# Patient Record
Sex: FEMALE | Race: WHITE | NOT HISPANIC OR LATINO | URBAN - METROPOLITAN AREA
[De-identification: names, ages, dates, MRNs, and addresses within clinical notes are randomized per-mention and may not be internally consistent; named-entity substitution may affect disease eponyms.]

---

## 2017-05-04 ENCOUNTER — OUTPATIENT (OUTPATIENT)
Dept: OUTPATIENT SERVICES | Facility: HOSPITAL | Age: 72
LOS: 1 days | Discharge: HOME | End: 2017-05-04

## 2017-06-28 DIAGNOSIS — Z79.899 OTHER LONG TERM (CURRENT) DRUG THERAPY: ICD-10-CM

## 2017-08-03 ENCOUNTER — OUTPATIENT (OUTPATIENT)
Dept: OUTPATIENT SERVICES | Facility: HOSPITAL | Age: 72
LOS: 1 days | Discharge: HOME | End: 2017-08-03

## 2017-08-03 DIAGNOSIS — Z79.899 OTHER LONG TERM (CURRENT) DRUG THERAPY: ICD-10-CM

## 2017-11-02 ENCOUNTER — OUTPATIENT (OUTPATIENT)
Dept: OUTPATIENT SERVICES | Facility: HOSPITAL | Age: 72
LOS: 1 days | Discharge: HOME | End: 2017-11-02

## 2017-11-02 DIAGNOSIS — Z79.899 OTHER LONG TERM (CURRENT) DRUG THERAPY: ICD-10-CM

## 2017-11-07 ENCOUNTER — OUTPATIENT (OUTPATIENT)
Dept: OUTPATIENT SERVICES | Facility: HOSPITAL | Age: 72
LOS: 1 days | Discharge: HOME | End: 2017-11-07

## 2017-11-07 DIAGNOSIS — Z79.899 OTHER LONG TERM (CURRENT) DRUG THERAPY: ICD-10-CM

## 2017-11-16 ENCOUNTER — OUTPATIENT (OUTPATIENT)
Dept: OUTPATIENT SERVICES | Facility: HOSPITAL | Age: 72
LOS: 1 days | Discharge: HOME | End: 2017-11-16

## 2017-11-16 DIAGNOSIS — Z79.899 OTHER LONG TERM (CURRENT) DRUG THERAPY: ICD-10-CM

## 2017-12-07 ENCOUNTER — OUTPATIENT (OUTPATIENT)
Dept: OUTPATIENT SERVICES | Facility: HOSPITAL | Age: 72
LOS: 1 days | Discharge: HOME | End: 2017-12-07

## 2017-12-07 DIAGNOSIS — Z79.899 OTHER LONG TERM (CURRENT) DRUG THERAPY: ICD-10-CM

## 2018-01-16 ENCOUNTER — OUTPATIENT (OUTPATIENT)
Dept: OUTPATIENT SERVICES | Facility: HOSPITAL | Age: 73
LOS: 1 days | Discharge: HOME | End: 2018-01-16

## 2018-01-16 DIAGNOSIS — Z12.31 ENCOUNTER FOR SCREENING MAMMOGRAM FOR MALIGNANT NEOPLASM OF BREAST: ICD-10-CM

## 2018-02-01 ENCOUNTER — OUTPATIENT (OUTPATIENT)
Dept: OUTPATIENT SERVICES | Facility: HOSPITAL | Age: 73
LOS: 1 days | Discharge: HOME | End: 2018-02-01

## 2018-02-01 DIAGNOSIS — Z79.899 OTHER LONG TERM (CURRENT) DRUG THERAPY: ICD-10-CM

## 2018-06-07 ENCOUNTER — OUTPATIENT (OUTPATIENT)
Dept: OUTPATIENT SERVICES | Facility: HOSPITAL | Age: 73
LOS: 1 days | Discharge: HOME | End: 2018-06-07

## 2018-06-07 DIAGNOSIS — Z79.899 OTHER LONG TERM (CURRENT) DRUG THERAPY: ICD-10-CM

## 2018-06-08 ENCOUNTER — OUTPATIENT (OUTPATIENT)
Dept: OUTPATIENT SERVICES | Facility: HOSPITAL | Age: 73
LOS: 1 days | Discharge: HOME | End: 2018-06-08

## 2018-06-08 DIAGNOSIS — Z79.899 OTHER LONG TERM (CURRENT) DRUG THERAPY: ICD-10-CM

## 2018-06-26 ENCOUNTER — OUTPATIENT (OUTPATIENT)
Dept: OUTPATIENT SERVICES | Facility: HOSPITAL | Age: 73
LOS: 1 days | Discharge: HOME | End: 2018-06-26

## 2018-06-26 DIAGNOSIS — Z79.899 OTHER LONG TERM (CURRENT) DRUG THERAPY: ICD-10-CM

## 2018-07-10 ENCOUNTER — OUTPATIENT (OUTPATIENT)
Dept: OUTPATIENT SERVICES | Facility: HOSPITAL | Age: 73
LOS: 1 days | Discharge: HOME | End: 2018-07-10

## 2018-07-10 DIAGNOSIS — Z79.899 OTHER LONG TERM (CURRENT) DRUG THERAPY: ICD-10-CM

## 2018-08-02 ENCOUNTER — OUTPATIENT (OUTPATIENT)
Dept: OUTPATIENT SERVICES | Facility: HOSPITAL | Age: 73
LOS: 1 days | Discharge: HOME | End: 2018-08-02

## 2018-08-02 DIAGNOSIS — Z79.899 OTHER LONG TERM (CURRENT) DRUG THERAPY: ICD-10-CM

## 2018-11-01 ENCOUNTER — OUTPATIENT (OUTPATIENT)
Dept: OUTPATIENT SERVICES | Facility: HOSPITAL | Age: 73
LOS: 1 days | Discharge: HOME | End: 2018-11-01

## 2018-11-01 DIAGNOSIS — Z79.899 OTHER LONG TERM (CURRENT) DRUG THERAPY: ICD-10-CM

## 2019-01-17 ENCOUNTER — OUTPATIENT (OUTPATIENT)
Dept: OUTPATIENT SERVICES | Facility: HOSPITAL | Age: 74
LOS: 1 days | Discharge: HOME | End: 2019-01-17

## 2019-01-17 DIAGNOSIS — Z79.899 OTHER LONG TERM (CURRENT) DRUG THERAPY: ICD-10-CM

## 2019-01-29 ENCOUNTER — OUTPATIENT (OUTPATIENT)
Dept: OUTPATIENT SERVICES | Facility: HOSPITAL | Age: 74
LOS: 1 days | Discharge: HOME | End: 2019-01-29

## 2019-01-29 DIAGNOSIS — R10.2 PELVIC AND PERINEAL PAIN: ICD-10-CM

## 2019-01-29 DIAGNOSIS — R10.9 UNSPECIFIED ABDOMINAL PAIN: ICD-10-CM

## 2019-01-30 ENCOUNTER — OUTPATIENT (OUTPATIENT)
Dept: OUTPATIENT SERVICES | Facility: HOSPITAL | Age: 74
LOS: 1 days | Discharge: HOME | End: 2019-01-30

## 2019-01-31 DIAGNOSIS — Z79.899 OTHER LONG TERM (CURRENT) DRUG THERAPY: ICD-10-CM

## 2019-02-07 ENCOUNTER — OUTPATIENT (OUTPATIENT)
Dept: OUTPATIENT SERVICES | Facility: HOSPITAL | Age: 74
LOS: 1 days | Discharge: HOME | End: 2019-02-07

## 2019-02-07 DIAGNOSIS — Z79.899 OTHER LONG TERM (CURRENT) DRUG THERAPY: ICD-10-CM

## 2019-04-30 ENCOUNTER — OUTPATIENT (OUTPATIENT)
Dept: OUTPATIENT SERVICES | Facility: HOSPITAL | Age: 74
LOS: 1 days | Discharge: HOME | End: 2019-04-30

## 2019-04-30 DIAGNOSIS — Z79.899 OTHER LONG TERM (CURRENT) DRUG THERAPY: ICD-10-CM

## 2019-07-02 ENCOUNTER — OUTPATIENT (OUTPATIENT)
Dept: OUTPATIENT SERVICES | Facility: HOSPITAL | Age: 74
LOS: 1 days | Discharge: HOME | End: 2019-07-02

## 2019-07-02 DIAGNOSIS — Z79.899 OTHER LONG TERM (CURRENT) DRUG THERAPY: ICD-10-CM

## 2019-07-09 ENCOUNTER — OUTPATIENT (OUTPATIENT)
Dept: OUTPATIENT SERVICES | Facility: HOSPITAL | Age: 74
LOS: 1 days | Discharge: HOME | End: 2019-07-09

## 2019-07-09 DIAGNOSIS — Z79.899 OTHER LONG TERM (CURRENT) DRUG THERAPY: ICD-10-CM

## 2019-07-16 ENCOUNTER — OUTPATIENT (OUTPATIENT)
Dept: OUTPATIENT SERVICES | Facility: HOSPITAL | Age: 74
LOS: 1 days | Discharge: HOME | End: 2019-07-16

## 2019-07-16 DIAGNOSIS — Z79.899 OTHER LONG TERM (CURRENT) DRUG THERAPY: ICD-10-CM

## 2019-08-08 ENCOUNTER — OUTPATIENT (OUTPATIENT)
Dept: OUTPATIENT SERVICES | Facility: HOSPITAL | Age: 74
LOS: 1 days | Discharge: HOME | End: 2019-08-08

## 2019-08-08 DIAGNOSIS — Z79.899 OTHER LONG TERM (CURRENT) DRUG THERAPY: ICD-10-CM

## 2019-09-20 ENCOUNTER — OUTPATIENT (OUTPATIENT)
Dept: OUTPATIENT SERVICES | Facility: HOSPITAL | Age: 74
LOS: 1 days | Discharge: HOME | End: 2019-09-20

## 2019-09-20 DIAGNOSIS — Z79.899 OTHER LONG TERM (CURRENT) DRUG THERAPY: ICD-10-CM

## 2019-10-17 ENCOUNTER — OUTPATIENT (OUTPATIENT)
Dept: OUTPATIENT SERVICES | Facility: HOSPITAL | Age: 74
LOS: 1 days | Discharge: HOME | End: 2019-10-17

## 2019-10-17 DIAGNOSIS — Z79.899 OTHER LONG TERM (CURRENT) DRUG THERAPY: ICD-10-CM

## 2019-11-13 ENCOUNTER — APPOINTMENT (OUTPATIENT)
Dept: CARDIOLOGY | Facility: CLINIC | Age: 74
End: 2019-11-13
Payer: MEDICARE

## 2019-11-13 ENCOUNTER — OTHER (OUTPATIENT)
Age: 74
End: 2019-11-13

## 2019-11-13 DIAGNOSIS — Z78.9 OTHER SPECIFIED HEALTH STATUS: ICD-10-CM

## 2019-11-13 DIAGNOSIS — I82.402 ACUTE EMBOLISM AND THROMBOSIS OF UNSPECIFIED DEEP VEINS OF LEFT LOWER EXTREMITY: ICD-10-CM

## 2019-11-13 DIAGNOSIS — Z86.39 PERSONAL HISTORY OF OTHER ENDOCRINE, NUTRITIONAL AND METABOLIC DISEASE: ICD-10-CM

## 2019-11-13 DIAGNOSIS — Z86.79 PERSONAL HISTORY OF OTHER DISEASES OF THE CIRCULATORY SYSTEM: ICD-10-CM

## 2019-11-13 PROBLEM — Z00.00 ENCOUNTER FOR PREVENTIVE HEALTH EXAMINATION: Status: ACTIVE | Noted: 2019-11-13

## 2019-11-13 PROCEDURE — 93000 ELECTROCARDIOGRAM COMPLETE: CPT

## 2019-11-13 PROCEDURE — 99204 OFFICE O/P NEW MOD 45 MIN: CPT

## 2019-11-13 NOTE — ASSESSMENT
[FreeTextEntry1] : DVT - on anticoagulation with Xarelto\par \par DM type 2 on rachel medical therapy\par \par HTN - controlled\par \par ECG - SB at 52. No syncope\par \par Systolic murmur - obtain 2 D echo\par \par If normal, c/w primary prevention, risk factor control.

## 2019-11-13 NOTE — PHYSICAL EXAM
[Normal Appearance] : normal appearance [General Appearance - Well Developed] : well developed [General Appearance - Well Nourished] : well nourished [Well Groomed] : well groomed [No Deformities] : no deformities [General Appearance - In No Acute Distress] : no acute distress [Normal Conjunctiva] : the conjunctiva exhibited no abnormalities [Eyelids - No Xanthelasma] : the eyelids demonstrated no xanthelasmas [Normal Oral Mucosa] : normal oral mucosa [No Oral Pallor] : no oral pallor [Heart Rate And Rhythm] : heart rate and rhythm were normal [Heart Sounds] : normal S1 and S2 [Systolic grade ___/6] : A grade [unfilled]/6 systolic murmur was heard. [] : no respiratory distress [Exaggerated Use Of Accessory Muscles For Inspiration] : no accessory muscle use [Respiration, Rhythm And Depth] : normal respiratory rhythm and effort [Bowel Sounds] : normal bowel sounds [Auscultation Breath Sounds / Voice Sounds] : lungs were clear to auscultation bilaterally [Abdomen Soft] : soft [Abdomen Tenderness] : non-tender [FreeTextEntry1] : using walker [Nail Clubbing] : no clubbing of the fingernails [Cyanosis, Localized] : no localized cyanosis [Skin Color & Pigmentation] : normal skin color and pigmentation [Petechial Hemorrhages (___cm)] : no petechial hemorrhages [No Venous Stasis] : no venous stasis [Oriented To Time, Place, And Person] : oriented to person, place, and time [Affect] : the affect was normal

## 2019-11-13 NOTE — HISTORY OF PRESENT ILLNESS
[FreeTextEntry1] : 73 y/o female with history of HTN, DL, T2DM, DVT on Xarelto, presenting for initial evaluation. ECG with mild bradycardia. No syncope. + edema. + mild dyspnea. No chest pain. No bleeding with Xarelto, but does report increased bruising. Complaint with medical therapy.

## 2019-11-13 NOTE — REVIEW OF SYSTEMS
[Eyeglasses] : currently wearing eyeglasses [Joint Pain] : joint pain [Easy Bruising] : a tendency for easy bruising [see HPI] : see HPI [Easy Bleeding] : no tendency for easy bleeding [Negative] : Endocrine

## 2019-11-14 ENCOUNTER — OUTPATIENT (OUTPATIENT)
Dept: OUTPATIENT SERVICES | Facility: HOSPITAL | Age: 74
LOS: 1 days | Discharge: HOME | End: 2019-11-14

## 2019-11-14 DIAGNOSIS — Z79.899 OTHER LONG TERM (CURRENT) DRUG THERAPY: ICD-10-CM

## 2019-12-12 ENCOUNTER — APPOINTMENT (OUTPATIENT)
Dept: CARDIOLOGY | Facility: CLINIC | Age: 74
End: 2019-12-12
Payer: MEDICARE

## 2019-12-12 PROCEDURE — 93306 TTE W/DOPPLER COMPLETE: CPT

## 2019-12-31 ENCOUNTER — OUTPATIENT (OUTPATIENT)
Dept: OUTPATIENT SERVICES | Facility: HOSPITAL | Age: 74
LOS: 1 days | Discharge: HOME | End: 2019-12-31

## 2019-12-31 DIAGNOSIS — Z79.899 OTHER LONG TERM (CURRENT) DRUG THERAPY: ICD-10-CM

## 2020-06-08 ENCOUNTER — APPOINTMENT (OUTPATIENT)
Dept: CARDIOLOGY | Facility: CLINIC | Age: 75
End: 2020-06-08

## 2020-11-02 ENCOUNTER — APPOINTMENT (OUTPATIENT)
Dept: CARDIOLOGY | Facility: CLINIC | Age: 75
End: 2020-11-02
Payer: MEDICARE

## 2020-11-02 VITALS
HEIGHT: 60 IN | HEART RATE: 63 BPM | BODY MASS INDEX: 26.5 KG/M2 | SYSTOLIC BLOOD PRESSURE: 136 MMHG | DIASTOLIC BLOOD PRESSURE: 84 MMHG | TEMPERATURE: 98.1 F | WEIGHT: 135 LBS

## 2020-11-02 PROCEDURE — 93000 ELECTROCARDIOGRAM COMPLETE: CPT

## 2020-11-02 PROCEDURE — 99213 OFFICE O/P EST LOW 20 MIN: CPT

## 2020-11-02 NOTE — REVIEW OF SYSTEMS
[Eyeglasses] : currently wearing eyeglasses [see HPI] : see HPI [Joint Pain] : joint pain [Easy Bleeding] : no tendency for easy bleeding [Easy Bruising] : a tendency for easy bruising [Negative] : Endocrine

## 2020-11-02 NOTE — HISTORY OF PRESENT ILLNESS
[FreeTextEntry1] : 76 y/o female with history of HTN, DL, T2DM, DVT - completed Xarelto, presenting for initial evaluation. ECG with mild bradycardia. No syncope. + edema - improved. + mild dyspnea. No chest pain.  Complaint with medical therapy.  Mild aortic stenosis.

## 2020-11-02 NOTE — ASSESSMENT
[FreeTextEntry1] : DVT - completed anticoagulation with Xarelto\par \par DM type 2 on oral medical therapy - monitor A1c with the PMD.\par \par HTN - controlled\par \par ECG - SB at 52. No syncope\par \par Systolic murmur, mild AS - obtain 2 D echo in 1 year.\par c/w primary prevention, risk factor control.\par F/u in 1 year.

## 2020-11-02 NOTE — PHYSICAL EXAM
[General Appearance - Well Developed] : well developed [Normal Appearance] : normal appearance [Well Groomed] : well groomed [General Appearance - Well Nourished] : well nourished [No Deformities] : no deformities [General Appearance - In No Acute Distress] : no acute distress [Normal Conjunctiva] : the conjunctiva exhibited no abnormalities [Eyelids - No Xanthelasma] : the eyelids demonstrated no xanthelasmas [] : no respiratory distress [Respiration, Rhythm And Depth] : normal respiratory rhythm and effort [Exaggerated Use Of Accessory Muscles For Inspiration] : no accessory muscle use [Auscultation Breath Sounds / Voice Sounds] : lungs were clear to auscultation bilaterally [Heart Rate And Rhythm] : heart rate and rhythm were normal [Heart Sounds] : normal S1 and S2 [Systolic grade ___/6] : A grade [unfilled]/6 systolic murmur was heard. [Bowel Sounds] : normal bowel sounds [Abdomen Soft] : soft [Abdomen Tenderness] : non-tender [FreeTextEntry1] : using walker [Nail Clubbing] : no clubbing of the fingernails [Cyanosis, Localized] : no localized cyanosis [Petechial Hemorrhages (___cm)] : no petechial hemorrhages [Skin Color & Pigmentation] : normal skin color and pigmentation [No Venous Stasis] : no venous stasis [Oriented To Time, Place, And Person] : oriented to person, place, and time [Affect] : the affect was normal

## 2021-03-08 ENCOUNTER — APPOINTMENT (OUTPATIENT)
Dept: CARDIOLOGY | Facility: CLINIC | Age: 76
End: 2021-03-08
Payer: MEDICARE

## 2021-03-08 VITALS
HEIGHT: 60 IN | HEART RATE: 53 BPM | TEMPERATURE: 97.4 F | WEIGHT: 134 LBS | BODY MASS INDEX: 26.31 KG/M2 | SYSTOLIC BLOOD PRESSURE: 160 MMHG | DIASTOLIC BLOOD PRESSURE: 90 MMHG

## 2021-03-08 DIAGNOSIS — I35.0 NONRHEUMATIC AORTIC (VALVE) STENOSIS: ICD-10-CM

## 2021-03-08 DIAGNOSIS — I50.32 CHRONIC DIASTOLIC (CONGESTIVE) HEART FAILURE: ICD-10-CM

## 2021-03-08 DIAGNOSIS — E78.5 HYPERLIPIDEMIA, UNSPECIFIED: ICD-10-CM

## 2021-03-08 DIAGNOSIS — I10 ESSENTIAL (PRIMARY) HYPERTENSION: ICD-10-CM

## 2021-03-08 DIAGNOSIS — E11.9 TYPE 2 DIABETES MELLITUS W/OUT COMPLICATIONS: ICD-10-CM

## 2021-03-08 PROCEDURE — 93000 ELECTROCARDIOGRAM COMPLETE: CPT

## 2021-03-08 PROCEDURE — 99213 OFFICE O/P EST LOW 20 MIN: CPT

## 2021-03-08 RX ORDER — HYDROCORTISONE 25 MG/G
2.5 CREAM TOPICAL
Qty: 30 | Refills: 0 | Status: DISCONTINUED | COMMUNITY
Start: 2020-07-01 | End: 2021-03-08

## 2021-03-08 NOTE — HISTORY OF PRESENT ILLNESS
[FreeTextEntry1] : 74 y/o female with history of HTN, DL, T2DM, DVT - completed Xarelto, presenting for f/u. ECG with mild bradycardia, blocked APC. No syncope. + edema - improved. + mild dyspnea. No chest pain.  Complaint with medical therapy.  Mild aortic stenosis. BP elevated today - did not take her medications.

## 2021-03-08 NOTE — ASSESSMENT
[FreeTextEntry1] : DVT - completed anticoagulation with Xarelto\par \par DM type 2 on oral medical therapy - monitor A1c with the PMD.\par \par HTN - controlled\par \par ECG - SB at 50's. No syncope\par \par Systolic murmur, mild AS - obtain 2 D echo in 1 year.\par c/w primary prevention, risk factor control.\par F/u in 1 year.

## 2021-07-17 ENCOUNTER — INPATIENT (INPATIENT)
Facility: HOSPITAL | Age: 76
LOS: 8 days | Discharge: NURSING, REHAB OR LONG-TERM CARE | End: 2021-07-26
Attending: INTERNAL MEDICINE | Admitting: INTERNAL MEDICINE
Payer: MEDICARE

## 2021-07-17 VITALS
RESPIRATION RATE: 18 BRPM | SYSTOLIC BLOOD PRESSURE: 187 MMHG | DIASTOLIC BLOOD PRESSURE: 77 MMHG | TEMPERATURE: 98 F | OXYGEN SATURATION: 98 % | HEART RATE: 52 BPM

## 2021-07-17 LAB
ALBUMIN SERPL ELPH-MCNC: 5 G/DL — SIGNIFICANT CHANGE UP (ref 3.5–5.2)
ALP SERPL-CCNC: 145 U/L — HIGH (ref 30–115)
ALT FLD-CCNC: 49 U/L — HIGH (ref 0–41)
ANION GAP SERPL CALC-SCNC: 12 MMOL/L — SIGNIFICANT CHANGE UP (ref 7–14)
APAP SERPL-MCNC: <5 UG/ML — LOW (ref 10–30)
APPEARANCE UR: CLEAR — SIGNIFICANT CHANGE UP
AST SERPL-CCNC: 15 U/L — SIGNIFICANT CHANGE UP (ref 0–41)
BACTERIA # UR AUTO: ABNORMAL
BASOPHILS # BLD AUTO: 0.02 K/UL — SIGNIFICANT CHANGE UP (ref 0–0.2)
BASOPHILS NFR BLD AUTO: 0.2 % — SIGNIFICANT CHANGE UP (ref 0–1)
BILIRUB SERPL-MCNC: 0.3 MG/DL — SIGNIFICANT CHANGE UP (ref 0.2–1.2)
BILIRUB UR-MCNC: NEGATIVE — SIGNIFICANT CHANGE UP
BUN SERPL-MCNC: 49 MG/DL — HIGH (ref 10–20)
CALCIUM SERPL-MCNC: 10.2 MG/DL — HIGH (ref 8.5–10.1)
CHLORIDE SERPL-SCNC: 109 MMOL/L — SIGNIFICANT CHANGE UP (ref 98–110)
CO2 SERPL-SCNC: 19 MMOL/L — SIGNIFICANT CHANGE UP (ref 17–32)
COLOR SPEC: SIGNIFICANT CHANGE UP
CREAT SERPL-MCNC: 2.1 MG/DL — HIGH (ref 0.7–1.5)
DIFF PNL FLD: SIGNIFICANT CHANGE UP
EOSINOPHIL # BLD AUTO: 0.24 K/UL — SIGNIFICANT CHANGE UP (ref 0–0.7)
EOSINOPHIL NFR BLD AUTO: 2.6 % — SIGNIFICANT CHANGE UP (ref 0–8)
EPI CELLS # UR: 1 /HPF — SIGNIFICANT CHANGE UP (ref 0–5)
ETHANOL SERPL-MCNC: <10 MG/DL — SIGNIFICANT CHANGE UP
GLUCOSE SERPL-MCNC: 153 MG/DL — HIGH (ref 70–99)
GLUCOSE UR QL: NEGATIVE — SIGNIFICANT CHANGE UP
HCT VFR BLD CALC: 32.6 % — LOW (ref 37–47)
HGB BLD-MCNC: 9.9 G/DL — LOW (ref 12–16)
HYALINE CASTS # UR AUTO: 2 /LPF — SIGNIFICANT CHANGE UP (ref 0–7)
IMM GRANULOCYTES NFR BLD AUTO: 0.8 % — HIGH (ref 0.1–0.3)
KETONES UR-MCNC: NEGATIVE — SIGNIFICANT CHANGE UP
LEUKOCYTE ESTERASE UR-ACNC: ABNORMAL
LITHIUM SERPL-MCNC: 1.68 MMOL/L — CRITICAL HIGH (ref 0.6–1.2)
LYMPHOCYTES # BLD AUTO: 1.5 K/UL — SIGNIFICANT CHANGE UP (ref 1.2–3.4)
LYMPHOCYTES # BLD AUTO: 16.4 % — LOW (ref 20.5–51.1)
MCHC RBC-ENTMCNC: 26.5 PG — LOW (ref 27–31)
MCHC RBC-ENTMCNC: 30.4 G/DL — LOW (ref 32–37)
MCV RBC AUTO: 87.2 FL — SIGNIFICANT CHANGE UP (ref 81–99)
MONOCYTES # BLD AUTO: 0.37 K/UL — SIGNIFICANT CHANGE UP (ref 0.1–0.6)
MONOCYTES NFR BLD AUTO: 4 % — SIGNIFICANT CHANGE UP (ref 1.7–9.3)
NEUTROPHILS # BLD AUTO: 6.95 K/UL — HIGH (ref 1.4–6.5)
NEUTROPHILS NFR BLD AUTO: 76 % — HIGH (ref 42.2–75.2)
NITRITE UR-MCNC: NEGATIVE — SIGNIFICANT CHANGE UP
NRBC # BLD: 0 /100 WBCS — SIGNIFICANT CHANGE UP (ref 0–0)
PH UR: 6.5 — SIGNIFICANT CHANGE UP (ref 5–8)
PLATELET # BLD AUTO: 205 K/UL — SIGNIFICANT CHANGE UP (ref 130–400)
POTASSIUM SERPL-MCNC: 5.6 MMOL/L — HIGH (ref 3.5–5)
POTASSIUM SERPL-SCNC: 5.6 MMOL/L — HIGH (ref 3.5–5)
PROT SERPL-MCNC: 7.7 G/DL — SIGNIFICANT CHANGE UP (ref 6–8)
PROT UR-MCNC: ABNORMAL
RBC # BLD: 3.74 M/UL — LOW (ref 4.2–5.4)
RBC # FLD: 14.3 % — SIGNIFICANT CHANGE UP (ref 11.5–14.5)
RBC CASTS # UR COMP ASSIST: 1 /HPF — SIGNIFICANT CHANGE UP (ref 0–4)
SALICYLATES SERPL-MCNC: <0.3 MG/DL — LOW (ref 4–30)
SARS-COV-2 RNA SPEC QL NAA+PROBE: DETECTED
SODIUM SERPL-SCNC: 140 MMOL/L — SIGNIFICANT CHANGE UP (ref 135–146)
SP GR SPEC: 1.01 — SIGNIFICANT CHANGE UP (ref 1.01–1.03)
UROBILINOGEN FLD QL: SIGNIFICANT CHANGE UP
WBC # BLD: 9.15 K/UL — SIGNIFICANT CHANGE UP (ref 4.8–10.8)
WBC # FLD AUTO: 9.15 K/UL — SIGNIFICANT CHANGE UP (ref 4.8–10.8)
WBC UR QL: 87 /HPF — HIGH (ref 0–5)

## 2021-07-17 PROCEDURE — 70450 CT HEAD/BRAIN W/O DYE: CPT | Mod: 26,ME

## 2021-07-17 PROCEDURE — G1004: CPT

## 2021-07-17 PROCEDURE — 93010 ELECTROCARDIOGRAM REPORT: CPT

## 2021-07-17 PROCEDURE — 71045 X-RAY EXAM CHEST 1 VIEW: CPT | Mod: 26

## 2021-07-17 PROCEDURE — 99285 EMERGENCY DEPT VISIT HI MDM: CPT | Mod: CS,GC

## 2021-07-17 RX ORDER — INSULIN HUMAN 100 [IU]/ML
10 INJECTION, SOLUTION SUBCUTANEOUS ONCE
Refills: 0 | Status: COMPLETED | OUTPATIENT
Start: 2021-07-17 | End: 2021-07-17

## 2021-07-17 RX ORDER — HALOPERIDOL DECANOATE 100 MG/ML
0.5 INJECTION INTRAMUSCULAR ONCE
Refills: 0 | Status: COMPLETED | OUTPATIENT
Start: 2021-07-17 | End: 2021-07-17

## 2021-07-17 RX ORDER — DEXTROSE 50 % IN WATER 50 %
50 SYRINGE (ML) INTRAVENOUS ONCE
Refills: 0 | Status: COMPLETED | OUTPATIENT
Start: 2021-07-17 | End: 2021-07-17

## 2021-07-17 RX ORDER — SODIUM ZIRCONIUM CYCLOSILICATE 10 G/10G
5 POWDER, FOR SUSPENSION ORAL ONCE
Refills: 0 | Status: COMPLETED | OUTPATIENT
Start: 2021-07-17 | End: 2021-07-18

## 2021-07-17 RX ORDER — SODIUM CHLORIDE 9 MG/ML
1000 INJECTION, SOLUTION INTRAVENOUS ONCE
Refills: 0 | Status: COMPLETED | OUTPATIENT
Start: 2021-07-17 | End: 2021-07-17

## 2021-07-17 RX ORDER — ATROPINE SULFATE 0.1 MG/ML
0.5 SYRINGE (ML) INJECTION ONCE
Refills: 0 | Status: DISCONTINUED | OUTPATIENT
Start: 2021-07-17 | End: 2021-07-26

## 2021-07-17 RX ORDER — CALCIUM GLUCONATE 100 MG/ML
1 VIAL (ML) INTRAVENOUS ONCE
Refills: 0 | Status: DISCONTINUED | OUTPATIENT
Start: 2021-07-17 | End: 2021-07-18

## 2021-07-17 RX ORDER — CEFTRIAXONE 500 MG/1
1000 INJECTION, POWDER, FOR SOLUTION INTRAMUSCULAR; INTRAVENOUS ONCE
Refills: 0 | Status: COMPLETED | OUTPATIENT
Start: 2021-07-17 | End: 2021-07-17

## 2021-07-17 RX ADMIN — CEFTRIAXONE 100 MILLIGRAM(S): 500 INJECTION, POWDER, FOR SOLUTION INTRAMUSCULAR; INTRAVENOUS at 21:37

## 2021-07-17 RX ADMIN — SODIUM CHLORIDE 1000 MILLILITER(S): 9 INJECTION, SOLUTION INTRAVENOUS at 21:36

## 2021-07-17 RX ADMIN — Medication 50 MILLILITER(S): at 23:58

## 2021-07-17 NOTE — ED ADULT NURSE NOTE - NSIMPLEMENTINTERV_GEN_ALL_ED
Implemented All Universal Safety Interventions:  [place] to call system. Call bell, personal items and telephone within reach. Instruct patient to call for assistance. Room bathroom lighting operational. Non-slip footwear when patient is off stretcher. Physically safe environment: no spills, clutter or unnecessary equipment. Stretcher in lowest position, wheels locked, appropriate side rails in place.

## 2021-07-17 NOTE — ED ADULT NURSE NOTE - SUICIDE SCREENING QUESTION 2
Problem: Patient Care Overview (Adult)  Goal: Plan of Care Review  Outcome: Ongoing (interventions implemented as appropriate)   01/16/18 0318   Coping/Psychosocial Response Interventions   Plan Of Care Reviewed With patient   Patient Care Overview   Progress no change   Outcome Evaluation   Outcome Summary/Follow up Plan pain controlled with PO pain meds; ambulates with assist; voiding; recieved 2 units of blood; educated pt about importance of BP monitoring r/t hx of HTN; plan for surgery in afternoon     Goal: Adult Individualization and Mutuality  Outcome: Ongoing (interventions implemented as appropriate)      Problem: Fall Risk (Adult)  Goal: Identify Related Risk Factors and Signs and Symptoms  Outcome: Outcome(s) achieved Date Met: 01/16/18    Goal: Absence of Falls  Outcome: Ongoing (interventions implemented as appropriate)      Problem: Pain, Acute (Adult)  Goal: Identify Related Risk Factors and Signs and Symptoms  Outcome: Outcome(s) achieved Date Met: 01/16/18    Goal: Acceptable Pain Control/Comfort Level  Outcome: Ongoing (interventions implemented as appropriate)      Problem: Orthopaedic Fracture (Adult)  Goal: Signs and Symptoms of Listed Potential Problems Will be Absent or Manageable (Orthopaedic Fracture)  Outcome: Ongoing (interventions implemented as appropriate)      Problem: Self-Care Deficit (Adult,Obstetrics,Pediatric)  Goal: Identify Related Risk Factors and Signs and Symptoms  Outcome: Outcome(s) achieved Date Met: 01/16/18    Goal: Improved Ability to Perform BADL and IADL  Outcome: Ongoing (interventions implemented as appropriate)         No

## 2021-07-17 NOTE — ED PROVIDER NOTE - ATTENDING CONTRIBUTION TO CARE
76 yo F with PMH of CHF, DM, Bipolar presents to ED for confusion. Pt lives at [facility] and called the son saying that patient seemed more confused and was walking around the halls. Son states pt has become more forgetful recently.   pt does not have any concerns- no SOB, CP, fever, abdominal pain, palpitations.   according to the son pt has refused some of her lithium doses for the past 2 months and her levels have been low but the facility now is watching her.     Const: Well nourished, well developed, appears stated age  Eyes: PERRL, no conjunctival injection  HENT:  Neck supple without meningismus   CV: RRR, Warm, well-perfused extremities  RESP: CTA B/L, no tachypnea   GI: soft, non-tender, non-distended  MSK: No gross deformities appreciated  Skin: Warm, dry. No rashes  Neuro: Alert to person. Not time. Pt able to answer what type of building this is when prompted (school vs hospital vs home) CNs II-XII grossly intact. Sensation and motor function of extremities grossly intact.  Psych: Appropriate mood and affect.    CT, labs, UA, CXR

## 2021-07-17 NOTE — ED ADULT NURSE NOTE - OBJECTIVE STATEMENT
Call- [address] shows a gallstone and some fatty liver. Neither need any intervention .  Just be aware of pain below the right rib cage, usually after eating Pt noted  by the staff at [facility] confused Altered mental status from today in AM , increase of forgetfulness ,

## 2021-07-17 NOTE — ED PROVIDER NOTE - PHYSICAL EXAMINATION
Vital Signs: Reviewed  GEN: AOx1, NAD, speaks full sentences, follows commands  HEAD:  normocephalic, atraumatic  EYES:  PERRLA; conjunctivae without injection, drainage or discharge  ENMT:  nasal mucosa moist; mouth moist without ulcerations or lesions; throat moist without erythema, exudate, ulcerations or lesions  NECK:  supple  CARDIAC:  regular rate, normal S1 and S2, no murmurs  RESP:  respiratory rate and effort appear normal for age; lungs are clear to auscultation bilaterally; no rales or wheezes  ABDOMEN:  soft, nontender, nondistended  MUSCULOSKELETAL/NEURO: CNII-XII intact, strength 5/5 b/l UE LE, Rhomberg neg; normal movement, normal tone  SKIN:  normal skin color for age and race, well-perfused; warm and dry

## 2021-07-17 NOTE — H&P ADULT - HISTORY OF PRESENT ILLNESS
Patient is poor historian. history taken from chart.  "75y F pmhx HTN, DM, MDD, bipolar on lithium, anxiety send in by [facility] due to AMS x3-4 days; constant, stable; per NH and son at bedside pt has been more forgetful than usual recently (might have early dementia but sx have never been this severe), with impairment in short-term memory. Pt has no complaints--denies chest pain, fever/chills, SOB, abd pain, n/v/d."    Spoke with Aid at [facility] ([phone]) Ms. Linton, as per aid patient was complaining of diarrhea this morning but she was not letting anyone go to the bathroom to check. Patient had received COVID vaccine (does not remember date). Aid denies any recent cough, fever, sob.    In ED: vitals Temp 98.3; HR 52, /77; RR 18; SaO2 98% on room air. UA suggestive of UTI. Patient received 1L LR bolus + Ceftriaxone 1000 mg x 1.  EKG in ED noted for Sinus bradycardia. Patient is poor historian. history taken from chart.  "75y F pmhx HTN, DM, MDD, bipolar on lithium, anxiety send in by [facility] (assisted living)          due to AMS x3-4 days; constant, stable; per NH and son at bedside pt has been more forgetful than usual recently (might have early dementia but sx have never been this severe), with impairment in short-term memory. Pt has no complaints--denies chest pain, fever/chills, SOB, abd pain, n/v/d."    Spoke with Aid at [facility] ([phone]) Ms. Linton, as per aid patient was complaining of diarrhea this morning but she was not letting anyone go to the bathroom to check. Patient had received COVID vaccine (does not remember date). Aid denies any recent cough, fever, sob.    In ED: vitals Temp 98.3; HR 52, /77; RR 18; SaO2 98% on room air. UA suggestive of UTI. Patient received 1L LR bolus + Ceftriaxone 1000 mg x 1.  EKG in ED noted for Sinus bradycardia.

## 2021-07-17 NOTE — ED PROVIDER NOTE - PMH
Bipolar disorder    DM (diabetes mellitus)    HTN (hypertension)    MDD (major depressive disorder)

## 2021-07-17 NOTE — ED PROVIDER NOTE - CLINICAL SUMMARY MEDICAL DECISION MAKING FREE TEXT BOX
76 yo F presented to ED for AMS. Pt more confused recently. CT head unchanged. Lithium level increased to 1.8. Labs otherwise unremarkable. +UTI. Will admit for treatment.

## 2021-07-17 NOTE — H&P ADULT - NSHPADDITIONALINFOADULT_GEN_ALL_CORE
Patient seen today, H&P read and appreciated. H& P amended, patient is a resident of [facility], assisted living facility.  Will check registry for Covid vaccination status.    Vs noted, BP remains elevated despite dose of IV Hydralazine today. Most of patient's BP medications held    NAD, patient anxious  lungs shallow  heart S1S2  abdomen benign  extremities no edema    COVID-19 PCR: Detected: You can help in the fight against COVID-19. [facility] may contact     Lithium Level, Serum: 1.38 mmol/L (07.18.21 @ 08:15)   Lithium Level, Serum: 1.68: Critical value:called to/read back by dr alford @ 5:50pm 7/17/21. mmol/L     Creatinine, Serum: 1.9 mg/dL (07.18.21 @ 08:15)   Creatinine, Serum: 2.0 mg/dL (07.18.21 @ 00:32)   Creatinine, Serum: 2.1 mg/dL (07.17.21 @ 17:00)     Blood Urea Nitrogen, Serum: 41 mg/dL (07.18.21 @ 08:15)   Blood Urea Nitrogen, Serum: 41 mg/dL (07.18.21 @ 00:32)   Blood Urea Nitrogen, Serum: 49 mg/dL (07.17.21 @ 17:00)

## 2021-07-17 NOTE — ED PROVIDER NOTE - OBJECTIVE STATEMENT
75yF pmhx HTN, DM, MDD, bipolar on lithium, anxiety send in by [facility] due to AMS x3-4 days; constant, stable; per NH and son at bedside pt has been more forgetful than usual recently (might have early dementia but sx have never been this severe), with impairment in short-term memory. Pt has no complaints--denies chest pain, fever/chills, SOB, abd pain, n/v/d.

## 2021-07-17 NOTE — ED PROVIDER NOTE - CARE PLAN
Principal Discharge DX:	Urinary tract infection  Secondary Diagnosis:	Metabolic encephalopathy  Secondary Diagnosis:	Elevated lithium level

## 2021-07-17 NOTE — H&P ADULT - NSHPPHYSICALEXAM_GEN_ALL_CORE
T(F): 98.7 (07-17-21 @ 22:54), Max: 98.7 (07-17-21 @ 22:54)  HR: 45 (07-17-21 @ 22:54) (45 - 54)  BP: 175/73 (07-17-21 @ 22:54) (175/73 - 211/85)  RR: 20 (07-17-21 @ 22:54) (18 - 20)  SpO2: 96% (07-17-21 @ 22:54) (96% - 100%)      PHYSICAL EXAM:  GENERAL: NAD, well-developed  CHEST/LUNG: CTAB; No wheeze  HEART: bradycardia, no murmur  ABDOMEN: Soft, NT/ND; BS present  EXTREMITIES:  No cyanosis, or edema  NEUROLOGY: Patient is awake and alert but not oriented to time, place or person.  SKIN: No rashes or lesions

## 2021-07-17 NOTE — H&P ADULT - NSHPLABSRESULTS_GEN_ALL_CORE
.                          9.9    9.15  )-----------( 205      ( 2021 17:00 )             32.6         140  |  109  |  49<H>  ----------------------------<  153<H>  5.6<H>   |  19  |  2.1<H>    Ca    10.2<H>      2021 17:00    TPro  7.7  /  Alb  5.0  /  TBili  0.3  /  DBili  x   /  AST  15  /  ALT  49<H>  /  AlkPhos  145<H>          LIVER FUNCTIONS - ( 2021 17:00 )  Alb: 5.0 g/dL / Pro: 7.7 g/dL / ALK PHOS: 145 U/L / ALT: 49 U/L / AST: 15 U/L / GGT: x             COVID-19 PCR: Detected (2021 20:30)    EKG: Sinus bradycardia (21)    Urinalysis Basic - ( 2021 19:20 )    Color: Light Yellow / Appearance: Clear / S.014 / pH: x  Gluc: x / Ketone: Negative  / Bili: Negative / Urobili: <2 mg/dL   Blood: x / Protein: 300 mg/dL / Nitrite: Negative   Leuk Esterase: Large / RBC: 1 /HPF / WBC 87 /HPF   Sq Epi: x / Non Sq Epi: 1 /HPF / Bacteria: Many      RADIOLOGY & ADDITIONAL STUDIES:  < from: CT Head No Cont (21 @ 17:12) >    No CT evidence ofacute intracranial pathology.    < end of copied text >

## 2021-07-17 NOTE — ED PROVIDER NOTE - PROGRESS NOTE DETAILS
ER: spoke to the MAR regarding pts repeat ekg. noted to be bradycardic and also concern for peak t waves. MAR aware.

## 2021-07-17 NOTE — H&P ADULT - TIME BILLING
ASHLEY, dehydration secondary to diarrhea, vs Covid 19 infection  - responding to IVF  - continue hydration  - BUN, Creat trending downward  - GFR improving  - continue to hold ACE, Lithium and Metformin    HTN  - on Amlodipine, given home dose this am  - hold ACE, Lasix  - resume Catapres today, give extra dose of 0.1 mg now    Metabolic encephalopathy multi factorial   - Lithium toxicity  - ASHLEY, dehydration  - Covid related?    Covid 19 Infection  - verify immunizations, will check office record and registry  - obtain Covid Antibodies  - obtain, Procalcitonin level, CRP, Ferritin, D-dimer and LDH level  - would obtain ID evaluation  - isolation as per protocol  - notify [facility] of Infection    DM  - hold Metformin  - treat with insulin as needed    Bipolar Disorder  - hold lithium  - would obtain psychiatry evaluation ASHLEY, dehydration secondary to diarrhea, vs Covid 19 infection  - responding to IVF  - continue hydration  - BUN, Creat trending downward  - GFR improving  - continue to hold ACE, Lithium and Metformin    UTI  - on IV Rocephin  - await culture results    HTN  - on Amlodipine, given home dose this am  - hold ACE, Lasix  - resume Catapres today, give extra dose of 0.1 mg now    Metabolic encephalopathy multi factorial   - Lithium toxicity  - ASHLEY, dehydration  - Covid related?    Covid 19 Infection  - verify immunizations, will check office record and registry  - obtain Covid Antibodies  - obtain, Procalcitonin level, CRP, Ferritin, D-dimer and LDH level  - would obtain ID evaluation  - isolation as per protocol  - notify [facility] of Infection    DM  - hold Metformin  - treat with insulin as needed    Bipolar Disorder  - hold lithium  - would obtain psychiatry evaluation

## 2021-07-17 NOTE — H&P ADULT - ASSESSMENT
75y F pmhx HTN, DM, MDD, bipolar on lithium, anxiety send in by [facility] due to AMS x3-4 days    # AMS  - likely metabolic encephalopathy 2/2 UTI vs Li toxicity vs ASHLEY on CKD  - CT head negative  - UA suggestive of UTI  - s/p Rocephin 1 gm x 1. UCx sent  - start Rocephin 1 gm q 24 hr  - follow UCx      # Li toxicity  - stop PO Li for now. single dose of haloperidol 0.5 mg ordered for agitation.  - repeat level  - s/p LR 1L bolus.  - gentle hydration with NS at 50/hr for 10 hours.  - no tremors on exam.    # ASHLEY on CKD3 + hyperkalemia: baseline creatinine ~ 1.4 - 1.7  - On lasix 40 mg at [facility]  - likely prerenal. Patient looks dry on exam  - hold lasix.  - encouage po intake  - gentle hydration with NS at 50 /hr for 10 hours.  - trend BMP  - check urine Na, creatinine, urea  - hyperkalemia can be due ASHLEY + ACEIs + Metformin. hold lisinopril and metformin.  - single dose of lokelma 5 gm ordered.  - check repeat level.      # Bradycardia  - ? due to metoprolol + clonidine vs hyperkalemia  - ekg noted. no significant tall T waves on EKG.  - single dose of insulin + dextrose ordered.  - hold clonidine and metoprolol for now  - trops ordered.  - Patient currently asymptomatic.    # HTN  - elevated BP noted.  - c/w amlodipine.  - monitor BP.    DVT: not indicated for now.  GI: Pantoprazole  Diet: DASH  Activity: Ambulate as tolerated  Dispo: Acute for now 75y F pmhx HTN, DM, MDD, bipolar on lithium, anxiety send in by [facility] due to AMS x3-4 days    # AMS  - likely metabolic encephalopathy 2/2 UTI vs Li toxicity vs ASHLEY on CKD  - CT head negative  - UA suggestive of UTI  - s/p Rocephin 1 gm x 1. UCx sent  - start Rocephin 1 gm q 24 hr  - follow UCx      # Li toxicity  - stop PO Li for now. single dose of haloperidol 0.5 mg ordered for agitation.  - repeat level  - s/p LR 1L bolus.  - gentle hydration with NS at 50/hr for 10 hours.  - no tremors on exam.    # ASHLEY on CKD3 + hyperkalemia: baseline creatinine ~ 1.4 - 1.7  - On lasix 40 mg at [facility]  - likely prerenal. Patient looks dry on exam  - hold lasix.  - encouage po intake  - gentle hydration with NS at 50 /hr for 10 hours.  - trend BMP  - check urine Na, creatinine, urea  - hyperkalemia can be due ASHLEY + ACEIs + Metformin. hold lisinopril and metformin.  - single dose of lokelma 5 gm ordered.  - check repeat level.      # Bradycardia  - ? due to metoprolol + clonidine vs hyperkalemia  - ekg noted. no significant tall T waves on EKG.  - single dose of insulin + dextrose ordered.  - hold clonidine and metoprolol for now  - trops ordered.  - Patient currently asymptomatic.    # HTN  - elevated BP noted.  - c/w amlodipine.  - monitor BP.    # DM  - monitor FS  - start insulin if FS > 180    DVT: not indicated for now.  GI: Pantoprazole  Diet: DASH  Activity: Ambulate as tolerated  Dispo: Acute for now

## 2021-07-18 LAB
ALBUMIN SERPL ELPH-MCNC: 4.5 G/DL — SIGNIFICANT CHANGE UP (ref 3.5–5.2)
ALBUMIN SERPL ELPH-MCNC: 4.7 G/DL — SIGNIFICANT CHANGE UP (ref 3.5–5.2)
ALP SERPL-CCNC: 135 U/L — HIGH (ref 30–115)
ALP SERPL-CCNC: 140 U/L — HIGH (ref 30–115)
ALT FLD-CCNC: 39 U/L — SIGNIFICANT CHANGE UP (ref 0–41)
ALT FLD-CCNC: 42 U/L — HIGH (ref 0–41)
ANION GAP SERPL CALC-SCNC: 11 MMOL/L — SIGNIFICANT CHANGE UP (ref 7–14)
ANION GAP SERPL CALC-SCNC: 12 MMOL/L — SIGNIFICANT CHANGE UP (ref 7–14)
AST SERPL-CCNC: 14 U/L — SIGNIFICANT CHANGE UP (ref 0–41)
AST SERPL-CCNC: 14 U/L — SIGNIFICANT CHANGE UP (ref 0–41)
BASOPHILS # BLD AUTO: 0.03 K/UL — SIGNIFICANT CHANGE UP (ref 0–0.2)
BASOPHILS NFR BLD AUTO: 0.3 % — SIGNIFICANT CHANGE UP (ref 0–1)
BILIRUB SERPL-MCNC: 0.2 MG/DL — SIGNIFICANT CHANGE UP (ref 0.2–1.2)
BILIRUB SERPL-MCNC: 0.3 MG/DL — SIGNIFICANT CHANGE UP (ref 0.2–1.2)
BUN SERPL-MCNC: 41 MG/DL — HIGH (ref 10–20)
BUN SERPL-MCNC: 41 MG/DL — HIGH (ref 10–20)
CALCIUM SERPL-MCNC: 10.1 MG/DL — SIGNIFICANT CHANGE UP (ref 8.5–10.1)
CALCIUM SERPL-MCNC: 10.1 MG/DL — SIGNIFICANT CHANGE UP (ref 8.5–10.1)
CHLORIDE SERPL-SCNC: 107 MMOL/L — SIGNIFICANT CHANGE UP (ref 98–110)
CHLORIDE SERPL-SCNC: 112 MMOL/L — HIGH (ref 98–110)
CO2 SERPL-SCNC: 17 MMOL/L — SIGNIFICANT CHANGE UP (ref 17–32)
CO2 SERPL-SCNC: 19 MMOL/L — SIGNIFICANT CHANGE UP (ref 17–32)
CREAT SERPL-MCNC: 1.9 MG/DL — HIGH (ref 0.7–1.5)
CREAT SERPL-MCNC: 2 MG/DL — HIGH (ref 0.7–1.5)
EOSINOPHIL # BLD AUTO: 0.25 K/UL — SIGNIFICANT CHANGE UP (ref 0–0.7)
EOSINOPHIL NFR BLD AUTO: 2.5 % — SIGNIFICANT CHANGE UP (ref 0–8)
GLUCOSE BLDC GLUCOMTR-MCNC: 151 MG/DL — HIGH (ref 70–99)
GLUCOSE BLDC GLUCOMTR-MCNC: 185 MG/DL — HIGH (ref 70–99)
GLUCOSE BLDC GLUCOMTR-MCNC: 202 MG/DL — HIGH (ref 70–99)
GLUCOSE BLDC GLUCOMTR-MCNC: 214 MG/DL — HIGH (ref 70–99)
GLUCOSE SERPL-MCNC: 162 MG/DL — HIGH (ref 70–99)
GLUCOSE SERPL-MCNC: 197 MG/DL — HIGH (ref 70–99)
HCT VFR BLD CALC: 33.5 % — LOW (ref 37–47)
HGB BLD-MCNC: 10 G/DL — LOW (ref 12–16)
IMM GRANULOCYTES NFR BLD AUTO: 0.5 % — HIGH (ref 0.1–0.3)
LDH SERPL L TO P-CCNC: 160 — SIGNIFICANT CHANGE UP (ref 50–242)
LITHIUM SERPL-MCNC: 1.38 MMOL/L — HIGH (ref 0.6–1.2)
LYMPHOCYTES # BLD AUTO: 1.71 K/UL — SIGNIFICANT CHANGE UP (ref 1.2–3.4)
LYMPHOCYTES # BLD AUTO: 17.2 % — LOW (ref 20.5–51.1)
MCHC RBC-ENTMCNC: 26.2 PG — LOW (ref 27–31)
MCHC RBC-ENTMCNC: 29.9 G/DL — LOW (ref 32–37)
MCV RBC AUTO: 87.9 FL — SIGNIFICANT CHANGE UP (ref 81–99)
MONOCYTES # BLD AUTO: 0.43 K/UL — SIGNIFICANT CHANGE UP (ref 0.1–0.6)
MONOCYTES NFR BLD AUTO: 4.3 % — SIGNIFICANT CHANGE UP (ref 1.7–9.3)
NEUTROPHILS # BLD AUTO: 7.48 K/UL — HIGH (ref 1.4–6.5)
NEUTROPHILS NFR BLD AUTO: 75.2 % — SIGNIFICANT CHANGE UP (ref 42.2–75.2)
NRBC # BLD: 0 /100 WBCS — SIGNIFICANT CHANGE UP (ref 0–0)
PLATELET # BLD AUTO: 212 K/UL — SIGNIFICANT CHANGE UP (ref 130–400)
POTASSIUM SERPL-MCNC: 4.6 MMOL/L — SIGNIFICANT CHANGE UP (ref 3.5–5)
POTASSIUM SERPL-MCNC: 4.7 MMOL/L — SIGNIFICANT CHANGE UP (ref 3.5–5)
POTASSIUM SERPL-SCNC: 4.6 MMOL/L — SIGNIFICANT CHANGE UP (ref 3.5–5)
POTASSIUM SERPL-SCNC: 4.7 MMOL/L — SIGNIFICANT CHANGE UP (ref 3.5–5)
PROT SERPL-MCNC: 7 G/DL — SIGNIFICANT CHANGE UP (ref 6–8)
PROT SERPL-MCNC: 7.2 G/DL — SIGNIFICANT CHANGE UP (ref 6–8)
RBC # BLD: 3.81 M/UL — LOW (ref 4.2–5.4)
RBC # FLD: 14.3 % — SIGNIFICANT CHANGE UP (ref 11.5–14.5)
SODIUM SERPL-SCNC: 138 MMOL/L — SIGNIFICANT CHANGE UP (ref 135–146)
SODIUM SERPL-SCNC: 140 MMOL/L — SIGNIFICANT CHANGE UP (ref 135–146)
TROPONIN T SERPL-MCNC: <0.01 NG/ML — SIGNIFICANT CHANGE UP
WBC # BLD: 9.95 K/UL — SIGNIFICANT CHANGE UP (ref 4.8–10.8)
WBC # FLD AUTO: 9.95 K/UL — SIGNIFICANT CHANGE UP (ref 4.8–10.8)

## 2021-07-18 RX ORDER — DEXTROSE 50 % IN WATER 50 %
15 SYRINGE (ML) INTRAVENOUS ONCE
Refills: 0 | Status: DISCONTINUED | OUTPATIENT
Start: 2021-07-18 | End: 2021-07-26

## 2021-07-18 RX ORDER — HYDRALAZINE HCL 50 MG
10 TABLET ORAL ONCE
Refills: 0 | Status: COMPLETED | OUTPATIENT
Start: 2021-07-18 | End: 2021-07-18

## 2021-07-18 RX ORDER — SODIUM CHLORIDE 9 MG/ML
1000 INJECTION, SOLUTION INTRAVENOUS
Refills: 0 | Status: DISCONTINUED | OUTPATIENT
Start: 2021-07-18 | End: 2021-07-26

## 2021-07-18 RX ORDER — PANTOPRAZOLE SODIUM 20 MG/1
40 TABLET, DELAYED RELEASE ORAL
Refills: 0 | Status: DISCONTINUED | OUTPATIENT
Start: 2021-07-17 | End: 2021-07-26

## 2021-07-18 RX ORDER — AMLODIPINE BESYLATE 2.5 MG/1
5 TABLET ORAL DAILY
Refills: 0 | Status: DISCONTINUED | OUTPATIENT
Start: 2021-07-17 | End: 2021-07-20

## 2021-07-18 RX ORDER — ATORVASTATIN CALCIUM 80 MG/1
40 TABLET, FILM COATED ORAL DAILY
Refills: 0 | Status: DISCONTINUED | OUTPATIENT
Start: 2021-07-17 | End: 2021-07-26

## 2021-07-18 RX ORDER — DEXTROSE 50 % IN WATER 50 %
12.5 SYRINGE (ML) INTRAVENOUS ONCE
Refills: 0 | Status: DISCONTINUED | OUTPATIENT
Start: 2021-07-18 | End: 2021-07-26

## 2021-07-18 RX ORDER — DEXTROSE 50 % IN WATER 50 %
25 SYRINGE (ML) INTRAVENOUS ONCE
Refills: 0 | Status: DISCONTINUED | OUTPATIENT
Start: 2021-07-18 | End: 2021-07-26

## 2021-07-18 RX ORDER — AMLODIPINE BESYLATE 2.5 MG/1
5 TABLET ORAL ONCE
Refills: 0 | Status: COMPLETED | OUTPATIENT
Start: 2021-07-18 | End: 2021-07-18

## 2021-07-18 RX ORDER — GLUCAGON INJECTION, SOLUTION 0.5 MG/.1ML
1 INJECTION, SOLUTION SUBCUTANEOUS ONCE
Refills: 0 | Status: DISCONTINUED | OUTPATIENT
Start: 2021-07-18 | End: 2021-07-26

## 2021-07-18 RX ORDER — SODIUM CHLORIDE 9 MG/ML
500 INJECTION INTRAMUSCULAR; INTRAVENOUS; SUBCUTANEOUS
Refills: 0 | Status: DISCONTINUED | OUTPATIENT
Start: 2021-07-18 | End: 2021-07-20

## 2021-07-18 RX ORDER — HYDRALAZINE HCL 50 MG
25 TABLET ORAL ONCE
Refills: 0 | Status: COMPLETED | OUTPATIENT
Start: 2021-07-18 | End: 2021-07-18

## 2021-07-18 RX ORDER — INSULIN LISPRO 100/ML
VIAL (ML) SUBCUTANEOUS
Refills: 0 | Status: DISCONTINUED | OUTPATIENT
Start: 2021-07-18 | End: 2021-07-26

## 2021-07-18 RX ADMIN — Medication 2: at 16:54

## 2021-07-18 RX ADMIN — AMLODIPINE BESYLATE 5 MILLIGRAM(S): 2.5 TABLET ORAL at 05:26

## 2021-07-18 RX ADMIN — INSULIN HUMAN 10 UNIT(S): 100 INJECTION, SOLUTION SUBCUTANEOUS at 00:04

## 2021-07-18 RX ADMIN — PANTOPRAZOLE SODIUM 40 MILLIGRAM(S): 20 TABLET, DELAYED RELEASE ORAL at 05:25

## 2021-07-18 RX ADMIN — AMLODIPINE BESYLATE 5 MILLIGRAM(S): 2.5 TABLET ORAL at 20:59

## 2021-07-18 RX ADMIN — SODIUM CHLORIDE 50 MILLILITER(S): 9 INJECTION INTRAMUSCULAR; INTRAVENOUS; SUBCUTANEOUS at 03:00

## 2021-07-18 RX ADMIN — Medication 0.1 MILLIGRAM(S): at 14:36

## 2021-07-18 RX ADMIN — Medication 1: at 09:30

## 2021-07-18 RX ADMIN — SODIUM ZIRCONIUM CYCLOSILICATE 5 GRAM(S): 10 POWDER, FOR SUSPENSION ORAL at 00:04

## 2021-07-18 RX ADMIN — HALOPERIDOL DECANOATE 0.5 MILLIGRAM(S): 100 INJECTION INTRAMUSCULAR at 00:05

## 2021-07-18 RX ADMIN — Medication 1: at 12:05

## 2021-07-18 RX ADMIN — Medication 25 MILLIGRAM(S): at 18:13

## 2021-07-18 RX ADMIN — Medication 10 MILLIGRAM(S): at 19:27

## 2021-07-18 RX ADMIN — Medication 10 MILLIGRAM(S): at 11:21

## 2021-07-18 RX ADMIN — ATORVASTATIN CALCIUM 40 MILLIGRAM(S): 80 TABLET, FILM COATED ORAL at 12:05

## 2021-07-18 NOTE — CONSULT NOTE ADULT - SUBJECTIVE AND OBJECTIVE BOX
Nephrology progress note    THIS IS AN INCOMPLETE NOTE . FULL NOTE TO FOLLOW SHORTLY    Patient is seen and examined, events over the last 24 h noted .    Allergies:  amoxicillin (Unknown)  Iodides (Unknown)    Hospital Medications:   MEDICATIONS  (STANDING):  amLODIPine   Tablet 5 milliGRAM(s) Oral daily  atorvastatin Oral Tab/Cap - Peds 40 milliGRAM(s) Oral daily  dextrose 40% Gel 15 Gram(s) Oral once  dextrose 5%. 1000 milliLiter(s) (50 mL/Hr) IV Continuous <Continuous>  dextrose 5%. 1000 milliLiter(s) (100 mL/Hr) IV Continuous <Continuous>  dextrose 50% Injectable 25 Gram(s) IV Push once  dextrose 50% Injectable 12.5 Gram(s) IV Push once  dextrose 50% Injectable 25 Gram(s) IV Push once  glucagon  Injectable 1 milliGRAM(s) IntraMuscular once  insulin lispro (ADMELOG) corrective regimen sliding scale   SubCutaneous three times a day before meals  pantoprazole    Tablet 40 milliGRAM(s) Oral before breakfast  sodium chloride 0.9%. 500 milliLiter(s) (50 mL/Hr) IV Continuous <Continuous>        VITALS:  T(F): 97.6 (21 @ 05:42), Max: 98.7 (21 @ 22:54)  HR: 50 (21 @ 05:42)  BP: 187/77 (21 @ 05:42)  RR: 18 (21 @ 05:42)  SpO2: 96% (21 @ 22:54)  Wt(kg): --     @ 07:01  -   @ 07:00  --------------------------------------------------------  IN: 200 mL / OUT: 0 mL / NET: 200 mL      Height (cm): 157.5 ( 02:42)  Weight (kg): 60.8 ( 02:42)  BMI (kg/m2): 24.5 ( 02:42)  BSA (m2): 1.61 (:42)    PHYSICAL EXAM:  Constitutional: NAD  HEENT: anicteric sclera, oropharynx clear, MMM  Neck: No JVD  Respiratory: CTAB, no wheezes, rales or rhonchi  Cardiovascular: S1, S2, RRR  Gastrointestinal: BS+, soft, NT/ND  Extremities: No cyanosis or clubbing. No peripheral edema  :  No alcala.   Skin: No rashes    LABS:      140  |  112<H>  |  41<H>  ----------------------------<  162<H>  4.6   |  17  |  2.0<H>    Ca    10.1      2021 00:32    TPro  7.2  /  Alb  4.5  /  TBili  0.2  /  DBili      /  AST  14  /  ALT  42<H>  /  AlkPhos  135<H>                            9.9    9.15  )-----------( 205      ( 2021 17:00 )             32.6       Urine Studies:  Urinalysis Basic - ( 2021 19:20 )    Color: Light Yellow / Appearance: Clear / S.014 / pH:   Gluc:  / Ketone: Negative  / Bili: Negative / Urobili: <2 mg/dL   Blood:  / Protein: 300 mg/dL / Nitrite: Negative   Leuk Esterase: Large / RBC: 1 /HPF / WBC 87 /HPF   Sq Epi:  / Non Sq Epi: 1 /HPF / Bacteria: Many        RADIOLOGY & ADDITIONAL STUDIES:   NEPHROLOGY CONSULTATION NOTE    75y F pmhx HTN, DM, MDD, bipolar on lithium, anxiety send in by Doc [facility] due to AMS x3-4 days; Patient was getting forgetful , complained of abdominal pain , had multiple episodes of diarrhea / no dysuria no hematuria no cough no fever no chills no dysuria .  PAtient ruled in for Covid found to have ASHLEY   renal called for ASHLEY on ? CKD   Seen today feels comfortable, denied any complaints/ no rash no NSAID's zone     PAST MEDICAL & SURGICAL HISTORY:  HTN (hypertension)    DM (diabetes mellitus)    MDD (major depressive disorder)    Bipolar disorder      Allergies:  amoxicillin (Unknown)  Iodides (Unknown)    Home Medications Reviewed  Home Medications:  alendronate 70 mg oral tablet: 1 tab(s) orally once a week (2021 23:51)  amLODIPine 5 mg oral tablet: 1 tab(s) orally once a day (2021 23:51)  atorvastatin 40 mg oral tablet: 1 tab(s) orally once a day (2021 23:52)  cloNIDine 0.1 mg oral tablet: 1 tab(s) orally once a day (2021 23:53)  ferrous sulfate 325 mg (65 mg elemental iron) oral tablet: 1 tab(s) orally 3 times a week (2021 23:54)  furosemide 40 mg oral tablet: 1 tab(s) orally once a day (2021 23:54)  glimepiride 4 mg oral tablet: 1 tab(s) orally once a day (2021 23:54)  Januvia 100 mg oral tablet: 1 tab(s) orally once a day (2021 23:55)  lisinopril 40 mg oral tablet: 1 tab(s) orally once a day (2021 23:55)  lithium 150 mg oral capsule: 1 cap(s) orally 2 times a day (2021 23:55)  metFORMIN 500 mg oral tablet: 1 tab(s) orally 2 times a day (2021 23:55)  metoprolol succinate 50 mg oral tablet, extended release: 1 tab(s) orally once a day (2021 23:56)  omeprazole 20 mg oral delayed release capsule: 1 cap(s) orally once a day (2021 23:56)  Vitamin D2 50,000 intl units (1.25 mg) oral capsule: 1 cap(s) orally once a month (2021 23:57)    Hospital Medications:   MEDICATIONS  (STANDING):  amLODIPine   Tablet 5 milliGRAM(s) Oral daily  atorvastatin Oral Tab/Cap - Peds 40 milliGRAM(s) Oral daily  glucagon  Injectable 1 milliGRAM(s) IntraMuscular once  insulin lispro (ADMELOG) corrective regimen sliding scale   SubCutaneous three times a day before meals  pantoprazole    Tablet 40 milliGRAM(s) Oral before breakfast  sodium chloride 0.9%. 500 milliLiter(s) (50 mL/Hr) IV Continuous <Continuous>      SOCIAL HISTORY:  Denies ETOH,Smoking,   FAMILY HISTORY:        REVIEW OF SYSTEMS:    All other review of systems is negative unless indicated above.    VITALS:  T(F): 97.6 (21 @ 05:42), Max: 98.7 (21 @ 22:54)  HR: 50 (21 @ 05:42)  BP: 187/77 (21 @ 05:42)  RR: 18 (21 @ 05:42)  SpO2: 100% (21 @ 08:38)     @ 07:01  -   @ 07:00  --------------------------------------------------------  IN: 200 mL / OUT: 0 mL / NET: 200 mL      Height (cm): 157.5 ( 02:42)  Weight (kg): 60.8 ( 02:42)  BMI (kg/m2): 24.5 ( 02:42)  BSA (m2): 1.61 ( 02:42)      I&O's Detail    2021 07:01  -  2021 07:00  --------------------------------------------------------  IN:    sodium chloride 0.9%: 200 mL  Total IN: 200 mL    OUT:  Total OUT: 0 mL    Total NET: 200 mL            PHYSICAL EXAM:  Constitutional: NAD  Neck: No JVD  Respiratory: CTAB, no wheezes, rales or rhonchi  Cardiovascular: S1, S2, RRR  Gastrointestinal: BS+, soft, NT/ND  Extremities: No cyanosis or clubbing. No peripheral edema  : No CVA tenderness. No alcala.   Skin: No rashes  Vascular Access:    LABS:      138  |  107  |  41<H>  ----------------------------<  197<H>  4.7   |  19  |  1.9<H>    Ca    10.1      2021 08:15    TPro  7.0  /  Alb  4.7  /  TBili  0.3  /  DBili      /  AST  14  /  ALT  39  /  AlkPhos  140<H>      Creatinine Trend: 1.9 <--, 2.0 <--, 2.1 <--    COVID-19 PCR: Detected (2021 20:30)                        10.0   9.95  )-----------( 212      ( 2021 08:15 )             33.5     Urine Studies:  Urinalysis Basic - ( 2021 19:20 )    Color: Light Yellow / Appearance: Clear / S.014 / pH:   Gluc:  / Ketone: Negative  / Bili: Negative / Urobili: <2 mg/dL   Blood:  / Protein: 300 mg/dL / Nitrite: Negative   Leuk Esterase: Large / RBC: 1 /HPF / WBC 87 /HPF   Sq Epi:  / Non Sq Epi: 1 /HPF / Bacteria: Many        Lithium Level, Serum: 1.38 mmol/L (21 @ 08:15)            RADIOLOGY & ADDITIONAL STUDIES:    < from: CT Head No Cont (21 @ 17:12) >    IMPRESSION:    No CT evidence ofacute intracranial pathology.    < end of copied text >

## 2021-07-18 NOTE — CONSULT NOTE ADULT - ASSESSMENT
75y F pmhx HTN, DM, MDD, bipolar on lithium, anxiety send in by Doc [facility] due to AMS  ·	ASHLEY on CKD ? / chronic lithium use/ DM / HTN / covid positive   ·	creat better on IVF continue   ·	keep metformin on hold / keep lisinopril on hold  ·	repeat Li levels / psych eval   ·	check UA / U protein creat ratio  ·	check renal bladder sono  ·	HTN - BP noted resume clonidine . metoprolol keep ACE inh on hold   ·	keep alendronate off   ·	serial BMP   ·	check phosphorus levels    no need for RRT    will follow

## 2021-07-19 LAB
A1C WITH ESTIMATED AVERAGE GLUCOSE RESULT: 8 % — HIGH (ref 4–5.6)
ANION GAP SERPL CALC-SCNC: 13 MMOL/L — SIGNIFICANT CHANGE UP (ref 7–14)
APPEARANCE UR: CLEAR — SIGNIFICANT CHANGE UP
BACTERIA # UR AUTO: NEGATIVE — SIGNIFICANT CHANGE UP
BILIRUB UR-MCNC: NEGATIVE — SIGNIFICANT CHANGE UP
BUN SERPL-MCNC: 29 MG/DL — HIGH (ref 10–20)
CALCIUM SERPL-MCNC: 9.8 MG/DL — SIGNIFICANT CHANGE UP (ref 8.5–10.1)
CHLORIDE SERPL-SCNC: 112 MMOL/L — HIGH (ref 98–110)
CO2 SERPL-SCNC: 15 MMOL/L — LOW (ref 17–32)
COLOR SPEC: SIGNIFICANT CHANGE UP
COMMENT - URINE: SIGNIFICANT CHANGE UP
COMMENT - URINE: SIGNIFICANT CHANGE UP
COVID-19 NUCLEOCAPSID GAM AB INTERP: POSITIVE
COVID-19 NUCLEOCAPSID TOTAL GAM ANTIBODY RESULT: 26.1 INDEX — HIGH
COVID-19 SPIKE DOMAIN AB INTERP: POSITIVE
COVID-19 SPIKE DOMAIN ANTIBODY RESULT: >250 U/ML — HIGH
CREAT ?TM UR-MCNC: 80 MG/DL — SIGNIFICANT CHANGE UP
CREAT SERPL-MCNC: 1.8 MG/DL — HIGH (ref 0.7–1.5)
DIFF PNL FLD: SIGNIFICANT CHANGE UP
EPI CELLS # UR: 1 /HPF — SIGNIFICANT CHANGE UP (ref 0–5)
ESTIMATED AVERAGE GLUCOSE: 183 MG/DL — HIGH (ref 68–114)
FERRITIN SERPL-MCNC: 97 NG/ML — SIGNIFICANT CHANGE UP (ref 15–150)
GLUCOSE BLDC GLUCOMTR-MCNC: 150 MG/DL — HIGH (ref 70–99)
GLUCOSE BLDC GLUCOMTR-MCNC: 173 MG/DL — HIGH (ref 70–99)
GLUCOSE BLDC GLUCOMTR-MCNC: 177 MG/DL — HIGH (ref 70–99)
GLUCOSE BLDC GLUCOMTR-MCNC: 200 MG/DL — HIGH (ref 70–99)
GLUCOSE SERPL-MCNC: 186 MG/DL — HIGH (ref 70–99)
GLUCOSE UR QL: NEGATIVE — SIGNIFICANT CHANGE UP
HCT VFR BLD CALC: 33.4 % — LOW (ref 37–47)
HGB BLD-MCNC: 10 G/DL — LOW (ref 12–16)
HYALINE CASTS # UR AUTO: 1 /LPF — SIGNIFICANT CHANGE UP (ref 0–7)
KETONES UR-MCNC: NEGATIVE — SIGNIFICANT CHANGE UP
LEUKOCYTE ESTERASE UR-ACNC: NEGATIVE — SIGNIFICANT CHANGE UP
LITHIUM SERPL-MCNC: 1.04 MMOL/L — SIGNIFICANT CHANGE UP (ref 0.6–1.2)
MCHC RBC-ENTMCNC: 26.1 PG — LOW (ref 27–31)
MCHC RBC-ENTMCNC: 29.9 G/DL — LOW (ref 32–37)
MCV RBC AUTO: 87.2 FL — SIGNIFICANT CHANGE UP (ref 81–99)
NITRITE UR-MCNC: NEGATIVE — SIGNIFICANT CHANGE UP
NRBC # BLD: 0 /100 WBCS — SIGNIFICANT CHANGE UP (ref 0–0)
PH UR: 6.5 — SIGNIFICANT CHANGE UP (ref 5–8)
PLATELET # BLD AUTO: 215 K/UL — SIGNIFICANT CHANGE UP (ref 130–400)
POTASSIUM SERPL-MCNC: 4.9 MMOL/L — SIGNIFICANT CHANGE UP (ref 3.5–5)
POTASSIUM SERPL-SCNC: 4.9 MMOL/L — SIGNIFICANT CHANGE UP (ref 3.5–5)
PROCALCITONIN SERPL-MCNC: 0.25 NG/ML — HIGH (ref 0.02–0.1)
PROT UR-MCNC: ABNORMAL
RBC # BLD: 3.83 M/UL — LOW (ref 4.2–5.4)
RBC # FLD: 14.6 % — HIGH (ref 11.5–14.5)
RBC CASTS # UR COMP ASSIST: 1 /HPF — SIGNIFICANT CHANGE UP (ref 0–4)
SARS-COV-2 IGG+IGM SERPL QL IA: 26.1 INDEX — HIGH
SARS-COV-2 IGG+IGM SERPL QL IA: >250 U/ML — HIGH
SARS-COV-2 IGG+IGM SERPL QL IA: POSITIVE
SARS-COV-2 IGG+IGM SERPL QL IA: POSITIVE
SARS-COV-2 RNA SPEC QL NAA+PROBE: SIGNIFICANT CHANGE UP
SODIUM SERPL-SCNC: 140 MMOL/L — SIGNIFICANT CHANGE UP (ref 135–146)
SP GR SPEC: 1.01 — SIGNIFICANT CHANGE UP (ref 1.01–1.03)
UROBILINOGEN FLD QL: SIGNIFICANT CHANGE UP
WBC # BLD: 9.63 K/UL — SIGNIFICANT CHANGE UP (ref 4.8–10.8)
WBC # FLD AUTO: 9.63 K/UL — SIGNIFICANT CHANGE UP (ref 4.8–10.8)
WBC UR QL: 7 /HPF — HIGH (ref 0–5)

## 2021-07-19 PROCEDURE — 99221 1ST HOSP IP/OBS SF/LOW 40: CPT | Mod: GC

## 2021-07-19 PROCEDURE — 76770 US EXAM ABDO BACK WALL COMP: CPT | Mod: 26

## 2021-07-19 RX ORDER — SODIUM CHLORIDE 9 MG/ML
1000 INJECTION INTRAMUSCULAR; INTRAVENOUS; SUBCUTANEOUS
Refills: 0 | Status: DISCONTINUED | OUTPATIENT
Start: 2021-07-19 | End: 2021-07-22

## 2021-07-19 RX ORDER — HYDRALAZINE HCL 50 MG
10 TABLET ORAL ONCE
Refills: 0 | Status: COMPLETED | OUTPATIENT
Start: 2021-07-19 | End: 2021-07-19

## 2021-07-19 RX ORDER — NIFEDIPINE 30 MG
30 TABLET, EXTENDED RELEASE 24 HR ORAL ONCE
Refills: 0 | Status: COMPLETED | OUTPATIENT
Start: 2021-07-19 | End: 2021-07-19

## 2021-07-19 RX ADMIN — Medication 1: at 11:40

## 2021-07-19 RX ADMIN — Medication 30 MILLIGRAM(S): at 18:21

## 2021-07-19 RX ADMIN — SODIUM CHLORIDE 50 MILLILITER(S): 9 INJECTION INTRAMUSCULAR; INTRAVENOUS; SUBCUTANEOUS at 14:46

## 2021-07-19 RX ADMIN — Medication 0.1 MILLIGRAM(S): at 05:31

## 2021-07-19 RX ADMIN — ATORVASTATIN CALCIUM 40 MILLIGRAM(S): 80 TABLET, FILM COATED ORAL at 11:41

## 2021-07-19 RX ADMIN — AMLODIPINE BESYLATE 5 MILLIGRAM(S): 2.5 TABLET ORAL at 05:31

## 2021-07-19 RX ADMIN — Medication 0.1 MILLIGRAM(S): at 17:29

## 2021-07-19 RX ADMIN — Medication 10 MILLIGRAM(S): at 15:33

## 2021-07-19 RX ADMIN — PANTOPRAZOLE SODIUM 40 MILLIGRAM(S): 20 TABLET, DELAYED RELEASE ORAL at 05:31

## 2021-07-19 RX ADMIN — Medication 1: at 07:45

## 2021-07-19 NOTE — BEHAVIORAL HEALTH ASSESSMENT NOTE - NSBHCHARTREVIEWLAB_PSY_A_CORE FT
Lithium Level, Serum: 1.38 mmol/L (07.18.21 @ 08:15)     Lithium Level, Serum: 1.68: Critical value:called to/read back by dr alford @ 5:50pm 7/17/21. mmol/L (07.17.21 @ 17:00)     `                      10.0   9.95  )-----------( 212      ( 18 Jul 2021 08:15 )             33.5     07-18    138  |  107  |  41<H>  ----------------------------<  197<H>  4.7   |  19  |  1.9<H>    Ca    10.1      18 Jul 2021 08:15    TPro  7.0  /  Alb  4.7  /  TBili  0.3  /  DBili  x   /  AST  14  /  ALT  39  /  AlkPhos  140<H>  07-18

## 2021-07-19 NOTE — BEHAVIORAL HEALTH ASSESSMENT NOTE - SUMMARY
Ms. 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 Psychiatry was consulted for lithium toxicity and possible alternative medication for management of bipolar disorder. Patient has poor attention and memory and had difficulty participating in the psychiatric interview. Attempted to reach sons Catarino and Javier Rodarte for collateral but was not able to get through.    Differential for current presentation include multifactorial delirium due to lithium toxicity and COVID infection, dementia, and bipolar disorder. Based on assessment, multifactorial delirium on top of a chronic dementia is most likely given rapid deterioration of mental status (several days) and reports of medication non-compliance on a sub-acute scale (months). As per [facility], patient self-administers all her medications and has been taking lithium erratically and potentially mixing with NSAIDs, which along with ASHLEY may have led to high lithium levels. Patient's baseline animated personality and noted propensity for confabulation may have masked underlying dementia for some time. Would also consider bipolar disorder given past history, but patient does not appear acutely depressed or manic.    Patient has chronically elevated risk of suicide given noncompliance with medication, which is mitigated by her living in a supervised setting. Patient is not acutely suicidal, denies psychotic symptoms as well as acute mood symptoms, and is at low risk for violent behavior, therefore patient does not meet criteria for involuntary admission. Will continue to hold lithium and reassess for worsening of bipolar symptoms on 7/21.     Plan:  1. Continue to hold lithium  2. Continue treatment for ASHLEY to address delirium Ms. 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 Psychiatry was consulted for lithium toxicity and possible alternative medication for management of bipolar disorder. Patient has poor attention and memory and had difficulty participating in the psychiatric interview. Attempted to reach sons Catarino and Javier Rodarte for collateral but was not able to get through. Collateral obtained from [facility].    Differential for current presentation include multifactorial delirium due to lithium toxicity and COVID infection, dementia, and bipolar disorder. Based on assessment, multifactorial delirium on top of a chronic dementia is most likely given rapid deterioration of mental status (several days) and reports of medication non-compliance on a sub-acute scale (months). As per [facility], patient self-administers all her medications and has been taking lithium erratically and potentially mixing with NSAIDs, which along with ASHLEY may have led to high lithium levels. Patient's baseline animated personality and noted propensity for confabulation may have masked underlying dementia for some time. Would also consider bipolar disorder given past history, but patient does not appear acutely depressed or manic.    Patient has chronically elevated risk of suicide given noncompliance with medication and active denial of psychiatric illness, which is somewhat mitigated by her living in a supervised setting. Patient is not acutely suicidal, denies psychotic symptoms as well as acute mood symptoms, and is at low risk for violent behavior, therefore patient does not meet criteria for involuntary admission. Will continue to hold lithium and reassess for worsening of bipolar symptoms on 7/21.     Plan:  1. Continue to hold lithium  2. Continue treatment for ASHLEY to address delirium Ms. 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 Psychiatry was consulted for lithium toxicity and possible alternative medication for management of bipolar disorder. Patient has poor attention and memory and had difficulty participating in the psychiatric interview. Attempted to reach sons Catarino and Javier Rodarte for collateral but was not able to get through. Collateral obtained from [facility].    Differential for current presentation include multifactorial delirium due to lithium toxicity and COVID infection, dementia, and bipolar disorder. Based on assessment, multifactorial delirium on top of a chronic dementia is most likely given rapid deterioration of mental status (several days) and reports of medication non-compliance on a sub-acute scale (months). As per [facility], patient has been taking lithium erratically and potentially mixing with NSAIDs, which along with ASHLEY may have led to high lithium levels. Patient's baseline "very animated" personality (potentially hypomania given medication noncompliance over several months) and observed propensity for confabulation may have masked underlying dementia for some time. Would also consider bipolar disorder given past history, but patient does not appear acutely depressed or manic.    Patient has chronically elevated risk of suicide given noncompliance with medication and active denial of psychiatric illness, which is somewhat mitigated by her living in a supervised setting. Patient is not acutely suicidal, denies psychotic symptoms as well as acute mood symptoms, and is at low risk for violent behavior, therefore patient does not meet criteria for involuntary admission. Will continue to hold lithium and reassess for worsening of bipolar symptoms on 7/21.     Plan:  1. Continue to hold lithium  2. Continue treatment for reversible causes of delirium  3. Can consider Seroquel 50 mg PRN for acute agitation. Seroquel can cause paradoxical agitation in patients with dementia and should be used as a last resort. Ms. 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 Psychiatry was consulted for lithium toxicity and possible alternative medication for management of bipolar disorder. Patient has poor attention and memory and had difficulty participating in the psychiatric interview. Attempted to reach sons Catarino and Javier Rodarte for collateral but was not able to get through. Collateral obtained from [facility].    Differential for current presentation include multifactorial delirium due to lithium toxicity and COVID infection, dementia, and bipolar disorder. Based on assessment, multifactorial delirium on top of a chronic dementia is most likely given rapid deterioration of mental status (several days) and reports of medication non-compliance on a sub-acute scale (months). As per jail, patient has been taking lithium erratically and potentially mixing with NSAIDs, which along with ASHLEY may have led to high lithium levels. Patient's baseline "very animated" personality (potentially hypomania given medication noncompliance over several months) and observed propensity for confabulation may have masked underlying dementia for some time. Would also consider bipolar disorder given past history, but patient does not appear acutely depressed or manic.    Patient has chronically elevated risk of suicide given noncompliance with medication and active denial of psychiatric illness, which is somewhat mitigated by her living in a supervised setting. Patient is not acutely suicidal, denies psychotic symptoms as well as acute mood symptoms, and is at low risk for violent behavior, therefore patient does not meet criteria for involuntary admission. Will continue to hold lithium and reassess for worsening of bipolar symptoms on 7/21.     Plan:  -On going delirium work up as per primary team.   1. Continue to hold lithium, -Please consider repeat lithium level on 7/20/21,   2. Continue treatment for reversible causes of delirium  3.  Please start Seroquel 50 mg  po hs standing   4. Consider seroquel 25mg po q8hrs PRN for acute agitation. Seroquel can cause paradoxical agitation in patients with dementia and should be used as a last resort.  -No indication for psychiatric 1:1 at this time.  -There is no plan for inpatient psychiatry hospitalization  -Will follow up on 7/21/21

## 2021-07-19 NOTE — BEHAVIORAL HEALTH ASSESSMENT NOTE - OTHER
ASHLEY Deferred mild Family oriented, has seven grandchildren who motivate her suggestible, confabulatory non-compliance with psychiatric medication [facility]

## 2021-07-19 NOTE — PROGRESS NOTE ADULT - ASSESSMENT
75y F pmhx HTN, DM, MDD, bipolar on lithium, anxiety send in by [facility] due to AMS x3-4 days. Pt was found to have COVID-19 positive on admission.     # COVID-19 positive  - s/p vaccination  2 doses of Pfizer COVID-19 vaccine on January 11, 2021 and February 1, 2021  - currently asymptomatic, on RA  - Xray Chest (07.17.21 @ 17:56): Small left basilar opacity, possibly atelectasis.    # AMS likely metabolic encephalopathy, multi factorial   - could be secondary to UTI vs Li toxicity vs ASHLEY on CKD  - CT head negative    # UTI  - UA positive for UTI  - f/u Ucx  - s/p Rocephin 1 gm x 1 in ED, continue ceftriaxone 1g q24h iv until UCX results x 3 days  as per ID    # Li toxicity  # Hx of bipolar disorder  - holding PO Li for now.   - repeat lithium level   - s/p LR 1L bolus.  - gentle hydration with NS at 50/hr for 10 hours.  - psychiatry evaluation appreciated: consider Seroquel 50 mg PRN for acute agitation. Seroquel can cause paradoxical agitation in patients with dementia and should be used as a last resort    # ASHLEY on CKD3,  likely prerenal  - baseline creatinine ~ 1.4 - 1.7. Cr 1.9 on 7/18  - On lasix 40 mg at [facility]  - continuing to hold lasix, ACE, Lithium and Metformin  - encouage po intake  - c/w hydration with NS at 50 /hr for 12 hours.  - f/u BMP, UA / U protein creat ratio  - f/u check renal bladder sono    # hyperkalemia, resolved  - hyperkalemia can be due ASHLEY + ACEIs + Metformin. hold lisinopril and metformin.  - s/p single dose of lokelma 5 gm     # Bradycardia  - ? due to metoprolol  - ekg noted. no significant tall T waves on EKG.  - single dose of insulin + dextrose ordered.  - hold metoprolol for now  - trops negative x1  - Patient currently asymptomatic.    # HTN  - elevated BP noted.  - c/w amlodipine and clonodine  - monitor BP.  - currently elevated, will order hydralazine 10 mg once    # DM  - monitor FS  - start insulin if FS > 180    DVT: not indicated for now.  GI: Pantoprazole  Diet: DASH  Activity: Ambulate as tolerated  Dispo: Acute for now

## 2021-07-19 NOTE — CONSULT NOTE ADULT - ASSESSMENT
ASSESSMENT  75y F pmhx HTN, DM, MDD, bipolar on lithium, anxiety send in by [facility] (assisted living) due to AMS x3-4 days; constant, stable; per NH and son at bedside pt has been more forgetful than usual recently (might have early dementia but sx have never been this severe), with impairment in short-term memory  ID consulted for COVID19    IMPRESSION  #Asymptomatic COVID19     s/p vaccination  2 doses of Pfizer COVID-19 vaccine on January 11, 2021 and February 1, 2021    CXR Small left basilar opacity, possibly atelectasis.    not requiring supplemental O2   #Bipolar with lithium toxicity  #Possible UTI    UA WBC 87  #Sepsis ruled out on admission   #Abx allergy: amoxicillin (Unknown)  Iodides (Unknown)  #ASHLEY  Creatinine, Serum: 1.9 (07-18-21 @ 08:15)      RECOMMENDATIONS  - Can repeat COVID19 testing if needed negative testing prior to return to facility, either false positive PCR or re-infection. Pt unlikely to develop progressive or severe disease as vaccinated  - No therapeutics  - Unable to determine if urinary symptoms, can continue ceftriaxone 1g q24h iv until UCX results x 3 days should be sufficient    Please recall ID PRN    If any questions, please call or send a message on [employer]  Spectra [phone]

## 2021-07-19 NOTE — PROGRESS NOTE ADULT - SUBJECTIVE AND OBJECTIVE BOX
Nephrology progress note    THIS IS AN INCOMPLETE NOTE . FULL NOTE TO FOLLOW SHORTLY    Patient is seen and examined, events over the last 24 h noted .    Allergies:  amoxicillin (Unknown)  Iodides (Unknown)    Hospital Medications:   MEDICATIONS  (STANDING):  amLODIPine   Tablet 5 milliGRAM(s) Oral daily  atorvastatin Oral Tab/Cap - Peds 40 milliGRAM(s) Oral daily  cloNIDine 0.1 milliGRAM(s) Oral two times a day  dextrose 40% Gel 15 Gram(s) Oral once  dextrose 5%. 1000 milliLiter(s) (50 mL/Hr) IV Continuous <Continuous>  dextrose 5%. 1000 milliLiter(s) (100 mL/Hr) IV Continuous <Continuous>  dextrose 50% Injectable 25 Gram(s) IV Push once  dextrose 50% Injectable 12.5 Gram(s) IV Push once  dextrose 50% Injectable 25 Gram(s) IV Push once  glucagon  Injectable 1 milliGRAM(s) IntraMuscular once  insulin lispro (ADMELOG) corrective regimen sliding scale   SubCutaneous three times a day before meals  pantoprazole    Tablet 40 milliGRAM(s) Oral before breakfast  sodium chloride 0.9%. 500 milliLiter(s) (50 mL/Hr) IV Continuous <Continuous>        VITALS:  T(F): 97.4 (21 @ 05:37), Max: 99.1 (21 @ 12:46)  HR: 57 (21 @ 05:37)  BP: 166/65 (21 @ 05:37)  RR: 18 (21 @ 05:37)  SpO2: 95% (21 @ 20:58)  Wt(kg): --     @ 07:01  -   @ 07:00  --------------------------------------------------------  IN: 200 mL / OUT: 0 mL / NET: 200 mL          PHYSICAL EXAM:  Constitutional: NAD  HEENT: anicteric sclera, oropharynx clear, MMM  Neck: No JVD  Respiratory: CTAB, no wheezes, rales or rhonchi  Cardiovascular: S1, S2, RRR  Gastrointestinal: BS+, soft, NT/ND  Extremities: No cyanosis or clubbing. No peripheral edema  :  No alcala.   Skin: No rashes    LABS:      138  |  107  |  41<H>  ----------------------------<  197<H>  4.7   |  19  |  1.9<H>    Ca    10.1      2021 08:15    TPro  7.0  /  Alb  4.7  /  TBili  0.3  /  DBili      /  AST  14  /  ALT  39  /  AlkPhos  140<H>                            10.0   9.95  )-----------( 212      ( 2021 08:15 )             33.5       Urine Studies:  Urinalysis Basic - ( 2021 19:20 )    Color: Light Yellow / Appearance: Clear / S.014 / pH:   Gluc:  / Ketone: Negative  / Bili: Negative / Urobili: <2 mg/dL   Blood:  / Protein: 300 mg/dL / Nitrite: Negative   Leuk Esterase: Large / RBC: 1 /HPF / WBC 87 /HPF   Sq Epi:  / Non Sq Epi: 1 /HPF / Bacteria: Many        RADIOLOGY & ADDITIONAL STUDIES:   Nephrology progress note  Patient is seen and examined, events over the last 24 h noted .  lying in bed comfortable     Allergies:  amoxicillin (Unknown)  Iodides (Unknown)    Hospital Medications:   MEDICATIONS  (STANDING):  amLODIPine   Tablet 5 milliGRAM(s) Oral daily  atorvastatin Oral Tab/Cap - Peds 40 milliGRAM(s) Oral daily  cloNIDine 0.1 milliGRAM(s) Oral two times a day  dextrose 40% Gel 15 Gram(s) Oral once  glucagon  Injectable 1 milliGRAM(s) IntraMuscular once  insulin lispro (ADMELOG) corrective regimen sliding scale   SubCutaneous three times a day before meals  pantoprazole    Tablet 40 milliGRAM(s) Oral before breakfast  sodium chloride 0.9%. 500 milliLiter(s) (50 mL/Hr) IV Continuous <Continuous>        VITALS:  T(F): 97.4 (21 @ 05:37), Max: 99.1 (21 @ 12:46)  HR: 57 (21 @ 05:37)  BP: 166/65 (21 @ 05:37)  RR: 18 (21 @ 05:37)  SpO2: 95% (21 @ 20:58)     @ 07:01  -   @ 07:00  --------------------------------------------------------  IN: 200 mL / OUT: 0 mL / NET: 200 mL          PHYSICAL EXAM:  Constitutional: NAD  Neck: No JVD  Respiratory: CTAB, no wheezes, rales or rhonchi  Cardiovascular: S1, S2, RRR  Gastrointestinal: BS+, soft, NT/ND  Extremities: No cyanosis or clubbing. No peripheral edema  :  No alcala.   Skin: No rashes    LABS:          138  |  107  |  41<H>  ----------------------------<  197<H>  4.7   |  19  |  1.9<H>  Creatinine Trend: 1.9<--, 2.0<--, 2.1<--  Ca    10.1      2021 08:15    TPro  7.0  /  Alb  4.7  /  TBili  0.3  /  DBili      /  AST  14  /  ALT  39  /  AlkPhos  140<H>  -18                          10.0   9.95  )-----------( 212      ( 2021 08:15 )             33.5     Lithium Level, Serum: 1.38 mmol/L (21 @ 08:15)        Urine Studies:  Urinalysis Basic - ( 2021 19:20 )    Color: Light Yellow / Appearance: Clear / S.014 / pH:   Gluc:  / Ketone: Negative  / Bili: Negative / Urobili: <2 mg/dL   Blood:  / Protein: 300 mg/dL / Nitrite: Negative   Leuk Esterase: Large / RBC: 1 /HPF / WBC 87 /HPF   Sq Epi:  / Non Sq Epi: 1 /HPF / Bacteria: Many        RADIOLOGY & ADDITIONAL STUDIES:

## 2021-07-19 NOTE — BEHAVIORAL HEALTH ASSESSMENT NOTE - NSBHCONSULTMEDSEVERE_PSY_A_CORE FT
Can consider Seroquel 50 mg PRN for severe agitation. Seroquel can cause paradoxical agitation in patients with dementia and should be used as a last resort.

## 2021-07-19 NOTE — BEHAVIORAL HEALTH ASSESSMENT NOTE - SUICIDE PROTECTIVE FACTORS
Responsibility to family and others/Identifies reasons for living/Has future plans/Supportive social network of family or friends/Cultural, spiritual and/or moral attitudes against suicide/Temple beliefs

## 2021-07-19 NOTE — BEHAVIORAL HEALTH ASSESSMENT NOTE - NSBHMEDSOTHERFT_PSY_A_CORE
Self-Admin PRN:    Acetaminophen 325 mg 2 tabs q8h  Famotidine 20 mg 1 tab daily   Ibuprofen 400 mg 1 tab BID  Loperamide 2 mg 1 cap q6h  Meclizine 12.5 mg 1 tab daily    Self admin routine:  Lancet easy touch 1x daily as directed  True Matrix Glucose test strip - test blood sugar 1x daily

## 2021-07-19 NOTE — BEHAVIORAL HEALTH ASSESSMENT NOTE - NSBHCHARTREVIEWINVESTIGATE_PSY_A_CORE FT
< from: 12 Lead ECG (07.17.21 @ 21:58) >      Ventricular Rate 45 BPM    Atrial Rate 45 BPM    P-R Interval 186 ms    QRS Duration 92 ms    Q-T Interval 458 ms    QTC Calculation(Bazett) 396 ms    P Axis 74 degrees    R Axis 60 degrees    T Axis 46 degrees    Diagnosis Line Sinus bradycardia  Otherwise normal ECG    < end of copied text >

## 2021-07-19 NOTE — PROGRESS NOTE ADULT - SUBJECTIVE AND OBJECTIVE BOX
Chart reviewed, patient examined. Pertinent results reviewed.  Case discussed with HO; specialist f/u reviewed  HD#2  KINGSTON PETTY    Allergy: amoxicillin (Unknown)  Iodides (Unknown)      CHIEF COMPLAINT: AMS reported by staff & family       LOS  2d    HPI  HPI:  Patient is poor historian. history taken from chart.  "75y F pmhx HTN, DM, MDD, bipolar on lithium, anxiety send in by [facility] (assisted living)          due to AMS x3-4 days; constant, stable; per NH and son at bedside pt has been more forgetful than usual recently (might have early dementia but sx have never been this severe), with impairment in short-term memory. Pt has no complaints--denies chest pain, fever/chills, SOB, abd pain, n/v/d."    Spoke with Aid at [facility] ([phone]) Ms. Linton, as per aid patient was complaining of diarrhea this morning but she was not letting anyone go to the bathroom to check. Patient had received COVID vaccine (does not remember date). Aid denies any recent cough, fever, sob.    In ED: vitals Temp 98.3; HR 52, /77; RR 18; SaO2 98% on room air. UA suggestive of UTI. Patient received 1L LR bolus + Ceftriaxone 1000 mg x 1.  EKG in ED noted for Sinus bradycardia. (2021 23:42)      INFECTIOUS DISEASE HISTORY:  Satting well on RA  CXR Small left basilar opacity, possibly atelectasis.      PMH  PAST MEDICAL & SURGICAL HISTORY:  HTN (hypertension)    DM (diabetes mellitus)    MDD (major depressive disorder)    Bipolar disorder        FAMILY HISTORY  non-contributory     SOCIAL HISTORY  Social History:  cannot obtain further history from the patient secondary to altered mental status or sedation       ROS  General: Denies rigors, nightsweats  HEENT: Denies headache, rhinorrhea, sore throat, eye pain  CV: Denies CP, palpitations  PULM: Denies wheezing, hemoptysis  GI: Denies hematemesis, hematochezia, melena  : Denies discharge, hematuria  MSK: Denies arthralgias, myalgias  SKIN: Denies rash, lesions  NEURO: Denies paresthesias, weakness  PSYCH: Denies depression, anxiety    VITALS:  Vital Signs Last 24 Hrs  T(C): 36.3 (2021 05:37), Max: 37.3 (2021 12:46)  T(F): 97.4 (2021 05:37), Max: 99.1 (2021 12:46)  HR: 57 (2021 05:37) (57 - 69)  BP: 166/65 (2021 05:37) (130/80 - 199/88)  BP(mean): --  RR: 18 (2021 05:37) (17 - 18)  SpO2: 95% (2021 20:58) (95% - 98%)  Gen: on RA  HEENT: NCAT  Resp: b/l chest expansion  Neuro: nonfocal       TESTS & MEASUREMENTS:                        10.0   9.95  )-----------( 212      ( 2021 08:15 )             33.5         138  |  107  |  41<H>  ----------------------------<  197<H>  4.7   |  19  |  1.9<H>    Ca    10.1      2021 08:15    TPro  7.0  /  Alb  4.7  /  TBili  0.3  /  DBili  x   /  AST  14  /  ALT  39  /  AlkPhos  140<H>      eGFR if Non African American: 25 mL/min/1.73M2 (21 @ 08:15)  eGFR if African American: 29 mL/min/1.73M2 (21 @ 08:15)    LIVER FUNCTIONS - ( 2021 08:15 )  Alb: 4.7 g/dL / Pro: 7.0 g/dL / ALK PHOS: 140 U/L / ALT: 39 U/L / AST: 14 U/L / GGT: x           Urinalysis Basic - ( 2021 19:20 )    Color: Light Yellow / Appearance: Clear / S.014 / pH: x  Gluc: x / Ketone: Negative  / Bili: Negative / Urobili: <2 mg/dL   Blood: x / Protein: 300 mg/dL / Nitrite: Negative   Leuk Esterase: Large / RBC: 1 /HPF / WBC 87 /HPF   Sq Epi: x / Non Sq Epi: 1 /HPF / Bacteria: Many              INFECTIOUS DISEASES TESTING  COVID-19 PCR: Detected (21 @ 20:30)      INFLAMMATORY MARKERS      RADIOLOGY & ADDITIONAL TESTS:  I have personally reviewed the last Chest xray  CXR  Xray Chest 1 View- PORTABLE-Urgent:   EXAM:  XR CHEST PORTABLE URGENT 1V            PROCEDURE DATE:  2021            INTERPRETATION:  Clinical History / Reason for exam: Pneumonia.    Comparison : Chest radiograph dated 2010.    Technique/Positioning: Portable frontal.Rotated.    Findings:    Support devices: None.    Cardiac/mediastinum/hilum: Unremarkable.    Lung parenchyma/Pleura: Small left basilar opacity, possibly atelectasis. No pneumothorax.    Skeleton/soft tissues: No acute osseous abnormality.    Impression:    1. Small left basilar opacity, possibly atelectasis.    --- End of Report ---              NU SPENCER MD; Attending Radiologist  This document has been electronically signed. 2021  9:56AM (21 @ 17:56)      CT      CARDIOLOGY TESTING  12 Lead ECG:   Ventricular Rate 45 BPM    Atrial Rate 45 BPM    P-R Interval 186 ms    QRS Duration 92 ms    Q-T Interval 458 ms    QTC Calculation(Bazett) 396 ms    P Axis 74 degrees    R Axis 60 degrees    T Axis 46 degrees    Diagnosis Line Sinus bradycardia  Otherwise normal ECG    Confirmed by YESSICA GEE MD ([id number]) on 2021 7:07:36 AM (21 @ 21:58)      MEDICATIONS  amLODIPine   Tablet 5 Oral daily  atorvastatin Oral Tab/Cap - Peds 40 Oral daily  cloNIDine 0.1 Oral two times a day  dextrose 40% Gel 15 Oral once  dextrose 5%. 1000 IV Continuous <Continuous>  dextrose 5%. 1000 IV Continuous <Continuous>  dextrose 50% Injectable 25 IV Push once  dextrose 50% Injectable 12.5 IV Push once  dextrose 50% Injectable 25 IV Push once  glucagon  Injectable 1 IntraMuscular once  insulin lispro (ADMELOG) corrective regimen sliding scale  SubCutaneous three times a day before meals  pantoprazole    Tablet 40 Oral before breakfast  sodium chloride 0.9%. 500 IV Continuous <Continuous>      Weight  Weight (kg): 60.8 (21 @ 02:42)    ANTIBIOTICS:      ALLERGIES:  amoxicillin (Unknown)  Iodides (Unknown)           Chart reviewed, patient examined. Pertinent results reviewed.  Case discussed with HO; specialist f/u reviewed  HD#2; Patient is new to me  KINGSTON PETTY    Allergy: amoxicillin (Unknown)  Iodides (Unknown)      CHIEF COMPLAINT: AMS reported by staff & family           HPI  HPI:  Patient is poor historian. history taken from chart.  "75y F pmhx HTN, DM, MDD, bipolar on lithium, anxiety send in by [facility] (assisted living)          due to AMS x3-4 days; constant, stable; per NH and son at bedside pt has been more forgetful than usual recently (might have early dementia but sx have never been this severe), with impairment in short-term memory. Pt has no complaints--denies chest pain, fever/chills, SOB, abd pain, n/v/d."    Spoke with Aid at [facility] ([phone]) Ms. Linton, as per aid patient was complaining of diarrhea this morning but she was not letting anyone go to the bathroom to check. Patient had received COVID vaccine (does not remember date). Aid denies any recent cough, fever, sob.    In ED: vitals Temp 98.3; HR 52, /77; RR 18; SaO2 98% on room air. UA suggestive of UTI. Patient received 1L LR bolus + Ceftriaxone 1000 mg x 1.  EKG in ED noted for Sinus bradycardia. (2021 23:42)      INFECTIOUS DISEASE HISTORY:  Satting well on RA  CXR Small left basilar opacity, possibly atelectasis.      PMH  PAST MEDICAL & SURGICAL HISTORY:  HTN (hypertension)    DM (diabetes mellitus)    MDD (major depressive disorder)    Bipolar disorder        FAMILY HISTORY  non-contributory     SOCIAL HISTORY  Social History:  cannot obtain further history from the patient secondary to altered mental status or sedation       ROS  General: Denies rigors, nightsweats  HEENT: Denies headache, rhinorrhea, sore throat, eye pain  CV: Denies CP, palpitations  PULM: Denies wheezing, hemoptysis  GI: Denies hematemesis, hematochezia, melena  : Denies discharge, hematuria, dysuria or h/o UTIs(in many years); denies sexual activity  MSK: Denies arthralgias, myalgias  SKIN: Denies rash, lesions  NEURO: Denies paresthesias, weakness  PSYCH: Denies depression, anxiety; does see PSY, & take lithium    VITALS:  Vital Signs Last 24 Hrs  T(C): 36.3 (2021 05:37), Max: 37.3 (2021 12:46)  T(F): 97.4 (2021 05:37), Max: 99.1 (2021 12:46)  HR: 57 (2021 05:37) (57 - 69)  BP: 166/65 (2021 05:37) (130/80 - 199/88)  BP(mean): --  RR: 18 (2021 05:37) (17 - 18)  SpO2: 95% (2021 20:58) (95% - 98%)      PHYSICAL EXAM:  GENERAL: NAD, well-developed; Ox2- (name, '[facility]', cannot give year or Pres. where do you live- [facility](incorrect)  CHEST/LUNG: CTAB; No wheeze  HEART: bradycardia, 2/6 CONNER, no R,G  ABDOMEN: Soft, NT/ND; BS present  EXTREMITIES:  No cyanosis, or edema  NEUROLOGY: Patient is awake and alert but not oriented to time; has no insights into her situation; stands with assist-      unsteady; no gross focal deficit  SKIN: No rashes or lesions          TESTS & MEASUREMENTS:                        10.0   9.95  )-----------( 212      ( 2021 08:15 )             33.5         138  |  107  |  41<H>  ----------------------------<  197<H>  4.7   |  19  |  1.9<H>    Ca    10.1      2021 08:15    TPro  7.0  /  Alb  4.7  /  TBili  0.3  /  DBili  x   /  AST  14  /  ALT  39  /  AlkPhos  140<H>      eGFR if Non African American: 25 mL/min/1.73M2 (21 @ 08:15)  eGFR if African American: 29 mL/min/1.73M2 (21 @ 08:15)    LIVER FUNCTIONS - ( 2021 08:15 )  Alb: 4.7 g/dL / Pro: 7.0 g/dL / ALK PHOS: 140 U/L / ALT: 39 U/L / AST: 14 U/L / GGT: x           Urinalysis Basic - ( 2021 19:20 )    Color: Light Yellow / Appearance: Clear / S.014 / pH: x  Gluc: x / Ketone: Negative  / Bili: Negative / Urobili: <2 mg/dL   Blood: x / Protein: 300 mg/dL / Nitrite: Negative   Leuk Esterase: Large / RBC: 1 /HPF / WBC 87 /HPF   Sq Epi: x / Non Sq Epi: 1 /HPF / Bacteria: Many              INFECTIOUS DISEASES TESTING  COVID-19 PCR: Detected (21 @ 20:30)      INFLAMMATORY MARKERS      RADIOLOGY & ADDITIONAL TESTS:  I have personally reviewed the last Chest xray  CXR  Xray Chest 1 View- PORTABLE-Urgent:   EXAM:  XR CHEST PORTABLE URGENT 1V            PROCEDURE DATE:  2021            INTERPRETATION:  Clinical History / Reason for exam: Pneumonia.    Comparison : Chest radiograph dated 2010.    Technique/Positioning: Portable frontal.Rotated.    Findings:    Support devices: None.    Cardiac/mediastinum/hilum: Unremarkable.    Lung parenchyma/Pleura: Small left basilar opacity, possibly atelectasis. No pneumothorax.    Skeleton/soft tissues: No acute osseous abnormality.    Impression:    1. Small left basilar opacity, possibly atelectasis.    --- End of Report ---              NU SPENCER MD; Attending Radiologist  This document has been electronically signed. 2021  9:56AM (21 @ 17:56)      CT      CARDIOLOGY TESTING  12 Lead ECG:   Ventricular Rate 45 BPM    Atrial Rate 45 BPM    P-R Interval 186 ms    QRS Duration 92 ms    Q-T Interval 458 ms    QTC Calculation(Bazett) 396 ms    P Axis 74 degrees    R Axis 60 degrees    T Axis 46 degrees    Diagnosis Line Sinus bradycardia  Otherwise normal ECG    Confirmed by YESSICA GEE MD ([id number]) on 2021 7:07:36 AM (21 @ 21:58)      MEDICATIONS  amLODIPine   Tablet 5 Oral daily  atorvastatin Oral Tab/Cap - Peds 40 Oral daily  cloNIDine 0.1 Oral two times a day  dextrose 40% Gel 15 Oral once  dextrose 5%. 1000 IV Continuous <Continuous>  dextrose 5%. 1000 IV Continuous <Continuous>  dextrose 50% Injectable 25 IV Push once  dextrose 50% Injectable 12.5 IV Push once  dextrose 50% Injectable 25 IV Push once  glucagon  Injectable 1 IntraMuscular once  insulin lispro (ADMELOG) corrective regimen sliding scale  SubCutaneous three times a day before meals  pantoprazole    Tablet 40 Oral before breakfast  sodium chloride 0.9%. 500 IV Continuous <Continuous>      Weight  Weight (kg): 60.8 (21 @ 02:42)    ANTIBIOTICS:      ALLERGIES:  amoxicillin (Unknown)  Iodides (Unknown)

## 2021-07-19 NOTE — BEHAVIORAL HEALTH ASSESSMENT NOTE - HPI (INCLUDE ILLNESS QUALITY, SEVERITY, DURATION, TIMING, CONTEXT, MODIFYING FACTORS, ASSOCIATED SIGNS AND SYMPTOMS)
Ms. 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 Psychiatry was consulted for lithium toxicity and possible alternative medication for management of bipolar disorder. Patient has poor attention and memory and had difficulty participating in the psychiatric interview. Attempted to reach sons Catarino and Javier Rodarte for collateral but was not able to get through.    On approach, Ms. Rodarte is sitting up in her chair, alert and conversant. She reports that she is feeling well, but that she is confused about how she got COVID as she's lived in the same place for the past 7 years. She believes that she takes lithium for "blood pressure". She denies ever having a psychiatric diagnosis or any medical problems. She notes a family history of diabetes in her mother and bipolar disorder in her brother. She denies feeling low or hopeless, or any thoughts of killing herself. She denies seeing or hearing things that others can't. She denies feeling euphoric, agitated, or anxious. She denies drinking alcohol.     Ms. Rodarte is not oriented to time, place, or situation. She has 1/3 on immediate recall and 0/3 on delayed recall. She does not know who the president is, and is markedly confabulatory and suggestible (agrees enthusiastically to most options provided despite being told they are incorrect). Her highest level of educational attainment is high school.    Collateral obtained by medical student from [facility] states that the patient is normally very alert and animated at baseline. On Friday, she appeared groggy and tired, and on Saturday she appeared confused. She manages all of her own medications but has been non-compliant with taking her lithium as prescribed over the past couple of months. She also has ibuprofen at her apartment and it is not clear how frequently she takes it. Ms. 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 Psychiatry was consulted for lithium toxicity and possible alternative medication for management of bipolar disorder. Patient has poor attention and memory and had difficulty participating in the psychiatric interview. Attempted to reach sons Catarino and Javier Rodarte for collateral but was not able to get through.    On approach, Ms. Rodarte is sitting up in her chair, alert and conversant. She reports that she is feeling well, but that she is confused about how she got COVID as she's lived in the same place for the past 7 years. She believes that she takes lithium for "blood pressure". She denies ever having a psychiatric diagnosis or any medical problems. She notes a family history of diabetes in her mother and bipolar disorder in her brother. She denies feeling low or hopeless, or any thoughts of killing herself. She denies seeing or hearing things that others can't. She denies feeling euphoric, agitated, or anxious. She denies drinking alcohol.     Ms. Rodarte is not oriented to time, place, or situation. She has 1/3 on immediate recall and 0/3 on delayed recall. She does not know who the president is, and is markedly confabulatory and suggestible (agrees enthusiastically to most options provided despite being told they are incorrect). Her highest level of educational attainment is high school.    Collateral obtained by medical student from [facility] states that the patient is normally very alert and very animated at baseline. On Friday, she appeared groggy and tired, and on Saturday she appeared confused. She has been non-compliant with taking her lithium as prescribed over the past couple of months, sometimes refusing to take it, sometimes throwing it away in the cup. She also has ibuprofen at her apartment and it is not clear how frequently she takes it.

## 2021-07-19 NOTE — CONSULT NOTE ADULT - SUBJECTIVE AND OBJECTIVE BOX
KINGSTON PETTY  75y, Female  Allergy: amoxicillin (Unknown)  Iodides (Unknown)      CHIEF COMPLAINT:       LOS  2d    HPI  HPI:  Patient is poor historian. history taken from chart.  "75y F pmhx HTN, DM, MDD, bipolar on lithium, anxiety send in by [facility] (assisted living)          due to AMS x3-4 days; constant, stable; per NH and son at bedside pt has been more forgetful than usual recently (might have early dementia but sx have never been this severe), with impairment in short-term memory. Pt has no complaints--denies chest pain, fever/chills, SOB, abd pain, n/v/d."    Spoke with Aid at [facility] ([phone]) Ms. Linton, as per aid patient was complaining of diarrhea this morning but she was not letting anyone go to the bathroom to check. Patient had received COVID vaccine (does not remember date). Aid denies any recent cough, fever, sob.    In ED: vitals Temp 98.3; HR 52, /77; RR 18; SaO2 98% on room air. UA suggestive of UTI. Patient received 1L LR bolus + Ceftriaxone 1000 mg x 1.  EKG in ED noted for Sinus bradycardia. (2021 23:42)      INFECTIOUS DISEASE HISTORY:  Satting well on RA  CXR Small left basilar opacity, possibly atelectasis.      PMH  PAST MEDICAL & SURGICAL HISTORY:  HTN (hypertension)    DM (diabetes mellitus)    MDD (major depressive disorder)    Bipolar disorder        FAMILY HISTORY  non-contributory     SOCIAL HISTORY  Social History:  cannot obtain further history from the patient secondary to altered mental status or sedation       ROS  General: Denies rigors, nightsweats  HEENT: Denies headache, rhinorrhea, sore throat, eye pain  CV: Denies CP, palpitations  PULM: Denies wheezing, hemoptysis  GI: Denies hematemesis, hematochezia, melena  : Denies discharge, hematuria  MSK: Denies arthralgias, myalgias  SKIN: Denies rash, lesions  NEURO: Denies paresthesias, weakness  PSYCH: Denies depression, anxiety    VITALS:  T(F): 97.4, Max: 99.1 (21 @ 12:46)  HR: 57  BP: 166/65  RR: 18Vital Signs Last 24 Hrs  T(C): 36.3 (2021 05:37), Max: 37.3 (2021 12:46)  T(F): 97.4 (2021 05:37), Max: 99.1 (2021 12:46)  HR: 57 (2021 05:37) (57 - 83)  BP: 166/65 (2021 05:37) (130/80 - 199/88)  BP(mean): --  RR: 18 (2021 05:37) (17 - 18)  SpO2: 95% (2021 20:58) (95% - 100%)    Gen: on RA  HEENT: NCAT  Resp: b/l chest expansion  Neuro: nonfocal       TESTS & MEASUREMENTS:                        10.0   9.95  )-----------( 212      ( 2021 08:15 )             33.5         138  |  107  |  41<H>  ----------------------------<  197<H>  4.7   |  19  |  1.9<H>    Ca    10.1      2021 08:15    TPro  7.0  /  Alb  4.7  /  TBili  0.3  /  DBili  x   /  AST  14  /  ALT  39  /  AlkPhos  140<H>      eGFR if Non African American: 25 mL/min/1.73M2 (21 @ 08:15)  eGFR if African American: 29 mL/min/1.73M2 (21 @ 08:15)    LIVER FUNCTIONS - ( 2021 08:15 )  Alb: 4.7 g/dL / Pro: 7.0 g/dL / ALK PHOS: 140 U/L / ALT: 39 U/L / AST: 14 U/L / GGT: x           Urinalysis Basic - ( 2021 19:20 )    Color: Light Yellow / Appearance: Clear / S.014 / pH: x  Gluc: x / Ketone: Negative  / Bili: Negative / Urobili: <2 mg/dL   Blood: x / Protein: 300 mg/dL / Nitrite: Negative   Leuk Esterase: Large / RBC: 1 /HPF / WBC 87 /HPF   Sq Epi: x / Non Sq Epi: 1 /HPF / Bacteria: Many              INFECTIOUS DISEASES TESTING  COVID-19 PCR: Detected (21 @ 20:30)      INFLAMMATORY MARKERS      RADIOLOGY & ADDITIONAL TESTS:  I have personally reviewed the last Chest xray  CXR  Xray Chest 1 View- PORTABLE-Urgent:   EXAM:  XR CHEST PORTABLE URGENT 1V            PROCEDURE DATE:  2021            INTERPRETATION:  Clinical History / Reason for exam: Pneumonia.    Comparison : Chest radiograph dated 2010.    Technique/Positioning: Portable frontal.Rotated.    Findings:    Support devices: None.    Cardiac/mediastinum/hilum: Unremarkable.    Lung parenchyma/Pleura: Small left basilar opacity, possibly atelectasis. No pneumothorax.    Skeleton/soft tissues: No acute osseous abnormality.    Impression:    1. Small left basilar opacity, possibly atelectasis.    --- End of Report ---              NU SPENCER MD; Attending Radiologist  This document has been electronically signed. 2021  9:56AM (21 @ 17:56)      CT      CARDIOLOGY TESTING  12 Lead ECG:   Ventricular Rate 45 BPM    Atrial Rate 45 BPM    P-R Interval 186 ms    QRS Duration 92 ms    Q-T Interval 458 ms    QTC Calculation(Bazett) 396 ms    P Axis 74 degrees    R Axis 60 degrees    T Axis 46 degrees    Diagnosis Line Sinus bradycardia  Otherwise normal ECG    Confirmed by YESSICA GEE MD ([id number]) on 2021 7:07:36 AM (21 @ 21:58)      MEDICATIONS  amLODIPine   Tablet 5 Oral daily  atorvastatin Oral Tab/Cap - Peds 40 Oral daily  cloNIDine 0.1 Oral two times a day  dextrose 40% Gel 15 Oral once  dextrose 5%. 1000 IV Continuous <Continuous>  dextrose 5%. 1000 IV Continuous <Continuous>  dextrose 50% Injectable 25 IV Push once  dextrose 50% Injectable 12.5 IV Push once  dextrose 50% Injectable 25 IV Push once  glucagon  Injectable 1 IntraMuscular once  insulin lispro (ADMELOG) corrective regimen sliding scale  SubCutaneous three times a day before meals  pantoprazole    Tablet 40 Oral before breakfast  sodium chloride 0.9%. 500 IV Continuous <Continuous>      Weight  Weight (kg): 60.8 (21 @ 02:42)    ANTIBIOTICS:      ALLERGIES:  amoxicillin (Unknown)  Iodides (Unknown)

## 2021-07-19 NOTE — PROGRESS NOTE ADULT - ASSESSMENT
ASSESSMENT  75y F pmhx HTN, DM, MDD, bipolar on lithium, anxiety send in by [facility] (assisted living) due to AMS x3-4 days; constant, stable; per NH and son at bedside pt has been more forgetful than usual recently (might have early dementia but sx have never been this severe), with impairment in short-term memory  ID consulted for COVID19    IMPRESSION  #Asymptomatic COVID19     s/p vaccination  2 doses of Pfizer COVID-19 vaccine on January 11, 2021 and February 1, 2021    CXR Small left basilar opacity, possibly atelectasis.    not requiring supplemental O2   #Bipolar with lithium toxicity  #Possible UTI    UA WBC 87  #Sepsis ruled out on admission   #Abx allergy: amoxicillin (Unknown)  Iodides (Unknown)      ASHLEY, dehydration secondary to diarrhea, vs Covid 19 infection  - responding to IVF  - continue hydration  - BUN, Creat trending downward  - GFR improving  - continue to hold ACE, Lithium and Metformin  - Renal consult pending    UTI  - on IV Rocephin  - await culture results    HTN  - on Amlodipine, given home dose this am  - hold ACE, Lasix  - resume Catapres today, give extra dose of 0.1 mg now    Metabolic encephalopathy multi factorial   - Lithium toxicity  - ASHLEY, dehydration  - Covid related?    Covid 19 Infection  - verify immunizations, will check office record and registry  - obtain Covid Antibodies  - obtain, Procalcitonin level, CRP, Ferritin, D-dimer and LDH level  - would obtain ID evaluation  - isolation as per protocol  - notify [facility] of Infection    DM  - hold Metformin  - treat with insulin as needed    Bipolar Disorder  - hold lithium  - would obtain psychiatry evaluation.  #ASHLEY  Creatinine, Serum: 1.9 (07-18-21 @ 08:15)      RECOMMENDATIONS  FROM ID:  - Can repeat COVID19 testing if needed negative testing prior to return to facility, either false positive PCR or re-infection. Pt unlikely to develop progressive or severe disease as vaccinated  - No therapeutics  - Unable to determine if urinary symptoms, can continue ceftriaxone 1g q24h iv until UCX results x 3 days should be sufficient    Please recall ID PRN    If any questions, please call or send a message on Microsoft Teams  Spectra [phone] ASSESSMENT  75y F pmhx HTN, DM, MDD, bipolar on lithium, anxiety send in by [facility] (assisted living) due to AMS x3-4 days; constant, stable; per NH and son at bedside pt has been more forgetful than usual recently (might have early dementia but sx have never been this severe), with impairment in short-term memory  ID consulted for COVID19    IMPRESSION  #Asymptomatic COVID19     s/p vaccination  2 doses of Pfizer COVID-19 vaccine on January 11, 2021 and February 1, 2021    CXR Small left basilar opacity, possibly atelectasis.    not requiring supplemental O2   #Bipolar with lithium toxicity  #Possible UTI    UA WBC 87  #Sepsis ruled out on admission   #Abx allergy: amoxicillin (Unknown)  Iodides (Unknown)      ASHLEY, dehydration secondary to diarrhea, vs Covid 19 infection  - responding to IVF  - continue hydration  - BUN, Creat trending downward  - GFR improving  - continue to hold ACE, Lithium and Metformin  - Renal consult pending;   - labs min improved with 1st F/U    UTI  - on IV Rocephin  - await culture results  - see ID recommendation- 3 days if UC NEG    HTN  - on Amlodipine, given home dose this am  - hold ACE, Lasix  - resume Catapres today,     Metabolic encephalopathy multi factorial   - Lithium toxicity  - ASHLEY, dehydration  - Covid related?  - would ask Neuro to see, may be Dementia with acute worsening due to encephalopathy    Covid 19 Infection  - verify immunizations, will check office record and registry  - obtain Covid Antibodies  - obtain, Procalcitonin level, CRP, Ferritin, D-dimer and LDH level- pending  - follow ID recommendation  - isolation as per protocol  - notify [facility] of Infection  - repeat COVID PCR test    DM  - hold Metformin  - treat with insulin as needed    Bipolar Disorder  - hold lithium  - would obtain psychiatry evaluation.  #ASHLEY  Creatinine, Serum: 1.9 (07-18-21 @ 08:15)   Unsteady Gait  - needs PT evaluation and RX;   - will need short term rehab      RECOMMENDATIONS  FROM ID:  - Can repeat COVID19 testing if needed negative testing prior to return to facility, either false positive PCR or re-infection. Pt unlikely to develop progressive or severe disease as vaccinated  - No therapeutics  - Unable to determine if urinary symptoms, can continue ceftriaxone 1g q24h iv until UCX results x 3 days should be sufficient    Please recall ID PRN    If any questions, please call or send a message on [employer] Teams  Spectra [phone]

## 2021-07-19 NOTE — BEHAVIORAL HEALTH ASSESSMENT NOTE - CASE SUMMARY
75 year old feemale from  [facility] assisted living facility for the past 7 years with a history of DM, MDD, bipolar on lithium, anxiety sent in by [facility] due to altered mental status for the past 3-4 days and was found to be COVID positive and have ASHLEY on admission.   Underlying delirium, which appear to be a component of both lithium toxicity and component of covid with superimposed dementia.  Currently patient appears to be animated, but no overt manic or psychotic symptoms. We agree to hold lithium for now, with the plan to restart it during this admission. For now, consider adding Seroquel 50mg po hs, until further reevaluation. There is no plan to admit this patient to inpatient psychiatry unit.   Will follow up on 7/21/21

## 2021-07-19 NOTE — PROGRESS NOTE ADULT - ASSESSMENT
75y F pmhx HTN, DM, MDD, bipolar on lithium, anxiety send in by [facility] due to AMS  ·	ASHLEY on CKD ? / chronic lithium use/ DM / HTN / covid positive   ·	creat better / need repeat from today   ·	keep metformin on hold / keep lisinopril on hold  ·	repeat Li levels noted better  / psych eval   ·	check UA / U protein creat ratio  ·	check renal bladder sono  ·	HTN - BP noted resumed clonidine . amlodipine keep ACE inh on hold   ·	keep alendronate off   ·	serial BMP   ·	check phosphorus levels    no need for RRT    will follow

## 2021-07-19 NOTE — BEHAVIORAL HEALTH ASSESSMENT NOTE - NSBHCHARTREVIEWVS_PSY_A_CORE FT
ICU Vital Signs Last 24 Hrs  T(C): 36.3 (19 Jul 2021 05:37), Max: 37.3 (18 Jul 2021 12:46)  T(F): 97.4 (19 Jul 2021 05:37), Max: 99.1 (18 Jul 2021 12:46)  HR: 57 (19 Jul 2021 05:37) (57 - 69)  BP: 166/65 (19 Jul 2021 05:37) (130/80 - 199/88)  BP(mean): --  ABP: --  ABP(mean): --  RR: 17 (19 Jul 2021 10:07) (17 - 18)  SpO2: 98% (19 Jul 2021 10:07) (95% - 98%)

## 2021-07-19 NOTE — BEHAVIORAL HEALTH ASSESSMENT NOTE - NSBHCONSULTRECOMMENDOTHER_PSY_A_CORE FT
Delirium precautions include frequent reorientation, maintaining circadian rhythms by encouraging daytime alertness and conversation, ensuring proper sleep at night and minimizing sleep interruptions, ambulating around the unit if mobile

## 2021-07-19 NOTE — BEHAVIORAL HEALTH ASSESSMENT NOTE - OTHER PAST PSYCHIATRIC HISTORY (INCLUDE DETAILS REGARDING ONSET, COURSE OF ILLNESS, INPATIENT/OUTPATIENT TREATMENT)
Has documented diagnoses of MDD, unspecific anxiety, and unspecified bipolar disorder per [facility] chart. Patient denies any past psychiatric history but does note that her brother has bipolar disorder and has been hospitalized due to it.

## 2021-07-19 NOTE — BEHAVIORAL HEALTH ASSESSMENT NOTE - RISK ASSESSMENT
Static risk factors: possible underlying cognitive decline  Protective factors: supervised domicile    Low risk for dangerous behavior Low Acute Suicide Risk Static risk factors: possible underlying cognitive decline, psychiatric history  Modifiable risk factors: denial of psychiatric illness, delirium  Protective factors: supervised domicile    Low risk for dangerous behavior

## 2021-07-19 NOTE — BEHAVIORAL HEALTH ASSESSMENT NOTE - SUICIDE RISK FACTORS
Access to lethal methods (pills, firearm, etc.: Ask specifically about presence or absence of a firearm in the home or ease of accessing/Unable to engage in safety planning

## 2021-07-19 NOTE — PROGRESS NOTE ADULT - SUBJECTIVE AND OBJECTIVE BOX
SUBJECTIVE:    Patient is a 75y old Female who presents with a chief complaint of AMS (2021 09:25)    Currently admitted to medicine with the primary diagnosis of Urinary tract infection.  Today is hospital day 2d. This morning she is resting comfortably in bed and reports no new issues or overnight events.     PAST MEDICAL & SURGICAL HISTORY  HTN (hypertension)    DM (diabetes mellitus)    MDD (major depressive disorder)    Bipolar disorder  ALLERGIES:  amoxicillin (Unknown)  Iodides (Unknown)    MEDICATIONS:  STANDING MEDICATIONS  amLODIPine   Tablet 5 milliGRAM(s) Oral daily  atorvastatin Oral Tab/Cap - Peds 40 milliGRAM(s) Oral daily  cloNIDine 0.1 milliGRAM(s) Oral two times a day  dextrose 40% Gel 15 Gram(s) Oral once  dextrose 5%. 1000 milliLiter(s) IV Continuous <Continuous>  dextrose 5%. 1000 milliLiter(s) IV Continuous <Continuous>  dextrose 50% Injectable 25 Gram(s) IV Push once  dextrose 50% Injectable 12.5 Gram(s) IV Push once  dextrose 50% Injectable 25 Gram(s) IV Push once  glucagon  Injectable 1 milliGRAM(s) IntraMuscular once  insulin lispro (ADMELOG) corrective regimen sliding scale   SubCutaneous three times a day before meals  pantoprazole    Tablet 40 milliGRAM(s) Oral before breakfast  sodium chloride 0.9%. 500 milliLiter(s) IV Continuous <Continuous>    PRN MEDICATIONS  atropine Injectable 0.5 milliGRAM(s) IntraMuscular once PRN    VITALS:   T(F): 98.7  HR: 58  BP: 195/81  RR: 17  SpO2: 98%    LABS:                        10.0   9.95  )-----------( 212      ( 2021 08:15 )             33.5     07-18    138  |  107  |  41<H>  ----------------------------<  197<H>  4.7   |  19  |  1.9<H>    Ca    10.1      2021 08:15    TPro  7.0  /  Alb  4.7  /  TBili  0.3  /  DBili  x   /  AST  14  /  ALT  39  /  AlkPhos  140<H>  07-18      Urinalysis Basic - ( 2021 19:20 )    Color: Light Yellow / Appearance: Clear / S.014 / pH: x  Gluc: x / Ketone: Negative  / Bili: Negative / Urobili: <2 mg/dL   Blood: x / Protein: 300 mg/dL / Nitrite: Negative   Leuk Esterase: Large / RBC: 1 /HPF / WBC 87 /HPF   Sq Epi: x / Non Sq Epi: 1 /HPF / Bacteria: Many      CARDIAC MARKERS ( 2021 00:32 )  x     / <0.01 ng/mL / x     / x     / x          RADIOLOGY:  < from: Xray Chest 1 View- PORTABLE-Urgent (Xray Chest 1 View- PORTABLE-Urgent .) (21 @ 17:56) >    Impression:    1. Small left basilar opacity, possibly atelectasis.      < end of copied text >  < from: CT Head No Cont (21 @ 17:12) >    IMPRESSION:    No CT evidence ofacute intracranial pathology.    < end of copied text >      PHYSICAL EXAM:  GEN: No acute distress  LUNGS: Clear to auscultation bilaterally , no wheezes, rales, rhonci  HEART: Regular rate and rhythm, +s1 s2  ABD: Soft, non-tender, non-distended.  EXT: NC/NC/NE/2+PP/APODACA/Skin Intact.   NEURO: AAOX3, confused

## 2021-07-19 NOTE — BEHAVIORAL HEALTH ASSESSMENT NOTE - DETAILS
DM in mother Bipolar disorder in brother, unverified Patient identifies her children and grandchildren as reasons to live

## 2021-07-19 NOTE — BEHAVIORAL HEALTH ASSESSMENT NOTE - DIFFERENTIAL
cognitive impairment (unspecified)  documented history of unspecific bipolar disorder Delirium  cognitive impairment (unspecified), r/o alzheimer dementia  documented history of unspecific bipolar disorder

## 2021-07-20 LAB
-  AMIKACIN: SIGNIFICANT CHANGE UP
-  AMOXICILLIN/CLAVULANIC ACID: SIGNIFICANT CHANGE UP
-  AMPICILLIN/SULBACTAM: SIGNIFICANT CHANGE UP
-  AMPICILLIN: SIGNIFICANT CHANGE UP
-  AZTREONAM: SIGNIFICANT CHANGE UP
-  CEFAZOLIN: SIGNIFICANT CHANGE UP
-  CEFEPIME: SIGNIFICANT CHANGE UP
-  CEFOXITIN: SIGNIFICANT CHANGE UP
-  CEFTRIAXONE: SIGNIFICANT CHANGE UP
-  CIPROFLOXACIN: SIGNIFICANT CHANGE UP
-  ERTAPENEM: SIGNIFICANT CHANGE UP
-  GENTAMICIN: SIGNIFICANT CHANGE UP
-  IMIPENEM: SIGNIFICANT CHANGE UP
-  LEVOFLOXACIN: SIGNIFICANT CHANGE UP
-  MEROPENEM: SIGNIFICANT CHANGE UP
-  NITROFURANTOIN: SIGNIFICANT CHANGE UP
-  PIPERACILLIN/TAZOBACTAM: SIGNIFICANT CHANGE UP
-  TIGECYCLINE: SIGNIFICANT CHANGE UP
-  TOBRAMYCIN: SIGNIFICANT CHANGE UP
-  TRIMETHOPRIM/SULFAMETHOXAZOLE: SIGNIFICANT CHANGE UP
ALBUMIN SERPL ELPH-MCNC: 4.2 G/DL — SIGNIFICANT CHANGE UP (ref 3.5–5.2)
ALP SERPL-CCNC: 126 U/L — HIGH (ref 30–115)
ALT FLD-CCNC: 22 U/L — SIGNIFICANT CHANGE UP (ref 0–41)
ANION GAP SERPL CALC-SCNC: 9 MMOL/L — SIGNIFICANT CHANGE UP (ref 7–14)
APPEARANCE UR: CLEAR — SIGNIFICANT CHANGE UP
AST SERPL-CCNC: 13 U/L — SIGNIFICANT CHANGE UP (ref 0–41)
BACTERIA # UR AUTO: ABNORMAL
BASOPHILS # BLD AUTO: 0.02 K/UL — SIGNIFICANT CHANGE UP (ref 0–0.2)
BASOPHILS NFR BLD AUTO: 0.2 % — SIGNIFICANT CHANGE UP (ref 0–1)
BILIRUB SERPL-MCNC: 0.3 MG/DL — SIGNIFICANT CHANGE UP (ref 0.2–1.2)
BILIRUB UR-MCNC: NEGATIVE — SIGNIFICANT CHANGE UP
BUN SERPL-MCNC: 25 MG/DL — HIGH (ref 10–20)
CALCIUM SERPL-MCNC: 9.4 MG/DL — SIGNIFICANT CHANGE UP (ref 8.5–10.1)
CHLORIDE SERPL-SCNC: 112 MMOL/L — HIGH (ref 98–110)
CO2 SERPL-SCNC: 16 MMOL/L — LOW (ref 17–32)
COLOR SPEC: YELLOW — SIGNIFICANT CHANGE UP
CREAT ?TM UR-MCNC: 102 MG/DL — SIGNIFICANT CHANGE UP
CREAT ?TM UR-MCNC: 107 MG/DL — SIGNIFICANT CHANGE UP
CREAT SERPL-MCNC: 1.6 MG/DL — HIGH (ref 0.7–1.5)
CULTURE RESULTS: SIGNIFICANT CHANGE UP
D DIMER BLD IA.RAPID-MCNC: 157 NG/ML DDU — SIGNIFICANT CHANGE UP (ref 0–230)
DIFF PNL FLD: NEGATIVE — SIGNIFICANT CHANGE UP
EOSINOPHIL # BLD AUTO: 0.33 K/UL — SIGNIFICANT CHANGE UP (ref 0–0.7)
EOSINOPHIL NFR BLD AUTO: 4.1 % — SIGNIFICANT CHANGE UP (ref 0–8)
EPI CELLS # UR: SIGNIFICANT CHANGE UP
GLUCOSE BLDC GLUCOMTR-MCNC: 158 MG/DL — HIGH (ref 70–99)
GLUCOSE BLDC GLUCOMTR-MCNC: 159 MG/DL — HIGH (ref 70–99)
GLUCOSE BLDC GLUCOMTR-MCNC: 194 MG/DL — HIGH (ref 70–99)
GLUCOSE BLDC GLUCOMTR-MCNC: 243 MG/DL — HIGH (ref 70–99)
GLUCOSE SERPL-MCNC: 223 MG/DL — HIGH (ref 70–99)
GLUCOSE UR QL: NEGATIVE — SIGNIFICANT CHANGE UP
HCT VFR BLD CALC: 29.5 % — LOW (ref 37–47)
HGB BLD-MCNC: 9 G/DL — LOW (ref 12–16)
IMM GRANULOCYTES NFR BLD AUTO: 0.4 % — HIGH (ref 0.1–0.3)
KETONES UR-MCNC: NEGATIVE — SIGNIFICANT CHANGE UP
LDH SERPL L TO P-CCNC: 151 — SIGNIFICANT CHANGE UP (ref 50–242)
LEUKOCYTE ESTERASE UR-ACNC: NEGATIVE — SIGNIFICANT CHANGE UP
LITHIUM SERPL-MCNC: 0.96 MMOL/L — SIGNIFICANT CHANGE UP (ref 0.6–1.2)
LYMPHOCYTES # BLD AUTO: 1.51 K/UL — SIGNIFICANT CHANGE UP (ref 1.2–3.4)
LYMPHOCYTES # BLD AUTO: 18.8 % — LOW (ref 20.5–51.1)
MAGNESIUM SERPL-MCNC: 2 MG/DL — SIGNIFICANT CHANGE UP (ref 1.8–2.4)
MCHC RBC-ENTMCNC: 26.3 PG — LOW (ref 27–31)
MCHC RBC-ENTMCNC: 30.5 G/DL — LOW (ref 32–37)
MCV RBC AUTO: 86.3 FL — SIGNIFICANT CHANGE UP (ref 81–99)
METHOD TYPE: SIGNIFICANT CHANGE UP
MONOCYTES # BLD AUTO: 0.43 K/UL — SIGNIFICANT CHANGE UP (ref 0.1–0.6)
MONOCYTES NFR BLD AUTO: 5.4 % — SIGNIFICANT CHANGE UP (ref 1.7–9.3)
NEUTROPHILS # BLD AUTO: 5.7 K/UL — SIGNIFICANT CHANGE UP (ref 1.4–6.5)
NEUTROPHILS NFR BLD AUTO: 71.1 % — SIGNIFICANT CHANGE UP (ref 42.2–75.2)
NITRITE UR-MCNC: NEGATIVE — SIGNIFICANT CHANGE UP
NRBC # BLD: 0 /100 WBCS — SIGNIFICANT CHANGE UP (ref 0–0)
ORGANISM # SPEC MICROSCOPIC CNT: SIGNIFICANT CHANGE UP
ORGANISM # SPEC MICROSCOPIC CNT: SIGNIFICANT CHANGE UP
PH UR: 6 — SIGNIFICANT CHANGE UP (ref 5–8)
PHOSPHATE SERPL-MCNC: 3.4 MG/DL — SIGNIFICANT CHANGE UP (ref 2.1–4.9)
PLATELET # BLD AUTO: 205 K/UL — SIGNIFICANT CHANGE UP (ref 130–400)
POTASSIUM SERPL-MCNC: 5 MMOL/L — SIGNIFICANT CHANGE UP (ref 3.5–5)
POTASSIUM SERPL-SCNC: 5 MMOL/L — SIGNIFICANT CHANGE UP (ref 3.5–5)
PROT ?TM UR-MCNC: 279 MG/DLG/24H — SIGNIFICANT CHANGE UP
PROT ?TM UR-MCNC: 292 MG/DLG/24H — SIGNIFICANT CHANGE UP
PROT SERPL-MCNC: 6.5 G/DL — SIGNIFICANT CHANGE UP (ref 6–8)
PROT UR-MCNC: ABNORMAL
PROT/CREAT UR-RTO: 2.9 RATIO — HIGH (ref 0–0.2)
RBC # BLD: 3.42 M/UL — LOW (ref 4.2–5.4)
RBC # FLD: 14.7 % — HIGH (ref 11.5–14.5)
SODIUM SERPL-SCNC: 137 MMOL/L — SIGNIFICANT CHANGE UP (ref 135–146)
SODIUM UR-SCNC: 62 MMOL/L — SIGNIFICANT CHANGE UP
SP GR SPEC: 1.02 — SIGNIFICANT CHANGE UP (ref 1.01–1.03)
SPECIMEN SOURCE: SIGNIFICANT CHANGE UP
UROBILINOGEN FLD QL: SIGNIFICANT CHANGE UP
UUN UR-MCNC: 634 MG/DL — SIGNIFICANT CHANGE UP
WBC # BLD: 8.02 K/UL — SIGNIFICANT CHANGE UP (ref 4.8–10.8)
WBC # FLD AUTO: 8.02 K/UL — SIGNIFICANT CHANGE UP (ref 4.8–10.8)
WBC UR QL: SIGNIFICANT CHANGE UP /HPF (ref 0–5)

## 2021-07-20 PROCEDURE — 73562 X-RAY EXAM OF KNEE 3: CPT | Mod: 26,LT

## 2021-07-20 RX ORDER — HALOPERIDOL DECANOATE 100 MG/ML
0.5 INJECTION INTRAMUSCULAR ONCE
Refills: 0 | Status: COMPLETED | OUTPATIENT
Start: 2021-07-20 | End: 2021-07-20

## 2021-07-20 RX ORDER — HYDRALAZINE HCL 50 MG
25 TABLET ORAL
Refills: 0 | Status: DISCONTINUED | OUTPATIENT
Start: 2021-07-20 | End: 2021-07-21

## 2021-07-20 RX ORDER — CHLORHEXIDINE GLUCONATE 213 G/1000ML
1 SOLUTION TOPICAL ONCE
Refills: 0 | Status: COMPLETED | OUTPATIENT
Start: 2021-07-20 | End: 2021-07-20

## 2021-07-20 RX ORDER — CEFTRIAXONE 500 MG/1
1000 INJECTION, POWDER, FOR SOLUTION INTRAMUSCULAR; INTRAVENOUS EVERY 24 HOURS
Refills: 0 | Status: DISCONTINUED | OUTPATIENT
Start: 2021-07-20 | End: 2021-07-21

## 2021-07-20 RX ORDER — HYDRALAZINE HCL 50 MG
25 TABLET ORAL DAILY
Refills: 0 | Status: DISCONTINUED | OUTPATIENT
Start: 2021-07-20 | End: 2021-07-20

## 2021-07-20 RX ORDER — NIFEDIPINE 30 MG
30 TABLET, EXTENDED RELEASE 24 HR ORAL DAILY
Refills: 0 | Status: DISCONTINUED | OUTPATIENT
Start: 2021-07-20 | End: 2021-07-20

## 2021-07-20 RX ORDER — AMLODIPINE BESYLATE 2.5 MG/1
5 TABLET ORAL DAILY
Refills: 0 | Status: DISCONTINUED | OUTPATIENT
Start: 2021-07-20 | End: 2021-07-21

## 2021-07-20 RX ADMIN — AMLODIPINE BESYLATE 5 MILLIGRAM(S): 2.5 TABLET ORAL at 06:15

## 2021-07-20 RX ADMIN — Medication 1: at 17:02

## 2021-07-20 RX ADMIN — Medication 2: at 12:07

## 2021-07-20 RX ADMIN — HALOPERIDOL DECANOATE 0.5 MILLIGRAM(S): 100 INJECTION INTRAMUSCULAR at 03:44

## 2021-07-20 RX ADMIN — CHLORHEXIDINE GLUCONATE 1 APPLICATION(S): 213 SOLUTION TOPICAL at 06:16

## 2021-07-20 RX ADMIN — Medication 0.1 MILLIGRAM(S): at 06:15

## 2021-07-20 RX ADMIN — Medication 25 MILLIGRAM(S): at 12:08

## 2021-07-20 RX ADMIN — CEFTRIAXONE 100 MILLIGRAM(S): 500 INJECTION, POWDER, FOR SOLUTION INTRAMUSCULAR; INTRAVENOUS at 12:08

## 2021-07-20 RX ADMIN — PANTOPRAZOLE SODIUM 40 MILLIGRAM(S): 20 TABLET, DELAYED RELEASE ORAL at 06:15

## 2021-07-20 RX ADMIN — Medication 1: at 07:45

## 2021-07-20 RX ADMIN — ATORVASTATIN CALCIUM 40 MILLIGRAM(S): 80 TABLET, FILM COATED ORAL at 12:08

## 2021-07-20 NOTE — PROGRESS NOTE ADULT - ASSESSMENT
75y F pmhx HTN, DM, MDD, bipolar on lithium, anxiety send in by [facility] due to AMS  ·	ASHLEY on CKD ? / chronic lithium use/ DM / HTN / covid positive   ·	creat better from peak, stable from yesterday  ·	keep metformin on hold / keep lisinopril on hold  ·	repeat Li levels noted better , keep off for now per psych  ·	check UA / U protein creat ratio  ·	check renal bladder sono  ·	HTN - BP noted resumed clonidine . increase amlodipine. keep ACE inh on hold   ·	keep alendronate off   ·	serial BMP   ·	check phosphorus levels    no need for RRT    will follow

## 2021-07-20 NOTE — PROGRESS NOTE ADULT - SUBJECTIVE AND OBJECTIVE BOX
SUBJECTIVE:    Patient is a 75y old Female who presents with a chief complaint of AMS (2021 09:25)    Currently admitted to medicine with the primary diagnosis of Urinary tract infection  Today is hospital day 3d. This morning she is resting comfortably in bed. No overnight events.     PAST MEDICAL & SURGICAL HISTORY  HTN (hypertension)    DM (diabetes mellitus)    MDD (major depressive disorder)    Bipolar disorder    ALLERGIES:  amoxicillin (Unknown)  Iodides (Unknown)    MEDICATIONS:  STANDING MEDICATIONS  amLODIPine   Tablet 5 milliGRAM(s) Oral daily  atorvastatin Oral Tab/Cap - Peds 40 milliGRAM(s) Oral daily  cloNIDine 0.1 milliGRAM(s) Oral two times a day  dextrose 40% Gel 15 Gram(s) Oral once  dextrose 5%. 1000 milliLiter(s) IV Continuous <Continuous>  dextrose 5%. 1000 milliLiter(s) IV Continuous <Continuous>  dextrose 50% Injectable 25 Gram(s) IV Push once  dextrose 50% Injectable 12.5 Gram(s) IV Push once  dextrose 50% Injectable 25 Gram(s) IV Push once  glucagon  Injectable 1 milliGRAM(s) IntraMuscular once  insulin lispro (ADMELOG) corrective regimen sliding scale   SubCutaneous three times a day before meals  pantoprazole    Tablet 40 milliGRAM(s) Oral before breakfast  sodium chloride 0.9%. 1000 milliLiter(s) IV Continuous <Continuous>  sodium chloride 0.9%. 500 milliLiter(s) IV Continuous <Continuous>    PRN MEDICATIONS  atropine Injectable 0.5 milliGRAM(s) IntraMuscular once PRN    VITALS:   T(F): 97  HR: 66  BP: 170/78  RR: 18  SpO2: 95%    LABS:                        9.0    8.02  )-----------( 205      ( 2021 04:30 )             29.5     07-20    137  |  112<H>  |  25<H>  ----------------------------<  223<H>  5.0   |  16<L>  |  1.6<H>    Ca    9.4      2021 04:30  Phos  3.4     07-20  Mg     2.0     07-20    TPro  6.5  /  Alb  4.2  /  TBili  0.3  /  DBili  x   /  AST  13  /  ALT  22  /  AlkPhos  126<H>  07-20      Urinalysis Basic - ( 2021 14:15 )    Color: Light Yellow / Appearance: Clear / S.014 / pH: x  Gluc: x / Ketone: Negative  / Bili: Negative / Urobili: <2 mg/dL   Blood: x / Protein: 300 mg/dL / Nitrite: Negative   Leuk Esterase: Negative / RBC: 1 /HPF / WBC 7 /HPF   Sq Epi: x / Non Sq Epi: 1 /HPF / Bacteria: Negative      Culture - Urine (collected 2021 19:20)  Source: .Urine Clean Catch (Midstream)  Preliminary Report (2021 21:00):    >100,000 CFU/ml Klebsiella pneumoniae  RADIOLOGY:  < from: US Kidney and Bladder (21 @ 18:51) >    IMPRESSION:    Simple cyst within the left kidney. Otherwise unremarkable study.      < end of copied text >    PHYSICAL EXAM:  GEN: No acute distress  LUNGS: Clear to auscultation bilaterally , no wheezes, rales, rhonci  HEART: Regular rate and rhythm, +s1 s2  ABD: Soft, non-tender, non-distended.  EXT: NC/NC/NE/2+PP/APODACA/Skin Intact.   NEURO: AAOX3, confused

## 2021-07-20 NOTE — PROGRESS NOTE ADULT - ASSESSMENT
75y F pmhx HTN, DM, MDD, bipolar on lithium, anxiety send in by [facility] due to AMS x3-4 days. Pt was found to have COVID-19 positive on admission.     # COVID-19 positive  - s/p vaccination  2 doses of Pfizer COVID-19 vaccine on January 11, 2021 and February 1, 2021  - currently asymptomatic, on RA  - Xray Chest (07.17.21 @ 17:56): Small left basilar opacity, possibly atelectasis.    # AMS likely metabolic encephalopathy, multi factorial   - could be secondary to UTI vs Li toxicity vs ASHLEY on CKD  - CT head negative    # UTI  - UA positive for UTI  - Ucx: Klabsiella  - s/p Rocephin 1 gm x 1 in ED,   - continue ceftriaxone 1g q24h iv until UCX results x 3 days  as per ID    # Li toxicity  # Hx of bipolar disorder  - holding PO Li for now.   - lithium level improved( 1.6>>0.96)  - s/p LR 1L bolus.  - gentle hydration with NS at 50/hr for 10 hours.  - psychiatry evaluation appreciated: consider Seroquel 50 mg PRN for acute agitation. Seroquel can cause paradoxical agitation in patients with dementia and should be used as a last resort    # ASHLEY on CKD3,  likely prerenal  - baseline creatinine ~ 1.4 - 1.7.   - Cr 1.9 on 7/18> 1.6 on 7/20  - On lasix 40 mg at [facility]  - continuing to hold lasix, ACE, Lithium and Metformin  - encourage po intake  - c/w hydration with NS at 50 /hr for 12 hours.  - U protein 279, urine creat 80  - renal bladder sono (7/19): Simple cyst within the left kidney    # hyperkalemia, resolved  - hyperkalemia can be due ASHLEY + ACEIs + Metformin. hold lisinopril and metformin.  - s/p single dose of lokelma 5 gm     # Bradycardia  - ? due to metoprolol  - ekg noted. no significant tall T waves on EKG.  - single dose of insulin + dextrose ordered.  - hold metoprolol for now  - trops negative x1  - Patient currently asymptomatic.    # HTN  - c/w amlodipine and clonodine  - s/p  hydralazine 10 mg  and nifedipine XL 30 mg on 7/19 once  - BP is still elevated  - started on hydralazine 25 mg QD    # DM  - monitor FS  - start insulin if FS > 180    DVT: not indicated for now.  GI: Pantoprazole  Diet: DASH  Activity: Ambulate as tolerated  Dispo: Acute for now

## 2021-07-20 NOTE — PROGRESS NOTE ADULT - SUBJECTIVE AND OBJECTIVE BOX
Nephrology progress note    Patient was seen and examined, events over the last 24 h noted .    Allergies:  amoxicillin (Unknown)  Iodides (Unknown)    Hospital Medications:   MEDICATIONS  (STANDING):  amLODIPine   Tablet 5 milliGRAM(s) Oral daily  atorvastatin Oral Tab/Cap - Peds 40 milliGRAM(s) Oral daily  cloNIDine 0.1 milliGRAM(s) Oral two times a day  dextrose 40% Gel 15 Gram(s) Oral once  dextrose 5%. 1000 milliLiter(s) (50 mL/Hr) IV Continuous <Continuous>  dextrose 5%. 1000 milliLiter(s) (100 mL/Hr) IV Continuous <Continuous>  dextrose 50% Injectable 25 Gram(s) IV Push once  dextrose 50% Injectable 12.5 Gram(s) IV Push once  dextrose 50% Injectable 25 Gram(s) IV Push once  glucagon  Injectable 1 milliGRAM(s) IntraMuscular once  insulin lispro (ADMELOG) corrective regimen sliding scale   SubCutaneous three times a day before meals  pantoprazole    Tablet 40 milliGRAM(s) Oral before breakfast  sodium chloride 0.9%. 1000 milliLiter(s) (50 mL/Hr) IV Continuous <Continuous>  sodium chloride 0.9%. 500 milliLiter(s) (50 mL/Hr) IV Continuous <Continuous>        VITALS:  T(F): 97 (21 @ 05:37), Max: 98.7 (21 @ 12:56)  HR: 66 (21 @ 05:37)  BP: 170/78 (21 @ 05:37)  RR: 18 (21 @ 05:37)  SpO2: 95% (21 @ 05:37)  Wt(kg): --     @ 07:01  -   @ 07:00  --------------------------------------------------------  IN: 780 mL / OUT: 200 mL / NET: 580 mL          PHYSICAL EXAM:  Constitutional: NAD  HEENT: anicteric sclera, oropharynx clear, MMM  Neck: No JVD  Respiratory: CTAB, no wheezes, rales or rhonchi  Cardiovascular: S1, S2, RRR  Gastrointestinal: BS+, soft, NT/ND  Extremities: No cyanosis or clubbing. No peripheral edema  :  No alcala.   Skin: No rashes    LABS:      140  |  112<H>  |  29<H>  ----------------------------<  186<H>  4.9   |  15<L>  |  1.8<H>    Ca    9.8      2021 18:28                            10.0   9.63  )-----------( 215      ( 2021 18:28 )             33.4       Urine Studies:  Urinalysis Basic - ( 2021 14:15 )    Color: Light Yellow / Appearance: Clear / S.014 / pH:   Gluc:  / Ketone: Negative  / Bili: Negative / Urobili: <2 mg/dL   Blood:  / Protein: 300 mg/dL / Nitrite: Negative   Leuk Esterase: Negative / RBC: 1 /HPF / WBC 7 /HPF   Sq Epi:  / Non Sq Epi: 1 /HPF / Bacteria: Negative      Creatinine, Random Urine: 80 mg/dL ( @ 13:50)    RADIOLOGY & ADDITIONAL STUDIES:

## 2021-07-20 NOTE — PROGRESS NOTE ADULT - ASSESSMENT
75y F pmhx HTN, DM, MDD, bipolar on lithium, anxiety send in by [facility] due to AMS x3-4 days. Pt was found to have COVID-19 positive on admission.     COVID-19, asymptomatic  - O2 not needed  - s/p vaccination  2 doses of Pfizer COVID-19 vaccine on January 11, 2021 and February 1, 2021  - currently asymptomatic, on RA  - repeat PCR negative  - antibody ++    AMS likely metabolic encephalopathy, multi factorial   - could be secondary to UTI vs Li toxicity vs ASHLEY on CKD  - CT head negative    UTI - Klebsiella  - await sensitivity  - Ceftriaxone 1g q24h     Li toxicity  - in presence of Bipolar disorder  - holding PO Li for now  - lithium level improved  - psychiatry evaluation appreciated: consider Seroquel 50 mg PRN for acute agitation.     ASHLEY on CKD3,  likely prerenal  - baseline creatinine ~ 1.4 - 1.7.   - creat trending downward  - On lasix 40 mg at [facility]  - continue to hold Lasix, ACE, Lithium and Metformin  - encourage po intake  - renal bladder sono (7/19): Simple cyst within the left kidney    Hyperkalemia, resolved  - post single dose of Lokelma 5 gm     Bradycardia  - ? due to Metoprolol  - hold Metoprolol for now, HR in 50's    HTN  - on Amlodipine and Catapres  - holding Lasix and ACE  - increase Hydralazine 25 mg BID to TID id needed    DM  - maintain of Metformin  - monitor FS  - start insulin if FS > 180    Left knee pain  - obtain xray  - rehab evaluation    DVT: not indicated for now.  GI: Pantoprazole  Diet: DASH  Activity: Ambulate as tolerated  Dispo: Acute for now

## 2021-07-20 NOTE — PROGRESS NOTE ADULT - SUBJECTIVE AND OBJECTIVE BOX
JOVANYNKINGSTON  75y  Female  HPI:  Patient is poor historian. history taken from chart.  "75y F pmhx HTN, DM, MDD, bipolar on lithium, anxiety send in by [facility] (assisted living)          due to AMS x3-4 days; constant, stable; per NH and son at bedside pt has been more forgetful than usual recently (might have early dementia but sx have never been this severe), with impairment in short-term memory. Pt has no complaints--denies chest pain, fever/chills, SOB, abd pain, n/v/d."    Spoke with Aid at [facility] ([phone]) Ms. Linton, as per aid patient was complaining of diarrhea this morning but she was not letting anyone go to the bathroom to check. Patient had received COVID vaccine (does not remember date). Aid denies any recent cough, fever, sob.    In ED: vitals Temp 98.3; HR 52, /77; RR 18; SaO2 98% on room air. UA suggestive of UTI. Patient received 1L LR bolus + Ceftriaxone 1000 mg x 1.  EKG in ED noted for Sinus bradycardia. (2021 23:42)    MEDICATIONS  (STANDING):  amLODIPine   Tablet 5 milliGRAM(s) Oral daily  atorvastatin Oral Tab/Cap - Peds 40 milliGRAM(s) Oral daily  cefTRIAXone   IVPB 1000 milliGRAM(s) IV Intermittent every 24 hours  cloNIDine 0.1 milliGRAM(s) Oral two times a day  dextrose 40% Gel 15 Gram(s) Oral once  dextrose 5%. 1000 milliLiter(s) (50 mL/Hr) IV Continuous <Continuous>  dextrose 5%. 1000 milliLiter(s) (100 mL/Hr) IV Continuous <Continuous>  dextrose 50% Injectable 25 Gram(s) IV Push once  dextrose 50% Injectable 12.5 Gram(s) IV Push once  dextrose 50% Injectable 25 Gram(s) IV Push once  glucagon  Injectable 1 milliGRAM(s) IntraMuscular once  hydrALAZINE 25 milliGRAM(s) Oral daily  insulin lispro (ADMELOG) corrective regimen sliding scale   SubCutaneous three times a day before meals  pantoprazole    Tablet 40 milliGRAM(s) Oral before breakfast  sodium chloride 0.9%. 1000 milliLiter(s) (50 mL/Hr) IV Continuous <Continuous>    MEDICATIONS  (PRN):  atropine Injectable 0.5 milliGRAM(s) IntraMuscular once PRN HR less than 30 and /or symptomatic bradycardia    INTERVAL EVENTS: Patient seen today without complaints, staff concerned risk to fall, unsteady due to knee pain? Patient denies fall    T(C): 36.4 (21 @ 13:11), Max: 36.7 (21 @ 20:31)  HR: 52 (21 @ 13:11) (50 - 66)  BP: 159/65 (21 @ 13:11) (152/69 - /)  RR: 18 (21 @ 13:11) (18 - 18)  SpO2: 95% (21 @ 05:37) (95% - 95%)  Wt(kg): --Vital Signs Last 24 Hrs  T(C): 36.4 (2021 13:11), Max: 36.7 (2021 20:31)  T(F): 97.5 (2021 13:11), Max: 98 (2021 20:31)  HR: 52 (2021 13:11) (50 - 66)  BP: 159/65 (2021 13:11) (152/69 - )  BP(mean): --  RR: 18 (2021 13:11) (18 - 18)  SpO2: 95% (2021 05:37) (95% - 95%)    PHYSICAL EXAM:  GENERAL: NAD, Confused  NECK: Supple, No JVD  CHEST/LUNG: Clear; Shallow  HEART: S1, S2  ABDOMEN: Soft, Nontender, Nondistended; Bowel sounds present  EXTREMITIES: No edema, left knee crepitus      LABS:                        9.0    8.02  )-----------(       ( 2021 04:30 )             29.5             07-20    137  |  112<H>  |  25<H>  ----------------------------<  223<H>  5.0   |  16<L>  |  1.6<H>    Ca    9.4      2021 04:30  Phos  3.4     07-20  Mg     2.0     -20    TPro  6.5  /  Alb  4.2  /  TBili  0.3  /  DBili  x   /  AST  13  /  ALT  22  /  AlkPhos  126<H>  -20    LIVER FUNCTIONS - ( 2021 04:30 )  Alb: 4.2 g/dL / Pro: 6.5 g/dL / ALK PHOS: 126 U/L / ALT: 22 U/L / AST: 13 U/L / GGT: x                   coCOVID-19 PCR . (21 @ 17:33)     COVID-19 PCR: NotDetec: Methodology: COVID-19 Nucleocapsid Antibody (21 @ 16:00)   COVID-19 Nucleocapsid Total MELVIN Antibody Result: 26.10: Roche ECLIA Nucleocapsid Total (MELVIN) AB   NOTE: This result index represents a total antibody measurement, which   includes IgG, IgA and IgM.   Measures Nucleocapsid   Negative <= 0.99 Index   Positive >= 1.00 Index Index   COVID-19 Nucleocapsid MELVIN AB Interp: Positive: This test has not been reviewed by the FDA by the standard review   process. It has been authorized for emergency use by the FDA. [employer] has validated this test to be accurate.   Negative results do not rule out SARS-CoV-2 infection, particularly in   those who have been in recent contact with the virus. Follow-up testing   with a molecular diagnostic test should be considered to rule out   infection in these individuals. Results from antibody testing should not   be used as the sole basis to diagnose or exclude SARS-CoV-2 infection, or   to inform infection status.   Positive results may rarely be due to past or present infection with   non-SARS-CoV-2 coronovirus strains, such as coronovirus HKU1, NL63,   OC43, A3 or 229E. [employer] through extensive   validation testing has confirmed that this risk in minimal with this test.     COVID-19 Allen Domain Antibody (21 @ 08:15)   COVID-19 lAlen Domain Antibody Result: >250.00: Roche ECLIA Total AB (MELVIN)       Urinalysis Basic - ( 2021 14:15 )    Color: Light Yellow / Appearance: Clear / S.014 / pH: x  Gluc: x / Ketone: Negative  / Bili: Negative / Urobili: <2 mg/dL   Blood: x / Protein: 300 mg/dL / Nitrite: Negative   Leuk Esterase: Negative / RBC: 1 /HPF / WBC 7 /HPF   Sq Epi: x / Non Sq Epi: 1 /HPF / Bacteria: Negative              Culture - Urine (collected 2021 19:20)  Source: .Urine Clean Catch (Midstream)  Preliminary Report (2021 21:00):    >100,000 CFU/ml Klebsiella pneumoniae      RADIOLOGY & ADDITIONAL TESTS:

## 2021-07-21 LAB
ALBUMIN SERPL ELPH-MCNC: 4.6 G/DL — SIGNIFICANT CHANGE UP (ref 3.5–5.2)
ALP SERPL-CCNC: 141 U/L — HIGH (ref 30–115)
ALT FLD-CCNC: 24 U/L — SIGNIFICANT CHANGE UP (ref 0–41)
ANION GAP SERPL CALC-SCNC: 11 MMOL/L — SIGNIFICANT CHANGE UP (ref 7–14)
AST SERPL-CCNC: 17 U/L — SIGNIFICANT CHANGE UP (ref 0–41)
BASOPHILS # BLD AUTO: 0.05 K/UL — SIGNIFICANT CHANGE UP (ref 0–0.2)
BASOPHILS NFR BLD AUTO: 0.6 % — SIGNIFICANT CHANGE UP (ref 0–1)
BILIRUB SERPL-MCNC: 0.2 MG/DL — SIGNIFICANT CHANGE UP (ref 0.2–1.2)
BUN SERPL-MCNC: 22 MG/DL — HIGH (ref 10–20)
CALCIUM SERPL-MCNC: 10.1 MG/DL — SIGNIFICANT CHANGE UP (ref 8.5–10.1)
CHLORIDE SERPL-SCNC: 108 MMOL/L — SIGNIFICANT CHANGE UP (ref 98–110)
CO2 SERPL-SCNC: 15 MMOL/L — LOW (ref 17–32)
CREAT SERPL-MCNC: 1.6 MG/DL — HIGH (ref 0.7–1.5)
CRP SERPL-MCNC: <3 MG/L — SIGNIFICANT CHANGE UP
EOSINOPHIL # BLD AUTO: 0.43 K/UL — SIGNIFICANT CHANGE UP (ref 0–0.7)
EOSINOPHIL NFR BLD AUTO: 4.8 % — SIGNIFICANT CHANGE UP (ref 0–8)
GLUCOSE BLDC GLUCOMTR-MCNC: 144 MG/DL — HIGH (ref 70–99)
GLUCOSE BLDC GLUCOMTR-MCNC: 168 MG/DL — HIGH (ref 70–99)
GLUCOSE BLDC GLUCOMTR-MCNC: 223 MG/DL — HIGH (ref 70–99)
GLUCOSE BLDC GLUCOMTR-MCNC: 247 MG/DL — HIGH (ref 70–99)
GLUCOSE SERPL-MCNC: 271 MG/DL — HIGH (ref 70–99)
HCT VFR BLD CALC: 33.4 % — LOW (ref 37–47)
HGB BLD-MCNC: 10.4 G/DL — LOW (ref 12–16)
IMM GRANULOCYTES NFR BLD AUTO: 0.4 % — HIGH (ref 0.1–0.3)
LYMPHOCYTES # BLD AUTO: 1.79 K/UL — SIGNIFICANT CHANGE UP (ref 1.2–3.4)
LYMPHOCYTES # BLD AUTO: 19.8 % — LOW (ref 20.5–51.1)
MAGNESIUM SERPL-MCNC: 2 MG/DL — SIGNIFICANT CHANGE UP (ref 1.8–2.4)
MCHC RBC-ENTMCNC: 26.9 PG — LOW (ref 27–31)
MCHC RBC-ENTMCNC: 31.1 G/DL — LOW (ref 32–37)
MCV RBC AUTO: 86.5 FL — SIGNIFICANT CHANGE UP (ref 81–99)
MONOCYTES # BLD AUTO: 0.38 K/UL — SIGNIFICANT CHANGE UP (ref 0.1–0.6)
MONOCYTES NFR BLD AUTO: 4.2 % — SIGNIFICANT CHANGE UP (ref 1.7–9.3)
NEUTROPHILS # BLD AUTO: 6.36 K/UL — SIGNIFICANT CHANGE UP (ref 1.4–6.5)
NEUTROPHILS NFR BLD AUTO: 70.2 % — SIGNIFICANT CHANGE UP (ref 42.2–75.2)
NRBC # BLD: 0 /100 WBCS — SIGNIFICANT CHANGE UP (ref 0–0)
PLATELET # BLD AUTO: 222 K/UL — SIGNIFICANT CHANGE UP (ref 130–400)
POTASSIUM SERPL-MCNC: 4.8 MMOL/L — SIGNIFICANT CHANGE UP (ref 3.5–5)
POTASSIUM SERPL-SCNC: 4.8 MMOL/L — SIGNIFICANT CHANGE UP (ref 3.5–5)
PROT SERPL-MCNC: 7.2 G/DL — SIGNIFICANT CHANGE UP (ref 6–8)
RBC # BLD: 3.86 M/UL — LOW (ref 4.2–5.4)
RBC # FLD: 14.9 % — HIGH (ref 11.5–14.5)
SODIUM SERPL-SCNC: 134 MMOL/L — LOW (ref 135–146)
WBC # BLD: 9.05 K/UL — SIGNIFICANT CHANGE UP (ref 4.8–10.8)
WBC # FLD AUTO: 9.05 K/UL — SIGNIFICANT CHANGE UP (ref 4.8–10.8)

## 2021-07-21 PROCEDURE — 99222 1ST HOSP IP/OBS MODERATE 55: CPT

## 2021-07-21 RX ORDER — HYDRALAZINE HCL 50 MG
25 TABLET ORAL EVERY 8 HOURS
Refills: 0 | Status: DISCONTINUED | OUTPATIENT
Start: 2021-07-21 | End: 2021-07-21

## 2021-07-21 RX ORDER — AMLODIPINE BESYLATE 2.5 MG/1
10 TABLET ORAL DAILY
Refills: 0 | Status: DISCONTINUED | OUTPATIENT
Start: 2021-07-21 | End: 2021-07-26

## 2021-07-21 RX ORDER — CIPROFLOXACIN LACTATE 400MG/40ML
250 VIAL (ML) INTRAVENOUS
Refills: 0 | Status: DISCONTINUED | OUTPATIENT
Start: 2021-07-21 | End: 2021-07-26

## 2021-07-21 RX ORDER — ACETAMINOPHEN 500 MG
650 TABLET ORAL ONCE
Refills: 0 | Status: COMPLETED | OUTPATIENT
Start: 2021-07-21 | End: 2021-07-21

## 2021-07-21 RX ORDER — HYDRALAZINE HCL 50 MG
25 TABLET ORAL
Refills: 0 | Status: DISCONTINUED | OUTPATIENT
Start: 2021-07-21 | End: 2021-07-23

## 2021-07-21 RX ADMIN — CEFTRIAXONE 100 MILLIGRAM(S): 500 INJECTION, POWDER, FOR SOLUTION INTRAMUSCULAR; INTRAVENOUS at 12:12

## 2021-07-21 RX ADMIN — Medication 25 MILLIGRAM(S): at 00:22

## 2021-07-21 RX ADMIN — Medication 0: at 17:12

## 2021-07-21 RX ADMIN — Medication 0.1 MILLIGRAM(S): at 18:02

## 2021-07-21 RX ADMIN — Medication 25 MILLIGRAM(S): at 06:21

## 2021-07-21 RX ADMIN — Medication 25 MILLIGRAM(S): at 18:02

## 2021-07-21 RX ADMIN — Medication 650 MILLIGRAM(S): at 15:30

## 2021-07-21 RX ADMIN — Medication 250 MILLIGRAM(S): at 18:42

## 2021-07-21 RX ADMIN — Medication 25 MILLIGRAM(S): at 13:50

## 2021-07-21 RX ADMIN — Medication 2: at 08:22

## 2021-07-21 RX ADMIN — PANTOPRAZOLE SODIUM 40 MILLIGRAM(S): 20 TABLET, DELAYED RELEASE ORAL at 06:21

## 2021-07-21 RX ADMIN — Medication 2: at 12:49

## 2021-07-21 RX ADMIN — Medication 650 MILLIGRAM(S): at 15:03

## 2021-07-21 RX ADMIN — ATORVASTATIN CALCIUM 40 MILLIGRAM(S): 80 TABLET, FILM COATED ORAL at 13:50

## 2021-07-21 RX ADMIN — AMLODIPINE BESYLATE 5 MILLIGRAM(S): 2.5 TABLET ORAL at 06:21

## 2021-07-21 NOTE — PROGRESS NOTE ADULT - TIME BILLING
Discussed diagnostic testing and treatment plan with primary team.
Discussed diagnostic testing and treatment plan with primary team.

## 2021-07-21 NOTE — PROGRESS NOTE ADULT - SUBJECTIVE AND OBJECTIVE BOX
Nephrology progress note    THIS IS AN INCOMPLETE NOTE . FULL NOTE TO FOLLOW SHORTLY    Patient is seen and examined, events over the last 24 h noted .    Allergies:  amoxicillin (Unknown)  Iodides (Unknown)    Hospital Medications:   MEDICATIONS  (STANDING):  amLODIPine   Tablet 10 milliGRAM(s) Oral daily  atorvastatin Oral Tab/Cap - Peds 40 milliGRAM(s) Oral daily  cefTRIAXone   IVPB 1000 milliGRAM(s) IV Intermittent every 24 hours  cloNIDine 0.1 milliGRAM(s) Oral two times a day  dextrose 40% Gel 15 Gram(s) Oral once  dextrose 5%. 1000 milliLiter(s) (50 mL/Hr) IV Continuous <Continuous>  dextrose 5%. 1000 milliLiter(s) (100 mL/Hr) IV Continuous <Continuous>  dextrose 50% Injectable 25 Gram(s) IV Push once  dextrose 50% Injectable 12.5 Gram(s) IV Push once  dextrose 50% Injectable 25 Gram(s) IV Push once  glucagon  Injectable 1 milliGRAM(s) IntraMuscular once  hydrALAZINE 25 milliGRAM(s) Oral every 8 hours  insulin lispro (ADMELOG) corrective regimen sliding scale   SubCutaneous three times a day before meals  pantoprazole    Tablet 40 milliGRAM(s) Oral before breakfast  sodium chloride 0.9%. 1000 milliLiter(s) (50 mL/Hr) IV Continuous <Continuous>        VITALS:  T(F): 97.5 (21 @ 05:56), Max: 97.5 (21 @ 13:11)  HR: 55 (21 @ 05:56)  BP: 176/77 (21 @ 05:56)  RR: 18 (21 @ 05:56)  SpO2: 100% (21 @ 05:56)  Wt(kg): --     @ 07:  -   @ 07:00  --------------------------------------------------------  IN: 780 mL / OUT: 200 mL / NET: 580 mL     @ 07:01  -   @ 07:00  --------------------------------------------------------  IN: 0 mL / OUT: 600 mL / NET: -600 mL          PHYSICAL EXAM:  Constitutional: NAD  HEENT: anicteric sclera, oropharynx clear, MMM  Neck: No JVD  Respiratory: CTAB, no wheezes, rales or rhonchi  Cardiovascular: S1, S2, RRR  Gastrointestinal: BS+, soft, NT/ND  Extremities: No cyanosis or clubbing. No peripheral edema  :  No alcala.   Skin: No rashes    LABS:      137  |  112<H>  |  25<H>  ----------------------------<  223<H>  5.0   |  16<L>  |  1.6<H>    Ca    9.4      2021 04:30  Phos  3.4       Mg     2.0         TPro  6.5  /  Alb  4.2  /  TBili  0.3  /  DBili      /  AST  13  /  ALT  22  /  AlkPhos  126<H>                            9.0    8.02  )-----------( 205      ( 2021 04:30 )             29.5       Urine Studies:  Urinalysis Basic - ( 2021 16:30 )    Color: Yellow / Appearance: Clear / S.020 / pH:   Gluc:  / Ketone: Negative  / Bili: Negative / Urobili: <2 mg/dL   Blood:  / Protein: 100 mg/dL / Nitrite: Negative   Leuk Esterase: Negative / RBC:  / WBC 3-5 /HPF   Sq Epi:  / Non Sq Epi: Few / Bacteria: Few      Creatinine, Random Urine: 107 mg/dL ( @ 16:31)  Sodium, Random Urine: 62.0 mmoL/L ( @ 16:31)  Creatinine, Random Urine: 102 mg/dL ( @ 16:31)  Protein/Creatinine Ratio Calculation: 2.9 Ratio ( @ 16:31)  Creatinine, Random Urine: 80 mg/dL ( @ 13:50)    RADIOLOGY & ADDITIONAL STUDIES:   Nephrology progress note  Patient is seen and examined, events over the last 24 h noted .  sitting in chair comfortable   Allergies:  amoxicillin (Unknown)  Iodides (Unknown)    Hospital Medications:   MEDICATIONS  (STANDING):  amLODIPine   Tablet 10 milliGRAM(s) Oral daily  atorvastatin Oral Tab/Cap - Peds 40 milliGRAM(s) Oral daily  cefTRIAXone   IVPB 1000 milliGRAM(s) IV Intermittent every 24 hours  cloNIDine 0.1 milliGRAM(s) Oral two times a day  dextrose 40% Gel 15 Gram(s) Oral once  glucagon  Injectable 1 milliGRAM(s) IntraMuscular once  hydrALAZINE 25 milliGRAM(s) Oral every 8 hours  insulin lispro (ADMELOG) corrective regimen sliding scale   SubCutaneous three times a day before meals  pantoprazole    Tablet 40 milliGRAM(s) Oral before breakfast  sodium chloride 0.9%. 1000 milliLiter(s) (50 mL/Hr) IV Continuous <Continuous>        VITALS:  T(F): 97.5 (21 @ 05:56), Max: 97.5 (21 @ 13:11)  HR: 55 (21 @ 05:56)  BP: 176/77 (21 @ 05:56)  RR: 18 (21 @ 05:56)  SpO2: 100% (21 @ 05:56)       @ :  -   @ 07:00  --------------------------------------------------------  IN: 780 mL / OUT: 200 mL / NET: 580 mL     @ 07:  -   @ 07:00  --------------------------------------------------------  IN: 0 mL / OUT: 600 mL / NET: -600 mL          PHYSICAL EXAM:  Constitutional: NAD  Neck: No JVD  Respiratory: CTAB,   Cardiovascular: S1, S2, RRR  Gastrointestinal: BS+, soft, NT/ND  Extremities: No cyanosis or clubbing. No peripheral edema  :  No alcala.   Skin: No rashes    LABS:      134<L>  |  108  |  22<H>  ----------------------------<  271<H>  4.8   |  15<L>  |  1.6<H>    Ca    10.1      2021 09:11  Phos  3.4       Mg     2.0         TPro  7.2  /  Alb  4.6  /  TBili  0.2  /  DBili  x   /  AST  17  /  ALT  24  /  AlkPhos  141<H>      137  |  112<H>  |  25<H>  ----------------------------<  223<H>  5.0   |  16<L>  |  1.6<H>    Ca    9.4      2021 04:30  Phos  3.4       Mg     2.0         TPro  6.5  /  Alb  4.2  /  TBili  0.3  /  DBili      /  AST  13  /  ALT  22  /  AlkPhos  126<H>                            9.0    8.02  )-----------( 205      ( 2021 04:30 )             29.5     Lithium Level, Serum: 0.96 mmol/L (21 @ 04:30)      Urine Studies:  Urinalysis Basic - ( 2021 16:30 )    Color: Yellow / Appearance: Clear / S.020 / pH:   Gluc:  / Ketone: Negative  / Bili: Negative / Urobili: <2 mg/dL   Blood:  / Protein: 100 mg/dL / Nitrite: Negative   Leuk Esterase: Negative / RBC:  / WBC 3-5 /HPF   Sq Epi:  / Non Sq Epi: Few / Bacteria: Few      Creatinine, Random Urine: 107 mg/dL ( @ 16:31)  Sodium, Random Urine: 62.0 mmoL/L ( @ 16:31)  Creatinine, Random Urine: 102 mg/dL ( @ 16:31)  Protein/Creatinine Ratio Calculation: 2.9 Ratio ( @ 16:31)  Creatinine, Random Urine: 80 mg/dL ( @ 13:50)    RADIOLOGY & ADDITIONAL STUDIES:  < from: US Kidney and Bladder (21 @ 18:51) >  IMPRESSION:    Simple cyst within the left kidney. Otherwise unremarkable study.    < end of copied text >

## 2021-07-21 NOTE — PROGRESS NOTE ADULT - ASSESSMENT
ASSESSMENT  75y F pmhx HTN, DM, MDD, bipolar on lithium, anxiety send in by [facility] (assisted living) due to AMS x3-4 days; constant, stable; per NH and son at bedside pt has been more forgetful than usual recently (might have early dementia but sx have never been this severe), with impairment in short-term memory  ID consulted for COVID19    IMPRESSION  #Asymptomatic COVID19 ; PCR +, then NEG- resolving; AB noted also    s/p vaccination  2 doses of Pfizer COVID-19 vaccine on January 11, 2021 and February 1, 2021    CXR Small left basilar opacity, possibly atelectasis.    not requiring supplemental O2   #Bipolar with lithium toxicity;   Behavior evaluation greatly appreciated; ? HX and Rx of underlying MH issues  #Possible UTI--+ UC- pan sensitive- can change to PO Cipro    UA WBC 87  #Sepsis ruled out on admission   #Abx allergy: amoxicillin (Unknown)  Iodides (Unknown)    Delerium on dementia- still waiting call back from son regarding patient's baseline;   ask neuro to see , and NC brain MR, and plan for SNF- if OK w family      ASHLEY, dehydration secondary to diarrhea- which has ceased; possibly 2/2 Covid 19 infection  - responding to IVF  - continue hydration  - BUN, Creat has improved  - GFR improving  - continue to hold ACE, Lithium and Metformin      RX BP as per renal      UTI  - on IV Rocephin  - await culture results  - see ID recommendation- 3 days if UC NEG    HTN  - on Amlodipine, given home dose this am  - hold ACE, Lasix  - resume Catapres today,     Metabolic encephalopathy multi factorial   - Lithium toxicity  - ASHLEY, dehydration  - Covid related?  - would ask Neuro to see, may be Dementia with acute worsening due to encephalopathy    Covid 19 Infection  - verify immunizations, will check office record and registry  - obtain Covid Antibodies  - obtain, Procalcitonin level, CRP, Ferritin, D-dimer and LDH level- pending  - follow ID recommendation  - isolation as per protocol  - notify [facility] of Infection  - repeat COVID PCR test    DM  - hold Metformin  - treat with insulin as needed    Bipolar Disorder  - hold lithium  - would obtain psychiatry evaluation.  #ASHLEY  Creatinine, Serum: 1.9 (07-18-21 @ 08:15)   Unsteady Gait  - needs PT evaluation and RX;   - will need short term rehab- ask CM to evaluate.      RECOMMENDATIONS  FROM ID:  - Can repeat COVID19 testing if needed negative testing prior to return to facility, either false positive PCR or re-infection. see 2nd test NEG; both Covid Ab are POS  - No therapeutics  - Unable to determine if urinary symptoms, can continue ceftriaxone 1g q24h iv ; now to cipro 250 q12h- for total of 10 days

## 2021-07-21 NOTE — CONSULT NOTE ADULT - ASSESSMENT
Assessment :   75y F PMHX HTN, DM, MDD, bipolar on lithium, anxiety send in by [facility] (assisted living) due to AMS x3-4 days; constant, stable; per NH and son at bedside pt has been more forgetful than usual recently (might have early dementia but sx have never been this severe), with impairment in short-term memory. Pt has no complaints--denies chest pain, fever/chills, SOB, abd pain, n/v/d."    Patient admitted for metabolic encephalopathy secondary to lithium toxicity, UTI and COVID infection. Mental status at baseline currently.     Impression  AMS likely because of lithium toxicity, UTI and COVID in the setting of baseline dementia - Resolved    Recommendations:   No further neurological workup needed at this time.   No indication for MRI Brain.  Can follow up outpatient for dementia workup.   Please recall PRN.    75y F PMHX HTN, DM, MDD, bipolar on lithium, anxiety send in by [facility] (assisted living) due to AMS x3-4 days; constant, stable; per NH and son at bedside pt has been more forgetful than usual recently (might have early dementia but sx have never been this severe), with impairment in short-term memory. Pt has no complaints--denies chest pain, fever/chills, SOB, abd pain, n/v/d."      Patient admitted for metabolic encephalopathy secondary to lithium toxicity, UTI and COVID infection. Mental status at baseline currently.     Impression  AMS likely because of lithium toxicity, UTI and COVID in the setting of baseline dementia - Resolved    Recommendations:   No further neurological workup needed at this time.   No indication for MRI Brain at this time  Can follow up outpatient for dementia workup.   Please recall PRN.

## 2021-07-21 NOTE — PROGRESS NOTE ADULT - ASSESSMENT
75y F pmhx HTN, DM, MDD, bipolar on lithium, anxiety send in by [facility] due to AMS  ·	ASHLEY on CKD ? / chronic lithium use/ DM / HTN / covid positive   ·	creat better from peak, stable from yesterday  ·	keep metformin on hold / keep lisinopril on hold  ·	repeat Li levels noted better , keep off for now per psych  ·	proteinuria due to DM   ·	renal bladder sono simple cyst   ·	HTN - BP noted resumed clonidine . increase amlodipine. keep ACE inh on hold   ·	keep alendronate off   ·	serial BMP   ·	check phosphorus levels  ·	bicarb noted dc IVF start sodium bicarb 325 po q8h     no need for RRT    will sign off recall PRN / call using TEAMS or on [phone]

## 2021-07-21 NOTE — CONSULT NOTE ADULT - SUBJECTIVE AND OBJECTIVE BOX
Neurology Consult    Patient is a 75y old  Female who presents with a chief complaint of AMS (2021 10:09)      HPI:    Tried calling the son ([phone]) for further information - no pickup. Patient is poor historian. history taken from chart.   "75y F pmhx HTN, DM, MDD, bipolar on lithium, anxiety send in by [facility] (assisted living)          due to AMS x3-4 days; constant, stable; per NH and son at bedside pt has been more forgetful than usual recently (might have early dementia but sx have never been this severe), with impairment in short-term memory. Pt has no complaints--denies chest pain, fever/chills, SOB, abd pain, n/v/d."    Spoke with Aid at [facility] ([phone]) Ms. Linton, as per aid patient was complaining of diarrhea this morning but she was not letting anyone go to the bathroom to check. Patient had received COVID vaccine (does not remember date). Aid denies any recent cough, fever, sob.    In ED: vitals Temp 98.3; HR 52, /77; RR 18; SaO2 98% on room air. UA suggestive of UTI. Patient received 1L LR bolus + Ceftriaxone 1000 mg x 1.  EKG in ED noted for Sinus bradycardia. (2021 23:42)    Patient was seen at bedside. She is sitting comfortably on a chair. Denies any specific symptoms.     PAST MEDICAL & SURGICAL HISTORY:  HTN (hypertension)    DM (diabetes mellitus)    MDD (major depressive disorder)    Bipolar disorder        FAMILY HISTORY:      Social History: (-) x 3    Allergies    amoxicillin (Unknown)  Iodides (Unknown)    Intolerances        MEDICATIONS  (STANDING):  amLODIPine   Tablet 10 milliGRAM(s) Oral daily  atorvastatin Oral Tab/Cap - Peds 40 milliGRAM(s) Oral daily  ciprofloxacin     Tablet 250 milliGRAM(s) Oral two times a day  cloNIDine 0.1 milliGRAM(s) Oral two times a day  dextrose 40% Gel 15 Gram(s) Oral once  dextrose 5%. 1000 milliLiter(s) (50 mL/Hr) IV Continuous <Continuous>  dextrose 5%. 1000 milliLiter(s) (100 mL/Hr) IV Continuous <Continuous>  dextrose 50% Injectable 25 Gram(s) IV Push once  dextrose 50% Injectable 12.5 Gram(s) IV Push once  dextrose 50% Injectable 25 Gram(s) IV Push once  glucagon  Injectable 1 milliGRAM(s) IntraMuscular once  hydrALAZINE 25 milliGRAM(s) Oral two times a day  insulin lispro (ADMELOG) corrective regimen sliding scale   SubCutaneous three times a day before meals  pantoprazole    Tablet 40 milliGRAM(s) Oral before breakfast  sodium chloride 0.9%. 1000 milliLiter(s) (50 mL/Hr) IV Continuous <Continuous>    MEDICATIONS  (PRN):  atropine Injectable 0.5 milliGRAM(s) IntraMuscular once PRN HR less than 30 and /or symptomatic bradycardia      Review of systems: Limited due to cognitive impairment.   Constitutional: as per HPI  Eyes: No eye pain or discharge  ENMT:  No difficulty hearing; No sinus or throat pain  Neck: No pain or stiffness  Respiratory: No cough, wheezing, chills or hemoptysis  Cardiovascular: No chest pain, palpitations, shortness of breath, dyspnea on exertion  Gastrointestinal: No abdominal pain, nausea, vomiting or hematemesis; No diarrhea or constipation.   Genitourinary: No dysuria, frequency, hematuria or incontinence  Neurological: As per HPI  Skin: No rashes or lesions   Endocrine: No heat or cold intolerance; No hair loss  Musculoskeletal: No joint pain or swelling  Psychiatric: Feels more depressed;   Heme/Lymph: No easy bruising or bleeding gums    Vital Signs Last 24 Hrs  T(C): 36.5 (2021 12:53), Max: 36.5 (2021 12:53)  T(F): 97.7 (2021 12:53), Max: 97.7 (2021 12:53)  HR: 73 (2021 12:53) (55 - 73)  BP: 123/71 (2021 12:53) (123/71 - 176/77)  BP(mean): --  RR: 18 (2021 12:53) (18 - 18)  SpO2: 100% (2021 05:56) (94% - 100%)    Examination:  General:  Appearance is consistent with chronologic age.  No abnormal facies.  Gross skin survey within normal limits.    Cognitive/Language:  The patient is oriented to person, and place but not time.  Recent and remote memory - mild impairment. Fund of knowledge intact. Language with normal repetition, comprehension and naming.  Nondysarthric.    Eyes: intact VA, VFF.  EOMI w/o nystagmus, skew or reported double vision.  PERRL.  No ptosis/weakness of eyelid closure.    Face:  Facial sensation normal V1 - 3, no facial asymmetry.    Ears/Nose/Throat:  Hearing grossly intact b/l.  Palate elevates midline.  Tongue and uvula midline.   Motor examination:   Normal tone, bulk and range of motion.  No tenderness, twitching, tremors or involuntary movements.  Formal Muscle Strength Testing: (MRC grade R/L) 5/5 UE; 5/5 LE.  No observable drift.  Reflexes:   2+ b/l pectoralis, biceps, triceps, brachioradialis, patella and Achilles.  Plantar response downgoing b/l.   Sensory examination:   Intact to light touch, proprioception  in all extremities.  Cerebellum:   No dysmetria or dysdiadokinesia. Romberg test -ve    Respiratory:  no audible wheezing or inspiratory stridor.  no use of accessory muscles.   Cardiac: pulse palpable, no audible bruits  Abdomen: supple, no guarding, no TTP    Labs:   CBC Full  -  ( 2021 09:11 )  WBC Count : 9.05 K/uL  RBC Count : 3.86 M/uL  Hemoglobin : 10.4 g/dL  Hematocrit : 33.4 %  Platelet Count - Automated : 222 K/uL  Mean Cell Volume : 86.5 fL  Mean Cell Hemoglobin : 26.9 pg  Mean Cell Hemoglobin Concentration : 31.1 g/dL  Auto Neutrophil # : 6.36 K/uL  Auto Lymphocyte # : 1.79 K/uL  Auto Monocyte # : 0.38 K/uL  Auto Eosinophil # : 0.43 K/uL  Auto Basophil # : 0.05 K/uL  Auto Neutrophil % : 70.2 %  Auto Lymphocyte % : 19.8 %  Auto Monocyte % : 4.2 %  Auto Eosinophil % : 4.8 %  Auto Basophil % : 0.6 %        134<L>  |  108  |  22<H>  ----------------------------<  271<H>  4.8   |  15<L>  |  1.6<H>    Ca    10.1      2021 09:11  Phos  3.4     07-20  Mg     2.0     -    TPro  7.2  /  Alb  4.6  /  TBili  0.2  /  DBili  x   /  AST  17  /  ALT  24  /  AlkPhos  141<H>      LIVER FUNCTIONS - ( 2021 09:11 )  Alb: 4.6 g/dL / Pro: 7.2 g/dL / ALK PHOS: 141 U/L / ALT: 24 U/L / AST: 17 U/L / GGT: x             Urinalysis Basic - ( 2021 16:30 )    Color: Yellow / Appearance: Clear / S.020 / pH: x  Gluc: x / Ketone: Negative  / Bili: Negative / Urobili: <2 mg/dL   Blood: x / Protein: 100 mg/dL / Nitrite: Negative   Leuk Esterase: Negative / RBC: x / WBC 3-5 /HPF   Sq Epi: x / Non Sq Epi: Few / Bacteria: Few          Neuroimaging:  NCHCT:   < from: CT Head No Cont (21 @ 17:12) >  IMPRESSION:    No CT evidence ofacute intracranial pathology.    < end of copied text >      21 @ 15:44       Neurology Consult    Patient is a 75y old  Female who presents with a chief complaint of AMS (2021 10:09)      HPI:    Tried calling the son ([phone]) for further information - no pickup. Patient is poor historian. history taken from chart.   "75y F pmhx HTN, DM, MDD, bipolar on lithium, anxiety send in by [facility] (assisted living) due to AMS x3-4 days; constant, stable; per NH and son at bedside pt has been more forgetful than usual recently (might have early dementia but sx have never been this severe), with impairment in short-term memory. Pt has no complaints--denies chest pain, fever/chills, SOB, abd pain, n/v/d."    Spoke with Aid at [facility] ([phone]) Ms. Linton, as per aid patient was complaining of diarrhea this morning but she was not letting anyone go to the bathroom to check. Patient had received COVID vaccine (does not remember date). Aid denies any recent cough, fever, sob.    In ED: vitals Temp 98.3; HR 52, /77; RR 18; SaO2 98% on room air. UA suggestive of UTI. Patient received 1L LR bolus + Ceftriaxone 1000 mg x 1.  EKG in ED noted for Sinus bradycardia. (2021 23:42)    Patient was seen at bedside. She is sitting comfortably on a chair. Denies any specific symptoms.     PAST MEDICAL & SURGICAL HISTORY:  HTN (hypertension)    DM (diabetes mellitus)    MDD (major depressive disorder)    Bipolar disorder    FAMILY HISTORY:    Social History: (-) x 3    Allergies    amoxicillin (Unknown)  Iodides (Unknown)    Intolerances    MEDICATIONS  (STANDING):  amLODIPine   Tablet 10 milliGRAM(s) Oral daily  atorvastatin Oral Tab/Cap - Peds 40 milliGRAM(s) Oral daily  ciprofloxacin     Tablet 250 milliGRAM(s) Oral two times a day  cloNIDine 0.1 milliGRAM(s) Oral two times a day  dextrose 40% Gel 15 Gram(s) Oral once  dextrose 5%. 1000 milliLiter(s) (50 mL/Hr) IV Continuous <Continuous>  dextrose 5%. 1000 milliLiter(s) (100 mL/Hr) IV Continuous <Continuous>  dextrose 50% Injectable 25 Gram(s) IV Push once  dextrose 50% Injectable 12.5 Gram(s) IV Push once  dextrose 50% Injectable 25 Gram(s) IV Push once  glucagon  Injectable 1 milliGRAM(s) IntraMuscular once  hydrALAZINE 25 milliGRAM(s) Oral two times a day  insulin lispro (ADMELOG) corrective regimen sliding scale   SubCutaneous three times a day before meals  pantoprazole    Tablet 40 milliGRAM(s) Oral before breakfast  sodium chloride 0.9%. 1000 milliLiter(s) (50 mL/Hr) IV Continuous <Continuous>    MEDICATIONS  (PRN):  atropine Injectable 0.5 milliGRAM(s) IntraMuscular once PRN HR less than 30 and /or symptomatic bradycardia    Review of systems: Limited due to cognitive impairment.   Constitutional: as per HPI  Eyes: No eye pain or discharge  ENMT:  No difficulty hearing; No sinus or throat pain  Neck: No pain or stiffness  Respiratory: No cough, wheezing, chills or hemoptysis  Cardiovascular: No chest pain, palpitations, shortness of breath, dyspnea on exertion  Gastrointestinal: No abdominal pain, nausea, vomiting or hematemesis; No diarrhea or constipation.   Genitourinary: No dysuria, frequency, hematuria or incontinence  Neurological: As per HPI  Skin: No rashes or lesions   Endocrine: No heat or cold intolerance; No hair loss  Musculoskeletal: No joint pain or swelling  Psychiatric: Feels more depressed;   Heme/Lymph: No easy bruising or bleeding gums    Vital Signs Last 24 Hrs  T(C): 36.5 (2021 12:53), Max: 36.5 (2021 12:53)  T(F): 97.7 (2021 12:53), Max: 97.7 (2021 12:53)  HR: 73 (2021 12:53) (55 - 73)  BP: 123/71 (2021 12:53) (123/71 - 176/77)  BP(mean): --  RR: 18 (2021 12:53) (18 - 18)  SpO2: 100% (2021 05:56) (94% - 100%)    Examination:  General:  Appearance is consistent with chronologic age.  No abnormal facies.  Gross skin survey within normal limits.    Cognitive/Language:  The patient is oriented to person, and place "hospital" but not time.  Recent and remote memory - mild impairment. Fund of knowledge intact. Language with normal repetition, comprehension and naming.  Nondysarthric.    Eyes: intact VA, VFF.  EOMI w/o nystagmus, skew or reported double vision.  PERRL.  No ptosis/weakness of eyelid closure.    Face:  Facial sensation normal V1 - 3, no facial asymmetry.    Ears/Nose/Throat:  Hearing grossly intact b/l.  Palate elevates midline.  Tongue and uvula midline.   Motor examination:   Normal tone, bulk and range of motion.  No tenderness, twitching, tremors or involuntary movements.  Formal Muscle Strength Testing: (MRC grade R/L) 5/5 UE; 5/5 LE.  No observable drift.  Reflexes:   2+ b/l pectoralis, biceps, triceps, brachioradialis, patella and Achilles.  Plantar response downgoing b/l.   Sensory examination:   Intact to light touch, proprioception  in all extremities.  Cerebellum:   No dysmetria or dysdiadokinesia. Romberg test -ve    Respiratory:  no audible wheezing or inspiratory stridor.  no use of accessory muscles.   Cardiac: pulse palpable, no audible bruits  Abdomen: supple, no guarding, no TTP    Labs:   CBC Full  -  ( 2021 09:11 )  WBC Count : 9.05 K/uL  RBC Count : 3.86 M/uL  Hemoglobin : 10.4 g/dL  Hematocrit : 33.4 %  Platelet Count - Automated : 222 K/uL  Mean Cell Volume : 86.5 fL  Mean Cell Hemoglobin : 26.9 pg  Mean Cell Hemoglobin Concentration : 31.1 g/dL  Auto Neutrophil # : 6.36 K/uL  Auto Lymphocyte # : 1.79 K/uL  Auto Monocyte # : 0.38 K/uL  Auto Eosinophil # : 0.43 K/uL  Auto Basophil # : 0.05 K/uL  Auto Neutrophil % : 70.2 %  Auto Lymphocyte % : 19.8 %  Auto Monocyte % : 4.2 %  Auto Eosinophil % : 4.8 %  Auto Basophil % : 0.6 %        134<L>  |  108  |  22<H>  ----------------------------<  271<H>  4.8   |  15<L>  |  1.6<H>    Ca    10.1      2021 09:11  Phos  3.4     07-20  Mg     2.0         TPro  7.2  /  Alb  4.6  /  TBili  0.2  /  DBili  x   /  AST  17  /  ALT  24  /  AlkPhos  141<H>      LIVER FUNCTIONS - ( 2021 09:11 )  Alb: 4.6 g/dL / Pro: 7.2 g/dL / ALK PHOS: 141 U/L / ALT: 24 U/L / AST: 17 U/L / GGT: x             Urinalysis Basic - ( 2021 16:30 )    Color: Yellow / Appearance: Clear / S.020 / pH: x  Gluc: x / Ketone: Negative  / Bili: Negative / Urobili: <2 mg/dL   Blood: x / Protein: 100 mg/dL / Nitrite: Negative   Leuk Esterase: Negative / RBC: x / WBC 3-5 /HPF   Sq Epi: x / Non Sq Epi: Few / Bacteria: Few    < from: CT Head No Cont (21 @ 17:12) >  IMPRESSION:    No CT evidence ofacute intracranial pathology.    --- End of Report ---    LUIS ALAS MD; Resident Radiologist  This document has been electronically signed.  PHOENIX ALVARES MD; Attending Radiologist  This document has been electronically signed. 1720  5:38PM    < end of copied text >

## 2021-07-21 NOTE — CONSULT NOTE ADULT - ATTENDING COMMENTS
I have personally seen and examined this patient.  I have fully participated in the care of this patient.  I have reviewed all pertinent clinical information, including history, physical exam, plan and note.   I have reviewed all pertinent clinical information and reviewed all relevant imaging and diagnostic studies personally.  Pt w/ baseline dementia admitted for worsening AMS likely toxic/metabolic encephalopathy currently improved without any signs of delirium at this time.  Recommendations as above.  Agree with above assessment except as noted.

## 2021-07-21 NOTE — PROGRESS NOTE ADULT - ASSESSMENT
75y F pmhx HTN, DM, MDD, bipolar on lithium, anxiety send in by [facility] due to AMS x3-4 days. Pt was found to have COVID-19 positive on admission.     # COVID-19 positive  - s/p vaccination  2 doses of Pfizer COVID-19 vaccine on January 11, 2021 and February 1, 2021  - currently asymptomatic, on RA  - Xray Chest (07.17.21 @ 17:56): Small left basilar opacity, possibly atelectasis.  - repeat COVID PCR negative    # AMS likely metabolic encephalopathy, multi factorial   - could be secondary to UTI vs Li toxicity vs ASHLEY on CKD  - CT head negative  - Neuro consult  - f/u MRI brain with and without contrast    # UTI  - UA positive for UTI  - Ucx: Klabsiella  - s/p Rocephin 1 gm x 1 in ED,   - d/c ceftriaxone 1g q24h iv until UCX results x 3 days  - started  cipro 250 q12h- for total of 10 days      # Li toxicity  # Hx of bipolar disorder  - holding PO Li for now.   - lithium level improved( 1.6>>0.96)  - s/p LR 1L bolus.  - gentle hydration with NS at 50/hr for 10 hours.  - psychiatry evaluation appreciated: consider Seroquel 50 mg PRN for acute agitation. Seroquel can cause paradoxical agitation in patients with dementia and should be used as a last resort    # ASHLEY on CKD3,  likely prerenal  - baseline creatinine ~ 1.4 - 1.7.   - Cr 1.9 on 7/18> 1.6 on 7/20  - On lasix 40 mg at [facility]  - continuing to hold lasix, ACE, Lithium and Metformin  - encourage po intake  - c/w hydration with NS at 50 /hr for 12 hours.  - U protein 279, urine creat 80  - renal bladder sono (7/19): Simple cyst within the left kidney    # hyperkalemia, resolved  - hyperkalemia can be due ASHLEY + ACEIs + Metformin. hold lisinopril and metformin.  - s/p single dose of lokelma 5 gm     # Bradycardia  - ? due to metoprolol  - ekg noted. no significant tall T waves on EKG.  - single dose of insulin + dextrose ordered.  - hold metoprolol for now  - trops negative x1  - Patient currently asymptomatic.    # HTN  - c/w amlodipine and clonodine  - s/p  hydralazine 10 mg  and nifedipine XL 30 mg on 7/19 once  - BP is still elevated  - started on hydralazine 25 mg QD    # DM  - monitor FS  - start insulin if FS > 180    DVT: not indicated for now.  GI: Pantoprazole  Diet: DASH  Activity: Ambulate as tolerated  Dispo: Acute for now

## 2021-07-21 NOTE — PROGRESS NOTE ADULT - SUBJECTIVE AND OBJECTIVE BOX
SUBJECTIVE:    Patient is a 75y old Female who presents with a chief complaint of AMS (2021 10:09)    Currently admitted to medicine with the primary diagnosis of Urinary tract infection       Today is hospital day 4d. This morning she is resting comfortably in bed and reports no new issues or overnight events.     PAST MEDICAL & SURGICAL HISTORY  HTN (hypertension)    DM (diabetes mellitus)    MDD (major depressive disorder)    Bipolar disorder      SOCIAL HISTORY:  Negative for smoking/alcohol/drug use.     ALLERGIES:  amoxicillin (Unknown)  Iodides (Unknown)    MEDICATIONS:  STANDING MEDICATIONS  acetaminophen   Tablet .. 650 milliGRAM(s) Oral once  amLODIPine   Tablet 10 milliGRAM(s) Oral daily  atorvastatin Oral Tab/Cap - Peds 40 milliGRAM(s) Oral daily  cefTRIAXone   IVPB 1000 milliGRAM(s) IV Intermittent every 24 hours  cloNIDine 0.1 milliGRAM(s) Oral two times a day  dextrose 40% Gel 15 Gram(s) Oral once  dextrose 5%. 1000 milliLiter(s) IV Continuous <Continuous>  dextrose 5%. 1000 milliLiter(s) IV Continuous <Continuous>  dextrose 50% Injectable 25 Gram(s) IV Push once  dextrose 50% Injectable 12.5 Gram(s) IV Push once  dextrose 50% Injectable 25 Gram(s) IV Push once  glucagon  Injectable 1 milliGRAM(s) IntraMuscular once  hydrALAZINE 25 milliGRAM(s) Oral two times a day  insulin lispro (ADMELOG) corrective regimen sliding scale   SubCutaneous three times a day before meals  pantoprazole    Tablet 40 milliGRAM(s) Oral before breakfast  sodium chloride 0.9%. 1000 milliLiter(s) IV Continuous <Continuous>    PRN MEDICATIONS  atropine Injectable 0.5 milliGRAM(s) IntraMuscular once PRN    VITALS:   T(F): 97.7  HR: 73  BP: 123/71  RR: 18  SpO2: 100%    LABS:                        10.4   9.05  )-----------( 222      ( 2021 09:11 )             33.4     07-21    134<L>  |  108  |  22<H>  ----------------------------<  271<H>  4.8   |  15<L>  |  1.6<H>    Ca    10.1      2021 09:11  Phos  3.4     07-20  Mg     2.0     07-21    TPro  7.2  /  Alb  4.6  /  TBili  0.2  /  DBili  x   /  AST  17  /  ALT  24  /  AlkPhos  141<H>        Urinalysis Basic - ( 2021 16:30 )    Color: Yellow / Appearance: Clear / S.020 / pH: x  Gluc: x / Ketone: Negative  / Bili: Negative / Urobili: <2 mg/dL   Blood: x / Protein: 100 mg/dL / Nitrite: Negative   Leuk Esterase: Negative / RBC: x / WBC 3-5 /HPF   Sq Epi: x / Non Sq Epi: Few / Bacteria: Few    RADIOLOGY:    PHYSICAL EXAM:  GEN: No acute distress  LUNGS: Clear to auscultation bilaterally , no wheezes, rales, rhonci  HEART: Regular rate and rhythm, +s1 s2  ABD: Soft, non-tender, non-distended.  EXT: NC/NC/NE/2+PP/APODACA/Skin Intact.   NEURO: AAOX3, confused

## 2021-07-21 NOTE — PROGRESS NOTE ADULT - SUBJECTIVE AND OBJECTIVE BOX
Chart reviewed, patient examined. Pertinent results reviewed.  Case discussed with HO; specialist f/u reviewed  HD#4; Patient is new to me  KINGSTON PETTY    Allergy: amoxicillin (Unknown)  Iodides (Unknown)      CHIEF COMPLAINT: AMS reported by staff & family           HPI  HPI:  Patient is poor historian. history taken from chart.  "75y F pmhx HTN, DM, MDD, bipolar on lithium, anxiety send in by [facility] (assisted living)          due to AMS x3-4 days; constant, stable; per NH and son at bedside pt has been more forgetful than usual recently (might have early dementia but sx have never been this severe), with impairment in short-term memory. Pt has no complaints--denies chest pain, fever/chills, SOB, abd pain, n/v/d."    Spoke with Aid at [facility] ([phone]) Ms. Linton, as per aid patient was complaining of diarrhea this morning but she was not letting anyone go to the bathroom to check. Patient had received COVID vaccine (does not remember date). Aid denies any recent cough, fever, sob.    In ED: vitals Temp 98.3; HR 52, /77; RR 18; SaO2 98% on room air. UA suggestive of UTI. Patient received 1L LR bolus + Ceftriaxone 1000 mg x 1.  EKG in ED noted for Sinus bradycardia. (2021 23:42)      INFECTIOUS DISEASE HISTORY:  Satting well on RA  CXR Small left basilar opacity, possibly atelectasis.      PMH  PAST MEDICAL & SURGICAL HISTORY:  HTN (hypertension)    DM (diabetes mellitus)    MDD (major depressive disorder)    Bipolar disorder        FAMILY HISTORY  non-contributory     SOCIAL HISTORY  Social History:  cannot obtain further history from the patient secondary to altered mental status or sedation       ROS  General: Denies rigors, nightsweats  HEENT: Denies headache, rhinorrhea, sore throat, eye pain  CV: Denies CP, palpitations  PULM: Denies wheezing, hemoptysis  GI: Denies hematemesis, hematochezia, melena  : Denies discharge, hematuria, dysuria or h/o UTIs(in many years); denies sexual activity  MSK: Denies arthralgias, myalgias  SKIN: Denies rash, lesions  NEURO: Denies paresthesias, weakness  PSYCH: Denies depression, anxiety; does see PSY, & take lithium    VITALS:  Vital Signs Last 24 Hrs  T(C): 36.4 (2021 05:56), Max: 36.4 (2021 13:11)  T(F): 97.5 (2021 05:56), Max: 97.5 (2021 13:11)  HR: 55 (2021 05:56) (52 - 58)  BP: 176/77 (2021 05:56) (137/60 - 176/77)  BP(mean): --  RR: 18 (2021 05:56) (18 - 18)  SpO2: 100% (2021 05:56) (94% - 100%)  PHYSICAL EXAM:  GENERAL: NAD, well-developed; Ox2- (name, '[facility]', cannot give year or Pres. where do you live- [facility](incorrect); poor recall-immediate, and at 3 minutes  CHEST/LUNG: CTAB; No wheeze  HEART: bradycardia, 2/6 CONNER, no R,G  ABDOMEN: Soft, NT/ND; BS present  EXTREMITIES:  No cyanosis, or edema  NEUROLOGY: Patient is awake and alert but not oriented to time; has no insights into her situation; stands with assist-      unsteady; no gross focal deficit  SKIN: No rashes or lesions          TESTS & MEASUREMENTS:                          10.4   9.05  )-----------( 222      ( 2021 09:11 )             33.4                       10.0   9.95  )-----------( 212      ( 2021 08:15 )             33.5     07-21    134<L>  |  108  |  22<H>  ----------------------------<  271<H>  4.8   |  15<L>  |  1.6<H>    Ca    10.1      2021 09:11  Phos  3.4     07-20  Mg     2.0     07    TPro  7.2  /  Alb  4.6  /  TBili  0.2  /  DBili  x   /  AST  17  /  ALT  24  /  AlkPhos  141<H>  -      07-18    138  |  107  |  41<H>  ----------------------------<  197<H>  4.7   |  19  |  1.9<H>    Ca    10.1      2021 08:15    TPro  7.0  /  Alb  4.7  /  TBili  0.3  /  DBili  x   /  AST  14  /  ALT  39  /  AlkPhos  140<H>      eGFR if Non African American: 25 mL/min/1.73M2 (21 @ 08:15)  eGFR if African American: 29 mL/min/1.73M2 (21 @ 08:15)    LIVER FUNCTIONS - ( 2021 08:15 )  Alb: 4.7 g/dL / Pro: 7.0 g/dL / ALK PHOS: 140 U/L / ALT: 39 U/L / AST: 14 U/L / GGT: x           Urinalysis Basic - ( 2021 19:20 )    Color: Light Yellow / Appearance: Clear / S.014 / pH: x  Gluc: x / Ketone: Negative  / Bili: Negative / Urobili: <2 mg/dL   Blood: x / Protein: 300 mg/dL / Nitrite: Negative   Leuk Esterase: Large / RBC: 1 /HPF / WBC 87 /HPF   Sq Epi: x / Non Sq Epi: 1 /HPF / Bacteria: Many              INFECTIOUS DISEASES TESTING  COVID-19 PCR: Detected (21 @ 20:30)  COVID-19 Allen Domain Antibody Result: >250.00: Roche ECLIA Total AB (MELVIN) COVID-19 Allen Domain AB Interp: Positive: This test has been authorized for emergency use by the FDA.  @ 19:20)   - Amikacin: S <=16   - Amoxicillin/Clavulanic Acid: S <=8/4   - Ampicillin: R >16 These ampicillin results predict results for amoxicillin   - Ampicillin/Sulbactam: S 8/4 Enterobacter, Citrobacter, and Serratia may develop resistance during prolonged therapy (3-4 days)   - Aztreonam: S <=4   - Cefazolin: S <=2 (MIC_CL_COM_ENTERIC_CEFAZU) For uncomplicated UTI with K. pneumoniae, E. coli, or P. mirablis: MANDI <=16 is sensitive and MANDI >=32 is resistant. This also predicts results for oral agents cefaclor, cefdinir, cefpodoxime, cefprozil, cefuroxime axetil, cephalexin and locarbef for uncomplicated UTI. Note that some isolates may be susceptible to these agents while testing resistant to cefazolin.   - Cefepime: S <=2   - Cefoxitin: S <=8   - Ceftriaxone: S <=1 Enterobacter, Citrobacter, and Serratia may develop resistance during prolonged therapy   - Ciprofloxacin: S <=0.25   - Ertapenem: S <=0.5   - Gentamicin: S <=2   - Imipenem: S <=1   - Levofloxacin: S <=0.5   - Meropenem: S <=1   - Nitrofurantoin: S <=32 Should not be used to treat pyelonephritis   - Piperacillin/Tazobactam: S <=8   - Tigecycline: S <=2   - Tobramycin: S <=2   - Trimethoprim/Sulfamethoxazole: S <=0.5/9.5  @ 19:20)         INFLAMMATORY MARKERS      RADIOLOGY & ADDITIONAL TESTS:  I have personally reviewed the last Chest xray  CXR  Xray Chest 1 View- PORTABLE-Urgent:   EXAM:  XR CHEST PORTABLE URGENT 1V            PROCEDURE DATE:  2021            INTERPRETATION:  Clinical History / Reason for exam: Pneumonia.    Comparison : Chest radiograph dated 2010.    Technique/Positioning: Portable frontal.Rotated.    Findings:    Support devices: None.    Cardiac/mediastinum/hilum: Unremarkable.    Lung parenchyma/Pleura: Small left basilar opacity, possibly atelectasis. No pneumothorax.    Skeleton/soft tissues: No acute osseous abnormality.    Impression:    1. Small left basilar opacity, possibly atelectasis.    --- End of Report ---              NU SPENCER MD; Attending Radiologist  This document has been electronically signed. 2021  9:56AM (21 @ 17:56)      CT      CARDIOLOGY TESTING  12 Lead ECG:   Ventricular Rate 45 BPM    Atrial Rate 45 BPM    P-R Interval 186 ms    QRS Duration 92 ms    Q-T Interval 458 ms    QTC Calculation(Bazett) 396 ms    P Axis 74 degrees    R Axis 60 degrees    T Axis 46 degrees    Diagnosis Line Sinus bradycardia  Otherwise normal ECG    Confirmed by YESSICA GEE MD ([id number]) on 2021 7:07:36 AM (21 @ 21:58)      MEDICATIONS  amLODIPine   Tablet 5 Oral daily  atorvastatin Oral Tab/Cap - Peds 40 Oral daily  cloNIDine 0.1 Oral two times a day  dextrose 40% Gel 15 Oral once  dextrose 5%. 1000 IV Continuous <Continuous>  dextrose 5%. 1000 IV Continuous <Continuous>  dextrose 50% Injectable 25 IV Push once  dextrose 50% Injectable 12.5 IV Push once  dextrose 50% Injectable 25 IV Push once  glucagon  Injectable 1 IntraMuscular once  insulin lispro (ADMELOG) corrective regimen sliding scale  SubCutaneous three times a day before meals  pantoprazole    Tablet 40 Oral before breakfast  sodium chloride 0.9%. 500 IV Continuous <Continuous>      Weight  Weight (kg): 60.8 (21 @ 02:42)    ANTIBIOTICS:      ALLERGIES:  amoxicillin (Unknown)  Iodides (Unknown)

## 2021-07-22 LAB
ALBUMIN SERPL ELPH-MCNC: 4.3 G/DL — SIGNIFICANT CHANGE UP (ref 3.5–5.2)
ALP SERPL-CCNC: 127 U/L — HIGH (ref 30–115)
ALT FLD-CCNC: 22 U/L — SIGNIFICANT CHANGE UP (ref 0–41)
ANION GAP SERPL CALC-SCNC: 12 MMOL/L — SIGNIFICANT CHANGE UP (ref 7–14)
AST SERPL-CCNC: 18 U/L — SIGNIFICANT CHANGE UP (ref 0–41)
BASOPHILS # BLD AUTO: 0.03 K/UL — SIGNIFICANT CHANGE UP (ref 0–0.2)
BASOPHILS NFR BLD AUTO: 0.4 % — SIGNIFICANT CHANGE UP (ref 0–1)
BILIRUB SERPL-MCNC: 0.3 MG/DL — SIGNIFICANT CHANGE UP (ref 0.2–1.2)
BUN SERPL-MCNC: 21 MG/DL — HIGH (ref 10–20)
CALCIUM SERPL-MCNC: 9.8 MG/DL — SIGNIFICANT CHANGE UP (ref 8.5–10.1)
CHLORIDE SERPL-SCNC: 109 MMOL/L — SIGNIFICANT CHANGE UP (ref 98–110)
CO2 SERPL-SCNC: 17 MMOL/L — SIGNIFICANT CHANGE UP (ref 17–32)
CREAT SERPL-MCNC: 1.7 MG/DL — HIGH (ref 0.7–1.5)
EOSINOPHIL # BLD AUTO: 0.48 K/UL — SIGNIFICANT CHANGE UP (ref 0–0.7)
EOSINOPHIL NFR BLD AUTO: 6.1 % — SIGNIFICANT CHANGE UP (ref 0–8)
FOLATE SERPL-MCNC: 13.9 NG/ML — SIGNIFICANT CHANGE UP
GLUCOSE BLDC GLUCOMTR-MCNC: 193 MG/DL — HIGH (ref 70–99)
GLUCOSE BLDC GLUCOMTR-MCNC: 206 MG/DL — HIGH (ref 70–99)
GLUCOSE BLDC GLUCOMTR-MCNC: 245 MG/DL — HIGH (ref 70–99)
GLUCOSE BLDC GLUCOMTR-MCNC: 254 MG/DL — HIGH (ref 70–99)
GLUCOSE SERPL-MCNC: 179 MG/DL — HIGH (ref 70–99)
HCT VFR BLD CALC: 32.6 % — LOW (ref 37–47)
HGB BLD-MCNC: 10 G/DL — LOW (ref 12–16)
IMM GRANULOCYTES NFR BLD AUTO: 0.3 % — SIGNIFICANT CHANGE UP (ref 0.1–0.3)
LYMPHOCYTES # BLD AUTO: 1.84 K/UL — SIGNIFICANT CHANGE UP (ref 1.2–3.4)
LYMPHOCYTES # BLD AUTO: 23.5 % — SIGNIFICANT CHANGE UP (ref 20.5–51.1)
MAGNESIUM SERPL-MCNC: 2.1 MG/DL — SIGNIFICANT CHANGE UP (ref 1.8–2.4)
MCHC RBC-ENTMCNC: 26.7 PG — LOW (ref 27–31)
MCHC RBC-ENTMCNC: 30.7 G/DL — LOW (ref 32–37)
MCV RBC AUTO: 86.9 FL — SIGNIFICANT CHANGE UP (ref 81–99)
MONOCYTES # BLD AUTO: 0.46 K/UL — SIGNIFICANT CHANGE UP (ref 0.1–0.6)
MONOCYTES NFR BLD AUTO: 5.9 % — SIGNIFICANT CHANGE UP (ref 1.7–9.3)
NEUTROPHILS # BLD AUTO: 5 K/UL — SIGNIFICANT CHANGE UP (ref 1.4–6.5)
NEUTROPHILS NFR BLD AUTO: 63.8 % — SIGNIFICANT CHANGE UP (ref 42.2–75.2)
NRBC # BLD: 0 /100 WBCS — SIGNIFICANT CHANGE UP (ref 0–0)
PLATELET # BLD AUTO: 209 K/UL — SIGNIFICANT CHANGE UP (ref 130–400)
POTASSIUM SERPL-MCNC: 5.1 MMOL/L — HIGH (ref 3.5–5)
POTASSIUM SERPL-SCNC: 5.1 MMOL/L — HIGH (ref 3.5–5)
PROT SERPL-MCNC: 6.8 G/DL — SIGNIFICANT CHANGE UP (ref 6–8)
RBC # BLD: 3.75 M/UL — LOW (ref 4.2–5.4)
RBC # FLD: 15.1 % — HIGH (ref 11.5–14.5)
SODIUM SERPL-SCNC: 138 MMOL/L — SIGNIFICANT CHANGE UP (ref 135–146)
TSH SERPL-MCNC: 1.79 UIU/ML — SIGNIFICANT CHANGE UP (ref 0.27–4.2)
WBC # BLD: 7.83 K/UL — SIGNIFICANT CHANGE UP (ref 4.8–10.8)
WBC # FLD AUTO: 7.83 K/UL — SIGNIFICANT CHANGE UP (ref 4.8–10.8)

## 2021-07-22 RX ORDER — ACETAMINOPHEN 500 MG
650 TABLET ORAL EVERY 6 HOURS
Refills: 0 | Status: DISCONTINUED | OUTPATIENT
Start: 2021-07-22 | End: 2021-07-26

## 2021-07-22 RX ORDER — HEPARIN SODIUM 5000 [USP'U]/ML
5000 INJECTION INTRAVENOUS; SUBCUTANEOUS EVERY 8 HOURS
Refills: 0 | Status: DISCONTINUED | OUTPATIENT
Start: 2021-07-22 | End: 2021-07-26

## 2021-07-22 RX ORDER — LITHIUM CARBONATE 300 MG/1
150 TABLET, EXTENDED RELEASE ORAL
Refills: 0 | Status: DISCONTINUED | OUTPATIENT
Start: 2021-07-22 | End: 2021-07-26

## 2021-07-22 RX ADMIN — Medication 250 MILLIGRAM(S): at 05:54

## 2021-07-22 RX ADMIN — PANTOPRAZOLE SODIUM 40 MILLIGRAM(S): 20 TABLET, DELAYED RELEASE ORAL at 05:54

## 2021-07-22 RX ADMIN — HEPARIN SODIUM 5000 UNIT(S): 5000 INJECTION INTRAVENOUS; SUBCUTANEOUS at 21:52

## 2021-07-22 RX ADMIN — Medication 25 MILLIGRAM(S): at 05:54

## 2021-07-22 RX ADMIN — LITHIUM CARBONATE 150 MILLIGRAM(S): 300 TABLET, EXTENDED RELEASE ORAL at 17:19

## 2021-07-22 RX ADMIN — Medication 0.1 MILLIGRAM(S): at 17:14

## 2021-07-22 RX ADMIN — Medication 0.1 MILLIGRAM(S): at 05:54

## 2021-07-22 RX ADMIN — AMLODIPINE BESYLATE 10 MILLIGRAM(S): 2.5 TABLET ORAL at 05:54

## 2021-07-22 RX ADMIN — ATORVASTATIN CALCIUM 40 MILLIGRAM(S): 80 TABLET, FILM COATED ORAL at 11:10

## 2021-07-22 RX ADMIN — Medication 250 MILLIGRAM(S): at 17:14

## 2021-07-22 RX ADMIN — Medication 2: at 11:13

## 2021-07-22 RX ADMIN — Medication 3: at 08:01

## 2021-07-22 RX ADMIN — Medication 1: at 17:13

## 2021-07-22 RX ADMIN — Medication 25 MILLIGRAM(S): at 17:14

## 2021-07-22 NOTE — PROGRESS NOTE BEHAVIORAL HEALTH - NSBHCONSULTRECOMMENDOTHER_PSY_A_CORE FT
Recommend letting [facility] know that patient should be monitored for medical noncompliance and that she shouldn't have access to NSAIDs in her apartment, conside tylenol as an alternative for arthritis pain

## 2021-07-22 NOTE — PROGRESS NOTE BEHAVIORAL HEALTH - NSBHFUPINTERVALHXFT_PSY_A_CORE
Ms. Rodarte reports that she is in a great mood today despite not getting much sleep in the hospital. She reports that she is generally in a good mood at the nursing home. She admits to sometimes not taking her prescribed medications but understands that it's important to do so. She also verbalizes understanding that she should avoid ibuprofen as it can interact poorly with her medications. She continues to deny ever having had a psychiatric diagnosis.

## 2021-07-22 NOTE — PROGRESS NOTE BEHAVIORAL HEALTH - NSBHCONSULTSUBST_PSY_A_CORE
Pulmonary Medicine and [address] Kari Benitez MD      Patient - Jayla Malik   MRN -  [id number]   [facility] # - [de-identified]   - 1947      Date of Admission -  3/3/2020  8:24 AM  Date of evaluation -  3/6/2020  Room -    Hospital Day - [address] Larry Amaya MD Primary Care Physician - Eva Santillan MD     Reason for Consult    Acute exacerbation of COPD/influenza A tracheobronchitis    Assessment   · Acute on chronic hypoxic respiratory failure  · Acute exacerbation of COPD  · History of smoking, more than 60-pack-year smoking  · Influenza A tracheo-bronchitis  · DM, psoriasis    Recommendations   · Continue oral Zithromax   · IV solu-medrol 80 mg every 8 hours  · Albuterol and Ipratropium Q 4 hours and prn  · Discontinue Symbicort  · Start budesonide and Brovana via nebulizer every 12 hours  · Singulair  · Tamiflu  · Daliresp  · X-ray chest today  · Labs: CBC and BMP in amBiPAP  · 2 liters/min via nasal cannula  · DVT prophylaxis with low molecular weight heparin  · Will follow with you    HPI     Jayla Malik is 67 y.o.,  male, admitted because of shortness of breath and myalgias. Patient has his symptoms are almost a week or so. He does have productive cough or pale white to yellow sputum. He denies any hemoptysis. He denies any fever or chills. He had body aches. He also had pleuritic type chest pain. History smokes few cigarettes a day, used to smoke 2 to 3 packs/day more than 60-pack-year smoking. He has been on home oxygen at nighttime for last 2 years or so. He follows Dr. Matthew Nixon he never been tested for obstructive sleep apnea.     PMHx   Past Medical History      Diagnosis Date    Asthma 2016    Chronic respiratory failure (HCC)     COPD (chronic obstructive pulmonary disease) ([facility] [address] 2016    Hypertension     Pneumonia     Psoriasis       Past Surgical History        Procedure Laterality Date    TONSILLECTOMY         Meds    Current no

## 2021-07-22 NOTE — PROGRESS NOTE BEHAVIORAL HEALTH - RISK ASSESSMENT
Chief Complaint   Patient presents with    Hypertension    Cholesterol Problem    Thyroid Problem    Labs     Patient in office today for f/u and fasting labs. Pt declined flu shot. Pt states he had to decrease lisinopril to 30mgs,have been on dose for one month. Pt states bp is well controlled on 30mgs. Have no concerns. 1. Have you been to the ER, urgent care clinic since your last visit? Hospitalized since your last visit? No    2. Have you seen or consulted any other health care providers outside of the [address] [place] [address] since your last visit? Include any pap smears or colon screening.  No Patient has chronically elevated risk of suicide given noncompliance with medication and active denial of psychiatric illness, which is somewhat mitigated by her living in a supervised setting. Patient is not acutely suicidal, denies psychotic symptoms, has standing medication order for symptoms of hypomania, and is at low risk for violent behavior, therefore patient does not meet criteria for involuntary admission.

## 2021-07-22 NOTE — PROGRESS NOTE ADULT - SUBJECTIVE AND OBJECTIVE BOX
JOVANNY KINGSTON  75y  Female  HPI:  Patient is poor historian. history taken from chart.  "75y F pmhx HTN, DM, MDD, bipolar on lithium, anxiety send in by [facility] (assisted living)          due to AMS x3-4 days; constant, stable; per NH and son at bedside pt has been more forgetful than usual recently (might have early dementia but sx have never been this severe), with impairment in short-term memory. Pt has no complaints--denies chest pain, fever/chills, SOB, abd pain, n/v/d."    Spoke with Aid at [facility] ([phone]) Ms. Linton, as per aid patient was complaining of diarrhea this morning but she was not letting anyone go to the bathroom to check. Patient had received COVID vaccine (does not remember date). Aid denies any recent cough, fever, sob.    In ED: vitals Temp 98.3; HR 52, /77; RR 18; SaO2 98% on room air. UA suggestive of UTI. Patient received 1L LR bolus + Ceftriaxone 1000 mg x 1.  EKG in ED noted for Sinus bradycardia. (2021 23:42)    MEDICATIONS  (STANDING):  amLODIPine   Tablet 10 milliGRAM(s) Oral daily  atorvastatin Oral Tab/Cap - Peds 40 milliGRAM(s) Oral daily  ciprofloxacin     Tablet 250 milliGRAM(s) Oral two times a day  cloNIDine 0.1 milliGRAM(s) Oral two times a day  dextrose 40% Gel 15 Gram(s) Oral once  dextrose 5%. 1000 milliLiter(s) (50 mL/Hr) IV Continuous <Continuous>  dextrose 5%. 1000 milliLiter(s) (100 mL/Hr) IV Continuous <Continuous>  dextrose 50% Injectable 25 Gram(s) IV Push once  dextrose 50% Injectable 12.5 Gram(s) IV Push once  dextrose 50% Injectable 25 Gram(s) IV Push once  glucagon  Injectable 1 milliGRAM(s) IntraMuscular once  hydrALAZINE 25 milliGRAM(s) Oral two times a day  insulin lispro (ADMELOG) corrective regimen sliding scale   SubCutaneous three times a day before meals  pantoprazole    Tablet 40 milliGRAM(s) Oral before breakfast  sodium chloride 0.9%. 1000 milliLiter(s) (50 mL/Hr) IV Continuous <Continuous>    MEDICATIONS  (PRN):  atropine Injectable 0.5 milliGRAM(s) IntraMuscular once PRN HR less than 30 and /or symptomatic bradycardia    INTERVAL EVENTS: Patient seen today still with knee pain ambulates in room okay    T(C): 36.3 (21 @ 05:27), Max: 36.5 (21 @ 12:53)  HR: 51 (21 @ 05:27) (51 - 73)  BP: 175/59 (21 @ 05:27) (123/71 - 175/59)  RR: 18 (21 @ 05:27) (18 - 18)  SpO2: 100% (21 @ 05:27) (100% - 100%)  Wt(kg): --Vital Signs Last 24 Hrs  T(C): 36.3 (2021 05:27), Max: 36.5 (2021 12:53)  T(F): 97.3 (2021 05:27), Max: 97.7 (2021 12:53)  HR: 51 (2021 05:27) (51 - 73)  BP: 175/59 (2021 05:27) (123/71 - 175/59)  BP(mean): --  RR: 18 (2021 05:27) (18 - 18)  SpO2: 100% (2021 05:27) (100% - 100%)    PHYSICAL EXAM:  GENERAL: NAD  NECK: Supple, No JVD  CHEST/LUNG: Clear  HEART: S1, S2, Regular rate and rhythm  ABDOMEN: Soft, Nontender, Nondistended; Bowel sounds present  EXTREMITIES: No  edema  SKIN: No rashes or lesions    LABS:                          10.0   7.83  )-----------( 209      ( 2021 06:10 )             32.6                 138  |  109  |  21<H>  ----------------------------<  179<H>  5.1<H>   |  17  |  1.7<H>    Ca    9.8      2021 06:10  Mg     2.1         TPro  6.8  /  Alb  4.3  /  TBili  0.3  /  DBili  x   /  AST  18  /  ALT  22  /  AlkPhos  127<H>      LIVER FUNCTIONS - ( 2021 06:10 )  Alb: 4.3 g/dL / Pro: 6.8 g/dL / ALK PHOS: 127 U/L / ALT: 22 U/L / AST: 18 U/L / GGT: x                   Thyroid Stimulating Hormone, Serum: 1.79 uIU/mL (21 @ 16:14)   Folate, Serum: 13.9 ng/mL (21 @ 16:14)   D-Dimer Assay, Quantitative: 157: Manufacturers recommended Cut off for VTE is 230 ng/ml D-DU ng/mL DDU   Lactate Dehydrogenase, Serum: 151 (21 @ 04:30)   C-Reactive Protein, Serum: <3: Test Repeated mg/L (21 @ 04:30)     Lithium Level, Serum: 0.96 mmol/L (21 @ 04:30)   Lithium Level, Serum: 1.04 mmol/L (21 @ 18:28)   Lithium Level, Serum: 1.38 mmol/L (21 @ 08:15)   Lithium Level, Serum: 1.68: Critical value: called to/read back by dr alford @ 5:50pm 21. mmol/L       Urinalysis Basic - ( 2021 16:30 )    Color: Yellow / Appearance: Clear / S.020 / pH: x  Gluc: x / Ketone: Negative  / Bili: Negative / Urobili: <2 mg/dL   Blood: x / Protein: 100 mg/dL / Nitrite: Negative   Leuk Esterase: Negative / RBC: x / WBC 3-5 /HPF   Sq Epi: x / Non Sq Epi: Few / Bacteria: Few              RADIOLOGY & ADDITIONAL TESTS:  < from: Xray Knee 3 Views, Left (21 @ 21:57) >  3 views of the left knee.    COMPARISON: None    Findings/  impression: No acute displaced fracture or dislocation. Severe medial compartment and moderate lateral and patellofemoral compartment osteoarthritis. Small joint effusion. Vascular calcifications.    < end of copied text >

## 2021-07-22 NOTE — PROGRESS NOTE ADULT - ASSESSMENT
75y F pmhx HTN, DM, MDD, bipolar on lithium, anxiety send in by [facility] due to AMS x3-4 days. Pt was found to have COVID-19 positive on admission.     COVID-19, asymptomatic  - O2 not needed  - s/p vaccination  2 doses of Pfizer COVID-19 vaccine on January 11, 2021 and February 1, 2021  - remains asymptomatic, on RA  - repeat PCR negative  - antibody ++    AMS likely metabolic encephalopathy, multi factorial etiology  - secondary to UTI vs Li toxicity vs ASHLEY on CKD vs Covid infection  - CT head negative    UTI - Klebsiella  - initially treated with Ceftriaxone 1g q24h   - transitioned to PO Cipro    Li toxicity  - in presence of Bipolar disorder  - lithium level improved  - psychiatry f/u today would like to resume Lithium, believing Seroquel not enough  - continue to monitor closely  - f/u level    ASHLEY on CKD3,  likely prerenal  - baseline creatinine ~ 1.4 - 1.7.   - creat trending downward  - continue to hold Lasix, ACE, and Metformin  - encourage po intake  - renal bladder sono (7/19): Simple cyst within the left kidney  - renal f/u noted  - continue on PO Bicarb    Hyperkalemia  - post single dose of Lokelma 5 gm     Bradycardia  - ? due to Metoprolol  - hold Metoprolol for now, HR in 50's    HTN  - on Amlodipine and Catapres  - holding Lasix and ACE  - increase Hydralazine 25 mg BID to TID as needed    DM  - maintain of Metformin  - monitor FS  - start insulin if FS > 180    Left knee pain  - xray noted  - rehab evaluation    DVT: not indicated for now.  GI: Pantoprazole  Diet: DASH  Activity: Ambulate as tolerated  Dispo: Acute  monitor with re introduction of [place]. PT to ambulate. Forms to be completed to return to [facility]

## 2021-07-22 NOTE — PROGRESS NOTE BEHAVIORAL HEALTH - SUMMARY
Ms. 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 Psychiatry was consulted for lithium toxicity and possible alternative medication for management of bipolar disorder. Patient was delirious and lithium levels were found to be in toxic range and lithium was held on 7/19.     Today the patient's delirium is improved though she remains disoriented with difficulty concentrating. She has elevated mood with little sleep with mildly pressured speech. Therefore, recommend she be restarted on her home dose of lithium. The patient admits to not taking her medications at [facility] but expresses that she understands the importance of doing so going forward. She also expresses understanding that NSAIDs like ibuprofen will negatively interact with her medication and that she should not take them.    Impression  1. Delirium (improved)  2. Unspecified bipolar disorder Ms. 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 Psychiatry was consulted for lithium toxicity and possible alternative medication for management of bipolar disorder. Patient was delirious and lithium levels were found to be in toxic range and lithium was held on 7/19.     Today the patient's delirium is improved though she remains disoriented with difficulty concentrating. She has elevated mood with little sleep with mildly pressured speech. Therefore, recommend she be restarted on her home dose of lithium. The patient admits to not taking her medications at [facility] but expresses that she understands the importance of doing so going forward. She also expresses understanding that NSAIDs like ibuprofen will negatively interact with her medication and that she should not take them.    Impression  1. Multifactorial delirium (improved)  2. Unspecified bipolar disorder  3. cognitive impairment (unspecified), r/o alzheimer dementia    Plan:  1. Restart home dose of lithium 150 mg oral capsule: 1 cap(s) orally 2 times a day  2. Outpatient followup with psychiatrist for dementia workup  3. Consider seroquel 25mg po q8hrs PRN for acute agitation. Seroquel can cause paradoxical agitation in patients with dementia and should be used as a last resort.

## 2021-07-22 NOTE — PROGRESS NOTE ADULT - SUBJECTIVE AND OBJECTIVE BOX
SUBJECTIVE:    Patient is a 75y old Female who presents with a chief complaint of Altered mental status (2021 15:44)    Currently admitted to medicine with the primary diagnosis of Urinary tract infection.  Today is hospital day 5d. This morning she is resting in bed. No overnight events.     PAST MEDICAL & SURGICAL HISTORY  HTN (hypertension)    DM (diabetes mellitus)    MDD (major depressive disorder)    Bipolar disorder  ALLERGIES:  amoxicillin (Unknown)  Iodides (Unknown)    MEDICATIONS:  STANDING MEDICATIONS  amLODIPine   Tablet 10 milliGRAM(s) Oral daily  atorvastatin Oral Tab/Cap - Peds 40 milliGRAM(s) Oral daily  ciprofloxacin     Tablet 250 milliGRAM(s) Oral two times a day  cloNIDine 0.1 milliGRAM(s) Oral two times a day  dextrose 40% Gel 15 Gram(s) Oral once  dextrose 5%. 1000 milliLiter(s) IV Continuous <Continuous>  dextrose 5%. 1000 milliLiter(s) IV Continuous <Continuous>  dextrose 50% Injectable 25 Gram(s) IV Push once  dextrose 50% Injectable 12.5 Gram(s) IV Push once  dextrose 50% Injectable 25 Gram(s) IV Push once  glucagon  Injectable 1 milliGRAM(s) IntraMuscular once  hydrALAZINE 25 milliGRAM(s) Oral two times a day  insulin lispro (ADMELOG) corrective regimen sliding scale   SubCutaneous three times a day before meals  pantoprazole    Tablet 40 milliGRAM(s) Oral before breakfast  sodium chloride 0.9%. 1000 milliLiter(s) IV Continuous <Continuous>    PRN MEDICATIONS  atropine Injectable 0.5 milliGRAM(s) IntraMuscular once PRN    VITALS:   T(F): 97.6  HR: 52  BP: 132/63  RR: 18  SpO2: 100%    LABS:                        10.0   7.83  )-----------( 209      ( 2021 06:10 )             32.6     -    138  |  109  |  21<H>  ----------------------------<  179<H>  5.1<H>   |  17  |  1.7<H>    Ca    9.8      2021 06:10  Mg     2.1         TPro  6.8  /  Alb  4.3  /  TBili  0.3  /  DBili  x   /  AST  18  /  ALT  22  /  AlkPhos  127<H>        Urinalysis Basic - ( 2021 16:30 )    Color: Yellow / Appearance: Clear / S.020 / pH: x  Gluc: x / Ketone: Negative  / Bili: Negative / Urobili: <2 mg/dL   Blood: x / Protein: 100 mg/dL / Nitrite: Negative   Leuk Esterase: Negative / RBC: x / WBC 3-5 /HPF   Sq Epi: x / Non Sq Epi: Few / Bacteria: Few    RADIOLOGY:    PHYSICAL EXAM:  GEN: No acute distress  LUNGS: Clear to auscultation bilaterally , no wheezes, rales, rhonci  HEART: Regular rate and rhythm, +s1 s2  ABD: Soft, non-tender, non-distended.  EXT: NC/NC/NE/2+PP/APODACA/Skin Intact.   NEURO: Awake and alert with periodic episodes of confusion

## 2021-07-22 NOTE — PROGRESS NOTE ADULT - ASSESSMENT
75y F pmhx HTN, DM, MDD, bipolar on lithium, anxiety send in by [facility] due to AMS x3-4 days. Pt was found to have COVID-19 positive on admission.     # COVID-19 positive on 7/17/21  - s/p vaccination  2 doses of Pfizer COVID-19 vaccine on January 11, 2021 and February 1, 2021  - currently asymptomatic, on RA  - Xray Chest (07.17.21 @ 17:56): Small left basilar opacity, possibly atelectasis.  - repeat COVID PCR 7/19/21 negative    # AMS likely metabolic encephalopathy, multi factorial   - could be secondary to UTI vs Li toxicity vs ASHLEY on CKD  - CT head negative  - Neuro consult appreciated: No further neurological workup recommended, No indication for MRI Brain.OP follow up for dementia workup.     # UTI  - UA positive for UTI  - Ucx: Klabsiella  - s/p Rocephin 1 gm x 1 in ED,   - d/c ceftriaxone 1g q24h iv until UCX results x 3 days  - started cipro 250 PO q12h- for total of 10 days    # Li toxicity  # Hx of bipolar disorder  - lithium level improved(1.6>>0.96)  - psychiatry evaluation appreciated: consider Seroquel 50 mg PRN for acute agitation. Seroquel can cause paradoxical agitation in patients with dementia and should be used as a last resort  - Li PO was on hold. Will restart Li today 7/22/21 as per psych on home dose    # ASHLEY on CKD3,  likely prerenal  - baseline creatinine ~ 1.4 - 1.7.   - Cr 1.9 on 7/18> 1.6 on 7/20>1.7 on 7/22  - On lasix 40 mg at [facility]  - continuing to hold lasix, ACE and Metformin  - encourage po intake  - U protein/ creat 2.9  - renal bladder sono (7/19): Simple cyst within the left kidney    # hyperkalemia  - hyperkalemia can be due ASHLEY + ACEIs + Metformin. hold lisinopril and metformin.  - s/p single dose of lokelma 5 gm     # Bradycardia, asymptomatic  - ? due to metoprolol  - ekg noted. no significant tall T waves on EKG.  - hold metoprolol for now    # HTN  - increased amlodipine 10 mg   - c/w clonodine 0.1mg BID  - c/w hydralazine 25 mg BID    # DM  - monitor FS  - start insulin if FS > 180    DVT: not indicated for now.  GI: Pantoprazole  Diet: DASH  Activity: Ambulate as tolerated, c/w PT  Dispo: [facility] once able to ambulate 150 ft

## 2021-07-22 NOTE — PROGRESS NOTE BEHAVIORAL HEALTH - ADDITIONAL DETAILS / COMMENTS
Patient still not oriented to time or who the president is, unable to spell world backwards or the months of the year backward

## 2021-07-22 NOTE — PHYSICAL THERAPY INITIAL EVALUATION ADULT - LIVES WITH, PROFILE
live in [facility] assisted living as per chart review. Pt is poor historian, + dementia.

## 2021-07-22 NOTE — PROGRESS NOTE BEHAVIORAL HEALTH - NSBHCHARTREVIEWVS_PSY_A_CORE FT
ICU Vital Signs Last 24 Hrs  T(C): 36.4 (22 Jul 2021 13:02), Max: 36.4 (22 Jul 2021 13:02)  T(F): 97.6 (22 Jul 2021 13:02), Max: 97.6 (22 Jul 2021 13:02)  HR: 52 (22 Jul 2021 13:02) (51 - 70)  BP: 132/63 (22 Jul 2021 13:02) (125/80 - 175/59)  BP(mean): --  ABP: --  ABP(mean): --  RR: 18 (22 Jul 2021 05:27) (18 - 18)  SpO2: 100% (22 Jul 2021 05:27) (100% - 100%)

## 2021-07-23 ENCOUNTER — TRANSCRIPTION ENCOUNTER (OUTPATIENT)
Age: 76
End: 2021-07-23

## 2021-07-23 LAB
ALBUMIN SERPL ELPH-MCNC: 4.1 G/DL — SIGNIFICANT CHANGE UP (ref 3.5–5.2)
ALP SERPL-CCNC: 120 U/L — HIGH (ref 30–115)
ALT FLD-CCNC: 22 U/L — SIGNIFICANT CHANGE UP (ref 0–41)
ANION GAP SERPL CALC-SCNC: 12 MMOL/L — SIGNIFICANT CHANGE UP (ref 7–14)
AST SERPL-CCNC: 18 U/L — SIGNIFICANT CHANGE UP (ref 0–41)
BASOPHILS # BLD AUTO: 0.04 K/UL — SIGNIFICANT CHANGE UP (ref 0–0.2)
BASOPHILS NFR BLD AUTO: 0.5 % — SIGNIFICANT CHANGE UP (ref 0–1)
BILIRUB SERPL-MCNC: <0.2 MG/DL — SIGNIFICANT CHANGE UP (ref 0.2–1.2)
BUN SERPL-MCNC: 25 MG/DL — HIGH (ref 10–20)
CALCIUM SERPL-MCNC: 9.4 MG/DL — SIGNIFICANT CHANGE UP (ref 8.5–10.1)
CHLORIDE SERPL-SCNC: 108 MMOL/L — SIGNIFICANT CHANGE UP (ref 98–110)
CO2 SERPL-SCNC: 17 MMOL/L — SIGNIFICANT CHANGE UP (ref 17–32)
CREAT SERPL-MCNC: 1.9 MG/DL — HIGH (ref 0.7–1.5)
EOSINOPHIL # BLD AUTO: 0.41 K/UL — SIGNIFICANT CHANGE UP (ref 0–0.7)
EOSINOPHIL NFR BLD AUTO: 5.2 % — SIGNIFICANT CHANGE UP (ref 0–8)
GLUCOSE BLDC GLUCOMTR-MCNC: 177 MG/DL — HIGH (ref 70–99)
GLUCOSE BLDC GLUCOMTR-MCNC: 195 MG/DL — HIGH (ref 70–99)
GLUCOSE BLDC GLUCOMTR-MCNC: 221 MG/DL — HIGH (ref 70–99)
GLUCOSE SERPL-MCNC: 216 MG/DL — HIGH (ref 70–99)
HCT VFR BLD CALC: 31.8 % — LOW (ref 37–47)
HGB BLD-MCNC: 9.4 G/DL — LOW (ref 12–16)
IMM GRANULOCYTES NFR BLD AUTO: 0.3 % — SIGNIFICANT CHANGE UP (ref 0.1–0.3)
LYMPHOCYTES # BLD AUTO: 1.78 K/UL — SIGNIFICANT CHANGE UP (ref 1.2–3.4)
LYMPHOCYTES # BLD AUTO: 22.8 % — SIGNIFICANT CHANGE UP (ref 20.5–51.1)
MAGNESIUM SERPL-MCNC: 2.1 MG/DL — SIGNIFICANT CHANGE UP (ref 1.8–2.4)
MCHC RBC-ENTMCNC: 26 PG — LOW (ref 27–31)
MCHC RBC-ENTMCNC: 29.6 G/DL — LOW (ref 32–37)
MCV RBC AUTO: 88.1 FL — SIGNIFICANT CHANGE UP (ref 81–99)
MONOCYTES # BLD AUTO: 0.36 K/UL — SIGNIFICANT CHANGE UP (ref 0.1–0.6)
MONOCYTES NFR BLD AUTO: 4.6 % — SIGNIFICANT CHANGE UP (ref 1.7–9.3)
NEUTROPHILS # BLD AUTO: 5.2 K/UL — SIGNIFICANT CHANGE UP (ref 1.4–6.5)
NEUTROPHILS NFR BLD AUTO: 66.6 % — SIGNIFICANT CHANGE UP (ref 42.2–75.2)
NRBC # BLD: 0 /100 WBCS — SIGNIFICANT CHANGE UP (ref 0–0)
PLATELET # BLD AUTO: 207 K/UL — SIGNIFICANT CHANGE UP (ref 130–400)
POTASSIUM SERPL-MCNC: 5.2 MMOL/L — HIGH (ref 3.5–5)
POTASSIUM SERPL-SCNC: 5.2 MMOL/L — HIGH (ref 3.5–5)
PROT SERPL-MCNC: 6.5 G/DL — SIGNIFICANT CHANGE UP (ref 6–8)
RBC # BLD: 3.61 M/UL — LOW (ref 4.2–5.4)
RBC # FLD: 14.9 % — HIGH (ref 11.5–14.5)
SODIUM SERPL-SCNC: 137 MMOL/L — SIGNIFICANT CHANGE UP (ref 135–146)
WBC # BLD: 7.81 K/UL — SIGNIFICANT CHANGE UP (ref 4.8–10.8)
WBC # FLD AUTO: 7.81 K/UL — SIGNIFICANT CHANGE UP (ref 4.8–10.8)

## 2021-07-23 PROCEDURE — 70551 MRI BRAIN STEM W/O DYE: CPT | Mod: 26

## 2021-07-23 RX ORDER — HYDRALAZINE HCL 50 MG
25 TABLET ORAL THREE TIMES A DAY
Refills: 0 | Status: DISCONTINUED | OUTPATIENT
Start: 2021-07-23 | End: 2021-07-24

## 2021-07-23 RX ORDER — SITAGLIPTIN 50 MG/1
1 TABLET, FILM COATED ORAL
Qty: 30 | Refills: 0
Start: 2021-07-23 | End: 2021-08-21

## 2021-07-23 RX ORDER — SODIUM ZIRCONIUM CYCLOSILICATE 10 G/10G
5 POWDER, FOR SUSPENSION ORAL ONCE
Refills: 0 | Status: COMPLETED | OUTPATIENT
Start: 2021-07-23 | End: 2021-07-23

## 2021-07-23 RX ADMIN — LITHIUM CARBONATE 150 MILLIGRAM(S): 300 TABLET, EXTENDED RELEASE ORAL at 17:07

## 2021-07-23 RX ADMIN — HEPARIN SODIUM 5000 UNIT(S): 5000 INJECTION INTRAVENOUS; SUBCUTANEOUS at 06:21

## 2021-07-23 RX ADMIN — Medication 650 MILLIGRAM(S): at 19:30

## 2021-07-23 RX ADMIN — Medication 250 MILLIGRAM(S): at 17:07

## 2021-07-23 RX ADMIN — LITHIUM CARBONATE 150 MILLIGRAM(S): 300 TABLET, EXTENDED RELEASE ORAL at 06:21

## 2021-07-23 RX ADMIN — Medication 1: at 17:07

## 2021-07-23 RX ADMIN — Medication 250 MILLIGRAM(S): at 06:21

## 2021-07-23 RX ADMIN — ATORVASTATIN CALCIUM 40 MILLIGRAM(S): 80 TABLET, FILM COATED ORAL at 11:30

## 2021-07-23 RX ADMIN — SODIUM ZIRCONIUM CYCLOSILICATE 5 GRAM(S): 10 POWDER, FOR SUSPENSION ORAL at 11:48

## 2021-07-23 RX ADMIN — HEPARIN SODIUM 5000 UNIT(S): 5000 INJECTION INTRAVENOUS; SUBCUTANEOUS at 22:10

## 2021-07-23 RX ADMIN — AMLODIPINE BESYLATE 10 MILLIGRAM(S): 2.5 TABLET ORAL at 06:21

## 2021-07-23 RX ADMIN — Medication 0.1 MILLIGRAM(S): at 17:07

## 2021-07-23 RX ADMIN — HEPARIN SODIUM 5000 UNIT(S): 5000 INJECTION INTRAVENOUS; SUBCUTANEOUS at 13:02

## 2021-07-23 RX ADMIN — Medication 25 MILLIGRAM(S): at 22:10

## 2021-07-23 RX ADMIN — Medication 650 MILLIGRAM(S): at 18:56

## 2021-07-23 RX ADMIN — Medication 25 MILLIGRAM(S): at 13:02

## 2021-07-23 RX ADMIN — Medication 2: at 07:56

## 2021-07-23 RX ADMIN — Medication 1: at 11:40

## 2021-07-23 RX ADMIN — Medication 0.1 MILLIGRAM(S): at 06:21

## 2021-07-23 RX ADMIN — Medication 25 MILLIGRAM(S): at 06:21

## 2021-07-23 RX ADMIN — PANTOPRAZOLE SODIUM 40 MILLIGRAM(S): 20 TABLET, DELAYED RELEASE ORAL at 06:21

## 2021-07-23 NOTE — PROGRESS NOTE ADULT - ASSESSMENT
75y F pmhx HTN, DM, MDD, bipolar on lithium, anxiety send in by [facility] due to AMS x3-4 days. Pt was found to have COVID-19 positive on admission.     COVID-19, asymptomatic  - O2 not needed  - s/p vaccination  2 doses of Pfizer COVID-19 vaccine on January 11, 2021 and February 1, 2021  - remains asymptomatic, on RA  - repeat PCR negative, needs new negative PCR to return to assisted living facility  - patient has antibodies    AMS likely metabolic encephalopathy, multi factorial etiology  Delirium resolved  - etiology UTI, Li toxicity, ASHLEY on CKD 3, Covid infection  - CT head negative  - symptoms improved    UTI - Klebsiella  - initially treated with Ceftriaxone 1g q24h   - transitioned to PO Cipro, complete 7 day    Li toxicity  - in presence of Bipolar disorder  - lithium level improved  - psychiatry resumed Lithium, Seroquel not enough and can cause paradoxical agitation  - f/u lithium levels  - see psych recommendations to avoid all NSAID    ASHLEY on CKD3  - baseline creatinine ~ 1.4 - 1.7.   - creat noted  - continue to hold Lasix, ACE, and Metformin, NO NSAIDS  - encourage po intake, fluids  - renal f/u as outpatient  - continue on PO Bicarb    Hyperkalemia  - post single dose of Lokelma 5 gm   - give single dose again today    Bradycardia  - ? due to Metoprolol  - hold Metoprolol for now, HR in 50's    HTN  - on Amlodipine and Catapres  - holding Lasix and ACE  - increase Hydralazine 25 mg BID to TID     DM  - maintain of Metformin  - monitor FS  - has sliding scale insulin  - for return to facility, would start on PO Januvia 50 mg daily    Left knee pain  - xray noted  - rehab evaluation, noted    DVT: not indicated for now.  GI: Pantoprazole  Diet: DASH  Activity: Ambulate as tolerated  Dispo: Return to [facility] when arrangements in place    Case discussed with house staff and .

## 2021-07-23 NOTE — DISCHARGE NOTE PROVIDER - NSDCCPCAREPLAN_GEN_ALL_CORE_FT
PRINCIPAL DISCHARGE DIAGNOSIS  Diagnosis: Altered mental status  Assessment and Plan of Treatment: You were sent to the hospital from your nursing home due to confusion and altered mental status. This was likely caused by a variety of different reasons, all of which have been managed and treated during your hospital stay. This has resolved during your hospital stay. Please follow up with your primary care physician.      SECONDARY DISCHARGE DIAGNOSES  Diagnosis: Metabolic encephalopathy  Assessment and Plan of Treatment:     Diagnosis: Elevated lithium level  Assessment and Plan of Treatment:     Diagnosis: UTI (urinary tract infection)  Assessment and Plan of Treatment:      PRINCIPAL DISCHARGE DIAGNOSIS  Diagnosis: Altered mental status  Assessment and Plan of Treatment: You were sent to the hospital from your nursing home due to confusion and altered mental status. This was likely caused by a variety of different reasons, all of which have been managed and treated during your hospital stay. This has resolved during your hospital stay. Please follow up with your primary care physician, neurologist, and psychiatrist.      SECONDARY DISCHARGE DIAGNOSES  Diagnosis: Elevated lithium level  Assessment and Plan of Treatment: Your lithium level was elevated during your admission. Your lithium was held during your hospital stay and resumed when your lithium level was back to normal. Please follow up with your primary care physician and psychiatrist.    Diagnosis: UTI (urinary tract infection)  Assessment and Plan of Treatment: You were found to have a urinary tract infection during your hospital stay. This could have contributed to your altered mental status. You were started on an antibiotic, ciprofloxacin. Please continue taking ciprofloxacin for a total of 7 days, until July 28.    Diagnosis: Dementia  Assessment and Plan of Treatment: You have a history of dementia. Please follow up with your neurologist for a full work-up of your dementia.    Diagnosis: High creatinine  Assessment and Plan of Treatment: Your creatinine levels were found to be elevated during your hospital stay, which indicates that you may have chronic kidney disease. This may also be due in part to your high lithium levels, which can impact the kidneys. Please follow-up with your nephrologist.

## 2021-07-23 NOTE — PROGRESS NOTE ADULT - SUBJECTIVE AND OBJECTIVE BOX
JOVANNY KINGSTON  75y  Female  HPI:  Patient is poor historian. history taken from chart.  "75y F pmhx HTN, DM, MDD, bipolar on lithium, anxiety send in by [facility] (assisted living)          due to AMS x3-4 days; constant, stable; per NH and son at bedside pt has been more forgetful than usual recently (might have early dementia but sx have never been this severe), with impairment in short-term memory. Pt has no complaints--denies chest pain, fever/chills, SOB, abd pain, n/v/d."    Spoke with Aid at [facility] ([phone]) Ms. Linton, as per aid patient was complaining of diarrhea this morning but she was not letting anyone go to the bathroom to check. Patient had received COVID vaccine (does not remember date). Aid denies any recent cough, fever, sob.    In ED: vitals Temp 98.3; HR 52, /77; RR 18; SaO2 98% on room air. UA suggestive of UTI. Patient received 1L LR bolus + Ceftriaxone 1000 mg x 1.  EKG in ED noted for Sinus bradycardia. (17 Jul 2021 23:42)    MEDICATIONS  (STANDING):  amLODIPine   Tablet 10 milliGRAM(s) Oral daily  atorvastatin Oral Tab/Cap - Peds 40 milliGRAM(s) Oral daily  ciprofloxacin     Tablet 250 milliGRAM(s) Oral two times a day  cloNIDine 0.1 milliGRAM(s) Oral two times a day  dextrose 40% Gel 15 Gram(s) Oral once  dextrose 5%. 1000 milliLiter(s) (50 mL/Hr) IV Continuous <Continuous>  dextrose 5%. 1000 milliLiter(s) (100 mL/Hr) IV Continuous <Continuous>  dextrose 50% Injectable 25 Gram(s) IV Push once  dextrose 50% Injectable 12.5 Gram(s) IV Push once  dextrose 50% Injectable 25 Gram(s) IV Push once  glucagon  Injectable 1 milliGRAM(s) IntraMuscular once  heparin   Injectable 5000 Unit(s) SubCutaneous every 8 hours  hydrALAZINE 25 milliGRAM(s) Oral two times a day  insulin lispro (ADMELOG) corrective regimen sliding scale   SubCutaneous three times a day before meals  lithium 150 milliGRAM(s) Oral two times a day  pantoprazole    Tablet 40 milliGRAM(s) Oral before breakfast    MEDICATIONS  (PRN):  acetaminophen   Tablet .. 650 milliGRAM(s) Oral every 6 hours PRN Moderate Pain (4 - 6)  atropine Injectable 0.5 milliGRAM(s) IntraMuscular once PRN HR less than 30 and /or symptomatic bradycardia    INTERVAL EVENTS: Patient seen today     T(C): 36.1 (07-23-21 @ 06:07), Max: 36.4 (07-22-21 @ 13:02)  HR: 52 (07-23-21 @ 06:07) (47 - 52)  BP: 172/74 (07-23-21 @ 06:07) (132/63 - 172/74)  RR: 18 (07-23-21 @ 06:07) (18 - 18)  SpO2: 100% (07-23-21 @ 06:07) (99% - 100%)  Wt(kg): --Vital Signs Last 24 Hrs  T(C): 36.1 (23 Jul 2021 06:07), Max: 36.4 (22 Jul 2021 13:02)  T(F): 96.9 (23 Jul 2021 06:07), Max: 97.6 (22 Jul 2021 13:02)  HR: 52 (23 Jul 2021 06:07) (47 - 52)  BP: 172/74 (23 Jul 2021 06:07) (132/63 - 172/74)  BP(mean): --  RR: 18 (23 Jul 2021 06:07) (18 - 18)  SpO2: 100% (23 Jul 2021 06:07) (99% - 100%)    PHYSICAL EXAM:  GENERAL:   NECK: Supple, No JVD, Normal thyroid  CHEST/LUNG: Clear; No crackles or wheezing  HEART: S1, S2, Regular rate and rhythm;   ABDOMEN: Soft, Nontender, Nondistended; Bowel sounds present  EXTREMITIES: No clubbing, cyanosis, or edema  SKIN: No rashes or lesions    LABS:                        9.4    7.81  )-----------( 207      ( 23 Jul 2021 08:31 )             31.8             07-23    137  |  108  |  25<H>  ----------------------------<  216<H>  5.2<H>   |  17  |  1.9<H>    Ca    9.4      23 Jul 2021 08:31  Mg     2.1     07-23    TPro  6.5  /  Alb  4.1  /  TBili  <0.2  /  DBili  x   /  AST  18  /  ALT  22  /  AlkPhos  120<H>  07-23    LIVER FUNCTIONS - ( 23 Jul 2021 08:31 )  Alb: 4.1 g/dL / Pro: 6.5 g/dL / ALK PHOS: 120 U/L / ALT: 22 U/L / AST: 18 U/L / GGT: x                    RADIOLOGY & ADDITIONAL TESTS:     JOVANNY KINGSTON  75y  Female  HPI:  Patient is poor historian. history taken from chart.  "75y F pmhx HTN, DM, MDD, bipolar on lithium, anxiety send in by [facility] (assisted living)          due to AMS x3-4 days; constant, stable; per NH and son at bedside pt has been more forgetful than usual recently (might have early dementia but sx have never been this severe), with impairment in short-term memory. Pt has no complaints--denies chest pain, fever/chills, SOB, abd pain, n/v/d."    Spoke with Aid at [facility] ([phone]) Ms. Linton, as per aid patient was complaining of diarrhea this morning but she was not letting anyone go to the bathroom to check. Patient had received COVID vaccine (does not remember date). Aid denies any recent cough, fever, sob.    In ED: vitals Temp 98.3; HR 52, /77; RR 18; SaO2 98% on room air. UA suggestive of UTI. Patient received 1L LR bolus + Ceftriaxone 1000 mg x 1.  EKG in ED noted for Sinus bradycardia. (17 Jul 2021 23:42)    MEDICATIONS  (STANDING):  amLODIPine   Tablet 10 milliGRAM(s) Oral daily  atorvastatin Oral Tab/Cap - Peds 40 milliGRAM(s) Oral daily  ciprofloxacin     Tablet 250 milliGRAM(s) Oral two times a day  cloNIDine 0.1 milliGRAM(s) Oral two times a day  dextrose 40% Gel 15 Gram(s) Oral once  dextrose 5%. 1000 milliLiter(s) (50 mL/Hr) IV Continuous <Continuous>  dextrose 5%. 1000 milliLiter(s) (100 mL/Hr) IV Continuous <Continuous>  dextrose 50% Injectable 25 Gram(s) IV Push once  dextrose 50% Injectable 12.5 Gram(s) IV Push once  dextrose 50% Injectable 25 Gram(s) IV Push once  glucagon  Injectable 1 milliGRAM(s) IntraMuscular once  heparin   Injectable 5000 Unit(s) SubCutaneous every 8 hours  hydrALAZINE 25 milliGRAM(s) Oral two times a day  insulin lispro (ADMELOG) corrective regimen sliding scale   SubCutaneous three times a day before meals  lithium 150 milliGRAM(s) Oral two times a day  pantoprazole    Tablet 40 milliGRAM(s) Oral before breakfast    MEDICATIONS  (PRN):  acetaminophen   Tablet .. 650 milliGRAM(s) Oral every 6 hours PRN Moderate Pain (4 - 6)  atropine Injectable 0.5 milliGRAM(s) IntraMuscular once PRN HR less than 30 and /or symptomatic bradycardia    INTERVAL EVENTS: Patient seen today     T(C): 36.1 (07-23-21 @ 06:07), Max: 36.4 (07-22-21 @ 13:02)  HR: 52 (07-23-21 @ 06:07) (47 - 52)  BP: 172/74 (07-23-21 @ 06:07) (132/63 - 172/74)  RR: 18 (07-23-21 @ 06:07) (18 - 18)  SpO2: 100% (07-23-21 @ 06:07) (99% - 100%)  Wt(kg): --Vital Signs Last 24 Hrs  T(C): 36.1 (23 Jul 2021 06:07), Max: 36.4 (22 Jul 2021 13:02)  T(F): 96.9 (23 Jul 2021 06:07), Max: 97.6 (22 Jul 2021 13:02)  HR: 52 (23 Jul 2021 06:07) (47 - 52)  BP: 172/74 (23 Jul 2021 06:07) (132/63 - 172/74)  BP(mean): --  RR: 18 (23 Jul 2021 06:07) (18 - 18)  SpO2: 100% (23 Jul 2021 06:07) (99% - 100%)    PHYSICAL EXAM:  GENERAL: NAD, pleasant  NECK: Supple, No JVD  CHEST/LUNG: Clear  HEART: S1, S2, Bradycardic  ABDOMEN: Soft, Nontender, Nondistended; Bowel sounds present  EXTREMITIES: No clubbing, cyanosis, or edema  SKIN: No rashes or lesions    LABS:                        9.4    7.81  )-----------( 207      ( 23 Jul 2021 08:31 )             31.8             07-23    137  |  108  |  25<H>  ----------------------------<  216<H>  5.2<H>   |  17  |  1.9<H>    Ca    9.4      23 Jul 2021 08:31  Mg     2.1     07-23    TPro  6.5  /  Alb  4.1  /  TBili  <0.2  /  DBili  x   /  AST  18  /  ALT  22  /  AlkPhos  120<H>  07-23    LIVER FUNCTIONS - ( 23 Jul 2021 08:31 )  Alb: 4.1 g/dL / Pro: 6.5 g/dL / ALK PHOS: 120 U/L / ALT: 22 U/L / AST: 18 U/L / GGT: x                  RADIOLOGY & ADDITIONAL TESTS:

## 2021-07-23 NOTE — DISCHARGE NOTE PROVIDER - CARE PROVIDER_API CALL
Joann Aguilar  [address] [address]  Phone: [phone]  Fax: [phone]  Follow Up Time: 1 week   Joann Aguilar  MEDICINE  [address] [place] [address]  Phone: [phone]  Fax: [phone]  Follow Up Time: 1 week    Wei Callejas)  Neuromuscular Medicine  [address] [place] [address], [address]  [place], [place] [id number]  Phone: [phone]  Fax: [phone]  Follow Up Time: 2 weeks    ROGE VIEYRA  Psychiatry  Phone: ()-  Fax: ()-  Follow Up Time: 1 week    Dandy Whitfield)  Internal Medicine; Nephrology  [address] [place] [address]  Phone: [phone]  Fax: [phone]  Follow Up Time: 1 week

## 2021-07-23 NOTE — DISCHARGE NOTE PROVIDER - PROVIDER TOKENS
PROVIDER:[TOKEN:[[id number]],FOLLOWUP:[1 week]] PROVIDER:[TOKEN:[[id number]],FOLLOWUP:[1 week]],PROVIDER:[TOKEN:[[id number]],FOLLOWUP:[2 weeks]],PROVIDER:[TOKEN:[[id number]],FOLLOWUP:[1 week]],PROVIDER:[TOKEN:[[id number]],FOLLOWUP:[1 week]]

## 2021-07-23 NOTE — DISCHARGE NOTE PROVIDER - CARE PROVIDERS DIRECT ADDRESSES
,[email or web address]

## 2021-07-23 NOTE — PROGRESS NOTE ADULT - SUBJECTIVE AND OBJECTIVE BOX
KINGSTON PETTY 75y Female  MRN#: [id number]   CODE STATUS: Full    Hospital Day: 6d    Pt is currently admitted with the primary diagnosis of UTI.    SUBJECTIVE  Hospital Course    Overnight events: None reported. Patient sitting comfortably. Reports 2 BMs yesterday of regular consistency.    Subjective complaints: Denies chest pain, confusion, SOB.    Present Today:   - Finney:  No [ x ], Yes [   ] : Indication:     - Type of IV Access:       .. CVC/Piccline:  No [ x ], Yes [   ] : Indication:       .. Midline: No [ x ], Yes [   ] : Indication:                                             ----------------------------------------------------------  OBJECTIVE  PAST MEDICAL & SURGICAL HISTORY  HTN (hypertension)    DM (diabetes mellitus)    MDD (major depressive disorder)    Bipolar disorder                                              -----------------------------------------------------------  ALLERGIES:  amoxicillin (Unknown)  Iodides (Unknown)                                            ------------------------------------------------------------    HOME MEDICATIONS  Home Medications:  alendronate 70 mg oral tablet: 1 tab(s) orally once a week (17 Jul 2021 23:51)  amLODIPine 5 mg oral tablet: 1 tab(s) orally once a day (17 Jul 2021 23:51)  atorvastatin 40 mg oral tablet: 1 tab(s) orally once a day (17 Jul 2021 23:52)  cloNIDine 0.1 mg oral tablet: 1 tab(s) orally once a day (17 Jul 2021 23:53)  ferrous sulfate 325 mg (65 mg elemental iron) oral tablet: 1 tab(s) orally 3 times a week (17 Jul 2021 23:54)  furosemide 40 mg oral tablet: 1 tab(s) orally once a day (17 Jul 2021 23:54)  glimepiride 4 mg oral tablet: 1 tab(s) orally once a day (17 Jul 2021 23:54)  Januvia 100 mg oral tablet: 1 tab(s) orally once a day (17 Jul 2021 23:55)  lisinopril 40 mg oral tablet: 1 tab(s) orally once a day (17 Jul 2021 23:55)  lithium 150 mg oral capsule: 1 cap(s) orally 2 times a day (17 Jul 2021 23:55)  metFORMIN 500 mg oral tablet: 1 tab(s) orally 2 times a day (17 Jul 2021 23:55)  metoprolol succinate 50 mg oral tablet, extended release: 1 tab(s) orally once a day (17 Jul 2021 23:56)  omeprazole 20 mg oral delayed release capsule: 1 cap(s) orally once a day (17 Jul 2021 23:56)  Vitamin D2 50,000 intl units (1.25 mg) oral capsule: 1 cap(s) orally once a month (17 Jul 2021 23:57)                           MEDICATIONS:  STANDING MEDICATIONS  amLODIPine   Tablet 10 milliGRAM(s) Oral daily  atorvastatin Oral Tab/Cap - Peds 40 milliGRAM(s) Oral daily  ciprofloxacin     Tablet 250 milliGRAM(s) Oral two times a day  cloNIDine 0.1 milliGRAM(s) Oral two times a day  dextrose 40% Gel 15 Gram(s) Oral once  dextrose 5%. 1000 milliLiter(s) IV Continuous <Continuous>  dextrose 5%. 1000 milliLiter(s) IV Continuous <Continuous>  dextrose 50% Injectable 25 Gram(s) IV Push once  dextrose 50% Injectable 12.5 Gram(s) IV Push once  dextrose 50% Injectable 25 Gram(s) IV Push once  glucagon  Injectable 1 milliGRAM(s) IntraMuscular once  heparin   Injectable 5000 Unit(s) SubCutaneous every 8 hours  hydrALAZINE 25 milliGRAM(s) Oral two times a day  insulin lispro (ADMELOG) corrective regimen sliding scale   SubCutaneous three times a day before meals  lithium 150 milliGRAM(s) Oral two times a day  pantoprazole    Tablet 40 milliGRAM(s) Oral before breakfast    PRN MEDICATIONS  acetaminophen   Tablet .. 650 milliGRAM(s) Oral every 6 hours PRN  atropine Injectable 0.5 milliGRAM(s) IntraMuscular once PRN                                            ------------------------------------------------------------  VITAL SIGNS: Last 24 Hours  T(C): 36.1 (23 Jul 2021 06:07), Max: 36.4 (22 Jul 2021 13:02)  T(F): 96.9 (23 Jul 2021 06:07), Max: 97.6 (22 Jul 2021 13:02)  HR: 52 (23 Jul 2021 06:07) (47 - 52)  BP: 172/74 (23 Jul 2021 06:07) (132/63 - 172/74)  BP(mean): --  RR: 18 (23 Jul 2021 06:07) (18 - 18)  SpO2: 100% (23 Jul 2021 06:07) (99% - 100%)      07-22-21 @ 07:01  -  07-23-21 @ 07:00  --------------------------------------------------------  IN: 0 mL / OUT: 1 mL / NET: -1 mL                                             --------------------------------------------------------------  LABS:                        10.0   7.83  )-----------( 209      ( 22 Jul 2021 06:10 )             32.6     07-22    138  |  109  |  21<H>  ----------------------------<  179<H>  5.1<H>   |  17  |  1.7<H>    Ca    9.8      22 Jul 2021 06:10  Mg     2.1     07-22    TPro  6.8  /  Alb  4.3  /  TBili  0.3  /  DBili  x   /  AST  18  /  ALT  22  /  AlkPhos  127<H>  07-22                                                              -------------------------------------------------------------  RADIOLOGY:                                            --------------------------------------------------------------    PHYSICAL EXAM:  General: alert, awake, NAD  HEENT:  LUNGS:  HEART:  ABDOMEN:  EXT:  NEURO:  SKIN:                                           --------------------------------------------------------------    ASSESSMENT & PLAN    Past medical history and hospital course   75y F pmhx HTN, DM, MDD, bipolar on lithium, anxiety send in by Doc HATCH due to AMS x3-4 days. Pt was found to have COVID-19 positive on admission.     # COVID-19 positive on 7/17/21  - s/p vaccination  2 doses of Pfizer COVID-19 vaccine on January 11, 2021 and February 1, 2021  - currently asymptomatic, on RA  - Xray Chest (07.17.21 @ 17:56): Small left basilar opacity, possibly atelectasis.  - repeat COVID PCR 7/19/21 negative    # AMS likely metabolic encephalopathy, multi factorial   - could be secondary to UTI vs Li toxicity vs ASHLEY on CKD  - CT head negative  - Neuro consult appreciated: No further neurological workup recommended, No indication for MRI Brain.OP follow up for dementia workup.     # UTI  - UA positive for UTI  - Ucx: Klabsiella  - s/p Rocephin 1 gm x 1 in ED,   - d/c ceftriaxone 1g q24h iv until UCX results x 3 days  - started cipro 250 PO q12h- for total of 10 days    # Li toxicity  # Hx of bipolar disorder  - lithium level improved(1.6>>0.96)  - psychiatry evaluation appreciated: consider Seroquel 50 mg PRN for acute agitation. Seroquel can cause paradoxical agitation in patients with dementia and should be used as a last resort  - Li PO was on hold. Will restart Li today 7/22/21 as per psych on home dose    # ASHLEY on CKD3,  likely prerenal  - baseline creatinine ~ 1.4 - 1.7.   - Cr 1.9 on 7/18> 1.6 on 7/20>1.7 on 7/22  - On lasix 40 mg at [facility]  - continuing to hold lasix, ACE and Metformin  - encourage po intake  - U protein/ creat 2.9  - renal bladder sono (7/19): Simple cyst within the left kidney    # hyperkalemia  - hyperkalemia can be due ASHLEY + ACEIs + Metformin. hold lisinopril and metformin.  - s/p single dose of lokelma 5 gm     # Bradycardia, asymptomatic  - ? due to metoprolol  - ekg noted. no significant tall T waves on EKG.  - hold metoprolol for now    # HTN  - increased amlodipine 10 mg   - c/w clonodine 0.1mg BID  - c/w hydralazine 25 mg BID    # DM  - monitor FS  - start insulin if FS > 180    DVT: not indicated for now.  GI: Pantoprazole  Diet: DASH  Activity: Ambulate as tolerated, c/w PT  Dispo: [facility] once able to ambulate 150 ft                                                                                                          ----------------------------------------------------  # DVT prophylaxis     # GI prophylaxis     # Diet     # Activity Score (AM-PAC)    # Code status     # Disposition                                                                              --------------------------------------------------------    # Handoff      KINGSTON PETTY 75y Female  MRN#: [id number]   CODE STATUS: Full    Hospital Day: 6d    Pt is currently admitted with the primary diagnosis of UTI.    SUBJECTIVE  Hospital Course    Overnight events: None reported. Patient sitting comfortably. Reports 2 BMs yesterday of regular consistency.    Subjective complaints: Denies chest pain, confusion, SOB.    Present Today:   - Finney:  No [ x ], Yes [   ] : Indication:     - Type of IV Access:       .. CVC/Piccline:  No [ x ], Yes [   ] : Indication:       .. Midline: No [ x ], Yes [   ] : Indication:                                             ----------------------------------------------------------  OBJECTIVE  PAST MEDICAL & SURGICAL HISTORY  HTN (hypertension)    DM (diabetes mellitus)    MDD (major depressive disorder)    Bipolar disorder                                              -----------------------------------------------------------  ALLERGIES:  amoxicillin (Unknown)  Iodides (Unknown)                                            ------------------------------------------------------------    HOME MEDICATIONS  Home Medications:  alendronate 70 mg oral tablet: 1 tab(s) orally once a week (17 Jul 2021 23:51)  amLODIPine 5 mg oral tablet: 1 tab(s) orally once a day (17 Jul 2021 23:51)  atorvastatin 40 mg oral tablet: 1 tab(s) orally once a day (17 Jul 2021 23:52)  cloNIDine 0.1 mg oral tablet: 1 tab(s) orally once a day (17 Jul 2021 23:53)  ferrous sulfate 325 mg (65 mg elemental iron) oral tablet: 1 tab(s) orally 3 times a week (17 Jul 2021 23:54)  furosemide 40 mg oral tablet: 1 tab(s) orally once a day (17 Jul 2021 23:54)  glimepiride 4 mg oral tablet: 1 tab(s) orally once a day (17 Jul 2021 23:54)  Januvia 100 mg oral tablet: 1 tab(s) orally once a day (17 Jul 2021 23:55)  lisinopril 40 mg oral tablet: 1 tab(s) orally once a day (17 Jul 2021 23:55)  lithium 150 mg oral capsule: 1 cap(s) orally 2 times a day (17 Jul 2021 23:55)  metFORMIN 500 mg oral tablet: 1 tab(s) orally 2 times a day (17 Jul 2021 23:55)  metoprolol succinate 50 mg oral tablet, extended release: 1 tab(s) orally once a day (17 Jul 2021 23:56)  omeprazole 20 mg oral delayed release capsule: 1 cap(s) orally once a day (17 Jul 2021 23:56)  Vitamin D2 50,000 intl units (1.25 mg) oral capsule: 1 cap(s) orally once a month (17 Jul 2021 23:57)                           MEDICATIONS:  STANDING MEDICATIONS  amLODIPine   Tablet 10 milliGRAM(s) Oral daily  atorvastatin Oral Tab/Cap - Peds 40 milliGRAM(s) Oral daily  ciprofloxacin     Tablet 250 milliGRAM(s) Oral two times a day  cloNIDine 0.1 milliGRAM(s) Oral two times a day  dextrose 40% Gel 15 Gram(s) Oral once  dextrose 5%. 1000 milliLiter(s) IV Continuous <Continuous>  dextrose 5%. 1000 milliLiter(s) IV Continuous <Continuous>  dextrose 50% Injectable 25 Gram(s) IV Push once  dextrose 50% Injectable 12.5 Gram(s) IV Push once  dextrose 50% Injectable 25 Gram(s) IV Push once  glucagon  Injectable 1 milliGRAM(s) IntraMuscular once  heparin   Injectable 5000 Unit(s) SubCutaneous every 8 hours  hydrALAZINE 25 milliGRAM(s) Oral two times a day  insulin lispro (ADMELOG) corrective regimen sliding scale   SubCutaneous three times a day before meals  lithium 150 milliGRAM(s) Oral two times a day  pantoprazole    Tablet 40 milliGRAM(s) Oral before breakfast    PRN MEDICATIONS  acetaminophen   Tablet .. 650 milliGRAM(s) Oral every 6 hours PRN  atropine Injectable 0.5 milliGRAM(s) IntraMuscular once PRN                                            ------------------------------------------------------------  VITAL SIGNS: Last 24 Hours  T(C): 36.1 (23 Jul 2021 06:07), Max: 36.4 (22 Jul 2021 13:02)  T(F): 96.9 (23 Jul 2021 06:07), Max: 97.6 (22 Jul 2021 13:02)  HR: 52 (23 Jul 2021 06:07) (47 - 52)  BP: 172/74 (23 Jul 2021 06:07) (132/63 - 172/74)  BP(mean): --  RR: 18 (23 Jul 2021 06:07) (18 - 18)  SpO2: 100% (23 Jul 2021 06:07) (99% - 100%)      07-22-21 @ 07:01  -  07-23-21 @ 07:00  --------------------------------------------------------  IN: 0 mL / OUT: 1 mL / NET: -1 mL                                             --------------------------------------------------------------  LABS:                        10.0   7.83  )-----------( 209      ( 22 Jul 2021 06:10 )             32.6     07-22    138  |  109  |  21<H>  ----------------------------<  179<H>  5.1<H>   |  17  |  1.7<H>    Ca    9.8      22 Jul 2021 06:10  Mg     2.1     07-22    TPro  6.8  /  Alb  4.3  /  TBili  0.3  /  DBili  x   /  AST  18  /  ALT  22  /  AlkPhos  127<H>  07-22                                                              -------------------------------------------------------------  RADIOLOGY:                                            --------------------------------------------------------------    PHYSICAL EXAM:  General: alert, awake, NAD  HEENT: EOMI, atraumatic  LUNGS: clear to auscultation bilaterally, no wheezes noted  HEART: regular rate/rhythm, no murmurs/gallops  ABDOMEN: soft, nontender, normoactive bowel sounds  EXT: 5/5 strength all extremities, 2+ radial pulses  NEURO: aaox3, no focal deficits noted  SKIN: intact, no new lesions noted                                           --------------------------------------------------------------    ASSESSMENT & PLAN    Past medical history and hospital course   75y F PMHx of HTN, DM, MDD, bipolar on lithium, anxiety send in by Doc HATCH due to AMS x3-4 days. Pt was found to have COVID-19 positive on admission.     # COVID-19 positive on 7/17/21  - s/p vaccination 2 doses of Pfizer COVID-19 vaccine on January 11, 2021 and February 1, 2021  - currently asymptomatic, on RA  - Xray Chest (07.17.21 @ 17:56): Small left basilar opacity, possibly atelectasis.  - repeat COVID PCR 7/19/21 negative, reordered PCR today    # AMS likely metabolic encephalopathy, multi factorial   - could be secondary to UTI vs Li toxicity vs ASHLEY on CKD  - CT head negative  - Neuro consult appreciated: No further neurological workup recommended, per Dr. Rasmussen son Catarino would like MRI Brain to be done prior to discharge, spoke with Doc HATCH over home and confirmed no implants or metallic foreign bodies for patient, ordered today    # UTI  - UA+ for UTI  - UCx: Klebsiella  - s/p Rocephin 1 gm x 1 in ED,   - d/c ceftriaxone 1g q24h iv until UCX results x 3 days  - Cipro 250mg PO q12h for total of 7 days per Dr. Aguilar, started 7/21    # Li toxicity  # Hx of bipolar disorder  - patient's lithium level improved (1.6>>0.96)  - psychiatry evaluation appreciated: consider Seroquel 50 mg PRN for acute agitation. Seroquel can cause paradoxical agitation in patients with dementia and should be used as a last resort  - Li PO was on hold. Restarted Lithium 7/22/21 as per psych on home dose    # ASHLEY, possible CKD3 likely prerenal  - baseline creatinine ~ 1.4 - 1.7.   - Cr 1.9 on 7/18> 1.6 on 7/20>1.7 on 7/22  - On Lasix 40 mg at [facility], continuing to hold lasix, ACE, Metformin, NO NSAIDs  - encouraging PO intake  - U protein/Cr 2.9  - renal bladder sono (7/19): Simple cyst within the left kidney  - will have patient f/u with Nephrologist outpatient    # hyperkalemia  - hyperkalemia can be due ASHLEY + ACEIs + Metformin. hold lisinopril and metformin.  - s/p single dose of lokelma 5 gm 7/17, another single dose given 7/23 (K+ 5.2 this morning), will continue to monitor    # Bradycardia, asymptomatic  - ? due to metoprolol  - EKG noted. No significant tall T waves on EKG.  - holding metoprolol for now as HR is in 50s    # HTN  - increased amlodipine to 10 mg on 7/21   - c/w clonidine 0.1mg BID  - per Dr. Aguilar increased hydralazine to 25 mg TID, holding ACE due to kidneys    # DM  - monitor FS  - start insulin if FS > 180  - SSI for now, for discharge will start patient on PO Januvia 50mg daily    DVT: not indicated for now.  GI: Pantoprazole  Diet: DASH  Activity: Ambulate as tolerated, c/w PT  Dispo: Return to [facility] once able, gave report to Nurse and Case management today 7/23, at [phone] KINGSTON PETTY 75y Female  MRN#: [id number]   CODE STATUS: Full    Hospital Day: 6d    Pt is currently admitted with the primary diagnosis of AMS.    SUBJECTIVE  Hospital Course    Overnight events: None reported. Patient sitting comfortably. Reports 2 BMs yesterday of regular consistency.    Subjective complaints: Denies chest pain, confusion, SOB.    Present Today:   - Finney:  No [ x ], Yes [   ] : Indication:     - Type of IV Access:       .. CVC/Piccline:  No [ x ], Yes [   ] : Indication:       .. Midline: No [ x ], Yes [   ] : Indication:                                             ----------------------------------------------------------  OBJECTIVE  PAST MEDICAL & SURGICAL HISTORY  HTN (hypertension)    DM (diabetes mellitus)    MDD (major depressive disorder)    Bipolar disorder                                              -----------------------------------------------------------  ALLERGIES:  amoxicillin (Unknown)  Iodides (Unknown)                                            ------------------------------------------------------------    HOME MEDICATIONS  Home Medications:  alendronate 70 mg oral tablet: 1 tab(s) orally once a week (17 Jul 2021 23:51)  amLODIPine 5 mg oral tablet: 1 tab(s) orally once a day (17 Jul 2021 23:51)  atorvastatin 40 mg oral tablet: 1 tab(s) orally once a day (17 Jul 2021 23:52)  cloNIDine 0.1 mg oral tablet: 1 tab(s) orally once a day (17 Jul 2021 23:53)  ferrous sulfate 325 mg (65 mg elemental iron) oral tablet: 1 tab(s) orally 3 times a week (17 Jul 2021 23:54)  furosemide 40 mg oral tablet: 1 tab(s) orally once a day (17 Jul 2021 23:54)  glimepiride 4 mg oral tablet: 1 tab(s) orally once a day (17 Jul 2021 23:54)  Januvia 100 mg oral tablet: 1 tab(s) orally once a day (17 Jul 2021 23:55)  lisinopril 40 mg oral tablet: 1 tab(s) orally once a day (17 Jul 2021 23:55)  lithium 150 mg oral capsule: 1 cap(s) orally 2 times a day (17 Jul 2021 23:55)  metFORMIN 500 mg oral tablet: 1 tab(s) orally 2 times a day (17 Jul 2021 23:55)  metoprolol succinate 50 mg oral tablet, extended release: 1 tab(s) orally once a day (17 Jul 2021 23:56)  omeprazole 20 mg oral delayed release capsule: 1 cap(s) orally once a day (17 Jul 2021 23:56)  Vitamin D2 50,000 intl units (1.25 mg) oral capsule: 1 cap(s) orally once a month (17 Jul 2021 23:57)                           MEDICATIONS:  STANDING MEDICATIONS  amLODIPine   Tablet 10 milliGRAM(s) Oral daily  atorvastatin Oral Tab/Cap - Peds 40 milliGRAM(s) Oral daily  ciprofloxacin     Tablet 250 milliGRAM(s) Oral two times a day  cloNIDine 0.1 milliGRAM(s) Oral two times a day  dextrose 40% Gel 15 Gram(s) Oral once  dextrose 5%. 1000 milliLiter(s) IV Continuous <Continuous>  dextrose 5%. 1000 milliLiter(s) IV Continuous <Continuous>  dextrose 50% Injectable 25 Gram(s) IV Push once  dextrose 50% Injectable 12.5 Gram(s) IV Push once  dextrose 50% Injectable 25 Gram(s) IV Push once  glucagon  Injectable 1 milliGRAM(s) IntraMuscular once  heparin   Injectable 5000 Unit(s) SubCutaneous every 8 hours  hydrALAZINE 25 milliGRAM(s) Oral two times a day  insulin lispro (ADMELOG) corrective regimen sliding scale   SubCutaneous three times a day before meals  lithium 150 milliGRAM(s) Oral two times a day  pantoprazole    Tablet 40 milliGRAM(s) Oral before breakfast    PRN MEDICATIONS  acetaminophen   Tablet .. 650 milliGRAM(s) Oral every 6 hours PRN  atropine Injectable 0.5 milliGRAM(s) IntraMuscular once PRN                                            ------------------------------------------------------------  VITAL SIGNS: Last 24 Hours  T(C): 36.1 (23 Jul 2021 06:07), Max: 36.4 (22 Jul 2021 13:02)  T(F): 96.9 (23 Jul 2021 06:07), Max: 97.6 (22 Jul 2021 13:02)  HR: 52 (23 Jul 2021 06:07) (47 - 52)  BP: 172/74 (23 Jul 2021 06:07) (132/63 - 172/74)  BP(mean): --  RR: 18 (23 Jul 2021 06:07) (18 - 18)  SpO2: 100% (23 Jul 2021 06:07) (99% - 100%)      07-22-21 @ 07:01  -  07-23-21 @ 07:00  --------------------------------------------------------  IN: 0 mL / OUT: 1 mL / NET: -1 mL                                             --------------------------------------------------------------  LABS:                        10.0   7.83  )-----------( 209      ( 22 Jul 2021 06:10 )             32.6     07-22    138  |  109  |  21<H>  ----------------------------<  179<H>  5.1<H>   |  17  |  1.7<H>    Ca    9.8      22 Jul 2021 06:10  Mg     2.1     07-22    TPro  6.8  /  Alb  4.3  /  TBili  0.3  /  DBili  x   /  AST  18  /  ALT  22  /  AlkPhos  127<H>  07-22                                                              -------------------------------------------------------------  RADIOLOGY:                                            --------------------------------------------------------------    PHYSICAL EXAM:  General: alert, awake, NAD  HEENT: EOMI, atraumatic  LUNGS: clear to auscultation bilaterally, no wheezes noted  HEART: regular rate/rhythm, no murmurs/gallops  ABDOMEN: soft, nontender, normoactive bowel sounds  EXT: 5/5 strength all extremities, 2+ radial pulses  NEURO: aaox3, no focal deficits noted  SKIN: intact, no new lesions noted                                           --------------------------------------------------------------    ASSESSMENT & PLAN    Past medical history and hospital course   75y F PMHx of HTN, DM, MDD, bipolar on lithium, anxiety send in by Doc HATCH due to AMS x3-4 days. Pt was found to have COVID-19 positive on admission.     # COVID-19 positive on 7/17/21  - s/p vaccination 2 doses of Pfizer COVID-19 vaccine on January 11, 2021 and February 1, 2021  - currently asymptomatic, on RA  - Xray Chest (07.17.21 @ 17:56): Small left basilar opacity, possibly atelectasis.  - repeat COVID PCR 7/19/21 negative, reordered PCR today    # AMS likely metabolic encephalopathy, multi factorial   - could be secondary to UTI vs Li toxicity vs ASHLEY on CKD  - CT head negative  - Neuro consult appreciated: No further neurological workup recommended, per Dr. Rasmussen son Catarino would like MRI Brain to be done prior to discharge, spoke with Doc HATCH over home and confirmed no implants or metallic foreign bodies for patient, ordered today    # UTI  - UA+ for UTI  - UCx: Klebsiella  - s/p Rocephin 1 gm x 1 in ED,   - d/c ceftriaxone 1g q24h iv until UCX results x 3 days  - Cipro 250mg PO q12h for total of 7 days per Dr. Aguilar, started 7/21    # Li toxicity  # Hx of bipolar disorder  - patient's lithium level improved (1.6>>0.96)  - psychiatry evaluation appreciated: consider Seroquel 50 mg PRN for acute agitation. Seroquel can cause paradoxical agitation in patients with dementia and should be used as a last resort  - Li PO was on hold. Restarted Lithium 7/22/21 as per psych on home dose    # ASHLEY, possible CKD3 likely prerenal  - baseline creatinine ~ 1.4 - 1.7.   - Cr 1.9 on 7/18> 1.6 on 7/20>1.7 on 7/22  - On Lasix 40 mg at [facility], continuing to hold lasix, ACE, Metformin, NO NSAIDs  - encouraging PO intake  - U protein/Cr 2.9  - renal bladder sono (7/19): Simple cyst within the left kidney  - will have patient f/u with Nephrologist outpatient    # hyperkalemia  - hyperkalemia can be due ASHLEY + ACEIs + Metformin. hold lisinopril and metformin.  - s/p single dose of lokelma 5 gm 7/17, another single dose given 7/23 (K+ 5.2 this morning), will continue to monitor    # Bradycardia, asymptomatic  - ? due to metoprolol  - EKG noted. No significant tall T waves on EKG.  - holding metoprolol for now as HR is in 50s    # HTN  - increased amlodipine to 10 mg on 7/21   - c/w clonidine 0.1mg BID  - per Dr. Aguilar increased hydralazine to 25 mg TID, holding ACE due to kidneys    # DM  - monitor FS  - start insulin if FS > 180  - SSI for now, for discharge will start patient on PO Januvia 50mg daily    DVT: not indicated for now.  GI: Pantoprazole  Diet: DASH  Activity: Ambulate as tolerated, c/w PT  Dispo: Return to [facility] once able, gave report to Nurse and Case management today 7/23, at [phone]

## 2021-07-23 NOTE — DISCHARGE NOTE PROVIDER - NSDCMRMEDTOKEN_GEN_ALL_CORE_FT
alendronate 70 mg oral tablet: 1 tab(s) orally once a week  amLODIPine 5 mg oral tablet: 1 tab(s) orally once a day  atorvastatin 40 mg oral tablet: 1 tab(s) orally once a day  cloNIDine 0.1 mg oral tablet: 1 tab(s) orally once a day  ferrous sulfate 325 mg (65 mg elemental iron) oral tablet: 1 tab(s) orally 3 times a week  furosemide 40 mg oral tablet: 1 tab(s) orally once a day  glimepiride 4 mg oral tablet: 1 tab(s) orally once a day  Januvia 100 mg oral tablet: 1 tab(s) orally once a day  lisinopril 40 mg oral tablet: 1 tab(s) orally once a day  lithium 150 mg oral capsule: 1 cap(s) orally 2 times a day  metFORMIN 500 mg oral tablet: 1 tab(s) orally 2 times a day  metoprolol succinate 50 mg oral tablet, extended release: 1 tab(s) orally once a day  omeprazole 20 mg oral delayed release capsule: 1 cap(s) orally once a day  Vitamin D2 50,000 intl units (1.25 mg) oral capsule: 1 cap(s) orally once a month   amLODIPine 5 mg oral tablet: 1 tab(s) orally once a day  atorvastatin 40 mg oral tablet: 1 tab(s) orally once a day  cloNIDine 0.1 mg oral tablet: 1 tab(s) orally once a day  ferrous sulfate 325 mg (65 mg elemental iron) oral tablet: 1 tab(s) orally 3 times a week  glimepiride 4 mg oral tablet: 1 tab(s) orally once a day  Januvia 100 mg oral tablet: 1 tab(s) orally once a day  lithium 150 mg oral capsule: 1 cap(s) orally once a day  metFORMIN 500 mg oral tablet: 1 tab(s) orally 2 times a day  metoprolol succinate 50 mg oral tablet, extended release: 1 tab(s) orally once a day  omeprazole 20 mg oral delayed release capsule: 1 cap(s) orally once a day  Vitamin D2 50,000 intl units (1.25 mg) oral capsule: 1 cap(s) orally once a month   amLODIPine 10 mg oral tablet: 1 tab(s) orally once a day  atorvastatin 40 mg oral tablet: 1 tab(s) orally once a day  cloNIDine 0.1 mg oral tablet: 1 tab(s) orally once a day  ferrous sulfate 325 mg (65 mg elemental iron) oral tablet: 1 tab(s) orally 3 times a week  glimepiride 4 mg oral tablet: 1 tab(s) orally once a day  metoprolol succinate 50 mg oral tablet, extended release: 1 tab(s) orally once a day  omeprazole 20 mg oral delayed release capsule: 1 cap(s) orally once a day  SITagliptin 50 mg oral tablet: 1 tab(s) orally once a day  Vitamin D2 50,000 intl units (1.25 mg) oral capsule: 1 cap(s) orally once a month   amLODIPine 10 mg oral tablet: 1 tab(s) orally once a day  atorvastatin 40 mg oral tablet: 1 tab(s) orally once a day  cloNIDine 0.1 mg oral tablet: 1 tab(s) orally once a day  ferrous sulfate 325 mg (65 mg elemental iron) oral tablet: 1 tab(s) orally 3 times a week  glimepiride 4 mg oral tablet: 1 tab(s) orally once a day  Januvia 50 mg oral tablet: 1 tab(s) orally once a day   lithium 150 mg oral capsule: 1 cap(s) orally 2 times a day  metoprolol succinate 50 mg oral tablet, extended release: 1 tab(s) orally once a day  omeprazole 20 mg oral delayed release capsule: 1 cap(s) orally once a day  Vitamin D2 50,000 intl units (1.25 mg) oral capsule: 1 cap(s) orally once a month   amLODIPine 10 mg oral tablet: 1 tab(s) orally once a day  atorvastatin 40 mg oral tablet: 1 tab(s) orally once a day  ciprofloxacin 250 mg oral tablet: 1 tab(s) orally 2 times a day  cloNIDine 0.1 mg oral tablet: 1 tab(s) orally once a day  ferrous sulfate 325 mg (65 mg elemental iron) oral tablet: 1 tab(s) orally 3 times a week  glimepiride 4 mg oral tablet: 1 tab(s) orally once a day  Januvia 50 mg oral tablet: 1 tab(s) orally once a day   lithium 150 mg oral capsule: 1 cap(s) orally 2 times a day  metoprolol succinate 50 mg oral tablet, extended release: 1 tab(s) orally once a day  omeprazole 20 mg oral delayed release capsule: 1 cap(s) orally once a day  Vitamin D2 50,000 intl units (1.25 mg) oral capsule: 1 cap(s) orally once a month

## 2021-07-23 NOTE — DISCHARGE NOTE PROVIDER - HOSPITAL COURSE
Patient is a 75 year old female with PMHx of HTN, DM, MDD, bipolar on lithium, anxiety, sent to [facility] by [facility] (assisted living) due to AMS x3-4 days; described as constant and stable; per NH and son at bedside, patient was more forgetful than usual recently (might have early dementia but sx have never been this severe), with impairment in short-term memory. Patient had no complaints, denied chest pain, fever/chills, SOB, abd pain, n/v/d. Spoke with Aid at [facility] ([phone]) Ms. Linton, as per aid patient was complaining of diarrhea that morning but she was not letting anyone go to the bathroom to check. Patient had received COVID vaccine (does not remember date). Aid denies any recent cough, fever, sob. In ED: vitals Temp 98.3; HR 52, /77; RR 18; SaO2 98% on room air. UA suggestive of UTI. Patient received 1L LR bolus + Ceftriaxone 1000 mg x 1. EKG in ED noted for Sinus bradycardia. COVID+ on 7/17. CXR 7/17 showed small left basilar opactiy, possibly atelectasis. Was never symptomatic for COVID, repeat COVID PCR 7/19 negative, repeat 7/23 prior to discharge also negative.     Patient admitted to floor for workup of AMS. CT Head 7/17 negative, neurology consulted, no further neurological workup recommended, no indication for MRI brain, outpatient f/u for dementia workup. UA was positive on admit, 1g Rocephin given in ED, 1g q24h IV Rocephin given for 3 days until UCx result, positive for Klebsiella.    # UTI  - UA positive for UTI  - Ucx: Klabsiella  - s/p Rocephin 1 gm x 1 in ED,   - d/c ceftriaxone 1g q24h iv until UCX results x 3 days  - started cipro 250 PO q12h- for total of 10 days    # Li toxicity  # Hx of bipolar disorder  - lithium level improved(1.6>>0.96)  - psychiatry evaluation appreciated: consider Seroquel 50 mg PRN for acute agitation. Seroquel can cause paradoxical agitation in patients with dementia and should be used as a last resort  - Li PO was on hold. Will restart Li today 7/22/21 as per psych on home dose    # ASHLEY on CKD3,  likely prerenal  - baseline creatinine ~ 1.4 - 1.7.   - Cr 1.9 on 7/18> 1.6 on 7/20>1.7 on 7/22  - On lasix 40 mg at [facility]  - continuing to hold lasix, ACE and Metformin  - encourage po intake  - U protein/ creat 2.9  - renal bladder sono (7/19): Simple cyst within the left kidney    # hyperkalemia  - hyperkalemia can be due ASHLEY + ACEIs + Metformin. hold lisinopril and metformin.  - s/p single dose of lokelma 5 gm     # Bradycardia, asymptomatic  - ? due to metoprolol  - ekg noted. no significant tall T waves on EKG.  - hold metoprolol for now    # HTN  - increased amlodipine 10 mg   - c/w clonodine 0.1mg BID  - c/w hydralazine 25 mg BID    # DM  - monitor FS  - start insulin if FS > 180    DVT: not indicated for now.  GI: Pantoprazole  Diet: DASH  Activity: Ambulate as tolerated, c/w PT  Dispo: [facility] assisted living once able to ambulate 150 ft   Patient is a 75 year old female with PMHx of HTN, DM, MDD, bipolar on lithium, anxiety, sent to [facility] by [facility] (assisted living) due to AMS x3-4 days; described as constant and stable; per NH and son at bedside, patient was more forgetful than usual recently (might have early dementia but sx have never been this severe), with impairment in short-term memory. Patient had no complaints, denied chest pain, fever/chills, SOB, abd pain, n/v/d. Spoke with Aid at [facility] ([phone]) Ms. Linton, as per aid patient was complaining of diarrhea that morning but she was not letting anyone go to the bathroom to check. Patient had received COVID vaccine (does not remember date). Aid denies any recent cough, fever, sob. In ED: vitals Temp 98.3; HR 52, /77; RR 18; SaO2 98% on room air. UA suggestive of UTI. Patient received 1L LR bolus + Ceftriaxone 1000 mg x 1. EKG in ED noted for Sinus bradycardia. COVID+ on 7/17. CXR 7/17 showed small left basilar opactiy, possibly atelectasis. Was never symptomatic for COVID, repeat COVID PCR 7/19 negative, repeat 7/23 prior to discharge also negative.     Patient admitted to floor for workup of AMS. CT Head 7/17 negative, neurology consulted, no further neurological workup recommended, no indication for MRI brain, outpatient f/u for dementia workup. UA was positive on admit, 1g Rocephin given in ED, 1g q24h IV Rocephin given for 3 days until UCx result, positive for Klebsiella. Started on Ciprofloxacin 250mg BID on 7/21, will complete 7d course. Patient has been taking Lithium 150mg PO BID for Bipolar disorder, levels have improved from 1.68 to WNL. Was on hold, restarted 7/22 on home dose. Please monitor Lithium levels outpatient, 2x/week. Patient had ASHLEY during hospital stay, held Lasix, ACE, Metformin, encouraged PO intake, Cr has been around 1.7 to 1.9, continue holding Lasix, ACE, Metformin, NO NSAIDs. Patient mildly hyperkalemic during hospital stay, replenished with Lokelma as needed, CBC/BMP outpatient. Patient bradycardic on admit, Metoprolol held during hospital stay. Patient has been on home meds for HTN, hydralazine increased to TID for better BP management.     # DM  - monitor FS  - start insulin if FS > 180    Dispo: [facility] assisted living once able to ambulate 150 ft   Patient is a 75 year old female with PMHx of HTN, DM, MDD, bipolar on lithium, anxiety, sent to [facility] by [facility] (assisted living) due to AMS x3-4 days; described as constant and stable; per NH and son at bedside, patient was more forgetful than usual recently (might have early dementia but sx have never been this severe), with impairment in short-term memory. Patient had no complaints, denied chest pain, fever/chills, SOB, abd pain, n/v/d. Spoke with Aid at [facility] ([phone]) Ms. Linton, as per aid patient was complaining of diarrhea that morning but she was not letting anyone go to the bathroom to check. Patient had received COVID vaccine (does not remember date). Aid denies any recent cough, fever, sob. In ED: vitals Temp 98.3; HR 52, /77; RR 18; SaO2 98% on room air. UA suggestive of UTI. Patient received 1L LR bolus + Ceftriaxone 1000 mg x 1. EKG in ED noted for Sinus bradycardia. COVID+ on 7/17. CXR 7/17 showed small left basilar opactiy, possibly atelectasis. Was never symptomatic for COVID, repeat COVID PCR 7/19 negative, repeat 7/23 prior to discharge also negative.     Patient admitted to floor for workup of AMS. CT Head 7/17 negative, neurology consulted, no further neurological workup recommended, no indication for MRI brain, outpatient f/u for dementia workup. UA was positive on admit, 1g Rocephin given in ED, 1g q24h IV Rocephin given for 3 days until UCx result, positive for Klebsiella. Started on Ciprofloxacin 250mg BID on 7/21, will complete 7d course. Patient has been taking Lithium 150mg PO BID for Bipolar disorder, levels have improved from 1.68 to WNL. Was on hold, restarted 7/22 on home dose. Please monitor Lithium levels outpatient, 2x/week. Patient had ASHLEY during hospital stay, held Lasix, ACE, Metformin, encouraged PO intake, Cr has been around 1.7 to 1.9, continue holding Lasix, ACE, Metformin, NO NSAIDs. Patient mildly hyperkalemic during hospital stay, replenished with Lokelma as needed, CBC/BMP outpatient. Patient bradycardic on admit, Metoprolol held during hospital stay. Patient has been on home meds for HTN, hydralazine increased to TID for better BP management. Patient will be started on Januvia 50mg PO daily for diabetes management, SSI inpatient, holding Metformin due to elevated Cr. Patient stable for discharge. Patient is a 75 year old female with PMHx of HTN, DM, MDD, bipolar on lithium, anxiety, sent to [facility] by [facility] (assisted living) due to AMS x3-4 days; described as constant and stable; per NH and son at bedside, patient was more forgetful than usual recently (might have early dementia but sx have never been this severe), with impairment in short-term memory. Patient had no complaints, denied chest pain, fever/chills, SOB, abd pain, n/v/d. Spoke with Aid at [facility] ([phone]) Ms. Linton, as per aid patient was complaining of diarrhea that morning but she was not letting anyone go to the bathroom to check. Patient had received COVID vaccine (does not remember date). Aid denies any recent cough, fever, sob. In ED: vitals Temp 98.3; HR 52, /77; RR 18; SaO2 98% on room air. UA suggestive of UTI. Patient received 1L LR bolus + Ceftriaxone 1000 mg x 1. EKG in ED noted for Sinus bradycardia. COVID+ on 7/17. CXR 7/17 showed small left basilar opactiy, possibly atelectasis. Was never symptomatic for COVID, repeat COVID PCR 7/19 negative, repeat 7/23 prior to discharge also negative.     Patient admitted to floor for workup of AMS. CT Head 7/17 negative, neurology consulted, no further neurological workup recommended, son Catarino insisted on MRI brain, ordered and performed, limited study unremarkable. UA was positive on admit, 1g Rocephin given in ED, 1g q24h IV Rocephin given for 3 days until UCx result, positive for Klebsiella. Started on Ciprofloxacin 250mg BID on 7/21, will complete 7d course. Patient has been taking Lithium 150mg PO BID for Bipolar disorder, levels have improved from 1.68 to WNL. Was on hold, restarted 7/22 on home dose. Please monitor Lithium levels outpatient, 2x/week. Patient had ASHLEY during hospital stay, held Lasix, ACE, Metformin, encouraged PO intake, Cr has been around 1.7 to 1.9, continue holding Lasix, ACE, NO NSAIDs, ok to resume Metformin outpatient for now. Patient mildly hyperkalemic during hospital stay, replenished with Lokelma as needed, CBC/BMP outpatient. Patient bradycardic on admit, Metoprolol held during hospital stay. Patient has been on home meds for HTN, hydralazine increased to TID for better BP management, decreased back to BID over weekend. Outpatient to resume metoprolol with clonidine. Patient will be started on Januvia 100mg PO daily for diabetes management, was on SSI inpatient. Patient stable for discharge. Patient is a 75 year old female with PMHx of HTN, DM, MDD, bipolar on lithium, anxiety, sent to [facility] by [facility] (assisted living) due to AMS x3-4 days; described as constant and stable; per NH and son at bedside, patient was more forgetful than usual recently (might have early dementia but sx have never been this severe), with impairment in short-term memory. Patient had no complaints, denied chest pain, fever/chills, SOB, abd pain, n/v/d. Spoke with Aid at [facility] ([phone]) Ms. Linton, as per aid patient was complaining of diarrhea that morning but she was not letting anyone go to the bathroom to check. Patient had received COVID vaccine (does not remember date). Aid denies any recent cough, fever, sob. In ED: vitals Temp 98.3; HR 52, /77; RR 18; SaO2 98% on room air. UA suggestive of UTI. Patient received 1L LR bolus + Ceftriaxone 1000 mg x 1. EKG in ED noted for Sinus bradycardia. COVID+ on 7/17. CXR 7/17 showed small left basilar opactiy, possibly atelectasis. Was never symptomatic for COVID, repeat COVID PCR 7/19 negative, repeat 7/23 prior to discharge also negative.     Patient admitted to floor for workup of AMS. CT Head 7/17 negative, neurology consulted, no further neurological workup recommended, son Catarino insisted on MRI brain, ordered and performed, limited study unremarkable. UA was positive on admit, 1g Rocephin given in ED, 1g q24h IV Rocephin given for 3 days until UCx result, positive for Klebsiella. Started on Ciprofloxacin 250mg BID on 7/21, will complete 7d course. Patient has been taking Lithium 150mg PO BID for Bipolar disorder, levels have improved from 1.68 to WNL. Was on hold, restarted 7/22 on home dose. Please monitor Lithium levels outpatient, 2x/week. Patient had ASHLEY during hospital stay, held Lasix, ACE, Metformin, encouraged PO intake, Cr has been around 1.7 to 1.9, continue holding Lasix, ACE, NO NSAIDs, ok to resume Metformin outpatient for now. Patient mildly hyperkalemic during hospital stay, replenished with Lokelma as needed, CBC/BMP outpatient. Patient bradycardic on admit, Metoprolol held during hospital stay. Patient has been on home meds for HTN, hydralazine increased to TID for better BP management, decreased back to BID over weekend. Outpatient to resume metoprolol with clonidine. Patient will be started on Januvia 100mg PO daily for diabetes management, was on SSI inpatient. Patient stable for discharge, medications reconciled as per Dr. Hooks on 7/24. Patient is a 75 year old female with PMHx of HTN, DM, MDD, bipolar on lithium, anxiety, sent to [facility] by [facility] (assisted living) due to AMS x3-4 days; described as constant and stable; per NH and son at bedside, patient was more forgetful than usual recently (might have early dementia but sx have never been this severe), with impairment in short-term memory. Patient had no complaints, denied chest pain, fever/chills, SOB, abd pain, n/v/d. Spoke with Aid at [facility] ([phone]) Ms. Linton, as per aid patient was complaining of diarrhea that morning but she was not letting anyone go to the bathroom to check. Patient had received COVID vaccine (does not remember date). Aid denies any recent cough, fever, sob. In ED: vitals Temp 98.3; HR 52, /77; RR 18; SaO2 98% on room air. UA suggestive of UTI. Patient received 1L LR bolus + Ceftriaxone 1000 mg x 1. EKG in ED noted for Sinus bradycardia. COVID+ on 7/17. CXR 7/17 showed small left basilar opactiy, possibly atelectasis. Was never symptomatic for COVID, repeat COVID PCR 7/19 negative, repeat 7/23 prior to discharge also negative.     Patient admitted to floor for workup of AMS. CT Head 7/17 negative, neurology consulted, no further neurological workup recommended, son Catarino insisted on MRI brain, ordered and performed, limited study unremarkable. UA was positive on admit, 1g Rocephin given in ED, 1g q24h IV Rocephin given for 3 days until UCx result, positive for Klebsiella. Started on Ciprofloxacin 250mg BID on 7/21, will complete 7d course. Patient has been taking Lithium 150mg PO BID for Bipolar disorder, levels have improved from 1.68 to WNL. Was on hold, restarted 7/22 on home dose. Please monitor Lithium levels outpatient, 2x/week. Patient had ASHLEY during hospital stay, held Lasix, ACE, Metformin, encouraged PO intake, Cr has been around 1.7 to 1.9, continue holding Lasix, ACE, Metformin, NO NSAIDs. Patient mildly hyperkalemic during hospital stay, replenished with Lokelma as needed, CBC/BMP outpatient. Patient bradycardic on admit, Metoprolol held during hospital stay. Patient has been on home meds for HTN, hydralazine increased to TID for better BP management, decreased back to BID over weekend. Outpatient to resume metoprolol with clonidine. Patient will be started on Januvia 50mg PO daily for diabetes management + glimepiride, was on SSI inpatient. Patient stable for discharge, medications reconciled as per Dr. Hooks on 7/25.

## 2021-07-24 LAB
GLUCOSE BLDC GLUCOMTR-MCNC: 218 MG/DL — HIGH (ref 70–99)
GLUCOSE BLDC GLUCOMTR-MCNC: 224 MG/DL — HIGH (ref 70–99)
GLUCOSE BLDC GLUCOMTR-MCNC: 227 MG/DL — HIGH (ref 70–99)
GLUCOSE BLDC GLUCOMTR-MCNC: 239 MG/DL — HIGH (ref 70–99)

## 2021-07-24 RX ORDER — LITHIUM CARBONATE 300 MG/1
1 TABLET, EXTENDED RELEASE ORAL
Qty: 0 | Refills: 0 | DISCHARGE

## 2021-07-24 RX ORDER — ALENDRONATE SODIUM 70 MG/1
1 TABLET ORAL
Qty: 0 | Refills: 0 | DISCHARGE

## 2021-07-24 RX ORDER — LISINOPRIL 2.5 MG/1
1 TABLET ORAL
Qty: 0 | Refills: 0 | DISCHARGE

## 2021-07-24 RX ORDER — HYDRALAZINE HCL 50 MG
25 TABLET ORAL
Refills: 0 | Status: DISCONTINUED | OUTPATIENT
Start: 2021-07-24 | End: 2021-07-25

## 2021-07-24 RX ORDER — FUROSEMIDE 40 MG
1 TABLET ORAL
Qty: 0 | Refills: 0 | DISCHARGE

## 2021-07-24 RX ADMIN — ATORVASTATIN CALCIUM 40 MILLIGRAM(S): 80 TABLET, FILM COATED ORAL at 11:15

## 2021-07-24 RX ADMIN — HEPARIN SODIUM 5000 UNIT(S): 5000 INJECTION INTRAVENOUS; SUBCUTANEOUS at 13:27

## 2021-07-24 RX ADMIN — Medication 650 MILLIGRAM(S): at 21:37

## 2021-07-24 RX ADMIN — Medication 2: at 16:58

## 2021-07-24 RX ADMIN — Medication 2: at 07:51

## 2021-07-24 RX ADMIN — HEPARIN SODIUM 5000 UNIT(S): 5000 INJECTION INTRAVENOUS; SUBCUTANEOUS at 05:13

## 2021-07-24 RX ADMIN — Medication 0.1 MILLIGRAM(S): at 18:04

## 2021-07-24 RX ADMIN — Medication 650 MILLIGRAM(S): at 21:07

## 2021-07-24 RX ADMIN — LITHIUM CARBONATE 150 MILLIGRAM(S): 300 TABLET, EXTENDED RELEASE ORAL at 18:04

## 2021-07-24 RX ADMIN — LITHIUM CARBONATE 150 MILLIGRAM(S): 300 TABLET, EXTENDED RELEASE ORAL at 05:13

## 2021-07-24 RX ADMIN — Medication 250 MILLIGRAM(S): at 05:13

## 2021-07-24 RX ADMIN — Medication 0.1 MILLIGRAM(S): at 05:13

## 2021-07-24 RX ADMIN — Medication 2: at 11:13

## 2021-07-24 RX ADMIN — Medication 25 MILLIGRAM(S): at 18:04

## 2021-07-24 RX ADMIN — Medication 25 MILLIGRAM(S): at 05:13

## 2021-07-24 RX ADMIN — HEPARIN SODIUM 5000 UNIT(S): 5000 INJECTION INTRAVENOUS; SUBCUTANEOUS at 21:09

## 2021-07-24 RX ADMIN — Medication 250 MILLIGRAM(S): at 18:04

## 2021-07-24 RX ADMIN — AMLODIPINE BESYLATE 10 MILLIGRAM(S): 2.5 TABLET ORAL at 05:13

## 2021-07-24 RX ADMIN — PANTOPRAZOLE SODIUM 40 MILLIGRAM(S): 20 TABLET, DELAYED RELEASE ORAL at 05:13

## 2021-07-24 NOTE — PROGRESS NOTE ADULT - SUBJECTIVE AND OBJECTIVE BOX
JOVANNYKINGSTON  75y  Female      Patient is a 75y old  Female who presents with a chief complaint of Altered Mental Status (23 Jul 2021 10:34)        REVIEW OF SYSTEMS:  CONSTITUTIONAL: No fever, weight loss, or fatigue  EYES: No eye pain, visual disturbances, or discharge  ENMT:  No difficulty hearing, tinnitus, vertigo; No sinus or throat pain  NECK: No pain or stiffness  BREASTS: No pain, masses, or nipple discharge  RESPIRATORY: No cough, wheezing, chills or hemoptysis; No shortness of breath  CARDIOVASCULAR: No chest pain, palpitations, dizziness, or leg swelling  GASTROINTESTINAL: No abdominal or epigastric pain. No nausea, vomiting, or hematemesis; No diarrhea or constipation. No melena or hematochezia.  GENITOURINARY: No dysuria, frequency, hematuria, or incontinence  MUSCULOSKELETAL: No joint pain or swelling; No muscle, back, or extremity pain  PSYCHIATRIC: No depression, anxiety, mood swings, or difficulty sleeping  HEME/LYMPH: No easy bruising, or bleeding gums  ALLERY AND IMMUNOLOGIC: No hives or eczema  FAMILY HISTORY:    T(C): 35.7 (07-24-21 @ 04:29), Max: 36.6 (07-23-21 @ 13:48)  HR: 48 (07-24-21 @ 06:10) (48 - 67)  BP: 109/58 (07-24-21 @ 06:10) (109/58 - 169/78)  RR: 18 (07-24-21 @ 06:10) (18 - 18)  SpO2: 96% (07-24-21 @ 06:10) (96% - 100%)  Wt(kg): --Vital Signs Last 24 Hrs  T(C): 35.7 (24 Jul 2021 04:29), Max: 36.6 (23 Jul 2021 13:48)  T(F): 96.2 (24 Jul 2021 04:29), Max: 97.9 (23 Jul 2021 13:48)  HR: 48 (24 Jul 2021 06:10) (48 - 67)  BP: 109/58 (24 Jul 2021 06:10) (109/58 - 169/78)  BP(mean): --  RR: 18 (24 Jul 2021 06:10) (18 - 18)  SpO2: 96% (24 Jul 2021 06:10) (96% - 100%)  amoxicillin (Unknown)  Iodides (Unknown)      PHYSICAL EXAM:  GENERAL: NAD,   HEAD:  Atraumatic, Normocephalic  EYES: EOMI, PERRLA, conjunctiva and sclera clear  ENMT: No tonsillar erythema, exudates, or enlargement;   NECK: Supple, No JVD, Normal thyroid  NERVOUS SYSTEM:  Alert & Oriented   CHEST/LUNG: Clear to percussion bilaterally; No rales, rhonchi, wheezing, or rubs  HEART: Regular rate and rhythm; No murmurs, rubs, or gallops  ABDOMEN: Soft, Nontender, Nondistended; Bowel sounds present  EXTREMITIES:  , No clubbing, cyanosis, or edema  LYMPH: No lymphadenopathy noted  SKIN: No rashes or lesions      LABS:  07-23    137  |  108  |  25<H>  ----------------------------<  216<H>  5.2<H>   |  17  |  1.9<H>    Ca    9.4      23 Jul 2021 08:31  Mg     2.1     07-23    TPro  6.5  /  Alb  4.1  /  TBili  <0.2  /  DBili  x   /  AST  18  /  ALT  22  /  AlkPhos  120<H>  07-23                          9.4    7.81  )-----------( 207      ( 23 Jul 2021 08:31 )             31.8         RADIOLOGY & ADDITIONAL TESTS:    MEDICATION:  acetaminophen   Tablet .. 650 milliGRAM(s) Oral every 6 hours PRN  amLODIPine   Tablet 10 milliGRAM(s) Oral daily  atorvastatin Oral Tab/Cap - Peds 40 milliGRAM(s) Oral daily  atropine Injectable 0.5 milliGRAM(s) IntraMuscular once PRN  ciprofloxacin     Tablet 250 milliGRAM(s) Oral two times a day  cloNIDine 0.1 milliGRAM(s) Oral two times a day  dextrose 40% Gel 15 Gram(s) Oral once  dextrose 5%. 1000 milliLiter(s) IV Continuous <Continuous>  dextrose 5%. 1000 milliLiter(s) IV Continuous <Continuous>  dextrose 50% Injectable 25 Gram(s) IV Push once  dextrose 50% Injectable 12.5 Gram(s) IV Push once  dextrose 50% Injectable 25 Gram(s) IV Push once  glucagon  Injectable 1 milliGRAM(s) IntraMuscular once  heparin   Injectable 5000 Unit(s) SubCutaneous every 8 hours  hydrALAZINE 25 milliGRAM(s) Oral three times a day  insulin lispro (ADMELOG) corrective regimen sliding scale   SubCutaneous three times a day before meals  lithium 150 milliGRAM(s) Oral two times a day  LORazepam   Injectable 1 milliGRAM(s) IV Push once  pantoprazole    Tablet 40 milliGRAM(s) Oral before breakfast      HEALTH ISSUES - PROBLEM Dx:    metabolic encephalopathy due to UTI,s/p lithium toxicity  HTN on amlodipine,hydralazine ,will cut down to bid  UTI on cipro  r/ cva ging for MRI son insists go for that  bipolar disrder on lithium,case d/w son at bedside at length total 20 minutes  s/p hyperkalemia,ckd#3 stable

## 2021-07-25 LAB
ALBUMIN SERPL ELPH-MCNC: 4.3 G/DL — SIGNIFICANT CHANGE UP (ref 3.5–5.2)
ALP SERPL-CCNC: 130 U/L — HIGH (ref 30–115)
ALT FLD-CCNC: 20 U/L — SIGNIFICANT CHANGE UP (ref 0–41)
ANION GAP SERPL CALC-SCNC: 11 MMOL/L — SIGNIFICANT CHANGE UP (ref 7–14)
AST SERPL-CCNC: 15 U/L — SIGNIFICANT CHANGE UP (ref 0–41)
BASOPHILS # BLD AUTO: 0.05 K/UL — SIGNIFICANT CHANGE UP (ref 0–0.2)
BASOPHILS NFR BLD AUTO: 0.6 % — SIGNIFICANT CHANGE UP (ref 0–1)
BILIRUB SERPL-MCNC: <0.2 MG/DL — SIGNIFICANT CHANGE UP (ref 0.2–1.2)
BUN SERPL-MCNC: 22 MG/DL — HIGH (ref 10–20)
CALCIUM SERPL-MCNC: 9.6 MG/DL — SIGNIFICANT CHANGE UP (ref 8.5–10.1)
CHLORIDE SERPL-SCNC: 106 MMOL/L — SIGNIFICANT CHANGE UP (ref 98–110)
CO2 SERPL-SCNC: 19 MMOL/L — SIGNIFICANT CHANGE UP (ref 17–32)
CREAT SERPL-MCNC: 1.8 MG/DL — HIGH (ref 0.7–1.5)
EOSINOPHIL # BLD AUTO: 0.43 K/UL — SIGNIFICANT CHANGE UP (ref 0–0.7)
EOSINOPHIL NFR BLD AUTO: 5.3 % — SIGNIFICANT CHANGE UP (ref 0–8)
GLUCOSE BLDC GLUCOMTR-MCNC: 204 MG/DL — HIGH (ref 70–99)
GLUCOSE BLDC GLUCOMTR-MCNC: 232 MG/DL — HIGH (ref 70–99)
GLUCOSE BLDC GLUCOMTR-MCNC: 240 MG/DL — HIGH (ref 70–99)
GLUCOSE BLDC GLUCOMTR-MCNC: 292 MG/DL — HIGH (ref 70–99)
GLUCOSE SERPL-MCNC: 242 MG/DL — HIGH (ref 70–99)
HCT VFR BLD CALC: 33.2 % — LOW (ref 37–47)
HGB BLD-MCNC: 9.9 G/DL — LOW (ref 12–16)
IMM GRANULOCYTES NFR BLD AUTO: 0.5 % — HIGH (ref 0.1–0.3)
LYMPHOCYTES # BLD AUTO: 1.85 K/UL — SIGNIFICANT CHANGE UP (ref 1.2–3.4)
LYMPHOCYTES # BLD AUTO: 22.8 % — SIGNIFICANT CHANGE UP (ref 20.5–51.1)
MAGNESIUM SERPL-MCNC: 2.2 MG/DL — SIGNIFICANT CHANGE UP (ref 1.8–2.4)
MCHC RBC-ENTMCNC: 26.1 PG — LOW (ref 27–31)
MCHC RBC-ENTMCNC: 29.8 G/DL — LOW (ref 32–37)
MCV RBC AUTO: 87.4 FL — SIGNIFICANT CHANGE UP (ref 81–99)
MONOCYTES # BLD AUTO: 0.47 K/UL — SIGNIFICANT CHANGE UP (ref 0.1–0.6)
MONOCYTES NFR BLD AUTO: 5.8 % — SIGNIFICANT CHANGE UP (ref 1.7–9.3)
NEUTROPHILS # BLD AUTO: 5.27 K/UL — SIGNIFICANT CHANGE UP (ref 1.4–6.5)
NEUTROPHILS NFR BLD AUTO: 65 % — SIGNIFICANT CHANGE UP (ref 42.2–75.2)
NRBC # BLD: 0 /100 WBCS — SIGNIFICANT CHANGE UP (ref 0–0)
PLATELET # BLD AUTO: 206 K/UL — SIGNIFICANT CHANGE UP (ref 130–400)
POTASSIUM SERPL-MCNC: 5 MMOL/L — SIGNIFICANT CHANGE UP (ref 3.5–5)
POTASSIUM SERPL-SCNC: 5 MMOL/L — SIGNIFICANT CHANGE UP (ref 3.5–5)
PROT SERPL-MCNC: 6.9 G/DL — SIGNIFICANT CHANGE UP (ref 6–8)
RBC # BLD: 3.8 M/UL — LOW (ref 4.2–5.4)
RBC # FLD: 15.2 % — HIGH (ref 11.5–14.5)
SARS-COV-2 RNA SPEC QL NAA+PROBE: SIGNIFICANT CHANGE UP
SODIUM SERPL-SCNC: 136 MMOL/L — SIGNIFICANT CHANGE UP (ref 135–146)
WBC # BLD: 8.11 K/UL — SIGNIFICANT CHANGE UP (ref 4.8–10.8)
WBC # FLD AUTO: 8.11 K/UL — SIGNIFICANT CHANGE UP (ref 4.8–10.8)

## 2021-07-25 RX ORDER — SITAGLIPTIN 50 MG/1
1 TABLET, FILM COATED ORAL
Qty: 0 | Refills: 0 | DISCHARGE

## 2021-07-25 RX ORDER — AMLODIPINE BESYLATE 2.5 MG/1
1 TABLET ORAL
Qty: 0 | Refills: 0 | DISCHARGE
Start: 2021-07-25

## 2021-07-25 RX ORDER — AMLODIPINE BESYLATE 2.5 MG/1
1 TABLET ORAL
Qty: 0 | Refills: 0 | DISCHARGE

## 2021-07-25 RX ORDER — METFORMIN HYDROCHLORIDE 850 MG/1
1 TABLET ORAL
Qty: 0 | Refills: 0 | DISCHARGE

## 2021-07-25 RX ORDER — SITAGLIPTIN 50 MG/1
1 TABLET, FILM COATED ORAL
Qty: 0 | Refills: 0 | DISCHARGE
Start: 2021-07-25

## 2021-07-25 RX ORDER — SITAGLIPTIN 50 MG/1
1 TABLET, FILM COATED ORAL
Qty: 30 | Refills: 0
Start: 2021-07-25 | End: 2021-08-23

## 2021-07-25 RX ORDER — LITHIUM CARBONATE 300 MG/1
1 TABLET, EXTENDED RELEASE ORAL
Qty: 0 | Refills: 0 | DISCHARGE

## 2021-07-25 RX ORDER — LITHIUM CARBONATE 300 MG/1
1 TABLET, EXTENDED RELEASE ORAL
Qty: 60 | Refills: 0
Start: 2021-07-25

## 2021-07-25 RX ORDER — CIPROFLOXACIN LACTATE 400MG/40ML
1 VIAL (ML) INTRAVENOUS
Qty: 6 | Refills: 0
Start: 2021-07-25 | End: 2021-07-27

## 2021-07-25 RX ADMIN — Medication 0.1 MILLIGRAM(S): at 18:43

## 2021-07-25 RX ADMIN — Medication 2: at 11:49

## 2021-07-25 RX ADMIN — LITHIUM CARBONATE 150 MILLIGRAM(S): 300 TABLET, EXTENDED RELEASE ORAL at 05:01

## 2021-07-25 RX ADMIN — Medication 3: at 09:15

## 2021-07-25 RX ADMIN — HEPARIN SODIUM 5000 UNIT(S): 5000 INJECTION INTRAVENOUS; SUBCUTANEOUS at 22:01

## 2021-07-25 RX ADMIN — ATORVASTATIN CALCIUM 40 MILLIGRAM(S): 80 TABLET, FILM COATED ORAL at 11:35

## 2021-07-25 RX ADMIN — HEPARIN SODIUM 5000 UNIT(S): 5000 INJECTION INTRAVENOUS; SUBCUTANEOUS at 05:01

## 2021-07-25 RX ADMIN — Medication 250 MILLIGRAM(S): at 18:43

## 2021-07-25 RX ADMIN — Medication 2: at 16:41

## 2021-07-25 RX ADMIN — Medication 25 MILLIGRAM(S): at 05:00

## 2021-07-25 RX ADMIN — LITHIUM CARBONATE 150 MILLIGRAM(S): 300 TABLET, EXTENDED RELEASE ORAL at 18:43

## 2021-07-25 RX ADMIN — AMLODIPINE BESYLATE 10 MILLIGRAM(S): 2.5 TABLET ORAL at 05:00

## 2021-07-25 RX ADMIN — Medication 0.1 MILLIGRAM(S): at 05:00

## 2021-07-25 RX ADMIN — PANTOPRAZOLE SODIUM 40 MILLIGRAM(S): 20 TABLET, DELAYED RELEASE ORAL at 05:00

## 2021-07-25 RX ADMIN — Medication 250 MILLIGRAM(S): at 05:00

## 2021-07-25 RX ADMIN — HEPARIN SODIUM 5000 UNIT(S): 5000 INJECTION INTRAVENOUS; SUBCUTANEOUS at 15:29

## 2021-07-25 NOTE — PROGRESS NOTE ADULT - SUBJECTIVE AND OBJECTIVE BOX
KINGSTON PETTY 75y Female  MRN#: [id number]   CODE STATUS: Full    Hospital Day: 8d    Pt is currently admitted with the primary diagnosis of Altered Mental Status.    SUBJECTIVE  Hospital Course    Overnight events: None reported.    Subjective complaints: Denies chest pain, headache, SOB.    Present Today:   - Finney:  No [  ], Yes [   ] : Indication:     - Type of IV Access:       .. CVC/Piccline:  No [  ], Yes [   ] : Indication:       .. Midline: No [  ], Yes [   ] : Indication:                                             ----------------------------------------------------------  OBJECTIVE  PAST MEDICAL & SURGICAL HISTORY  HTN (hypertension)    DM (diabetes mellitus)    MDD (major depressive disorder)    Bipolar disorder                                              -----------------------------------------------------------  ALLERGIES:  amoxicillin (Unknown)  Iodides (Unknown)                                            ------------------------------------------------------------    HOME MEDICATIONS  Home Medications:  amLODIPine 5 mg oral tablet: 1 tab(s) orally once a day (17 Jul 2021 23:51)  atorvastatin 40 mg oral tablet: 1 tab(s) orally once a day (17 Jul 2021 23:52)  cloNIDine 0.1 mg oral tablet: 1 tab(s) orally once a day (17 Jul 2021 23:53)  ferrous sulfate 325 mg (65 mg elemental iron) oral tablet: 1 tab(s) orally 3 times a week (17 Jul 2021 23:54)  glimepiride 4 mg oral tablet: 1 tab(s) orally once a day (17 Jul 2021 23:54)  Januvia 100 mg oral tablet: 1 tab(s) orally once a day (17 Jul 2021 23:55)  lithium 150 mg oral capsule: 1 cap(s) orally once a day (24 Jul 2021 19:53)  metFORMIN 500 mg oral tablet: 1 tab(s) orally 2 times a day (17 Jul 2021 23:55)  metoprolol succinate 50 mg oral tablet, extended release: 1 tab(s) orally once a day (17 Jul 2021 23:56)  omeprazole 20 mg oral delayed release capsule: 1 cap(s) orally once a day (17 Jul 2021 23:56)  Vitamin D2 50,000 intl units (1.25 mg) oral capsule: 1 cap(s) orally once a month (17 Jul 2021 23:57)                           MEDICATIONS:  STANDING MEDICATIONS  amLODIPine   Tablet 10 milliGRAM(s) Oral daily  atorvastatin Oral Tab/Cap - Peds 40 milliGRAM(s) Oral daily  ciprofloxacin     Tablet 250 milliGRAM(s) Oral two times a day  cloNIDine 0.1 milliGRAM(s) Oral two times a day  dextrose 40% Gel 15 Gram(s) Oral once  dextrose 5%. 1000 milliLiter(s) IV Continuous <Continuous>  dextrose 5%. 1000 milliLiter(s) IV Continuous <Continuous>  dextrose 50% Injectable 25 Gram(s) IV Push once  dextrose 50% Injectable 12.5 Gram(s) IV Push once  dextrose 50% Injectable 25 Gram(s) IV Push once  glucagon  Injectable 1 milliGRAM(s) IntraMuscular once  heparin   Injectable 5000 Unit(s) SubCutaneous every 8 hours  hydrALAZINE 25 milliGRAM(s) Oral two times a day  insulin lispro (ADMELOG) corrective regimen sliding scale   SubCutaneous three times a day before meals  lithium 150 milliGRAM(s) Oral two times a day  LORazepam   Injectable 1 milliGRAM(s) IV Push once  pantoprazole    Tablet 40 milliGRAM(s) Oral before breakfast    PRN MEDICATIONS  acetaminophen   Tablet .. 650 milliGRAM(s) Oral every 6 hours PRN  atropine Injectable 0.5 milliGRAM(s) IntraMuscular once PRN                                            ------------------------------------------------------------  VITAL SIGNS: Last 24 Hours  T(C): 36.7 (24 Jul 2021 21:20), Max: 36.7 (24 Jul 2021 21:20)  T(F): 98 (24 Jul 2021 21:20), Max: 98 (24 Jul 2021 21:20)  HR: 51 (24 Jul 2021 21:20) (48 - 56)  BP: 149/67 (24 Jul 2021 21:20) (109/58 - 169/78)  BP(mean): 96 (24 Jul 2021 21:20) (96 - 96)  RR: 19 (24 Jul 2021 21:20) (18 - 19)  SpO2: 99% (24 Jul 2021 21:20) (96% - 100%)      07-23-21 @ 07:01  -  07-24-21 @ 07:00  --------------------------------------------------------  IN: 400 mL / OUT: 0 mL / NET: 400 mL                                             --------------------------------------------------------------  LABS:                        9.4    7.81  )-----------( 207      ( 23 Jul 2021 08:31 )             31.8     07-23    137  |  108  |  25<H>  ----------------------------<  216<H>  5.2<H>   |  17  |  1.9<H>    Ca    9.4      23 Jul 2021 08:31  Mg     2.1     07-23    TPro  6.5  /  Alb  4.1  /  TBili  <0.2  /  DBili  x   /  AST  18  /  ALT  22  /  AlkPhos  120<H>  07-23                                                              -------------------------------------------------------------  RADIOLOGY:  < from: MR Head No Cont (07.23.21 @ 20:17) >  EXAM:  MR BRAIN            PROCEDURE DATE:  07/23/2021            INTERPRETATION:  Clinical History / Reason for exam: Altered mental status.    MRI of the brain was attempted using axial diffusion-weighted sequence. Patient could not continue withthe rest this study.    Evaluation of the diffusion weighted sequence demonstrates no abnormal areas of restricted diffusion to suggest acute infarct.    Impression:    Limited incomplete MRI of the brain. Complete MRI of the brain is recommended when patient can tolerate it or sedation be given.    --- End of Report ---    < end of copied text >                                            --------------------------------------------------------------    PHYSICAL EXAM:  General: alert, awake, NAD  HEENT: atraumatic  LUNGS: clear to auscultation bilaterally  HEART: regular rate/rhythm, no murmurs/gallops  ABDOMEN: soft, nontender, normoactive bowel sounds  EXT: 2+ radial pulses  NEURO: aaox3, no focal deficits noted  SKIN: intact, no new lesions noted                                           --------------------------------------------------------------    ASSESSMENT & PLAN    Past medical history and hospital course   75y F PMHx of HTN, DM, MDD, bipolar on lithium, anxiety send in by [facility] due to AMS x3-4 days. Pt was found to have COVID-19 positive on admission.     # AMS likely metabolic encephalopathy, multi-factorial   - could be secondary to UTI vs. Li toxicity vs. ASHLEY on possible CKD  - CT head negative  - Neuro consult appreciated: No further neurological workup recommended, per Dr. Rasmussen son Catarino would like MRI Brain to be done prior to discharge, spoke with Doc HATCH over home and confirmed no implants or metallic foreign bodies for patient, ordered 7/23, limited study unremarkable    # UTI  - UA+ for UTI  - UCx: Klebsiella  - s/p Rocephin 1 gm x 1 in ED   - d/c ceftriaxone 1g q24h iv until UCx results x 3 days  - Cipro 250mg PO q12h for total of 7 days per Dr. Aguilar, started 7/21    # COVID-19 positive on 7/17/21  - s/p vaccination 2 doses of Pfizer COVID-19 vaccine on January 11, 2021 and February 1, 2021  - currently asymptomatic, on RA  - Xray Chest (07.17.21 @ 17:56): Small left basilar opacity, possibly atelectasis.  - repeat COVID PCR 7/19/21 negative, reordered PCR    # Lithium toxicity  - PMHx of bipolar disorder, on Lithium  - patient's lithium level has improved (1.6>>0.96)  - psychiatry evaluation appreciated: consider Seroquel 50 mg PRN for acute agitation. Seroquel can cause paradoxical agitation in patients with dementia and should be used as a last resort  - Li PO was on hold. Restarted Lithium 7/22/21 as per psych on home dose    # ASHLEY like prerenal, possible CKD3  - baseline creatinine ~ 1.4 - 1.7.   - Cr 1.9 on 7/18, 1.6 on 7/2, 1.7 on 7/22, 1.9 on 7/23  - On Lasix 40 mg at [facility], continuing to hold lasix, ACE, Metformin, NO NSAIDs  - encouraging PO intake  - U protein/Cr 2.9  - renal bladder sono (7/19): Simple cyst within the left kidney  - will have patient f/u with Nephrologist outpatient    # hyperkalemia  - hyperkalemia can be due ASHLEY + ACEIs + Metformin. hold lisinopril and metformin.  - s/p single dose of lokelma 5 gm 7/17, another single dose given 7/23 (K+ 5.2 this morning), will continue to monitor    # Bradycardia, asymptomatic  - ? due to metoprolol  - EKG noted. No significant tall T waves on EKG.  - holding metoprolol for now as HR is in 50s    # HTN  - increased amlodipine to 10 mg on 7/21   - c/w clonidine 0.1mg BID  - per Dr. Aguilar increased hydralazine to 25 mg TID on 7/23, decreased back to 7/22 per Dr. Hooks, holding ACE due to kidneys    # DM  - monitor FS  - start insulin if FS > 180  - SSI for now, for discharge will start patient on PO Januvia 50mg daily    DVT: not indicated for now.  GI: Pantoprazole  Diet: DASH  Activity: Ambulate as tolerated, c/w PT  Dispo: Possible return to [facility] once able? Gave report to Nurse and Case management 7/23, at [phone]   KINGSTON PETTY 75y Female  MRN#: [id number]   CODE STATUS: Full    Hospital Day: 8d    Pt is currently admitted with the primary diagnosis of Altered Mental Status.    SUBJECTIVE  Hospital Course    Overnight events: None reported.    Subjective complaints: Denies chest pain, headache, SOB.    Present Today:   - Finney:  No [ x ], Yes [   ] : Indication:     - Type of IV Access:       .. CVC/Piccline:  No [ x ], Yes [   ] : Indication:       .. Midline: No [x], Yes [   ] : Indication:                                             ----------------------------------------------------------  OBJECTIVE  PAST MEDICAL & SURGICAL HISTORY  HTN (hypertension)    DM (diabetes mellitus)    MDD (major depressive disorder)    Bipolar disorder                                              -----------------------------------------------------------  ALLERGIES:  amoxicillin (Unknown)  Iodides (Unknown)                                            ------------------------------------------------------------    HOME MEDICATIONS  Home Medications:  amLODIPine 5 mg oral tablet: 1 tab(s) orally once a day (17 Jul 2021 23:51)  atorvastatin 40 mg oral tablet: 1 tab(s) orally once a day (17 Jul 2021 23:52)  cloNIDine 0.1 mg oral tablet: 1 tab(s) orally once a day (17 Jul 2021 23:53)  ferrous sulfate 325 mg (65 mg elemental iron) oral tablet: 1 tab(s) orally 3 times a week (17 Jul 2021 23:54)  glimepiride 4 mg oral tablet: 1 tab(s) orally once a day (17 Jul 2021 23:54)  Januvia 100 mg oral tablet: 1 tab(s) orally once a day (17 Jul 2021 23:55)  lithium 150 mg oral capsule: 1 cap(s) orally once a day (24 Jul 2021 19:53)  metFORMIN 500 mg oral tablet: 1 tab(s) orally 2 times a day (17 Jul 2021 23:55)  metoprolol succinate 50 mg oral tablet, extended release: 1 tab(s) orally once a day (17 Jul 2021 23:56)  omeprazole 20 mg oral delayed release capsule: 1 cap(s) orally once a day (17 Jul 2021 23:56)  Vitamin D2 50,000 intl units (1.25 mg) oral capsule: 1 cap(s) orally once a month (17 Jul 2021 23:57)                           MEDICATIONS:  STANDING MEDICATIONS  amLODIPine   Tablet 10 milliGRAM(s) Oral daily  atorvastatin Oral Tab/Cap - Peds 40 milliGRAM(s) Oral daily  ciprofloxacin     Tablet 250 milliGRAM(s) Oral two times a day  cloNIDine 0.1 milliGRAM(s) Oral two times a day  dextrose 40% Gel 15 Gram(s) Oral once  dextrose 5%. 1000 milliLiter(s) IV Continuous <Continuous>  dextrose 5%. 1000 milliLiter(s) IV Continuous <Continuous>  dextrose 50% Injectable 25 Gram(s) IV Push once  dextrose 50% Injectable 12.5 Gram(s) IV Push once  dextrose 50% Injectable 25 Gram(s) IV Push once  glucagon  Injectable 1 milliGRAM(s) IntraMuscular once  heparin   Injectable 5000 Unit(s) SubCutaneous every 8 hours  hydrALAZINE 25 milliGRAM(s) Oral two times a day  insulin lispro (ADMELOG) corrective regimen sliding scale   SubCutaneous three times a day before meals  lithium 150 milliGRAM(s) Oral two times a day  LORazepam   Injectable 1 milliGRAM(s) IV Push once  pantoprazole    Tablet 40 milliGRAM(s) Oral before breakfast    PRN MEDICATIONS  acetaminophen   Tablet .. 650 milliGRAM(s) Oral every 6 hours PRN  atropine Injectable 0.5 milliGRAM(s) IntraMuscular once PRN                                            ------------------------------------------------------------  VITAL SIGNS: Last 24 Hours  T(C): 36.7 (24 Jul 2021 21:20), Max: 36.7 (24 Jul 2021 21:20)  T(F): 98 (24 Jul 2021 21:20), Max: 98 (24 Jul 2021 21:20)  HR: 51 (24 Jul 2021 21:20) (48 - 56)  BP: 149/67 (24 Jul 2021 21:20) (109/58 - 169/78)  BP(mean): 96 (24 Jul 2021 21:20) (96 - 96)  RR: 19 (24 Jul 2021 21:20) (18 - 19)  SpO2: 99% (24 Jul 2021 21:20) (96% - 100%)      07-23-21 @ 07:01  -  07-24-21 @ 07:00  --------------------------------------------------------  IN: 400 mL / OUT: 0 mL / NET: 400 mL                                             --------------------------------------------------------------  LABS:                        9.4    7.81  )-----------( 207      ( 23 Jul 2021 08:31 )             31.8     07-23    137  |  108  |  25<H>  ----------------------------<  216<H>  5.2<H>   |  17  |  1.9<H>    Ca    9.4      23 Jul 2021 08:31  Mg     2.1     07-23    TPro  6.5  /  Alb  4.1  /  TBili  <0.2  /  DBili  x   /  AST  18  /  ALT  22  /  AlkPhos  120<H>  07-23                                                              -------------------------------------------------------------  RADIOLOGY:  < from: MR Head No Cont (07.23.21 @ 20:17) >  EXAM:  MR BRAIN            PROCEDURE DATE:  07/23/2021            INTERPRETATION:  Clinical History / Reason for exam: Altered mental status.    MRI of the brain was attempted using axial diffusion-weighted sequence. Patient could not continue withthe rest this study.    Evaluation of the diffusion weighted sequence demonstrates no abnormal areas of restricted diffusion to suggest acute infarct.    Impression:    Limited incomplete MRI of the brain. Complete MRI of the brain is recommended when patient can tolerate it or sedation be given.    --- End of Report ---    < end of copied text >                                            --------------------------------------------------------------    PHYSICAL EXAM:  General: alert, awake, NAD  HEENT: atraumatic  LUNGS: clear to auscultation bilaterally  HEART: regular rate/rhythm, no murmurs/gallops  ABDOMEN: soft, nontender, normoactive bowel sounds  EXT: 2+ radial pulses  NEURO: aaox3, no focal deficits noted  SKIN: intact, no new lesions noted                                           --------------------------------------------------------------    ASSESSMENT & PLAN    Past medical history and hospital course   75y F PMHx of HTN, DM, MDD, bipolar on lithium, anxiety send in by Doc HATCH due to AMS x3-4 days. Pt was found to have COVID-19 positive on admission.     # AMS likely metabolic encephalopathy, multi-factorial   - could be secondary to UTI vs. Li toxicity vs. ASHLEY on possible CKD  - CT head negative  - Neuro consult appreciated: No further neurological workup recommended, per Dr. Rasmussen son Catarino would like MRI Brain to be done prior to discharge, spoke with Doc HATCH over home and confirmed no implants or metallic foreign bodies for patient, ordered 7/23, limited study unremarkable    # UTI  - UA+ for UTI  - UCx: Klebsiella  - s/p Rocephin 1 gm x 1 in ED   - d/c ceftriaxone 1g q24h iv until UCx results x 3 days  - Cipro 250mg PO q12h for total of 7 days per Dr. Aguilar, started 7/21    # COVID-19 positive on 7/17/21  - s/p vaccination 2 doses of Pfizer COVID-19 vaccine on January 11, 2021 and February 1, 2021  - currently asymptomatic, on RA  - Xray Chest (07.17.21 @ 17:56): Small left basilar opacity, possibly atelectasis.  - repeat COVID PCR 7/19/21 negative, reordered PCR    # Lithium toxicity  - PMHx of bipolar disorder, on Lithium  - patient's lithium level has improved (1.6>>0.96)  - psychiatry evaluation appreciated: consider Seroquel 50 mg PRN for acute agitation. Seroquel can cause paradoxical agitation in patients with dementia and should be used as a last resort  - Li PO was on hold. Restarted Lithium 7/22/21 as per psych on home dose    # ASHLEY like prerenal, possible CKD3  - baseline creatinine ~ 1.4 - 1.7.   - Cr 1.9 on 7/18, 1.6 on 7/2, 1.7 on 7/22, 1.9 on 7/23  - On Lasix 40 mg at [facility], continuing to hold lasix, ACE, Metformin, NO NSAIDs  - encouraging PO intake  - U protein/Cr 2.9  - renal bladder sono (7/19): Simple cyst within the left kidney  - will have patient f/u with Nephrologist outpatient    # hyperkalemia  - hyperkalemia can be due ASHLEY + ACEIs + Metformin. hold lisinopril and metformin.  - s/p single dose of lokelma 5 gm 7/17, another single dose given 7/23 (K+ 5.2 this morning), will continue to monitor    # Bradycardia, asymptomatic  - ? due to metoprolol  - EKG noted. No significant tall T waves on EKG.  - holding metoprolol for now as HR is in 50s    # HTN  - increased amlodipine to 10 mg on 7/21   - c/w clonidine 0.1mg BID  - per Dr. Aguilar increased hydralazine to 25 mg TID on 7/23, decreased back to 7/22 per Dr. Hooks, holding ACE due to kidneys    # DM  - monitor FS  - start insulin if FS > 180  - SSI for now, for discharge will start patient on PO Januvia 50mg daily    DVT: not indicated for now.  GI: Pantoprazole  Diet: DASH  Activity: Ambulate as tolerated, c/w PT  Dispo: Possible d/c today?

## 2021-07-25 NOTE — PROGRESS NOTE ADULT - SUBJECTIVE AND OBJECTIVE BOX
JOVANNYKINGSTON  75y  Female      Patient is a 75y old  Female who presents with a chief complaint of Altered Mental Status (25 Jul 2021 02:44)        REVIEW OF SYSTEMS:  CONSTITUTIONAL: No fever, weight loss, or fatigue  EYES: No eye pain, visual disturbances, or discharge  ENMT:  No difficulty hearing, tinnitus, vertigo; No sinus or throat pain  NECK: No pain or stiffness  BREASTS: No pain, masses, or nipple discharge  RESPIRATORY: No cough, wheezing, chills or hemoptysis; No shortness of breath  CARDIOVASCULAR: No chest pain, palpitations, dizziness, or leg swelling  GASTROINTESTINAL: No abdominal or epigastric pain. No nausea, vomiting, or hematemesis; No diarrhea or constipation. No melena or hematochezia.  GENITOURINARY: No dysuria, frequency, hematuria, or incontinence SKIN: No itching, burning, rashes, or lesions   LYMPH NODES: No enlarged glands  MUSCULOSKELETAL: No joint pain or swelling; No muscle, back, or extremity pain  PSYCHIATRIC: No depression, anxiety, mood swings, or difficulty sleeping  HEME/LYMPH: No easy bruising, or bleeding gums  ALLERY AND IMMUNOLOGIC: No hives or eczema  FAMILY HISTORY:    T(C): 36.4 (07-25-21 @ 04:54), Max: 36.7 (07-24-21 @ 21:20)  HR: 56 (07-25-21 @ 05:51) (48 - 58)  BP: 111/72 (07-25-21 @ 05:51) (111/72 - 186/83)  RR: 18 (07-25-21 @ 05:51) (18 - 19)  SpO2: 98% (07-25-21 @ 05:51) (97% - 99%)  Wt(kg): --Vital Signs Last 24 Hrs  T(C): 36.4 (25 Jul 2021 04:54), Max: 36.7 (24 Jul 2021 21:20)  T(F): 97.6 (25 Jul 2021 04:54), Max: 98 (24 Jul 2021 21:20)  HR: 56 (25 Jul 2021 05:51) (48 - 58)  BP: 111/72 (25 Jul 2021 05:51) (111/72 - 186/83)  BP(mean): 96 (24 Jul 2021 21:20) (96 - 96)  RR: 18 (25 Jul 2021 05:51) (18 - 19)  SpO2: 98% (25 Jul 2021 05:51) (97% - 99%)  amoxicillin (Unknown)  Iodides (Unknown)      PHYSICAL EXAM:  GENERAL: NAD,   HEAD:  Atraumatic, Normocephalic  EYES: EOMI, PERRLA, conjunctiva and sclera clear  ENMT: No tonsillar erythema, exudates, or enlargement;  NECK: Supple, No JVD, Normal thyroid  NERVOUS SYSTEM:  Alert & Oriented   CHEST/LUNG: Clear to percussion bilaterally; No rales, rhonchi, wheezing, or rubs  HEART: Regular rate and rhythm; No murmurs, rubs, or gallops  ABDOMEN: Soft, Nontender, Nondistended; Bowel sounds present  EXTREMITIES:  , No clubbing, cyanosis, or edema  LYMPH: No lymphadenopathy noted  SKIN: No rashes or lesions      LABS:  07-25    136  |  106  |  22<H>  ----------------------------<  242<H>  5.0   |  19  |  1.8<H>    Ca    9.6      25 Jul 2021 08:03  Mg     2.2     07-25    TPro  6.9  /  Alb  4.3  /  TBili  <0.2  /  DBili  x   /  AST  15  /  ALT  20  /  AlkPhos  130<H>  07-25                          9.9    8.11  )-----------( 206      ( 25 Jul 2021 08:03 )             33.2   < from: MR Head No Cont (07.23.21 @ 20:17) >  Limited incomplete MRI of the brain. Complete MRI of the brain is recommended when patient can tolerate it or sedation be given.    --- End of Report ---      < end of copied text >        RADIOLOGY & ADDITIONAL TESTS:    MEDICATION:  acetaminophen   Tablet .. 650 milliGRAM(s) Oral every 6 hours PRN  amLODIPine   Tablet 10 milliGRAM(s) Oral daily  atorvastatin Oral Tab/Cap - Peds 40 milliGRAM(s) Oral daily  atropine Injectable 0.5 milliGRAM(s) IntraMuscular once PRN  ciprofloxacin     Tablet 250 milliGRAM(s) Oral two times a day  cloNIDine 0.1 milliGRAM(s) Oral two times a day  dextrose 40% Gel 15 Gram(s) Oral once  dextrose 5%. 1000 milliLiter(s) IV Continuous <Continuous>  dextrose 5%. 1000 milliLiter(s) IV Continuous <Continuous>  dextrose 50% Injectable 25 Gram(s) IV Push once  dextrose 50% Injectable 12.5 Gram(s) IV Push once  dextrose 50% Injectable 25 Gram(s) IV Push once  glucagon  Injectable 1 milliGRAM(s) IntraMuscular once  heparin   Injectable 5000 Unit(s) SubCutaneous every 8 hours  hydrALAZINE 25 milliGRAM(s) Oral two times a day  insulin lispro (ADMELOG) corrective regimen sliding scale   SubCutaneous three times a day before meals  lithium 150 milliGRAM(s) Oral two times a day  LORazepam   Injectable 1 milliGRAM(s) IV Push once  pantoprazole    Tablet 40 milliGRAM(s) Oral before breakfast      HEALTH ISSUES - PROBLEM Dx:  metabolic encephalopathy due to lithium toxcicity,ashley,uti  s/p ASHLEY on ckd#3 stable  Diabetes mellitus januvia,glimeperide  uti finish today cipro  HTN stable will d/c hydralazine  bipolar disorder lithium cehck lithium level at [facility],pt is stable to discharge to adult home

## 2021-07-26 ENCOUNTER — TRANSCRIPTION ENCOUNTER (OUTPATIENT)
Age: 76
End: 2021-07-26

## 2021-07-26 VITALS
DIASTOLIC BLOOD PRESSURE: 62 MMHG | TEMPERATURE: 98 F | RESPIRATION RATE: 18 BRPM | HEART RATE: 41 BPM | SYSTOLIC BLOOD PRESSURE: 145 MMHG

## 2021-07-26 LAB
ALBUMIN SERPL ELPH-MCNC: 4.2 G/DL — SIGNIFICANT CHANGE UP (ref 3.5–5.2)
ALP SERPL-CCNC: 128 U/L — HIGH (ref 30–115)
ALT FLD-CCNC: 27 U/L — SIGNIFICANT CHANGE UP (ref 0–41)
ANION GAP SERPL CALC-SCNC: 10 MMOL/L — SIGNIFICANT CHANGE UP (ref 7–14)
AST SERPL-CCNC: 20 U/L — SIGNIFICANT CHANGE UP (ref 0–41)
BASOPHILS # BLD AUTO: 0.04 K/UL — SIGNIFICANT CHANGE UP (ref 0–0.2)
BASOPHILS NFR BLD AUTO: 0.5 % — SIGNIFICANT CHANGE UP (ref 0–1)
BILIRUB SERPL-MCNC: 0.2 MG/DL — SIGNIFICANT CHANGE UP (ref 0.2–1.2)
BUN SERPL-MCNC: 20 MG/DL — SIGNIFICANT CHANGE UP (ref 10–20)
CALCIUM SERPL-MCNC: 9.3 MG/DL — SIGNIFICANT CHANGE UP (ref 8.5–10.1)
CHLORIDE SERPL-SCNC: 107 MMOL/L — SIGNIFICANT CHANGE UP (ref 98–110)
CO2 SERPL-SCNC: 18 MMOL/L — SIGNIFICANT CHANGE UP (ref 17–32)
CREAT SERPL-MCNC: 1.7 MG/DL — HIGH (ref 0.7–1.5)
EOSINOPHIL # BLD AUTO: 0.36 K/UL — SIGNIFICANT CHANGE UP (ref 0–0.7)
EOSINOPHIL NFR BLD AUTO: 4.7 % — SIGNIFICANT CHANGE UP (ref 0–8)
GLUCOSE BLDC GLUCOMTR-MCNC: 187 MG/DL — HIGH (ref 70–99)
GLUCOSE BLDC GLUCOMTR-MCNC: 226 MG/DL — HIGH (ref 70–99)
GLUCOSE BLDC GLUCOMTR-MCNC: 228 MG/DL — HIGH (ref 70–99)
GLUCOSE SERPL-MCNC: 219 MG/DL — HIGH (ref 70–99)
HCT VFR BLD CALC: 31 % — LOW (ref 37–47)
HGB BLD-MCNC: 9.3 G/DL — LOW (ref 12–16)
IMM GRANULOCYTES NFR BLD AUTO: 0.3 % — SIGNIFICANT CHANGE UP (ref 0.1–0.3)
LYMPHOCYTES # BLD AUTO: 1.58 K/UL — SIGNIFICANT CHANGE UP (ref 1.2–3.4)
LYMPHOCYTES # BLD AUTO: 20.7 % — SIGNIFICANT CHANGE UP (ref 20.5–51.1)
MAGNESIUM SERPL-MCNC: 2.1 MG/DL — SIGNIFICANT CHANGE UP (ref 1.8–2.4)
MCHC RBC-ENTMCNC: 26.7 PG — LOW (ref 27–31)
MCHC RBC-ENTMCNC: 30 G/DL — LOW (ref 32–37)
MCV RBC AUTO: 89.1 FL — SIGNIFICANT CHANGE UP (ref 81–99)
MONOCYTES # BLD AUTO: 0.49 K/UL — SIGNIFICANT CHANGE UP (ref 0.1–0.6)
MONOCYTES NFR BLD AUTO: 6.4 % — SIGNIFICANT CHANGE UP (ref 1.7–9.3)
NEUTROPHILS # BLD AUTO: 5.14 K/UL — SIGNIFICANT CHANGE UP (ref 1.4–6.5)
NEUTROPHILS NFR BLD AUTO: 67.4 % — SIGNIFICANT CHANGE UP (ref 42.2–75.2)
NRBC # BLD: 0 /100 WBCS — SIGNIFICANT CHANGE UP (ref 0–0)
PLATELET # BLD AUTO: 188 K/UL — SIGNIFICANT CHANGE UP (ref 130–400)
POTASSIUM SERPL-MCNC: 5.4 MMOL/L — HIGH (ref 3.5–5)
POTASSIUM SERPL-SCNC: 5.4 MMOL/L — HIGH (ref 3.5–5)
PROT SERPL-MCNC: 6.5 G/DL — SIGNIFICANT CHANGE UP (ref 6–8)
RBC # BLD: 3.48 M/UL — LOW (ref 4.2–5.4)
RBC # FLD: 15.4 % — HIGH (ref 11.5–14.5)
SODIUM SERPL-SCNC: 135 MMOL/L — SIGNIFICANT CHANGE UP (ref 135–146)
VIT B2 SERPL-MCNC: 329 UG/L — SIGNIFICANT CHANGE UP (ref 137–370)
WBC # BLD: 7.63 K/UL — SIGNIFICANT CHANGE UP (ref 4.8–10.8)
WBC # FLD AUTO: 7.63 K/UL — SIGNIFICANT CHANGE UP (ref 4.8–10.8)

## 2021-07-26 RX ADMIN — ATORVASTATIN CALCIUM 40 MILLIGRAM(S): 80 TABLET, FILM COATED ORAL at 12:21

## 2021-07-26 RX ADMIN — Medication 0.1 MILLIGRAM(S): at 06:32

## 2021-07-26 RX ADMIN — LITHIUM CARBONATE 150 MILLIGRAM(S): 300 TABLET, EXTENDED RELEASE ORAL at 06:31

## 2021-07-26 RX ADMIN — PANTOPRAZOLE SODIUM 40 MILLIGRAM(S): 20 TABLET, DELAYED RELEASE ORAL at 06:31

## 2021-07-26 RX ADMIN — Medication 250 MILLIGRAM(S): at 06:31

## 2021-07-26 RX ADMIN — HEPARIN SODIUM 5000 UNIT(S): 5000 INJECTION INTRAVENOUS; SUBCUTANEOUS at 13:49

## 2021-07-26 RX ADMIN — AMLODIPINE BESYLATE 10 MILLIGRAM(S): 2.5 TABLET ORAL at 06:30

## 2021-07-26 RX ADMIN — Medication 2: at 07:36

## 2021-07-26 RX ADMIN — Medication 1: at 12:18

## 2021-07-26 RX ADMIN — HEPARIN SODIUM 5000 UNIT(S): 5000 INJECTION INTRAVENOUS; SUBCUTANEOUS at 06:32

## 2021-07-26 NOTE — PHYSICAL THERAPY INITIAL EVALUATION ADULT - RANGE OF MOTION EXAMINATION, REHAB EVAL
B UE's grossly WFL. R LE knee flex 1/2 range , ankle and hip grossly WFL. L LE grossly WFL.
B UE's grossly WFL. R LE knee flex 1/2 range , ankle and hip grossly WFL. L LE grossly WFL.

## 2021-07-26 NOTE — PHYSICAL THERAPY INITIAL EVALUATION ADULT - ADDITIONAL COMMENTS
live in [facility] assisted living as per chart review. Pt is poor historian, + dementia.

## 2021-07-26 NOTE — PROGRESS NOTE ADULT - PROVIDER SPECIALTY LIST ADULT
Internal Medicine
Nephrology
Nephrology
Internal Medicine
Internal Medicine
Nephrology
Internal Medicine

## 2021-07-26 NOTE — PROGRESS NOTE ADULT - NSICDXPILOT_GEN_ALL_CORE
[place]

## 2021-07-26 NOTE — PHYSICAL THERAPY INITIAL EVALUATION ADULT - PLANNED THERAPY INTERVENTIONS, PT EVAL
No PT interventions required at this time. Pt independent with bed mobility, transfers, and ambulation w/ RW at 300 ft. Contact PT if status changes.
balance training/gait training

## 2021-07-26 NOTE — PHYSICAL THERAPY INITIAL EVALUATION ADULT - GENERAL OBSERVATIONS, REHAB EVAL
This is a Re-evaluation as requested by . Pt encountered sitting in chair alert NAD and agreeable to PT.
9:35-9:58. Pt encountered semifowler in bed in NAD,1:1 sit (Zofie PCA ), pt reported discomfort R knee when bending, however agreeable to PT.

## 2021-07-26 NOTE — DISCHARGE NOTE NURSING/CASE MANAGEMENT/SOCIAL WORK - PATIENT PORTAL LINK FT
You can access the FollowMyHealth Patient Portal offered by [facility] by registering at the following website: [email or web address]. By joining [employer]’s FollowMyHealth portal, you will also be able to view your health information using other applications (apps) compatible with our system.

## 2021-07-26 NOTE — PROGRESS NOTE ADULT - SUBJECTIVE AND OBJECTIVE BOX
KINGSTON PETTY 75y Female  MRN#: [id number]   CODE STATUS:________    Hospital Day: 9d    Pt is currently admitted with the primary diagnosis of     SUBJECTIVE  Hospital Course    Overnight events     Subjective complaints     Present Today:   - Reg:  No [  ], Yes [   ] : Indication:     - Type of IV Access:       .. CVC/Piccline:  No [  ], Yes [   ] : Indication:       .. Midline: No [  ], Yes [   ] : Indication:                                             ----------------------------------------------------------  OBJECTIVE  PAST MEDICAL & SURGICAL HISTORY  HTN (hypertension)    DM (diabetes mellitus)    MDD (major depressive disorder)    Bipolar disorder                                              -----------------------------------------------------------  ALLERGIES:  amoxicillin (Unknown)  Iodides (Unknown)                                            ------------------------------------------------------------    HOME MEDICATIONS  Home Medications:  amLODIPine 10 mg oral tablet: 1 tab(s) orally once a day (25 Jul 2021 08:40)  atorvastatin 40 mg oral tablet: 1 tab(s) orally once a day (17 Jul 2021 23:52)  cloNIDine 0.1 mg oral tablet: 1 tab(s) orally once a day (17 Jul 2021 23:53)  ferrous sulfate 325 mg (65 mg elemental iron) oral tablet: 1 tab(s) orally 3 times a week (17 Jul 2021 23:54)  glimepiride 4 mg oral tablet: 1 tab(s) orally once a day (17 Jul 2021 23:54)  metoprolol succinate 50 mg oral tablet, extended release: 1 tab(s) orally once a day (17 Jul 2021 23:56)  omeprazole 20 mg oral delayed release capsule: 1 cap(s) orally once a day (17 Jul 2021 23:56)  Vitamin D2 50,000 intl units (1.25 mg) oral capsule: 1 cap(s) orally once a month (17 Jul 2021 23:57)                           MEDICATIONS:  STANDING MEDICATIONS  amLODIPine   Tablet 10 milliGRAM(s) Oral daily  atorvastatin Oral Tab/Cap - Peds 40 milliGRAM(s) Oral daily  ciprofloxacin     Tablet 250 milliGRAM(s) Oral two times a day  cloNIDine 0.1 milliGRAM(s) Oral two times a day  dextrose 40% Gel 15 Gram(s) Oral once  dextrose 5%. 1000 milliLiter(s) IV Continuous <Continuous>  dextrose 5%. 1000 milliLiter(s) IV Continuous <Continuous>  dextrose 50% Injectable 25 Gram(s) IV Push once  dextrose 50% Injectable 12.5 Gram(s) IV Push once  dextrose 50% Injectable 25 Gram(s) IV Push once  glucagon  Injectable 1 milliGRAM(s) IntraMuscular once  heparin   Injectable 5000 Unit(s) SubCutaneous every 8 hours  insulin lispro (ADMELOG) corrective regimen sliding scale   SubCutaneous three times a day before meals  lithium 150 milliGRAM(s) Oral two times a day  LORazepam   Injectable 1 milliGRAM(s) IV Push once  pantoprazole    Tablet 40 milliGRAM(s) Oral before breakfast    PRN MEDICATIONS  acetaminophen   Tablet .. 650 milliGRAM(s) Oral every 6 hours PRN  atropine Injectable 0.5 milliGRAM(s) IntraMuscular once PRN                                            ------------------------------------------------------------  VITAL SIGNS: Last 24 Hours  T(C): 36.5 (26 Jul 2021 05:12), Max: 36.5 (25 Jul 2021 14:40)  T(F): 97.7 (26 Jul 2021 05:12), Max: 97.7 (25 Jul 2021 14:40)  HR: 45 (26 Jul 2021 05:12) (40 - 58)  BP: 183/79 (26 Jul 2021 05:12) (120/68 - 183/79)  BP(mean): --  RR: 18 (26 Jul 2021 05:12) (18 - 18)  SpO2: 97% (25 Jul 2021 22:30) (97% - 97%)                                             --------------------------------------------------------------  LABS:                        9.3    7.63  )-----------( 188      ( 26 Jul 2021 07:32 )             31.0     07-26    135  |  107  |  20  ----------------------------<  219<H>  5.4<H>   |  18  |  1.7<H>    Ca    9.3      26 Jul 2021 07:32  Mg     2.1     07-26    TPro  6.5  /  Alb  4.2  /  TBili  0.2  /  DBili  x   /  AST  20  /  ALT  27  /  AlkPhos  128<H>  07-26                                                              -------------------------------------------------------------  RADIOLOGY:                                            --------------------------------------------------------------    PHYSICAL EXAM:  General:   HEENT:  LUNGS:  HEART:  ABDOMEN:  EXT:  NEURO:  SKIN:                                           --------------------------------------------------------------    ASSESSMENT & PLAN    Past medical history and hospital course                                                                                                           ----------------------------------------------------  # DVT prophylaxis     # GI prophylaxis     # Diet     # Activity Score (AM-PAC)    # Code status     # Disposition                                                                              --------------------------------------------------------    # Fox      KINGSTON PETTY 75y Female  MRN#: [id number]   CODE STATUS: Full    Hospital Day: 9d    Pt is currently admitted with the primary diagnosis of Altered Mental Status.    SUBJECTIVE  Hospital Course    Overnight events: None reported.    Subjective complaints: Denies headache, chest pain, SOB.    Present Today:   - Finney:  No [ x ], Yes [   ] : Indication:     - Type of IV Access:       .. CVC/Piccline:  No [ x ], Yes [   ] : Indication:       .. Midline: No [ x ], Yes [   ] : Indication:                                             ----------------------------------------------------------  OBJECTIVE  PAST MEDICAL & SURGICAL HISTORY  HTN (hypertension)    DM (diabetes mellitus)    MDD (major depressive disorder)    Bipolar disorder                                              -----------------------------------------------------------  ALLERGIES:  amoxicillin (Unknown)  Iodides (Unknown)                                            ------------------------------------------------------------    HOME MEDICATIONS  Home Medications:  amLODIPine 10 mg oral tablet: 1 tab(s) orally once a day (25 Jul 2021 08:40)  atorvastatin 40 mg oral tablet: 1 tab(s) orally once a day (17 Jul 2021 23:52)  cloNIDine 0.1 mg oral tablet: 1 tab(s) orally once a day (17 Jul 2021 23:53)  ferrous sulfate 325 mg (65 mg elemental iron) oral tablet: 1 tab(s) orally 3 times a week (17 Jul 2021 23:54)  glimepiride 4 mg oral tablet: 1 tab(s) orally once a day (17 Jul 2021 23:54)  metoprolol succinate 50 mg oral tablet, extended release: 1 tab(s) orally once a day (17 Jul 2021 23:56)  omeprazole 20 mg oral delayed release capsule: 1 cap(s) orally once a day (17 Jul 2021 23:56)  Vitamin D2 50,000 intl units (1.25 mg) oral capsule: 1 cap(s) orally once a month (17 Jul 2021 23:57)                           MEDICATIONS:  STANDING MEDICATIONS  amLODIPine   Tablet 10 milliGRAM(s) Oral daily  atorvastatin Oral Tab/Cap - Peds 40 milliGRAM(s) Oral daily  ciprofloxacin     Tablet 250 milliGRAM(s) Oral two times a day  cloNIDine 0.1 milliGRAM(s) Oral two times a day  dextrose 40% Gel 15 Gram(s) Oral once  dextrose 5%. 1000 milliLiter(s) IV Continuous <Continuous>  dextrose 5%. 1000 milliLiter(s) IV Continuous <Continuous>  dextrose 50% Injectable 25 Gram(s) IV Push once  dextrose 50% Injectable 12.5 Gram(s) IV Push once  dextrose 50% Injectable 25 Gram(s) IV Push once  glucagon  Injectable 1 milliGRAM(s) IntraMuscular once  heparin   Injectable 5000 Unit(s) SubCutaneous every 8 hours  insulin lispro (ADMELOG) corrective regimen sliding scale   SubCutaneous three times a day before meals  lithium 150 milliGRAM(s) Oral two times a day  LORazepam   Injectable 1 milliGRAM(s) IV Push once  pantoprazole    Tablet 40 milliGRAM(s) Oral before breakfast    PRN MEDICATIONS  acetaminophen   Tablet .. 650 milliGRAM(s) Oral every 6 hours PRN  atropine Injectable 0.5 milliGRAM(s) IntraMuscular once PRN                                            ------------------------------------------------------------  VITAL SIGNS: Last 24 Hours  T(C): 36.5 (26 Jul 2021 05:12), Max: 36.5 (25 Jul 2021 14:40)  T(F): 97.7 (26 Jul 2021 05:12), Max: 97.7 (25 Jul 2021 14:40)  HR: 45 (26 Jul 2021 05:12) (40 - 58)  BP: 183/79 (26 Jul 2021 05:12) (120/68 - 183/79)  BP(mean): --  RR: 18 (26 Jul 2021 05:12) (18 - 18)  SpO2: 97% (25 Jul 2021 22:30) (97% - 97%)                                             --------------------------------------------------------------  LABS:                        9.3    7.63  )-----------( 188      ( 26 Jul 2021 07:32 )             31.0     07-26    135  |  107  |  20  ----------------------------<  219<H>  5.4<H>   |  18  |  1.7<H>    Ca    9.3      26 Jul 2021 07:32  Mg     2.1     07-26    TPro  6.5  /  Alb  4.2  /  TBili  0.2  /  DBili  x   /  AST  20  /  ALT  27  /  AlkPhos  128<H>  07-26                                                              -------------------------------------------------------------  RADIOLOGY:                                            --------------------------------------------------------------    PHYSICAL EXAM:  General: alert, awake, NAD  HEENT: atraumatic  LUNGS: clear to auscultation bilaterally  HEART: regular rate/rhythm, no murmurs/gallops  ABDOMEN: soft, nontender, normoactive bowel sounds  EXT: 2+ radial pulses  NEURO: aaox3, no focal deficits noted  SKIN: intact, no new lesions noted                                           --------------------------------------------------------------    ASSESSMENT & PLAN  Past medical history and hospital course   75 year old female with PMHx of HTN, DM, MDD, bipolar on lithium, anxiety send in by [facility] due to AMS x3-4 days. Pt was found to have COVID-19 positive on admission.     # AMS likely metabolic encephalopathy, multi-factorial   - could be secondary to UTI vs. Li toxicity vs. ASHLEY on possible CKD  - CT head negative  - Neuro consult appreciated: No further neurological workup recommended, per Dr. Rasmussen son Catarino would like MRI Brain to be done prior to discharge, spoke with [facility] over home and confirmed no implants or metallic foreign bodies for patient, ordered 7/23, limited study unremarkable    # UTI  - UA+ for UTI  - UCx: Klebsiella  - s/p Rocephin 1 gm x 1 in ED   - d/c ceftriaxone 1g q24h iv until UCx results x 3 days  - Cipro 250mg PO q12h for total of 7 days per Dr. Aguilar, started 7/21    # COVID-19 positive on 7/17/21  - s/p vaccination 2 doses of Pfizer COVID-19 vaccine on January 11, 2021 and February 1, 2021  - currently asymptomatic, on RA  - Xray Chest (07.17.21 @ 17:56): Small left basilar opacity, possibly atelectasis.  - repeat COVID PCR 7/19/21 negative, repeat PCR 7/25 negative    # Lithium toxicity  - PMHx of bipolar disorder, on Lithium  - patient's lithium level has improved (1.6>>0.96)  - psychiatry evaluation appreciated: consider Seroquel 50 mg PRN for acute agitation. Seroquel can cause paradoxical agitation in patients with dementia and should be used as a last resort  - Lithium PO was on hold. Restarted Lithium 7/22/21 as per psych on home dose    # ASHLEY like prerenal, possible CKD3  - baseline creatinine ~ 1.4 - 1.7.   - Cr 1.9 on 7/18, 1.6 on 7/2, 1.7 on 7/22, 1.9 on 7/23  - On Lasix 40 mg at [facility], continuing to hold lasix, ACE, Metformin, NO NSAIDs  - encouraging PO intake  - U protein/Cr 2.9  - renal bladder sono (7/19): Simple cyst within the left kidney  - will have patient f/u with Nephrologist outpatient    # hyperkalemia  - hyperkalemia can be due ASHLEY + ACEIs + Metformin. hold lisinopril and metformin.  - s/p single dose of lokelma 5 gm 7/17, another single dose given 7/23 (K+ 5.2 this morning), will continue to monitor    # Bradycardia, asymptomatic  - ? due to metoprolol  - EKG noted. No significant tall T waves on EKG.  - holding metoprolol for now as HR is in 50s    # HTN  - increased amlodipine to 10 mg on 7/21   - c/w clonidine 0.1mg BID  - per Dr. Aguilar increased hydralazine to 25 mg TID on 7/23, decreased back to 7/22 per Dr. Hooks, holding ACE due to kidneys    # DM  - monitor FS  - start insulin if FS > 180  - SSI for now, for discharge will start patient on glimepiride and PO Januvia 50mg daily    DVT: not indicated for now.  GI: Pantoprazole  Diet: DASH  Activity: Ambulate as tolerated  Dispo: d/c today

## 2021-07-26 NOTE — PHYSICAL THERAPY INITIAL EVALUATION ADULT - PERTINENT HX OF CURRENT PROBLEM, REHAB EVAL
75y F pmhx HTN, DM, MDD, bipolar on lithium, anxiety send in by [facility] (assisted living)          due to AMS x3-4 days; constant, stable; per NH and son at bedside pt has been more forgetful than usual recently (might have early dementia but sx have never been this severe), with impairment in short-term memory
75y F pmhx HTN, DM, MDD, bipolar on lithium, anxiety send in by [facility] (assisted living)          due to AMS x3-4 days; constant, stable; per NH and son at bedside pt has been more forgetful than usual recently (might have early dementia but sx have never been this severe), with impairment in short-term memory.

## 2021-08-06 DIAGNOSIS — F31.9 BIPOLAR DISORDER, UNSPECIFIED: ICD-10-CM

## 2021-08-06 DIAGNOSIS — N17.9 ACUTE KIDNEY FAILURE, UNSPECIFIED: ICD-10-CM

## 2021-08-06 DIAGNOSIS — N18.30 CHRONIC KIDNEY DISEASE, STAGE 3 UNSPECIFIED: ICD-10-CM

## 2021-08-06 DIAGNOSIS — G93.41 METABOLIC ENCEPHALOPATHY: ICD-10-CM

## 2021-08-06 DIAGNOSIS — E86.0 DEHYDRATION: ICD-10-CM

## 2021-08-06 DIAGNOSIS — U07.1 COVID-19: ICD-10-CM

## 2021-08-06 DIAGNOSIS — I50.9 HEART FAILURE, UNSPECIFIED: ICD-10-CM
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
## 2021-08-06 DIAGNOSIS — Z79.84 LONG TERM (CURRENT) USE OF ORAL HYPOGLYCEMIC DRUGS: ICD-10-CM

## 2021-08-06 DIAGNOSIS — E87.5 HYPERKALEMIA: ICD-10-CM

## 2021-08-06 DIAGNOSIS — I13.0 HYPERTENSIVE HEART AND CHRONIC KIDNEY DISEASE WITH HEART FAILURE AND STAGE 1 THROUGH STAGE 4 CHRONIC KIDNEY DISEASE, OR UNSPECIFIED CHRONIC KIDNEY DISEASE: ICD-10-CM

## 2021-08-06 DIAGNOSIS — R41.82 ALTERED MENTAL STATUS, UNSPECIFIED: ICD-10-CM

## 2021-08-06 DIAGNOSIS — F03.90 UNSPECIFIED DEMENTIA WITHOUT BEHAVIORAL DISTURBANCE: ICD-10-CM

## 2021-08-06 DIAGNOSIS — E11.22 TYPE 2 DIABETES MELLITUS WITH DIABETIC CHRONIC KIDNEY DISEASE: ICD-10-CM

## 2021-08-06 DIAGNOSIS — B96.1 KLEBSIELLA PNEUMONIAE [K. PNEUMONIAE] AS THE CAUSE OF DISEASES CLASSIFIED ELSEWHERE: ICD-10-CM

## 2021-08-06 DIAGNOSIS — N39.0 URINARY TRACT INFECTION, SITE NOT SPECIFIED: ICD-10-CM

## 2021-08-06 DIAGNOSIS — T43.595A ADVERSE EFFECT OF OTHER ANTIPSYCHOTICS AND NEUROLEPTICS, INITIAL ENCOUNTER: ICD-10-CM

## 2021-08-06 DIAGNOSIS — F32.9 MAJOR DEPRESSIVE DISORDER, SINGLE EPISODE, UNSPECIFIED: ICD-10-CM

## 2021-08-06 DIAGNOSIS — R00.1 BRADYCARDIA, UNSPECIFIED: ICD-10-CM

## 2021-09-17 ENCOUNTER — INPATIENT (INPATIENT)
Facility: HOSPITAL | Age: 76
LOS: 5 days | Discharge: HOME | End: 2021-09-23
Attending: INTERNAL MEDICINE | Admitting: INTERNAL MEDICINE
Payer: MEDICARE

## 2021-09-17 VITALS
HEART RATE: 64 BPM | SYSTOLIC BLOOD PRESSURE: 169 MMHG | HEIGHT: 62 IN | WEIGHT: 130.07 LBS | OXYGEN SATURATION: 98 % | RESPIRATION RATE: 18 BRPM | TEMPERATURE: 98 F | DIASTOLIC BLOOD PRESSURE: 76 MMHG

## 2021-09-17 PROBLEM — E11.9 TYPE 2 DIABETES MELLITUS WITHOUT COMPLICATIONS: Chronic | Status: ACTIVE | Noted: 2021-07-17

## 2021-09-17 PROBLEM — F31.9 BIPOLAR DISORDER, UNSPECIFIED: Chronic | Status: ACTIVE | Noted: 2021-07-17

## 2021-09-17 PROBLEM — F32.9 MAJOR DEPRESSIVE DISORDER, SINGLE EPISODE, UNSPECIFIED: Chronic | Status: ACTIVE | Noted: 2021-07-17

## 2021-09-17 PROBLEM — I10 ESSENTIAL (PRIMARY) HYPERTENSION: Chronic | Status: ACTIVE | Noted: 2021-07-17

## 2021-09-17 LAB
ABO RH CONFIRMATION: SIGNIFICANT CHANGE UP
ALBUMIN SERPL ELPH-MCNC: 4.2 G/DL — SIGNIFICANT CHANGE UP (ref 3.5–5.2)
ALP SERPL-CCNC: 107 U/L — SIGNIFICANT CHANGE UP (ref 30–115)
ALT FLD-CCNC: 20 U/L — SIGNIFICANT CHANGE UP (ref 0–41)
ANION GAP SERPL CALC-SCNC: 10 MMOL/L — SIGNIFICANT CHANGE UP (ref 7–14)
AST SERPL-CCNC: 20 U/L — SIGNIFICANT CHANGE UP (ref 0–41)
BASOPHILS # BLD AUTO: 0.02 K/UL — SIGNIFICANT CHANGE UP (ref 0–0.2)
BASOPHILS NFR BLD AUTO: 0.3 % — SIGNIFICANT CHANGE UP (ref 0–1)
BILIRUB SERPL-MCNC: 0.2 MG/DL — SIGNIFICANT CHANGE UP (ref 0.2–1.2)
BLD GP AB SCN SERPL QL: SIGNIFICANT CHANGE UP
BUN SERPL-MCNC: 22 MG/DL — HIGH (ref 10–20)
CALCIUM SERPL-MCNC: 9.6 MG/DL — SIGNIFICANT CHANGE UP (ref 8.5–10.1)
CHLORIDE SERPL-SCNC: 102 MMOL/L — SIGNIFICANT CHANGE UP (ref 98–110)
CO2 SERPL-SCNC: 27 MMOL/L — SIGNIFICANT CHANGE UP (ref 17–32)
CREAT SERPL-MCNC: 1.4 MG/DL — SIGNIFICANT CHANGE UP (ref 0.7–1.5)
EOSINOPHIL # BLD AUTO: 0.15 K/UL — SIGNIFICANT CHANGE UP (ref 0–0.7)
EOSINOPHIL NFR BLD AUTO: 2.3 % — SIGNIFICANT CHANGE UP (ref 0–8)
FERRITIN SERPL-MCNC: 40 NG/ML — SIGNIFICANT CHANGE UP (ref 15–150)
GLUCOSE SERPL-MCNC: 145 MG/DL — HIGH (ref 70–99)
HCT VFR BLD CALC: 25.1 % — LOW (ref 37–47)
HGB BLD-MCNC: 7.8 G/DL — LOW (ref 12–16)
IMM GRANULOCYTES NFR BLD AUTO: 0.2 % — SIGNIFICANT CHANGE UP (ref 0.1–0.3)
IRON SATN MFR SERPL: 14 % — LOW (ref 15–50)
IRON SATN MFR SERPL: 40 UG/DL — SIGNIFICANT CHANGE UP (ref 35–150)
LYMPHOCYTES # BLD AUTO: 1.64 K/UL — SIGNIFICANT CHANGE UP (ref 1.2–3.4)
LYMPHOCYTES # BLD AUTO: 24.8 % — SIGNIFICANT CHANGE UP (ref 20.5–51.1)
MCHC RBC-ENTMCNC: 26.4 PG — LOW (ref 27–31)
MCHC RBC-ENTMCNC: 31.1 G/DL — LOW (ref 32–37)
MCV RBC AUTO: 84.8 FL — SIGNIFICANT CHANGE UP (ref 81–99)
MONOCYTES # BLD AUTO: 0.4 K/UL — SIGNIFICANT CHANGE UP (ref 0.1–0.6)
MONOCYTES NFR BLD AUTO: 6 % — SIGNIFICANT CHANGE UP (ref 1.7–9.3)
NEUTROPHILS # BLD AUTO: 4.4 K/UL — SIGNIFICANT CHANGE UP (ref 1.4–6.5)
NEUTROPHILS NFR BLD AUTO: 66.4 % — SIGNIFICANT CHANGE UP (ref 42.2–75.2)
NRBC # BLD: 0 /100 WBCS — SIGNIFICANT CHANGE UP (ref 0–0)
PLATELET # BLD AUTO: 222 K/UL — SIGNIFICANT CHANGE UP (ref 130–400)
POTASSIUM SERPL-MCNC: 3.4 MMOL/L — LOW (ref 3.5–5)
POTASSIUM SERPL-SCNC: 3.4 MMOL/L — LOW (ref 3.5–5)
PROT SERPL-MCNC: 6.5 G/DL — SIGNIFICANT CHANGE UP (ref 6–8)
RBC # BLD: 2.96 M/UL — LOW (ref 4.2–5.4)
RBC # FLD: 13.4 % — SIGNIFICANT CHANGE UP (ref 11.5–14.5)
SARS-COV-2 RNA SPEC QL NAA+PROBE: SIGNIFICANT CHANGE UP
SODIUM SERPL-SCNC: 139 MMOL/L — SIGNIFICANT CHANGE UP (ref 135–146)
TIBC SERPL-MCNC: 284 UG/DL — SIGNIFICANT CHANGE UP (ref 220–430)
TROPONIN T SERPL-MCNC: <0.01 NG/ML — SIGNIFICANT CHANGE UP
UIBC SERPL-MCNC: 244 UG/DL — SIGNIFICANT CHANGE UP (ref 110–370)
WBC # BLD: 6.62 K/UL — SIGNIFICANT CHANGE UP (ref 4.8–10.8)
WBC # FLD AUTO: 6.62 K/UL — SIGNIFICANT CHANGE UP (ref 4.8–10.8)

## 2021-09-17 PROCEDURE — 93010 ELECTROCARDIOGRAM REPORT: CPT | Mod: 77

## 2021-09-17 PROCEDURE — 99220: CPT | Mod: CS

## 2021-09-17 PROCEDURE — 71045 X-RAY EXAM CHEST 1 VIEW: CPT | Mod: 26

## 2021-09-17 PROCEDURE — 93010 ELECTROCARDIOGRAM REPORT: CPT | Mod: 76

## 2021-09-17 RX ORDER — ASPIRIN/CALCIUM CARB/MAGNESIUM 324 MG
81 TABLET ORAL DAILY
Refills: 0 | Status: DISCONTINUED | OUTPATIENT
Start: 2021-09-17 | End: 2021-09-23

## 2021-09-17 RX ORDER — PREGABALIN 225 MG/1
1000 CAPSULE ORAL DAILY
Refills: 0 | Status: DISCONTINUED | OUTPATIENT
Start: 2021-09-17 | End: 2021-09-20

## 2021-09-17 RX ORDER — ATORVASTATIN CALCIUM 80 MG/1
40 TABLET, FILM COATED ORAL AT BEDTIME
Refills: 0 | Status: DISCONTINUED | OUTPATIENT
Start: 2021-09-17 | End: 2021-09-23

## 2021-09-17 RX ORDER — DONEPEZIL HYDROCHLORIDE 10 MG/1
5 TABLET, FILM COATED ORAL AT BEDTIME
Refills: 0 | Status: DISCONTINUED | OUTPATIENT
Start: 2021-09-17 | End: 2021-09-21

## 2021-09-17 RX ORDER — LANOLIN ALCOHOL/MO/W.PET/CERES
5 CREAM (GRAM) TOPICAL AT BEDTIME
Refills: 0 | Status: DISCONTINUED | OUTPATIENT
Start: 2021-09-17 | End: 2021-09-23

## 2021-09-17 RX ORDER — NYSTATIN CREAM 100000 [USP'U]/G
1 CREAM TOPICAL
Refills: 0 | Status: DISCONTINUED | OUTPATIENT
Start: 2021-09-17 | End: 2021-09-21

## 2021-09-17 RX ORDER — LURASIDONE HYDROCHLORIDE 40 MG/1
20 TABLET ORAL DAILY
Refills: 0 | Status: DISCONTINUED | OUTPATIENT
Start: 2021-09-17 | End: 2021-09-17

## 2021-09-17 RX ORDER — FERROUS SULFATE 325(65) MG
1 TABLET ORAL
Qty: 0 | Refills: 0 | DISCHARGE

## 2021-09-17 RX ORDER — AMLODIPINE BESYLATE 2.5 MG/1
1 TABLET ORAL
Qty: 0 | Refills: 0 | DISCHARGE

## 2021-09-17 RX ORDER — PANTOPRAZOLE SODIUM 20 MG/1
40 TABLET, DELAYED RELEASE ORAL
Refills: 0 | Status: DISCONTINUED | OUTPATIENT
Start: 2021-09-17 | End: 2021-09-23

## 2021-09-17 RX ORDER — FERROUS SULFATE 325(65) MG
325 TABLET ORAL DAILY
Refills: 0 | Status: DISCONTINUED | OUTPATIENT
Start: 2021-09-17 | End: 2021-09-23

## 2021-09-17 RX ORDER — OMEPRAZOLE 10 MG/1
1 CAPSULE, DELAYED RELEASE ORAL
Qty: 0 | Refills: 0 | DISCHARGE

## 2021-09-17 RX ORDER — LISINOPRIL 2.5 MG/1
40 TABLET ORAL DAILY
Refills: 0 | Status: DISCONTINUED | OUTPATIENT
Start: 2021-09-17 | End: 2021-09-21

## 2021-09-17 RX ORDER — ACETAMINOPHEN 500 MG
650 TABLET ORAL ONCE
Refills: 0 | Status: COMPLETED | OUTPATIENT
Start: 2021-09-17 | End: 2021-09-17

## 2021-09-17 RX ORDER — ERGOCALCIFEROL 1.25 MG/1
1 CAPSULE ORAL
Qty: 0 | Refills: 0 | DISCHARGE

## 2021-09-17 RX ORDER — HYDRALAZINE HCL 50 MG
50 TABLET ORAL EVERY 8 HOURS
Refills: 0 | Status: DISCONTINUED | OUTPATIENT
Start: 2021-09-17 | End: 2021-09-21

## 2021-09-17 RX ORDER — AMLODIPINE BESYLATE 2.5 MG/1
10 TABLET ORAL DAILY
Refills: 0 | Status: DISCONTINUED | OUTPATIENT
Start: 2021-09-17 | End: 2021-09-23

## 2021-09-17 RX ORDER — LITHIUM CARBONATE 300 MG/1
150 TABLET, EXTENDED RELEASE ORAL
Refills: 0 | Status: DISCONTINUED | OUTPATIENT
Start: 2021-09-17 | End: 2021-09-17

## 2021-09-17 RX ADMIN — Medication 0.5 MILLIGRAM(S): at 17:03

## 2021-09-17 RX ADMIN — Medication 5 MILLIGRAM(S): at 22:29

## 2021-09-17 RX ADMIN — DONEPEZIL HYDROCHLORIDE 5 MILLIGRAM(S): 10 TABLET, FILM COATED ORAL at 22:30

## 2021-09-17 RX ADMIN — Medication 50 MILLIGRAM(S): at 22:29

## 2021-09-17 RX ADMIN — ATORVASTATIN CALCIUM 40 MILLIGRAM(S): 80 TABLET, FILM COATED ORAL at 22:29

## 2021-09-17 NOTE — ED PROVIDER NOTE - NS ED ROS FT
Review of Systems:  CONSTITUTIONAL - No fever  SKIN - No rash  HEMATOLOGIC - No abnormal bleeding or bruising  RESPIRATORY - No shortness of breath, No cough  CARDIAC -No palpitations  GI - No abdominal pain, No nausea, No vomiting  - No dysuria, frequency, hematuria  MUSCULOSKELETAL - No joint paint, No swelling, No back pain  NEUROLOGIC - No numbness, No focal weakness, No headache, No dizziness  All other systems negative, unless specified in HPI

## 2021-09-17 NOTE — ED CDU PROVIDER DISPOSITION NOTE - CLINICAL COURSE
77yo woman h/o HTN, DM, bipolar d/o, dementia was placed in CDU for transfusion after she was found to be anemic; she had presented to the ED c/o chest discomfort and tingling in her arms x 1 day. She is a poor historian re timeline of sx, knows she is confused, and having "problems with my memory." She lives at [facility], 2 sons are local. ED workup revealed anemia, but rectal exam with no blood. Anemia labs sent. On my eval, VS as noted, pt alert and cooperative but with mild cognitive disability/dementia. Neck supple, lungs CTA, CVS1S2 RRR abd soft, neuro intact other than confusion, which per sons, is baseline. Initial plan was for transfusion and d/c, however son gives additional hx of multiple visits to the ED (mostly [facility]) over the past few weeks for the same complaint, unsure if truly chest pain or just anxiety; he has noted poor PO intake and weight loss and feels pt's "mind is making her crazy." He reports that pt was on lithium for 3o years successfully, but after a recent admission for ASHLEY, it was discontinued and alternative regimen was prescribed. Given this additional history, admission seems more appropriate. Spoke with Dr Aguilar regarding family concerns; they have power of , but don't want to force their mother to do things she doesn't want to do. Will admit.

## 2021-09-17 NOTE — ED CDU PROVIDER DISPOSITION NOTE - ATTENDING CONTRIBUTION TO CARE
75yo woman h/o HTN, DM, bipolar d/o, dementia was placed in CDU for transfusion after she was found to be anemic; she had presented to the ED c/o chest discomfort and tingling in her arms x 1 day. She is a poor historian re timeline of sx, knows she is confused, and having "problems with my memory." She lives at [facility], 2 sons are local. ED workup revealed anemia, but rectal exam with no blood. Anemia labs sent. On my eval, VS as noted, pt alert and cooperative but with mild cognitive disability/dementia. Neck supple, lungs CTA, CVS1S2 RRR abd soft, neuro intact other than confusion, which per sons, is baseline. Initial plan was for transfusion and d/c, however son gives additional hx of multiple visits to the ED (mostly [facility]) over the past few weeks for the same complaint, unsure if truly chest pain or just anxiety; he has noted poor PO intake and weight loss and feels pt's "mind is making her crazy." He reports that pt was on lithium for 3o years successfully, but after a recent admission for ASHLEY, it was discontinued and alternative regimen was prescribed. Given this additional history, admission seems more appropriate. Spoke with Dr Aguilar regarding family concerns; they have power of , but don't want to force their mother to do things she doesn't want to do. Will admit.

## 2021-09-17 NOTE — ED PROVIDER NOTE - PROGRESS NOTE DETAILS
PP: Consent for blood transfusion left in chart. Patient signed out to KRISTINA Brenner. Rectal and vaginal exam chaperoned by Valerie Hernandez. Rectal no gross blood, no melena. Vaginal vault shows no blood either.

## 2021-09-17 NOTE — ED ADULT NURSE NOTE - NSICDXPASTMEDICALHX_GEN_ALL_CORE_FT
PAST MEDICAL HISTORY:  Bipolar disorder     DM (diabetes mellitus)     HTN (hypertension)     MDD (major depressive disorder)

## 2021-09-17 NOTE — H&P ADULT - NSHPLABSRESULTS_GEN_ALL_CORE
CBC Full  -  ( 17 Sep 2021 02:30 )  WBC Count : 6.62 K/uL  RBC Count : 2.96 M/uL  Hemoglobin : 7.8 g/dL  Hematocrit : 25.1 %  Platelet Count - Automated : 222 K/uL  Mean Cell Volume : 84.8 fL  Mean Cell Hemoglobin : 26.4 pg  Mean Cell Hemoglobin Concentration : 31.1 g/dL  Auto Neutrophil # : 4.40 K/uL  Auto Lymphocyte # : 1.64 K/uL  Auto Monocyte # : 0.40 K/uL  Auto Eosinophil # : 0.15 K/uL  Auto Basophil # : 0.02 K/uL  Auto Neutrophil % : 66.4 %  Auto Lymphocyte % : 24.8 %  Auto Monocyte % : 6.0 %  Auto Eosinophil % : 2.3 %  Auto Basophil % : 0.3 %  09-17    139  |  102  |  22<H>  ----------------------------<  145<H>  3.4<L>   |  27  |  1.4    Ca    9.6      17 Sep 2021 02:30    TPro  6.5  /  Alb  4.2  /  TBili  0.2  /  DBili  x   /  AST  20  /  ALT  20  /  AlkPhos  107  09-17  < from: Xray Chest 1 View AP/PA (09.17.21 @ 05:56) >    Impression:    Left midlung field linear atelectasis.    < end of copied text >

## 2021-09-17 NOTE — ED PROVIDER NOTE - EKG ADDITIONAL QUESTION - PERFORMED INDEPENDENT VISUALIZATION
Controlled Substance Refill Request  Medication Name:   Requested Prescriptions     Pending Prescriptions Disp Refills     omeprazole (PRILOSEC) 40 MG capsule [Pharmacy Med Name: OMEPRAZOLE 40MG CAPSULES] 90 capsule 1     Sig: TAKE 1 CAPSULE(40 MG) BY MOUTH DAILY BEFORE BREAKFAST     acetaminophen-codeine (TYLENOL #3) 300-30 mg per tablet [Pharmacy Med Name: ACETAMINOPHEN/COD #3 (300/30MG) TAB] 120 tablet 0     Sig: TAKE 1 TABLET BY MOUTH EVERY 6 HOURS AS NEEDED FOR PAIN     Date Last Fill: 5/4/21  Requested Pharmacy: [facility]  Submit electronically to pharmacy  Controlled Substance Agreement on file:   Encounter-Level CSA Scan Date - 07/09/2018:    Scan on 7/11/2018 12:40 PM           Encounter-Level CSA Scan Date - 09/08/2016:    Scan on 9/9/2016 11:19 AM        Last office visit:  5/4/21  Marcelina Anthony RN, MA  [facility]    Triage Nurse Advisor       Yes

## 2021-09-17 NOTE — H&P ADULT - HISTORY OF PRESENT ILLNESS
*History is extremely limited as patient cannot recollect the severity of her chest, location or distribution.  Most of information taken from ED Chart as there are no records  from [facility] that contain her HPI or medication list. MEdi    This is a 76 year old F with a PMHx of HTN, HLD, Bipolar Disorder, Anxiety, presents to the ED with a one day history of  *History is extremely limited as patient cannot recollect the severity of her chest, or distribution.  Most of information taken from ED Chart as there are no records  from [facility] that contain her HPI or medication list. MEdi    This is a 76 year old F with a PMHx of HTN, HLD, Bipolar Disorder, Anxiety, presents to the ED with a one day history of history of pain.  Patient endorsed that her chest pain was localized to the midsternum.  When asked about her pain, she was not able to recollect whether the pain radiated, or severe the pain was, whether the pain was occurring frequently prior to admission, whether it was exertional in nature.  But upon further view, she currently denies chest pain, sob, abdominal pain, n/v/d, fever, rigors.      ICU Vital Signs Last 24 Hrs  T(C): 36.3 (17 Sep 2021 05:11), Max: 36.6 (17 Sep 2021 00:35)  T(F): 97.3 (17 Sep 2021 05:11), Max: 97.8 (17 Sep 2021 00:35)  HR: 69 (17 Sep 2021 05:11) (64 - 69)  BP: 145/75 (17 Sep 2021 05:11) (145/75 - 169/76)  BP(mean): --  ABP: --  ABP(mean): --  RR: 18 (17 Sep 2021 05:11) (18 - 18)  SpO2: 99% (17 Sep 2021 05:11) (98% - 99%)      In the ED: CBC demonstrated a Hgb of 7.8 (Baseline 9), MCV 84, CMP demonstrated a Na of 139, K 3.4, BUN 22/SCr 1.4, Trop -ve x2. ECG demonstrated sinus arrythmia without ischemic changes.  Patient to be given 1 UPRC in the ED

## 2021-09-17 NOTE — ED CDU PROVIDER INITIAL DAY NOTE - NS ED ROS FT
Review of Systems    Constitutional: (-) fever, (-) chills  Eyes/ENT: (-) blurry vision, (-) epistaxis, (-) sore throat  Cardiovascular: (+) chest pain, (-) syncope  Respiratory: (-) cough, (-) shortness of breath  Gastrointestinal: (-) pain, (-) nausea, (-) vomiting, (-) diarrhea  Musculoskeletal: (-) neck pain, (-) back pain, (-) body aches  Integumentary: (-) rash, (-) edema  Neurological: (-) headache, (-) altered mental status, (+) tingling to hands  Psychiatric: (-) hallucinations  Allergic/Immunologic: (-) pruritus

## 2021-09-17 NOTE — ED CDU PROVIDER INITIAL DAY NOTE - MEDICAL DECISION MAKING DETAILS
75yo woman h/o HTN, DM, bipolar d/o, dementia was placed in CDU for transfusion after she was found to be anemic; she had presented to the ED c/o chest discomfort and tingling in her arms x 1 day. She is a poor historian re timeline of sx, knows she is confused, and having "problems with my memory." She lives at [facility], 2 sons are local. ED workup revealed anemia, but rectal exam with no blood. Anemia labs sent. On my eval, VS as noted, pt alert and cooperative but with mild cognitive disability/dementia. Neck supple, lungs CTA, CVS1S2 RRR abd soft, neuro intact other than confusion, which per sons, is baseline. Initial plan was for transfusion and d/c, however son gives additional hx of multiple visits to the ED (mostly [facility]) over the past few weeks for the same complaint, unsure if truly chest pain or just anxiety; he has noted poor PO intake and weight loss and feels pt's "mind is making her crazy." He reports that pt was on lithium for 3o years successfully, but after a recent admission for ASHLEY, it was discontinued and alternative regimen was prescribed. Given this additional history, admission seems more appropriate. Spoke with Dr Aguilar regarding family concerns; they have power of , but don't want to force their mother to do things she doesn't want to do. Will admit.

## 2021-09-17 NOTE — CHART NOTE - NSCHARTNOTEFT_GEN_A_CORE
EVENT NOTE:    called by RN --the patient's son is at bedside and says there's an error with home meds    I spoke to the son ,Catarino the [facility] by proxy ,and informed me that his mother has not been on lithium since july ,which was stopped after a hospital admission.  he also says she has not been taking Latuda for the last 3 days ,and says these two drugs where discontinued.  The son spoke with Yamel at [facility]'s [phone] and confirmed with her in front of me .  both medications have been dc.    Tyrone the son and by proxy can be reached at [phone]  brother Javier [phone]      T(F): 98.1 (09-17-21 @ 16:08)  HR: 67 (09-17-21 @ 16:08)  BP: 141/66 (09-17-21 @ 16:08)  BP(mean): --  ABP: --  RR: 18 (09-17-21 @ 16:08)  SpO2: --  CVP(mm Hg): --  CVP(cm H2O): --  PHYSICAL EXAM:   Neurological: AAOx3, CNII-XII intact,  strength 5/5 b/l  Cardiovascular: RRR  Respiratory: CTA  Gastrointestinal: soft, NT, ND, BS+  Extremities: warm, no dependent edema  Vascular: no cyanosis/erythema  LABS:    09-17    139  |  102  |  22<H>  ----------------------------<  145<H>  3.4<L>   |  27  |  1.4    Ca    9.6      17 Sep 2021 02:30    TPro  6.5  /  Alb  4.2  /  TBili  0.2  /  DBili  x   /  AST  20  /  ALT  20  /  AlkPhos  107  09-17  LIVER FUNCTIONS - ( 17 Sep 2021 02:30 )  Alb: 4.2 g/dL / Pro: 6.5 g/dL / ALK PHOS: 107 U/L / ALT: 20 U/L / AST: 20 U/L / GGT: x                               7.8    6.62  )-----------( 222      ( 17 Sep 2021 02:30 )             25.1   CARDIAC MARKERS ( 17 Sep 2021 07:40 )  x     / <0.01 ng/mL / x     / x     / x      CARDIAC MARKERS ( 17 Sep 2021 02:30 )  x     / <0.01 ng/mL / x     / x     / x        CAPILLARY BLOOD GLUCOSE                above event discussed with Attg

## 2021-09-17 NOTE — ED PROVIDER NOTE - ATTENDING CONTRIBUTION TO CARE
I personally evaluated the patient. I reviewed the Resident’s or Physician Assistant’s note (as assigned above), and agree with the findings and plan except as documented in my note.  Pt with hx of HTN, DM, HLD, bipolar do and MDD sent in from [facility] for evaluation of CP. Pt had transient non radiating mid CP. Denies any SOB, fever, coughing, LE pain or swelling. VS reviewed, pt non-toxic appearing, NAD. Head ncat, MMM, neck supple, no JVD, normal s1s2 without any murmurs, Lungs CTAB with normal work of breathing. abd +BS, s/nd/nt, extremities wnl, neuro exam grossly normal. No acute skin rashes. Plan is ekg, labs, monitor, imaging and dispo accordingly.

## 2021-09-17 NOTE — H&P ADULT - ASSESSMENT
76 year old F with a PMHx of HTN, HLD, Bipolar Disorder, Anxiety, presents to the ED with a one day history of history of pain.  Patient endorsed that her chest pain was localized to the midsternum    #Atypical Chest Pain vs. Typical Chest Pain 2/2 to symptomatic Anemia  CBC demonstrated a Hgb of 7.8 (Baseline 9), MCV 84,   CMP demonstrated a Na of 139, K 3.4, BUN 22/SCr 1.4,   Trop -ve x2.   CXR demonstrates Left midlung field linear atelectasis.  ECG demonstrated sinus arrythmia without ischemic changes.    Patient to be given 1 UPRC in the ED   Trop -ve x2; please follow up a Trop for 4pm  2D Echo   Cardio consulted for possible sick sinus syndrome on the ECG    #HTN  c/w norvasc 10mg PO qdaily  c/w lisinopril 40mg PO qdaily   c/w hydralazine 50mg PO TID    #HLD  c/w statin    #Bipolar Disorder  #Anxiety  c/w Latuda 20mg PO qdaily  c/w Lithium 150mg PO BID   c/w ativan .5mg PO qdaily         #DVT ppx: Heparin SubQ  #GI: PPI  #Activity: AAT  #Diet: DASH  Full Code

## 2021-09-17 NOTE — ED PROVIDER NOTE - OBJECTIVE STATEMENT
75 year old female with PMHx of HTN, DM, MDD, bipolar on lithium, anxiety, sent to [facility] by [facility] (assisted living) presents BIBA for chest pain. Patient reports one day of intermittent chest discomfort, non radiating localized, associated with bilateral hand numbness. Denies fevers, SOB, abdominal pain, N/V/D. Patient is AAOx3. Chest pain has subsided according to patient now.

## 2021-09-17 NOTE — ED ADULT TRIAGE NOTE - CHIEF COMPLAINT QUOTE
BIBA with complaints of chest pain, constipated x for more than a week and burning pain on urination. BIBA with complaints of on & off chest pain, constipation and burning pain on urination for more than a week. BIBA with complaints of on & off chest pain, constipation and burning pain on urination for awhile.

## 2021-09-17 NOTE — ED CDU PROVIDER INITIAL DAY NOTE - ATTENDING CONTRIBUTION TO CARE
75yo woman h/o HTN, DM, bipolar d/o, dementia was placed in CDU for transfusion after she was found to be anemic; she had presented to the ED c/o chest discomfort and tingling in her arms x 1 day. She is a poor historian re timeline of sx, knows she is confused, and having "problems with my memory." She lives at [facility], 2 sons are local. ED workup revealed anemia, but rectal exam with no blood. Anemia labs sent. On my eval, VS as noted, pt alert and cooperative but with mild cognitive disability/dementia. Neck supple, lungs CTA, CVS1S2 RRR abd soft, neuro intact other than confusion, which per sons, is baseline. Initial plan was for transfusion and d/c, however son gives additional hx of multiple visits to the ED (mostly [facility]) over the past few weeks for the same complaint, unsure if truly chest pain or just anxiety; he has noted poor PO intake and weight loss and feels pt's "mind is making her crazy." He reports that pt was on lithium for 3o years successfully, but after a recent admission for ASHLEY, it was discontinued and alternative regimen was prescribed. Given this additional history, admission seems more appropriate. Spoke with Dr Aguilar regarding family concerns; they have power of , but don't want to force their mother to do things she doesn't want to do. Will admit.

## 2021-09-17 NOTE — ED CDU PROVIDER INITIAL DAY NOTE - OBJECTIVE STATEMENT
77 yo F hx of DM, HTN, bipolar c/o intermittent chest pains with tingling to arms x 1 day. No SOB, n/v, or abdominal pains. Former smoker. No family hx of CAD.

## 2021-09-18 LAB
ALBUMIN SERPL ELPH-MCNC: 4.1 G/DL — SIGNIFICANT CHANGE UP (ref 3.5–5.2)
ALP SERPL-CCNC: 103 U/L — SIGNIFICANT CHANGE UP (ref 30–115)
ALT FLD-CCNC: 21 U/L — SIGNIFICANT CHANGE UP (ref 0–41)
ANION GAP SERPL CALC-SCNC: 12 MMOL/L — SIGNIFICANT CHANGE UP (ref 7–14)
AST SERPL-CCNC: 19 U/L — SIGNIFICANT CHANGE UP (ref 0–41)
BASOPHILS # BLD AUTO: 0.03 K/UL — SIGNIFICANT CHANGE UP (ref 0–0.2)
BASOPHILS NFR BLD AUTO: 0.4 % — SIGNIFICANT CHANGE UP (ref 0–1)
BILIRUB SERPL-MCNC: <0.2 MG/DL — SIGNIFICANT CHANGE UP (ref 0.2–1.2)
BUN SERPL-MCNC: 23 MG/DL — HIGH (ref 10–20)
CALCIUM SERPL-MCNC: 9.4 MG/DL — SIGNIFICANT CHANGE UP (ref 8.5–10.1)
CHLORIDE SERPL-SCNC: 102 MMOL/L — SIGNIFICANT CHANGE UP (ref 98–110)
CO2 SERPL-SCNC: 25 MMOL/L — SIGNIFICANT CHANGE UP (ref 17–32)
CREAT SERPL-MCNC: 1.4 MG/DL — SIGNIFICANT CHANGE UP (ref 0.7–1.5)
EOSINOPHIL # BLD AUTO: 0.18 K/UL — SIGNIFICANT CHANGE UP (ref 0–0.7)
EOSINOPHIL NFR BLD AUTO: 2.7 % — SIGNIFICANT CHANGE UP (ref 0–8)
GLUCOSE SERPL-MCNC: 157 MG/DL — HIGH (ref 70–99)
HAPTOGLOB SERPL-MCNC: 266 MG/DL — HIGH (ref 34–200)
HCT VFR BLD CALC: 25.8 % — LOW (ref 37–47)
HGB BLD-MCNC: 7.9 G/DL — LOW (ref 12–16)
IMM GRANULOCYTES NFR BLD AUTO: 0.3 % — SIGNIFICANT CHANGE UP (ref 0.1–0.3)
LYMPHOCYTES # BLD AUTO: 1.81 K/UL — SIGNIFICANT CHANGE UP (ref 1.2–3.4)
LYMPHOCYTES # BLD AUTO: 26.8 % — SIGNIFICANT CHANGE UP (ref 20.5–51.1)
MAGNESIUM SERPL-MCNC: 2.3 MG/DL — SIGNIFICANT CHANGE UP (ref 1.8–2.4)
MCHC RBC-ENTMCNC: 25.9 PG — LOW (ref 27–31)
MCHC RBC-ENTMCNC: 30.6 G/DL — LOW (ref 32–37)
MCV RBC AUTO: 84.6 FL — SIGNIFICANT CHANGE UP (ref 81–99)
MONOCYTES # BLD AUTO: 0.42 K/UL — SIGNIFICANT CHANGE UP (ref 0.1–0.6)
MONOCYTES NFR BLD AUTO: 6.2 % — SIGNIFICANT CHANGE UP (ref 1.7–9.3)
NEUTROPHILS # BLD AUTO: 4.29 K/UL — SIGNIFICANT CHANGE UP (ref 1.4–6.5)
NEUTROPHILS NFR BLD AUTO: 63.6 % — SIGNIFICANT CHANGE UP (ref 42.2–75.2)
NRBC # BLD: 0 /100 WBCS — SIGNIFICANT CHANGE UP (ref 0–0)
PHOSPHATE SERPL-MCNC: 3.1 MG/DL — SIGNIFICANT CHANGE UP (ref 2.1–4.9)
PLATELET # BLD AUTO: 236 K/UL — SIGNIFICANT CHANGE UP (ref 130–400)
POTASSIUM SERPL-MCNC: 4.1 MMOL/L — SIGNIFICANT CHANGE UP (ref 3.5–5)
POTASSIUM SERPL-SCNC: 4.1 MMOL/L — SIGNIFICANT CHANGE UP (ref 3.5–5)
PROT SERPL-MCNC: 6.6 G/DL — SIGNIFICANT CHANGE UP (ref 6–8)
RBC # BLD: 3.05 M/UL — LOW (ref 4.2–5.4)
RBC # FLD: 13.6 % — SIGNIFICANT CHANGE UP (ref 11.5–14.5)
SODIUM SERPL-SCNC: 139 MMOL/L — SIGNIFICANT CHANGE UP (ref 135–146)
WBC # BLD: 6.75 K/UL — SIGNIFICANT CHANGE UP (ref 4.8–10.8)
WBC # FLD AUTO: 6.75 K/UL — SIGNIFICANT CHANGE UP (ref 4.8–10.8)

## 2021-09-18 RX ADMIN — NYSTATIN CREAM 1 APPLICATION(S): 100000 CREAM TOPICAL at 17:20

## 2021-09-18 RX ADMIN — Medication 50 MILLIGRAM(S): at 21:46

## 2021-09-18 RX ADMIN — AMLODIPINE BESYLATE 10 MILLIGRAM(S): 2.5 TABLET ORAL at 05:50

## 2021-09-18 RX ADMIN — NYSTATIN CREAM 1 APPLICATION(S): 100000 CREAM TOPICAL at 05:50

## 2021-09-18 RX ADMIN — PREGABALIN 1000 MICROGRAM(S): 225 CAPSULE ORAL at 12:15

## 2021-09-18 RX ADMIN — Medication 1 APPLICATION(S): at 19:07

## 2021-09-18 RX ADMIN — Medication 50 MILLIGRAM(S): at 05:50

## 2021-09-18 RX ADMIN — DONEPEZIL HYDROCHLORIDE 5 MILLIGRAM(S): 10 TABLET, FILM COATED ORAL at 21:46

## 2021-09-18 RX ADMIN — Medication 50 MILLIGRAM(S): at 17:19

## 2021-09-18 RX ADMIN — Medication 81 MILLIGRAM(S): at 12:14

## 2021-09-18 RX ADMIN — Medication 5 MILLIGRAM(S): at 21:46

## 2021-09-18 RX ADMIN — ATORVASTATIN CALCIUM 40 MILLIGRAM(S): 80 TABLET, FILM COATED ORAL at 21:46

## 2021-09-18 RX ADMIN — PANTOPRAZOLE SODIUM 40 MILLIGRAM(S): 20 TABLET, DELAYED RELEASE ORAL at 06:00

## 2021-09-18 RX ADMIN — Medication 1 APPLICATION(S): at 05:50

## 2021-09-18 RX ADMIN — LISINOPRIL 40 MILLIGRAM(S): 2.5 TABLET ORAL at 05:49

## 2021-09-18 RX ADMIN — Medication 325 MILLIGRAM(S): at 12:15

## 2021-09-18 RX ADMIN — Medication 0.5 MILLIGRAM(S): at 12:17

## 2021-09-18 NOTE — PROVIDER CONTACT NOTE (OTHER) - BACKGROUND
Pt is a 76 yr old female, admitted with anemia and chest pain. Pt is A+Ox4, ambulating in the room. Pt has a HX of bipolar disorder, MDD, diabetes and HTN.

## 2021-09-18 NOTE — PROVIDER CONTACT NOTE (OTHER) - SITUATION
Pt has a blood transfusion ordered since 9/17/21 at 6 Am. Pt has been refusing the transfusion on previous shift. Pt H+h 7.8. I spoke to pt and educate pt about blood transfusion.

## 2021-09-19 LAB
COVID-19 SPIKE DOMAIN AB INTERP: POSITIVE
COVID-19 SPIKE DOMAIN ANTIBODY RESULT: >250 U/ML — HIGH
HCT VFR BLD CALC: 24.8 % — LOW (ref 37–47)
HGB BLD-MCNC: 7.6 G/DL — LOW (ref 12–16)
MCHC RBC-ENTMCNC: 26.2 PG — LOW (ref 27–31)
MCHC RBC-ENTMCNC: 30.6 G/DL — LOW (ref 32–37)
MCV RBC AUTO: 85.5 FL — SIGNIFICANT CHANGE UP (ref 81–99)
NRBC # BLD: 0 /100 WBCS — SIGNIFICANT CHANGE UP (ref 0–0)
PLATELET # BLD AUTO: 235 K/UL — SIGNIFICANT CHANGE UP (ref 130–400)
RBC # BLD: 2.9 M/UL — LOW (ref 4.2–5.4)
RBC # FLD: 13.2 % — SIGNIFICANT CHANGE UP (ref 11.5–14.5)
SARS-COV-2 IGG+IGM SERPL QL IA: >250 U/ML — HIGH
SARS-COV-2 IGG+IGM SERPL QL IA: POSITIVE
TROPONIN T SERPL-MCNC: <0.01 NG/ML — SIGNIFICANT CHANGE UP
WBC # BLD: 6.47 K/UL — SIGNIFICANT CHANGE UP (ref 4.8–10.8)
WBC # FLD AUTO: 6.47 K/UL — SIGNIFICANT CHANGE UP (ref 4.8–10.8)

## 2021-09-19 PROCEDURE — 99232 SBSQ HOSP IP/OBS MODERATE 35: CPT

## 2021-09-19 RX ORDER — ALPRAZOLAM 0.25 MG
0.5 TABLET ORAL AT BEDTIME
Refills: 0 | Status: DISCONTINUED | OUTPATIENT
Start: 2021-09-19 | End: 2021-09-21

## 2021-09-19 RX ADMIN — Medication 0.5 MILLIGRAM(S): at 22:06

## 2021-09-19 RX ADMIN — Medication 50 MILLIGRAM(S): at 06:17

## 2021-09-19 RX ADMIN — Medication 325 MILLIGRAM(S): at 11:39

## 2021-09-19 RX ADMIN — AMLODIPINE BESYLATE 10 MILLIGRAM(S): 2.5 TABLET ORAL at 06:17

## 2021-09-19 RX ADMIN — DONEPEZIL HYDROCHLORIDE 5 MILLIGRAM(S): 10 TABLET, FILM COATED ORAL at 22:06

## 2021-09-19 RX ADMIN — Medication 50 MILLIGRAM(S): at 22:06

## 2021-09-19 RX ADMIN — Medication 1 APPLICATION(S): at 05:40

## 2021-09-19 RX ADMIN — PREGABALIN 1000 MICROGRAM(S): 225 CAPSULE ORAL at 11:39

## 2021-09-19 RX ADMIN — Medication 81 MILLIGRAM(S): at 11:39

## 2021-09-19 RX ADMIN — PANTOPRAZOLE SODIUM 40 MILLIGRAM(S): 20 TABLET, DELAYED RELEASE ORAL at 06:17

## 2021-09-19 RX ADMIN — LISINOPRIL 40 MILLIGRAM(S): 2.5 TABLET ORAL at 06:17

## 2021-09-19 RX ADMIN — NYSTATIN CREAM 1 APPLICATION(S): 100000 CREAM TOPICAL at 05:40

## 2021-09-19 RX ADMIN — Medication 50 MILLIGRAM(S): at 15:15

## 2021-09-19 RX ADMIN — Medication 5 MILLIGRAM(S): at 22:06

## 2021-09-19 RX ADMIN — ATORVASTATIN CALCIUM 40 MILLIGRAM(S): 80 TABLET, FILM COATED ORAL at 22:06

## 2021-09-19 NOTE — PROGRESS NOTE ADULT - ASSESSMENT
76 year old F with a PMHx of HTN, HLD, Bipolar Disorder, Anxiety, presents to the ED with a one day history of history of pain.  Patient endorsed that her chest pain was localized to the midsternum    #Atypical Chest Pain   CBC demonstrated a Hgb of 7.8 (Baseline 9), MCV 84,   Trop -ve x2.   ECG demonstrated sinus arrythmia without ischemic changes.    Cardio consulted for possible sick sinus syndrome on the ECG    #Bipolar Disorder  #Suicidal ideations in the past   Appears very anxious,   c/w Latuda 20mg PO q daily and lithium for now.   Latuda can cause anxiety.   Need to verify Ativan with patient's pharmacy.    psychiatry eval.    #HTN  c/w norvasc 10mg PO qdaily  c/w lisinopril 40mg PO qdaily   c/w hydralazine 50mg PO TID    #HLD  c/w statin    #DVT ppx: Heparin SubQ#GI: PPI  #Activity: AAT  #Diet: DASH  Full Code    #Progress Note Handoff  Pending (specify):  Psychiatry   Disposition: Assisted living.

## 2021-09-19 NOTE — CHART NOTE - NSCHARTNOTEFT_GEN_A_CORE
During mornings rounds patient repeatedly described how she "cant do anything" when referring to life and how she "wants things to be over and cant continue living like this"  She had no further description other than these, answering to all questions with "there is no solution"    1:1 observation

## 2021-09-19 NOTE — PROGRESS NOTE ADULT - SUBJECTIVE AND OBJECTIVE BOX
KINGSTON PETTY  76y  Female      Patient is a 76y old  Female who presents with a chief complaint of Chest Pain (19 Sep 2021 10:08)      INTERVAL HPI/OVERNIGHT EVENTS:      ******************************* REVIEW OF SYSTEMS:**********************************************    CONSTITUTIONAL: No fever, weight loss, or fatigue  RESPIRATORY: No cough, wheezing, chills or hemoptysis; No shortness of breath  CARDIOVASCULAR: No chest pain, palpitations, dizziness, or leg swelling  GASTROINTESTINAL: No abdominal or epigastric pain. No nausea, vomiting, or hematemesis; No diarrhea or constipation. No melena or hematochezia.  GENITOURINARY: No dysuria, frequency, hematuria, or incontinence  NEUROLOGICAL: No headaches, memory loss, loss of strength, numbness, or tremors  SKIN: No itching, burning, rashes, or lesions   MUSCULOSKELETAL: No joint pain or swelling; No muscle, back, or extremity pain  PSYCHIATRIC: No depression, anxiety, mood swings, or difficulty sleeping    All other review of systems negative    *********************** VITALS ******************************************    T(F): 96.6 (09-19-21 @ 13:35)  HR: 64 (09-19-21 @ 15:10) (63 - 97)  BP: 145/67 (09-19-21 @ 15:10) (118/62 - 147/67)  RR: 17 (09-19-21 @ 15:10) (17 - 20)  SpO2: --            ******************************** PHYSICAL EXAM:**************************************************  GENERAL: NAD    PSYCH: no agitation, baseline mentation  HEENT:     NERVOUS SYSTEM:  Alert & Oriented X3, MS  5/5 B/L  UE and LE ; Sensory intact    PULMONARY: TICO, CTA    CARDIOVASCULAR: S1S2 RRR    GI: Soft, NT, ND; BS present.    EXTREMITIES:  2+ Peripheral Pulses, No clubbing, cyanosis, or edema    LYMPH: No lymphadenopathy noted    SKIN: No rashes or lesions      **************************** LABS *******************************************************                          7.9    6.75  )-----------( 236      ( 18 Sep 2021 04:30 )             25.8     09-18    139  |  102  |  23<H>  ----------------------------<  157<H>  4.1   |  25  |  1.4    Ca    9.4      18 Sep 2021 04:30  Phos  3.1     09-18  Mg     2.3     09-18    TPro  6.6  /  Alb  4.1  /  TBili  <0.2  /  DBili  x   /  AST  19  /  ALT  21  /  AlkPhos  103  09-18          Lactate Trend    CARDIAC MARKERS ( 17 Sep 2021 22:13 )  x     / <0.01 ng/mL / x     / x     / x      CARDIAC MARKERS ( 17 Sep 2021 17:16 )  x     / <0.01 ng/mL / x     / x     / x          CAPILLARY BLOOD GLUCOSE              **************************Active Medications *******************************************  amoxicillin (Unknown)  Iodides (Unknown)      amLODIPine   Tablet 10 milliGRAM(s) Oral daily  aspirin  chewable 81 milliGRAM(s) Oral daily  atorvastatin 40 milliGRAM(s) Oral at bedtime  clotrimazole 1% Cream 1 Application(s) Topical two times a day  cyanocobalamin 1000 MICROGram(s) Oral daily  donepezil 5 milliGRAM(s) Oral at bedtime  ferrous    sulfate 325 milliGRAM(s) Oral daily  hydrALAZINE 50 milliGRAM(s) Oral every 8 hours  lisinopril 40 milliGRAM(s) Oral daily  melatonin 5 milliGRAM(s) Oral at bedtime  nystatin Cream 1 Application(s) Topical two times a day  pantoprazole    Tablet 40 milliGRAM(s) Oral before breakfast      ***************************************************  RADIOLOGY & ADDITIONAL TESTS:    Imaging Personally Reviewed:  [ ] YES  [ ] NO    HEALTH ISSUES - PROBLEM Dx:

## 2021-09-19 NOTE — PROGRESS NOTE ADULT - SUBJECTIVE AND OBJECTIVE BOX
[facility] day: 2    HPI      76 year old F with a PMHx of HTN, HLD, Bipolar Disorder, Anxiety, presents to the ED with a one day history of history of pain.  Patient endorsed that her chest pain was localized to the midsternum    #Atypical Chest Pain vs. Typical Chest Pain 2/2 to symptomatic Anemia  CBC demonstrated a Hgb of 7.8 (Baseline 9), MCV 84,   CMP demonstrated a Na of 139, K 3.4, BUN 22/SCr 1.4,   Trop -ve x2.   CXR demonstrates Left midlung field linear atelectasis.  ECG demonstrated sinus arrythmia without ischemic changes.    Patient to be given 1 UPRC in the ED   Trop -ve x2; please follow up a Trop for 4pm  2D Echo   Cardio consulted for possible sick sinus syndrome on the ECG    #HTN  c/w norvasc 10mg PO qdaily  c/w lisinopril 40mg PO qdaily   c/w hydralazine 50mg PO TID    #HLD  c/w statin    #Bipolar Disorder  #Anxiety  c/w Latuda 20mg PO qdaily  c/w Lithium 150mg PO BID   c/w ativan .5mg PO qdaily         #DVT ppx: Heparin SubQ  #GI: PPI  #Activity: AAT  #Diet: DASH  Full Code [facility] day: 2    HPI    76 year old F with a PMHx of HTN, HLD, Bipolar Disorder, Anxiety, presents to the ED with a one day history of history of pain.  Patient endorsed that her chest pain was localized to the midsternum    Subjective :   Overnight events: none   Complaints: repeatedly describing how she cant keep living    Objective:  Vital Signs Last 24 Hrs  T(C): 36.7 (19 Sep 2021 04:00), Max: 36.7 (18 Sep 2021 14:26)  T(F): 98 (19 Sep 2021 04:00), Max: 98 (18 Sep 2021 14:26)  HR: 67 (19 Sep 2021 04:00) (64 - 97)  BP: 131/70 (19 Sep 2021 04:00) (118/62 - 147/67)  BP(mean): --  RR: 18 (19 Sep 2021 04:00) (16 - 20)  SpO2: --    MEDICATIONS  (STANDING):  amLODIPine   Tablet 10 milliGRAM(s) Oral daily  aspirin  chewable 81 milliGRAM(s) Oral daily  atorvastatin 40 milliGRAM(s) Oral at bedtime  clotrimazole 1% Cream 1 Application(s) Topical two times a day  cyanocobalamin 1000 MICROGram(s) Oral daily  donepezil 5 milliGRAM(s) Oral at bedtime  ferrous    sulfate 325 milliGRAM(s) Oral daily  hydrALAZINE 50 milliGRAM(s) Oral every 8 hours  lisinopril 40 milliGRAM(s) Oral daily  melatonin 5 milliGRAM(s) Oral at bedtime  nystatin Cream 1 Application(s) Topical two times a day  pantoprazole    Tablet 40 milliGRAM(s) Oral before breakfast    Home Medications:  amLODIPine 10 mg oral tablet: 1 tab(s) orally once a day (25 Jul 2021 08:40)  aspirin 81 mg oral tablet, chewable: 1 tab(s) orally once a day (17 Sep 2021 14:02)  Ativan 0.5 mg oral tablet: 1 tab(s) orally once a day (17 Sep 2021 14:35)  atorvastatin 40 mg oral tablet: 1 tab(s) orally once a day (17 Jul 2021 23:52)  donepezil 5 mg oral tablet: 1 tab(s) orally once a day (at bedtime) (17 Sep 2021 14:03)  ferrous sulfate 324 mg (65 mg elemental iron) oral delayed release tablet: 1 tab(s) orally once a day (17 Sep 2021 14:03)  glimepiride 4 mg oral tablet: 1 tab(s) orally once a day (17 Jul 2021 23:54)  hydrALAZINE 50 mg oral tablet: 1 tab(s) orally 3 times a day (17 Sep 2021 14:02)  Latuda 20 mg oral tablet: 1 tab(s) orally once a day (17 Sep 2021 14:02)  lisinopril 40 mg oral tablet: 1 tab(s) orally once a day (17 Sep 2021 14:02)  metoprolol succinate 50 mg oral tablet, extended release: 1 tab(s) orally once a day (17 Jul 2021 23:56)  omeprazole 20 mg oral delayed release tablet: 1 tab(s) orally once a day (17 Sep 2021 14:04)  sodium bicarbonate 650 mg oral tablet: 2 tab(s) orally 3 times a day (17 Sep 2021 14:03)  torsemide 20 mg oral tablet: 1 tab(s) orally once a day (17 Sep 2021 14:03)  Vitamin B12 1000 mcg oral tablet: 1 tab(s) orally once a day (17 Sep 2021 14:02)    Physical Examination:  GENERAL: NAD, lying in bed comfortably  HEAD:  Atraumatic, Normocephalic  NECK: Supple, No JVD  CHEST/LUNG: Clear to auscultation bilaterally; No rales, rhonchi, wheezing, or rubs. Unlabored respirations  HEART: Regular rate and rhythm; No murmurs, rubs, or gallops  ABDOMEN: Bowel sounds present; Soft, Nontender, Nondistended. No hepatomegally  EXTREMITIES:  2+ Peripheral Pulses, brisk capillary refill. No clubbing, cyanosis, or edema  NERVOUS SYSTEM:  Alert & Oriented X3, speech clear. No deficits   MSK: FROM all 4 extremities, full and equal strength                            7.9    6.75  )-----------( 236      ( 18 Sep 2021 04:30 )             25.8   09-18    139  |  102  |  23<H>  ----------------------------<  157<H>  4.1   |  25  |  1.4    Ca    9.4      18 Sep 2021 04:30  Phos  3.1     09-18  Mg     2.3     09-18    TPro  6.6  /  Alb  4.1  /  TBili  <0.2  /  DBili  x   /  AST  19  /  ALT  21  /  AlkPhos  103  09-18    Troponin T, Serum: <0.01 ng/mL (09.17.21 @ 22:13)      Assessment and plan:  76 year old F with a PMHx of HTN, HLD, Bipolar Disorder, Anxiety, presents to the ED with a one day history of history of pain.  Patient endorsed that her chest pain was localized to the midsternum    #Atypical Chest Pain  symptomatic Anemia  CBC demonstrated a Hgb of 7.8 (Baseline 9), MCV 84,   Trop -ve x2.   ECG demonstrated sinus arrythmia without ischemic changes.    Patient to be given 1 UPRC in the ED   Cardio consulted for possible sick sinus syndrome on the ECG    #Bipolar Disorder  #Suicidal ideations  c/w Latuda 20mg PO qdaily and lithium  1:1 observations   Consult psychiatry     #HTN  c/w norvasc 10mg PO qdaily  c/w lisinopril 40mg PO qdaily   c/w hydralazine 50mg PO TID    #HLD  c/w statin    #DVT ppx: Heparin SubQ  #GI: PPI  #Activity: AAT  #Diet: DASH  Full Code

## 2021-09-19 NOTE — PHYSICAL THERAPY INITIAL EVALUATION ADULT - GENERAL OBSERVATIONS, REHAB EVAL
pt. encountered in bed in NAD. Pt pleasant and cooperative and willing to participate in therapy. [phone]

## 2021-09-20 DIAGNOSIS — F31.9 BIPOLAR DISORDER, UNSPECIFIED: ICD-10-CM

## 2021-09-20 LAB
ALBUMIN SERPL ELPH-MCNC: 3.9 G/DL — SIGNIFICANT CHANGE UP (ref 3.5–5.2)
ALP SERPL-CCNC: 91 U/L — SIGNIFICANT CHANGE UP (ref 30–115)
ALT FLD-CCNC: 16 U/L — SIGNIFICANT CHANGE UP (ref 0–41)
ANION GAP SERPL CALC-SCNC: 10 MMOL/L — SIGNIFICANT CHANGE UP (ref 7–14)
AST SERPL-CCNC: 14 U/L — SIGNIFICANT CHANGE UP (ref 0–41)
BASOPHILS # BLD AUTO: 0.03 K/UL — SIGNIFICANT CHANGE UP (ref 0–0.2)
BASOPHILS NFR BLD AUTO: 0.6 % — SIGNIFICANT CHANGE UP (ref 0–1)
BILIRUB SERPL-MCNC: <0.2 MG/DL — SIGNIFICANT CHANGE UP (ref 0.2–1.2)
BLD GP AB SCN SERPL QL: SIGNIFICANT CHANGE UP
BUN SERPL-MCNC: 27 MG/DL — HIGH (ref 10–20)
CALCIUM SERPL-MCNC: 9.6 MG/DL — SIGNIFICANT CHANGE UP (ref 8.5–10.1)
CHLORIDE SERPL-SCNC: 107 MMOL/L — SIGNIFICANT CHANGE UP (ref 98–110)
CO2 SERPL-SCNC: 24 MMOL/L — SIGNIFICANT CHANGE UP (ref 17–32)
CREAT SERPL-MCNC: 1.6 MG/DL — HIGH (ref 0.7–1.5)
EOSINOPHIL # BLD AUTO: 0.19 K/UL — SIGNIFICANT CHANGE UP (ref 0–0.7)
EOSINOPHIL NFR BLD AUTO: 3.6 % — SIGNIFICANT CHANGE UP (ref 0–8)
GLUCOSE SERPL-MCNC: 153 MG/DL — HIGH (ref 70–99)
HCT VFR BLD CALC: 24.9 % — LOW (ref 37–47)
HGB BLD-MCNC: 7.4 G/DL — LOW (ref 12–16)
IMM GRANULOCYTES NFR BLD AUTO: 0.4 % — HIGH (ref 0.1–0.3)
LYMPHOCYTES # BLD AUTO: 1.83 K/UL — SIGNIFICANT CHANGE UP (ref 1.2–3.4)
LYMPHOCYTES # BLD AUTO: 34.2 % — SIGNIFICANT CHANGE UP (ref 20.5–51.1)
MAGNESIUM SERPL-MCNC: 2.2 MG/DL — SIGNIFICANT CHANGE UP (ref 1.8–2.4)
MCHC RBC-ENTMCNC: 26.1 PG — LOW (ref 27–31)
MCHC RBC-ENTMCNC: 29.7 G/DL — LOW (ref 32–37)
MCV RBC AUTO: 88 FL — SIGNIFICANT CHANGE UP (ref 81–99)
MONOCYTES # BLD AUTO: 0.35 K/UL — SIGNIFICANT CHANGE UP (ref 0.1–0.6)
MONOCYTES NFR BLD AUTO: 6.5 % — SIGNIFICANT CHANGE UP (ref 1.7–9.3)
NEUTROPHILS # BLD AUTO: 2.93 K/UL — SIGNIFICANT CHANGE UP (ref 1.4–6.5)
NEUTROPHILS NFR BLD AUTO: 54.7 % — SIGNIFICANT CHANGE UP (ref 42.2–75.2)
NRBC # BLD: 0 /100 WBCS — SIGNIFICANT CHANGE UP (ref 0–0)
PLATELET # BLD AUTO: 197 K/UL — SIGNIFICANT CHANGE UP (ref 130–400)
POTASSIUM SERPL-MCNC: 4.4 MMOL/L — SIGNIFICANT CHANGE UP (ref 3.5–5)
POTASSIUM SERPL-SCNC: 4.4 MMOL/L — SIGNIFICANT CHANGE UP (ref 3.5–5)
PROT SERPL-MCNC: 6 G/DL — SIGNIFICANT CHANGE UP (ref 6–8)
RBC # BLD: 2.83 M/UL — LOW (ref 4.2–5.4)
RBC # FLD: 13.2 % — SIGNIFICANT CHANGE UP (ref 11.5–14.5)
SODIUM SERPL-SCNC: 141 MMOL/L — SIGNIFICANT CHANGE UP (ref 135–146)
TROPONIN T SERPL-MCNC: <0.01 NG/ML — SIGNIFICANT CHANGE UP
WBC # BLD: 5.35 K/UL — SIGNIFICANT CHANGE UP (ref 4.8–10.8)
WBC # FLD AUTO: 5.35 K/UL — SIGNIFICANT CHANGE UP (ref 4.8–10.8)

## 2021-09-20 PROCEDURE — 99221 1ST HOSP IP/OBS SF/LOW 40: CPT

## 2021-09-20 PROCEDURE — 99233 SBSQ HOSP IP/OBS HIGH 50: CPT

## 2021-09-20 RX ORDER — LITHIUM CARBONATE 300 MG/1
150 TABLET, EXTENDED RELEASE ORAL EVERY 12 HOURS
Refills: 0 | Status: DISCONTINUED | OUTPATIENT
Start: 2021-09-20 | End: 2021-09-21

## 2021-09-20 RX ORDER — IRON SUCROSE 20 MG/ML
200 INJECTION, SOLUTION INTRAVENOUS ONCE
Refills: 0 | Status: COMPLETED | OUTPATIENT
Start: 2021-09-20 | End: 2021-09-20

## 2021-09-20 RX ORDER — IRON SUCROSE 20 MG/ML
200 INJECTION, SOLUTION INTRAVENOUS ONCE
Refills: 0 | Status: DISCONTINUED | OUTPATIENT
Start: 2021-09-20 | End: 2021-09-20

## 2021-09-20 RX ORDER — METOPROLOL TARTRATE 50 MG
25 TABLET ORAL DAILY
Refills: 0 | Status: DISCONTINUED | OUTPATIENT
Start: 2021-09-20 | End: 2021-09-23

## 2021-09-20 RX ADMIN — LISINOPRIL 40 MILLIGRAM(S): 2.5 TABLET ORAL at 05:31

## 2021-09-20 RX ADMIN — IRON SUCROSE 110 MILLIGRAM(S): 20 INJECTION, SOLUTION INTRAVENOUS at 11:20

## 2021-09-20 RX ADMIN — PREGABALIN 1000 MICROGRAM(S): 225 CAPSULE ORAL at 12:36

## 2021-09-20 RX ADMIN — DONEPEZIL HYDROCHLORIDE 5 MILLIGRAM(S): 10 TABLET, FILM COATED ORAL at 21:54

## 2021-09-20 RX ADMIN — Medication 1 APPLICATION(S): at 05:38

## 2021-09-20 RX ADMIN — ATORVASTATIN CALCIUM 40 MILLIGRAM(S): 80 TABLET, FILM COATED ORAL at 21:54

## 2021-09-20 RX ADMIN — AMLODIPINE BESYLATE 10 MILLIGRAM(S): 2.5 TABLET ORAL at 05:31

## 2021-09-20 RX ADMIN — LITHIUM CARBONATE 150 MILLIGRAM(S): 300 TABLET, EXTENDED RELEASE ORAL at 17:31

## 2021-09-20 RX ADMIN — Medication 50 MILLIGRAM(S): at 21:54

## 2021-09-20 RX ADMIN — Medication 0.5 MILLIGRAM(S): at 22:02

## 2021-09-20 RX ADMIN — Medication 81 MILLIGRAM(S): at 12:35

## 2021-09-20 RX ADMIN — Medication 50 MILLIGRAM(S): at 05:31

## 2021-09-20 RX ADMIN — PANTOPRAZOLE SODIUM 40 MILLIGRAM(S): 20 TABLET, DELAYED RELEASE ORAL at 06:02

## 2021-09-20 RX ADMIN — Medication 50 MILLIGRAM(S): at 13:19

## 2021-09-20 RX ADMIN — NYSTATIN CREAM 1 APPLICATION(S): 100000 CREAM TOPICAL at 05:39

## 2021-09-20 RX ADMIN — Medication 0.5 MILLIGRAM(S): at 21:55

## 2021-09-20 NOTE — BEHAVIORAL HEALTH ASSESSMENT NOTE - ACTIVATING EVENTS/STRESSORS
Triggering events leading to humiliation, shame, and/or despair (e.g. Loss of relationship, financial or health status) (real or anticipated)/Change in provider or treatment (i.e., medications, psychotherapy, milieu)/Acute medical problem

## 2021-09-20 NOTE — BEHAVIORAL HEALTH ASSESSMENT NOTE - HPI (INCLUDE ILLNESS QUALITY, SEVERITY, DURATION, TIMING, CONTEXT, MODIFYING FACTORS, ASSOCIATED SIGNS AND SYMPTOMS)
76F, , domiciled at [facility] assisted living facility for the past 7 years with a PMHX of DM, HTN, PPHx of of MDD, bipolar disorder, anxiety BIBEMS to the ED for chest pain.  Was admitted for workup of atypical chest pain.  psych consulted for patient stating that "she cannot continue living."      Upon approach pt is laying in bed, wearing clothes from home, good hygiene and grooming, behavior is somewhat uncooperative, pt is A&Ox4.  Speech is linear however she appears thought blocked vs. patient is uncooperative.      Spoke with son, Catarino Rodarte [phone]  who states that since the Lithium was stopped, the patient has not been doing well.  She's not functioning, can't dress herself or shower, more depressed, has expressed not wanting to live like this anymore, but has never expressed wanting to kill herself, having a plan, or intent.  Has mood swings, shopping and overspending, going out every day, .   She had ECT had 2 separate times in her life, 25 years ago and then and then again 7 years ago.  She had her own apartment for years but then was depressed.  Had ECT while she was living at [facility].  Then went to [facility].  Then 7 years ago moved to [facility].  No hx of SA, .  Last time she was had elevated mood was this summer.  Dementia since 10 years ago and getting.  She had difficulties remembering where she is, who certain family members are.  This tends to fluctuate; ;at times she is more cooperative and her memory seems to be better at those times.  Had rough childhood, grew up in poverty, she had an affair,  and then  kicked her out of the house .   Ex  passed away 3 years ago; she has guilt.  7 Grandchildren.  Sons visit regularly and are supportive.  She doesn't have much a relationship with the .  Very negative thoughts and pessimistic, even when she was on lithium.      [facility] due to altered mental status for the past 3-4 days and was found to be COVID positive and have ASHLEY on admission. Psychiatry was consulted for lithium toxicity and possible alternative medication for management of bipolar disorder. Patient was delirious and lithium levels were found to be in toxic range and lithium was held on 7/19. 76F, , domiciled at [facility] assisted living facility for the past 7 years with a PMHX of DM, HTN, PPHx of of MDD, bipolar disorder, anxiety BIBEMS to the ED for chest pain.  Was admitted for workup of atypical chest pain.  psych consulted for patient stating that "she cannot continue living."      Upon approach pt is laying in bed, wearing clothes from home, good hygiene and grooming, behavior is somewhat uncooperative, pt is A&Ox4.  Speech is linear however she appears thought blocked vs. patient is uncooperative.  she ruminates in feeling that she "can't function" but does not elaborate on this despite asking multiple questions.  patient was asked to participate in tasks of recall, executive function; patient very uncooperative and did not participate.  She was able to repeat 3 objects when the writer prompted her to, however was uncooperative when asked to recall them.  Refused to make a guess at who the POTUS is.      Patient states that she has trouble going ot the bathroom.   She denies constipation, diarrhea, abdominal pain, or other bowel habit issues.  When asked further questions she states that she "cant do anything anymore."  Unclear if patient feels she has physical imitations, pain, changes in independence/function, overall patient is very uncooperative and repeats the same statement over and over.     She endorses feeling hopeless, worthless, depressed, poor appetite, poor focus, poor sleep; endorse anhedonia and not wanting to do anything.  Used to enjoy being social, going shopping, cooking; has no interest in these anymore.  Has no appetite and does not enjoy food.  Admits to feeling like "can't live like this anymore" but denies passive SI, any plan or intent for suicide, past suicide attempt, past self harm.  Endorses feeling overwhelmed but denies constant worrying, feeling on edge, anxiety attacks, muscle tension.  Denies manic symptoms, AVH, paranoia.  Denies past hx of a manic episode.     Denies IPP admissions in the past, rehab visits, hx of substance use.  Denies any alcohol or substance use now.  Denies ECT treatments. Patient states that she was dx with bipolar disorder "a long time ago."  Endorses that she was on lithium for 30 years and it was helpful for her mood.  She states that she was taken off this medication recently and was then started on latuda but the medication was not helpful.  She has been feeling depressed ever since stopping [place].  Patient states she also has been dx with dementia "a while ago" and has had worsening memory problems (unclear timeline) and endorses difficulty remembering faces, names, factual data from the past.      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  Patient likes living at [facility].  Denies issues or recent stressors.  She has 2 sons and 7 grandchildren; endorses good relationship with family and they are supportive.       Spoke with son, Catarino Rodarte [phone]  who states that since the Lithium was stopped, the patient has not been doing well.  She's not functioning, can't dress herself or shower, more depressed, has expressed not wanting to live like this anymore, but has never expressed wanting to kill herself, having a plan, or intent.  Has mood swings, shopping and overspending, going out every day, .   She had ECT had 2 separate times in her life, 25 years ago and then and then again 7 years ago.  She had her own apartment for years but then was depressed.  Had ECT while she was living at [facility].  Then went to [facility].  Then 7 years ago moved to [facility].  No hx of SA, .  Last time she was had elevated mood was this summer.  Dementia since 10 years ago and getting.  She had difficulties remembering where she is, who certain family members are.  This tends to fluctuate; ;at times she is more cooperative and her memory seems to be better at those times.  Had rough childhood, grew up in poverty, she had an affair,  and then  kicked her out of the house .   Ex  passed away 3 years ago; she has guilt.  7 Grandchildren.  Sons visit regularly and are supportive.  She doesn't have much a relationship with the .  Very negative thoughts and pessimistic, even when she was on lithium.      [facility] due to altered mental status for the past 3-4 days and was found to be COVID positive and have ASHLEY on admission. Psychiatry was consulted for lithium toxicity and possible alternative medication for management of bipolar disorder. Patient was delirious and lithium levels were found to be in toxic range and lithium was held on 7/19. 76F, , domiciled at [facility] assisted living facility for the past 7 years with a PMHX of DM, HTN, PPHx of of MDD, bipolar disorder, anxiety BIBEMS to the ED for chest pain.  Was admitted for workup of atypical chest pain.  psych consulted for patient stating that "she cannot continue living."      Upon approach pt is laying in bed, wearing clothes from home, good hygiene and grooming, behavior is somewhat uncooperative, pt is A&Ox4.  Speech is linear however she appears thought blocked vs. patient is uncooperative.  she ruminates in feeling that she "can't function" but does not elaborate on this despite asking multiple questions.  patient was asked to participate in tasks of recall, executive function; patient very uncooperative and did not participate.  She was able to repeat 3 objects when the writer prompted her to, however was uncooperative when asked to recall them.  Refused to make a guess at who the POTUS is.      Patient states that she has trouble going ot the bathroom.   She denies constipation, diarrhea, abdominal pain, or other bowel habit issues.  When asked further questions she states that she "cant do anything anymore."  Unclear if patient feels she has physical imitations, pain, changes in independence/function, overall patient is very uncooperative and repeats the same statement over and over.     She endorses feeling hopeless, worthless, depressed, poor appetite, poor focus, poor sleep; endorse anhedonia and not wanting to do anything.  Used to enjoy being social, going shopping, cooking; has no interest in these anymore.  Has no appetite and does not enjoy food.  Admits to feeling like "can't live like this anymore" but denies passive SI, any plan or intent for suicide, past suicide attempt, past self harm.  Endorses feeling overwhelmed but denies constant worrying, feeling on edge, anxiety attacks, muscle tension.  Denies manic symptoms, AVH, paranoia.  Denies past hx of a manic episode.     Denies IPP admissions in the past, rehab visits, hx of substance use.  Denies any alcohol or substance use now.  Denies ECT treatments. Patient states that she was dx with bipolar disorder "a long time ago."  Endorses that she was on lithium for 30 years and it was helpful for her mood.  She states that she was taken off this medication recently and was then started on latuda but the medication was not helpful.  She has been feeling depressed ever since stopping [place].  Patient states she also has been dx with dementia "a while ago" and has had worsening memory problems (unclear timeline) and endorses difficulty remembering faces, names, factual data from the past.      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, then [facility], and now at [facility].  Patient likes living at [facility].  Denies issues or recent stressors.  She has 2 sons and 7 grandchildren; endorses good relationship with family and they are supportive.   Patient endorses strong will to live and dedication to her family.  Has goals of getting back to a healthier state and enjoying things she used to enjoy, e.g. her social life.      Spoke with son, Catarino Rodarte [phone]  who states that since the Lithium was stopped, the patient has not been doing well.  She's not functioning, can't dress herself or shower, more depressed, has expressed not wanting to live like this anymore, but has never expressed wanting to kill herself, having a plan, or intent.  Has mood swings, shopping and overspending, going out every day, .   She had ECT had 2 separate times in her life, 25 years ago and then and then again 7 years ago.  She had her own apartment for years but then was depressed.  Had ECT while she was living at [facility].  Then went to [facility].  Then 7 years ago moved to [facility].  No hx of SA, .  Last time she was had elevated mood was this summer.  Dementia since 10 years ago and getting.  She had difficulties remembering where she is, who certain family members are.  This tends to fluctuate; at times she is more cooperative and her memory seems to be better at those times.  Had rough childhood, grew up in poverty, she had an affair,  and then  kicked her out of the house .   Ex  passed away 3 years ago; she has guilt.  7 Grandchildren.  Sons visit regularly and are supportive.  She doesn't have much a relationship with the grandchildren, per Catarino.  Very negative thoughts and pessimistic, even when she was on lithium; this appears to be a chronic aspect of her personality.  the Son states that even during her more elevated mood swings when she was very social, spending a lot of money shopping, the patient was still very negative and this is why her relationship with the sons was very strained, in addition to the issue that the sons feel she ruined her marriage with their father.      Spoke with  at [facility].  Patient was sent to the hospital in July 2021 for AMS, found to have UTI and lithium toxicity.  Patient was discharged back to [facility], was restarted on Lithium but then had AMS again and was sent to [facility], found to have Lithium toxicity again in August.  Once she returned to [facility] in late August, patient was started on Latuda by [facility] NP Armaan Morales, however the medication did not help with mood, and patient appeared to be disoriented on the medication.  On Sept 15 the latuda was stopped, Donepezil was stopped due to disorientation.  She was not started on a new mood stabilizer yet because she was waiting to be seen by the NP, however on 9/17 was sent to [facility] ED again, now for chest pain.      Per , patient has been having worse mood swings since 76F, , domiciled at [facility] assisted living facility for the past 7 years with a PMHX of DM, HTN, PPHx of of MDD, bipolar disorder, anxiety, dementia  BIBEMS to the ED for chest pain.  Was admitted for workup of atypical chest pain.  psych consulted for patient stating that "she cannot continue living."  to rule out susicidal ideations.     Upon approach pt is laying in bed, wearing clothes from home, good hygiene and grooming, behavior is somewhat uncooperative, pt is A&Ox4.  Speech is linear however she appears thought blocked vs. patient is uncooperative.  she ruminates in feeling that she "can't function" but does not elaborate on this despite asking multiple questions.  patient was asked to participate in tasks of recall, executive function; patient very uncooperative and did not participate.  She was able to repeat 3 objects when the writer prompted her to, however was uncooperative when asked to recall them.  Refused to make a guess at who the POTUS is.      Patient states that she has trouble going ot the bathroom.   She denies constipation, diarrhea, abdominal pain, or other bowel habit issues.  When asked further questions she states that she "cant do anything anymore."  Unclear if patient feels she has physical imitations, pain, changes in independence/function, overall patient is very uncooperative and repeats the same statement over and over.     She endorses feeling hopeless, worthless, depressed, poor appetite, poor focus, poor sleep; endorse anhedonia and not wanting to do anything.  Used to enjoy being social, going shopping, cooking; has no interest in these anymore.  Has no appetite and does not enjoy food.  Admits to feeling like "can't live like this anymore" but denies passive SI, any plan or intent for suicide, past suicide attempt, past self harm.  Endorses feeling overwhelmed but denies constant worrying, feeling on edge, anxiety attacks, muscle tension.  Denies manic symptoms, AVH, paranoia.  Denies past hx of a manic episode.   patient did not want to participate in the MOC[facility].    Denies IPP admissions in the past, rehab visits, hx of substance use.  Denies any alcohol or substance use now.  Denies ECT treatments. Patient states that she was dx with bipolar disorder "a long time ago."  Endorses that she was on lithium for 30 years and it was helpful for her mood.  She states that she was taken off this medication recently and was then started on latuda but the medication was not helpful.  She has been feeling depressed ever since stopping [place].  Patient states she also has been dx with dementia "a while ago" and has had worsening memory problems (unclear timeline) and endorses difficulty remembering faces, names, factual data from the past.      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  Patient likes living at [facility].  Denies issues or recent stressors.  She has 2 sons and 7 grandchildren; endorses good relationship with family and they are supportive.   Patient endorses strong will to live and dedication to her family.  Has goals of getting back to a healthier state and enjoying things she used to enjoy, e.g. her social life.      Spoke with son, Catarino Rodarte [phone]  who states that since the Lithium was stopped, the patient has not been doing well.  She's not functioning, can't dress herself or shower, more depressed, has expressed not wanting to live like this anymore, but has never expressed wanting to kill herself, having a plan, or intent.  Has mood swings, shopping and overspending, going out every day, .   She had ECT had 2 separate times in her life, 25 years ago and then and then again 7 years ago.  She had her own apartment for years but then was depressed.  Had ECT while she was living at [facility].  Then went to [facility].  Then 7 years ago moved to [facility].  No hx of SA, .  Last time she was had elevated mood was this summer.  Dementia since 10 years ago and getting.  She had difficulties remembering where she is, who certain family members are.  This tends to fluctuate; at times she is more cooperative and her memory seems to be better at those times.  Had rough childhood, grew up in poverty, she had an affair,  and then  kicked her out of the house .   Ex  passed away 3 years ago; she has guilt.  7 Grandchildren.  Sons visit regularly and are supportive.  She doesn't have much a relationship with the grandchildren, per Catarino.  Very negative thoughts and pessimistic, even when she was on lithium; this appears to be a chronic aspect of her personality.  the Son states that even during her more elevated mood swings when she was very social, spending a lot of money shopping, the patient was still very negative and this is why her relationship with the sons was very strained, in addition to the issue that the sons feel she ruined her marriage with their father.      Spoke with  at [facility].  Patient was sent to the hospital in July 2021 for AMS, found to have UTI and lithium toxicity.  Patient was discharged back to [facility], was restarted on Lithium but then had AMS again and was sent to [facility], found to have Lithium toxicity again in August.  Once she returned to [facility] in late August, patient was started on Latuda by Psych[facility] NP Armaan Morales, however the medication did not help with mood, and patient appeared to be disoriented on the medication.  On Sept 15 the latuda was stopped, Donepezil was stopped due to disorientation.  She was not started on a new mood stabilizer yet because she was waiting to be seen by the NP, however on 9/17 was sent to [facility] ED again, now for chest pain.      Per , patient has been having worse mood swings since

## 2021-09-20 NOTE — BEHAVIORAL HEALTH ASSESSMENT NOTE - CASE SUMMARY
Ms Rodarte is a 76 year old woman with a history of Bipolar disorder and Dementia who was admitted to the medical floor for the management of chest pain. psychiatry consult was called for the evalaution of possible suicidal ideations because patient had reported  to the staff on the medical floor that she could no longer continue to live like this.   Patient denies having suicidal thoughts on intent when she mad the statement. She seems to be frustrated and depressed because of her perception that she can not perform preferred tasks and can not think appropriately.  It appears that patient's out patient psychiatrist discontinued her lithium which has worked well for her for many years because of kidney failure. It is also important to consider that patient was diagnosed with dementia and is currently on Aricept. Patient continue to report depressed mood however , it appears that her feeling of dysphoria and her apparent anxiety, seems  to be in the context of her perception of not being able to perform tasks herself and her cognitive impairment.  She however denies having current suicidal ideations, intent or plan. Patient also does not appear acutely psychotic or manic.   At this time, patient is not considered an imminent danger to herself or others and does not need inpatient psychiatric hospitalization. For now patient will benefit from supportive psychotherapy to help her develop better coping skills  and better frustration tolerance to deal with her stressors . We recommend that patient's outpatient psychiatrist should consider re-challenging her on Lithium since it worked well for her in the past however this needs to be done with close coordination of care with her outpatient psychiatrist. In the mean time, we recommend starting Haldol 1mg P.o BID for agitation associated with dementia and some mood stabilization. Second generation antipsychotics were not recommended for no because of the risk of metabolic syndrome and worsening of her current medical problems like Diabetes and hypercholesterolemia. Patient and son  were informed of the black box warning of the risk of death and strokes and they verbalized understand and were agreeable for patient to take the medication. We recommend considering Neurology consult to adjust patient's Aricept and possibly give [facility] , [address] [place] [id number], Phone Number : [phone] for supportive psychotherapy.

## 2021-09-20 NOTE — PROGRESS NOTE ADULT - SUBJECTIVE AND OBJECTIVE BOX
JOVANNY, KINGSTON  76y  Female      Patient is a 76y old  Female who presents with a chief complaint of Chest Pain (19 Sep 2021 16:28)      INTERVAL HPI:  76 year old F with a PMHx of HTN, HLD, Bipolar Disorder, Anxiety, presents to the ED with a one day history of history of pain.  Patient endorsed that her chest pain was localized to the midsternum. Trops were negative, no ST changes on EKG. Hgb was found to be 7.8, baseline 9. Treated for symptomatic anemia and 1U PRBCs given in the ED.     OVERNIGHT EVENTS:  Yesterday patient endorsed suicidal ideations to the team and psych was consulted.   Saw and examined the patient this morning.   Reports decreased appetite and "don't feel like functioning."  Denies suicidal and homicidal ideations today.       Vital Signs Last 24 Hrs  T(C): 36.6 (20 Sep 2021 05:30), Max: 36.6 (20 Sep 2021 05:30)  T(F): 97.8 (20 Sep 2021 05:30), Max: 97.8 (20 Sep 2021 05:30)  HR: 55 (20 Sep 2021 05:30) (55 - 64)  BP: 134/60 (20 Sep 2021 05:30) (105/62 - 145/67)  RR: 18 (20 Sep 2021 05:30) (17 - 18)  SpO2: 98% (19 Sep 2021 20:39) (98% - 98%)    PHYSICAL EXAM:  GENERAL: Flat affect   HEAD:  Atraumatic, Normocephalic  EYES: EOMI, PERRLA  ENMT: Moist mucous membranes  NECK: Supple  NERVOUS SYSTEM:  Alert & Oriented X3  CHEST/LUNG: Clear to auscultation bilaterally  HEART: Regular rate and rhythm  ABDOMEN: Soft, Nontender, Nondistended; Bowel sounds present  EXTREMITIES:  2+ Peripheral Pulses, no edema       Consultant(s) Notes Reviewed:  [x ] YES  [ ] NO  Care Discussed with Consultants/Other Providers [ x] YES  [ ] NO    LABS:                        7.4    5.35  )-----------( 197      ( 20 Sep 2021 04:30 )             24.9     09-20    141  |  107  |  27<H>  ----------------------------<  153<H>  4.4   |  24  |  1.6<H>    Ca    9.6      20 Sep 2021 04:30  Mg     2.2     09-20    TPro  6.0  /  Alb  3.9  /  TBili  <0.2  /  DBili  x   /  AST  14  /  ALT  16  /  AlkPhos  91  09-20          CAPILLARY BLOOD GLUCOSE          RADIOLOGY & ADDITIONAL TESTS:      Imaging Personally Reviewed:  [ ] YES  [ ] NO

## 2021-09-20 NOTE — BEHAVIORAL HEALTH ASSESSMENT NOTE - SUICIDE PROTECTIVE FACTORS
Responsibility to family and others/Identifies reasons for living/Has future plans/Supportive social network of family or friends/Fear of death or the actual act of killing self/Cultural, spiritual and/or moral attitudes against suicide/Positive therapeutic relationships

## 2021-09-20 NOTE — BEHAVIORAL HEALTH ASSESSMENT NOTE - SUMMARY
76F, , domiciled at [facility] assisted living facility for the past 7 years with a PMHX of DM, HTN, PPHx of of MDD, bipolar disorder, anxiety BIBEMS to the ED for chest pain.  Was admitted for workup of atypical chest pain.  psych consulted for patient stating that "she cannot continue living."      Upon evaluation, patient appears to be experiencing worsening depressive symptoms and demoralization in the context of acute medical issues, her long-standing lithium be discontinued, worsening dementia symptoms.  Patient was stable on lithium for many years but due to recent lithium toxicity related to worsening kidney function, has contraindication to this therapy.  She would benefit from medication for mood stabilization that does not exacerbate CKD and DM and would also be safe for her age.  Though the patient does endorse some feelings that she "cant function" and "can't live like this", these statements appear to be out of frustration and demoralization due to her worsening dementia, and the patient is not suicidal, has no plan or intent to kill herself.  she is not an acute danger to self or others, does not require IPP admission.      Recommendations:  -  Start haldol 0.5mg BID for mood stabilization.    Patient agreeable to starting haldol.  The writer educated the patient on the Indication for treatment, risk, benefits, and side effects of medication including increased risk of stroke and death in the eldelry (black box warning); the patient expressed informed consent and understanding.  Family was informed and expressed understanding.  The writer answered all questions and concerns.   -  Patient does not require IPP admission  - Recommend neuropsych referral/neurology workup for dementia if not done previously   - Patient can contact [facility] upon discharge to set up supportive therapy to start once she returns to the nursing home. 76F, , domiciled at [facility] assisted living facility for the past 7 years with a PMHX of DM, HTN, PPHx of of MDD, bipolar disorder, anxiety BIBEMS to the ED for chest pain.  Was admitted for workup of atypical chest pain.  psych consulted for patient stating that "she cannot continue living."  and to rule out suicidal ideations.     Upon evaluation, patient appears to be experiencing worsening depressive symptoms and demoralization in the context of acute medical issues, her long-standing lithium be discontinued, worsening dementia symptoms.  Patient was stable on lithium for many years but due to recent lithium toxicity related to worsening kidney function, has contraindication to this therapy.  She would benefit from medication for mood stabilization that does not exacerbate CKD and DM and would also be safe for her age.  Though the patient does endorse some feelings that she "cant function" and "can't live like this", these statements appear to be out of frustration and demoralization due to her worsening dementia, and the patient is not suicidal, has no plan or intent to kill herself.  she is not an acute danger to self or others, does not require IPP admission.      Recommendations:  -  Start haldol 0.5mg BID for mood stabilization.    Patient agreeable to starting haldol.  The writer educated the patient on the Indication for treatment, risk, benefits, and side effects of medication including increased risk of stroke and death in the eldelry (black box warning); the patient expressed informed consent and understanding.  Family was informed and expressed understanding.  The writer answered all questions and concerns.   -  Patient does not require IPP admission  - Recommend neuropsych referral/neurology workup for dementia if not done previously   - Patient can contact [facility] upon discharge to set up supportive therapy to start once she returns to the nursing home.

## 2021-09-20 NOTE — BEHAVIORAL HEALTH ASSESSMENT NOTE - NSBHCHARTREVIEWVS_PSY_A_CORE FT
ICU Vital Signs Last 24 Hrs  T(C): 36.2 (20 Sep 2021 11:30), Max: 36.6 (20 Sep 2021 05:30)  T(F): 97.2 (20 Sep 2021 11:30), Max: 97.8 (20 Sep 2021 05:30)  HR: 59 (20 Sep 2021 11:30) (55 - 64)  BP: 129/72 (20 Sep 2021 11:30) (105/62 - 145/67)  BP(mean): --  ABP: --  ABP(mean): --  RR: 18 (20 Sep 2021 11:30) (17 - 18)  SpO2: 98% (19 Sep 2021 20:39) (98% - 98%)

## 2021-09-20 NOTE — BEHAVIORAL HEALTH ASSESSMENT NOTE - NSBHREFERDETAILS_PSY_A_CORE_FT
Hx of Bipolar on lithium   looks depressed and describing "cant continue living" repeatedly and how she is unable to do anything Hx of Bipolar on lithium looks depressed and describing "cant continue living" repeatedly and how she is unable to do anything

## 2021-09-20 NOTE — PROGRESS NOTE ADULT - SUBJECTIVE AND OBJECTIVE BOX
KINGSTON PETTY  76y  Female  ***My note supersedes ALL resident notes that I sign.  My corrections for their notes are in my note.***    I can be reached directly on [id number]. My office number is [phone]. My personal cell number is [phone].    INTERVAL EVENTS: Here for f/u of anemia. Pt seems a bit overwhelmed by everything. She seems like she has a mild degree of cognitive impairment (early dementia) and has trouble grasping medical issues. She is a bit fixated on thinking that she is "not going to make it." She has NO desire whatsoever to kill or harm herself, in fact, she very much wants to live, she just thinks that she is not going to survive (and she does not know why). She is not sure she can go back to [facility]. She seems lost and does not know what to do. She agrees that she is depressed and is willing to talk w/ psych.  She can eat and drink. She is able to walk.    T(F): 97 (09-20-21 @ 13:48), Max: 97.8 (09-20-21 @ 05:30)  HR: 51 (09-20-21 @ 13:48) (51 - 64)  BP: 133/71 (09-20-21 @ 13:48) (105/62 - 145/67)  RR: 18 (09-20-21 @ 13:48) (17 - 18)  SpO2: 98% (09-19-21 @ 20:39) (98% - 98%)    Gen: NAD  HEENT: PERRL, EOMI, mouth clr, nose clr  Neck: no nodes, no JVD, thyroid nl  lungs: clr  hrt: s1 s2 rrr no murmur  abd: soft, NT/ND, no HS megaly  ext: no edema, no c/c  neuro: aa ox3, cn intact, can move all 4 ext    LABS:                      7.4     (    88.0   5.35  )-----------( ---------      197      ( 20 Sep 2021 04:30 )             24.9    (    13.2     Hemoglobin: 7.4 g/dL (09-20 @ 04:30)  Hemoglobin: 7.6 g/dL (09-19 @ 17:20)  Hemoglobin: 7.9 g/dL (09-18 @ 04:30)  Hemoglobin: 7.8 g/dL (09-17 @ 02:30)    141   (   107   (   153      09-20-21 @ 04:30  ----------------------               4.4   (   24   (   27                             -----                        1.6  Ca  9.6   Mg  2.2    P   --     LFT  6.0  (  <0.2  (  14       09-20-21 @ 04:30  -------------------------  3.9  (  91  (  16    CARDIAC MARKERS ( 20 Sep 2021 04:30 )  x     / <0.01 ng/mL / x     / x     / x      CARDIAC MARKERS ( 19 Sep 2021 17:20 )  x     / <0.01 ng/mL / x     / x     / x        RADIOLOGY & ADDITIONAL TESTS:  < from: Xray Chest 1 View AP/PA (09.17.21 @ 05:56) >  Impression:    Left midlung field linear atelectasis.    < end of copied text >    < from: MR Head No Cont (07.23.21 @ 20:17) >  MRI of the brain was attempted using axial diffusion-weighted sequence. Patient could not continue withthe rest this study.    Evaluation of the diffusion weighted sequence demonstrates no abnormal areas of restricted diffusion to suggest acute infarct.    Impression:    Limited incomplete MRI of the brain. Complete MRI of the brain is recommended when patient can tolerate it or sedation be given.    < end of copied text >    < from: CT Head No Cont (07.17.21 @ 17:12) >  FINDINGS:    The ventricular, basal cisternal and sulcal patterns are appropriate for patient's stated age.    No evidence of acute intracranial hemorrhage, loss of gray-white matter differentiation, or significant space-occupying lesion. No extra-axial collections. The craniocervical junction is unremarkable.    Confluent hypoattenuation in the cerebral hemispheric white matter without mass effect is consistent with chronic microvascular ischemic changes.    No depressed calvarial fracture. Imaged portions of the paranasal sinuses are clear.    IMPRESSION:    No CT evidence of acute intracranial pathology.    < end of copied text >    MEDICATIONS:    ALPRAZolam 0.5 milliGRAM(s) Oral at bedtime  amLODIPine   Tablet 10 milliGRAM(s) Oral daily  aspirin  chewable 81 milliGRAM(s) Oral daily  atorvastatin 40 milliGRAM(s) Oral at bedtime  clotrimazole 1% Cream 1 Application(s) Topical two times a day  cyanocobalamin 1000 MICROGram(s) Oral daily  donepezil 5 milliGRAM(s) Oral at bedtime  ferrous    sulfate 325 milliGRAM(s) Oral daily  hydrALAZINE 50 milliGRAM(s) Oral every 8 hours  lisinopril 40 milliGRAM(s) Oral daily  melatonin 5 milliGRAM(s) Oral at bedtime  nystatin Cream 1 Application(s) Topical two times a day  pantoprazole    Tablet 40 milliGRAM(s) Oral before breakfast

## 2021-09-20 NOTE — BEHAVIORAL HEALTH ASSESSMENT NOTE - OTHER
denies any passive or active suicide wish chest pain, dementia.  recent change from lithium to latuda, not responsive to latuda bipolar disorder, hx of ECT

## 2021-09-20 NOTE — BEHAVIORAL HEALTH ASSESSMENT NOTE - NSBHCHARTREVIEWLAB_PSY_A_CORE FT
7.4    5.35  )-----------( 197      ( 20 Sep 2021 04:30 )             24.9   09-20    141  |  107  |  27<H>  ----------------------------<  153<H>  4.4   |  24  |  1.6<H>    Ca    9.6      20 Sep 2021 04:30  Mg     2.2     09-20    TPro  6.0  /  Alb  3.9  /  TBili  <0.2  /  DBili  x   /  AST  14  /  ALT  16  /  AlkPhos  91  09-20

## 2021-09-20 NOTE — BEHAVIORAL HEALTH ASSESSMENT NOTE - RISK ASSESSMENT
risk factors: depressive episode, acute medical problem, elderly  female liviing in a nursing home, poor response to medications recently    protective: not suicidal, no acute safety concnerns Low Acute Suicide Risk

## 2021-09-20 NOTE — PROGRESS NOTE ADULT - ASSESSMENT
76 year old F with a PMHx of HTN, HLD, Bipolar Disorder, Anxiety, presents to the ED with a one day history of history of pain.  Patient endorsed that her chest pain was localized to the midsternum.    # Atypical Chest Pain prob musculoskeletal in nature  ACS RULED OUT  ECG demonstrated sinus arrythmia without ischemic changes.    trop neg  does NOT need to see cardio here - can f/u as outpt  can do echo as OUTPT    # normo anemia of chr dz 2/2 CKD 3 and poss MONA  ferr 40; iron sat 14%  venofer 200mg iv x1  FeSO4 325mg po q24  cannot start ALISSA until iron more replete  OUTPT Gi eval for EGD/C-scope    # mild cognitive impairment  not sure if pt has actual dementia - too early to tell  CTH 7/2021 = chr microvasc changes  MRI 7/2021 = pt did not lillian well; but no acute CVA on limited exam  OK to cont Aricept  not sure if pt has "pseudodementia" from depression - f/u psych  TSH, Fol, B12 are nl from 7/2021 - can d/c B12 oral  Li lvl 0.96 in 7/2021    # Bipolar Disorder  there are ZERO suicidal ideations now - 1:1 sit not needed; IPP not needed  Appears overwhelmed  c/w Latuda 20mg PO q daily - may need NF request  can resume lithium 150mg po q12  resume ativan 0.5mg po q12 prn anxiety  f/u  psychiatry eval - in progress  can see neuro as OUTPT    # HTN  c/w norvasc 10mg PO qdaily  c/w lisinopril 40mg PO qdaily   c/w hydralazine 50mg PO TID  resume and decr Toprol XL 25mg po q24 - hold for HR < 55  holding torsemide    # HLD  c/w Lip 40mg QHS    # T2 DM  on amaryl and januvia at home - can resume on d/c  diabetic/DASH diet  FS 2x/day  will tx if FS > 180 consistently    # DVT ppx: Heparin SubQ    # GI ppx: PPI po q24    # Activity: PT eval to see if pt needs STR at SNF or can go back to [facility]    # Full Code    Dispo: d/c Po B12; f/u psych; FS 2x/day; decr BB; Fe oral  will eventually d/c back to Assisted living ([facility]) vs SNF for STR  outpt f/u Neuro, Psych, Renal, IM, Cardio,

## 2021-09-20 NOTE — PROGRESS NOTE ADULT - ASSESSMENT
76 year old F with a PMHx of HTN, HLD, Bipolar Disorder, Anxiety, presents to the ED with a one day history of history of pain.  Patient endorsed that her chest pain was localized to the midsternum    #Symptomatic Anemia causing Atypical chest pain   #Normocytic Anemia   -CBC demonstrated a Hgb of 7.8 (Baseline 9), MCV 84. S/p 1U PRBCs in ED   -Trop -ve x2. No ST changes on ECG     -Iron % Sat low- Will give Venofer 200 x 1 today   -Hgb 7.4 today, patient can benefit from 1 U PRBCs  -FOBT now - with outpatient f/o with GI for possible  endoscopy   -Keep active type and screen      #Bipolar Disorder  #Suicidal ideations  c/w Latuda 20mg PO qdaily and lithium  1:1 observations   Consult psychiatry     #HTN  c/w norvasc 10mg PO qdaily  c/w lisinopril 40mg PO qdaily   c/w hydralazine 50mg PO TID    #HLD  c/w statin    Chronic Kidney Disease III  -Normocytic Anemia   -f/o outpatient with nephro  -can benefit from procrit injection       #DVT ppx: Heparin SubQ  #GI: PPI  #Activity: AAT  #Diet: DASH  Full Code

## 2021-09-20 NOTE — BEHAVIORAL HEALTH ASSESSMENT NOTE - NSBHCHARTREVIEWINVESTIGATE_PSY_A_CORE FT
`< from: 12 Lead ECG (09.17.21 @ 08:02) >      Ventricular Rate 62 BPM    Atrial Rate 62 BPM    P-R Interval 182 ms    QRS Duration 90 ms    Q-T Interval 452 ms    QTC Calculation(Bazett) 458 ms    P Axis 76 degrees    R Axis 74 degrees    T Axis 93 degrees    Diagnosis Line Sinus rhythm with marked sinus arrhythmia  ST & T wave abnormality, consider lateral ischemia  Abnormal ECG    Confirmed by LISA QUINONES MD ([id number]) on 9/17/2021 11:00:06 AM    < end of copied text >

## 2021-09-21 DIAGNOSIS — F03.90 UNSPECIFIED DEMENTIA WITHOUT BEHAVIORAL DISTURBANCE: ICD-10-CM

## 2021-09-21 LAB
ALBUMIN SERPL ELPH-MCNC: 4.4 G/DL — SIGNIFICANT CHANGE UP (ref 3.5–5.2)
ALP SERPL-CCNC: 107 U/L — SIGNIFICANT CHANGE UP (ref 30–115)
ALT FLD-CCNC: 21 U/L — SIGNIFICANT CHANGE UP (ref 0–41)
ANION GAP SERPL CALC-SCNC: 11 MMOL/L — SIGNIFICANT CHANGE UP (ref 7–14)
APPEARANCE UR: CLEAR — SIGNIFICANT CHANGE UP
AST SERPL-CCNC: 20 U/L — SIGNIFICANT CHANGE UP (ref 0–41)
BACTERIA # UR AUTO: NEGATIVE — SIGNIFICANT CHANGE UP
BASOPHILS # BLD AUTO: 0.03 K/UL — SIGNIFICANT CHANGE UP (ref 0–0.2)
BASOPHILS NFR BLD AUTO: 0.5 % — SIGNIFICANT CHANGE UP (ref 0–1)
BILIRUB SERPL-MCNC: <0.2 MG/DL — SIGNIFICANT CHANGE UP (ref 0.2–1.2)
BILIRUB UR-MCNC: NEGATIVE — SIGNIFICANT CHANGE UP
BUN SERPL-MCNC: 20 MG/DL — SIGNIFICANT CHANGE UP (ref 10–20)
CALCIUM SERPL-MCNC: 10.3 MG/DL — HIGH (ref 8.5–10.1)
CHLORIDE SERPL-SCNC: 106 MMOL/L — SIGNIFICANT CHANGE UP (ref 98–110)
CO2 SERPL-SCNC: 23 MMOL/L — SIGNIFICANT CHANGE UP (ref 17–32)
COLOR SPEC: SIGNIFICANT CHANGE UP
CREAT SERPL-MCNC: 1.3 MG/DL — SIGNIFICANT CHANGE UP (ref 0.7–1.5)
DIFF PNL FLD: NEGATIVE — SIGNIFICANT CHANGE UP
EOSINOPHIL # BLD AUTO: 0.17 K/UL — SIGNIFICANT CHANGE UP (ref 0–0.7)
EOSINOPHIL NFR BLD AUTO: 2.7 % — SIGNIFICANT CHANGE UP (ref 0–8)
EPI CELLS # UR: 6 /HPF — HIGH (ref 0–5)
GLUCOSE BLDC GLUCOMTR-MCNC: 117 MG/DL — HIGH (ref 70–99)
GLUCOSE BLDC GLUCOMTR-MCNC: 188 MG/DL — HIGH (ref 70–99)
GLUCOSE SERPL-MCNC: 115 MG/DL — HIGH (ref 70–99)
GLUCOSE UR QL: NEGATIVE — SIGNIFICANT CHANGE UP
HCT VFR BLD CALC: 31.7 % — LOW (ref 37–47)
HGB BLD-MCNC: 9.9 G/DL — LOW (ref 12–16)
HYALINE CASTS # UR AUTO: 3 /LPF — SIGNIFICANT CHANGE UP (ref 0–7)
IMM GRANULOCYTES NFR BLD AUTO: 0.5 % — HIGH (ref 0.1–0.3)
KETONES UR-MCNC: NEGATIVE — SIGNIFICANT CHANGE UP
LEUKOCYTE ESTERASE UR-ACNC: ABNORMAL
LYMPHOCYTES # BLD AUTO: 1.16 K/UL — LOW (ref 1.2–3.4)
LYMPHOCYTES # BLD AUTO: 18.3 % — LOW (ref 20.5–51.1)
MCHC RBC-ENTMCNC: 26.5 PG — LOW (ref 27–31)
MCHC RBC-ENTMCNC: 31.2 G/DL — LOW (ref 32–37)
MCV RBC AUTO: 85 FL — SIGNIFICANT CHANGE UP (ref 81–99)
MONOCYTES # BLD AUTO: 0.31 K/UL — SIGNIFICANT CHANGE UP (ref 0.1–0.6)
MONOCYTES NFR BLD AUTO: 4.9 % — SIGNIFICANT CHANGE UP (ref 1.7–9.3)
NEUTROPHILS # BLD AUTO: 4.64 K/UL — SIGNIFICANT CHANGE UP (ref 1.4–6.5)
NEUTROPHILS NFR BLD AUTO: 73.1 % — SIGNIFICANT CHANGE UP (ref 42.2–75.2)
NITRITE UR-MCNC: NEGATIVE — SIGNIFICANT CHANGE UP
NRBC # BLD: 0 /100 WBCS — SIGNIFICANT CHANGE UP (ref 0–0)
PH UR: 7 — SIGNIFICANT CHANGE UP (ref 5–8)
PLATELET # BLD AUTO: 249 K/UL — SIGNIFICANT CHANGE UP (ref 130–400)
POTASSIUM SERPL-MCNC: 4 MMOL/L — SIGNIFICANT CHANGE UP (ref 3.5–5)
POTASSIUM SERPL-SCNC: 4 MMOL/L — SIGNIFICANT CHANGE UP (ref 3.5–5)
PROT SERPL-MCNC: 6.9 G/DL — SIGNIFICANT CHANGE UP (ref 6–8)
PROT UR-MCNC: ABNORMAL
RBC # BLD: 3.73 M/UL — LOW (ref 4.2–5.4)
RBC # FLD: 14.1 % — SIGNIFICANT CHANGE UP (ref 11.5–14.5)
RBC CASTS # UR COMP ASSIST: 2 /HPF — SIGNIFICANT CHANGE UP (ref 0–4)
SARS-COV-2 RNA SPEC QL NAA+PROBE: SIGNIFICANT CHANGE UP
SODIUM SERPL-SCNC: 140 MMOL/L — SIGNIFICANT CHANGE UP (ref 135–146)
SP GR SPEC: 1.01 — SIGNIFICANT CHANGE UP (ref 1.01–1.03)
UROBILINOGEN FLD QL: SIGNIFICANT CHANGE UP
WBC # BLD: 6.34 K/UL — SIGNIFICANT CHANGE UP (ref 4.8–10.8)
WBC # FLD AUTO: 6.34 K/UL — SIGNIFICANT CHANGE UP (ref 4.8–10.8)
WBC UR QL: 10 /HPF — HIGH (ref 0–5)

## 2021-09-21 PROCEDURE — 99233 SBSQ HOSP IP/OBS HIGH 50: CPT

## 2021-09-21 RX ORDER — DONEPEZIL HYDROCHLORIDE 10 MG/1
10 TABLET, FILM COATED ORAL AT BEDTIME
Refills: 0 | Status: DISCONTINUED | OUTPATIENT
Start: 2021-09-21 | End: 2021-09-23

## 2021-09-21 RX ORDER — HYDRALAZINE HCL 50 MG
75 TABLET ORAL EVERY 8 HOURS
Refills: 0 | Status: DISCONTINUED | OUTPATIENT
Start: 2021-09-21 | End: 2021-09-23

## 2021-09-21 RX ORDER — HALOPERIDOL DECANOATE 100 MG/ML
0.5 INJECTION INTRAMUSCULAR EVERY 12 HOURS
Refills: 0 | Status: DISCONTINUED | OUTPATIENT
Start: 2021-09-21 | End: 2021-09-23

## 2021-09-21 RX ORDER — SALICYLIC ACID 0.5 %
1 CLEANSER (GRAM) TOPICAL
Refills: 0 | Status: DISCONTINUED | OUTPATIENT
Start: 2021-09-21 | End: 2021-09-23

## 2021-09-21 RX ORDER — LISINOPRIL 2.5 MG/1
40 TABLET ORAL AT BEDTIME
Refills: 0 | Status: DISCONTINUED | OUTPATIENT
Start: 2021-09-22 | End: 2021-09-23

## 2021-09-21 RX ADMIN — Medication 5 MILLIGRAM(S): at 21:45

## 2021-09-21 RX ADMIN — Medication 1 APPLICATION(S): at 17:01

## 2021-09-21 RX ADMIN — Medication 75 MILLIGRAM(S): at 13:32

## 2021-09-21 RX ADMIN — Medication 25 MILLIGRAM(S): at 05:05

## 2021-09-21 RX ADMIN — ATORVASTATIN CALCIUM 40 MILLIGRAM(S): 80 TABLET, FILM COATED ORAL at 21:44

## 2021-09-21 RX ADMIN — PANTOPRAZOLE SODIUM 40 MILLIGRAM(S): 20 TABLET, DELAYED RELEASE ORAL at 05:05

## 2021-09-21 RX ADMIN — AMLODIPINE BESYLATE 10 MILLIGRAM(S): 2.5 TABLET ORAL at 05:05

## 2021-09-21 RX ADMIN — Medication 325 MILLIGRAM(S): at 11:02

## 2021-09-21 RX ADMIN — LISINOPRIL 40 MILLIGRAM(S): 2.5 TABLET ORAL at 05:05

## 2021-09-21 RX ADMIN — Medication 81 MILLIGRAM(S): at 11:02

## 2021-09-21 RX ADMIN — Medication 0.5 MILLIGRAM(S): at 16:05

## 2021-09-21 RX ADMIN — HALOPERIDOL DECANOATE 0.5 MILLIGRAM(S): 100 INJECTION INTRAMUSCULAR at 16:31

## 2021-09-21 RX ADMIN — DONEPEZIL HYDROCHLORIDE 10 MILLIGRAM(S): 10 TABLET, FILM COATED ORAL at 21:47

## 2021-09-21 RX ADMIN — LITHIUM CARBONATE 150 MILLIGRAM(S): 300 TABLET, EXTENDED RELEASE ORAL at 05:04

## 2021-09-21 RX ADMIN — Medication 50 MILLIGRAM(S): at 05:05

## 2021-09-21 RX ADMIN — Medication 75 MILLIGRAM(S): at 21:47

## 2021-09-21 NOTE — PROGRESS NOTE ADULT - ASSESSMENT
76 year old F with a PMHx of HTN, HLD, Bipolar Disorder, Anxiety, presents to the ED with a one day history of history of pain.  Patient endorsed that her chest pain was localized to the midsternum    #Symptomatic Anemia causing Atypical chest pain   #Normocytic Anemia   -CBC demonstrated a Hgb of 7.8 (Baseline 9), MCV 84. S/p 2U PRBCs since admission   -Trop -ve x2. No ST changes on ECG     -Iron % Sat low- s/p Venofer 200 x 1  -FOBT now - with outpatient f/o with GI for possible  endoscopy   -Keep active type and screen      #Bipolar Disorder  #Anxiety   -No lithium per Son's wishes due to effect on Kidneys   -D/c Latuda- not much improvement in pt's mood since started   - psych recommending haldol 0.5 mg BID   - D/c xanax   -C/w Ativan 0.5 mg daily PRN       #HTN  c/w norvasc 10mg PO qdaily  c/w lisinopril 40mg PO at bedtime   increase  [id number] mg PO TID  resume toprol at lower dose of 25 mg, hold for HR <55  continue to hold torsemide     #HLD  c/w Lipitor 40     #Chronic Kidney Disease III  -Normocytic Anemia   -f/o outpatient with nephro  -can benefit from procrit injection       #DVT ppx: Heparin SubQ  #GI: PPI  #Activity: AAT  #Diet: DASH  Full Code

## 2021-09-21 NOTE — PROGRESS NOTE BEHAVIORAL HEALTH - SUMMARY
Ms Rodarte is a 76 year old woman with a history of Bipolar disorder and Dementia who was admitted to the medical floor for the management of chest pain. psychiatry consult was called for the evalaution of possible suicidal ideations because patient had reported  to the staff on the medical floor that she could no longer continue to live like this.   	Patient denies having suicidal thoughts on intent when she mad the statement. She seems to be frustrated and depressed because of her perception that she can not perform preferred tasks and can not think appropriately.  It appears that patient's out patient psychiatrist discontinued her lithium which has worked well for her for many years because of kidney failure. It is also important to consider that patient was diagnosed with dementia and is currently on Aricept. Patient continue to report depressed mood however , it appears that her feeling of dysphoria and her apparent anxiety, seems  to be in the context of her perception of not being able to perform tasks herself and her cognitive impairment.  She however denies having current suicidal ideations, intent or plan. Patient also does not appear acutely psychotic or manic.   At this time, patient is not considered an imminent danger to herself or others and does not need inpatient psychiatric hospitalization. For now patient will benefit from supportive psychotherapy to help her develop better coping skills  and better frustration tolerance to deal with her stressors . We recommend that patient's outpatient psychiatrist should consider re-challenging her on Lithium since it worked well for her in the past however this needs to be done with close coordination of care with her outpatient psychiatrist. In the mean time, we recommend starting Haldol 1mg P.o BID for agitation associated with dementia and some mood stabilization. Second generation antipsychotics were not recommended for no because of the risk of metabolic syndrome and worsening of her current medical problems like Diabetes and hypercholesterolemia. Patient and son  were informed of the black box warning of the risk of death and strokes and they verbalized understand and were agreeable for patient to take the medication. We recommend considering Neurology consult to adjust patient's Aricept and possibly give [facility] , [address] [place] [id number], Phone Number : [phone] for supportive psychotherapy. Ms Rodarte is a 76 year old woman with a history of Bipolar disorder and Dementia who was admitted to the medical floor for the management of chest pain. psychiatry consult was called for the evaluation of possible suicidal ideations because patient had reported  to the staff on the medical floor that she could no longer continue to live like this.   Patient continues to appear anxious and depressed in the context of her perception that she can not perform tasks and can not think. Patient appeared to have difficulty participating in the MOCA dementia screening and eventually refused to complete the screen.   At this time, patient is not considered an imminent danger to herself or others and does not need inpatient psychiatric hospitalization. We continue to recommend that patient's outpatient psychiatrist should consider re-challenging her on Lithium since it worked well for her in the past however this needs to be done with close coordination of care with her outpatient nephrologist. The  psychiatry team will discuss this recommendation with patient's son and her out patient psychiatrist.  In the mean time, we recommend starting Haldol 1mg P.o BID for agitation associated with dementia and some mood stabilization. We also recommend titrating Aricept for the better management of dementia in this patient.

## 2021-09-21 NOTE — PROGRESS NOTE ADULT - SUBJECTIVE AND OBJECTIVE BOX
KINGSTON PETTY  76y  Female      Patient is a 76y old  Female who presents with a chief complaint of Chest Pain (21 Sep 2021 13:29)      INTERVAL HPI:  76 year old F with a PMHx of HTN, HLD, Bipolar Disorder, Anxiety, presents to the ED with a one day history of history of pain.  Patient endorsed that her chest pain was localized to the midsternum. Trops were negative, no ST changes on EKG. Hgb was found to be 7.8, baseline 9. Treated for symptomatic anemia and 1U PRBCs given in the ED.     OVERNIGHT EVENTS:  Reports dysuria this am.  Also endorses hopelessness.  No suicidal ideations         Vital Signs Last 24 Hrs  T(C): 36.1 (21 Sep 2021 13:27), Max: 36.4 (20 Sep 2021 21:18)  T(F): 97 (21 Sep 2021 13:27), Max: 97.5 (20 Sep 2021 21:18)  HR: 71 (21 Sep 2021 13:27) (50 - 71)  BP: 121/83 (21 Sep 2021 13:27) (121/68 - 187/74)  RR: 20 (21 Sep 2021 13:27) (18 - 20)      PHYSICAL EXAM:  GENERAL: Flat affect   HEAD:  Atraumatic, Normocephalic  EYES: EOMI, PERRLA  ENMT: Moist mucous membranes  NECK: Supple  NERVOUS SYSTEM:  Alert & Oriented X3  CHEST/LUNG: Clear to auscultation bilaterally  HEART: Regular rate and rhythm  ABDOMEN: Soft, Nontender, Nondistended; Bowel sounds present  EXTREMITIES:  2+ Peripheral Pulses, no edema     Consultant(s) Notes Reviewed:  [x ] YES  [ ] NO  Care Discussed with Consultants/Other Providers [ x] YES  [ ] NO    LABS:                        9.9    6.34  )-----------( 249      ( 21 Sep 2021 11:00 )             31.7     09-    140  |  106  |  20  ----------------------------<  115<H>  4.0   |  23  |  1.3    Ca    10.3<H>      21 Sep 2021 11:00  Mg     2.2     -    TPro  6.9  /  Alb  4.4  /  TBili  <0.2  /  DBili  x   /  AST  20  /  ALT  21  /  AlkPhos  107  09-21      Urinalysis Basic - ( 21 Sep 2021 11:00 )    Color: Light Yellow / Appearance: Clear / S.013 / pH: x  Gluc: x / Ketone: Negative  / Bili: Negative / Urobili: <2 mg/dL   Blood: x / Protein: 100 mg/dL / Nitrite: Negative   Leuk Esterase: Moderate / RBC: 2 /HPF / WBC 10 /HPF   Sq Epi: x / Non Sq Epi: 6 /HPF / Bacteria: Negative        CAPILLARY BLOOD GLUCOSE      POCT Blood Glucose.: 188 mg/dL (21 Sep 2021 11:08)      RADIOLOGY & ADDITIONAL TESTS:    Imaging Personally Reviewed:  [ ] YES  [ ] NO

## 2021-09-21 NOTE — PROGRESS NOTE ADULT - ASSESSMENT
76 year old F with a PMHx of HTN, HLD, Bipolar Disorder, Anxiety, presents to the ED with a one day history of history of pain.  Patient endorsed that her chest pain was localized to the midsternum.    # Atypical Chest Pain prob musculoskeletal in nature  ACS RULED OUT  ECG demonstrated sinus arrythmia without ischemic changes.    trop neg  does NOT need to see cardio here - can f/u as outpt  can do echo as OUTPT    # normo anemia of chr dz 2/2 CKD 3 and poss MONA  ferr 40; iron sat 14%  venofer 200mg iv x1  FeSO4 325mg po q24  cannot start ALISSA until iron more replete - needs OUTPT f/u w/ RENAL  OUTPT GI eval for EGD/C-scope    # mild cognitive impairment; son reports hx of dementia  doubt pt has progression of her dementia accounting for decomp since July, thus raising Aricept unlikely to help  CTH 7/2021 = chr microvasc changes  MRI 7/2021 = pt did not lillian well; but no acute CVA on limited exam  OK to cont Aricept at 5mg po q24  not sure if pt has "pseudodementia" from depression - f/u psych  TSH, Fol, B12 are nl from 7/2021 - can d/c B12 oral  Li lvl 0.96 in 7/2021    # Bipolar Disorder  there are ZERO suicidal ideations now - 1:1 sit not needed; IPP not needed  pt appears overwhelmed  I spoke w/ Dr Mac Berumen 20mg PO q daily - does not seem to be helping: per psych, HOLD  DO NOT RESUME LITHIUM per son (psych prefers to restart Li, but son totally against it. Dr Watts will speak w/ outpt NP from psych and then speak w/ son)  for now, start haldol 0.5mg po q12 (NOT PRN)  d/c xanax  decr ativan 0.5mg po q24 prn anxiety - DO NOT RAISE  f/u  psychiatry   should see neuro and psych as OUTPT    # HTN  c/w norvasc 10mg PO qdaily  make lisinopril 40mg PO qHS  incr hydralazine 75mg PO TID  resume and decr Toprol XL 25mg po q24 - hold for HR < 55  holding torsemide    # HLD  c/w Lip 40mg QHS    # T2 DM  on amaryl and januvia at home - can resume upon d/c  diabetic/DASH diet  FS 2x/day  will tx if FS > 180 consistently    # DVT ppx: Heparin SubQ    # GI ppx: PPI po q24    # Activity: PT eval (walked 150') son STR not needed; can go back to [facility]    # Full Code    Dispo: f/u psych; FS 2x/day; tx HTN; Fe oral  need to f/u CM and son about going back to [facility] tomorrow  outpt f/u Neuro, Psych, Renal, IM, Cardio,    76 year old F with a PMHx of HTN, HLD, Bipolar Disorder, Anxiety, presents to the ED with a one day history of history of pain.  Patient endorsed that her chest pain was localized to the midsternum.    # Atypical Chest Pain prob musculoskeletal in nature  ACS RULED OUT  ECG demonstrated sinus arrythmia without ischemic changes.    trop neg  does NOT need to see cardio here - can f/u as outpt  can do echo as OUTPT    # normo anemia of chr dz 2/2 CKD 3 and poss MONA  ferr 40; iron sat 14%  venofer 200mg iv x1  FeSO4 325mg po q24  cannot start ALISSA until iron more replete - needs OUTPT f/u w/ RENAL  needs OUTPT GI eval for EGD/C-scope    # son reports hx of chronic dementia (could be Alzheimer's and/or vascular type)  pt prob has at least some progression of her dementia (as she did not do well when completing the MOCA ([place] [employer] Assess) form)  some of the dysfxn in filling out the MOCA was related to anxiety, but there is also element of dementia  pt MIGHT also be losing ability to cope emotionally feeling (but unable to express into thoughts/words) she is becoming demented (i.e. "losing her mind")  the lack of Li could also be unstabilizing her BPD  in all, the combination of BPD flare and worse dementia is leading to her unrelenting decompensation  CTH 7/2021 = chr microvasc changes  MRI 7/2021 = pt did not lillian well; but no acute CVA on limited exam  incr Aricept to 10mg po q24 - watch for cholinergic side effects  not sure if pt has "pseudodementia" from depression - f/u psych  TSH, Fol, B12 are nl from 7/2021 - can d/c B12 oral  Li lvl 0.96 in 7/2021 - psych wants to talk w/ son about restarting Li at lower dose - f/u psych  needs outpt NEURO f/u    # Bipolar Disorder  there are ZERO suicidal ideations now - 1:1 sit not needed; IPP not needed  pt appears overwhelmed  I spoke w/ Dr Mac Berumen 20mg PO q daily - does not seem to be helping: per psych, HOLD  HOLD Li til psych and son talk  for now, start haldol 0.5mg po q12 (NOT PRN)  d/c xanax  decr ativan 0.5mg po q24 prn anxiety - DO NOT RAISE  f/u  psychiatry   needs OUTPT psych f/u     # HTN  c/w norvasc 10mg PO qdaily  make lisinopril 40mg PO qHS  incr hydralazine 75mg PO TID  resume and decr Toprol XL 25mg po q24 - hold for HR < 55  holding torsemide    # HLD  c/w Lip 40mg QHS    # T2 DM  on amaryl and januvia at home - can resume upon d/c  diabetic/DASH diet  FS 2x/day  will tx if FS > 180 consistently    # DVT ppx: Heparin SubQ    # GI ppx: PPI po q24    # Activity: PT eval (walked 150') son STR not needed; can go back to [facility]    # Full Code    Dispo: f/u psych; FS 2x/day; tx HTN; Fe oral; incr aricept  f/u CM - anticipate d/c back to [facility] tomorrow  outpt f/u Neuro, Psych, Renal, IM, Cardio,

## 2021-09-21 NOTE — PROGRESS NOTE ADULT - SUBJECTIVE AND OBJECTIVE BOX
KINGSTON PETTY  76y  Female  ***My note supersedes ALL resident notes that I sign.  My corrections for their notes are in my note.***    I can be reached directly on [id number]. My office number is [phone]. My personal cell number is [phone].    INTERVAL EVENTS: Here for f/u of anemia. Pt still saying she has trouble grasping what is going on, though I have explained it to her slowly many times. I think there is an inability to comprehend and understand, which can be related to a developing dementia. Today, she c/o dysuria and burning in the vulvar area. She also noted spotting from her vagina while wiping. RN never saw this and it seems like it stopped.    I spoke w/ son. Pt has had long psych hx. She has been in IPP in past and has had ECT as well. The Li was ruining her kidney fxn and this is NOT to be used ever again. Since 2021, when the pt was found to be talking to herself, she has been back and forth to [facility] and [facility]. No medical issue (except mild dementia, CKD and anemia) has been found. She seems more in a depression now rather than a progression of her dementia. The son (Catarino) has power of . Catarino (son) lives in  and Javier (son) lives in .     T(F): 97 (21 @ 13:27), Max: 97.5 (21 @ 21:18)  HR: 71 (21 @ 13:27) (50 - 71)  BP: 121/83 (21 @ 13:27) (121/68 - 187/74)  RR: 20 (21 @ 13:27) (18 - 20)  SpO2: --    I did exam w/ RN chaperone (Ching).    Gen: no resp distress; pt is bothered by her inability to comprehend what is going on  HEENT: PERRL, EOMI, mouth clr, nose clr  Neck: no nodes, no JVD, thyroid nl  lungs: clr  hrt: s1 s2 rrr no murmur  abd: soft, NT/ND, no HS megaly  ext: no edema, no c/c  : ext exam: no rash seen; no infection; no erythema; no blood in vag introitus; no perianal fissure; no anal bleeding; ext hemorrhoid noted  neuro: aa ox3, cn intact, can move all 4 ext    LABS:                      9.9     (    85.0   6.34  )-----------( ---------      249      ( 21 Sep 2021 11:00 )             31.7    (    14.1     Hemoglobin: 9.9 g/dL ( @ 11:00)  Hemoglobin: 7.4 g/dL ( @ 04:30) - gave 1u PRBC  Hemoglobin: 7.6 g/dL ( @ 17:20)  Hemoglobin: 7.9 g/dL ( @ 04:30)  Hemoglobin: 7.8 g/dL ( @ 02:30)    CARDIAC MARKERS ( 20 Sep 2021 04:30 )  x     / <0.01 ng/mL / x     / x     / x      CARDIAC MARKERS ( 19 Sep 2021 17:20 )  x     / <0.01 ng/mL / x     / x     / x        Urinalysis Basic - ( 21 Sep 2021 11:00 )    Color: Light Yellow / Appearance: Clear / S.013 / pH: x  Gluc: x / Ketone: Negative  / Bili: Negative / Urobili: <2 mg/dL   Blood: x / Protein: 100 mg/dL / Nitrite: Negative   Leuk Esterase: Moderate / RBC: 2 /HPF / WBC 10 /HPF   Sq Epi: x / Non Sq Epi: 6 /HPF / Bacteria: Negative    CAPILLARY BLOOD GLUCOSE  POCT Blood Glucose.: 188 (21 @ 11:08)    RADIOLOGY & ADDITIONAL TESTS:  < from: Xray Chest 1 View AP/PA (21 @ 05:56) >  Impression:    Left midlung field linear atelectasis.    < end of copied text >    MEDICATIONS:    ALPRAZolam 0.5 milliGRAM(s) Oral at bedtime  amLODIPine   Tablet 10 milliGRAM(s) Oral daily  aspirin  chewable 81 milliGRAM(s) Oral daily  atorvastatin 40 milliGRAM(s) Oral at bedtime  clotrimazole 1% Cream 1 Application(s) Topical two times a day  donepezil 5 milliGRAM(s) Oral at bedtime  ferrous    sulfate 325 milliGRAM(s) Oral daily  hydrALAZINE 75 milliGRAM(s) Oral every 8 hours  lithium 150 milliGRAM(s) Oral every 12 hours  LORazepam     Tablet 0.5 milliGRAM(s) Oral two times a day PRN  melatonin 5 milliGRAM(s) Oral at bedtime  metoprolol succinate ER 25 milliGRAM(s) Oral daily  nystatin Cream 1 Application(s) Topical two times a day  pantoprazole    Tablet 40 milliGRAM(s) Oral before breakfast  vitamin A &amp; D Ointment 1 Application(s) Topical two times a day

## 2021-09-21 NOTE — PROGRESS NOTE BEHAVIORAL HEALTH - NSBHCHARTREVIEWLAB_PSY_A_CORE FT
9.9    6.34  )-----------( 249      ( 21 Sep 2021 11:00 )             31.7       09-21    140  |  106  |  20  ----------------------------<  115<H>  4.0   |  23  |  1.3    Ca    10.3<H>      21 Sep 2021 11:00  Mg     2.2     09-20    TPro  6.9  /  Alb  4.4  /  TBili  <0.2  /  DBili  x   /  AST  20  /  ALT  21  /  AlkPhos  107  09-21

## 2021-09-21 NOTE — PROGRESS NOTE BEHAVIORAL HEALTH - NSBHFUPINTERVALHXFT_PSY_A_CORE
Patient seen and interviewd Patient seen and interviewed.  She initial refused to participate in the interview but later agreed. She continued to report that there is something wrong with her brain and she can not do anything , cannot take care of herself and is worried that she would not have any where to go.   patient continued to appear anxious despite all efforts by writer and medical students that she has somewhere to go. patient then asked the CL team to leave saying she no longer wanted to continue to interview.   A MOCA was administered and patient was unable to tack the numbers and letters and although she was able to draw the [place] of the clock , and assign the number accordingly, she was unable to draw the hands of the clock to show ten past 11 , patient was unable to identify the rhinoceros  and was unable to recall list of 5 words read to her after 5 minutes. patient reused to continue the screen. Patient seen and interviewed.  She initial refused to participate in the interview but later agreed. She continued to report that there is something wrong with her brain and she can not do anything , cannot take care of herself and is worried that she would not have any where to go.   patient continued to appear anxious despite all efforts by writer and medical students that she has somewhere to go. patient then asked the CL team to leave saying she no longer wanted to continue to interview.   A MOCA was administered and patient was unable to tack the numbers and letters and although she was able to draw the [place] of the clock , and assign the number accordingly, she was unable to draw the hands of the clock to show ten past 11 , patient was unable to identify the rhinoceros  and was unable to recall list of 5 words read to her after 5 minutes. patient reused to continue the screen.  Attempted to call patient's son Catarino Rodarte but did not get through.

## 2021-09-22 ENCOUNTER — TRANSCRIPTION ENCOUNTER (OUTPATIENT)
Age: 76
End: 2021-09-22

## 2021-09-22 VITALS
TEMPERATURE: 97 F | RESPIRATION RATE: 20 BRPM | SYSTOLIC BLOOD PRESSURE: 153 MMHG | DIASTOLIC BLOOD PRESSURE: 67 MMHG | HEART RATE: 51 BPM

## 2021-09-22 LAB
ALBUMIN SERPL ELPH-MCNC: 4 G/DL — SIGNIFICANT CHANGE UP (ref 3.5–5.2)
ALP SERPL-CCNC: 95 U/L — SIGNIFICANT CHANGE UP (ref 30–115)
ALT FLD-CCNC: 19 U/L — SIGNIFICANT CHANGE UP (ref 0–41)
ANION GAP SERPL CALC-SCNC: 10 MMOL/L — SIGNIFICANT CHANGE UP (ref 7–14)
AST SERPL-CCNC: 17 U/L — SIGNIFICANT CHANGE UP (ref 0–41)
BASOPHILS # BLD AUTO: 0.03 K/UL — SIGNIFICANT CHANGE UP (ref 0–0.2)
BASOPHILS NFR BLD AUTO: 0.6 % — SIGNIFICANT CHANGE UP (ref 0–1)
BILIRUB SERPL-MCNC: <0.2 MG/DL — SIGNIFICANT CHANGE UP (ref 0.2–1.2)
BUN SERPL-MCNC: 20 MG/DL — SIGNIFICANT CHANGE UP (ref 10–20)
CALCIUM SERPL-MCNC: 10.2 MG/DL — HIGH (ref 8.5–10.1)
CHLORIDE SERPL-SCNC: 107 MMOL/L — SIGNIFICANT CHANGE UP (ref 98–110)
CO2 SERPL-SCNC: 22 MMOL/L — SIGNIFICANT CHANGE UP (ref 17–32)
CREAT SERPL-MCNC: 1.4 MG/DL — SIGNIFICANT CHANGE UP (ref 0.7–1.5)
EOSINOPHIL # BLD AUTO: 0.17 K/UL — SIGNIFICANT CHANGE UP (ref 0–0.7)
EOSINOPHIL NFR BLD AUTO: 3.2 % — SIGNIFICANT CHANGE UP (ref 0–8)
GLUCOSE BLDC GLUCOMTR-MCNC: 137 MG/DL — HIGH (ref 70–99)
GLUCOSE BLDC GLUCOMTR-MCNC: 156 MG/DL — HIGH (ref 70–99)
GLUCOSE SERPL-MCNC: 137 MG/DL — HIGH (ref 70–99)
HCT VFR BLD CALC: 30.3 % — LOW (ref 37–47)
HGB BLD-MCNC: 9.2 G/DL — LOW (ref 12–16)
IMM GRANULOCYTES NFR BLD AUTO: 0.4 % — HIGH (ref 0.1–0.3)
LYMPHOCYTES # BLD AUTO: 1.43 K/UL — SIGNIFICANT CHANGE UP (ref 1.2–3.4)
LYMPHOCYTES # BLD AUTO: 27.2 % — SIGNIFICANT CHANGE UP (ref 20.5–51.1)
MAGNESIUM SERPL-MCNC: 2.1 MG/DL — SIGNIFICANT CHANGE UP (ref 1.8–2.4)
MCHC RBC-ENTMCNC: 26.1 PG — LOW (ref 27–31)
MCHC RBC-ENTMCNC: 30.4 G/DL — LOW (ref 32–37)
MCV RBC AUTO: 85.8 FL — SIGNIFICANT CHANGE UP (ref 81–99)
MONOCYTES # BLD AUTO: 0.36 K/UL — SIGNIFICANT CHANGE UP (ref 0.1–0.6)
MONOCYTES NFR BLD AUTO: 6.8 % — SIGNIFICANT CHANGE UP (ref 1.7–9.3)
NEUTROPHILS # BLD AUTO: 3.25 K/UL — SIGNIFICANT CHANGE UP (ref 1.4–6.5)
NEUTROPHILS NFR BLD AUTO: 61.8 % — SIGNIFICANT CHANGE UP (ref 42.2–75.2)
NRBC # BLD: 0 /100 WBCS — SIGNIFICANT CHANGE UP (ref 0–0)
PLATELET # BLD AUTO: 211 K/UL — SIGNIFICANT CHANGE UP (ref 130–400)
POTASSIUM SERPL-MCNC: 4.2 MMOL/L — SIGNIFICANT CHANGE UP (ref 3.5–5)
POTASSIUM SERPL-SCNC: 4.2 MMOL/L — SIGNIFICANT CHANGE UP (ref 3.5–5)
PROT SERPL-MCNC: 6.4 G/DL — SIGNIFICANT CHANGE UP (ref 6–8)
RBC # BLD: 3.53 M/UL — LOW (ref 4.2–5.4)
RBC # FLD: 14 % — SIGNIFICANT CHANGE UP (ref 11.5–14.5)
SODIUM SERPL-SCNC: 139 MMOL/L — SIGNIFICANT CHANGE UP (ref 135–146)
WBC # BLD: 5.26 K/UL — SIGNIFICANT CHANGE UP (ref 4.8–10.8)
WBC # FLD AUTO: 5.26 K/UL — SIGNIFICANT CHANGE UP (ref 4.8–10.8)

## 2021-09-22 PROCEDURE — 99239 HOSP IP/OBS DSCHRG MGMT >30: CPT

## 2021-09-22 PROCEDURE — 99232 SBSQ HOSP IP/OBS MODERATE 35: CPT

## 2021-09-22 RX ORDER — FERROUS SULFATE 325(65) MG
1 TABLET ORAL
Qty: 0 | Refills: 0 | DISCHARGE

## 2021-09-22 RX ORDER — LURASIDONE HYDROCHLORIDE 40 MG/1
1 TABLET ORAL
Qty: 0 | Refills: 0 | DISCHARGE

## 2021-09-22 RX ORDER — HALOPERIDOL DECANOATE 100 MG/ML
1 INJECTION INTRAMUSCULAR
Qty: 60 | Refills: 0
Start: 2021-09-22 | End: 2021-10-21

## 2021-09-22 RX ORDER — FERROUS SULFATE 325(65) MG
1 TABLET ORAL
Qty: 0 | Refills: 0 | DISCHARGE
Start: 2021-09-22

## 2021-09-22 RX ORDER — LITHIUM CARBONATE 300 MG/1
1 TABLET, EXTENDED RELEASE ORAL
Qty: 30 | Refills: 0
Start: 2021-09-22 | End: 2021-10-21

## 2021-09-22 RX ORDER — DONEPEZIL HYDROCHLORIDE 10 MG/1
1 TABLET, FILM COATED ORAL
Qty: 0 | Refills: 0 | DISCHARGE
Start: 2021-09-22

## 2021-09-22 RX ORDER — DONEPEZIL HYDROCHLORIDE 10 MG/1
1 TABLET, FILM COATED ORAL
Qty: 0 | Refills: 0 | DISCHARGE

## 2021-09-22 RX ORDER — PREGABALIN 225 MG/1
1 CAPSULE ORAL
Qty: 0 | Refills: 0 | DISCHARGE

## 2021-09-22 RX ORDER — LITHIUM CARBONATE 300 MG/1
1 TABLET, EXTENDED RELEASE ORAL
Qty: 60 | Refills: 0
Start: 2021-09-22 | End: 2021-10-21

## 2021-09-22 RX ORDER — METOPROLOL TARTRATE 50 MG
1 TABLET ORAL
Qty: 0 | Refills: 0 | DISCHARGE

## 2021-09-22 RX ORDER — METOPROLOL TARTRATE 50 MG
1 TABLET ORAL
Qty: 0 | Refills: 0 | DISCHARGE
Start: 2021-09-22

## 2021-09-22 RX ADMIN — Medication 325 MILLIGRAM(S): at 11:13

## 2021-09-22 RX ADMIN — Medication 25 MILLIGRAM(S): at 05:37

## 2021-09-22 RX ADMIN — AMLODIPINE BESYLATE 10 MILLIGRAM(S): 2.5 TABLET ORAL at 05:37

## 2021-09-22 RX ADMIN — Medication 75 MILLIGRAM(S): at 15:00

## 2021-09-22 RX ADMIN — Medication 81 MILLIGRAM(S): at 11:12

## 2021-09-22 RX ADMIN — PANTOPRAZOLE SODIUM 40 MILLIGRAM(S): 20 TABLET, DELAYED RELEASE ORAL at 05:38

## 2021-09-22 RX ADMIN — Medication 1 APPLICATION(S): at 05:37

## 2021-09-22 RX ADMIN — Medication 75 MILLIGRAM(S): at 05:37

## 2021-09-22 RX ADMIN — HALOPERIDOL DECANOATE 0.5 MILLIGRAM(S): 100 INJECTION INTRAMUSCULAR at 05:37

## 2021-09-22 NOTE — DIETITIAN INITIAL EVALUATION ADULT. - OTHER CALCULATIONS
Weight used: 59 kg Energy needs: 9962-7405 kcal/day (MSJ 1.2-1.3 AF) Protein needs: 59-70 g/day (1.0-1.2 g/kg of dosing weight) Fluid needs: per LIP

## 2021-09-22 NOTE — DIETITIAN INITIAL EVALUATION ADULT. - NAME AND PHONE
RD to monitor: diet order, body composition, energy intake, nutrition focused physical finding: glucose profile. at risk will f/u in 4 days

## 2021-09-22 NOTE — PROGRESS NOTE BEHAVIORAL HEALTH - NSBHFUPINTERVALHXFT_PSY_A_CORE
Patient seen and interviewed.    Writer spoke to patient's son Catarino Rodarte about patient's medication. he was informed that given the fact that patient has had severe psychiatric decompensations in the past , requiring ECT and has since  has done very well on lithium for a , the recommendation is to continue lithium at a lower dose in combination with low dose haldol  and for her outpatient to work closely with her outpatient nephrologist to monitor her kidney function closely. patient's son was informed that patient's dementia appears to be getting worse and will not do well if admitted to the inpatient psychiatric floor for medication management since the inpatient psychiatric floor does not provide the services that will be of benefit to the patient. Patient's son verbalized understanding and was agreeable that lithium can be restarted on a Lower dose in combination with the low dose of haldol. he expressed his desire that patient should see a therapist weekly on outpatient basis upon discharge from the hospital.   Writer spoke to patient's outpatient psychiatrist NP Armaan Morales [phone]) and was informed of the plan as discussed with patient's son Catarino Rodarte. Mr Morales verbalized understanding and was agreeable.   Writer spoke to Sandra, staff at [facility] who reports that the last dose of Lithium that patient was taking prior to the onset of the ASHLEY in July , 2021  was 150mg P.O BID. She also reports that patient was started on Xanax 0.5mg on September 10th , 2021  and subsequently increased to Xanax 0.5mg BID for worsening anxiety. Patient seen and interviewed.  She reports that she does not remember writer and is worried about feeling out of it. patient also reports that she was told that she would be going back home today but knows that she cant go back home,. When asked why she thinks that she can not go back to her residence, patient states " I don't know, I can not function, I feel out of it'. Patient continues to report having depressed mood and anxiety but reports good sleep  and appetite and denies having suicidal ideations , intent or plan. She also denies acute symptoms of psychosis and samra.   Writer spoke to patient's son Catarino Rodarte about patient's medication. he was informed that given the fact that patient has had severe psychiatric decompensations in the past , requiring ECT and has since  has done very well on lithium for a , the recommendation is to continue lithium at a lower dose in combination with low dose haldol  and for her outpatient to work closely with her outpatient nephrologist to monitor her kidney function closely. patient's son was informed that patient's dementia appears to be getting worse and will not do well if admitted to the inpatient psychiatric floor for medication management since the inpatient psychiatric floor does not provide the services that will be of benefit to the patient. Patient's son verbalized understanding and was agreeable that lithium can be restarted on a Lower dose in combination with the low dose of haldol. he expressed his desire that patient should see a therapist weekly on outpatient basis upon discharge from the hospital.   Writer spoke to patient's outpatient psychiatrist NP Armaan Morales 9 [phone]) and was informed of the plan as discussed with patient's son Catarino Rodarte. Mr Morales verbalized understanding and was agreeable.   Writer spoke to Sandra, staff at [facility] who reports that the last dose of Lithium that patient was taking prior to the onset of the ASHLEY in July , 2021  was 150mg P.O BID. She also reports that patient was started on Xanax 0.5mg on September 10th , 2021  and subsequently increased to Xanax 0.5mg BID for worsening anxiety.

## 2021-09-22 NOTE — DISCHARGE NOTE PROVIDER - NSDCCPCAREPLAN_GEN_ALL_CORE_FT
PRINCIPAL DISCHARGE DIAGNOSIS  Diagnosis: Chest pain  Assessment and Plan of Treatment: You presented with chest pain. EKG and enzymes were negative meaning you did not have an acute myocardial infarction. You should follow up outpatient with a cardiologist.      SECONDARY DISCHARGE DIAGNOSES  Diagnosis: Anemia  Assessment and Plan of Treatment: You were found to be anemic at presentation. You received 2 units of packed red blood cells during your admission. You were also given Venofor Infusion  x 1 dose to improve you iron levels. You will continue to take iron supplements at discharge. You will need to follow up with your primary care provider and nephrology to further evaluate your anemia and maintain proper hemoglobin levels.     PRINCIPAL DISCHARGE DIAGNOSIS  Diagnosis: Chest pain  Assessment and Plan of Treatment: You presented with chest pain. EKG and enzymes were negative meaning you did not have an acute myocardial infarction. Your chest pain was probably more musculoskeletal in nature. You should follow up outpatient with a cardiologist.      SECONDARY DISCHARGE DIAGNOSES  Diagnosis: Anemia  Assessment and Plan of Treatment: You were found to be anemic at presentation. You received 2 units of packed red blood cells during your admission. You were also given Venofor Infusion  x 1 dose to improve you iron levels. You will continue to take iron supplements at discharge. You will need to follow up with your primary care provider and nephrology to further evaluate your anemia and maintain proper hemoglobin levels.

## 2021-09-22 NOTE — DISCHARGE NOTE PROVIDER - NSDCMRMEDTOKEN_GEN_ALL_CORE_FT
amLODIPine 10 mg oral tablet: 1 tab(s) orally once a day  aspirin 81 mg oral tablet, chewable: 1 tab(s) orally once a day  Ativan 0.5 mg oral tablet: 1 tab(s) orally once a day  atorvastatin 40 mg oral tablet: 1 tab(s) orally once a day  donepezil 5 mg oral tablet: 1 tab(s) orally once a day (at bedtime)  ferrous sulfate 324 mg (65 mg elemental iron) oral delayed release tablet: 1 tab(s) orally once a day  glimepiride 4 mg oral tablet: 1 tab(s) orally once a day  hydrALAZINE 50 mg oral tablet: 1 tab(s) orally 3 times a day  Januvia 50 mg oral tablet: 1 tab(s) orally once a day   Latuda 20 mg oral tablet: 1 tab(s) orally once a day  lisinopril 40 mg oral tablet: 1 tab(s) orally once a day  lithium 150 mg oral capsule: 1 cap(s) orally 2 times a day  metoprolol succinate 50 mg oral tablet, extended release: 1 tab(s) orally once a day  omeprazole 20 mg oral delayed release tablet: 1 tab(s) orally once a day  sodium bicarbonate 650 mg oral tablet: 2 tab(s) orally 3 times a day  torsemide 20 mg oral tablet: 1 tab(s) orally once a day  Vitamin B12 1000 mcg oral tablet: 1 tab(s) orally once a day   amLODIPine 10 mg oral tablet: 1 tab(s) orally once a day  aspirin 81 mg oral tablet, chewable: 1 tab(s) orally once a day  Ativan 0.5 mg oral tablet: 1 tab(s) orally once a day  atorvastatin 40 mg oral tablet: 1 tab(s) orally once a day  donepezil 10 mg oral tablet: 1 tab(s) orally once a day (at bedtime)  ferrous sulfate 325 mg (65 mg elemental iron) oral tablet: 1 tab(s) orally once a day  glimepiride 4 mg oral tablet: 1 tab(s) orally once a day  haloperidol 0.5 mg oral tablet: 1 tab(s) orally every 12 hours  hydrALAZINE 50 mg oral tablet: 1 tab(s) orally 3 times a day  Januvia 50 mg oral tablet: 1 tab(s) orally once a day   lisinopril 40 mg oral tablet: 1 tab(s) orally once a day  lithium 150 mg oral capsule: 1 cap(s) orally 2 times a day  metoprolol succinate 25 mg oral tablet, extended release: 1 tab(s) orally once a day  omeprazole 20 mg oral delayed release tablet: 1 tab(s) orally once a day  sodium bicarbonate 650 mg oral tablet: 2 tab(s) orally 3 times a day  torsemide 20 mg oral tablet: 1 tab(s) orally once a day

## 2021-09-22 NOTE — DISCHARGE NOTE PROVIDER - CARE PROVIDERS DIRECT ADDRESSES
,[email or web address]

## 2021-09-22 NOTE — DISCHARGE NOTE PROVIDER - HOSPITAL COURSE
v v76 year old F with a PMHx of HTN, HLD, Bipolar Disorder, Anxiety, presents to the ED with a one day history of history of pain.  Patient endorsed that her chest pain was localized to the midsternum.    # Atypical Chest Pain prob musculoskeletal in nature  ACS RULED OUT  ECG demonstrated sinus arrythmia without ischemic changes.    trop neg  does NOT need to see cardio here - can f/u as outpt  can do echo as OUTPT    # normo anemia of chr dz 2/2 CKD 3 and poss MOAN  ferr 40; iron sat 14%  s/p venofer 200mg iv x1  FeSO4 325mg po q24  cannot start ALISSA until iron more replete - needs OUTPT f/u w/ RENAL  needs OUTPT GI eval for EGD/C-scope    # son reports hx of chronic dementia (could be Alzheimer's and/or vascular type)  pt prob has at least some progression of her dementia (as she did not do well when completing the MOCA ([place] [employer]og Assess) form)  some of the dysfxn in filling out the MOCA was related to anxiety, but there is also element of dementia  pt MIGHT also be losing ability to cope emotionally feeling (but unable to express into thoughts/words) she is becoming demented (i.e. "losing her mind")  the lack of Li could also be unstabilizing her BPD  in all, the combination of BPD flare and worse dementia is leading to her unrelenting decompensation  CTH 7/2021 = chr microvasc changes  MRI 7/2021 = pt did not lillian well; but no acute CVA on limited exam  incr Aricept to 10mg po q24 - watch for cholinergic side effects  not sure if pt has "pseudodementia" from depression - f/u psych  TSH, Fol, B12 are nl from 7/2021 - can d/c B12 oral  Li lvl 0.96 in 7/2021 - psych wants to talk w/ son about restarting Li at lower dose - f/u psych  needs outpt NEURO f/u    # Bipolar Disorder  there are ZERO suicidal ideations now - 1:1 sit not needed; IPP not needed  pt appears overwhelmed  I spoke w/ Dr Mac Berumen 20mg PO q daily - does not seem to be helping: per psych, HOLD  Lithium 150 mg   for now, start haldol 0.5mg po q12   d/c xanax  decr ativan 0.5mg po q24 prn anxiety - DO NOT RAISE  f/u  psychiatry   needs OUTPT psych f/u     # HTN  c/w norvasc 10mg PO qdaily  make lisinopril 40mg PO qHS  incr hydralazine 75mg PO TID  resume and decr Toprol XL 25mg po q24 - hold for HR < 55      # HLD  c/w Lip 40mg QHS    # T2 DM  on amaryl and januvia at home - can resume upon d/c  diabetic/DASH diet  FS 2x/day  will tx if FS > 180 consistently      Dispo: f/u psych; FS 2x/day; tx HTN; Fe oral; incr aricept  f/u CM - [facility]  outpt f/u Neuro, Psych, Renal, IM, Cardio

## 2021-09-22 NOTE — PROGRESS NOTE BEHAVIORAL HEALTH - SUMMARY
Ms Rodarte is a 76 year old woman with a history of Bipolar disorder and Dementia who was admitted to the medical floor for the management of chest pain. psychiatry consult was called for the evaluation of possible suicidal ideations because patient had reported  to the staff on the medical floor that she could no longer continue to live like this.     At this time, patient is not considered an imminant danger to herself or others and does not need inpatient psychiatric hospitalization. Patient can be discharged back to her residence with the plan to follow up with her outpatient psychiatrist NP Armaan Morales on [place] 150mg P.O Daily and haldol 0.5mg P.O BID. We do no recommend the use of benzodiazepines in elderly patient's with dementia because of the risk of falls, worsening delirium and paradoxical agitation. If it is necessary for this medication to be used , it should be used very judiciously. We recommend that patient's outpatient psychiatrist should work in close concert with patient's nephrologist to monitor her kidney function given her recent ASHLEY and the fact that Lithium can cause worsening renal impairment. We are also agreeable with the increase in the dose of Aricept for now. Patient will benefit from outpatient supportive psychotherapy to help her develop better coping skills and better frustration tolerance. Patient's son and psychiatrist were informed of this pan and they were both agreeable to it. Ms Rodarte is a 76 year old woman with a history of Bipolar disorder and Dementia who was admitted to the medical floor for the management of chest pain. psychiatry consult was called for the evaluation of possible suicidal ideations because patient had reported  to the staff on the medical floor that she could no longer continue to live like this.   Patient continues to appear anxious and depressed in the context of her perception that she can not perform tasks and can not think appropriately. her behavior seems to be in keeping with worsening of her dementia. It is unclear if the chronic kidney disease was caused by lithium however Lithium can worsen kidney function. However since patient is high risk for acute decompensation of bipolar depression and has required ECT in the past and has done well on Lithium , the benefit of continuing this medication with the collaboration of her outpatient psychiatrist and nephrologist must be weighted with the risk of worsening kidney function.   At this time, patient is not considered an imminent danger to herself or others and does not need inpatient psychiatric hospitalization. Patient can be discharged back to her residence with the plan to follow up with her outpatient psychiatrist NP Armaan Morales on [place] 150mg P.O Daily and haldol 0.5mg P.O BID. We do no recommend the use of benzodiazepines in elderly patient's with dementia because of the risk of falls, worsening delirium and paradoxical agitation. If it is necessary for this medication to be used , it should be used very judiciously. We recommend that patient's outpatient psychiatrist should work in close concert with patient's nephrologist to monitor her kidney function given her chronic kidney disease and the fact that Lithium can cause worsening renal impairment. We are also agreeable with the increase in the dose of Aricept for now. Patient will benefit from outpatient supportive psychotherapy to help her develop better coping skills and better frustration tolerance. Patient's son and psychiatrist were informed of this pan and they were both agreeable to it.

## 2021-09-22 NOTE — PROGRESS NOTE ADULT - ASSESSMENT
76 year old F with a PMHx of HTN, HLD, Bipolar Disorder, Anxiety, presents to the ED with a one day history of history of pain.  Patient endorsed that her chest pain was localized to the midsternum.    # Atypical Chest Pain prob musculoskeletal in nature  ACS RULED OUT  ECG demonstrated sinus arrythmia without ischemic changes  trop neg  does NOT need to see cardio here - can f/u as outpt  can do echo as OUTPT    # normo anemia of chr dz 2/2 CKD 3 and poss MONA  ferr 40; iron sat 14%  venofer 200mg iv x1 done  FeSO4 325mg po q24  cannot start ALISSA until iron more replete - needs OUTPT f/u w/ RENAL  needs OUTPT GI eval for EGD/C-scope    # son reports hx of chronic dementia (could be Alzheimer's and/or vascular type)  pt prob has at least some progression of her dementia (as she did not do well when completing the MOCA (Zander Cog Assess) form w/ psych)  some of the dysfxn in filling out the MOCA was related to anxiety, but there is also element of dementia  pt MIGHT also be losing ability to cope emotionally (but unable to express into thoughts/words) that she is becoming demented (i.e. "losing her mind")  the lack of Li could also be unstabilizing her BPD  in all, the combination of BPD flare and worse dementia is leading to her unrelenting decompensation  CTH 7/2021 = chr microvasc changes  MRI 7/2021 = pt did not lillian well; but no acute CVA on limited exam  incr Aricept to 10mg po q24 - watch for cholinergic side effects  not sure if pt has "pseudodementia" from depression - f/u psych  TSH, Fol, B12 are nl from 7/2021 - can d/c B12 oral  needs outpt NEURO f/u    # Bipolar Disorder  there are ZERO suicidal ideations now - 1:1 sit not needed; IPP not needed  pt appears overwhelmed  psych spoke w/ outpt psych and son  I spoke w/ Dr Watts  d/c Latuda   resume Lithium 150mg po q24  c/w haldol 0.5mg po q12 (NOT PRN) - and can incr as outpt  d/c xanax  decr ativan 0.5mg po q24 prn anxiety - DO NOT RAISE  f/u  psychiatry as OUTPT      # HTN  c/w norvasc 10mg PO qdaily  c/w lisinopril 40mg PO qHS  c/w hydralazine 75mg PO TID  c/w Toprol XL 25mg po q24 - hold for HR < 55  holding torsemide - do not restart    # HLD  c/w Lip 40mg QHS    # T2 DM  on amaryl and januvia at home - can resume upon d/c  diabetic/DASH diet  FS 2x/day  will tx if FS > 180 consistently    # DVT ppx: Heparin SubQ    # GI ppx: PPI po q24    # Activity: PT eval (walked 150') so STR not needed; can go back to [facility]    # Full Code    Dispo: f/u psych; FS 2x/day; tx HTN; Fe oral; incr aricept; restart Li  f/u CM - d/c back to [facility] today  outpt f/u Neuro, Psych, Renal, IM, Cardio,

## 2021-09-22 NOTE — DISCHARGE NOTE PROVIDER - NSDCFUADDAPPT_GEN_ALL_CORE_FT
Please be sure to follow up with Gastroenterology to further evaluate your anemia, with nephrology for your kidneys, psychiatry, and neurology.

## 2021-09-22 NOTE — PROGRESS NOTE BEHAVIORAL HEALTH - NSBHCHARTREVIEWVS_PSY_A_CORE FT
Vital Signs Last 24 Hrs  T(C): 35.7 (22 Sep 2021 05:00), Max: 36.3 (21 Sep 2021 22:02)  T(F): 96.3 (22 Sep 2021 05:00), Max: 97.4 (21 Sep 2021 22:02)  HR: 58 (22 Sep 2021 06:27) (53 - 71)  BP: 177/76 (22 Sep 2021 06:27) (121/83 - 188/84)  BP(mean): --  RR: 20 (22 Sep 2021 05:00) (20 - 20)  SpO2: --
Vital Signs Last 24 Hrs  T(C): 36.3 (21 Sep 2021 22:02), Max: 36.3 (21 Sep 2021 22:02)  T(F): 97.4 (21 Sep 2021 22:02), Max: 97.4 (21 Sep 2021 22:02)  HR: 57 (21 Sep 2021 22:02) (57 - 71)  BP: 176/72 (21 Sep 2021 22:02) (121/83 - 187/74)  BP(mean): --  RR: 20 (21 Sep 2021 22:02) (20 - 20)  SpO2: --

## 2021-09-22 NOTE — DIETITIAN INITIAL EVALUATION ADULT. - OTHER INFO
Pertinent medical information: 76 year old F with a PMHx of HTN, HLD, Bipolar Disorder, Anxiety, presents to the ED with a one day history of history of pain.  Patient endorsed that her chest pain was localized to the midsternum. Per internal medicine note, Atypical Chest Pain prob musculoskeletal in nature,  normo anemia of chr dz 2/2 CKD 3 and poss MONA. Patient seems overwhelmed noted.    Pertinent nutrition information: Patient noted with poor PO intake. Per RN patients PO intake ranges from <50 % of meals. Changing habits and appetite common with progression of dementia.

## 2021-09-22 NOTE — PROGRESS NOTE BEHAVIORAL HEALTH - NSBHCHARTREVIEWLAB_PSY_A_CORE FT
9.2    5.26  )-----------( 211      ( 22 Sep 2021 05:48 )             30.3       09-22    139  |  107  |  20  ----------------------------<  137<H>  4.2   |  22  |  1.4    Ca    10.2<H>      22 Sep 2021 05:48  Mg     2.1     09-22    TPro  6.4  /  Alb  4.0  /  TBili  <0.2  /  DBili  x   /  AST  17  /  ALT  19  /  AlkPhos  95  09-22

## 2021-09-22 NOTE — PROGRESS NOTE ADULT - SUBJECTIVE AND OBJECTIVE BOX
KINGSTON PETTY  76y  Female  ***My note supersedes ALL resident notes that I sign.  My corrections for their notes are in my note.***    I can be reached directly on [address]. My office number is [phone]. My personal cell number is [phone].    INTERVAL EVENTS: Here for f/u of dementia. Pt calm today. She is aware that she has dementia and her brain is failing her. She knows that this is making her upset. She is concerned about how she will do at [facility], even knowing that the staff there do help her and her son is very active in her care. Pt has no dysuria. Pt has no more vag bleeding.    T(F): 96.6 (21 @ 12:56), Max: 97.4 (21 @ 22:02)  HR: 51 (21 @ 12:56) (51 - 58)  BP: 149/65 (21 @ 12:56) (149/65 - 188/84)  RR: 20 (21 @ 12:56) (20 - 20)  SpO2: --    Gen: no resp distress; pt is bothered by her inability to comprehend what is going on  HEENT: PERRL, EOMI, mouth clr, nose clr  Neck: no nodes, no JVD, thyroid nl  lungs: clr  hrt: s1 s2 rrr no murmur  abd: soft, NT/ND, no HS megaly  ext: no edema, no c/c  neuro: aa ox2-3, cn intact, can move all 4 ext    LABS:                      9.2     (    85.8   5.26  )-----------( ---------      211      ( 22 Sep 2021 05:48 )             30.3    (    14.0     139   (   107   (   137      21 @ 05:48  ----------------------               4.2   (   22   (   20                             -----                        1.4  Ca  10.2   Mg  2.1    P   --     LFT  6.4  (  <0.2  (  17       21 @ 05:48  -------------------------  4.0  (  95  (  19    Urinalysis Basic - ( 21 Sep 2021 11:00 )    Color: Light Yellow / Appearance: Clear / S.013 / pH: x  Gluc: x / Ketone: Negative  / Bili: Negative / Urobili: <2 mg/dL   Blood: x / Protein: 100 mg/dL / Nitrite: Negative   Leuk Esterase: Moderate / RBC: 2 /HPF / WBC 10 /HPF   Sq Epi: x / Non Sq Epi: 6 /HPF / Bacteria: Negative    CAPILLARY BLOOD GLUCOSE  POCT Blood Glucose.: 137 (21 @ 12:12)  POCT Blood Glucose.: 156 (21 @ 08:16)  POCT Blood Glucose.: 117 (21 @ 16:42)  POCT Blood Glucose.: 188 (21 @ 11:08)    RADIOLOGY & ADDITIONAL TESTS:    MEDICATIONS:    amLODIPine   Tablet 10 milliGRAM(s) Oral daily  aspirin  chewable 81 milliGRAM(s) Oral daily  atorvastatin 40 milliGRAM(s) Oral at bedtime  donepezil 10 milliGRAM(s) Oral at bedtime  ferrous    sulfate 325 milliGRAM(s) Oral daily  haloperidol     Tablet 0.5 milliGRAM(s) Oral every 12 hours  hydrALAZINE 75 milliGRAM(s) Oral every 8 hours  lisinopril 40 milliGRAM(s) Oral at bedtime  LORazepam     Tablet 0.5 milliGRAM(s) Oral daily PRN  melatonin 5 milliGRAM(s) Oral at bedtime  metoprolol succinate ER 25 milliGRAM(s) Oral daily  pantoprazole    Tablet 40 milliGRAM(s) Oral before breakfast  vitamin A &amp; D Ointment 1 Application(s) Topical two times a day

## 2021-09-22 NOTE — PROGRESS NOTE BEHAVIORAL HEALTH - NSBHCONSULTFOLLOWAFTERCARE_PSY_A_CORE FT
Please refer patient back to her outpatient psychiatrist LUIS Morales at [facility] , [phone]  Patient can also be referred to [facility] , [address] [place] [address], Phone number: [phone] for supportive psychotherapy at her residence

## 2021-09-22 NOTE — DISCHARGE NOTE NURSING/CASE MANAGEMENT/SOCIAL WORK - PATIENT PORTAL LINK FT
You can access the FollowMyHealth Patient Portal offered by [facility] by registering at the following website: [email or web address]. By joining [employer]’s FollowMyHealth portal, you will also be able to view your health information using other applications (apps) compatible with our system.

## 2021-09-22 NOTE — PROGRESS NOTE BEHAVIORAL HEALTH - NSBHCONSULTMEDS_PSY_A_CORE FT
- Recommend starting Lithium 150mg p.o daily  - Recommend continuing haldol 0.5mg P.o BID   - There is no indication for Latuda for now   - Recommend continuation of Aricept 10mg P.O daily.   - If benzodiazepines are to be used for anxiety, we recommend the use of low dose ativan on an as needed basis - Recommend starting Lithium 150mg p.o daily  - Recommend continuing haldol 0.5mg P.o BID   - There is no indication for Latuda for now   - Recommend continuation of Aricept 10mg P.O daily.   - We do not recommend the use on benzodiazepines in this patient however in the event that there are used,  low dose ativan on an as needed basis is preferable

## 2021-09-22 NOTE — DIETITIAN INITIAL EVALUATION ADULT. - PHYSCIAL ASSESSMENT
skin intact  no edema noted  no gastrointestinal signs/symptoms, LBM: 9/ 21 per RN flow sheets   No chewing/swallowing difficulty at this time well nourished

## 2021-09-22 NOTE — PROGRESS NOTE ADULT - PROVIDER SPECIALTY LIST ADULT
Internal Medicine
Hospitalist
Internal Medicine

## 2021-09-22 NOTE — DISCHARGE NOTE PROVIDER - PROVIDER TOKENS
PROVIDER:[TOKEN:[[id number]]],PROVIDER:[TOKEN:[[id number]],ESTABLISHEDPATIENT:[T]],PROVIDER:[TOKEN:[[id number]]],PROVIDER:[TOKEN:[[id number]]],PROVIDER:[TOKEN:[[id number]]]

## 2021-09-22 NOTE — DIETITIAN INITIAL EVALUATION ADULT. - ORAL INTAKE PTA/DIET HISTORY
Attempted to ask patient nutrition assessment questions- was unable to provide pertinent nutrition hx. No answer from emergency contact number, called all number listed in EMR.

## 2021-09-22 NOTE — DIETITIAN INITIAL EVALUATION ADULT. - ADD RECOMMEND
1) Add carbohydrate consistent diet modifier to diet 2) Add Glucerna BID to diet order to optimize energy and protein intake 3) Provide food preferences and encourage PO intake

## 2021-09-22 NOTE — DIETITIAN INITIAL EVALUATION ADULT. - PATIENT PROFILE REVIEWED
Outpatient Occupational Therapy Peds Treatment Note BEULAH Jacobo     Patient Name: Chu Nugent  : 2014  MRN: [id number]  Today's Date: 2018       Visit Date: 2018  There is no problem list on file for this patient.    Past Medical History:   Diagnosis Date   • Constipation    • Dysuria    • Geographic tongue birth     Past Surgical History:   Procedure Laterality Date   • TONSILLECTOMY Bilateral 2017    and Adenoidectomy       Visit Dx:    ICD-10-CM ICD-9-CM   1. Lack of coordination R27.9 781.3              OT Pediatric Evaluation     Row Name 18 1312             Subjective Comments    Subjective Comments grandparents brought pt to therapy session, states doing well in   -JJ         General Observations/Behavior    General Observations/Behavior Followed verbal directions well  -JJ      Communication Impaired oral expression  -JJ         Subjective Pain    Able to rate subjective pain? no  -JJ        User Key  (r) = Recorded By, (t) = Taken By, (c) = Cosigned By    Initials Name Provider Type    Angelica Henry, OTR Occupational Therapist                        OT Assessment/Plan     Row Name 18 8976          OT Assessment    Assessment Comments pt improving cutting skills and inhand manipulation of paper and scissors. pt still having difficulty with fine moter coordination/precison with all handwriting/ letter tracing. pt is improving participation with therapist and follwoing directions  throughout treatment session   -JJ        OT Plan    OT Plan Comments cont poc  -JJ       User Key  (r) = Recorded By, (t) = Taken By, (c) = Cosigned By    Initials Name Provider Type    Angelica Henry, OTR Occupational Therapist           Therapy Education  Given: HEP  Program: Reinforced  How Provided: Verbal, Demonstration  Provided to: Patient, Caregiver  Level of Understanding: Verbalized        OT Exercises     Row Name 18 6306             Exercise  1    Exercise Name 1 pt completed theraputty activity with min cues to stay on task  -JJ         Exercise 2    Exercise Name 2 pt completed handwriting/ letter tracing activity. pt required [place] intermittently for cuing and to increase accuracy. pt requried cues to use L hand as assist to stabilize paper. pt required consistent cues and encouragement to trace letters, slow pace and increase accuracy. pt kelly 50% accuracy with letter tracing  -J         Exercise 3    Exercise Name 3 pt completed cutting activity with cutting 3 1/2 inch thick diaganol lines. pt required demonstration of instrucitons and required min assist to stabilize paper and min assist to advance scissors intermittently on first line. able to progress to holding papaer himself and advancing scissors up line with max cues and kelly 75% accuracy on line  -J         Exercise 4    Exercise Name 4 pt completed turn taking game with following one step directions, manipulating colors. pt able to follow one step directions kelly 75% of game, given extended time. improved with practice  -        User Key  (r) = Recorded By, (t) = Taken By, (c) = Cosigned By    Initials Name Provider Type    Angelica Henry OTR Occupational Therapist                   Time Calculation:   OT Start Time: 1315  OT Stop Time: 1400  OT Time Calculation (min): 45 min   Therapy Suggested Charges     Code   Minutes Charges    None           Therapy Charges for Today     Code Description Service Date Service Provider Modifiers Qty    [id number]  OT THERAPEUTIC ACT EA 15 MIN 8/14/2018 Angelica Anton OTR GO 3              DANILO Guillen  8/14/2018   yes

## 2021-09-22 NOTE — DIETITIAN INITIAL EVALUATION ADULT. - PERTINENT MEDS FT
MEDICATIONS  (STANDING):  amLODIPine   Tablet 10 milliGRAM(s) Oral daily  atorvastatin 40 milliGRAM(s) Oral at bedtime  donepezil 10 milliGRAM(s) Oral at bedtime  ferrous    sulfate 325 milliGRAM(s) Oral daily  hydrALAZINE 75 milliGRAM(s) Oral every 8 hours  lisinopril 40 milliGRAM(s) Oral at bedtime  pantoprazole    Tablet 40 milliGRAM(s) Oral before breakfast

## 2021-09-22 NOTE — DISCHARGE NOTE PROVIDER - CARE PROVIDER_API CALL
Brad Watts)  Psychiatry  [address] [place] [address]  Phone: [phone]  Fax: [phone]  Follow Up Time:     Joann Aguilar  MEDICINE  [address] [place] [address]  Phone: [phone]  Fax: [phone]  Established Patient  Follow Up Time:     Alma Moran)  Internal Medicine; Nephrology  [address] [place] [address]  Phone: [phone]  Fax: [phone]  Follow Up Time:     Malika Lowe)  Gastroenterology  [address] [place] [address]  Phone: [phone]  Fax: [phone]  Follow Up Time:     Kal Keane)  Neurology  [address] [place] [address], [address]  [place] [address]  Phone: [phone]  Fax: [phone]  Follow Up Time:

## 2021-09-22 NOTE — DIETITIAN INITIAL EVALUATION ADULT. - PERTINENT LABORATORY DATA
9/22: RBC: 3.53, H/H: 9.2/30.3    7/18:  HbA1c: 8.0%    CAPILLARY BLOOD GLUCOSE  POCT Blood Glucose.: 137 mg/dL (22 Sep 2021 12:12)  POCT Blood Glucose.: 156 mg/dL (22 Sep 2021 08:16)

## 2021-09-27 DIAGNOSIS — I12.9 HYPERTENSIVE CHRONIC KIDNEY DISEASE WITH STAGE 1 THROUGH STAGE 4 CHRONIC KIDNEY DISEASE, OR UNSPECIFIED CHRONIC KIDNEY DISEASE: ICD-10-CM

## 2021-09-27 DIAGNOSIS — Z79.82 LONG TERM (CURRENT) USE OF ASPIRIN: ICD-10-CM

## 2021-09-27 DIAGNOSIS — Z79.84 LONG TERM (CURRENT) USE OF ORAL HYPOGLYCEMIC DRUGS: ICD-10-CM

## 2021-09-27 DIAGNOSIS — F31.9 BIPOLAR DISORDER, UNSPECIFIED: ICD-10-CM

## 2021-09-27 DIAGNOSIS — F41.9 ANXIETY DISORDER, UNSPECIFIED: ICD-10-CM

## 2021-09-27 DIAGNOSIS — D63.1 ANEMIA IN CHRONIC KIDNEY DISEASE: ICD-10-CM

## 2021-09-27 DIAGNOSIS — Z88.8 ALLERGY STATUS TO OTHER DRUGS, MEDICAMENTS AND BIOLOGICAL SUBSTANCES: ICD-10-CM

## 2021-09-27 DIAGNOSIS — R07.89 OTHER CHEST PAIN: ICD-10-CM

## 2021-09-27 DIAGNOSIS — Z88.1 ALLERGY STATUS TO OTHER ANTIBIOTIC AGENTS STATUS: ICD-10-CM

## 2021-09-27 DIAGNOSIS — F03.90 UNSPECIFIED DEMENTIA, UNSPECIFIED SEVERITY, WITHOUT BEHAVIORAL DISTURBANCE, PSYCHOTIC DISTURBANCE, MOOD DISTURBANCE, AND ANXIETY: ICD-10-CM

## 2021-09-27 DIAGNOSIS — E11.22 TYPE 2 DIABETES MELLITUS WITH DIABETIC CHRONIC KIDNEY DISEASE: ICD-10-CM

## 2021-09-27 DIAGNOSIS — N18.30 CHRONIC KIDNEY DISEASE, STAGE 3 UNSPECIFIED: ICD-10-CM

## 2021-09-27 DIAGNOSIS — F32.9 MAJOR DEPRESSIVE DISORDER, SINGLE EPISODE, UNSPECIFIED: ICD-10-CM

## 2021-09-27 DIAGNOSIS — Z87.891 PERSONAL HISTORY OF NICOTINE DEPENDENCE: ICD-10-CM

## 2021-09-29 ENCOUNTER — EMERGENCY (EMERGENCY)
Facility: HOSPITAL | Age: 76
LOS: 1 days | Discharge: HOME | End: 2021-09-29
Attending: EMERGENCY MEDICINE | Admitting: EMERGENCY MEDICINE
Payer: MEDICARE

## 2021-09-29 VITALS
RESPIRATION RATE: 18 BRPM | SYSTOLIC BLOOD PRESSURE: 156 MMHG | TEMPERATURE: 97 F | HEART RATE: 61 BPM | DIASTOLIC BLOOD PRESSURE: 72 MMHG | OXYGEN SATURATION: 98 %

## 2021-09-29 VITALS
DIASTOLIC BLOOD PRESSURE: 74 MMHG | HEIGHT: 62 IN | HEART RATE: 60 BPM | RESPIRATION RATE: 17 BRPM | TEMPERATURE: 97 F | SYSTOLIC BLOOD PRESSURE: 182 MMHG | WEIGHT: 130.07 LBS | OXYGEN SATURATION: 97 %

## 2021-09-29 DIAGNOSIS — I10 ESSENTIAL (PRIMARY) HYPERTENSION: ICD-10-CM

## 2021-09-29 DIAGNOSIS — E11.9 TYPE 2 DIABETES MELLITUS WITHOUT COMPLICATIONS: ICD-10-CM

## 2021-09-29 DIAGNOSIS — F03.90 UNSPECIFIED DEMENTIA WITHOUT BEHAVIORAL DISTURBANCE: ICD-10-CM

## 2021-09-29 DIAGNOSIS — Z79.84 LONG TERM (CURRENT) USE OF ORAL HYPOGLYCEMIC DRUGS: ICD-10-CM

## 2021-09-29 DIAGNOSIS — F31.9 BIPOLAR DISORDER, UNSPECIFIED: ICD-10-CM

## 2021-09-29 DIAGNOSIS — K57.92 DIVERTICULITIS OF INTESTINE, PART UNSPECIFIED, WITHOUT PERFORATION OR ABSCESS WITHOUT BLEEDING: ICD-10-CM

## 2021-09-29 DIAGNOSIS — R10.2 PELVIC AND PERINEAL PAIN: ICD-10-CM

## 2021-09-29 DIAGNOSIS — Z88.0 ALLERGY STATUS TO PENICILLIN: ICD-10-CM

## 2021-09-29 DIAGNOSIS — K59.00 CONSTIPATION, UNSPECIFIED: ICD-10-CM

## 2021-09-29 DIAGNOSIS — Z79.82 LONG TERM (CURRENT) USE OF ASPIRIN: ICD-10-CM

## 2021-09-29 DIAGNOSIS — Z87.19 PERSONAL HISTORY OF OTHER DISEASES OF THE DIGESTIVE SYSTEM: ICD-10-CM

## 2021-09-29 DIAGNOSIS — Z91.041 RADIOGRAPHIC DYE ALLERGY STATUS: ICD-10-CM

## 2021-09-29 DIAGNOSIS — R10.30 LOWER ABDOMINAL PAIN, UNSPECIFIED: ICD-10-CM

## 2021-09-29 DIAGNOSIS — K64.9 UNSPECIFIED HEMORRHOIDS: ICD-10-CM

## 2021-09-29 LAB
ALBUMIN SERPL ELPH-MCNC: 4.3 G/DL — SIGNIFICANT CHANGE UP (ref 3.5–5.2)
ALP SERPL-CCNC: 120 U/L — HIGH (ref 30–115)
ALT FLD-CCNC: 18 U/L — SIGNIFICANT CHANGE UP (ref 0–41)
ANION GAP SERPL CALC-SCNC: 11 MMOL/L — SIGNIFICANT CHANGE UP (ref 7–14)
APPEARANCE UR: CLEAR — SIGNIFICANT CHANGE UP
AST SERPL-CCNC: 18 U/L — SIGNIFICANT CHANGE UP (ref 0–41)
BACTERIA # UR AUTO: NEGATIVE — SIGNIFICANT CHANGE UP
BASOPHILS # BLD AUTO: 0.03 K/UL — SIGNIFICANT CHANGE UP (ref 0–0.2)
BASOPHILS NFR BLD AUTO: 0.4 % — SIGNIFICANT CHANGE UP (ref 0–1)
BILIRUB SERPL-MCNC: <0.2 MG/DL — SIGNIFICANT CHANGE UP (ref 0.2–1.2)
BILIRUB UR-MCNC: NEGATIVE — SIGNIFICANT CHANGE UP
BUN SERPL-MCNC: 13 MG/DL — SIGNIFICANT CHANGE UP (ref 10–20)
CALCIUM SERPL-MCNC: 10.3 MG/DL — HIGH (ref 8.5–10.1)
CHLORIDE SERPL-SCNC: 106 MMOL/L — SIGNIFICANT CHANGE UP (ref 98–110)
CO2 SERPL-SCNC: 19 MMOL/L — SIGNIFICANT CHANGE UP (ref 17–32)
COLOR SPEC: COLORLESS — SIGNIFICANT CHANGE UP
CREAT SERPL-MCNC: 1.3 MG/DL — SIGNIFICANT CHANGE UP (ref 0.7–1.5)
DIFF PNL FLD: NEGATIVE — SIGNIFICANT CHANGE UP
EOSINOPHIL # BLD AUTO: 0.19 K/UL — SIGNIFICANT CHANGE UP (ref 0–0.7)
EOSINOPHIL NFR BLD AUTO: 2.6 % — SIGNIFICANT CHANGE UP (ref 0–8)
EPI CELLS # UR: 1 /HPF — SIGNIFICANT CHANGE UP (ref 0–5)
GLUCOSE SERPL-MCNC: 151 MG/DL — HIGH (ref 70–99)
GLUCOSE UR QL: NEGATIVE — SIGNIFICANT CHANGE UP
HCT VFR BLD CALC: 32.6 % — LOW (ref 37–47)
HGB BLD-MCNC: 10 G/DL — LOW (ref 12–16)
HYALINE CASTS # UR AUTO: 3 /LPF — SIGNIFICANT CHANGE UP (ref 0–7)
IMM GRANULOCYTES NFR BLD AUTO: 0.4 % — HIGH (ref 0.1–0.3)
KETONES UR-MCNC: NEGATIVE — SIGNIFICANT CHANGE UP
LACTATE SERPL-SCNC: 1.4 MMOL/L — SIGNIFICANT CHANGE UP (ref 0.7–2)
LEUKOCYTE ESTERASE UR-ACNC: NEGATIVE — SIGNIFICANT CHANGE UP
LIDOCAIN IGE QN: 42 U/L — SIGNIFICANT CHANGE UP (ref 7–60)
LYMPHOCYTES # BLD AUTO: 1.29 K/UL — SIGNIFICANT CHANGE UP (ref 1.2–3.4)
LYMPHOCYTES # BLD AUTO: 17.9 % — LOW (ref 20.5–51.1)
MCHC RBC-ENTMCNC: 26.4 PG — LOW (ref 27–31)
MCHC RBC-ENTMCNC: 30.7 G/DL — LOW (ref 32–37)
MCV RBC AUTO: 86 FL — SIGNIFICANT CHANGE UP (ref 81–99)
MONOCYTES # BLD AUTO: 0.44 K/UL — SIGNIFICANT CHANGE UP (ref 0.1–0.6)
MONOCYTES NFR BLD AUTO: 6.1 % — SIGNIFICANT CHANGE UP (ref 1.7–9.3)
NEUTROPHILS # BLD AUTO: 5.22 K/UL — SIGNIFICANT CHANGE UP (ref 1.4–6.5)
NEUTROPHILS NFR BLD AUTO: 72.6 % — SIGNIFICANT CHANGE UP (ref 42.2–75.2)
NITRITE UR-MCNC: NEGATIVE — SIGNIFICANT CHANGE UP
NRBC # BLD: 0 /100 WBCS — SIGNIFICANT CHANGE UP (ref 0–0)
PH UR: 6.5 — SIGNIFICANT CHANGE UP (ref 5–8)
PLATELET # BLD AUTO: 207 K/UL — SIGNIFICANT CHANGE UP (ref 130–400)
POTASSIUM SERPL-MCNC: 4.7 MMOL/L — SIGNIFICANT CHANGE UP (ref 3.5–5)
POTASSIUM SERPL-SCNC: 4.7 MMOL/L — SIGNIFICANT CHANGE UP (ref 3.5–5)
PROT SERPL-MCNC: 7 G/DL — SIGNIFICANT CHANGE UP (ref 6–8)
PROT UR-MCNC: ABNORMAL
RBC # BLD: 3.79 M/UL — LOW (ref 4.2–5.4)
RBC # FLD: 14.1 % — SIGNIFICANT CHANGE UP (ref 11.5–14.5)
RBC CASTS # UR COMP ASSIST: 0 /HPF — SIGNIFICANT CHANGE UP (ref 0–4)
SODIUM SERPL-SCNC: 136 MMOL/L — SIGNIFICANT CHANGE UP (ref 135–146)
SP GR SPEC: 1.01 — SIGNIFICANT CHANGE UP (ref 1.01–1.03)
UROBILINOGEN FLD QL: SIGNIFICANT CHANGE UP
WBC # BLD: 7.2 K/UL — SIGNIFICANT CHANGE UP (ref 4.8–10.8)
WBC # FLD AUTO: 7.2 K/UL — SIGNIFICANT CHANGE UP (ref 4.8–10.8)
WBC UR QL: 1 /HPF — SIGNIFICANT CHANGE UP (ref 0–5)

## 2021-09-29 PROCEDURE — 74177 CT ABD & PELVIS W/CONTRAST: CPT | Mod: 26,MA

## 2021-09-29 PROCEDURE — 99284 EMERGENCY DEPT VISIT MOD MDM: CPT

## 2021-09-29 RX ORDER — CIPROFLOXACIN LACTATE 400MG/40ML
400 VIAL (ML) INTRAVENOUS ONCE
Refills: 0 | Status: COMPLETED | OUTPATIENT
Start: 2021-09-29 | End: 2021-09-29

## 2021-09-29 RX ORDER — METRONIDAZOLE 500 MG
500 TABLET ORAL ONCE
Refills: 0 | Status: COMPLETED | OUTPATIENT
Start: 2021-09-29 | End: 2021-09-29

## 2021-09-29 RX ORDER — MOXIFLOXACIN HYDROCHLORIDE TABLETS, 400 MG 400 MG/1
1 TABLET, FILM COATED ORAL
Qty: 20 | Refills: 0
Start: 2021-09-29 | End: 2021-10-08

## 2021-09-29 RX ORDER — METRONIDAZOLE 500 MG
1 TABLET ORAL
Qty: 30 | Refills: 0
Start: 2021-09-29 | End: 2021-10-08

## 2021-09-29 RX ADMIN — Medication 100 MILLIGRAM(S): at 05:06

## 2021-09-29 RX ADMIN — Medication 200 MILLIGRAM(S): at 05:40

## 2021-09-29 NOTE — ED PROVIDER NOTE - NS ED ROS FT
CONST: No fever, chills or bodyaches  EYES: No pain, redness, drainage or visual changes.  ENT: No ear pain or discharge, nasal discharge or congestion. No sore throat  CARD: No chest pain, palpitations  RESP: No SOB, cough, hemoptysis. No hx of asthma or COPD  GI: (+) lower abdominal pain, No N/V/D  : No urinary symptoms. (+) vaginal pain.   MS: No joint pain, back pain or extremity pain/injury  SKIN: No rashes  NEURO: No headache, dizziness, paresthesias or LOC

## 2021-09-29 NOTE — ED PROVIDER NOTE - PATIENT PORTAL LINK FT
You can access the FollowMyHealth Patient Portal offered by [facility] by registering at the following website: [email or web address]. By joining [employer]’s FollowMyHealth portal, you will also be able to view your health information using other applications (apps) compatible with our system.

## 2021-09-29 NOTE — ED PROVIDER NOTE - NSFOLLOWUPINSTRUCTIONS_ED_ALL_ED_FT
Diverticulitis  Diverticulitis is infection or inflammation of small pouches (diverticula) in the colon that form due to a condition called diverticulosis. Diverticula can trap stool (feces) and bacteria, causing infection and inflammation.    Diverticulitis may cause severe stomach pain and diarrhea. It may lead to tissue damage in the colon that causes bleeding. The diverticula may also burst (rupture) and cause infected stool to enter other areas of the abdomen.    Complications of diverticulitis can include:  Bleeding.  Severe infection.  Severe pain.  Rupture (perforation) of the colon.  Blockage (obstruction) of the colon.  What are the causes?  This condition is caused by stool becoming trapped in the diverticula, which allows bacteria to grow in the diverticula. This leads to inflammation and infection.    What increases the risk?  You are more likely to develop this condition if:  You have diverticulosis. The risk for diverticulosis increases if:  You are overweight or obese.  You use tobacco products.  You do not get enough exercise.  You eat a diet that does not include enough fiber. High-fiber foods include fruits, vegetables, beans, nuts, and whole grains.  What are the signs or symptoms?  Symptoms of this condition may include:  Pain and tenderness in the abdomen. The pain is normally located on the left side of the abdomen, but it may occur in other areas.  Fever and chills.  Bloating.  Cramping.  Nausea.  Vomiting.  Changes in bowel routines.  Blood in your stool.  How is this diagnosed?  This condition is diagnosed based on:  Your medical history.  A physical exam.  Tests to make sure there is nothing else causing your condition. These tests may include:  Blood tests.  Urine tests.  Imaging tests of the abdomen, including X-rays, ultrasounds, MRIs, or CT scans.  How is this treated?  Most cases of this condition are mild and can be treated at home. Treatment may include:  Taking over-the-counter pain medicines.  Following a clear liquid diet.  Taking antibiotic medicines by mouth.  Rest.  More severe cases may need to be treated at a hospital. Treatment may include:  Not eating or drinking.  Taking prescription pain medicine.  Receiving antibiotic medicines through an IV tube.  Receiving fluids and nutrition through an IV tube.  Surgery.  When your condition is under control, your health care provider may recommend that you have a colonoscopy. This is an exam to look at the entire large intestine. During the exam, a lubricated, bendable tube is inserted into the anus and then passed into the rectum, colon, and other parts of the large intestine. A colonoscopy can show how severe your diverticula are and whether something else may be causing your symptoms.    Follow these instructions at home:  Medicines     Take over-the-counter and prescription medicines only as told by your health care provider. These include fiber supplements, probiotics, and stool softeners.  If you were prescribed an antibiotic medicine, take it as told by your health care provider. Do not stop taking the antibiotic even if you start to feel better.  Do not drive or use heavy machinery while taking prescription pain medicine.  General instructions     Follow a full liquid diet or another diet as directed by your health care provider. After your symptoms improve, your health care provider may tell you to change your diet. He or she may recommend that you eat a diet that contains at least 25 g (25 grams) of fiber daily. Fiber makes it easier to pass stool. Healthy sources of fiber include:  Berries. One cup contains 4–8 grams of fiber.  Beans or lentils. One half cup contains 5–8 grams of fiber.  Green vegetables. One cup contains 4 grams of fiber.  Exercise for at least 30 minutes, 3 times each week. You should exercise hard enough to raise your heart rate and break a sweat.  Keep all follow-up visits as told by your health care provider. This is important. You may need a colonoscopy.  Contact a health care provider if:  Your pain does not improve.  You have a hard time drinking or eating food.  Your bowel movements do not return to normal.  Get help right away if:  Your pain gets worse.  Your symptoms do not get better with treatment.  Your symptoms suddenly get worse.  You have a fever.  You vomit more than one time.  You have stools that are bloody, black, or tarry.  Summary  Diverticulitis is infection or inflammation of small pouches (diverticula) in the colon that form due to a condition called diverticulosis. Diverticula can trap stool (feces) and bacteria, causing infection and inflammation.  You are at higher risk for this condition if you have diverticulosis and you eat a diet that does not include enough fiber.  Most cases of this condition are mild and can be treated at home. More severe cases may need to be treated at a hospital.  When your condition is under control, your health care provider may recommend that you have an exam called a colonoscopy. This exam can show how severe your diverticula are and whether something else may be causing your symptoms.  This information is not intended to replace advice given to you by your health care provider. Make sure you discuss any questions you have with your health care provider.

## 2021-09-29 NOTE — ED PROVIDER NOTE - PROGRESS NOTE DETAILS
FF: discussed lab and radiology results. discussed adnexal cyst with pt. advised of return precautions discussed at bedside. advised to f/u with gyn, and gi. agreeable to dc.

## 2021-09-29 NOTE — ED PROVIDER NOTE - CLINICAL SUMMARY MEDICAL DECISION MAKING FREE TEXT BOX
Diverticulitis. Pt is well appearing. IV abx given. Will send oral abx to the pharmacy. Repeat abd exam- soft slight ttp in LLQ. Full DC instructions discussed and patient knows when to seek immediate medical attention. Patient has proper follow-up. All results discussed with the patient they may require further work-up. Limitations of ED work-up discussed. All  questions and concerns from patient or family addressed. Understanding of insturctions verbalized

## 2021-09-29 NOTE — ED ADULT NURSE REASSESSMENT NOTE - NS ED NURSE REASSESS COMMENT FT1
patient made for discharge by provider.  Report given to receiving RN Abdifatah at [facility].  Patient sent by ambulate and discharge papers.  Patient in stable condition and nad.  Patient ambulates with steady gait.

## 2021-09-29 NOTE — ED PROVIDER NOTE - OBJECTIVE STATEMENT
77 y/o female with a PMH of bipolar, MDD, DM, HTN, chronic pancreatitis, and dementia presents to the ED for evaluation of [place] for lower abdominal pain, vaginal pain and difficulty passing stool x 1 week. pt reports she had a bowel movement this morning. pt reports she noticed a spec of blood on her depends. pt reports she is using flagyl gel vaginally that was prescribed by her doctor for lower abdominal pain. pt denies fever, chills, back pain, chest pain, sob, weakness, numbness, tingling, dizziness, n/v/d/c, burning or pain with urination, urinary frequency, blood in the urine or stools, or hx of abdominal surgeries.

## 2021-09-29 NOTE — ED PROVIDER NOTE - CARE PROVIDER_API CALL
Malika Lowe (MD)  Gastroenterology  [address] [place] [address]  Phone: [phone]  Fax: [phone]  Follow Up Time: 1-3 Days

## 2021-09-29 NOTE — ED PROVIDER NOTE - ATTENDING CONTRIBUTION TO CARE
I personally evaluated the patient. I reviewed the Resident’s or Physician Assistant’s note (as assigned above), and agree with the findings and plan except as documented in my note.    75 y/o female with a PMH of bipolar, MDD, DM, HTN, chronic pancreatitis, and dementia presents to the ED for evaluation of [place] for lower abdominal pain for few days. No n/v. Last BM this morning. No back pain. No fevers, chills. No vomiting.     Exam- Well appearing patient in NAD. Abd: ttp in LLQ. Soft. No rebound. No guarding    Plan- labs, CT, reassess

## 2021-09-30 LAB
CULTURE RESULTS: SIGNIFICANT CHANGE UP
SPECIMEN SOURCE: SIGNIFICANT CHANGE UP

## 2021-10-05 ENCOUNTER — EMERGENCY (EMERGENCY)
Facility: HOSPITAL | Age: 76
LOS: 0 days | Discharge: HOME | End: 2021-10-05
Attending: EMERGENCY MEDICINE | Admitting: EMERGENCY MEDICINE
Payer: MEDICARE

## 2021-10-05 VITALS
OXYGEN SATURATION: 97 % | SYSTOLIC BLOOD PRESSURE: 161 MMHG | TEMPERATURE: 98 F | HEART RATE: 65 BPM | RESPIRATION RATE: 18 BRPM | DIASTOLIC BLOOD PRESSURE: 77 MMHG

## 2021-10-05 VITALS
OXYGEN SATURATION: 99 % | HEIGHT: 62 IN | DIASTOLIC BLOOD PRESSURE: 77 MMHG | SYSTOLIC BLOOD PRESSURE: 149 MMHG | TEMPERATURE: 98 F | HEART RATE: 88 BPM | RESPIRATION RATE: 20 BRPM

## 2021-10-05 DIAGNOSIS — M25.561 PAIN IN RIGHT KNEE: ICD-10-CM

## 2021-10-05 DIAGNOSIS — E11.9 TYPE 2 DIABETES MELLITUS WITHOUT COMPLICATIONS: ICD-10-CM

## 2021-10-05 DIAGNOSIS — F32.9 MAJOR DEPRESSIVE DISORDER, SINGLE EPISODE, UNSPECIFIED: ICD-10-CM

## 2021-10-05 DIAGNOSIS — F03.90 UNSPECIFIED DEMENTIA WITHOUT BEHAVIORAL DISTURBANCE: ICD-10-CM
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
## 2021-10-05 DIAGNOSIS — Z79.82 LONG TERM (CURRENT) USE OF ASPIRIN: ICD-10-CM

## 2021-10-05 DIAGNOSIS — I10 ESSENTIAL (PRIMARY) HYPERTENSION: ICD-10-CM

## 2021-10-05 DIAGNOSIS — Z91.041 RADIOGRAPHIC DYE ALLERGY STATUS: ICD-10-CM

## 2021-10-05 DIAGNOSIS — Z88.1 ALLERGY STATUS TO OTHER ANTIBIOTIC AGENTS STATUS: ICD-10-CM

## 2021-10-05 DIAGNOSIS — E78.5 HYPERLIPIDEMIA, UNSPECIFIED: ICD-10-CM

## 2021-10-05 DIAGNOSIS — Z79.84 LONG TERM (CURRENT) USE OF ORAL HYPOGLYCEMIC DRUGS: ICD-10-CM

## 2021-10-05 DIAGNOSIS — F41.9 ANXIETY DISORDER, UNSPECIFIED: ICD-10-CM

## 2021-10-05 LAB
ALBUMIN SERPL ELPH-MCNC: 4 G/DL — SIGNIFICANT CHANGE UP (ref 3.5–5.2)
ALP SERPL-CCNC: 112 U/L — SIGNIFICANT CHANGE UP (ref 30–115)
ALT FLD-CCNC: 17 U/L — SIGNIFICANT CHANGE UP (ref 0–41)
ANION GAP SERPL CALC-SCNC: 14 MMOL/L — SIGNIFICANT CHANGE UP (ref 7–14)
AST SERPL-CCNC: 29 U/L — SIGNIFICANT CHANGE UP (ref 0–41)
BASOPHILS # BLD AUTO: 0.04 K/UL — SIGNIFICANT CHANGE UP (ref 0–0.2)
BASOPHILS NFR BLD AUTO: 0.6 % — SIGNIFICANT CHANGE UP (ref 0–1)
BILIRUB SERPL-MCNC: <0.2 MG/DL — SIGNIFICANT CHANGE UP (ref 0.2–1.2)
BUN SERPL-MCNC: 19 MG/DL — SIGNIFICANT CHANGE UP (ref 10–20)
CALCIUM SERPL-MCNC: 9.4 MG/DL — SIGNIFICANT CHANGE UP (ref 8.5–10.1)
CHLORIDE SERPL-SCNC: 107 MMOL/L — SIGNIFICANT CHANGE UP (ref 98–110)
CO2 SERPL-SCNC: 18 MMOL/L — SIGNIFICANT CHANGE UP (ref 17–32)
CREAT SERPL-MCNC: 1.6 MG/DL — HIGH (ref 0.7–1.5)
EOSINOPHIL # BLD AUTO: 0.19 K/UL — SIGNIFICANT CHANGE UP (ref 0–0.7)
EOSINOPHIL NFR BLD AUTO: 2.7 % — SIGNIFICANT CHANGE UP (ref 0–8)
GLUCOSE SERPL-MCNC: 77 MG/DL — SIGNIFICANT CHANGE UP (ref 70–99)
HCT VFR BLD CALC: 31.8 % — LOW (ref 37–47)
HGB BLD-MCNC: 9.9 G/DL — LOW (ref 12–16)
IMM GRANULOCYTES NFR BLD AUTO: 0.3 % — SIGNIFICANT CHANGE UP (ref 0.1–0.3)
LYMPHOCYTES # BLD AUTO: 1.62 K/UL — SIGNIFICANT CHANGE UP (ref 1.2–3.4)
LYMPHOCYTES # BLD AUTO: 22.7 % — SIGNIFICANT CHANGE UP (ref 20.5–51.1)
MCHC RBC-ENTMCNC: 26.4 PG — LOW (ref 27–31)
MCHC RBC-ENTMCNC: 31.1 G/DL — LOW (ref 32–37)
MCV RBC AUTO: 84.8 FL — SIGNIFICANT CHANGE UP (ref 81–99)
MONOCYTES # BLD AUTO: 0.48 K/UL — SIGNIFICANT CHANGE UP (ref 0.1–0.6)
MONOCYTES NFR BLD AUTO: 6.7 % — SIGNIFICANT CHANGE UP (ref 1.7–9.3)
NEUTROPHILS # BLD AUTO: 4.79 K/UL — SIGNIFICANT CHANGE UP (ref 1.4–6.5)
NEUTROPHILS NFR BLD AUTO: 67 % — SIGNIFICANT CHANGE UP (ref 42.2–75.2)
NRBC # BLD: 0 /100 WBCS — SIGNIFICANT CHANGE UP (ref 0–0)
PLATELET # BLD AUTO: 245 K/UL — SIGNIFICANT CHANGE UP (ref 130–400)
POTASSIUM SERPL-MCNC: 4.6 MMOL/L — SIGNIFICANT CHANGE UP (ref 3.5–5)
POTASSIUM SERPL-SCNC: 4.6 MMOL/L — SIGNIFICANT CHANGE UP (ref 3.5–5)
PROT SERPL-MCNC: 6.6 G/DL — SIGNIFICANT CHANGE UP (ref 6–8)
RBC # BLD: 3.75 M/UL — LOW (ref 4.2–5.4)
RBC # FLD: 13.6 % — SIGNIFICANT CHANGE UP (ref 11.5–14.5)
SODIUM SERPL-SCNC: 139 MMOL/L — SIGNIFICANT CHANGE UP (ref 135–146)
WBC # BLD: 7.14 K/UL — SIGNIFICANT CHANGE UP (ref 4.8–10.8)
WBC # FLD AUTO: 7.14 K/UL — SIGNIFICANT CHANGE UP (ref 4.8–10.8)

## 2021-10-05 PROCEDURE — 73502 X-RAY EXAM HIP UNI 2-3 VIEWS: CPT | Mod: 26,RT

## 2021-10-05 PROCEDURE — 73562 X-RAY EXAM OF KNEE 3: CPT | Mod: 26,RT

## 2021-10-05 PROCEDURE — 71045 X-RAY EXAM CHEST 1 VIEW: CPT | Mod: 26

## 2021-10-05 PROCEDURE — 99284 EMERGENCY DEPT VISIT MOD MDM: CPT | Mod: GC

## 2021-10-05 NOTE — ED PROVIDER NOTE - PHYSICAL EXAMINATION
CONSTITUTIONAL: Well-developed; well-nourished. Thin, old female, laying on the bed.  SKIN: warm, dry  HEAD: Normocephalic; atraumatic.  EYES: no conjunctival injection. PERRL.   ENT: No nasal discharge; airway clear.  NECK: Supple; non tender.  CARD: S1, S2 normal; no murmurs, gallops, or rubs. Regular rate and rhythm.   RESP: No wheezes, rales or rhonchi.  ABD: soft ntnd  EXT: Normal ROM.  No clubbing, cyanosis or edema.   LYMPH: No acute cervical adenopathy.  NEURO: Alert, oriented x 1. Can answer questions and follow commands. Can move all extremities. Can ambulate.

## 2021-10-05 NOTE — ED ADULT NURSE NOTE - CHIEF COMPLAINT QUOTE
BIBA from [facility] pt. requesting to go to hospital for anxiety. pt. on ativan PRN in [place]. pt. states she fell this morning tripped and fall on a shot landed on right knee. pt. evaluated by MD in [place] was cleared and ambulatory. pt. denies any SI/HI

## 2021-10-05 NOTE — ED ADULT NURSE NOTE - NSIMPLEMENTINTERV_GEN_ALL_ED
Implemented All Fall with Harm Risk Interventions:  [place] to call system. Call bell, personal items and telephone within reach. Instruct patient to call for assistance. Room bathroom lighting operational. Non-slip footwear when patient is off stretcher. Physically safe environment: no spills, clutter or unnecessary equipment. Stretcher in lowest position, wheels locked, appropriate side rails in place. Provide visual cue, wrist band, yellow gown, etc. Monitor gait and stability. Monitor for mental status changes and reorient to person, place, and time. Review medications for side effects contributing to fall risk. Reinforce activity limits and safety measures with patient and family. Provide visual clues: red socks.

## 2021-10-05 NOTE — ED PROVIDER NOTE - OBJECTIVE STATEMENT
Patient is a 77 yo female with PMHx bipolar d/o, MDD, DM, HTN, dementia BIBEMS from [facility] for fall and medical evaluation. Per her , patient had fall this morning at 8:30 AM, from standing, slipped, and fell on right knee, was evaluated by her doctor and was medically cleared, patient ambulating well. However, patient told  that she wanted to go to the hospital because she "does not feel well", without complaining about any specific symptoms. Patient recently diagnosed with dementia, alert and oriented x 1, and had been hospitalized recently for lithium toxicity.

## 2021-10-05 NOTE — ED ADULT NURSE NOTE - NS_BH TRG QUESTION6_ED_ALL_ED
Monoclonal antibodies would help him get better quicker.  They should be given within 10 days of testing positive.  If he is getting better, he probably does not need them.  Especially if his symptoms are mild.  We could order them for him, we would have to check availability.   No

## 2021-10-05 NOTE — ED PROVIDER NOTE - CLINICAL SUMMARY MEDICAL DECISION MAKING FREE TEXT BOX
75 Y/O F PMHX AS DOCUMENTED S/P FALL AT SNF THIS AM. PT C/O R KNEE PAIN AND "NOT FEELING WELL." XRAYS WITH NO FX. PT AMBULATING WELL IN THE ED. ALL DIAGNOSTIC TESTING REVIEWED. PT DISCHARGED TO SNF AND TO FOLLOW UP WITH PMD.

## 2021-10-05 NOTE — ED PROVIDER NOTE - PROGRESS NOTE DETAILS
MQ: Patient does not remember why she was sent to ED, denies SI/HI, auditory/visual hallucinations, alert oriented x 1, called [facility], left message, awaiting call back MQ: Talked to  for patient, Ms. Driscoll ([phone]), knows patient well, patient was alert and oriented x 3 in the summer, but was hospitalized for lithium toxicity in August and ever since then has progressively become more confused, alert and oriented x 1 now, diagnosed with dementia, recently seen in ED for diverticulitis and treated, now c/o of fecal incontinence after treatment, however, no other complaints, had fall today on knee, but seen by doctor in [facility] and medically cleared, can ambulate normally, patient insisted this morning to go to the hospital because she "does not feel well", did not note any symptoms, such as fever, chills, chest pain, SOB, cough, abdominal pain, n/v. Told  we will obtain labs and medically clear patient, will also obtain urine. MQ: Talked to  for patient, Ms. Driscoll ([phone]), knows patient well, patient was alert and oriented x 3 in the summer, but was hospitalized for lithium toxicity in August and ever since then has progressively become more confused, alert and oriented x 1 now, diagnosed with dementia, recently seen in ED for diverticulitis and treated, now c/o of fecal incontinence after treatment, however, no other complaints, had fall today on knee, but seen by doctor in [place] and medically cleared, can ambulate normally, patient insisted this morning to go to the hospital because she "does not feel well", did not note any symptoms, such as fever, chills, chest pain, SOB, cough, abdominal pain, n/v. Told  we will obtain labs and medically clear patient. MQ: Labs unremarkable, xrays neg, will d/c with PMD f/u

## 2021-10-05 NOTE — ED ADULT TRIAGE NOTE - CHIEF COMPLAINT QUOTE
BIBA from [facility] pt. requesting to go to hospital for anxiety. pt. on ativan PRN in [place]. pt. states she fell this morning tripped and fall on a shot landed on right knee. pt. evaluated by MD in [place] was cleared and ambulatory. pt. denies any SI/HI

## 2021-10-05 NOTE — ED ADULT NURSE NOTE - OBJECTIVE STATEMENT
Pt bibems c/o fecal incontinence with dementia also with fall earlier at [facility], medically cleared by NH. Also c/o anxiety patient states "I don't feel good." Fall precautions implemented with bed alarm on and audible as patient is alert and oriented x 1.

## 2021-10-05 NOTE — ED PROVIDER NOTE - CARE PROVIDER_API CALL
Joann Aguilar  [address] [address]  Phone: [phone]  Fax: [phone]  Follow Up Time: 4-6 Days

## 2021-10-05 NOTE — ED PROVIDER NOTE - PATIENT PORTAL LINK FT
You can access the FollowMyHealth Patient Portal offered by [facility] by registering at the following website: [email or web address]. By joining [employer]’s FollowMyHealth portal, you will also be able to view your health information using other applications (apps) compatible with our system.

## 2021-10-05 NOTE — ED PROVIDER NOTE - ATTENDING CONTRIBUTION TO CARE
I personally evaluated the patient. I reviewed the Resident’s or Physician Assistant’s note (as assigned above), and agree with the findings and plan except as documented in my note.   77 Y/O F HTN, BIPOLAR D/O, DEPRESSION/ANXIETY, DLD, DM, DEMENTIASENT TO ED FROM SNF S/P FALL. AS PER SNF STAFF, PT FELL AT 8:30AM TODAY. PT C/O R KNEE PAIN. NO HIP PAIN. NO HEAD TRAUMA OR LOC. PT GIVEN [facility] PRIOR TO TRANSPORT FOR SEVERE ANXIETY. PT LAST ADMITTED 8/2021 FOR LITHIUM TOXICITY. PT DENIES ANY OTHER INJURIES. AS PER STAFF PT REPORTS "NOT FEELING WELL" THIS AM AND WAS REFERRED TO THE ED. AS PER STAFF, NO RECENT FEVER, COUGH. NO V/D. VITALS NOTED. ALERT ORIENTED TO PERSON ONLY. NCAT. PERRL, EOMI. NO MIDLINE C SPINE TENDERNESS. LUNGS CLEAR B/L. CHEST NONTENDER, NO CREPITUS. RRR. ABD- SOFT NONTENDER. PELVIS STABLE NONTENDER. BACK NONTENDER. NO SPINE TENDERNESS. RECTAL WITH NO GROSS BLOOD. NEURO EXAM NONFOCAL. PT AMBULATING WITH LIMPING GAIT. R KNEE NONTENDER, FROM. R HIP AND ANKLE NONTENDER, FROM.

## 2021-10-05 NOTE — ED PROVIDER NOTE - NSFOLLOWUPINSTRUCTIONS_ED_ALL_ED_FT
Fall Prevention    WHAT YOU NEED TO KNOW:    Fall prevention includes ways to make your home and other areas safer. It also includes ways you can move more carefully to prevent a fall. Health conditions that cause changes in your blood pressure, vision, or muscle strength and coordination may increase your risk for falls. Medicines may also increase your risk for falls if they make you dizzy, weak, or sleepy.    DISCHARGE INSTRUCTIONS:    Call 911 or have someone else call if:   •You have fallen and are unconscious.      •You have fallen and cannot move part of your body.      Contact your healthcare provider if:   •You have fallen and have pain or a headache.      •You have questions or concerns about your condition or care.      Fall prevention tips:   •Stand or sit up slowly. This may help you keep your balance and prevent falls.      •Use assistive devices as directed. Your healthcare provider may suggest that you use a cane or walker to help you keep your balance. You may need to have grab bars put in your bathroom near the toilet or in the shower.      •Wear shoes that fit well and have soles that . Wear shoes both inside and outside. Use slippers with good . Do not wear shoes with high heels.      •Wear a personal alarm. This is a device that allows you to call 911 if you fall and need help. Ask your healthcare provider for more information.      •Stay active. Exercise can help strengthen your muscles and improve your balance. Your healthcare provider may recommend water aerobics or walking. He or she may also recommend physical therapy to improve your coordination. Never start an exercise program without talking to your healthcare provider first.  Walking for Exercise           •Manage your medical conditions.  Keep all appointments with your healthcare providers. Visit your eye doctor as directed.      Home safety tips:     Fall Prevention for Adults     •Add items to prevent falls in the bathroom. Put nonslip strips on your bath or shower floor to prevent you from slipping. Use a bath mat if you do not have carpet in the bathroom. This will prevent you from falling when you step out of the bath or shower. Use a shower seat so you do not need to stand while you shower. Sit on the toilet or a chair in your bathroom to dry yourself and put on clothing. This will prevent you from losing your balance from drying or dressing yourself while you are standing.      •Keep paths clear. Remove books, shoes, and other objects from walkways and stairs. Place cords for telephones and lamps out of the way so that you do not need to walk over them. Tape them down if you cannot move them. Remove small rugs. If you cannot remove a rug, secure it with double-sided tape. This will prevent you from tripping.      •Install bright lights in your home. Use night lights to help light paths to the bathroom or kitchen. Always turn on the light before you start walking.      •Keep items you use often on shelves within reach. Do not use a step stool to help you reach an item.      •Paint or place reflective tape on the edges of your stairs. This will help you see the stairs better.      Follow up with your doctor as directed: Write down your questions so you remember to ask them during your visits.     Please follow up with your primary care doctor in 1 week.

## 2021-10-14 ENCOUNTER — EMERGENCY (EMERGENCY)
Facility: HOSPITAL | Age: 76
LOS: 0 days | Discharge: HOME | End: 2021-10-15
Attending: EMERGENCY MEDICINE | Admitting: EMERGENCY MEDICINE
Payer: MEDICARE

## 2021-10-14 VITALS
HEART RATE: 57 BPM | WEIGHT: 145.06 LBS | OXYGEN SATURATION: 98 % | SYSTOLIC BLOOD PRESSURE: 135 MMHG | TEMPERATURE: 98 F | RESPIRATION RATE: 17 BRPM | HEIGHT: 62 IN | DIASTOLIC BLOOD PRESSURE: 62 MMHG

## 2021-10-14 DIAGNOSIS — Z79.82 LONG TERM (CURRENT) USE OF ASPIRIN: ICD-10-CM
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
## 2021-10-14 DIAGNOSIS — Z79.84 LONG TERM (CURRENT) USE OF ORAL HYPOGLYCEMIC DRUGS: ICD-10-CM

## 2021-10-14 DIAGNOSIS — F31.9 BIPOLAR DISORDER, UNSPECIFIED: ICD-10-CM

## 2021-10-14 DIAGNOSIS — I10 ESSENTIAL (PRIMARY) HYPERTENSION: ICD-10-CM

## 2021-10-14 DIAGNOSIS — M25.561 PAIN IN RIGHT KNEE: ICD-10-CM

## 2021-10-14 DIAGNOSIS — Z91.041 RADIOGRAPHIC DYE ALLERGY STATUS: ICD-10-CM
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
## 2021-10-14 DIAGNOSIS — E11.9 TYPE 2 DIABETES MELLITUS WITHOUT COMPLICATIONS: ICD-10-CM

## 2021-10-14 DIAGNOSIS — Z88.1 ALLERGY STATUS TO OTHER ANTIBIOTIC AGENTS STATUS: ICD-10-CM

## 2021-10-14 PROCEDURE — 70450 CT HEAD/BRAIN W/O DYE: CPT | Mod: 26,MA

## 2021-10-14 PROCEDURE — 71045 X-RAY EXAM CHEST 1 VIEW: CPT | Mod: 26

## 2021-10-14 PROCEDURE — 73552 X-RAY EXAM OF FEMUR 2/>: CPT | Mod: 26,RT

## 2021-10-14 PROCEDURE — 72170 X-RAY EXAM OF PELVIS: CPT | Mod: 26

## 2021-10-14 PROCEDURE — 99284 EMERGENCY DEPT VISIT MOD MDM: CPT

## 2021-10-14 PROCEDURE — 73564 X-RAY EXAM KNEE 4 OR MORE: CPT | Mod: 26,RT

## 2021-10-14 PROCEDURE — 72125 CT NECK SPINE W/O DYE: CPT | Mod: 26,MA

## 2021-10-14 PROCEDURE — 73030 X-RAY EXAM OF SHOULDER: CPT | Mod: 26,LT

## 2021-10-14 NOTE — ED PROVIDER NOTE - NSFOLLOWUPINSTRUCTIONS_ED_ALL_ED_FT
Follow up with PMD in 1-2 days.    Fall Prevention in the Home    Falls can cause injuries. They can happen to people of all ages. There are many things you can do to make your home safe and to help prevent falls.     WHAT CAN I DO ON THE OUTSIDE OF MY HOME?  Regularly fix the edges of walkways and driveways and fix any cracks.  Remove anything that might make you trip as you walk through a door, such as a raised step or threshold.  Trim any bushes or trees on the path to your home.  Use bright outdoor lighting.  Clear any walking paths of anything that might make someone trip, such as rocks or tools.  Regularly check to see if handrails are loose or broken. Make sure that both sides of any steps have handrails.  Any raised decks and porches should have guardrails on the edges.  Have any leaves, snow, or ice cleared regularly.  Use sand or salt on walking paths during winter.  Clean up any spills in your garage right away. This includes oil or grease spills.    WHAT CAN I DO IN THE BATHROOM?  Use night lights.  Install grab bars by the toilet and in the tub and shower. Do not use towel bars as grab bars.  Use non-skid mats or decals in the tub or shower.  If you need to sit down in the shower, use a plastic, non-slip stool.  Keep the floor dry. Clean up any water that spills on the floor as soon as it happens.  Remove soap buildup in the tub or shower regularly.  Attach bath mats securely with double-sided non-slip rug tape.  Do not have throw rugs and other things on the floor that can make you trip.    WHAT CAN I DO IN THE BEDROOM?  Use night lights.  Make sure that you have a light by your bed that is easy to reach.  Do not use any sheets or blankets that are too big for your bed. They should not hang down onto the floor.  Have a firm chair that has side arms. You can use this for support while you get dressed.  Do not have throw rugs and other things on the floor that can make you trip.    WHAT CAN I DO IN THE KITCHEN?  Clean up any spills right away.  Avoid walking on wet floors.  Keep items that you use a lot in easy-to-reach places.  If you need to reach something above you, use a strong step stool that has a grab bar.  Keep electrical cords out of the way.  Do not use floor polish or wax that makes floors slippery. If you must use wax, use non-skid floor wax.  Do not have throw rugs and other things on the floor that can make you trip.    WHAT CAN I DO WITH MY STAIRS?  Do not leave any items on the stairs.  Make sure that there are handrails on both sides of the stairs and use them. Fix handrails that are broken or loose. Make sure that handrails are as long as the stairways.  Check any carpeting to make sure that it is firmly attached to the stairs. Fix any carpet that is loose or worn.  Avoid having throw rugs at the top or bottom of the stairs. If you do have throw rugs, attach them to the floor with carpet tape.  Make sure that you have a light switch at the top of the stairs and the bottom of the stairs. If you do not have them, ask someone to add them for you.    WHAT ELSE CAN I DO TO HELP PREVENT FALLS?  Wear shoes that:  Do not have high heels.  Have rubber bottoms.  Are comfortable and fit you well.  Are closed at the toe. Do not wear sandals.  If you use a stepladder:  Make sure that it is fully opened. Do not climb a closed stepladder.  Make sure that both sides of the stepladder are locked into place.  Ask someone to hold it for you, if possible.  Clearly kassandra and make sure that you can see:  Any grab bars or handrails.  First and last steps.  Where the edge of each step is.  Use tools that help you move around (mobility aids) if they are needed. These include:  Canes.  Walkers.  Scooters.  Crutches.  Turn on the lights when you go into a dark area. Replace any light bulbs as soon as they burn out.  Set up your furniture so you have a clear path. Avoid moving your furniture around.  If any of your floors are uneven, fix them.  If there are any pets around you, be aware of where they are.  Review your medicines with your doctor. Some medicines can make you feel dizzy. This can increase your chance of falling.    Ask your doctor what other things that you can do to help prevent falls.    ADDITIONAL NOTES AND INSTRUCTIONS    Please follow up with your Primary MD in 24-48 hr.  Seek immediate medical care for any new/worsening signs or symptoms.

## 2021-10-14 NOTE — ED PROVIDER NOTE - PHYSICAL EXAMINATION
CONST: NAD  EYES: Sclera and conjunctiva clear.   ENT: No nasal discharge. Oropharynx normal appearing, no erythema or exudates. No abscess or swelling. Uvula midline.   NECK: Non-tender, no meningeal signs. normal ROM. supple   CARD: S1 S2; No jvd  RESP: Equal BS B/L, No wheezes, rhonchi or rales. No distress  GI: Soft, non-tender, non-distended. no cva tenderness. normal BS  MS: Normal ROM in all extremities. pulses 2 +. no calf tenderness or swelling  SKIN: Warm, dry, no acute rashes. Good turgor  NEURO: A&Ox2, No focal deficits. Strength 5/5 with no sensory deficits. Steady gait.

## 2021-10-14 NOTE — ED ADULT TRIAGE NOTE - RESPIRATORY RATE (BREATHS/MIN)
Ongoing SW/CM Assessment/Plan of Care Note     See SW/CM flowsheets for goals and other objective data.    Patient/Family discharge goal (s):  Goal #1: Communication facilitated  Goal #2: Psychosocial needs assessed       PT Recommendation:  Recommendation for Discharge: PT WI: Intensive therapy program    OT Recommendation:  Recommendations for Discharge: OT WI: 24 Hour assist, Home, Home therapy    SLP Recommendation:       Disposition:  Planned Discharge Destination: Home/apartment with Spouse/SO    Progress note:   SW attended patient's weekly team meeting today. Patient now ambulating 25 feet with a walker and with wheelchair follow. Therapy anticipating that patient's primary source of ambulation will be a wheelchair. Currently patient able to self propel 80 feet with supervision. Patient continues to need partial moderate to dependent assistance with his ADLs. Therapy team recommending that patient's wife, Sarah come to hospital for family training and to determine if Pat will be able to safely care for patient at home. Patient's anticipated discharge is Friday, 6/12. Re-conference will be held on Tuesday 6/9.     SW met with patient and provided update from today's team meeting. Patient is in agreement and is looking forward to seeing his wife during family teaching. Patient shared with patient the long journey he has had with his brain mass and need for chemotherapy. Patient anxious to get home and start following up with doctor's appointments as he needs dental work due to chemo causing his crowns to fall out.  SW listened and provided support/empathy. Patient agreeable for SW to call patient's wife, Sarah for an update and to discuss discharge planning.     SW called patient's wife, Sarah and provided update from today's team meeting. Pat indicated that she spoke with PT and Pat is scheduled to come in on Monday for family teaching. Pat had no other SW concerns/needs at this time.     Sw to continue to follow  and be available for any discharge planning needs.          17

## 2021-10-14 NOTE — ED PROVIDER NOTE - PATIENT PORTAL LINK FT
You can access the FollowMyHealth Patient Portal offered by [facility] by registering at the following website: [email or web address]. By joining [employer]’s FollowMyHealth portal, you will also be able to view your health information using other applications (apps) compatible with our system.

## 2021-10-14 NOTE — ED PROVIDER NOTE - ATTENDING CONTRIBUTION TO CARE
75 y/o female with hx of Bipolar, DM, HTN presents to ED for evaluation s/p mechanical fall, tripped over a slipper.  No prodrome.  No CP/SOB.  No numbness tingling or weakness.  C/O right knee pain.  Is ambulatory in ED.  PE:  agree with above.  A/P:  Mechanical Fall, Knee Pain.  Imaging and Reassess.

## 2021-10-14 NOTE — ED PROVIDER NOTE - OBJECTIVE STATEMENT
76y F pmh Bipolar, MDD, DM, HTN presents for eval of fall. Pt tripped on her slipper today falling onto her R knee, since has mild aching pain localized to R knee, aggravated with flexion, relieved at rest. Pt has had multiple falls in past 2wk.

## 2021-10-14 NOTE — ED PROVIDER NOTE - NSFOLLOWUPCLINICS_GEN_ALL_ED_FT
[facility] ([facility])  Rehabilitation  [facility] [address], [address] [place] [address]  Phone: [phone]  Fax:

## 2021-10-14 NOTE — ED PROVIDER NOTE - NS ED ROS FT
Constitutional: (-) fever  Eyes/ENT: (-) blurry vision, (-) epistaxis  Cardiovascular: (-) chest pain, (-) syncope  Respiratory: (-) cough, (-) shortness of breath  Gastrointestinal: (-) vomiting, (-) diarrhea  Musculoskeletal: (+) R knee pain, (-) neck pain, (-) back pain  Integumentary: (-) rash, (-) edema  Neurological: (-) headache, (-) altered mental status  Psychiatric: (-) hallucinations  Allergic/Immunologic: (-) pruritus

## 2021-10-14 NOTE — ED ADULT TRIAGE NOTE - CHIEF COMPLAINT QUOTE
Pt alert here from [facility] with multiple falls over past 2 weeks. unwitnessed. c/o R knee pain, left shoulder pain and R knee pain. As per ems pt with baseline confusion. ambulates with rollator. No obvious injury or deformity.

## 2021-10-14 NOTE — ED ADULT NURSE NOTE - CHIEF COMPLAINT QUOTE
Pt alert here from [facility] with multiple falls over past 2 weeks. unwitnessed. c/o R knee pain, left shoulder pain and R knee pain. As per ems pt with baseline confusion. ambulates with rollator. No obvious injury or deformity.

## 2021-10-22 ENCOUNTER — OUTPATIENT (OUTPATIENT)
Dept: OUTPATIENT SERVICES | Facility: HOSPITAL | Age: 76
LOS: 1 days | Discharge: HOME | End: 2021-10-22

## 2021-10-22 ENCOUNTER — APPOINTMENT (OUTPATIENT)
Dept: PSYCHIATRY | Facility: CLINIC | Age: 76
End: 2021-10-22
Payer: MEDICARE

## 2021-10-22 DIAGNOSIS — F31.9 BIPOLAR DISORDER, UNSPECIFIED: ICD-10-CM

## 2021-10-22 PROCEDURE — 90792 PSYCH DIAG EVAL W/MED SRVCS: CPT | Mod: GC

## 2021-10-24 ENCOUNTER — EMERGENCY (EMERGENCY)
Facility: HOSPITAL | Age: 76
LOS: 0 days | Discharge: HOME | End: 2021-10-24
Attending: EMERGENCY MEDICINE | Admitting: EMERGENCY MEDICINE
Payer: MEDICARE

## 2021-10-24 VITALS
RESPIRATION RATE: 18 BRPM | DIASTOLIC BLOOD PRESSURE: 65 MMHG | HEART RATE: 56 BPM | OXYGEN SATURATION: 98 % | SYSTOLIC BLOOD PRESSURE: 147 MMHG | TEMPERATURE: 99 F | WEIGHT: 125 LBS | HEIGHT: 60 IN

## 2021-10-24 VITALS
TEMPERATURE: 97 F | RESPIRATION RATE: 18 BRPM | OXYGEN SATURATION: 99 % | HEART RATE: 57 BPM | DIASTOLIC BLOOD PRESSURE: 83 MMHG | SYSTOLIC BLOOD PRESSURE: 155 MMHG

## 2021-10-24 DIAGNOSIS — R19.7 DIARRHEA, UNSPECIFIED: ICD-10-CM

## 2021-10-24 DIAGNOSIS — K57.32 DIVERTICULITIS OF LARGE INTESTINE WITHOUT PERFORATION OR ABSCESS WITHOUT BLEEDING: ICD-10-CM

## 2021-10-24 DIAGNOSIS — N39.0 URINARY TRACT INFECTION, SITE NOT SPECIFIED: ICD-10-CM

## 2021-10-24 DIAGNOSIS — I10 ESSENTIAL (PRIMARY) HYPERTENSION: ICD-10-CM

## 2021-10-24 DIAGNOSIS — Z79.82 LONG TERM (CURRENT) USE OF ASPIRIN: ICD-10-CM

## 2021-10-24 DIAGNOSIS — E11.9 TYPE 2 DIABETES MELLITUS WITHOUT COMPLICATIONS: ICD-10-CM

## 2021-10-24 DIAGNOSIS — Z79.84 LONG TERM (CURRENT) USE OF ORAL HYPOGLYCEMIC DRUGS: ICD-10-CM

## 2021-10-24 DIAGNOSIS — Z88.8 ALLERGY STATUS TO OTHER DRUGS, MEDICAMENTS AND BIOLOGICAL SUBSTANCES: ICD-10-CM

## 2021-10-24 DIAGNOSIS — Z88.0 ALLERGY STATUS TO PENICILLIN: ICD-10-CM

## 2021-10-24 DIAGNOSIS — R10.30 LOWER ABDOMINAL PAIN, UNSPECIFIED: ICD-10-CM

## 2021-10-24 LAB
ALBUMIN SERPL ELPH-MCNC: 4.3 G/DL — SIGNIFICANT CHANGE UP (ref 3.5–5.2)
ALP SERPL-CCNC: 112 U/L — SIGNIFICANT CHANGE UP (ref 30–115)
ALT FLD-CCNC: 12 U/L — SIGNIFICANT CHANGE UP (ref 0–41)
ANION GAP SERPL CALC-SCNC: 13 MMOL/L — SIGNIFICANT CHANGE UP (ref 7–14)
APPEARANCE UR: ABNORMAL
AST SERPL-CCNC: 13 U/L — SIGNIFICANT CHANGE UP (ref 0–41)
BACTERIA # UR AUTO: ABNORMAL
BASOPHILS # BLD AUTO: 0.04 K/UL — SIGNIFICANT CHANGE UP (ref 0–0.2)
BASOPHILS NFR BLD AUTO: 0.4 % — SIGNIFICANT CHANGE UP (ref 0–1)
BILIRUB DIRECT SERPL-MCNC: <0.2 MG/DL — SIGNIFICANT CHANGE UP (ref 0–0.2)
BILIRUB INDIRECT FLD-MCNC: SIGNIFICANT CHANGE UP MG/DL (ref 0.2–1.2)
BILIRUB SERPL-MCNC: <0.2 MG/DL — SIGNIFICANT CHANGE UP (ref 0.2–1.2)
BILIRUB UR-MCNC: NEGATIVE — SIGNIFICANT CHANGE UP
BLD GP AB SCN SERPL QL: SIGNIFICANT CHANGE UP
BUN SERPL-MCNC: 19 MG/DL — SIGNIFICANT CHANGE UP (ref 10–20)
CALCIUM SERPL-MCNC: 9.5 MG/DL — SIGNIFICANT CHANGE UP (ref 8.5–10.1)
CHLORIDE SERPL-SCNC: 105 MMOL/L — SIGNIFICANT CHANGE UP (ref 98–110)
CO2 SERPL-SCNC: 22 MMOL/L — SIGNIFICANT CHANGE UP (ref 17–32)
COLOR SPEC: SIGNIFICANT CHANGE UP
CREAT SERPL-MCNC: 1.4 MG/DL — SIGNIFICANT CHANGE UP (ref 0.7–1.5)
DIFF PNL FLD: NEGATIVE — SIGNIFICANT CHANGE UP
EOSINOPHIL # BLD AUTO: 0.17 K/UL — SIGNIFICANT CHANGE UP (ref 0–0.7)
EOSINOPHIL NFR BLD AUTO: 1.6 % — SIGNIFICANT CHANGE UP (ref 0–8)
EPI CELLS # UR: 0 /HPF — SIGNIFICANT CHANGE UP (ref 0–5)
GLUCOSE SERPL-MCNC: 123 MG/DL — HIGH (ref 70–99)
GLUCOSE UR QL: NEGATIVE — SIGNIFICANT CHANGE UP
HCT VFR BLD CALC: 30.6 % — LOW (ref 37–47)
HGB BLD-MCNC: 9.5 G/DL — LOW (ref 12–16)
HYALINE CASTS # UR AUTO: 0 /LPF — SIGNIFICANT CHANGE UP (ref 0–7)
IMM GRANULOCYTES NFR BLD AUTO: 0.4 % — HIGH (ref 0.1–0.3)
KETONES UR-MCNC: NEGATIVE — SIGNIFICANT CHANGE UP
LACTATE SERPL-SCNC: 1 MMOL/L — SIGNIFICANT CHANGE UP (ref 0.7–2)
LEUKOCYTE ESTERASE UR-ACNC: ABNORMAL
LIDOCAIN IGE QN: 55 U/L — SIGNIFICANT CHANGE UP (ref 7–60)
LYMPHOCYTES # BLD AUTO: 1.1 K/UL — LOW (ref 1.2–3.4)
LYMPHOCYTES # BLD AUTO: 10.6 % — LOW (ref 20.5–51.1)
MCHC RBC-ENTMCNC: 26.6 PG — LOW (ref 27–31)
MCHC RBC-ENTMCNC: 31 G/DL — LOW (ref 32–37)
MCV RBC AUTO: 85.7 FL — SIGNIFICANT CHANGE UP (ref 81–99)
MONOCYTES # BLD AUTO: 0.61 K/UL — HIGH (ref 0.1–0.6)
MONOCYTES NFR BLD AUTO: 5.9 % — SIGNIFICANT CHANGE UP (ref 1.7–9.3)
NEUTROPHILS # BLD AUTO: 8.38 K/UL — HIGH (ref 1.4–6.5)
NEUTROPHILS NFR BLD AUTO: 81.1 % — HIGH (ref 42.2–75.2)
NITRITE UR-MCNC: NEGATIVE — SIGNIFICANT CHANGE UP
NRBC # BLD: 0 /100 WBCS — SIGNIFICANT CHANGE UP (ref 0–0)
PH UR: 6.5 — SIGNIFICANT CHANGE UP (ref 5–8)
PLATELET # BLD AUTO: 195 K/UL — SIGNIFICANT CHANGE UP (ref 130–400)
POTASSIUM SERPL-MCNC: 4.3 MMOL/L — SIGNIFICANT CHANGE UP (ref 3.5–5)
POTASSIUM SERPL-SCNC: 4.3 MMOL/L — SIGNIFICANT CHANGE UP (ref 3.5–5)
PROT SERPL-MCNC: 6.9 G/DL — SIGNIFICANT CHANGE UP (ref 6–8)
PROT UR-MCNC: ABNORMAL
RBC # BLD: 3.57 M/UL — LOW (ref 4.2–5.4)
RBC # FLD: 13.5 % — SIGNIFICANT CHANGE UP (ref 11.5–14.5)
RBC CASTS # UR COMP ASSIST: 1 /HPF — SIGNIFICANT CHANGE UP (ref 0–4)
SODIUM SERPL-SCNC: 140 MMOL/L — SIGNIFICANT CHANGE UP (ref 135–146)
SP GR SPEC: 1.01 — SIGNIFICANT CHANGE UP (ref 1.01–1.03)
UROBILINOGEN FLD QL: SIGNIFICANT CHANGE UP
WBC # BLD: 10.34 K/UL — SIGNIFICANT CHANGE UP (ref 4.8–10.8)
WBC # FLD AUTO: 10.34 K/UL — SIGNIFICANT CHANGE UP (ref 4.8–10.8)
WBC UR QL: 36 /HPF — HIGH (ref 0–5)

## 2021-10-24 PROCEDURE — 71045 X-RAY EXAM CHEST 1 VIEW: CPT | Mod: 26

## 2021-10-24 PROCEDURE — 99284 EMERGENCY DEPT VISIT MOD MDM: CPT

## 2021-10-24 PROCEDURE — 74177 CT ABD & PELVIS W/CONTRAST: CPT | Mod: 26,MA

## 2021-10-24 RX ORDER — SODIUM CHLORIDE 9 MG/ML
1000 INJECTION INTRAMUSCULAR; INTRAVENOUS; SUBCUTANEOUS ONCE
Refills: 0 | Status: COMPLETED | OUTPATIENT
Start: 2021-10-24 | End: 2021-10-24

## 2021-10-24 RX ORDER — METRONIDAZOLE 500 MG
500 TABLET ORAL ONCE
Refills: 0 | Status: DISCONTINUED | OUTPATIENT
Start: 2021-10-24 | End: 2021-10-24

## 2021-10-24 RX ORDER — MOXIFLOXACIN HYDROCHLORIDE TABLETS, 400 MG 400 MG/1
1 TABLET, FILM COATED ORAL
Qty: 20 | Refills: 0
Start: 2021-10-24 | End: 2021-11-02

## 2021-10-24 RX ORDER — ONDANSETRON 8 MG/1
4 TABLET, FILM COATED ORAL ONCE
Refills: 0 | Status: COMPLETED | OUTPATIENT
Start: 2021-10-24 | End: 2021-10-24

## 2021-10-24 RX ORDER — METRONIDAZOLE 500 MG
1 TABLET ORAL
Qty: 30 | Refills: 0
Start: 2021-10-24 | End: 2021-11-02

## 2021-10-24 RX ORDER — CIPROFLOXACIN LACTATE 400MG/40ML
400 VIAL (ML) INTRAVENOUS ONCE
Refills: 0 | Status: DISCONTINUED | OUTPATIENT
Start: 2021-10-24 | End: 2021-10-24

## 2021-10-24 RX ORDER — FAMOTIDINE 10 MG/ML
20 INJECTION INTRAVENOUS ONCE
Refills: 0 | Status: COMPLETED | OUTPATIENT
Start: 2021-10-24 | End: 2021-10-24

## 2021-10-24 RX ADMIN — ONDANSETRON 4 MILLIGRAM(S): 8 TABLET, FILM COATED ORAL at 07:42

## 2021-10-24 RX ADMIN — FAMOTIDINE 20 MILLIGRAM(S): 10 INJECTION INTRAVENOUS at 07:42

## 2021-10-24 RX ADMIN — SODIUM CHLORIDE 1000 MILLILITER(S): 9 INJECTION INTRAMUSCULAR; INTRAVENOUS; SUBCUTANEOUS at 07:42

## 2021-10-24 RX ADMIN — SODIUM CHLORIDE 1000 MILLILITER(S): 9 INJECTION INTRAMUSCULAR; INTRAVENOUS; SUBCUTANEOUS at 08:39

## 2021-10-24 NOTE — ED ADULT NURSE NOTE - NSIMPLEMENTINTERV_GEN_ALL_ED
Implemented All Fall Risk Interventions:  [place] to call system. Call bell, personal items and telephone within reach. Instruct patient to call for assistance. Room bathroom lighting operational. Non-slip footwear when patient is off stretcher. Physically safe environment: no spills, clutter or unnecessary equipment. Stretcher in lowest position, wheels locked, appropriate side rails in place. Provide visual cue, wrist band, yellow gown, etc. Monitor gait and stability. Monitor for mental status changes and reorient to person, place, and time. Review medications for side effects contributing to fall risk. Reinforce activity limits and safety measures with patient and family.

## 2021-10-24 NOTE — ED PROVIDER NOTE - NEUROLOGICAL, MLM
04/11/20  1707   04/11/20  1209   04/11/20  0903   04/11/20  0818   04/11/Mono #     1.0         Mono%     04/11/20  0006   04/10/Alert and oriented, no focal deficits, no motor or sensory deficits.

## 2021-10-24 NOTE — ED PROVIDER NOTE - PATIENT PORTAL LINK FT
You can access the FollowMyHealth Patient Portal offered by [facility] by registering at the following website: [email or web address]. By joining [employer]’s FollowMyHealth portal, you will also be able to view your health information using other applications (apps) compatible with our system.

## 2021-10-24 NOTE — ED PROVIDER NOTE - CARE PROVIDER_API CALL
Malika Lowe (MD)  Gastroenterology  [address] [place] [address]  Phone: [phone]  Fax: [phone]  Follow Up Time:

## 2021-10-24 NOTE — ED ADULT NURSE NOTE - OBJECTIVE STATEMENT
Pt with complaint of pelvic pain/dysuria x1week along with bleeding. pt reports wearing incontinence pad and endorses blood near rectal area. PA at bedside for rectal exam - external hemorrhoid present.

## 2021-10-24 NOTE — ED PROVIDER NOTE - OBJECTIVE STATEMENT
75 y/o female with hx Bipolar, MDD, DM, HTN BIBA from assisted living c/o "I have lower abdominal pain with frequent, burning urination for a couple of days. I also noticed some blood on my pad. I had some diarrhea last week. " no n/v/fever/ chills/ weakness

## 2021-10-24 NOTE — ED PROVIDER NOTE - PROGRESS NOTE DETAILS
pelvic exam with no blood by KRISTINA Burk. rectal exam negative as well spoke to son about ct finding knows about lesions to adnexa, kidney. pt has GI f/u on Nov 1st. will dc

## 2021-10-24 NOTE — ED ADULT TRIAGE NOTE - CHIEF COMPLAINT QUOTE
I have pain in my lower abdomen and when I went to the bathroom there  was red blood on the pad - patient   Patient unsure if blood from rectum or urine, c/o dysuria and pelvic pain

## 2021-10-24 NOTE — ED PROVIDER NOTE - CARE PLAN
1 Principal Discharge DX:	Acute diverticulitis   Principal Discharge DX:	Acute diverticulitis  Secondary Diagnosis:	Urinary tract infection

## 2021-10-24 NOTE — ED PROVIDER NOTE - ATTENDING CONTRIBUTION TO CARE
76yr old male hx of dm, mdd, bp, here for lower abdominal/pelvic pain, dysuria. assocaited with blood on the pad. no fever, chills, back pain. on exam pt no disrtress, awake, alert, s1s2 ctab soft minimal suprapubic ttp w/o r/g.

## 2021-10-24 NOTE — ED ADULT NURSE NOTE - CHPI ED NUR SEVERITY2
Left facial swelling x 2-3 days; h/o chemo in 2019 for anal cancer  Tranferred in for ophtho eval  Acyclovir 10mg/kg started at OSH  Pt also in CARLOS and likely CHF; current CrCl 51  PLAN  - Acyclovir 10mg/kg IV Q12h (dose reduced for CrCl <50); may increase to standard Q8h dosing but given CARLOS and CHF, optimal IV hydration may be challenging  - Ophtho eval in the AM (ordered)     PAIN SCALE 4 OF 10.

## 2021-10-24 NOTE — ED PROVIDER NOTE - CLINICAL SUMMARY MEDICAL DECISION MAKING FREE TEXT BOX
pt presented with lower abd pain, dysuria, and blood noticed on pad.  here pt with UTI, rectal and pelvic exam wnl. ct showing Since September 29, 2021, increased moderate mural thickening of a short segment of sigmoid colon, with increased pericolonic inflammation, compatible with acute sigmoid diverticulitis; no focal drainable fluid collection or free intraperitoneal air. plan to treat on cipro/flagyl. dc with abx, outtp f/u. spoke with son.

## 2021-10-24 NOTE — ED PROVIDER NOTE - NSFOLLOWUPINSTRUCTIONS_ED_ALL_ED_FT
Diverticulitis    Diverticulitis is inflammation or infection of small pouches in your colon that form when you have a condition called diverticulosis. This condition can range from mild to severe potentially leading to perforation or obstructions of your colon. Symptoms include abdominal pain, fever/chills, nausea, vomiting, diarrhea, constipation, or blood in your stool. If you were prescribed an antibiotic medicine, take it as told by your health care provider. Do not stop taking the antibiotic even if you start to feel better.    SEEK IMMEDIATE MEDICAL CARE IF YOU HAVE THE FOLLOWING SYMPTOMS: worsening abdominal pain, high fever, inability to hold down liquids or medication, black or bloody stools, inability to pass gas, lightheadedness/dizziness, or a change in mental status.    GI follow-up needed.

## 2021-10-29 ENCOUNTER — OUTPATIENT (OUTPATIENT)
Dept: OUTPATIENT SERVICES | Facility: HOSPITAL | Age: 76
LOS: 1 days | Discharge: HOME | End: 2021-10-29

## 2021-10-29 ENCOUNTER — APPOINTMENT (OUTPATIENT)
Dept: PSYCHIATRY | Facility: CLINIC | Age: 76
End: 2021-10-29
Payer: MEDICARE

## 2021-10-29 DIAGNOSIS — F31.9 BIPOLAR DISORDER, UNSPECIFIED: ICD-10-CM

## 2021-10-29 PROCEDURE — 99213 OFFICE O/P EST LOW 20 MIN: CPT | Mod: GC

## 2021-10-29 RX ORDER — QUETIAPINE FUMARATE 25 MG/1
25 TABLET ORAL
Qty: 30 | Refills: 0 | Status: ACTIVE | COMMUNITY
Start: 2021-10-22 | End: 1900-01-01

## 2021-10-29 RX ORDER — QUETIAPINE FUMARATE 50 MG/1
50 TABLET ORAL
Qty: 30 | Refills: 0 | Status: ACTIVE | COMMUNITY
Start: 2021-10-29 | End: 1900-01-01

## 2021-11-05 ENCOUNTER — INPATIENT (INPATIENT)
Facility: HOSPITAL | Age: 76
LOS: 82 days | Discharge: NURSING, REHAB OR LONG-TERM CARE | End: 2022-01-27
Attending: PSYCHIATRY & NEUROLOGY | Admitting: PSYCHIATRY & NEUROLOGY
Payer: MEDICARE

## 2021-11-05 VITALS
HEART RATE: 104 BPM | HEIGHT: 60 IN | TEMPERATURE: 98 F | SYSTOLIC BLOOD PRESSURE: 135 MMHG | OXYGEN SATURATION: 99 % | RESPIRATION RATE: 18 BRPM | DIASTOLIC BLOOD PRESSURE: 70 MMHG

## 2021-11-05 DIAGNOSIS — F31.9 BIPOLAR DISORDER, UNSPECIFIED: ICD-10-CM

## 2021-11-05 LAB
ALBUMIN SERPL ELPH-MCNC: 4 G/DL — SIGNIFICANT CHANGE UP (ref 3.5–5.2)
ALBUMIN SERPL ELPH-MCNC: 4.2 G/DL — SIGNIFICANT CHANGE UP (ref 3.5–5.2)
ALP SERPL-CCNC: 74 U/L — SIGNIFICANT CHANGE UP (ref 30–115)
ALP SERPL-CCNC: 86 U/L — SIGNIFICANT CHANGE UP (ref 30–115)
ALT FLD-CCNC: 13 U/L — SIGNIFICANT CHANGE UP (ref 0–41)
ALT FLD-CCNC: 14 U/L — SIGNIFICANT CHANGE UP (ref 0–41)
ANION GAP SERPL CALC-SCNC: 11 MMOL/L — SIGNIFICANT CHANGE UP (ref 7–14)
ANION GAP SERPL CALC-SCNC: 15 MMOL/L — HIGH (ref 7–14)
APPEARANCE UR: CLEAR — SIGNIFICANT CHANGE UP
AST SERPL-CCNC: 12 U/L — SIGNIFICANT CHANGE UP (ref 0–41)
AST SERPL-CCNC: 9 U/L — SIGNIFICANT CHANGE UP (ref 0–41)
BACTERIA # UR AUTO: ABNORMAL
BASOPHILS # BLD AUTO: 0.01 K/UL — SIGNIFICANT CHANGE UP (ref 0–0.2)
BASOPHILS NFR BLD AUTO: 0.1 % — SIGNIFICANT CHANGE UP (ref 0–1)
BILIRUB SERPL-MCNC: <0.2 MG/DL — SIGNIFICANT CHANGE UP (ref 0.2–1.2)
BILIRUB SERPL-MCNC: <0.2 MG/DL — SIGNIFICANT CHANGE UP (ref 0.2–1.2)
BILIRUB UR-MCNC: NEGATIVE — SIGNIFICANT CHANGE UP
BUN SERPL-MCNC: 33 MG/DL — HIGH (ref 10–20)
BUN SERPL-MCNC: 36 MG/DL — HIGH (ref 10–20)
CALCIUM SERPL-MCNC: 9.3 MG/DL — SIGNIFICANT CHANGE UP (ref 8.5–10.1)
CALCIUM SERPL-MCNC: 9.7 MG/DL — SIGNIFICANT CHANGE UP (ref 8.5–10.1)
CHLORIDE SERPL-SCNC: 102 MMOL/L — SIGNIFICANT CHANGE UP (ref 98–110)
CHLORIDE SERPL-SCNC: 107 MMOL/L — SIGNIFICANT CHANGE UP (ref 98–110)
CO2 SERPL-SCNC: 16 MMOL/L — LOW (ref 17–32)
CO2 SERPL-SCNC: 18 MMOL/L — SIGNIFICANT CHANGE UP (ref 17–32)
COLOR SPEC: YELLOW — SIGNIFICANT CHANGE UP
CREAT SERPL-MCNC: 1.7 MG/DL — HIGH (ref 0.7–1.5)
CREAT SERPL-MCNC: 1.8 MG/DL — HIGH (ref 0.7–1.5)
DIFF PNL FLD: NEGATIVE — SIGNIFICANT CHANGE UP
EOSINOPHIL # BLD AUTO: 0 K/UL — SIGNIFICANT CHANGE UP (ref 0–0.7)
EOSINOPHIL NFR BLD AUTO: 0 % — SIGNIFICANT CHANGE UP (ref 0–8)
EPI CELLS # UR: ABNORMAL /HPF
ETHANOL SERPL-MCNC: <10 MG/DL — SIGNIFICANT CHANGE UP
GLUCOSE SERPL-MCNC: 165 MG/DL — HIGH (ref 70–99)
GLUCOSE SERPL-MCNC: 241 MG/DL — HIGH (ref 70–99)
GLUCOSE UR QL: NEGATIVE MG/DL — SIGNIFICANT CHANGE UP
HCT VFR BLD CALC: 31.7 % — LOW (ref 37–47)
HGB BLD-MCNC: 9.9 G/DL — LOW (ref 12–16)
IMM GRANULOCYTES NFR BLD AUTO: 0.6 % — HIGH (ref 0.1–0.3)
KETONES UR-MCNC: NEGATIVE — SIGNIFICANT CHANGE UP
LEUKOCYTE ESTERASE UR-ACNC: NEGATIVE — SIGNIFICANT CHANGE UP
LYMPHOCYTES # BLD AUTO: 0.5 K/UL — LOW (ref 1.2–3.4)
LYMPHOCYTES # BLD AUTO: 7.1 % — LOW (ref 20.5–51.1)
MAGNESIUM SERPL-MCNC: 2 MG/DL — SIGNIFICANT CHANGE UP (ref 1.8–2.4)
MCHC RBC-ENTMCNC: 26.5 PG — LOW (ref 27–31)
MCHC RBC-ENTMCNC: 31.2 G/DL — LOW (ref 32–37)
MCV RBC AUTO: 85 FL — SIGNIFICANT CHANGE UP (ref 81–99)
MONOCYTES # BLD AUTO: 0.03 K/UL — LOW (ref 0.1–0.6)
MONOCYTES NFR BLD AUTO: 0.4 % — LOW (ref 1.7–9.3)
NEUTROPHILS # BLD AUTO: 6.46 K/UL — SIGNIFICANT CHANGE UP (ref 1.4–6.5)
NEUTROPHILS NFR BLD AUTO: 91.8 % — HIGH (ref 42.2–75.2)
NITRITE UR-MCNC: NEGATIVE — SIGNIFICANT CHANGE UP
NRBC # BLD: 0 /100 WBCS — SIGNIFICANT CHANGE UP (ref 0–0)
PH UR: 5.5 — SIGNIFICANT CHANGE UP (ref 5–8)
PLATELET # BLD AUTO: 246 K/UL — SIGNIFICANT CHANGE UP (ref 130–400)
POTASSIUM SERPL-MCNC: 4.6 MMOL/L — SIGNIFICANT CHANGE UP (ref 3.5–5)
POTASSIUM SERPL-MCNC: 4.9 MMOL/L — SIGNIFICANT CHANGE UP (ref 3.5–5)
POTASSIUM SERPL-SCNC: 4.6 MMOL/L — SIGNIFICANT CHANGE UP (ref 3.5–5)
POTASSIUM SERPL-SCNC: 4.9 MMOL/L — SIGNIFICANT CHANGE UP (ref 3.5–5)
PROT SERPL-MCNC: 6.2 G/DL — SIGNIFICANT CHANGE UP (ref 6–8)
PROT SERPL-MCNC: 6.5 G/DL — SIGNIFICANT CHANGE UP (ref 6–8)
PROT UR-MCNC: 100 MG/DL
RBC # BLD: 3.73 M/UL — LOW (ref 4.2–5.4)
RBC # FLD: 13.4 % — SIGNIFICANT CHANGE UP (ref 11.5–14.5)
RBC CASTS # UR COMP ASSIST: SIGNIFICANT CHANGE UP /HPF
SARS-COV-2 RNA SPEC QL NAA+PROBE: SIGNIFICANT CHANGE UP
SODIUM SERPL-SCNC: 133 MMOL/L — LOW (ref 135–146)
SODIUM SERPL-SCNC: 136 MMOL/L — SIGNIFICANT CHANGE UP (ref 135–146)
SP GR SPEC: 1.02 — SIGNIFICANT CHANGE UP (ref 1.01–1.03)
UROBILINOGEN FLD QL: 0.2 MG/DL — SIGNIFICANT CHANGE UP
WBC # BLD: 7.04 K/UL — SIGNIFICANT CHANGE UP (ref 4.8–10.8)
WBC # FLD AUTO: 7.04 K/UL — SIGNIFICANT CHANGE UP (ref 4.8–10.8)
WBC UR QL: SIGNIFICANT CHANGE UP /HPF

## 2021-11-05 PROCEDURE — 93010 ELECTROCARDIOGRAM REPORT: CPT

## 2021-11-05 PROCEDURE — 99285 EMERGENCY DEPT VISIT HI MDM: CPT

## 2021-11-05 PROCEDURE — 90792 PSYCH DIAG EVAL W/MED SRVCS: CPT | Mod: 95

## 2021-11-05 RX ORDER — DONEPEZIL HYDROCHLORIDE 10 MG/1
10 TABLET, FILM COATED ORAL AT BEDTIME
Refills: 0 | Status: DISCONTINUED | OUTPATIENT
Start: 2021-11-05 | End: 2021-11-30

## 2021-11-05 RX ORDER — ACETAMINOPHEN 500 MG
650 TABLET ORAL ONCE
Refills: 0 | Status: COMPLETED | OUTPATIENT
Start: 2021-11-05 | End: 2021-11-05

## 2021-11-05 RX ORDER — QUETIAPINE FUMARATE 200 MG/1
25 TABLET, FILM COATED ORAL DAILY
Refills: 0 | Status: DISCONTINUED | OUTPATIENT
Start: 2021-11-05 | End: 2021-11-08

## 2021-11-05 RX ORDER — QUETIAPINE FUMARATE 200 MG/1
50 TABLET, FILM COATED ORAL AT BEDTIME
Refills: 0 | Status: DISCONTINUED | OUTPATIENT
Start: 2021-11-05 | End: 2021-11-08

## 2021-11-05 RX ORDER — SODIUM CHLORIDE 9 MG/ML
1000 INJECTION INTRAMUSCULAR; INTRAVENOUS; SUBCUTANEOUS ONCE
Refills: 0 | Status: COMPLETED | OUTPATIENT
Start: 2021-11-05 | End: 2021-11-05

## 2021-11-05 RX ADMIN — Medication 650 MILLIGRAM(S): at 20:55

## 2021-11-05 RX ADMIN — SODIUM CHLORIDE 1000 MILLILITER(S): 9 INJECTION INTRAMUSCULAR; INTRAVENOUS; SUBCUTANEOUS at 17:02
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
## 2021-11-05 NOTE — ED PROVIDER NOTE - EYES NEGATIVE STATEMENT, MLM
[Takes medication as prescribed] : takes [None] : Patient does not have any barriers to medication adherence no discharge, no irritation, no pain, no redness, and no visual changes.

## 2021-11-05 NOTE — ED BEHAVIORAL HEALTH NOTE - BEHAVIORAL HEALTH NOTE
===================  PRE-HOSPITAL COURSE  ===================    SOURCE: ANTOINETTE Toribio.     [facility] SI.     ============  ED COURSE  ============    SOURCE: ANTOINETTE Toribio.     ARRIVAL: [facility] activated [facility].     BELONGINGS: Pt's belongings were collected and cleared.     BEHAVIOR: Patient has been cooperative but appears extremely anxious. Mood congruent to affect. Speech rate and volume off normal. Pt's behavior has been in control and no further details were elaborated. Pt continues to perseverate on stating" Something is wrong with me." Allowed for bloodwork, EKG and covid swab to get done. No psychotic sx visible. No elicited delusions. Somewhat good insight and judgement.     TREATMENT: Pt did not require any medication or security intervention.     VISITORS: No visitors     ------------------------------------------------  COVID Exposure Screen- collateral (i.e. third-party, chart review, belongings, etc; include EMS and ED staff)  ---------------------------------------------------    1.    Has the patient had a COVID-19 test in the last 90 days? Unknown.    2. Has the patient tested positive for COVID-19 antibodies? Unknown.    3.Has the patient received 2 doses of the COVID-19 vaccine?  Unknown.    4. In the past 10 days, has the patient been around anyone with a positive COVID-19 test?* Unknown.    5.Has the patient been out of [place] within the past 10 days? Unknown.    ========================  FOR EACH COLLATERAL  ========================    Patient gives permission to obtain collateral from _____:    (  ) Yes    (X  )  No    Rationale for overriding objection              ( X ) Lack of capacity. Details: ________              (  ) Assessing risk of danger to self/others. Details: ________    Rationale for selecting specific collateral source              (  ) Potential to impact risk of danger to self/others and no alternative equivalent. Details: _____    NAME: Sandra.     NUMBER: [phone]    RELATIONSHIP: .     RELIABILITY: Reliable.     COMMENTS: Patient has extensive hx of Bipolar d/o, was previously on Lithium for a long time until this past August 2021 after pt was hospitalized for Lithium Toxicity. Pt's kidney function began to decline. Therefore, pt was discontinued from Lithium and a different psych med regimen were started and discontinued several times. Trial meds include Haldol, Lamotrigine and Seroquel. Pt's current med regimen include Seroquel.     Pt was seeing an NP from the facility. However, pt's PCP wanted a second option. Therefore, pt started seeing Dr. Gonzalez ([phone]) and pt has only met with this psychiatrist twice. During the first visit pt was started on Seroquel and during the second visit pt's Seroquel was increased.     Ever since the Lithium toxicity, pt's cognitive function has declined, pt has not been alert or oriented and pt used to take pride in her appearance but pt no longer spends time thinking about what to wear or in general about her appearance.     Today, pt she made a passive suicidal comment in the context of "I don't think I can take this anymore. I hate to think about what could happen to me." Pt then mentioned that she might need a psych admission. Staff became concerned due to these comments. Therefore, they activated 911.     Per collateral pt has hx of ECT treatment approx 10-15 years ago but no hx of SI.SIB.SA.  Pt has diverticulitis and reported abdominal pain recently. Pt was given prednisone (3 doses) before an appointment to prevent a "reaction."    ------------------------------------------------  COVID Exposure Screen- collateral (i.e. third-party, chart review, belongings, etc; include EMS and ED staff)  ---------------------------------------------------    1.    Has the patient had a COVID-19 test in the last 90 days? No.     2. Has the patient tested positive for COVID-19 antibodies? Unknown.    3.Has the patient received 2 doses of the COVID-19 vaccine?  Unknown.    4. In the past 10 days, has the patient been around anyone with a positive COVID-19 test?* Yes.     5.Has the patient been out of [place] within the past 10 days?  No.

## 2021-11-05 NOTE — ED BEHAVIORAL HEALTH ASSESSMENT NOTE - DETAILS
discussed reason for admission passive suicidal statement in circumstance of frustration with how poorly she is feeling does not change disposition brother has bipolar disorder and has been hospitalized due to it. Lithium Toxicity

## 2021-11-05 NOTE — ED BEHAVIORAL HEALTH ASSESSMENT NOTE - NS ED BHA PLAN INPATIENT UNIT SELECTION YN
I personally performed the service described in the documentation recorded by the scribe in my presence, and it accurately and completely records my words and actions. No

## 2021-11-05 NOTE — ED PROVIDER NOTE - PROGRESS NOTE DETAILS
labs with signs of dehydration, increased bun/cr from prior. glucose 241 and ag 15 but doubt dka and suspect dehydration. will give IVFs and reassess. SAE: signed out to Dr Lopez pending psych rec and rpt bmp post IVFs.

## 2021-11-05 NOTE — ED BEHAVIORAL HEALTH ASSESSMENT NOTE - NSPRESENTSXS_PSY_ALL_CORE
Depressed mood/Anhedonia/Hopelessness or despair/Global insomnia/Severe anxiety, agitation or panic/Refusal or inability to complete safety plan

## 2021-11-05 NOTE — ED BEHAVIORAL HEALTH ASSESSMENT NOTE - DESCRIPTION
currently domiciled at assisted living facility. Has two adult sons, 7 grand children,  ED Triage  ED Assessment  Placed on 1:1  Given IV fluids after initial medical/psych clearance labs.  PA to re-check BMP  Psych consult placed HTN, T2DM, h/o diverticulitis, arthritis

## 2021-11-05 NOTE — ED BEHAVIORAL HEALTH ASSESSMENT NOTE - SUMMARY
77 y/o female with what appears to be (confirmed by  at [facility] of 7 years and biological son) of bipolar depression, with good response to lithium for 20 to 30 years, with no previous IP hospitalizations, now presenting to the ED multiple times in the last few months for various medical complaints since being admitted in July/August of 2021 for lithium toxicity and metabolic encephalopathy.    Upon discharge and discontinuation of lithium, patient has suffered significant deterioration in her affective and mood stability, and ability to function in the community and care for herself, when previously though she required some assistance, was independent in her activities of daily living, with euthymic and reactive affect. Multiple other medications has been trial-ed including multiple 2nd generation antipsychotics such as abilify,  quetiapine, lurasidone, meds like haldol, ativan, prozac, yet patient has not improved and is now reporting passive suicidal statements or wish to die given her hopelessness.    In addition, patient does have mild cognitive impairment, though is alert and oriented to herself, time/date, situation when doing well, and recently placed on prednisone 50mg (for 3 days?), for diverticulitis and inflammation, both factors which can be included in differential and association with recent decompensation, though regardless patient is unable to care for herself in the community, and requires inpatient level of care at this time.    Patient may be admitted to IP psychiatry only after medical clearance and new BMP after correction with IV fluids, and deemed medically appropriate to receive psychiatric treatment in hospital without acute medical concerns.

## 2021-11-05 NOTE — ED ADULT NURSE REASSESSMENT NOTE - NS ED NURSE REASSESS COMMENT FT1
received at 11pm  - a&o x3, lsc, abd soft, nt/nd, no iv, awaits bed assignment to psych  - voluntary admit - dniees SI/HI-  will continue to monitor

## 2021-11-05 NOTE — ED BEHAVIORAL HEALTH ASSESSMENT NOTE - DIFFERENTIAL
Bipolar II Disorder (Bipolar depression), current episode mixed  Mild Cognitive Impairment, without behavioral disturbance  r/o steroid induced samra??

## 2021-11-05 NOTE — ED PROVIDER NOTE - OBJECTIVE STATEMENT
Patient sent for eval from [facility],   patient depressed, worried she might want to hurt herself, Patient denies suicidal thoughts, States she has poor appetite, unable to sleep, States she is depressed due to dementia, and unable to remember anything

## 2021-11-05 NOTE — ED PROVIDER NOTE - ATTENDING CONTRIBUTION TO CARE
77yo F with hx of BPD, MDD, HTN, DM here with slowly progressive depression, anhedonia and forgetfullness that is frustrating her for the past few weeks. today was sent for passive SI and feeling more anxious. denies SI to me adamantly. no HI or AVH. no cp or sob or abd pain. no HA or any other systemic complaints. on exam, nontoxic, mild tachycardia but midly anxious appearing and vss other wise.   Gen: Alert, NAD  Skin: Warm, dry, intact  Head: NCAT, PERRL  ENT: Mucous membranes moist  Neck: Supple  CV: RRR, normal S1, S2, blowing systolic murmur  Resp: Non labored respirations, Lungs CTA b/l, no wheezes, rales, rhonchi  Abdomen: Soft, nondistended, nontender  Extremities: Moving all extremities, no edema  Neuro: No focal neuro deficits, AAOx3, CNs II-XII intact, strength 5/5 in b/l UE and LE, normal gait, no ataxia, no drift  Psych: Cooperative, depressed, linear, not suicidal    here for worsening depression but not actively suicidal but with pph and ?passive SI on paperwork, will c/s psych and get medical clearance labs. CTH from 10/14 wnl so with forgetfullness do not feel needs CTH today. no deficits on exam. disposition pending w/up and, psych recs and reassessment. 75yo F with hx of BPD, MDD, HTN, DM here with slowly progressive depression, anhedonia and forgetfullness that is frustrating her for the past few weeks. today was sent for passive SI and feeling more anxious. denies SI to me adamantly. no HI or AVH. no cp or sob or abd pain. no HA or any other systemic complaints. on exam, nontoxic, mild tachycardia but midly anxious appearing and vss other wise.   Gen: Alert, NAD  Skin: Warm, dry, intact  Head: NCAT, PERRL  ENT: Mucous membranes moist  Neck: Supple  CV: RRR, normal S1, S2, blowing systolic murmur  Resp: Non labored respirations, Lungs CTA b/l, no wheezes, rales, rhonchi  Abdomen: Soft, nondistended, nontender  Extremities: Moving all extremities, no edema  Neuro: No focal neuro deficits, AAOx3, CNs II-XII intact, strength 5/5 in b/l UE and LE, normal gait, no ataxia, no drift  Psych: Cooperative, depressed, linear, not suicidal    here for worsening depression but not actively suicidal but with pph and ?passive SI on paperwork, will c/s psych and get medical clearance labs. clincially do not feel needs 1:1. CTH from 10/14 wnl so with forgetfullness do not feel needs CTH today. no deficits on exam. disposition pending w/up and, psych recs and reassessment.

## 2021-11-05 NOTE — ED PROVIDER NOTE - CLINICAL SUMMARY MEDICAL DECISION MAKING FREE TEXT BOX
Patient seen and evaluated by me.  Patient on a 1:1 and psychiatry consulted.  Appropriate clearance labs sent, EKG.  ED work up reviewed and psychiatry evaluated patient.  Initial Creatinine slightly higher than baseline at 1.8. Patient had mildly elevated anion gap. Patient given IV fluids. Metabolic panel repeated and Creatinine slightly improved and gap is closed. Will admit to psychiatry for further work up and treatment..

## 2021-11-06 LAB
CULTURE RESULTS: SIGNIFICANT CHANGE UP
GLUCOSE BLDC GLUCOMTR-MCNC: 126 MG/DL — HIGH (ref 70–99)
GLUCOSE BLDC GLUCOMTR-MCNC: 174 MG/DL — HIGH (ref 70–99)
GLUCOSE BLDC GLUCOMTR-MCNC: 96 MG/DL — SIGNIFICANT CHANGE UP (ref 70–99)
SPECIMEN SOURCE: SIGNIFICANT CHANGE UP

## 2021-11-06 PROCEDURE — 99232 SBSQ HOSP IP/OBS MODERATE 35: CPT

## 2021-11-06 RX ORDER — DEXTROSE 50 % IN WATER 50 %
25 SYRINGE (ML) INTRAVENOUS ONCE
Refills: 0 | Status: DISCONTINUED | OUTPATIENT
Start: 2021-11-06 | End: 2022-01-27

## 2021-11-06 RX ORDER — SODIUM CHLORIDE 9 MG/ML
1000 INJECTION, SOLUTION INTRAVENOUS
Refills: 0 | Status: DISCONTINUED | OUTPATIENT
Start: 2021-11-06 | End: 2022-01-27

## 2021-11-06 RX ORDER — INSULIN GLARGINE 100 [IU]/ML
27 INJECTION, SOLUTION SUBCUTANEOUS EVERY MORNING
Refills: 0 | Status: DISCONTINUED | OUTPATIENT
Start: 2021-11-06 | End: 2021-11-06

## 2021-11-06 RX ORDER — INSULIN LISPRO 100/ML
VIAL (ML) SUBCUTANEOUS
Refills: 0 | Status: DISCONTINUED | OUTPATIENT
Start: 2021-11-06 | End: 2021-11-09

## 2021-11-06 RX ORDER — INSULIN LISPRO 100/ML
9 VIAL (ML) SUBCUTANEOUS
Refills: 0 | Status: DISCONTINUED | OUTPATIENT
Start: 2021-11-06 | End: 2021-11-06

## 2021-11-06 RX ORDER — GLUCAGON INJECTION, SOLUTION 0.5 MG/.1ML
1 INJECTION, SOLUTION SUBCUTANEOUS ONCE
Refills: 0 | Status: DISCONTINUED | OUTPATIENT
Start: 2021-11-06 | End: 2022-01-27

## 2021-11-06 RX ORDER — DEXTROSE 50 % IN WATER 50 %
15 SYRINGE (ML) INTRAVENOUS ONCE
Refills: 0 | Status: DISCONTINUED | OUTPATIENT
Start: 2021-11-06 | End: 2022-01-27

## 2021-11-06 RX ORDER — DEXTROSE 50 % IN WATER 50 %
12.5 SYRINGE (ML) INTRAVENOUS ONCE
Refills: 0 | Status: DISCONTINUED | OUTPATIENT
Start: 2021-11-06 | End: 2022-01-27

## 2021-11-06 RX ADMIN — Medication 0.5 MILLIGRAM(S): at 20:12

## 2021-11-06 RX ADMIN — Medication 0.5 MILLIGRAM(S): at 00:56

## 2021-11-06 RX ADMIN — DONEPEZIL HYDROCHLORIDE 10 MILLIGRAM(S): 10 TABLET, FILM COATED ORAL at 00:53

## 2021-11-06 RX ADMIN — QUETIAPINE FUMARATE 50 MILLIGRAM(S): 200 TABLET, FILM COATED ORAL at 20:12

## 2021-11-06 RX ADMIN — Medication 0.5 MILLIGRAM(S): at 08:10

## 2021-11-06 RX ADMIN — QUETIAPINE FUMARATE 25 MILLIGRAM(S): 200 TABLET, FILM COATED ORAL at 08:10

## 2021-11-06 RX ADMIN — SODIUM CHLORIDE 1000 MILLILITER(S): 9 INJECTION INTRAMUSCULAR; INTRAVENOUS; SUBCUTANEOUS at 00:01

## 2021-11-06 RX ADMIN — Medication 650 MILLIGRAM(S): at 00:52

## 2021-11-06 RX ADMIN — QUETIAPINE FUMARATE 50 MILLIGRAM(S): 200 TABLET, FILM COATED ORAL at 00:54

## 2021-11-06 RX ADMIN — QUETIAPINE FUMARATE 25 MILLIGRAM(S): 200 TABLET, FILM COATED ORAL at 01:47

## 2021-11-06 RX ADMIN — DONEPEZIL HYDROCHLORIDE 10 MILLIGRAM(S): 10 TABLET, FILM COATED ORAL at 20:12

## 2021-11-06 NOTE — H&P ADULT - NSHPLABSRESULTS_GEN_ALL_CORE
LABS:  cret                        9.9    7.04  )-----------( 246      ( 05 Nov 2021 14:44 )             31.7     11-05    136  |  107  |  33<H>  ----------------------------<  165<H>  4.9   |  18  |  1.7<H>    Ca    9.3      05 Nov 2021 20:48  Mg     2.0     11-05    TPro  6.2  /  Alb  4.0  /  TBili  <0.2  /  DBili  x   /  AST  9   /  ALT  13  /  AlkPhos  74  11-05

## 2021-11-06 NOTE — H&P ADULT - ASSESSMENT
77yo F with hx of BPD, MDD, HTN, DM here with slowly progressive depression, anhedonia and forgetfulness that is frustrating her for the past few weeks. Pt was sent for passive SI and feeling more anxious.    # Suicidal ideations  # anxiety  # Bipolar d/o  # MDD  - Will follow recommendations as per psychiatrist    # HTN  - c/w    # HLD  - c/w atorvastatin    # Low carb DASH diet   75yo F with hx of BPD, MDD, HTN, DM here with slowly progressive depression, anhedonia and forgetfulness that is frustrating her for the past few weeks. Pt was sent for passive SI and feeling more anxious.    # Suicidal ideations  # anxiety  # Bipolar d/o  # MDD  - Will follow recommendations as per psychiatrist    # HTN  - c/w.......................    # HLD  - c/w atorvastatin 40mg QD    # GERD  - c/w pantoprazole 40mg QD    # DM  - FS SS as per orders     # Low carb DASH diet   75yo F with hx of BPD, MDD, HTN, DM here with slowly progressive depression, anhedonia and forgetfulness that is frustrating her for the past few weeks. Pt was sent for passive SI and feeling more anxious.    # Suicidal ideations  # anxiety  # Bipolar d/o  # MDD  - Will follow recommendations as per psychiatrist    # HTN    # HLD  - c/w atorvastatin 40mg QD    # GERD  - c/w pantoprazole 40mg QD    # DM  - FS SS as per orders     # Low carb DASH diet

## 2021-11-06 NOTE — PROGRESS NOTE BEHAVIORAL HEALTH - NSBHFUPINTERVALHXFT_PSY_A_CORE
As per nurses report, no acute events overnight. However, pt presents depressed, with decreased appetite. Pt was seen in her room, presents pleasant, reports feeling "not so great" and is mostly concerned about her forgetfulness. She is A,Ox3 approximately (does not know the hospital name and reports current date as 11/16/2021). She reports depressed mood, low appetite, denies SI/HI, and denies AH.

## 2021-11-06 NOTE — H&P ADULT - NSICDXFAMILYHX_GEN_ALL_CORE_FT
FAMILY HISTORY:  Father  Still living? No  FH: myocardial infarction, Age at diagnosis: Age Unknown    Mother  Still living? No  Family history of diabetes mellitus (DM), Age at diagnosis: Age Unknown

## 2021-11-06 NOTE — H&P ADULT - HISTORY OF PRESENT ILLNESS
75yo F with hx of BPD, MDD, HTN, DM here with slowly progressive depression, anhedonia and forgetfulness that is frustrating her for the past few weeks. Pt was sent for passive SI and feeling more anxious. denies SI upon interview no HI or AVH. Denies CP, SOB, abd pain, HA, fever, chills or any other systemic complaints.

## 2021-11-06 NOTE — H&P ADULT - NSHPPHYSICALEXAM_GEN_ALL_CORE
Vital Signs Last 24 Hrs  T(C): 35.7 (06 Nov 2021 01:27), Max: 36.9 (05 Nov 2021 13:48)  T(F): 96.2 (06 Nov 2021 01:27), Max: 98.5 (05 Nov 2021 13:48)  HR: 72 (06 Nov 2021 01:27) (72 - 104)  BP: 169/76 (06 Nov 2021 01:27) (117/62 - 169/76)  BP(mean): --  RR: 20 (06 Nov 2021 01:27) (18 - 20)  SpO2: 98% (05 Nov 2021 22:34) (98% - 99%)    VITALS:   T(C): 35.7 (11-06-21 @ 01:27), Max: 36.9 (11-05-21 @ 13:48)  HR: 72 (11-06-21 @ 01:27) (72 - 104)  BP: 169/76 (11-06-21 @ 01:27) (117/62 - 169/76)  RR: 20 (11-06-21 @ 01:27) (18 - 20)  SpO2: 98% (11-05-21 @ 22:34) (98% - 99%)    GENERAL: NAD, lying in bed comfortably and pleasant  HEAD:  Atraumatic, Normocephalic  EYES: EOMI, conjunctiva and sclera clear  ENT: Moist mucous membranes  NECK: Supple, No JVD  CHEST/LUNG: Clear to auscultation bilaterally; Unlabored respirations  HEART: Regular rate and rhythm; No murmurs, rubs, or gallops  ABDOMEN: Bowel sounds present; Soft, Nontender, Nondistended.   EXTREMITIES:  No clubbing, cyanosis, or edema  NERVOUS SYSTEM:  Alert & Oriented X3, speech clear. No deficits   MSK: FROM all 4 extremities, full and equal strength  SKIN: No rashes or lesions

## 2021-11-06 NOTE — PROGRESS NOTE BEHAVIORAL HEALTH - NSBHCHARTREVIEWLAB_PSY_A_CORE FT
9.9    7.04  )-----------( 246      ( 05 Nov 2021 14:44 )             31.7   11-05    136  |  107  |  33<H>  ----------------------------<  165<H>  4.9   |  18  |  1.7<H>    Ca    9.3      05 Nov 2021 20:48  Mg     2.0     11-05    TPro  6.2  /  Alb  4.0  /  TBili  <0.2  /  DBili  x   /  AST  9   /  ALT  13  /  AlkPhos  74  11-05

## 2021-11-06 NOTE — PROGRESS NOTE BEHAVIORAL HEALTH - SUMMARY
77 y/o female with what appears to be (confirmed by  at [facility] of 7 years and biological son) of bipolar depression, with good response to lithium for 20 to 30 years, with no previous IP hospitalizations, now presenting to the ED multiple times in the last few months for various medical complaints since being admitted in July/August of 2021 for lithium toxicity and metabolic encephalopathy.    Upon discharge and discontinuation of lithium, patient has suffered significant deterioration in her affective and mood stability, and ability to function in the community and care for herself.    - Bipolar depression:  Quetiapine 25mg AM, 50mg QHS  Lorazepam 0.5mg PO q12h for anxiety    - Memory impairment  Donepezil 10mg daily.

## 2021-11-07 LAB
COVID-19 NUCLEOCAPSID GAM AB INTERP: POSITIVE
COVID-19 NUCLEOCAPSID TOTAL GAM ANTIBODY RESULT: 10.3 INDEX — HIGH
GLUCOSE BLDC GLUCOMTR-MCNC: 102 MG/DL — HIGH (ref 70–99)
GLUCOSE BLDC GLUCOMTR-MCNC: 110 MG/DL — HIGH (ref 70–99)
GLUCOSE BLDC GLUCOMTR-MCNC: 116 MG/DL — HIGH (ref 70–99)
SARS-COV-2 IGG+IGM SERPL QL IA: 10.3 INDEX — HIGH
SARS-COV-2 IGG+IGM SERPL QL IA: POSITIVE

## 2021-11-07 RX ORDER — PANTOPRAZOLE SODIUM 20 MG/1
40 TABLET, DELAYED RELEASE ORAL
Refills: 0 | Status: DISCONTINUED | OUTPATIENT
Start: 2021-11-07 | End: 2022-01-27

## 2021-11-07 RX ORDER — ATORVASTATIN CALCIUM 80 MG/1
40 TABLET, FILM COATED ORAL AT BEDTIME
Refills: 0 | Status: DISCONTINUED | OUTPATIENT
Start: 2021-11-07 | End: 2022-01-27

## 2021-11-07 RX ORDER — LISINOPRIL 2.5 MG/1
40 TABLET ORAL DAILY
Refills: 0 | Status: DISCONTINUED | OUTPATIENT
Start: 2021-11-07 | End: 2021-11-24

## 2021-11-07 RX ORDER — AMLODIPINE BESYLATE 2.5 MG/1
10 TABLET ORAL DAILY
Refills: 0 | Status: DISCONTINUED | OUTPATIENT
Start: 2021-11-07 | End: 2021-11-24

## 2021-11-07 RX ORDER — MIRTAZAPINE 45 MG/1
7.5 TABLET, ORALLY DISINTEGRATING ORAL AT BEDTIME
Refills: 0 | Status: DISCONTINUED | OUTPATIENT
Start: 2021-11-07 | End: 2021-11-12

## 2021-11-07 RX ORDER — HYDROXYZINE HCL 10 MG
25 TABLET ORAL ONCE
Refills: 0 | Status: COMPLETED | OUTPATIENT
Start: 2021-11-07 | End: 2021-11-07

## 2021-11-07 RX ORDER — FERROUS SULFATE 325(65) MG
325 TABLET ORAL DAILY
Refills: 0 | Status: DISCONTINUED | OUTPATIENT
Start: 2021-11-07 | End: 2022-01-27

## 2021-11-07 RX ORDER — ASPIRIN/CALCIUM CARB/MAGNESIUM 324 MG
81 TABLET ORAL DAILY
Refills: 0 | Status: DISCONTINUED | OUTPATIENT
Start: 2021-11-07 | End: 2022-01-27

## 2021-11-07 RX ORDER — HYDRALAZINE HCL 50 MG
50 TABLET ORAL THREE TIMES A DAY
Refills: 0 | Status: DISCONTINUED | OUTPATIENT
Start: 2021-11-07 | End: 2021-11-24

## 2021-11-07 RX ORDER — PANTOPRAZOLE SODIUM 20 MG/1
40 TABLET, DELAYED RELEASE ORAL DAILY
Refills: 0 | Status: DISCONTINUED | OUTPATIENT
Start: 2021-11-07 | End: 2021-11-07

## 2021-11-07 RX ORDER — LANOLIN ALCOHOL/MO/W.PET/CERES
5 CREAM (GRAM) TOPICAL AT BEDTIME
Refills: 0 | Status: DISCONTINUED | OUTPATIENT
Start: 2021-11-07 | End: 2021-11-12

## 2021-11-07 RX ADMIN — Medication 0.5 MILLIGRAM(S): at 20:13

## 2021-11-07 RX ADMIN — AMLODIPINE BESYLATE 10 MILLIGRAM(S): 2.5 TABLET ORAL at 15:27

## 2021-11-07 RX ADMIN — MIRTAZAPINE 7.5 MILLIGRAM(S): 45 TABLET, ORALLY DISINTEGRATING ORAL at 20:15

## 2021-11-07 RX ADMIN — Medication 25 MILLIGRAM(S): at 15:27

## 2021-11-07 RX ADMIN — Medication 50 MILLIGRAM(S): at 20:14

## 2021-11-07 RX ADMIN — Medication 5 MILLIGRAM(S): at 20:16

## 2021-11-07 RX ADMIN — Medication 81 MILLIGRAM(S): at 15:27

## 2021-11-07 RX ADMIN — Medication 25 MILLIGRAM(S): at 15:25

## 2021-11-07 RX ADMIN — ATORVASTATIN CALCIUM 40 MILLIGRAM(S): 80 TABLET, FILM COATED ORAL at 20:14

## 2021-11-07 RX ADMIN — Medication 0.5 MILLIGRAM(S): at 08:06

## 2021-11-07 RX ADMIN — DONEPEZIL HYDROCHLORIDE 10 MILLIGRAM(S): 10 TABLET, FILM COATED ORAL at 20:14

## 2021-11-07 RX ADMIN — QUETIAPINE FUMARATE 50 MILLIGRAM(S): 200 TABLET, FILM COATED ORAL at 20:16

## 2021-11-07 RX ADMIN — QUETIAPINE FUMARATE 25 MILLIGRAM(S): 200 TABLET, FILM COATED ORAL at 08:06

## 2021-11-07 NOTE — PROGRESS NOTE BEHAVIORAL HEALTH - SUMMARY
75 y/o female with what appears to be (confirmed by  at [facility] of 7 years and biological son) of bipolar depression, with good response to lithium for 20 to 30 years, with no previous IP hospitalizations, now presenting to the ED multiple times in the last few months for various medical complaints since being admitted in July/August of 2021 for lithium toxicity and metabolic encephalopathy.    Upon discharge and discontinuation of lithium, patient has suffered significant deterioration in her affective and mood stability, and ability to function in the community and care for herself.    - Bipolar depression:  Quetiapine 25mg AM, 50mg QHS  Lorazepam 0.5mg PO q12h for anxiety    - Memory impairment  Donepezil 10mg daily.

## 2021-11-07 NOTE — PROGRESS NOTE BEHAVIORAL HEALTH - NSBHFUPINTERVALHXFT_PSY_A_CORE
As per nurses report, no acute events overnight. However, pt presents depressed, with decreased appetite.  feeling very anxious ,   not sleeping . She reports depressed mood, low appetite, denies SI/HI, and denies AH.

## 2021-11-08 DIAGNOSIS — G31.84 MILD COGNITIVE IMPAIRMENT OF UNCERTAIN OR UNKNOWN ETIOLOGY: ICD-10-CM

## 2021-11-08 DIAGNOSIS — E11.9 TYPE 2 DIABETES MELLITUS WITHOUT COMPLICATIONS: ICD-10-CM

## 2021-11-08 DIAGNOSIS — I10 ESSENTIAL (PRIMARY) HYPERTENSION: ICD-10-CM

## 2021-11-08 LAB
A1C WITH ESTIMATED AVERAGE GLUCOSE RESULT: 6 % — HIGH (ref 4–5.6)
ESTIMATED AVERAGE GLUCOSE: 126 MG/DL — HIGH (ref 68–114)
GLUCOSE BLDC GLUCOMTR-MCNC: 109 MG/DL — HIGH (ref 70–99)
GLUCOSE BLDC GLUCOMTR-MCNC: 125 MG/DL — HIGH (ref 70–99)
GLUCOSE BLDC GLUCOMTR-MCNC: 150 MG/DL — HIGH (ref 70–99)
GLUCOSE BLDC GLUCOMTR-MCNC: 206 MG/DL — HIGH (ref 70–99)
GLUCOSE BLDC GLUCOMTR-MCNC: 86 MG/DL — SIGNIFICANT CHANGE UP (ref 70–99)
SARS-COV-2 RNA SPEC QL NAA+PROBE: SIGNIFICANT CHANGE UP

## 2021-11-08 PROCEDURE — 99233 SBSQ HOSP IP/OBS HIGH 50: CPT

## 2021-11-08 RX ORDER — FLUOXETINE HCL 10 MG
20 CAPSULE ORAL DAILY
Refills: 0 | Status: DISCONTINUED | OUTPATIENT
Start: 2021-11-08 | End: 2021-11-15

## 2021-11-08 RX ORDER — HEPARIN SODIUM 5000 [USP'U]/ML
5000 INJECTION INTRAVENOUS; SUBCUTANEOUS EVERY 12 HOURS
Refills: 0 | Status: DISCONTINUED | OUTPATIENT
Start: 2021-11-08 | End: 2022-01-27

## 2021-11-08 RX ORDER — OLANZAPINE 15 MG/1
2.5 TABLET, FILM COATED ORAL AT BEDTIME
Refills: 0 | Status: DISCONTINUED | OUTPATIENT
Start: 2021-11-08 | End: 2021-11-10

## 2021-11-08 RX ORDER — ENOXAPARIN SODIUM 100 MG/ML
30 INJECTION SUBCUTANEOUS DAILY
Refills: 0 | Status: DISCONTINUED | OUTPATIENT
Start: 2021-11-08 | End: 2021-11-08

## 2021-11-08 RX ADMIN — LISINOPRIL 40 MILLIGRAM(S): 2.5 TABLET ORAL at 08:22

## 2021-11-08 RX ADMIN — MIRTAZAPINE 7.5 MILLIGRAM(S): 45 TABLET, ORALLY DISINTEGRATING ORAL at 21:00

## 2021-11-08 RX ADMIN — Medication 0.5 MILLIGRAM(S): at 08:22

## 2021-11-08 RX ADMIN — ATORVASTATIN CALCIUM 40 MILLIGRAM(S): 80 TABLET, FILM COATED ORAL at 20:59

## 2021-11-08 RX ADMIN — Medication 5 MILLIGRAM(S): at 21:00

## 2021-11-08 RX ADMIN — DONEPEZIL HYDROCHLORIDE 10 MILLIGRAM(S): 10 TABLET, FILM COATED ORAL at 20:59

## 2021-11-08 RX ADMIN — Medication 50 MILLIGRAM(S): at 20:59

## 2021-11-08 RX ADMIN — Medication 20 MILLIGRAM(S): at 21:01

## 2021-11-08 RX ADMIN — Medication 0.5 MILLIGRAM(S): at 21:03

## 2021-11-08 RX ADMIN — Medication 2: at 11:25

## 2021-11-08 RX ADMIN — AMLODIPINE BESYLATE 10 MILLIGRAM(S): 2.5 TABLET ORAL at 08:22

## 2021-11-08 RX ADMIN — Medication 50 MILLIGRAM(S): at 08:22

## 2021-11-08 RX ADMIN — Medication 81 MILLIGRAM(S): at 08:22

## 2021-11-08 RX ADMIN — Medication 50 MILLIGRAM(S): at 13:55

## 2021-11-08 RX ADMIN — Medication 325 MILLIGRAM(S): at 08:22

## 2021-11-08 RX ADMIN — PANTOPRAZOLE SODIUM 40 MILLIGRAM(S): 20 TABLET, DELAYED RELEASE ORAL at 08:22

## 2021-11-08 RX ADMIN — OLANZAPINE 2.5 MILLIGRAM(S): 15 TABLET, FILM COATED ORAL at 21:00

## 2021-11-08 RX ADMIN — QUETIAPINE FUMARATE 25 MILLIGRAM(S): 200 TABLET, FILM COATED ORAL at 08:22

## 2021-11-08 NOTE — PROGRESS NOTE BEHAVIORAL HEALTH - NSBHFUPINTERVALHXFT_PSY_A_CORE
Per patient's son Catarino (contacted with consent [phone]), she has been deteriorating since June/July. The only thing that has worked to keep her stable for decades was lithium, however Catarino recalls that her lithium was stopped due to concerns about renal toxicity despite nephrologists having told them that her kidney function looks okay for now. She has had dementia for over 10 years. She received ECT at [facility] 7-8 years ago for bipolar depression and had a good response. He plans to visit her on Thursday and would like patient to call him.    Collateral per patient's  Sandra Driscoll at [facility], patient has been on numerous medication trials of very brief duration since July and has had many hospitalizations and changes in providers leading to erratic prescribing.   She was tried on 2 mg and then 5 mg of Abilify in October, unclear if helped or why stopped.  She was tried on Prozac from Oct. 18-22, unclear if helped or why stopped.  She has had trials of Prozac, Abilify, Latuda, Buspar, Haldol, Seroquel, Lamotrigine (couple of days). She has not tried Zyprexa or Wellbutrin.  Patient's nephrologist is Ella Owens  Patient's prior PCP was Jay Jay June, now is Dr. Melo.  She was admitted to the hospital for chest pain on Sept 6th and again on Sept 11th and 16th, was found to have Vit B12 deficiency and anemia. Admitted Sept 29th for abdominal pain, found to have diverticulitis. She has only had one hospitalization in which she was found to have lithium toxicity (in July). Amber reports that she is having trouble with daily tasks of life and feels like something is wrong. She endorses feeling very sad, lacking in motivation, and like she doesn't want to move. She used to go out with friends but no longer does. She is sleeping on and off and is eating   "a little bit". She denies thoughts of wanting to harm or kill herself or others. She denies hearing or seeing things others can't. She is oriented to year, month, and city ([place]). She reports that she's been tried on many medications recently that she doesn't remember the names of, but that the only medication she remembers working for her is lithium and she doesn't know why she was taken off of it. She reports being able to ambulate to bathroom on her own without walker. She denies using alcohol or other drugs.    Per patient's son Catarino (contacted with consent [phone]), she has been deteriorating since June/July. The only thing that has worked to keep her stable for decades was lithium, however Catarino recalls that her lithium was stopped due to concerns about renal toxicity despite nephrologists having told them that her kidney function looks okay for now. She has had dementia for over 10 years. She received ECT at [facility] 7-8 years ago for bipolar depression and had a good response. He plans to visit her on Thursday and would like patient to call him.    Collateral per patient's  Sandra Driscoll at [facility], patient has been on numerous medication trials of very brief duration since July and has had many hospitalizations and changes in providers leading to erratic prescribing.   She was tried on 2 mg and then 5 mg of Abilify in October, unclear if helped or why stopped.  She was tried on Prozac from Oct. 18-22, unclear if helped or why stopped.  She has had trials of Prozac, Abilify, Latuda, Buspar, Haldol, Seroquel, Lamotrigine (couple of days). She has not tried Zyprexa or Wellbutrin.  Patient's nephrologist is Ella Owens  Patient's prior PCP was Jay Jay June, now is Dr. Melo.  She was admitted to the hospital for chest pain on Sept 6th and again on Sept 11th and 16th, was found to have Vit B12 deficiency and anemia. Admitted Sept 29th for abdominal pain, found to have diverticulitis. She has only had one hospitalization in which she was found to have lithium toxicity (in July).
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
## 2021-11-08 NOTE — PHYSICAL THERAPY INITIAL EVALUATION ADULT - PLANNED THERAPY INTERVENTIONS, PT EVAL
Pt able to transfer and ambulate with RW and supervision, without LOB, on unit. D/C pt from PT - no skilled need for PT in acute care setting. Discussed leaving RW on unit for pt to use (pt already using one) - ANTOINETTE Yanes aware and agreeable. Pt able to transfer and ambulate with RW and supervision, without LOB, on unit. D/C pt from PT - no skilled need for PT in acute care setting. Discussed leaving RW on unit for pt to use (pt already using one) - ANTOINETTE Yanes aware and agreeable. please recall if pt status changes.

## 2021-11-08 NOTE — PROGRESS NOTE BEHAVIORAL HEALTH - NSBHFUPIPCHARTREVFT_PSY_A_CORE
77 y/o female with history of bipolar depression (Bipolar II disorder), hypertension, osteoarthritis, T2DM, mild cognitive impairment without behavioral disturbance, brought to the ED at the behest of the [facility] she has been residing for the last 7 years for gradual deterioration in patient's ability to care for herself, in the context of recent psychotropic medication changes due to an episode of lithium toxicity in August of 2021.
75 y/o female with what appears to be (confirmed by  at [facility] of 7 years and biological son) of bipolar depression, with good response to lithium for 20 to 30 years, with no previous IP hospitalizations, now presenting to the ED multiple times in the last few months for various medical complaints since being admitted in July/August of 2021 for lithium toxicity and metabolic encephalopathy.    Upon discharge and discontinuation of lithium, patient has suffered significant deterioration in her affective and mood stability, and ability to function in the community and care for herself.

## 2021-11-08 NOTE — PROGRESS NOTE BEHAVIORAL HEALTH - PROBLEM SELECTOR PLAN 1
- d/c Seroquel d/t lack of response  - start Zyprexa 2.5 mg tonight (11/8)  - start Prozac 20 mg tomorrow (11/9)  - c/w Lorazepam 0.5 mg BID for anxiety

## 2021-11-08 NOTE — PROGRESS NOTE BEHAVIORAL HEALTH - SUMMARY
Ms. Rodarte is a 77 y/o female with history of bipolar depression (Bipolar II disorder), hypertension, osteoarthritis, T2DM, mild cognitive impairment without behavioral disturbance, brought to the ED at the behest of the [facility] she has been residing for the last 7 years for gradual deterioration in patient's ability to care for herself, in the context of recent psychotropic medication changes due to an episode of lithium toxicity in August of 2021.    Patient's current presentation is consistent with bipolar depression with underlying dementia without behavioral disturbance. Per collateral from son, patient has been well managed on lithium for decades in terms of bipolar samra, was recently discontinued on it due to concern for lithum toxicity and renal function. Creatinine was 1.7 on 11/5. In the past, she has responded well to ECT for bipolar depression. Per  at [facility], patient has been on numerous medication trials of very brief duration since July and has had many hospitalizations and changes in providers leading to erratic prescribing. She has not been tried on Zyprexa and was only on Prozac for 4 days (stopped for unknown reasons, no side effects mentioned). Will plan on doing trial of Zyprexa and Prozac for bipolar depression, can also try Wellbutrin if no response, or transfer for ECT if medication trials not fruitful.

## 2021-11-08 NOTE — PROGRESS NOTE BEHAVIORAL HEALTH - NSBHADMITIPBHPROVFT_PSY_A_CORE
Called psych NP Armaan Morales ([phone]). He was away from his desk, said would call back once he had access to pt's file. Called psych NP Armaan Morales ([phone]). He was away from his desk, said would call back once he had access to pt's file.
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
## 2021-11-08 NOTE — PROGRESS NOTE BEHAVIORAL HEALTH - NSBHADDHXPSYCHFT_PSY_A_CORE
"As per group home  and family collateral from previous assessments, patient has long standing history of bipolar depression, and had been stable with respect to acute episodes of depression/samra on lithium for apprx. 20 to 30 years, though earlier this summer medication had to be discontinued given patient came in with acute lithium toxicity and required medical admission for encephalopathy.     Since discontinuation of lithium in August, patient has trials multiple anti-psychotic medication including lurasidone, haloperidol, quetiapine, fluoxetine, but patient continued to deteriorate, and in present, is unable to care for herself, does not keep up with her hygiene, does not bathe, reports feeling confused, withdrawing, restless with insomnia though she feels tired and wants to sleep, hopeless, and with irritability and frustration leading to passive suicidal statements without plan or intent.     Patient has also previously had ECT for stabilization of her bipolar depression  Patient has been in hospital now   11/5  10/24  10/14  10/5  9/29  Just in ED, and earlier in September had medical admission."

## 2021-11-08 NOTE — CONSULT NOTE ADULT - ASSESSMENT
IMPRESSION: Rehab of 75 y/o  f  rehab  for  debility      PRECAUTIONS: [  ] Cardiac  [  ] Respiratory  [  ] Seizures [  ] Contact Isolation  [  ] Droplet Isolation  [ FALL ] Other    Weight Bearing Status:     RECOMMENDATION:    Out of Bed to Chair     DVT/Decubiti Prophylaxis    REHAB PLAN:     [  xx ] Bedside P/T 3-5 times a week   [   ]   Bedside O/T  2-3 times a week             [   ] No Rehab Therapy Indicated                   [   ]  Speech Therapy   Conditioning/ROM                                    ADL  Bed Mobility                                               Conditioning/ROM  Transfers                                                     Bed Mobility  Sitting /Standing Balance                         Transfers                                        Gait Training                                               Sitting/Standing Balance  Stair Training [   ]Applicable                    Home equipment Eval                                                                        Splinting  [   ] Only      GOALS:   ADL   [x  ]   Independent                    Transfers  [ x  ] Independent                          Ambulation  [x   ] Independent     [x   ] With device                            [   ]  CG                                                         [   ]  CG                                                                  [   ] CG                            [    ] Min A                                                   [   ] Min A                                                              [   ] Min  A          DISCHARGE PLAN:   [   ]  Good candidate for Intensive Rehabilitation/Hospital based-[facility]                                             Will tolerate 3hrs Intensive Rehab Daily                                       [    ]  Short Term Rehab in Skilled Nursing Facility                                       [  xx  ]  Home with Outpatient or  services  need  rw  for  ambulation  for  safety  and  support                                         [    ]  Possible Candidate for Intensive Hospital based Rehab

## 2021-11-08 NOTE — CHART NOTE - NSCHARTNOTEFT_GEN_A_CORE
Social Work Admit Note:    Patient is 76 years of age female who was admitted for evaluation for “possible suicidal statement.”  At time of assessment in the emergency department patient endorsed “I can’t take care of myself.”  Prior to her admission patient informed the staff of her residence that she was going to have a breakdown.  She presented as anxious and fearful.  Since August lithium was discontinued and there were other medication trials.      In the community patient resides at [facility].  She has been at this facility for seven years.  Patient has history of multiple prior inpatient psychiatric admissions.  She has history of ECT treatment.  Patient has two sons and seven grandchildren.  Marital status is .     Sexual History – patient identifies as heterosexual. 	    Family relationships and history – Patient has two adult sons who are her primary social supports.     Leisure Activity Assessment – not disclosed          Community Supports – No known attendance in any self- help groups or other organizations.     Employment – patient is not employed     Substance Use Assessment – use of alcohol or other substances denied.       History of suicidality or self- injurious behaviors – no known history         Significant Loses – None identified.      Life Goals – not disclosed.        will continue to meet with patient 1:1 and with treatment team daily.  Discharge plan is for continued mental health treatment in outpatient setting.      Please refer to Social Work Psychosocial for additional information.

## 2021-11-08 NOTE — PROGRESS NOTE BEHAVIORAL HEALTH - NSBHFUPADDHPIFT_PSY_A_CORE
Today she reported to the staff that she feels like she is going to have a nervous breakdown, and during assessment, patient reported feeling fearful and scared, and did not feel safe returning to the facility before getting "better" for fear she may hurt herself.    In her usual state of health, patient is social, well kempt, able to complete her ADL's, and is not confused.    Patient denies previous history of psychosis, AVH, paranoid ideation. Does not appear to have thought disorder, though is quite anxious and has disorganization in her thought process.    From previous notes and collateral: on 9/20/21    Spoke with son, Catarino Rodarte [phone]  who states that since the Lithium was stopped, the patient has not been doing well.  She's not functioning, can't dress herself or shower, more depressed, has expressed not wanting to live like this anymore, but has never expressed wanting to kill herself, having a plan, or intent.  Has mood swings, shopping and overspending, going out every day, .   She had ECT had 2 separate times in her life, 25 years ago and then and then again 7 years ago.  She had her own apartment for years but then was depressed.  Had ECT while she was living at [facility].  Then went to [facility].  Then 7 years ago moved to [facility].  No hx of SA, .  Last time she was had elevated mood was this summer.  Dementia since 10 years ago and getting.  She had difficulties remembering where she is, who certain family members are.  This tends to fluctuate; at times she is more cooperative and her memory seems to be better at those times.      Spoke with  at [facility].  Patient was sent to the hospital in July 2021 for AMS, found to have UTI and lithium toxicity.  Patient was discharged back to [facility], was restarted on Lithium but then had AMS again and was sent to [facility], found to have Lithium toxicity again in August.  Once she returned to [facility] in late August, patient was started on Latuda by Psychairic NP Armaan Morales, however the medication did not help with mood, and patient appeared to be disoriented on the medication.  On Sept 15 the latuda was stopped, Donepezil was stopped due to disorientation.  She was not started on a new mood stabilizer yet because she was waiting to be seen by the NP, however on 9/17 was sent to [facility] ED again, now for chest pain.

## 2021-11-09 DIAGNOSIS — F31.4 BIPOLAR DISORDER, CURRENT EPISODE DEPRESSED, SEVERE, WITHOUT PSYCHOTIC FEATURES: ICD-10-CM

## 2021-11-09 LAB
ALBUMIN SERPL ELPH-MCNC: 4.7 G/DL — SIGNIFICANT CHANGE UP (ref 3.5–5.2)
ALP SERPL-CCNC: 93 U/L — SIGNIFICANT CHANGE UP (ref 30–115)
ALT FLD-CCNC: 13 U/L — SIGNIFICANT CHANGE UP (ref 0–41)
ANION GAP SERPL CALC-SCNC: 11 MMOL/L — SIGNIFICANT CHANGE UP (ref 7–14)
AST SERPL-CCNC: 11 U/L — SIGNIFICANT CHANGE UP (ref 0–41)
BILIRUB SERPL-MCNC: <0.2 MG/DL — SIGNIFICANT CHANGE UP (ref 0.2–1.2)
BUN SERPL-MCNC: 34 MG/DL — HIGH (ref 10–20)
CALCIUM SERPL-MCNC: 10.1 MG/DL — SIGNIFICANT CHANGE UP (ref 8.5–10.1)
CHLORIDE SERPL-SCNC: 106 MMOL/L — SIGNIFICANT CHANGE UP (ref 98–110)
CO2 SERPL-SCNC: 21 MMOL/L — SIGNIFICANT CHANGE UP (ref 17–32)
CREAT SERPL-MCNC: 1.5 MG/DL — SIGNIFICANT CHANGE UP (ref 0.7–1.5)
GLUCOSE BLDC GLUCOMTR-MCNC: 128 MG/DL — HIGH (ref 70–99)
GLUCOSE SERPL-MCNC: 175 MG/DL — HIGH (ref 70–99)
POTASSIUM SERPL-MCNC: 5.1 MMOL/L — HIGH (ref 3.5–5)
POTASSIUM SERPL-SCNC: 5.1 MMOL/L — HIGH (ref 3.5–5)
PROT SERPL-MCNC: 7 G/DL — SIGNIFICANT CHANGE UP (ref 6–8)
SODIUM SERPL-SCNC: 138 MMOL/L — SIGNIFICANT CHANGE UP (ref 135–146)

## 2021-11-09 PROCEDURE — 99232 SBSQ HOSP IP/OBS MODERATE 35: CPT

## 2021-11-09 RX ORDER — HYDROXYZINE HCL 10 MG
25 TABLET ORAL EVERY 6 HOURS
Refills: 0 | Status: DISCONTINUED | OUTPATIENT
Start: 2021-11-09 | End: 2022-01-27

## 2021-11-09 RX ADMIN — Medication 0.5 MILLIGRAM(S): at 08:02

## 2021-11-09 RX ADMIN — Medication 0.5 MILLIGRAM(S): at 20:23

## 2021-11-09 RX ADMIN — OLANZAPINE 2.5 MILLIGRAM(S): 15 TABLET, FILM COATED ORAL at 20:20

## 2021-11-09 RX ADMIN — MIRTAZAPINE 7.5 MILLIGRAM(S): 45 TABLET, ORALLY DISINTEGRATING ORAL at 20:20

## 2021-11-09 RX ADMIN — Medication 20 MILLIGRAM(S): at 08:03

## 2021-11-09 RX ADMIN — Medication 50 MILLIGRAM(S): at 08:02

## 2021-11-09 RX ADMIN — Medication 50 MILLIGRAM(S): at 20:21

## 2021-11-09 RX ADMIN — Medication 325 MILLIGRAM(S): at 08:02

## 2021-11-09 RX ADMIN — DONEPEZIL HYDROCHLORIDE 10 MILLIGRAM(S): 10 TABLET, FILM COATED ORAL at 20:20

## 2021-11-09 RX ADMIN — PANTOPRAZOLE SODIUM 40 MILLIGRAM(S): 20 TABLET, DELAYED RELEASE ORAL at 08:02

## 2021-11-09 RX ADMIN — HEPARIN SODIUM 5000 UNIT(S): 5000 INJECTION INTRAVENOUS; SUBCUTANEOUS at 20:21

## 2021-11-09 RX ADMIN — HEPARIN SODIUM 5000 UNIT(S): 5000 INJECTION INTRAVENOUS; SUBCUTANEOUS at 08:01

## 2021-11-09 RX ADMIN — Medication 81 MILLIGRAM(S): at 08:02

## 2021-11-09 RX ADMIN — Medication 50 MILLIGRAM(S): at 15:09

## 2021-11-09 RX ADMIN — ATORVASTATIN CALCIUM 40 MILLIGRAM(S): 80 TABLET, FILM COATED ORAL at 20:20

## 2021-11-09 RX ADMIN — LISINOPRIL 40 MILLIGRAM(S): 2.5 TABLET ORAL at 08:02

## 2021-11-09 RX ADMIN — AMLODIPINE BESYLATE 10 MILLIGRAM(S): 2.5 TABLET ORAL at 08:02

## 2021-11-09 RX ADMIN — Medication 5 MILLIGRAM(S): at 20:20

## 2021-11-09 NOTE — PROGRESS NOTE BEHAVIORAL HEALTH - NSBHFUPINTERVALHXFT_PSY_A_CORE
Per nursing, Amber has been more ambulatory around the unit.    Amber reports that she feels a little bit better this morning but still not 100% back to herself. She has been eating and sleeping adequately. She reports that "I'm trying to get myself back to normal," and that she plans on taking a shower today. She has been going to groups. She denies hearing or seeing things that aren't there, or a desire to harm herself or others. She is amenable to asking her son to bring her own clothes when he visits tomorrow. No side effects reported.

## 2021-11-09 NOTE — PROGRESS NOTE BEHAVIORAL HEALTH - NSBHCHARTREVIEWLAB_PSY_A_CORE FT
11-09    138  |  106  |  34<H>  ----------------------------<  175<H>  5.1<H>   |  21  |  1.5    Ca    10.1      09 Nov 2021 08:19    TPro  7.0  /  Alb  4.7  /  TBili  <0.2  /  DBili  x   /  AST  11  /  ALT  13  /  AlkPhos  93  11-09

## 2021-11-09 NOTE — PROGRESS NOTE BEHAVIORAL HEALTH - SUMMARY
Ms. Rodarte is a 77 y/o female with history of bipolar depression (Bipolar II disorder), hypertension, osteoarthritis, T2DM, mild cognitive impairment without behavioral disturbance, brought to the ED at the behest of the long term she has been residing for the last 7 years for gradual deterioration in patient's ability to care for herself, in the context of recent psychotropic medication changes due to an episode of lithium toxicity in August of 2021.    Patient's current presentation is consistent with bipolar depression with underlying dementia without behavioral disturbance. Per collateral from son, patient has been well managed on lithium for decades in terms of bipolar samra, was recently discontinued on it due to concern for lithum toxicity and renal function. In the past, she has responded well to ECT for bipolar depression. Per  at [facility], patient has been on numerous medication trials of very brief duration since July and has had many hospitalizations and changes in providers leading to erratic prescribing. She has not been tried on Zyprexa and was only on Prozac for 4 days (stopped for unknown reasons, no side effects mentioned). Patient tolerating trial of Zyprexa and Prozac well, no complaints of side effects with slightly increased mood and activity.    Creatinine is within normal limits today 11/9 (1.5).

## 2021-11-09 NOTE — PROGRESS NOTE BEHAVIORAL HEALTH - PROBLEM SELECTOR PLAN 1
- c/w Zyprexa 2.5 mg (started 11/8)  - c/w Prozac 20 mg (started 11/9)  - c/w home dose of Lorazepam 0.5 mg BID

## 2021-11-09 NOTE — PROGRESS NOTE BEHAVIORAL HEALTH - NSBHADMITIPBHPROVFT_PSY_A_CORE
Called psych NP Armaan Morales ([phone]). He was away from his desk, said would call back once he had access to pt's file.

## 2021-11-10 LAB
CHOLEST SERPL-MCNC: 186 MG/DL — SIGNIFICANT CHANGE UP
GLUCOSE BLDC GLUCOMTR-MCNC: 131 MG/DL — HIGH (ref 70–99)
HDLC SERPL-MCNC: 33 MG/DL — LOW
LIPID PNL WITH DIRECT LDL SERPL: 100 MG/DL — HIGH
NON HDL CHOLESTEROL: 153 MG/DL — HIGH
TRIGL SERPL-MCNC: 360 MG/DL — HIGH

## 2021-11-10 PROCEDURE — 99232 SBSQ HOSP IP/OBS MODERATE 35: CPT

## 2021-11-10 RX ORDER — OLANZAPINE 15 MG/1
5 TABLET, FILM COATED ORAL AT BEDTIME
Refills: 0 | Status: DISCONTINUED | OUTPATIENT
Start: 2021-11-10 | End: 2021-11-15

## 2021-11-10 RX ADMIN — Medication 81 MILLIGRAM(S): at 08:06

## 2021-11-10 RX ADMIN — Medication 50 MILLIGRAM(S): at 08:06

## 2021-11-10 RX ADMIN — DONEPEZIL HYDROCHLORIDE 10 MILLIGRAM(S): 10 TABLET, FILM COATED ORAL at 20:17

## 2021-11-10 RX ADMIN — Medication 0.5 MILLIGRAM(S): at 20:18

## 2021-11-10 RX ADMIN — Medication 50 MILLIGRAM(S): at 13:29

## 2021-11-10 RX ADMIN — LISINOPRIL 40 MILLIGRAM(S): 2.5 TABLET ORAL at 08:06

## 2021-11-10 RX ADMIN — HEPARIN SODIUM 5000 UNIT(S): 5000 INJECTION INTRAVENOUS; SUBCUTANEOUS at 20:17

## 2021-11-10 RX ADMIN — Medication 0.5 MILLIGRAM(S): at 08:06

## 2021-11-10 RX ADMIN — OLANZAPINE 5 MILLIGRAM(S): 15 TABLET, FILM COATED ORAL at 20:16

## 2021-11-10 RX ADMIN — PANTOPRAZOLE SODIUM 40 MILLIGRAM(S): 20 TABLET, DELAYED RELEASE ORAL at 08:06

## 2021-11-10 RX ADMIN — Medication 5 MILLIGRAM(S): at 20:17

## 2021-11-10 RX ADMIN — Medication 20 MILLIGRAM(S): at 08:06

## 2021-11-10 RX ADMIN — AMLODIPINE BESYLATE 10 MILLIGRAM(S): 2.5 TABLET ORAL at 08:06

## 2021-11-10 RX ADMIN — MIRTAZAPINE 7.5 MILLIGRAM(S): 45 TABLET, ORALLY DISINTEGRATING ORAL at 20:17

## 2021-11-10 RX ADMIN — ATORVASTATIN CALCIUM 40 MILLIGRAM(S): 80 TABLET, FILM COATED ORAL at 20:16

## 2021-11-10 RX ADMIN — Medication 50 MILLIGRAM(S): at 20:17

## 2021-11-10 RX ADMIN — Medication 325 MILLIGRAM(S): at 08:06

## 2021-11-10 NOTE — PROGRESS NOTE BEHAVIORAL HEALTH - PROBLEM SELECTOR PLAN 1
- increase Zyprexa to 5 mg qhs (started 11/8)  - c/w Prozac 20 mg qdaily (started 11/9)  - c/w home dose of Lorazepam 0.5 mg BID

## 2021-11-10 NOTE — PROGRESS NOTE BEHAVIORAL HEALTH - NSBHADMITIPBHPROVFT_PSY_A_CORE
Called psych NP Armaan Morales ([phone]). He was away from his desk, said would call back once he had access to pt's file.

## 2021-11-10 NOTE — PROGRESS NOTE BEHAVIORAL HEALTH - NSBHFUPINTERVALHXFT_PSY_A_CORE
Pt was seen, evaluated, chart reviewed.  As per nursing staff, no events overnight. On evaluation, pt reports she "doesn't feel good today." States she feels that she has no energy and states "I can't do anything." Pt reports having depressed mood today. Reports going to 1 group yesterday. Pt appears more depressed today than yesterday. s compliant with medication, denies negative side effects. Endorsed fair appetite. States she did not sleep very well last night. Denied any dizziness, headaches or lightheadedness. Denied auditory/visual hallucinations. Denied suicidal/homicidal ideation, intent or plan.

## 2021-11-10 NOTE — CHART NOTE - NSCHARTNOTEFT_GEN_A_CORE
Social Work Note:    Treatment team met with patient earlier today on unit rounds.  Patient informed treatment team that she was not feeling physically well.  She described feeling “sad.”  Sleep and appetite endorsed as poor.  Treatment team encouraged patient to ask for assistance as needed.  During the day patient has been isolative to her room.  She has been adherent to prescribed medications.  Suicidal / homicidal ideation denied.     Prior to admission patient was a resident at [facility].  Plan is for patient to return there when stable for discharge.  Patient has supportive family.     Mental Status Exam:    Mood – Depressed   Sleep – Fair   Appetite – Poor   ADLs – Fair   Thought Process – Perseverative     Observation – [id number]    No barriers to discharge identified at this time.     At this time patient is not psychiatrically stable for discharge.

## 2021-11-10 NOTE — CHART NOTE - NSCHARTNOTEFT_GEN_A_CORE
Spoke to Amber about her life before depression and goals of care. She stated her depression began this year (2021), could not identify a specific month but said it has gotten progressively worse. She was also diagnosed with dementia within the past year and stated her inability to care for herself is contributing to her depression. Stating further she doesn't know where her mind went. Before depression she endorses the ability to take care of herself, go shopping, and that she would look forward to going out with girlfriends for lunch/coffee on weekends.    For goals of care, pt states she wants more energy and to be able to want to participate in activities. Stating that she does not feel like doing anything anymore. She endorses looking forward to seeing her son, looks forward to seeing family for holiday season, and looks forward to feeling like herself. She agreed to try things like taking walks around the unit, and doing word searches.

## 2021-11-10 NOTE — PROGRESS NOTE BEHAVIORAL HEALTH - SUMMARY
Ms. Rodarte is a 77 y/o female with history of bipolar depression (Bipolar II disorder), hypertension, osteoarthritis, T2DM, mild cognitive impairment without behavioral disturbance, brought to the ED at the behest of the intermediate she has been residing for the last 7 years for gradual deterioration in patient's ability to care for herself, in the context of recent psychotropic medication changes due to an episode of lithium toxicity in August of 2021.    Patient's current presentation is consistent with bipolar depression with underlying dementia without behavioral disturbance. Per collateral from son, patient has been well managed on lithium for decades in terms of bipolar samra, was recently discontinued on it due to concern for lithum toxicity and renal function. In the past, she has responded well to ECT for bipolar depression. Per  at [facility], patient has been on numerous medication trials of very brief duration since July and has had many hospitalizations and changes in providers leading to erratic prescribing. She has not been tried on Zyprexa and was only on Prozac for 4 days (stopped for unknown reasons, no side effects mentioned). Patient tolerating trial of Zyprexa and Prozac well, no complaints of side effects with slightly increased mood and activity.    Creatinine is within normal limits today 11/9 (1.5).

## 2021-11-10 NOTE — PROGRESS NOTE BEHAVIORAL HEALTH - THOUGHT CONTENT
----- Message from Do Jarquin sent at 9/29/2017 10:22 AM CDT -----  Regarding: [facility]  Pt has an appt in October, would like orders placed for pt to come in before appt. Thank you   Preoccupations

## 2021-11-11 DIAGNOSIS — E78.5 HYPERLIPIDEMIA, UNSPECIFIED: ICD-10-CM

## 2021-11-11 LAB
GLUCOSE BLDC GLUCOMTR-MCNC: 134 MG/DL — HIGH (ref 70–99)
SARS-COV-2 RNA SPEC QL NAA+PROBE: SIGNIFICANT CHANGE UP

## 2021-11-11 PROCEDURE — 99232 SBSQ HOSP IP/OBS MODERATE 35: CPT

## 2021-11-11 RX ADMIN — LISINOPRIL 40 MILLIGRAM(S): 2.5 TABLET ORAL at 08:07

## 2021-11-11 RX ADMIN — Medication 50 MILLIGRAM(S): at 20:11

## 2021-11-11 RX ADMIN — ATORVASTATIN CALCIUM 40 MILLIGRAM(S): 80 TABLET, FILM COATED ORAL at 20:11

## 2021-11-11 RX ADMIN — Medication 50 MILLIGRAM(S): at 12:12

## 2021-11-11 RX ADMIN — HEPARIN SODIUM 5000 UNIT(S): 5000 INJECTION INTRAVENOUS; SUBCUTANEOUS at 08:06

## 2021-11-11 RX ADMIN — OLANZAPINE 5 MILLIGRAM(S): 15 TABLET, FILM COATED ORAL at 20:11

## 2021-11-11 RX ADMIN — Medication 5 MILLIGRAM(S): at 20:11

## 2021-11-11 RX ADMIN — Medication 325 MILLIGRAM(S): at 08:07

## 2021-11-11 RX ADMIN — Medication 50 MILLIGRAM(S): at 08:07

## 2021-11-11 RX ADMIN — AMLODIPINE BESYLATE 10 MILLIGRAM(S): 2.5 TABLET ORAL at 08:08

## 2021-11-11 RX ADMIN — Medication 20 MILLIGRAM(S): at 08:07

## 2021-11-11 RX ADMIN — DONEPEZIL HYDROCHLORIDE 10 MILLIGRAM(S): 10 TABLET, FILM COATED ORAL at 20:11

## 2021-11-11 RX ADMIN — Medication 0.5 MILLIGRAM(S): at 08:07

## 2021-11-11 RX ADMIN — Medication 25 MILLIGRAM(S): at 12:17

## 2021-11-11 RX ADMIN — Medication 81 MILLIGRAM(S): at 08:07

## 2021-11-11 RX ADMIN — MIRTAZAPINE 7.5 MILLIGRAM(S): 45 TABLET, ORALLY DISINTEGRATING ORAL at 20:11

## 2021-11-11 RX ADMIN — Medication 0.5 MILLIGRAM(S): at 20:13

## 2021-11-11 RX ADMIN — PANTOPRAZOLE SODIUM 40 MILLIGRAM(S): 20 TABLET, DELAYED RELEASE ORAL at 08:06

## 2021-11-11 NOTE — PROGRESS NOTE BEHAVIORAL HEALTH - NSBHFUPINTERVALHXFT_PSY_A_CORE
Per nursing, Amber has been doing better and is more visible on the unit.    Amber is able to ambulate from her room to conference room with walker despite believing she can't. She reports that she tosses and turns at night and that she has no appetite. Amber denies that she has been anywhere else but her bed yesterday and asserts that she is unable to do anything. "I'm not ok" "I'm angry at myself" "I feel stupid sitting here" "My thoughts are foggy" "I have no thoughts" "I'm worried about having Alzheimers" are some of her concerns. Minutes later she says that she is feeling "a little bit better". She is agreeable to her goals today being to take a shower, have a visitor (son), and go to a group.     She is compliant with medications, denied side effects. Denied auditory/visual hallucinations. Denied paranoia. Denied suicidal/homicidal ideation, intent or plan.     On afternoon reassessment, she endorses having taken a shower but that she does not want to go to a group. Her son visited and would like an update tomorrow after 3 PM.    Contacted nephrologist Dr. Ella Owens [phone] about potentially restarting lithium, left a message with .

## 2021-11-11 NOTE — PROGRESS NOTE BEHAVIORAL HEALTH - PROBLEM SELECTOR PLAN 1
- c/w Zyprexa 5 mg qhs   - c/w Prozac 20 mg qdaily (started 11/9)  - c/w home dose of Lorazepam 0.5 mg BID - c/w Zyprexa 5 mg qhs   - c/w Prozac 20 mg qdaily (started 11/9)  - c/w home dose of Lorazepam 0.5 mg BID  - c/w DVT ppx dose of heparin as per PT recommendation, patient is minimally ambulating

## 2021-11-11 NOTE — PROGRESS NOTE BEHAVIORAL HEALTH - NSBHCHARTREVIEWLAB_PSY_A_CORE FT
Lipid Profile in AM (11.10.21 @ 07:30)   Cholesterol, Serum: 186 mg/dL   Triglycerides, Serum: 360 mg/dL   HDL Cholesterol, Serum: 33 mg/dL   Non HDL Cholesterol: 153  LDL Cholesterol Calculated: 100 mg/dL

## 2021-11-11 NOTE — PROGRESS NOTE BEHAVIORAL HEALTH - SUMMARY
Ms. Rodarte is a 77 y/o female with history of bipolar depression (Bipolar II disorder), hypertension, osteoarthritis, T2DM, mild cognitive impairment without behavioral disturbance, brought to the ED at the behest of the correction she has been residing for the last 7 years for gradual deterioration in patient's ability to care for herself, in the context of recent psychotropic medication changes due to an episode of lithium toxicity in August of 2021.    Patient's current presentation is consistent with bipolar depression with underlying dementia without behavioral disturbance. Per collateral from son, patient has been well managed on lithium for decades in terms of bipolar samra, was recently discontinued on it due to concern for lithum toxicity and renal function. In the past, she has responded well to ECT for bipolar depression. Per  at [facility], patient has been on numerous medication trials of very brief duration since July and has had many hospitalizations and changes in providers leading to erratic prescribing. She has not been tried on Zyprexa and was only on Prozac for 4 days (stopped for unknown reasons, no side effects mentioned). Patient tolerating trial of Zyprexa and Prozac well, no complaints of side effects with slightly increased mood and activity.    Attempted to contact outpatient nephrologist Dr. Ella Owens, left a message. Would prefer to restart patient on lithium with close monitoring with nephrology as she has been stable on it for decades. Etiology or chronicity of patient's kidney disease is unknown.

## 2021-11-12 ENCOUNTER — APPOINTMENT (OUTPATIENT)
Dept: PSYCHIATRY | Facility: CLINIC | Age: 76
End: 2021-11-12

## 2021-11-12 LAB — GLUCOSE BLDC GLUCOMTR-MCNC: 136 MG/DL — HIGH (ref 70–99)

## 2021-11-12 PROCEDURE — 99232 SBSQ HOSP IP/OBS MODERATE 35: CPT

## 2021-11-12 RX ORDER — LANOLIN ALCOHOL/MO/W.PET/CERES
5 CREAM (GRAM) TOPICAL
Refills: 0 | Status: DISCONTINUED | OUTPATIENT
Start: 2021-11-12 | End: 2021-12-06

## 2021-11-12 RX ADMIN — Medication 81 MILLIGRAM(S): at 08:26

## 2021-11-12 RX ADMIN — Medication 50 MILLIGRAM(S): at 14:21

## 2021-11-12 RX ADMIN — PANTOPRAZOLE SODIUM 40 MILLIGRAM(S): 20 TABLET, DELAYED RELEASE ORAL at 08:26

## 2021-11-12 RX ADMIN — AMLODIPINE BESYLATE 10 MILLIGRAM(S): 2.5 TABLET ORAL at 08:26

## 2021-11-12 RX ADMIN — Medication 50 MILLIGRAM(S): at 08:26

## 2021-11-12 RX ADMIN — ATORVASTATIN CALCIUM 40 MILLIGRAM(S): 80 TABLET, FILM COATED ORAL at 20:14

## 2021-11-12 RX ADMIN — LISINOPRIL 40 MILLIGRAM(S): 2.5 TABLET ORAL at 08:26

## 2021-11-12 RX ADMIN — HEPARIN SODIUM 5000 UNIT(S): 5000 INJECTION INTRAVENOUS; SUBCUTANEOUS at 08:26

## 2021-11-12 RX ADMIN — HEPARIN SODIUM 5000 UNIT(S): 5000 INJECTION INTRAVENOUS; SUBCUTANEOUS at 20:14

## 2021-11-12 RX ADMIN — Medication 50 MILLIGRAM(S): at 20:13

## 2021-11-12 RX ADMIN — Medication 325 MILLIGRAM(S): at 08:26

## 2021-11-12 RX ADMIN — OLANZAPINE 5 MILLIGRAM(S): 15 TABLET, FILM COATED ORAL at 20:13

## 2021-11-12 RX ADMIN — DONEPEZIL HYDROCHLORIDE 10 MILLIGRAM(S): 10 TABLET, FILM COATED ORAL at 20:13

## 2021-11-12 RX ADMIN — Medication 20 MILLIGRAM(S): at 08:26

## 2021-11-12 RX ADMIN — Medication 0.5 MILLIGRAM(S): at 08:27

## 2021-11-12 RX ADMIN — Medication 5 MILLIGRAM(S): at 20:13

## 2021-11-12 NOTE — PROGRESS NOTE BEHAVIORAL HEALTH - PROBLEM SELECTOR PLAN 1
- c/w Zyprexa 5 mg qhs   - c/w Prozac 20 mg qdaily (started 11/9)  - will d/c Lorazepam 0.5 mg BID  - c/w DVT ppx dose of heparin as per PT recommendation, patient is minimally ambulating

## 2021-11-12 NOTE — PROGRESS NOTE BEHAVIORAL HEALTH - NSBHFUPINTERVALHXFT_PSY_A_CORE
Pt was seen, evaluated, chart reviewed. As per nursing staff, no events overnight. On evaluation, pt reports she is "still not myself." Continues to endorse depressed mood, although was seen dressed nicely, sitting in her bed. She was agreeable to do some word search puzzles. Continues to require encouragement to do things but is able to do them once she starts. States her son visited her yesterday. Is compliant with medication, denies negative side effects. Endorsed poor sleep last night, states she was able to get comfortable. Denied auditory/visual hallucinations. Denied suicidal/homicidal ideation, intent or plan.

## 2021-11-12 NOTE — PROGRESS NOTE BEHAVIORAL HEALTH - SUMMARY
Ms. Rodarte is a 77 y/o female with history of bipolar depression (Bipolar II disorder), hypertension, osteoarthritis, T2DM, mild cognitive impairment without behavioral disturbance, brought to the ED at the behest of the skilled nursing she has been residing for the last 7 years for gradual deterioration in patient's ability to care for herself, in the context of recent psychotropic medication changes due to an episode of lithium toxicity in August of 2021.    Patient's current presentation is consistent with bipolar depression with underlying dementia without behavioral disturbance. Per collateral from son, patient has been well managed on lithium for decades in terms of bipolar samra, was recently discontinued on it due to concern for lithum toxicity and renal function. In the past, she has responded well to ECT for bipolar depression. Per  at [facility] [place], patient has been on numerous medication trials of very brief duration since July and has had many hospitalizations and changes in providers leading to erratic prescribing. She has not been tried on Zyprexa and was only on Prozac for 4 days (stopped for unknown reasons, no side effects mentioned). Patient tolerating trial of Zyprexa and Prozac well, no complaints of side effects with slightly increased mood and activity.    Attempted to contact outpatient nephrologist Dr. Ella Owens, left a message. Would prefer to restart patient on lithium with close monitoring with nephrology as she has been stable on it for decades. Etiology or chronicity of patient's kidney disease is unknown.

## 2021-11-13 LAB — GLUCOSE BLDC GLUCOMTR-MCNC: 146 MG/DL — HIGH (ref 70–99)

## 2021-11-13 RX ADMIN — OLANZAPINE 5 MILLIGRAM(S): 15 TABLET, FILM COATED ORAL at 20:16

## 2021-11-13 RX ADMIN — Medication 20 MILLIGRAM(S): at 08:24

## 2021-11-13 RX ADMIN — HEPARIN SODIUM 5000 UNIT(S): 5000 INJECTION INTRAVENOUS; SUBCUTANEOUS at 20:16

## 2021-11-13 RX ADMIN — ATORVASTATIN CALCIUM 40 MILLIGRAM(S): 80 TABLET, FILM COATED ORAL at 20:16

## 2021-11-13 RX ADMIN — LISINOPRIL 40 MILLIGRAM(S): 2.5 TABLET ORAL at 08:24

## 2021-11-13 RX ADMIN — PANTOPRAZOLE SODIUM 40 MILLIGRAM(S): 20 TABLET, DELAYED RELEASE ORAL at 08:24

## 2021-11-13 RX ADMIN — DONEPEZIL HYDROCHLORIDE 10 MILLIGRAM(S): 10 TABLET, FILM COATED ORAL at 20:16

## 2021-11-13 RX ADMIN — Medication 30 MILLILITER(S): at 10:13

## 2021-11-13 RX ADMIN — Medication 325 MILLIGRAM(S): at 08:25

## 2021-11-13 RX ADMIN — Medication 5 MILLIGRAM(S): at 20:16

## 2021-11-13 RX ADMIN — Medication 50 MILLIGRAM(S): at 08:24

## 2021-11-13 RX ADMIN — HEPARIN SODIUM 5000 UNIT(S): 5000 INJECTION INTRAVENOUS; SUBCUTANEOUS at 10:51

## 2021-11-13 RX ADMIN — AMLODIPINE BESYLATE 10 MILLIGRAM(S): 2.5 TABLET ORAL at 08:25

## 2021-11-13 RX ADMIN — Medication 50 MILLIGRAM(S): at 12:35

## 2021-11-13 RX ADMIN — Medication 50 MILLIGRAM(S): at 20:16

## 2021-11-13 RX ADMIN — Medication 81 MILLIGRAM(S): at 08:25

## 2021-11-14 LAB
ALBUMIN SERPL ELPH-MCNC: 4.7 G/DL — SIGNIFICANT CHANGE UP (ref 3.5–5.2)
ALP SERPL-CCNC: 92 U/L — SIGNIFICANT CHANGE UP (ref 30–115)
ALT FLD-CCNC: 15 U/L — SIGNIFICANT CHANGE UP (ref 0–41)
ANION GAP SERPL CALC-SCNC: 13 MMOL/L — SIGNIFICANT CHANGE UP (ref 7–14)
AST SERPL-CCNC: 14 U/L — SIGNIFICANT CHANGE UP (ref 0–41)
BILIRUB SERPL-MCNC: 0.3 MG/DL — SIGNIFICANT CHANGE UP (ref 0.2–1.2)
BUN SERPL-MCNC: 41 MG/DL — HIGH (ref 10–20)
CALCIUM SERPL-MCNC: 10.1 MG/DL — SIGNIFICANT CHANGE UP (ref 8.5–10.1)
CHLORIDE SERPL-SCNC: 107 MMOL/L — SIGNIFICANT CHANGE UP (ref 98–110)
CO2 SERPL-SCNC: 19 MMOL/L — SIGNIFICANT CHANGE UP (ref 17–32)
CREAT SERPL-MCNC: 2 MG/DL — HIGH (ref 0.7–1.5)
GLUCOSE BLDC GLUCOMTR-MCNC: 140 MG/DL — HIGH (ref 70–99)
GLUCOSE SERPL-MCNC: 199 MG/DL — HIGH (ref 70–99)
POTASSIUM SERPL-MCNC: 5.2 MMOL/L — HIGH (ref 3.5–5)
POTASSIUM SERPL-SCNC: 5.2 MMOL/L — HIGH (ref 3.5–5)
PROT SERPL-MCNC: 7.1 G/DL — SIGNIFICANT CHANGE UP (ref 6–8)
SODIUM SERPL-SCNC: 139 MMOL/L — SIGNIFICANT CHANGE UP (ref 135–146)

## 2021-11-14 RX ADMIN — Medication 20 MILLIGRAM(S): at 08:35

## 2021-11-14 RX ADMIN — Medication 5 MILLIGRAM(S): at 20:11

## 2021-11-14 RX ADMIN — ATORVASTATIN CALCIUM 40 MILLIGRAM(S): 80 TABLET, FILM COATED ORAL at 20:11

## 2021-11-14 RX ADMIN — HEPARIN SODIUM 5000 UNIT(S): 5000 INJECTION INTRAVENOUS; SUBCUTANEOUS at 08:38

## 2021-11-14 RX ADMIN — Medication 50 MILLIGRAM(S): at 08:35

## 2021-11-14 RX ADMIN — OLANZAPINE 5 MILLIGRAM(S): 15 TABLET, FILM COATED ORAL at 20:11

## 2021-11-14 RX ADMIN — Medication 81 MILLIGRAM(S): at 08:37

## 2021-11-14 RX ADMIN — Medication 50 MILLIGRAM(S): at 20:11

## 2021-11-14 RX ADMIN — DONEPEZIL HYDROCHLORIDE 10 MILLIGRAM(S): 10 TABLET, FILM COATED ORAL at 20:11

## 2021-11-14 RX ADMIN — PANTOPRAZOLE SODIUM 40 MILLIGRAM(S): 20 TABLET, DELAYED RELEASE ORAL at 08:35

## 2021-11-14 RX ADMIN — Medication 325 MILLIGRAM(S): at 08:35

## 2021-11-14 RX ADMIN — AMLODIPINE BESYLATE 10 MILLIGRAM(S): 2.5 TABLET ORAL at 08:35

## 2021-11-14 RX ADMIN — LISINOPRIL 40 MILLIGRAM(S): 2.5 TABLET ORAL at 08:35

## 2021-11-14 RX ADMIN — Medication 50 MILLIGRAM(S): at 12:28

## 2021-11-15 DIAGNOSIS — N18.30 CHRONIC KIDNEY DISEASE, STAGE 3 UNSPECIFIED: ICD-10-CM

## 2021-11-15 LAB
GLUCOSE BLDC GLUCOMTR-MCNC: 144 MG/DL — HIGH (ref 70–99)
SARS-COV-2 RNA SPEC QL NAA+PROBE: SIGNIFICANT CHANGE UP

## 2021-11-15 PROCEDURE — 99232 SBSQ HOSP IP/OBS MODERATE 35: CPT

## 2021-11-15 RX ORDER — SODIUM BICARBONATE 1 MEQ/ML
1300 SYRINGE (ML) INTRAVENOUS
Refills: 0 | Status: DISCONTINUED | OUTPATIENT
Start: 2021-11-15 | End: 2022-01-27

## 2021-11-15 RX ORDER — FLUOXETINE HCL 10 MG
20 CAPSULE ORAL DAILY
Refills: 0 | Status: DISCONTINUED | OUTPATIENT
Start: 2021-11-16 | End: 2021-11-16

## 2021-11-15 RX ORDER — LITHIUM CARBONATE 300 MG/1
150 TABLET, EXTENDED RELEASE ORAL DAILY
Refills: 0 | Status: DISCONTINUED | OUTPATIENT
Start: 2021-11-15 | End: 2021-11-15

## 2021-11-15 RX ORDER — LITHIUM CARBONATE 300 MG/1
150 TABLET, EXTENDED RELEASE ORAL DAILY
Refills: 0 | Status: DISCONTINUED | OUTPATIENT
Start: 2021-11-15 | End: 2021-11-19

## 2021-11-15 RX ADMIN — LITHIUM CARBONATE 150 MILLIGRAM(S): 300 TABLET, EXTENDED RELEASE ORAL at 13:54

## 2021-11-15 RX ADMIN — Medication 81 MILLIGRAM(S): at 08:05

## 2021-11-15 RX ADMIN — Medication 50 MILLIGRAM(S): at 13:01

## 2021-11-15 RX ADMIN — LISINOPRIL 40 MILLIGRAM(S): 2.5 TABLET ORAL at 08:06

## 2021-11-15 RX ADMIN — AMLODIPINE BESYLATE 10 MILLIGRAM(S): 2.5 TABLET ORAL at 08:05

## 2021-11-15 RX ADMIN — Medication 20 MILLIGRAM(S): at 08:06

## 2021-11-15 RX ADMIN — Medication 325 MILLIGRAM(S): at 08:06

## 2021-11-15 RX ADMIN — PANTOPRAZOLE SODIUM 40 MILLIGRAM(S): 20 TABLET, DELAYED RELEASE ORAL at 07:38

## 2021-11-15 RX ADMIN — Medication 50 MILLIGRAM(S): at 08:06

## 2021-11-15 NOTE — PROGRESS NOTE BEHAVIORAL HEALTH - SUMMARY
Ms. Rodarte is a 77 y/o female with history of bipolar depression (Bipolar II disorder), hypertension, osteoarthritis, T2DM, mild cognitive impairment without behavioral disturbance, brought to the ED at the behest of the long term she has been residing for the last 7 years for gradual deterioration in patient's ability to care for herself, in the context of recent psychotropic medication changes due to an episode of lithium toxicity in August of 2021.    Patient's current presentation is consistent with bipolar depression with underlying dementia without behavioral disturbance. Per collateral from son, patient has been well managed on lithium for decades in terms of bipolar samra, was recently discontinued on it due to concern for lithum toxicity and renal function. In the past, she has responded well to ECT for bipolar depression. Per outpatient psychiatrist Dr. Melo, patient was being slowly titrated up on Seroquel prior to admission.    Amber is more active on the unit today and is no longer perseverating on her inability to function. She is now perseverative about her appearance, which per Dr. Melo is her baseline.     Per  note from 9/20/21:  "Very negative thoughts and pessimistic, even when she was on lithium; this appears to be a chronic aspect of her personality.  the Son states that even during her more elevated mood swings when she was very social, spending a lot of money shopping, the patient was still very negative and this is why her relationship with the sons was very strained."    Patient continues to endorse poor sleep and appetite. No SI/HI/hallucinations/samra noted.    Attempted to contact outpatient nephrologist Dr. Ella Owens, left a second message. Would prefer to restart patient on low dose lithium with close monitoring with nephrology as she has been stable on it for decades. Ms. Rodarte is a 75 y/o female with history of bipolar depression (Bipolar II disorder), hypertension, osteoarthritis, T2DM, mild cognitive impairment without behavioral disturbance, brought to the ED at the behest of the [facility] she has been residing for the last 7 years for gradual deterioration in patient's ability to care for herself, in the context of recent psychotropic medication changes due to an episode of lithium toxicity in August of 2021.    Patient's current presentation is consistent with bipolar depression with underlying dementia without behavioral disturbance. Per collateral from son, patient has been well managed on lithium for decades in terms of bipolar samra, was recently discontinued on it due to concern for lithum toxicity and renal function. In the past, she has responded well to ECT for bipolar depression. Per outpatient psychiatrist Dr. Melo, patient was being slowly titrated up on Seroquel prior to admission.    Amber is more active on the unit today and is no longer perseverating on her inability to function. She is now perseverative about her appearance, which per Dr. Melo is her baseline.     Per  note from 9/20/21:  "Very negative thoughts and pessimistic, even when she was on lithium; this appears to be a chronic aspect of her personality.  the Son states that even during her more elevated mood swings when she was very social, spending a lot of money shopping, the patient was still very negative and this is why her relationship with the sons was very strained."    Patient continues to endorse poor sleep and appetite. No SI/HI/hallucinations/samra noted.    Spoke with patient's nephrologist Dr. Owens who endorses that she only very recently has known patient, but that she can be restarted on lithium with close renal monitoring if she's not responding well to other medications. Patient started on sodium bicarb 1300 BID and low K+ diet as per Dr. Owens's recommendations.

## 2021-11-15 NOTE — PROGRESS NOTE BEHAVIORAL HEALTH - PROBLEM SELECTOR PLAN 1
- c/w Zyprexa 5 mg qhs   - c/w Prozac 20 mg qdaily (started 11/9)  - c/w DVT ppx dose of heparin as per PT recommendation, patient is minimally ambulating - d/c Zyprexa 5 mg qhs   - d/c Prozac 20 mg qdaily   - start lithium 150 mg qday with close monitoring of renal function - d/c Zyprexa 5 mg qhs   - continue with Prozac 20 mg qdaily   - start lithium 150 mg qday with close monitoring of renal function

## 2021-11-15 NOTE — PROGRESS NOTE BEHAVIORAL HEALTH - NSBHFUPINTERVALHXFT_PSY_A_CORE
Per nursing, Amber has been doing well over the weekend.    This morning, Amber is not in her room. After some searching, she emerges from a bathroom on the unit and is interviewed in her room. She endorses that this is the first time she has looked in the mirror since admission and that she looks "horrible" and that her "whole face is different". She is unable to describe in what way her face is different, but that she doesn't recognize it. She reports that the weekend was just okay, and that it was good to see her son. She is unwilling to put on the clothes her son brought her because she doesn't believe it will help anything. She reports that she feels worse than when she came in, has no more energy, is not sleeping at all, and is eating only a little bit. She is not hopeful that her medications will ever help her. She is amenable to attempting to take a shower today. No SI/HI/hallucinations reported.    Outpatient psychiatrist Dr. Melo contacted over the weekend--she reports that patient has CKD stage 3 which is why they were trying to move away from lithium, but is okay with restarting lithium at very low dose if pt's nephrologist is okay with it. If nonresponsive to medication ECT would also be okay but this needs to be considered in context of pt's existing neurocognitive impairment.     Called pt's nephrologist Dr. Owens at [phone], left a second message with .    Patient's son Catarino visited over the weekend, did not observe much change in his mother. He would like her back on lithium. Per nursing, Amber has been doing well over the weekend.    This morning, Amber is not in her room. After some searching, she emerges from a bathroom on the unit and is interviewed in her room. She endorses that this is the first time she has looked in the mirror since admission and that she looks "horrible" and that her "whole face is different". She is unable to describe in what way her face is different, but that she doesn't recognize it. She reports that the weekend was just okay, and that it was good to see her son. She is unwilling to put on the clothes her son brought her because she doesn't believe it will help anything. She reports that she feels worse than when she came in, has no more energy, is not sleeping at all, and is eating only a little bit. She is not hopeful that her medications will ever help her. She is amenable to attempting to take a shower today. No SI/HI/hallucinations reported.    Outpatient psychiatrist Dr. Melo contacted over the weekend--she reports that patient has CKD stage 3 which is why they were trying to move away from lithium, but is okay with restarting lithium at very low dose if pt's nephrologist is okay with it. If nonresponsive to medication ECT would also be okay but this needs to be considered in context of pt's existing neurocognitive impairment.     Patient's son Catarino visited over the weekend, did not observe much change in his mother. He would like her back on lithium.    Patient's nephrologist Dr. Owens endorses that she only very recently has known patient, but that she can be restarted on lithium with close renal monitoring if she's not responding well to other medications. Patient should be on sodium bicarb 1300 BID and low K+ diet as well. The underlying source of patient's kidney disease is unknown--fluctuating creatinine may be due to variable PO hydration.

## 2021-11-15 NOTE — CHART NOTE - NSCHARTNOTEFT_GEN_A_CORE
Social Work Note:    Treatment team met with patient on unit rounds.  At time of assessment patient referred to herself and asked treatment team “don’t look at me, I look horrible, this isn’t me.”  Patient was unable to express what was different specifically but referred to self as looking badly.  Sleep endorsed as poor.  Patient was not able to identify when she last showered.  She was encouraged to have better ADLs, shower today and change out of her hospital gown and into clothing her son brought to her over the weekend when he visited.      Patient has been observed to be more visible on the unit during the day.  She informed treatment team that her visit with her son over the weekend went well.  When patient’s hospital stay was discussed with her, patient expressed not hopeful that she would feel better.      Mental Status Exam:    Mood – Neutral   Sleep – Poor   Appetite – Poor   ADLs – Poor   Thought Process – Illogical     Observation – [id number]    No barriers to discharge identified at this time.     At this time patient is not psychiatrically stable for discharge.

## 2021-11-15 NOTE — PROGRESS NOTE BEHAVIORAL HEALTH - NSBHCHARTREVIEWLAB_PSY_A_CORE FT
Comprehensive Metabolic Panel in AM (11.14.21 @ 08:35)   Sodium, Serum: 139 mmol/L   Potassium, Serum: 5.2 mmol/L   Chloride, Serum: 107 mmol/L   Carbon Dioxide, Serum: 19 mmol/L   Anion Gap, Serum: 13 mmol/L   Blood Urea Nitrogen, Serum: 41 mg/dL   Creatinine, Serum: 2.0 mg/dL   Glucose, Serum: 199 mg/dL   Calcium, Total Serum: 10.1 mg/dL   Protein Total, Serum: 7.1 g/dL   Albumin, Serum: 4.7 g/dL   Bilirubin Total, Serum: 0.3 mg/dL   Alkaline Phosphatase, Serum: 92 U/L   Aspartate Aminotransferase (AST/SGOT): 14 U/L   Alanine Aminotransferase (ALT/SGPT): 15 U/L   eGFR if Non : 23

## 2021-11-15 NOTE — PROGRESS NOTE BEHAVIORAL HEALTH - OTHER
not assessed content of patient's preoccupations have shifted from her perceived lack of functionality to her appearance

## 2021-11-16 LAB — GLUCOSE BLDC GLUCOMTR-MCNC: 138 MG/DL — HIGH (ref 70–99)

## 2021-11-16 PROCEDURE — 99232 SBSQ HOSP IP/OBS MODERATE 35: CPT

## 2021-11-16 RX ORDER — FLUOXETINE HCL 10 MG
30 CAPSULE ORAL DAILY
Refills: 0 | Status: DISCONTINUED | OUTPATIENT
Start: 2021-11-16 | End: 2021-11-19

## 2021-11-16 RX ADMIN — LISINOPRIL 40 MILLIGRAM(S): 2.5 TABLET ORAL at 08:32

## 2021-11-16 RX ADMIN — DONEPEZIL HYDROCHLORIDE 10 MILLIGRAM(S): 10 TABLET, FILM COATED ORAL at 20:24

## 2021-11-16 RX ADMIN — Medication 1300 MILLIGRAM(S): at 20:24

## 2021-11-16 RX ADMIN — LITHIUM CARBONATE 150 MILLIGRAM(S): 300 TABLET, EXTENDED RELEASE ORAL at 08:32

## 2021-11-16 RX ADMIN — PANTOPRAZOLE SODIUM 40 MILLIGRAM(S): 20 TABLET, DELAYED RELEASE ORAL at 08:02

## 2021-11-16 RX ADMIN — Medication 5 MILLIGRAM(S): at 20:24

## 2021-11-16 RX ADMIN — AMLODIPINE BESYLATE 10 MILLIGRAM(S): 2.5 TABLET ORAL at 08:32

## 2021-11-16 RX ADMIN — Medication 20 MILLIGRAM(S): at 08:32

## 2021-11-16 RX ADMIN — Medication 325 MILLIGRAM(S): at 08:32

## 2021-11-16 RX ADMIN — Medication 50 MILLIGRAM(S): at 20:24

## 2021-11-16 RX ADMIN — Medication 50 MILLIGRAM(S): at 13:41

## 2021-11-16 RX ADMIN — Medication 1300 MILLIGRAM(S): at 08:32

## 2021-11-16 RX ADMIN — ATORVASTATIN CALCIUM 40 MILLIGRAM(S): 80 TABLET, FILM COATED ORAL at 20:24

## 2021-11-16 RX ADMIN — Medication 50 MILLIGRAM(S): at 08:32

## 2021-11-16 RX ADMIN — Medication 81 MILLIGRAM(S): at 08:32

## 2021-11-16 NOTE — PROGRESS NOTE BEHAVIORAL HEALTH - SUMMARY
Ms. Rodarte is a 75 y/o female with history of bipolar depression (Bipolar II disorder), hypertension, osteoarthritis, T2DM, mild cognitive impairment without behavioral disturbance, brought to the ED at the behest of the halfway she has been residing for the last 7 years for gradual deterioration in patient's ability to care for herself, in the context of recent psychotropic medication changes due to an episode of lithium toxicity in August of 2021.    Patient's current presentation is consistent with bipolar depression with underlying dementia without behavioral disturbance. Patient was stable on lithium for decades (though per son, she was never "normal" and always fluctuated between highs and lows)--was restarted on low dose lithium 150 mg qday after discussion with outpatient nephrologist Dr. Owens.    Amber is more active on the unit today and is wearing her home clothes, which she refused to do yesterday. She continues to be perseverative about dissatisfaction with her appearance, which per her son is normal at her baseline. Her MOCA score is 12/30 which likely reflects a progression of her underlying neurocognitive disorder with a primary mood disorder superimposition. Patient continues to endorse poor sleep and appetite. No SI/HI/hallucinations/samra noted. Patient counseled about importance of remaining hydrated for kidney function.    Amber's lack of confidence and prominent sense of loss may reflect an appropriate emotional reaction to progression of her neurocognitive disorder. This may be her new baseline as it is a common feature in early dementia and does not necessarily reflect a depressive episode or suicidal ideation. Will continue low dose lithium which has coincided with some improvement in executive function and perseveration. Will increase Prozac to 30 mg and assess for improvement in dysphoria.

## 2021-11-16 NOTE — PROGRESS NOTE BEHAVIORAL HEALTH - NSBHFUPINTERVALHXFT_PSY_A_CORE
Per nursing, patient has been anxious, worrying, and refusing her nighttime medications, however taking all of her psych medications.    On approach, Amber is in the tv room watching tv wearing her own clothing that her son brought her. Interviewed in room. She reports that she's been trying her best to be functional but knows she doesn't look right. "I feel like I can't cope with everything", but is unable to elaborate on what she means. She reports that her sleep and appetite continue to be poor. She reports that she has friends at her residence and that if she was feeling better emotionally she'd be looking forward to spending time with them but that she's feeling like "a lot is going through my head".     No SI/HI/hallucinations noted.    Per son Catarino, patient doesn't really have a baseline, rather vacillates between highs and lows. When she is more high, she can "become nasty, goes out, and spends money". When she's on a low, she is fixated on her appearance, is overly critical, focuses on room not being clean enough. Son updated on current treatment plan.

## 2021-11-16 NOTE — PROGRESS NOTE BEHAVIORAL HEALTH - PROBLEM SELECTOR PLAN 1
- increase Prozac to 30 mg qdaily (increased on 11/16)  - c/w lithium 150 mg qday with close monitoring of renal function

## 2021-11-17 LAB — GLUCOSE BLDC GLUCOMTR-MCNC: 130 MG/DL — HIGH (ref 70–99)

## 2021-11-17 PROCEDURE — 93010 ELECTROCARDIOGRAM REPORT: CPT

## 2021-11-17 PROCEDURE — 99231 SBSQ HOSP IP/OBS SF/LOW 25: CPT

## 2021-11-17 RX ORDER — MIRTAZAPINE 45 MG/1
15 TABLET, ORALLY DISINTEGRATING ORAL AT BEDTIME
Refills: 0 | Status: DISCONTINUED | OUTPATIENT
Start: 2021-11-17 | End: 2021-11-18

## 2021-11-17 RX ORDER — ACETAMINOPHEN 500 MG
650 TABLET ORAL EVERY 6 HOURS
Refills: 0 | Status: DISCONTINUED | OUTPATIENT
Start: 2021-11-17 | End: 2022-01-27

## 2021-11-17 RX ADMIN — LISINOPRIL 40 MILLIGRAM(S): 2.5 TABLET ORAL at 08:31

## 2021-11-17 RX ADMIN — Medication 1300 MILLIGRAM(S): at 08:31

## 2021-11-17 RX ADMIN — Medication 30 MILLILITER(S): at 02:17

## 2021-11-17 RX ADMIN — AMLODIPINE BESYLATE 10 MILLIGRAM(S): 2.5 TABLET ORAL at 08:30

## 2021-11-17 RX ADMIN — Medication 25 MILLIGRAM(S): at 02:17

## 2021-11-17 RX ADMIN — LITHIUM CARBONATE 150 MILLIGRAM(S): 300 TABLET, EXTENDED RELEASE ORAL at 08:28

## 2021-11-17 RX ADMIN — Medication 30 MILLIGRAM(S): at 08:28

## 2021-11-17 RX ADMIN — Medication 50 MILLIGRAM(S): at 12:23

## 2021-11-17 RX ADMIN — PANTOPRAZOLE SODIUM 40 MILLIGRAM(S): 20 TABLET, DELAYED RELEASE ORAL at 08:31

## 2021-11-17 RX ADMIN — Medication 50 MILLIGRAM(S): at 08:31

## 2021-11-17 RX ADMIN — HEPARIN SODIUM 5000 UNIT(S): 5000 INJECTION INTRAVENOUS; SUBCUTANEOUS at 08:35

## 2021-11-17 RX ADMIN — Medication 81 MILLIGRAM(S): at 08:28

## 2021-11-17 RX ADMIN — Medication 650 MILLIGRAM(S): at 02:20

## 2021-11-17 RX ADMIN — Medication 325 MILLIGRAM(S): at 08:30

## 2021-11-17 NOTE — CHART NOTE - NSCHARTNOTEFT_GEN_A_CORE
Social Work Note:    Treatment team met with patient on unit rounds.  At time of assessment patient endorsed “I’m in bad shape” and “can’t get myself together.”  On interview patient was pleasant.  She requires encouragement to perform ADLs, shower and leave her room.  This is a change from yesterday when patient was observed to be showered, dressed in regular clothing and sitting in the day room.  Treatment team encouraged patient to be visible on the unit.  She was offered assistance and encouragement.     Plan is for patient to return to [facility] when stable for discharge.  She is agreeable to same.      Mental Status Exam:    Mood – Neutral   Sleep – Poor   Appetite – Poor   ADLs – Poor   Thought Process – Illogical     Observation – [id number]    No barriers to discharge identified at this time.     At this time patient is not psychiatrically stable for discharge.

## 2021-11-17 NOTE — PROGRESS NOTE BEHAVIORAL HEALTH - PROBLEM SELECTOR PLAN 1
- increase Prozac to 30 mg qdaily (increased on 11/17)  - c/w lithium 150 mg qday with close monitoring of renal function  - augment with remeron 15 mg for insomnia

## 2021-11-17 NOTE — PROGRESS NOTE BEHAVIORAL HEALTH - NSBHFUPINTERVALHXFT_PSY_A_CORE
Pt was seen, evaluated, chart reviewed. As per nursing staff, received Vistaril 25 mg at 2:00AM. On evaluation, pt reports she "can't get it together." Continues to report that she "doesn't feel right." Requires a lot of encouragement to take a shower and get out of bed today, although when prompted is able to do those things. Continues to have a feeling of impending doom. Is compliant with medication, denies negative side effects. Endorsed difficulty sleeping. Denied auditory/visual hallucinations. Denied paranoia. Denied suicidal/homicidal ideation, intent or plan. Continues to feel that her mind is "blank."

## 2021-11-17 NOTE — CHART NOTE - NSCHARTNOTEFT_GEN_A_CORE
Vital Signs Last 24 Hrs  T(C): 36.5 (16 Nov 2021 16:03), Max: 36.5 (16 Nov 2021 08:06)  T(F): 97.7 (16 Nov 2021 16:03), Max: 97.7 (16 Nov 2021 08:06)  HR: 67 (16 Nov 2021 16:03) (55 - 67)  BP: 124/62 (16 Nov 2021 16:03) (124/62 - 164/110)  BP(mean): --  RR: 18 (16 Nov 2021 16:03) (18 - 20)  SpO2: --  Pt c/o chest pain. (pt points to epigastric and lower sternum.), and bilateral hand "tingling".   Pt in NAD, no SOB, no dizziness.  EKG done, NO acute changes.  Will offer pt vistaril and maalox.

## 2021-11-18 LAB
ALBUMIN SERPL ELPH-MCNC: 5 G/DL — SIGNIFICANT CHANGE UP (ref 3.5–5.2)
ALP SERPL-CCNC: 93 U/L — SIGNIFICANT CHANGE UP (ref 30–115)
ALT FLD-CCNC: 14 U/L — SIGNIFICANT CHANGE UP (ref 0–41)
ANION GAP SERPL CALC-SCNC: 13 MMOL/L — SIGNIFICANT CHANGE UP (ref 7–14)
AST SERPL-CCNC: 16 U/L — SIGNIFICANT CHANGE UP (ref 0–41)
BILIRUB SERPL-MCNC: <0.2 MG/DL — SIGNIFICANT CHANGE UP (ref 0.2–1.2)
BUN SERPL-MCNC: 52 MG/DL — HIGH (ref 10–20)
CALCIUM SERPL-MCNC: 10.5 MG/DL — HIGH (ref 8.5–10.1)
CHLORIDE SERPL-SCNC: 103 MMOL/L — SIGNIFICANT CHANGE UP (ref 98–110)
CO2 SERPL-SCNC: 20 MMOL/L — SIGNIFICANT CHANGE UP (ref 17–32)
CREAT SERPL-MCNC: 2.6 MG/DL — HIGH (ref 0.7–1.5)
GLUCOSE BLDC GLUCOMTR-MCNC: 120 MG/DL — HIGH (ref 70–99)
GLUCOSE SERPL-MCNC: 188 MG/DL — HIGH (ref 70–99)
HCT VFR BLD CALC: 37.6 % — SIGNIFICANT CHANGE UP (ref 37–47)
HGB BLD-MCNC: 11.9 G/DL — LOW (ref 12–16)
MCHC RBC-ENTMCNC: 26.6 PG — LOW (ref 27–31)
MCHC RBC-ENTMCNC: 31.6 G/DL — LOW (ref 32–37)
MCV RBC AUTO: 84.1 FL — SIGNIFICANT CHANGE UP (ref 81–99)
NRBC # BLD: 0 /100 WBCS — SIGNIFICANT CHANGE UP (ref 0–0)
PLATELET # BLD AUTO: 263 K/UL — SIGNIFICANT CHANGE UP (ref 130–400)
POTASSIUM SERPL-MCNC: 5.4 MMOL/L — HIGH (ref 3.5–5)
POTASSIUM SERPL-SCNC: 5.4 MMOL/L — HIGH (ref 3.5–5)
PROT SERPL-MCNC: 7.7 G/DL — SIGNIFICANT CHANGE UP (ref 6–8)
RBC # BLD: 4.47 M/UL — SIGNIFICANT CHANGE UP (ref 4.2–5.4)
RBC # FLD: 13.3 % — SIGNIFICANT CHANGE UP (ref 11.5–14.5)
SARS-COV-2 RNA SPEC QL NAA+PROBE: SIGNIFICANT CHANGE UP
SODIUM SERPL-SCNC: 136 MMOL/L — SIGNIFICANT CHANGE UP (ref 135–146)
WBC # BLD: 7.74 K/UL — SIGNIFICANT CHANGE UP (ref 4.8–10.8)
WBC # FLD AUTO: 7.74 K/UL — SIGNIFICANT CHANGE UP (ref 4.8–10.8)

## 2021-11-18 PROCEDURE — 99231 SBSQ HOSP IP/OBS SF/LOW 25: CPT

## 2021-11-18 RX ORDER — QUETIAPINE FUMARATE 200 MG/1
50 TABLET, FILM COATED ORAL AT BEDTIME
Refills: 0 | Status: DISCONTINUED | OUTPATIENT
Start: 2021-11-18 | End: 2021-11-19

## 2021-11-18 RX ADMIN — Medication 81 MILLIGRAM(S): at 10:32

## 2021-11-18 RX ADMIN — Medication 5 MILLIGRAM(S): at 20:06

## 2021-11-18 RX ADMIN — Medication 1300 MILLIGRAM(S): at 10:32

## 2021-11-18 RX ADMIN — Medication 325 MILLIGRAM(S): at 10:32

## 2021-11-18 RX ADMIN — QUETIAPINE FUMARATE 50 MILLIGRAM(S): 200 TABLET, FILM COATED ORAL at 20:06

## 2021-11-18 RX ADMIN — PANTOPRAZOLE SODIUM 40 MILLIGRAM(S): 20 TABLET, DELAYED RELEASE ORAL at 10:34

## 2021-11-18 RX ADMIN — Medication 1300 MILLIGRAM(S): at 20:06

## 2021-11-18 RX ADMIN — AMLODIPINE BESYLATE 10 MILLIGRAM(S): 2.5 TABLET ORAL at 10:32

## 2021-11-18 RX ADMIN — Medication 30 MILLIGRAM(S): at 10:33

## 2021-11-18 RX ADMIN — DONEPEZIL HYDROCHLORIDE 10 MILLIGRAM(S): 10 TABLET, FILM COATED ORAL at 20:05

## 2021-11-18 RX ADMIN — ATORVASTATIN CALCIUM 40 MILLIGRAM(S): 80 TABLET, FILM COATED ORAL at 20:05

## 2021-11-18 RX ADMIN — LISINOPRIL 40 MILLIGRAM(S): 2.5 TABLET ORAL at 10:32

## 2021-11-18 RX ADMIN — Medication 50 MILLIGRAM(S): at 14:40

## 2021-11-18 RX ADMIN — LITHIUM CARBONATE 150 MILLIGRAM(S): 300 TABLET, EXTENDED RELEASE ORAL at 10:35

## 2021-11-18 RX ADMIN — Medication 50 MILLIGRAM(S): at 10:32

## 2021-11-18 RX ADMIN — Medication 50 MILLIGRAM(S): at 20:06

## 2021-11-18 NOTE — PROGRESS NOTE BEHAVIORAL HEALTH - NSBHFUPINTERVALHXFT_PSY_A_CORE
Per nursing, Amber has been depressed, anxious, and refusing to take her medications at night.    On approach, Amber is dressed in her home clothing. She complains of a piece of lint on her sweatshirt before putting it on and gestures to her unmade bed as evidence of things being "wrong". She agrees that throughout her life she was a perfectionist and liked having everything neat and orderly, but denies that it ever caused issues in her relationships. She endorses feeling "worse" than when she came in but when asked in what way, she says "that's a good reason to ask me that question" and is unable to explain further. She is oriented to year and month but is unable to provide a time period where she can remember feeling well. Amber reports that when she sits in the TV room to watch TV she is unable to focus on the programming. She reports that she feels better when her son visits, and that she has been trying to stay hydrated.

## 2021-11-18 NOTE — PROGRESS NOTE BEHAVIORAL HEALTH - SUMMARY
Ms. Rodarte is a 75 y/o female with history of bipolar depression (Bipolar II disorder), hypertension, osteoarthritis, T2DM, mild cognitive impairment without behavioral disturbance, brought to the ED at the behest of the nursing home she has been residing for the last 7 years for gradual deterioration in patient's ability to care for herself, in the context of recent psychotropic medication changes due to an episode of lithium toxicity in August of 2021.    Patient's current presentation is consistent with bipolar depression with underlying dementia without behavioral disturbance. Patient was stable on lithium for decades (though per son, she was never "normal" and always fluctuated between highs and lows)--was restarted on low dose lithium 150 mg qday after discussion with outpatient nephrologist Dr. Owens.    Amber has made progress in terms of her functionality and sociability, however she still remains markedly dysphoric and perseverative. Her MOCA score is 12/30 which likely reflects a progression of her underlying neurocognitive disorder with a primary mood disorder superimposition. She continues to endorse poor sleep and appetite. No SI/HI/hallucinations/samra noted. Patient counseled about importance of remaining hydrated for kidney function and provided multiple glasses of water.     Amber's dysphoria and prominent sense of loss may reflect an appropriate emotional reaction to progression of her neurocognitive disorder, especially in context of her personality of high criticality and perfectionism. This may be her new baseline as it is a common feature in early dementia and does not necessarily reflect a depressive episode or suicidal ideation. Will continue low dose lithium which has coincided with some improvement in executive function. Prozac increased to 30 mg.

## 2021-11-19 DIAGNOSIS — R41.9 UNSPECIFIED SYMPTOMS AND SIGNS INVOLVING COGNITIVE FUNCTIONS AND AWARENESS: ICD-10-CM

## 2021-11-19 LAB — GLUCOSE BLDC GLUCOMTR-MCNC: 127 MG/DL — HIGH (ref 70–99)

## 2021-11-19 PROCEDURE — 99231 SBSQ HOSP IP/OBS SF/LOW 25: CPT

## 2021-11-19 RX ORDER — QUETIAPINE FUMARATE 200 MG/1
100 TABLET, FILM COATED ORAL AT BEDTIME
Refills: 0 | Status: DISCONTINUED | OUTPATIENT
Start: 2021-11-19 | End: 2021-11-22

## 2021-11-19 RX ADMIN — LITHIUM CARBONATE 150 MILLIGRAM(S): 300 TABLET, EXTENDED RELEASE ORAL at 08:39

## 2021-11-19 RX ADMIN — Medication 30 MILLIGRAM(S): at 08:40

## 2021-11-19 RX ADMIN — LISINOPRIL 40 MILLIGRAM(S): 2.5 TABLET ORAL at 08:39

## 2021-11-19 RX ADMIN — Medication 5 MILLIGRAM(S): at 20:16

## 2021-11-19 RX ADMIN — Medication 1300 MILLIGRAM(S): at 08:40

## 2021-11-19 RX ADMIN — Medication 325 MILLIGRAM(S): at 08:39

## 2021-11-19 RX ADMIN — QUETIAPINE FUMARATE 100 MILLIGRAM(S): 200 TABLET, FILM COATED ORAL at 20:25

## 2021-11-19 RX ADMIN — Medication 50 MILLIGRAM(S): at 08:39

## 2021-11-19 RX ADMIN — AMLODIPINE BESYLATE 10 MILLIGRAM(S): 2.5 TABLET ORAL at 08:39

## 2021-11-19 RX ADMIN — Medication 81 MILLIGRAM(S): at 08:39

## 2021-11-19 RX ADMIN — ATORVASTATIN CALCIUM 40 MILLIGRAM(S): 80 TABLET, FILM COATED ORAL at 20:16

## 2021-11-19 RX ADMIN — DONEPEZIL HYDROCHLORIDE 10 MILLIGRAM(S): 10 TABLET, FILM COATED ORAL at 20:17

## 2021-11-19 RX ADMIN — PANTOPRAZOLE SODIUM 40 MILLIGRAM(S): 20 TABLET, DELAYED RELEASE ORAL at 08:39

## 2021-11-19 RX ADMIN — Medication 50 MILLIGRAM(S): at 20:17

## 2021-11-19 RX ADMIN — Medication 50 MILLIGRAM(S): at 13:19

## 2021-11-19 RX ADMIN — Medication 1300 MILLIGRAM(S): at 20:17

## 2021-11-19 NOTE — PROGRESS NOTE BEHAVIORAL HEALTH - PROBLEM SELECTOR PLAN 4
- c/w lisinopril 40 mg qday  - c/w hydralazine 75 mg TID  - monitor BP - c/w lisinopril 40 mg qday  - c/w hydralazine 75 mg TID  - c/w amlodipine 10 mg qday  - monitor BP

## 2021-11-19 NOTE — PROGRESS NOTE BEHAVIORAL HEALTH - NSBHFUPINTERVALHXFT_PSY_A_CORE
Per nursing, Amber has been taking her medications, needs lots of encouragement but otherwise functional, and her son is planning to visit her over the weekend.    On approach, Amber is dressed, sitting at the edge of her bed and eating breakfast. She reports that she was able to sleep a bit and is eating because she doesn't want a tube in here, and points to her abdomen. She reports that she talked to her son yesterday and that she is looking forward to seeing him over the weekend. She remains perseverative about inability to do things and endorses worry about her appearance.    Son Catarino contacted to discuss what is likely Amber's new baseline--left a message with callback number. Per nursing, Amber has been taking her medications, needs lots of encouragement but otherwise functional, and her son is planning to visit her over the weekend.    On approach, Amber is dressed, sitting at the edge of her bed and eating breakfast. She reports that she was able to sleep a bit and is eating more. She reports that she talked to her son yesterday and that she is looking forward to seeing him over the weekend. She remains perseverative about inability to do things and endorses worry about her appearance. Requires encouragement about daily events however is able to do everything. Pt is compliant with medications, denied side effects. Requires encouragement to drink more fluids. Denied auditory/visual hallucinations. Denied paranoia. Denied suicidal/homicidal ideation, intent or plan.    Son Catarino contacted to discuss what is likely Amber's new baseline--left a message with callback number.

## 2021-11-19 NOTE — PROGRESS NOTE BEHAVIORAL HEALTH - SUMMARY
Ms. Rodarte is a 77 y/o female with history of bipolar depression (Bipolar II disorder), hypertension, osteoarthritis, T2DM, mild cognitive impairment without behavioral disturbance, brought to the ED at the behest of the [facility] she has been residing for the last 7 years for gradual deterioration in patient's ability to care for herself, in the context of recent psychotropic medication changes due to an episode of lithium toxicity in August of 2021.    Patient's current presentation is consistent with bipolar depression with underlying dementia without behavioral disturbance. Patient was stable on lithium for decades (though per son, she was never "normal" and always fluctuated between highs and lows)--was restarted on low dose lithium 150 mg qday after discussion with outpatient nephrologist Dr. Owens. However, even on a low dose patient's creatinine increased to 2.6 (baseline fluctuates around 2, BUN:Cr 20) and patient is hyperkalemic and hypercalcemic. As patient's mood has not been significantly improved on the lithium and she cannot be relied on to keep herself consistently hydrated, it has been discontinued (on 11/19).    Amber has made progress in terms of her functionality and sociability, however she still remains markedly dysphoric and perseverative. Her MOCA score is 12/30 which likely reflects a progression of her underlying neurocognitive disorder with a primary mood disorder superimposition. Amber's dysphoria and prominent sense of loss may reflect an appropriate emotional reaction to progression of her neurocognitive disorder, especially in context of her personality of high criticality and perfectionism. This may be her new baseline as it is a common feature in early dementia and does not necessarily reflect a depressive episode or suicidal ideation. She continues to endorse poor sleep and appetite, though both are improved from admission on observation. No SI/HI/hallucinations/samra noted. Prozac was increased to 30 mg, will increase Seroquel to 100 mg tonight.

## 2021-11-19 NOTE — PROGRESS NOTE BEHAVIORAL HEALTH - PROBLEM SELECTOR PLAN 1
- c/w Prozac 30 mg qday (increased on 11/17)  - d/c lithium   - increase Seroquel to 100 mg qhs - D/c Prozac 30 mg qday  - d/c lithium   - increase Seroquel to 100 mg qhs for bipolar depression

## 2021-11-19 NOTE — PROGRESS NOTE BEHAVIORAL HEALTH - NSBHCHARTREVIEWLAB_PSY_A_CORE FT
11.9   7.74  )-----------( 263      ( 18 Nov 2021 11:14 )             37.6   11-18    136  |  103  |  52<H>  ----------------------------<  188<H>  5.4<H>   |  20  |  2.6<H>    Ca    10.5<H>      18 Nov 2021 11:14    TPro  7.7  /  Alb  5.0  /  TBili  <0.2  /  DBili  x   /  AST  16  /  ALT  14  /  AlkPhos  93  11-18

## 2021-11-19 NOTE — CHART NOTE - NSCHARTNOTEFT_GEN_A_CORE
Social Work Note:    Treatment team met with patient on unit rounds.  Patient was in her room at time of assessment.  She was dressed in regular clothing and eating her breakfast.  Patient was praised for not being in a hospital gown and having something to eat and drink.  She responded with “I’m trying.”      Treatment team encouraged patient to be visible on the unit.  She was offered assistance if needed.  At times patient requires encouragement to perform ADLs and other tasks.      Plan is for patient to return to [facility] when stable for discharge.  She is agreeable to same.  This worker has been communicating with ANTOINETTE Montesinos at [facility] [phone].      Mental Status Exam:    Mood – Neutral   Sleep – Fair    Appetite – Fair    ADLs – Good   Thought Process – Linear      Observation – [id number]    No barriers to discharge identified at this time.     At this time patient is not psychiatrically stable for discharge.

## 2021-11-20 LAB — GLUCOSE BLDC GLUCOMTR-MCNC: 164 MG/DL — HIGH (ref 70–99)

## 2021-11-20 RX ADMIN — ATORVASTATIN CALCIUM 40 MILLIGRAM(S): 80 TABLET, FILM COATED ORAL at 20:01

## 2021-11-20 RX ADMIN — Medication 1300 MILLIGRAM(S): at 20:01

## 2021-11-20 RX ADMIN — Medication 50 MILLIGRAM(S): at 20:01

## 2021-11-20 RX ADMIN — QUETIAPINE FUMARATE 100 MILLIGRAM(S): 200 TABLET, FILM COATED ORAL at 20:01

## 2021-11-20 RX ADMIN — AMLODIPINE BESYLATE 10 MILLIGRAM(S): 2.5 TABLET ORAL at 06:31

## 2021-11-20 RX ADMIN — Medication 1300 MILLIGRAM(S): at 09:04

## 2021-11-20 RX ADMIN — Medication 50 MILLIGRAM(S): at 09:04

## 2021-11-20 RX ADMIN — Medication 325 MILLIGRAM(S): at 09:04

## 2021-11-20 RX ADMIN — LISINOPRIL 40 MILLIGRAM(S): 2.5 TABLET ORAL at 09:04

## 2021-11-20 RX ADMIN — Medication 5 MILLIGRAM(S): at 20:01

## 2021-11-20 RX ADMIN — Medication 81 MILLIGRAM(S): at 09:03

## 2021-11-20 RX ADMIN — HEPARIN SODIUM 5000 UNIT(S): 5000 INJECTION INTRAVENOUS; SUBCUTANEOUS at 20:02

## 2021-11-20 RX ADMIN — HEPARIN SODIUM 5000 UNIT(S): 5000 INJECTION INTRAVENOUS; SUBCUTANEOUS at 09:04

## 2021-11-20 RX ADMIN — DONEPEZIL HYDROCHLORIDE 10 MILLIGRAM(S): 10 TABLET, FILM COATED ORAL at 20:01

## 2021-11-20 RX ADMIN — Medication 50 MILLIGRAM(S): at 14:54

## 2021-11-21 LAB
GLUCOSE BLDC GLUCOMTR-MCNC: 110 MG/DL — HIGH (ref 70–99)
GLUCOSE BLDC GLUCOMTR-MCNC: 117 MG/DL — HIGH (ref 70–99)

## 2021-11-21 RX ADMIN — HEPARIN SODIUM 5000 UNIT(S): 5000 INJECTION INTRAVENOUS; SUBCUTANEOUS at 21:31

## 2021-11-21 RX ADMIN — PANTOPRAZOLE SODIUM 40 MILLIGRAM(S): 20 TABLET, DELAYED RELEASE ORAL at 08:43

## 2021-11-21 RX ADMIN — Medication 1300 MILLIGRAM(S): at 08:43

## 2021-11-21 RX ADMIN — HEPARIN SODIUM 5000 UNIT(S): 5000 INJECTION INTRAVENOUS; SUBCUTANEOUS at 08:43

## 2021-11-21 RX ADMIN — Medication 50 MILLIGRAM(S): at 08:43

## 2021-11-21 RX ADMIN — LISINOPRIL 40 MILLIGRAM(S): 2.5 TABLET ORAL at 08:43

## 2021-11-21 RX ADMIN — Medication 325 MILLIGRAM(S): at 08:43

## 2021-11-21 RX ADMIN — Medication 81 MILLIGRAM(S): at 08:43

## 2021-11-21 RX ADMIN — AMLODIPINE BESYLATE 10 MILLIGRAM(S): 2.5 TABLET ORAL at 08:43

## 2021-11-22 LAB
ALBUMIN SERPL ELPH-MCNC: 5 G/DL — SIGNIFICANT CHANGE UP (ref 3.5–5.2)
ALP SERPL-CCNC: 93 U/L — SIGNIFICANT CHANGE UP (ref 30–115)
ALT FLD-CCNC: 11 U/L — SIGNIFICANT CHANGE UP (ref 0–41)
ANION GAP SERPL CALC-SCNC: 14 MMOL/L — SIGNIFICANT CHANGE UP (ref 7–14)
AST SERPL-CCNC: 14 U/L — SIGNIFICANT CHANGE UP (ref 0–41)
BILIRUB SERPL-MCNC: 0.3 MG/DL — SIGNIFICANT CHANGE UP (ref 0.2–1.2)
BUN SERPL-MCNC: 46 MG/DL — HIGH (ref 10–20)
CALCIUM SERPL-MCNC: 10.6 MG/DL — HIGH (ref 8.5–10.1)
CHLORIDE SERPL-SCNC: 106 MMOL/L — SIGNIFICANT CHANGE UP (ref 98–110)
CO2 SERPL-SCNC: 20 MMOL/L — SIGNIFICANT CHANGE UP (ref 17–32)
CREAT SERPL-MCNC: 1.8 MG/DL — HIGH (ref 0.7–1.5)
GLUCOSE BLDC GLUCOMTR-MCNC: 129 MG/DL — HIGH (ref 70–99)
GLUCOSE SERPL-MCNC: 148 MG/DL — HIGH (ref 70–99)
HCT VFR BLD CALC: 36.4 % — LOW (ref 37–47)
HGB BLD-MCNC: 11.6 G/DL — LOW (ref 12–16)
MAGNESIUM SERPL-MCNC: 2.4 MG/DL — SIGNIFICANT CHANGE UP (ref 1.8–2.4)
MCHC RBC-ENTMCNC: 26.7 PG — LOW (ref 27–31)
MCHC RBC-ENTMCNC: 31.9 G/DL — LOW (ref 32–37)
MCV RBC AUTO: 83.9 FL — SIGNIFICANT CHANGE UP (ref 81–99)
NRBC # BLD: 0 /100 WBCS — SIGNIFICANT CHANGE UP (ref 0–0)
PHOSPHATE SERPL-MCNC: 4.1 MG/DL — SIGNIFICANT CHANGE UP (ref 2.1–4.9)
PLATELET # BLD AUTO: 206 K/UL — SIGNIFICANT CHANGE UP (ref 130–400)
POTASSIUM SERPL-MCNC: 4.9 MMOL/L — SIGNIFICANT CHANGE UP (ref 3.5–5)
POTASSIUM SERPL-SCNC: 4.9 MMOL/L — SIGNIFICANT CHANGE UP (ref 3.5–5)
PROT SERPL-MCNC: 7.3 G/DL — SIGNIFICANT CHANGE UP (ref 6–8)
RBC # BLD: 4.34 M/UL — SIGNIFICANT CHANGE UP (ref 4.2–5.4)
RBC # FLD: 13.6 % — SIGNIFICANT CHANGE UP (ref 11.5–14.5)
SODIUM SERPL-SCNC: 140 MMOL/L — SIGNIFICANT CHANGE UP (ref 135–146)
WBC # BLD: 5.12 K/UL — SIGNIFICANT CHANGE UP (ref 4.8–10.8)
WBC # FLD AUTO: 5.12 K/UL — SIGNIFICANT CHANGE UP (ref 4.8–10.8)

## 2021-11-22 PROCEDURE — 99232 SBSQ HOSP IP/OBS MODERATE 35: CPT

## 2021-11-22 RX ORDER — QUETIAPINE FUMARATE 200 MG/1
150 TABLET, FILM COATED ORAL AT BEDTIME
Refills: 0 | Status: DISCONTINUED | OUTPATIENT
Start: 2021-11-22 | End: 2021-11-23

## 2021-11-22 RX ORDER — FLUOXETINE HCL 10 MG
30 CAPSULE ORAL DAILY
Refills: 0 | Status: DISCONTINUED | OUTPATIENT
Start: 2021-11-22 | End: 2021-11-26

## 2021-11-22 RX ADMIN — ATORVASTATIN CALCIUM 40 MILLIGRAM(S): 80 TABLET, FILM COATED ORAL at 20:24

## 2021-11-22 RX ADMIN — Medication 50 MILLIGRAM(S): at 12:52

## 2021-11-22 RX ADMIN — DONEPEZIL HYDROCHLORIDE 10 MILLIGRAM(S): 10 TABLET, FILM COATED ORAL at 20:22

## 2021-11-22 RX ADMIN — Medication 325 MILLIGRAM(S): at 08:50

## 2021-11-22 RX ADMIN — Medication 81 MILLIGRAM(S): at 08:50

## 2021-11-22 RX ADMIN — QUETIAPINE FUMARATE 150 MILLIGRAM(S): 200 TABLET, FILM COATED ORAL at 20:23

## 2021-11-22 RX ADMIN — PANTOPRAZOLE SODIUM 40 MILLIGRAM(S): 20 TABLET, DELAYED RELEASE ORAL at 08:50

## 2021-11-22 RX ADMIN — Medication 50 MILLIGRAM(S): at 20:23

## 2021-11-22 RX ADMIN — HEPARIN SODIUM 5000 UNIT(S): 5000 INJECTION INTRAVENOUS; SUBCUTANEOUS at 08:51

## 2021-11-22 RX ADMIN — Medication 30 MILLIGRAM(S): at 12:42

## 2021-11-22 RX ADMIN — Medication 1300 MILLIGRAM(S): at 20:23

## 2021-11-22 RX ADMIN — AMLODIPINE BESYLATE 10 MILLIGRAM(S): 2.5 TABLET ORAL at 08:50

## 2021-11-22 RX ADMIN — Medication 5 MILLIGRAM(S): at 20:23

## 2021-11-22 RX ADMIN — Medication 1300 MILLIGRAM(S): at 08:49

## 2021-11-22 RX ADMIN — HEPARIN SODIUM 5000 UNIT(S): 5000 INJECTION INTRAVENOUS; SUBCUTANEOUS at 20:23

## 2021-11-22 RX ADMIN — LISINOPRIL 40 MILLIGRAM(S): 2.5 TABLET ORAL at 08:50

## 2021-11-22 NOTE — CHART NOTE - NSCHARTNOTEFT_GEN_A_CORE
Social Work Note:    Treatment team met with patient on unit rounds. At time of assessment patient was laying in her bed and lacked any motivation to sit up, take her medications or engage with treatment team.  On interview patient said multiple times “you are going to put me out on the street.”  Patient was assisted to sit up for the remainder of the interview.  Her primary nurse was present and offered her medications which she refused as “they are not going to help me.”  This presentation is different from Friday 11/19 when patient had some motivation and informed “I’ll try” when she was asked to be visible on the unit, sit in the day room and attend groups.     Plan is for patient to return to [facility] when stable for discharge.  She is agreeable to same.  This worker has been communicating with ANTOINETTE Montesinos at [facility] [phone].      Mental Status Exam:    Mood – Depressed   Sleep – Fair    Appetite – Fair    ADLs – Fair    Thought Process – Illogical       Observation – [id number]    No barriers to discharge identified at this time.     At this time patient is not psychiatrically stable for discharge.

## 2021-11-22 NOTE — PROGRESS NOTE BEHAVIORAL HEALTH - SUMMARY
Ms. Rodarte is a 77 y/o female with history of bipolar depression (Bipolar II disorder), hypertension, osteoarthritis, T2DM, mild cognitive impairment without behavioral disturbance, brought to the ED at the behest of the senior care she has been residing for the last 7 years for gradual deterioration in patient's ability to care for herself, in the context of recent psychotropic medication changes due to an episode of lithium toxicity in August of 2021.    Patient's current presentation is consistent with bipolar depression with underlying dementia without behavioral disturbance. Patient was stable on lithium for decades (though per son, she was never "normal" and always fluctuated between highs and lows)--was restarted on low dose lithium 150 mg qday after discussion with outpatient nephrologist Dr. Owens. However, even on a low dose patient's creatinine increased to 2.6 (baseline fluctuates around 2, BUN:Cr 20) and patient is hyperkalemic and hypercalcemic. As patient's mood has not been significantly improved on the lithium and she cannot be relied on to keep herself consistently hydrated, it has been discontinued (on 11/19).    Amber has made progress in terms of her functionality and sociability, however she still remains markedly dysphoric and perseverative. Her MOCA score is 12/30 which likely reflects a progression of her underlying neurocognitive disorder with a primary mood disorder superimposition. Amber's dysphoria and prominent sense of loss may reflect an appropriate emotional reaction to progression of her neurocognitive disorder, especially in context of her personality of high criticality and perfectionism. This may be her new baseline as it is a common feature in early dementia and does not necessarily reflect a depressive episode or suicidal ideation. She continues to endorse poor sleep and appetite, though both are improved from admission on observation. No SI/HI/hallucinations/samra noted. Prozac was increased to 30 mg, will increase Seroquel to 100 mg tonight.

## 2021-11-22 NOTE — PROGRESS NOTE BEHAVIORAL HEALTH - NSBHFUPINTERVALHXFT_PSY_A_CORE
Pt was seen, evaluated, chart reviewed. As per nursing staff, pt started to refuse medications starting Saturday. On evaluation, pt reports she is "not good" and remains preoccupied with "being thrown out on the street" Pt was reassured that she would not be discharged from the hospital and that she has a home to return to however continues to state "don't contradict me, just throw me out, naked." Pt is illogical and depressed today. Refusing medications despite much encouragement. Has poor appetite. Reported poor sleep. Denied auditory/visual hallucinations.

## 2021-11-23 LAB
GLUCOSE BLDC GLUCOMTR-MCNC: 141 MG/DL — HIGH (ref 70–99)
SARS-COV-2 RNA SPEC QL NAA+PROBE: SIGNIFICANT CHANGE UP

## 2021-11-23 PROCEDURE — 99232 SBSQ HOSP IP/OBS MODERATE 35: CPT

## 2021-11-23 RX ORDER — CLONAZEPAM 1 MG
0.5 TABLET ORAL
Refills: 0 | Status: DISCONTINUED | OUTPATIENT
Start: 2021-11-23 | End: 2021-11-24

## 2021-11-23 RX ORDER — QUETIAPINE FUMARATE 200 MG/1
200 TABLET, FILM COATED ORAL AT BEDTIME
Refills: 0 | Status: DISCONTINUED | OUTPATIENT
Start: 2021-11-23 | End: 2021-11-24

## 2021-11-23 RX ADMIN — ATORVASTATIN CALCIUM 40 MILLIGRAM(S): 80 TABLET, FILM COATED ORAL at 20:14

## 2021-11-23 RX ADMIN — Medication 5 MILLIGRAM(S): at 20:14

## 2021-11-23 RX ADMIN — PANTOPRAZOLE SODIUM 40 MILLIGRAM(S): 20 TABLET, DELAYED RELEASE ORAL at 08:36

## 2021-11-23 RX ADMIN — Medication 0.5 MILLIGRAM(S): at 20:16

## 2021-11-23 RX ADMIN — Medication 1300 MILLIGRAM(S): at 08:37

## 2021-11-23 RX ADMIN — Medication 50 MILLIGRAM(S): at 20:14

## 2021-11-23 RX ADMIN — Medication 1300 MILLIGRAM(S): at 20:14

## 2021-11-23 RX ADMIN — HEPARIN SODIUM 5000 UNIT(S): 5000 INJECTION INTRAVENOUS; SUBCUTANEOUS at 08:56

## 2021-11-23 RX ADMIN — DONEPEZIL HYDROCHLORIDE 10 MILLIGRAM(S): 10 TABLET, FILM COATED ORAL at 20:14

## 2021-11-23 RX ADMIN — Medication 325 MILLIGRAM(S): at 08:36

## 2021-11-23 RX ADMIN — AMLODIPINE BESYLATE 10 MILLIGRAM(S): 2.5 TABLET ORAL at 08:37

## 2021-11-23 RX ADMIN — LISINOPRIL 40 MILLIGRAM(S): 2.5 TABLET ORAL at 08:36

## 2021-11-23 RX ADMIN — Medication 50 MILLIGRAM(S): at 14:28

## 2021-11-23 RX ADMIN — Medication 0.5 MILLIGRAM(S): at 11:43

## 2021-11-23 RX ADMIN — QUETIAPINE FUMARATE 200 MILLIGRAM(S): 200 TABLET, FILM COATED ORAL at 20:14

## 2021-11-23 RX ADMIN — Medication 50 MILLIGRAM(S): at 08:53

## 2021-11-23 RX ADMIN — Medication 81 MILLIGRAM(S): at 08:37

## 2021-11-23 RX ADMIN — HEPARIN SODIUM 5000 UNIT(S): 5000 INJECTION INTRAVENOUS; SUBCUTANEOUS at 20:17

## 2021-11-23 RX ADMIN — Medication 30 MILLIGRAM(S): at 08:54

## 2021-11-23 NOTE — PROGRESS NOTE BEHAVIORAL HEALTH - NSBHCHARTREVIEWLAB_PSY_A_CORE FT
Phosphorus Level, Serum: 4.1 mg/dL (11.22.21 @ 08:00)   Magnesium, Serum: 2.4 mg/dL (11.22.21 @ 08:00) Comprehensive Metabolic Panel (11.22.21 @ 08:00)     Sodium, Serum: 140 mmol/L   Potassium, Serum: 4.9 mmol/L   Chloride, Serum: 106 mmol/L   Carbon Dioxide, Serum: 20 mmol/L   Anion Gap, Serum: 14 mmol/L   Blood Urea Nitrogen, Serum: 46 mg/dL   Creatinine, Serum: 1.8 mg/dL   Glucose, Serum: 148 mg/dL   Calcium, Total Serum: 10.6 mg/dL   Protein Total, Serum: 7.3 g/dL   Albumin, Serum: 5.0 g/dL   Bilirubin Total, Serum: 0.3 mg/dL   Alkaline Phosphatase, Serum: 93 U/L   Aspartate Aminotransferase (AST/SGOT): 14 U/L   Alanine Aminotransferase (ALT/SGPT): 11 U/L   eGFR if Non : 27    Complete Blood Count (11.22.21 @ 08:00)   Nucleated RBC: 0 /100 WBCs   WBC Count: 5.12 K/uL   RBC Count: 4.34 M/uL   Hemoglobin: 11.6 g/dL   Hematocrit: 36.4 %   Mean Cell Volume: 83.9 fL   Mean Cell Hemoglobin: 26.7 pg   Mean Cell Hemoglobin Conc: 31.9 g/dL   Red Cell Distrib Width: 13.6 %   Platelet Count - Automated: 206 K/uL

## 2021-11-23 NOTE — PROGRESS NOTE BEHAVIORAL HEALTH - NSBHFUPINTERVALHXFT_PSY_A_CORE
Per nursing, Amber has been feeling better and is eating by herself.    On assessment, Amber is sitting up in chair, showered, and dressed in home clothing. She is somewhat tearful during interview, endorsing worry about being put out on the street. She endorses eating very little (per RN, ate half her breakfast). Amber does not remember her prior ECT treatments and does not want to try it, however it is unclear whether she understands the question as she has been receptive to the idea in the past. With encouragement she is able to walk to the TV room with her walker with relative physical ease, however endorses anxiety that she is being thrown out "God help me". Reassured multiple times that her fears are unfounded.    Per son Catarino who saw patient over the weekend, her condition has not changed since admission. He believes that her current presentation is "99% bipolar depression" and that ECT is the only thing at this point that will help her. He denies any known history of heart disease/CAD. He is willing to talk to patient about ECT and will encourage her to continue her medications. Per nursing, Amber has been feeling better and is eating by herself.    On assessment, Amber is sitting up in chair, showered, and dressed in home clothing. She is somewhat tearful during interview, endorsing worry about being put out on the street. She endorses eating very little (per RN, ate half her breakfast). Amber does not remember her prior ECT treatments and does not want to try it, however it is unclear whether she understands the question as she has been receptive to the idea in the past. With encouragement she is able to walk to the TV room with her walker with relative physical ease, however endorses anxiety that she is being thrown out "God help me". Reassured multiple times that her fears are unfounded. Pt was able to eat some of her breakfast, had minimal lunch. Denied any side effects from medications.     Per son Catarino who saw patient over the weekend, her condition has not changed since admission. He believes that her current presentation is "99% bipolar depression" and that ECT is the only thing at this point that will help her. He denies any known history of heart disease/CAD. He is willing to talk to patient about ECT and will encourage her to continue her medications.

## 2021-11-23 NOTE — PROGRESS NOTE BEHAVIORAL HEALTH - SUMMARY
Ms. Rodarte is a 75 y/o female with history of bipolar depression (Bipolar II disorder), hypertension, osteoarthritis, T2DM, mild cognitive impairment without behavioral disturbance, brought [facility] the ED [facility] the [facility] been [facility] for the last 7 years for gradual deterioration in patient's ability to care for herself, in the context of recent psychotropic medication changes due to an episode of lithium toxicity in August of 2021.    Patient's current presentation is consistent with bipolar depression with underlying dementia without behavioral disturbance. Patient was stable on lithium for decades (though per son, she was never "normal" and always fluctuated between highs and lows)--was restarted on low dose lithium 150 mg qday after discussion with outpatient nephrologist Dr. Owens. However, even on a low dose patient's creatinine increased to 2.6 (baseline fluctuates around 2, BUN:Cr 20) and patient is hyperkalemic and hypercalcemic. As patient's mood has not been significantly improved on the lithium and she cannot be relied on to keep herself consistently hydrated, it was discontinued on 11/19. On 11/22 pt's renal function has improved (Cr 1.8, BUN/Cr 25) and is normokalemic, however still mildly hypercalcemic, will order PTH.     Amber's response to medication treatment has been poor; she remains markedly dysphoric and perseverative despite adequate functioning. She continues to endorse poor sleep and appetite, though both are improved from admission on observation. She has developed a fixed belief that she will be thrown out into the street naked--no other signs of psychosis noted, however anxiety is more prominent this week. No SI/HI/hallucinations/samra noted. Will start Klonopin 0.5 mg BID for anxiety and increase Seroquel to 200 mg qhs.    Son Catarino believes ECT will be more helpful than medication as this has worked well in the past--will need medical clearance prior to placement.

## 2021-11-23 NOTE — PROGRESS NOTE BEHAVIORAL HEALTH - PROBLEM SELECTOR PLAN 1
- c/w Prozac 30 mg qday  - increase Seroquel to 200 mg qhs for bipolar depression  - start Klonopin 0.5 mg BID for anxiety

## 2021-11-24 DIAGNOSIS — F31.5 BIPOLAR DISORDER, CURRENT EPISODE DEPRESSED, SEVERE, WITH PSYCHOTIC FEATURES: ICD-10-CM

## 2021-11-24 LAB
CALCIUM SERPL-MCNC: 10.9 MG/DL — HIGH (ref 8.4–10.5)
GLUCOSE BLDC GLUCOMTR-MCNC: 128 MG/DL — HIGH (ref 70–99)
PTH-INTACT FLD-MCNC: 63 PG/ML — SIGNIFICANT CHANGE UP (ref 15–65)

## 2021-11-24 PROCEDURE — 99231 SBSQ HOSP IP/OBS SF/LOW 25: CPT

## 2021-11-24 RX ORDER — LISINOPRIL 2.5 MG/1
10 TABLET ORAL DAILY
Refills: 0 | Status: DISCONTINUED | OUTPATIENT
Start: 2021-11-25 | End: 2022-01-27

## 2021-11-24 RX ORDER — CLONAZEPAM 1 MG
0.5 TABLET ORAL AT BEDTIME
Refills: 0 | Status: DISCONTINUED | OUTPATIENT
Start: 2021-11-24 | End: 2021-12-01

## 2021-11-24 RX ORDER — AMLODIPINE BESYLATE 2.5 MG/1
10 TABLET ORAL DAILY
Refills: 0 | Status: DISCONTINUED | OUTPATIENT
Start: 2021-11-25 | End: 2022-01-27

## 2021-11-24 RX ORDER — QUETIAPINE FUMARATE 200 MG/1
250 TABLET, FILM COATED ORAL AT BEDTIME
Refills: 0 | Status: DISCONTINUED | OUTPATIENT
Start: 2021-11-24 | End: 2021-11-26

## 2021-11-24 RX ADMIN — Medication 30 MILLIGRAM(S): at 08:19

## 2021-11-24 RX ADMIN — Medication 325 MILLIGRAM(S): at 08:19

## 2021-11-24 RX ADMIN — LISINOPRIL 40 MILLIGRAM(S): 2.5 TABLET ORAL at 08:19

## 2021-11-24 RX ADMIN — Medication 0.5 MILLIGRAM(S): at 08:19

## 2021-11-24 RX ADMIN — Medication 1300 MILLIGRAM(S): at 08:20

## 2021-11-24 RX ADMIN — Medication 81 MILLIGRAM(S): at 08:19

## 2021-11-24 RX ADMIN — PANTOPRAZOLE SODIUM 40 MILLIGRAM(S): 20 TABLET, DELAYED RELEASE ORAL at 08:20

## 2021-11-24 RX ADMIN — AMLODIPINE BESYLATE 10 MILLIGRAM(S): 2.5 TABLET ORAL at 08:20

## 2021-11-24 RX ADMIN — Medication 50 MILLIGRAM(S): at 13:04

## 2021-11-24 RX ADMIN — Medication 50 MILLIGRAM(S): at 08:21

## 2021-11-24 NOTE — PROGRESS NOTE BEHAVIORAL HEALTH - THOUGHT PROCESS
Perseverative/Illogical/Impaired reasoning Tangential/Perseverative/Illogical/Impaired reasoning/Disorganized

## 2021-11-24 NOTE — PROGRESS NOTE BEHAVIORAL HEALTH - PROBLEM SELECTOR PLAN 1
- c/w Prozac 30 mg qday  - increase Seroquel to 250 mg qhs for bipolar depression  - decrease Klonopin to 0.5 mg only at bedtime for anxiety

## 2021-11-24 NOTE — PROGRESS NOTE BEHAVIORAL HEALTH - PROBLEM SELECTOR PLAN 4
- currently hypotensive, f/u PA recommendations  - c/w lisinopril 40 mg qday  - c/w hydralazine 75 mg TID  - c/w amlodipine 10 mg qday  - monitor BP

## 2021-11-24 NOTE — PROGRESS NOTE BEHAVIORAL HEALTH - THOUGHT CONTENT
Delusions/Preoccupations/Ruminations/Hopelessness Delusions/Preoccupations/Ruminations/Hopelessness/Guilt/Other

## 2021-11-24 NOTE — CHART NOTE - NSCHARTNOTEFT_GEN_A_CORE
Social Work Note:    Treatment team met with patient on unit rounds. At time of assessment patient informed she had a “stomach ache.” She has been more visible on the unit prior to today.  Patient requires encouragement to perform ADLs and to participate in groups or sit in the day room.     Plan is for patient to return to [facility] when stable for discharge.  She is agreeable to same.  This worker has been communicating with ANTOINETTE Montesinos at [facility] [phone].      Mental Status Exam:    Mood – Depressed   Sleep – Fair    Appetite – Fair    ADLs – Fair    Thought Process – Illogical       Observation – [id number]    No barriers to discharge identified at this time.     At this time patient is not psychiatrically stable for discharge.

## 2021-11-24 NOTE — CHART NOTE - NSCHARTNOTEFT_GEN_A_CORE
Pt hypotensive today but asymptomatic no dizziness.  Will dc hydralazine  reordered lisinopril and amlodipine with parameter to hold if sbp<100  d/w DR Crawford

## 2021-11-24 NOTE — PROGRESS NOTE BEHAVIORAL HEALTH - SUMMARY
Ms. Rodarte is a 77 y/o female with history of bipolar depression (Bipolar II disorder), hypertension, osteoarthritis, T2DM, mild cognitive impairment without behavioral disturbance, brought to the ED at the behest of the senior living she has been residing for the last 7 years for gradual deterioration in patient's ability to care for herself, in the context of recent psychotropic medication changes due to an episode of lithium toxicity in August of 2021.    Patient's current presentation is consistent with severe bipolar depression with psychotic features underlying dementia without behavioral disturbance. Patient was stable on lithium for decades (though per son, she was never "normal" and always fluctuated between highs and lows)--was restarted on low dose lithium 150 mg qday after discussion with outpatient nephrologist Dr. Owens. However, even on a low dose patient's creatinine increased to 2.6 (baseline fluctuates around 2, BUN:Cr 20) and patient is hyperkalemic and hypercalcemic. As patient's mood has not been significantly improved on the lithium and she cannot be relied on to keep herself consistently hydrated, it was discontinued on 11/19. On 11/22 pt's renal function has improved (Cr 1.8, BUN/Cr 25) and is normokalemic, however still mildly hypercalcemic, will order PTH.     Amber's response to medication treatment has been poor; she remains markedly dysphoric and perseverative despite adequate functioning. She continues to endorse poor sleep and appetite, though both are improved from admission on observation. She has developed a fixed belief that she will be thrown out into the street naked, now endorses delusions about offensive body odor and appearance, No SI/HI/hallucinations/samra noted.  Klonopin 0.5 mg BID was started for anxiety, will discontinue morning dose due to potential delirium (patient doesn't remember conversation with son yesterday) and increase Seroquel to 250 mg qhs. Hypotension noted, PA contacted to reassess need for BP medications.    Son Catarino believes ECT will be more helpful than medication as this has worked well in the past--will need medical clearance prior to placement. Patient currently delirious and unable to give consent.

## 2021-11-24 NOTE — PROGRESS NOTE BEHAVIORAL HEALTH - NSBHFUPINTERVALHXFT_PSY_A_CORE
Per nursing, Amber has been ambulating, showered yesterday, has been more visible on the unit.    Per son Catarino, patient hallucinated insects and spiders crawling in her room once during a manic episode.    This morning Amber was in bed with the covers pulled up to her chin. "Not good" she says without being asked a question. She complains of a stomachache but denies any discomfort going to the bathroom. On afternoon reassessment, she is still in bed and continues to endorse feeling "not good". She endorses fear of being thrown out into the street and that she can no longer clean herself. She denies stomachache or fatigue. She does not remember talking to her son Catarino yesterday. When asked about her thoughts on ECT she says "I don't know". Per nursing, Amber has been ambulating, showered yesterday, has been more visible on the unit.    Per son Catarino, patient hallucinated insects and spiders crawling in her room once during a manic episode.    This morning Amber was in bed with the covers pulled up to her chin. "Not good" she says without being asked a question. She complains of a stomachache but denies any discomfort going to the bathroom. On afternoon reassessment, she is still in bed and continues to endorse feeling "not good". She endorses fear of being thrown out into the street and that she can no longer clean herself. She denies stomachache or fatigue. She does not remember talking to her son Catarino yesterday. When asked about her thoughts on ECT she says "I don't know". When PA student stepped away and turned her head to cough, patient endorses worry that she did so to escape patient's offensive body odor, is not reassurable. Per nursing, Amber has been ambulating, showered yesterday, has been more visible on the unit.    This morning Amber was in bed with the covers pulled up to her chin. "Not good" she says without being asked a question. She complains of a stomachache but denies any discomfort going to the bathroom. On afternoon reassessment, she is still in bed and continues to endorse feeling "not good". She endorses fear of being thrown out into the street and that she can no longer clean herself. She denies stomachache or fatigue. She does not remember talking to her son Catarino yesterday. When asked about her thoughts on ECT she says "I don't know". When PA student stepped away and turned her head to cough, patient endorses worry that she did so to escape patient's offensive body odor, is not reassurable.    Per son Catarino, patient hallucinated insects and spiders crawling in her room once during a manic episode in the past.

## 2021-11-25 LAB
GLUCOSE BLDC GLUCOMTR-MCNC: 113 MG/DL — HIGH (ref 70–99)
SARS-COV-2 RNA SPEC QL NAA+PROBE: SIGNIFICANT CHANGE UP

## 2021-11-25 RX ADMIN — Medication 1300 MILLIGRAM(S): at 08:15

## 2021-11-25 RX ADMIN — QUETIAPINE FUMARATE 250 MILLIGRAM(S): 200 TABLET, FILM COATED ORAL at 20:02

## 2021-11-25 RX ADMIN — Medication 0.5 MILLIGRAM(S): at 20:01

## 2021-11-25 RX ADMIN — Medication 30 MILLIGRAM(S): at 08:14

## 2021-11-25 RX ADMIN — Medication 81 MILLIGRAM(S): at 08:14

## 2021-11-25 RX ADMIN — Medication 1300 MILLIGRAM(S): at 20:01

## 2021-11-25 RX ADMIN — Medication 325 MILLIGRAM(S): at 08:14

## 2021-11-25 RX ADMIN — LISINOPRIL 10 MILLIGRAM(S): 2.5 TABLET ORAL at 08:16

## 2021-11-25 RX ADMIN — PANTOPRAZOLE SODIUM 40 MILLIGRAM(S): 20 TABLET, DELAYED RELEASE ORAL at 08:15

## 2021-11-25 RX ADMIN — AMLODIPINE BESYLATE 10 MILLIGRAM(S): 2.5 TABLET ORAL at 08:14

## 2021-11-25 RX ADMIN — DONEPEZIL HYDROCHLORIDE 10 MILLIGRAM(S): 10 TABLET, FILM COATED ORAL at 20:02

## 2021-11-25 RX ADMIN — Medication 5 MILLIGRAM(S): at 20:02

## 2021-11-25 RX ADMIN — ATORVASTATIN CALCIUM 40 MILLIGRAM(S): 80 TABLET, FILM COATED ORAL at 20:01

## 2021-11-26 LAB — GLUCOSE BLDC GLUCOMTR-MCNC: 112 MG/DL — HIGH (ref 70–99)

## 2021-11-26 PROCEDURE — 99231 SBSQ HOSP IP/OBS SF/LOW 25: CPT

## 2021-11-26 RX ORDER — OLANZAPINE 15 MG/1
5 TABLET, FILM COATED ORAL AT BEDTIME
Refills: 0 | Status: DISCONTINUED | OUTPATIENT
Start: 2021-11-26 | End: 2021-11-29

## 2021-11-26 RX ORDER — FLUOXETINE HCL 10 MG
40 CAPSULE ORAL DAILY
Refills: 0 | Status: DISCONTINUED | OUTPATIENT
Start: 2021-11-26 | End: 2021-12-07

## 2021-11-26 RX ADMIN — ATORVASTATIN CALCIUM 40 MILLIGRAM(S): 80 TABLET, FILM COATED ORAL at 20:46

## 2021-11-26 RX ADMIN — Medication 1300 MILLIGRAM(S): at 20:45

## 2021-11-26 RX ADMIN — DONEPEZIL HYDROCHLORIDE 10 MILLIGRAM(S): 10 TABLET, FILM COATED ORAL at 20:46

## 2021-11-26 RX ADMIN — Medication 5 MILLIGRAM(S): at 20:46

## 2021-11-26 RX ADMIN — Medication 0.5 MILLIGRAM(S): at 20:47

## 2021-11-26 RX ADMIN — OLANZAPINE 5 MILLIGRAM(S): 15 TABLET, FILM COATED ORAL at 20:51

## 2021-11-26 NOTE — PROGRESS NOTE BEHAVIORAL HEALTH - PROBLEM SELECTOR PLAN 4
- currently normotensive after hydralazine was d/c-ed  - c/w lisinopril 40 mg qday  - c/w amlodipine 10 mg qday  - monitor BP

## 2021-11-26 NOTE — PROGRESS NOTE BEHAVIORAL HEALTH - OTHER
not assessed delusions of being thrown out into the street, of offensive body odor requires significant encouragement

## 2021-11-26 NOTE — PROGRESS NOTE BEHAVIORAL HEALTH - SUMMARY
Ms. Rodarte is a 77 y/o female with history of bipolar depression (Bipolar II disorder), hypertension, osteoarthritis, T2DM, mild cognitive impairment without behavioral disturbance, brought to the ED at the behest of the [facility] she has been residing for the last 7 years for gradual deterioration in patient's ability to care for herself, in the context of recent psychotropic medication changes due to an episode of lithium toxicity in August of 2021.    Patient's current presentation is consistent with severe bipolar depression with psychotic features underlying dementia without behavioral disturbance. Patient was stable on lithium for decades (though per son, she was never "normal" and always fluctuated between highs and lows)--was restarted on low dose lithium 150 mg qday after discussion with outpatient nephrologist Dr. Owens. However, even on a low dose patient's creatinine increased to 2.6 (baseline fluctuates around 2, BUN:Cr 20) and patient is hyperkalemic and hypercalcemic. As patient's mood has not been significantly improved on the lithium and she cannot be relied on to keep herself consistently hydrated, it was discontinued on 11/19. On 11/22 pt's renal function has improved (Cr 1.8, BUN/Cr 25) and is normokalemic. Calcium levels have been rising (10.9), PTH in high range of normal, likely secondary to renal disease in setting of poor oral hydration.    Amber's response to medication treatment has been poor; she remains markedly dysphoric and perseverative despite adequate functioning. She continues to endorse poor sleep and appetite, though both are improved from admission on observation. She has developed a fixed belief that she will be thrown out into the street naked, now endorses delusions about offensive body odor and appearance, No SI/HI/hallucinations/samra noted. Her condition has worsened on Seroquel, will d/c and restart Zyprexa which she tolerated well.    Son Catarino believes ECT will be more helpful than medication as this has worked well in the past--will need medical clearance prior to placement. Patient currently unable to give consent.

## 2021-11-26 NOTE — PROGRESS NOTE BEHAVIORAL HEALTH - NSBHFUPINTERVALHXFT_PSY_A_CORE
Per nursing, Amber has been compliant with her medications.    On assessment, Amber is in bed turned away from interviewers. She is dysphoric and perseverative, saying "please don't throw me out". With encouragement she is able to sit up and drink some water. She denies abdominal pain. Interview limited by perseveration. Per nursing, Amber has been compliant with her medications.    On assessment, Amber is in bed turned away from interviewers. She is dysphoric and perseverative, saying "please don't throw me out". With encouragement she is able to sit up and drink some water. She denies abdominal pain. Interview limited by perseveration. Pt requires a lot of encouragement to get out of bed. Remains somewhat paranoid of being "thrown out of the hospital." Endorsed fair sleep. Requires encouragement to eat. Has been compliant with medications. Denied auditory/visual hallucinations

## 2021-11-26 NOTE — PROGRESS NOTE BEHAVIORAL HEALTH - PROBLEM SELECTOR PLAN 1
- increase Prozac to 40 mg qday  - d/c Seroquel  - restart Zyprexa 5 mg at bedtime  - c/w Klonopin to 0.5 mg only at bedtime for anxiety

## 2021-11-26 NOTE — PROGRESS NOTE BEHAVIORAL HEALTH - NSBHCHARTREVIEWLAB_PSY_A_CORE FT
Parathyroid Hormone Intact, Serum (11.24.21 @ 07:43)   Calcium.: 10.9: Test Repeated mg/dL   Intact PTH: 63

## 2021-11-27 LAB — GLUCOSE BLDC GLUCOMTR-MCNC: 125 MG/DL — HIGH (ref 70–99)

## 2021-11-27 RX ADMIN — Medication 650 MILLIGRAM(S): at 18:35

## 2021-11-27 RX ADMIN — Medication 1300 MILLIGRAM(S): at 20:24

## 2021-11-27 RX ADMIN — AMLODIPINE BESYLATE 10 MILLIGRAM(S): 2.5 TABLET ORAL at 08:14

## 2021-11-27 RX ADMIN — ATORVASTATIN CALCIUM 40 MILLIGRAM(S): 80 TABLET, FILM COATED ORAL at 20:25

## 2021-11-27 RX ADMIN — Medication 325 MILLIGRAM(S): at 08:15

## 2021-11-27 RX ADMIN — DONEPEZIL HYDROCHLORIDE 10 MILLIGRAM(S): 10 TABLET, FILM COATED ORAL at 20:25

## 2021-11-27 RX ADMIN — Medication 81 MILLIGRAM(S): at 08:14

## 2021-11-27 RX ADMIN — Medication 650 MILLIGRAM(S): at 20:00

## 2021-11-27 RX ADMIN — Medication 0.5 MILLIGRAM(S): at 20:58

## 2021-11-27 RX ADMIN — Medication 40 MILLIGRAM(S): at 08:15

## 2021-11-27 RX ADMIN — Medication 5 MILLIGRAM(S): at 20:24

## 2021-11-27 RX ADMIN — LISINOPRIL 10 MILLIGRAM(S): 2.5 TABLET ORAL at 08:15

## 2021-11-27 RX ADMIN — Medication 1300 MILLIGRAM(S): at 08:14

## 2021-11-27 RX ADMIN — OLANZAPINE 5 MILLIGRAM(S): 15 TABLET, FILM COATED ORAL at 20:25

## 2021-11-28 LAB
ALBUMIN SERPL ELPH-MCNC: 4.2 G/DL — SIGNIFICANT CHANGE UP (ref 3.5–5.2)
ALP SERPL-CCNC: 99 U/L — SIGNIFICANT CHANGE UP (ref 30–115)
ALT FLD-CCNC: 14 U/L — SIGNIFICANT CHANGE UP (ref 0–41)
ANION GAP SERPL CALC-SCNC: 12 MMOL/L — SIGNIFICANT CHANGE UP (ref 7–14)
AST SERPL-CCNC: 14 U/L — SIGNIFICANT CHANGE UP (ref 0–41)
BILIRUB SERPL-MCNC: <0.2 MG/DL — SIGNIFICANT CHANGE UP (ref 0.2–1.2)
BUN SERPL-MCNC: 36 MG/DL — HIGH (ref 10–20)
CALCIUM SERPL-MCNC: 10.1 MG/DL — SIGNIFICANT CHANGE UP (ref 8.5–10.1)
CHLORIDE SERPL-SCNC: 101 MMOL/L — SIGNIFICANT CHANGE UP (ref 98–110)
CO2 SERPL-SCNC: 21 MMOL/L — SIGNIFICANT CHANGE UP (ref 17–32)
CREAT SERPL-MCNC: 1.8 MG/DL — HIGH (ref 0.7–1.5)
GLUCOSE BLDC GLUCOMTR-MCNC: 147 MG/DL — HIGH (ref 70–99)
GLUCOSE SERPL-MCNC: 201 MG/DL — HIGH (ref 70–99)
HCT VFR BLD CALC: 34.9 % — LOW (ref 37–47)
HGB BLD-MCNC: 11.2 G/DL — LOW (ref 12–16)
MAGNESIUM SERPL-MCNC: 1.8 MG/DL — SIGNIFICANT CHANGE UP (ref 1.8–2.4)
MCHC RBC-ENTMCNC: 27 PG — SIGNIFICANT CHANGE UP (ref 27–31)
MCHC RBC-ENTMCNC: 32.1 G/DL — SIGNIFICANT CHANGE UP (ref 32–37)
MCV RBC AUTO: 84.1 FL — SIGNIFICANT CHANGE UP (ref 81–99)
NRBC # BLD: 0 /100 WBCS — SIGNIFICANT CHANGE UP (ref 0–0)
PHOSPHATE SERPL-MCNC: 3.6 MG/DL — SIGNIFICANT CHANGE UP (ref 2.1–4.9)
PLATELET # BLD AUTO: 168 K/UL — SIGNIFICANT CHANGE UP (ref 130–400)
POTASSIUM SERPL-MCNC: 4.6 MMOL/L — SIGNIFICANT CHANGE UP (ref 3.5–5)
POTASSIUM SERPL-SCNC: 4.6 MMOL/L — SIGNIFICANT CHANGE UP (ref 3.5–5)
PROT SERPL-MCNC: 6.8 G/DL — SIGNIFICANT CHANGE UP (ref 6–8)
RBC # BLD: 4.15 M/UL — LOW (ref 4.2–5.4)
RBC # FLD: 13.5 % — SIGNIFICANT CHANGE UP (ref 11.5–14.5)
SODIUM SERPL-SCNC: 134 MMOL/L — LOW (ref 135–146)
WBC # BLD: 9.48 K/UL — SIGNIFICANT CHANGE UP (ref 4.8–10.8)
WBC # FLD AUTO: 9.48 K/UL — SIGNIFICANT CHANGE UP (ref 4.8–10.8)

## 2021-11-28 RX ADMIN — ATORVASTATIN CALCIUM 40 MILLIGRAM(S): 80 TABLET, FILM COATED ORAL at 20:24

## 2021-11-28 RX ADMIN — Medication 1300 MILLIGRAM(S): at 20:25

## 2021-11-28 RX ADMIN — Medication 0.5 MILLIGRAM(S): at 20:26

## 2021-11-28 RX ADMIN — OLANZAPINE 5 MILLIGRAM(S): 15 TABLET, FILM COATED ORAL at 20:27

## 2021-11-28 RX ADMIN — DONEPEZIL HYDROCHLORIDE 10 MILLIGRAM(S): 10 TABLET, FILM COATED ORAL at 20:24

## 2021-11-28 RX ADMIN — Medication 5 MILLIGRAM(S): at 20:24

## 2021-11-29 LAB
GLUCOSE BLDC GLUCOMTR-MCNC: 137 MG/DL — HIGH (ref 70–99)
SARS-COV-2 RNA SPEC QL NAA+PROBE: SIGNIFICANT CHANGE UP
VIT D25+D1,25 OH+D1,25 PNL SERPL-MCNC: 16.5 PG/ML — LOW (ref 19.9–79.3)

## 2021-11-29 PROCEDURE — 99231 SBSQ HOSP IP/OBS SF/LOW 25: CPT

## 2021-11-29 RX ORDER — OLANZAPINE 15 MG/1
7.5 TABLET, FILM COATED ORAL AT BEDTIME
Refills: 0 | Status: DISCONTINUED | OUTPATIENT
Start: 2021-11-29 | End: 2021-12-06

## 2021-11-29 RX ADMIN — Medication 40 MILLIGRAM(S): at 08:02

## 2021-11-29 RX ADMIN — Medication 325 MILLIGRAM(S): at 08:02

## 2021-11-29 RX ADMIN — PANTOPRAZOLE SODIUM 40 MILLIGRAM(S): 20 TABLET, DELAYED RELEASE ORAL at 08:02

## 2021-11-29 RX ADMIN — Medication 81 MILLIGRAM(S): at 08:02

## 2021-11-29 RX ADMIN — Medication 1300 MILLIGRAM(S): at 08:02

## 2021-11-29 NOTE — PROGRESS NOTE BEHAVIORAL HEALTH - SUMMARY
Ms. Rodarte is a 75 y/o female with history of bipolar depression (Bipolar II disorder), hypertension, osteoarthritis, T2DM, mild cognitive impairment without behavioral disturbance, brought to the ED at the behest of the longterm she has been residing for the last 7 years for gradual deterioration in patient's ability to care for herself, in the context of recent psychotropic medication changes due to an episode of lithium toxicity in August of 2021.    Patient's current presentation is consistent with severe bipolar depression with psychotic features underlying dementia without behavioral disturbance. Patient was stable on lithium for decades (though per son, she was never "normal" and always fluctuated between highs and lows)--was restarted on low dose lithium 150 mg qday after discussion with outpatient nephrologist Dr. Owens. However, even on a low dose patient's creatinine increased to 2.6 (baseline fluctuates around 2) and it was discontinued on 11/19. Patient's renal function is now stable at 1.8 and hypercalcemia has resolved.    Amber's response to medication treatment has been poor; she remains markedly dysphoric and perseverative despite adequate functioning. She continues to endorse poor sleep and appetite, though both are improved from admission on observation. She has developed a fixed belief that she will be thrown out into the street naked. No SI/HI/hallucinations/samra noted. Her condition worsened on Seroquel but has improved somewhat on Zyprexa 5 mg. Will increase to 7.5 mg tonight. Amber has been compliant with psych meds but noncompliant with other medications. Currently normotensive.    Son Catarino believes ECT will be more helpful than medication as this has worked well in the past--will need medical clearance prior to placement. Patient currently unable to give consent, may need to get outpatient.

## 2021-11-29 NOTE — CHART NOTE - NSCHARTNOTEFT_GEN_A_CORE
Social Work Note:    Treatment team met with patient on unit rounds. Patient minimally engaged but was pleasant on interview.  She was encouraged to sit in the day room and attend groups offered today.  She responded with “I’ll try” but she requires encouragement to be visible on the unit.   Patient was also encouraged to maintain communication with her son.      Plan is for patient to return to [facility] when stable for discharge.  She is agreeable to same.  This worker has been communicating with ANTOINETTE Montesinos at [facility] [phone].      Mental Status Exam:    Mood – Depressed   Sleep – Fair    Appetite – Fair    ADLs – Fair    Thought Process – Perseverative   Observation – [id number]    No barriers to discharge identified at this time.     At this time patient is not psychiatrically stable for discharge.

## 2021-11-29 NOTE — PROGRESS NOTE BEHAVIORAL HEALTH - NSBHCHARTREVIEWLAB_PSY_A_CORE FT
Vitamin D, 1, 25-Dihydroxy (11.28.21 @ 04:30)   Vitamin D, 1, 25-Dihydroxy: 16.5 pg/mL     Phosphorus Level, Serum: 3.6 mg/dL (11.28.21 @ 04:30)     Magnesium, Serum: 1.8 mg/dL (11.28.21 @ 04:30)     Comprehensive Metabolic Panel (11.28.21 @ 04:30)   Sodium, Serum: 134 mmol/L   Potassium, Serum: 4.6 mmol/L   Chloride, Serum: 101 mmol/L   Carbon Dioxide, Serum: 21 mmol/L   Anion Gap, Serum: 12 mmol/L   Blood Urea Nitrogen, Serum: 36 mg/dL   Creatinine, Serum: 1.8 mg/dL   Glucose, Serum: 201 mg/dL   Calcium, Total Serum: 10.1 mg/dL   Protein Total, Serum: 6.8 g/dL   Albumin, Serum: 4.2 g/dL   Bilirubin Total, Serum: <0.2 mg/dL   Alkaline Phosphatase, Serum: 99 U/L   Aspartate Aminotransferase (AST/SGOT): 14 U/L   Alanine Aminotransferase (ALT/SGPT): 14 U/L   eGFR if Non : 27    Complete Blood Count (11.28.21 @ 04:30)   Nucleated RBC: 0 /100 WBCs   WBC Count: 9.48 K/uL   RBC Count: 4.15 M/uL   Hemoglobin: 11.2 g/dL   Hematocrit: 34.9 %   Mean Cell Volume: 84.1 fL   Mean Cell Hemoglobin: 27.0 pg   Mean Cell Hemoglobin Conc: 32.1 g/dL   Red Cell Distrib Width: 13.5 %   Platelet Count - Automated: 168 K/uL

## 2021-11-29 NOTE — PROGRESS NOTE BEHAVIORAL HEALTH - NSBHFUPINTERVALHXFT_PSY_A_CORE
Per nursing, Amber has been feeling better, spoke with her son over the weekend, has been out more.    Per RN note from 11/28: had a conversation with patient and her son kamila on phone in her room so that he could reassure her that she was not being discharged to street. Despite him providing emotional support and assuring her that she was not being thrown out to street she continues to believe she is. He also encouraged her to take medication which she continues to refuse .     This morning Amber is in bed and does not want to get up. She is perseverative about feeling bad, and is somewhat short with interviewers, which is new for her. On reassessment in the afternoon, she is able to get up with encouragement though still anxious and dysphoric about being put out onto the street. She is less tearful today. Per RN, ambulated to TV room with lots of encouragement. Had a bowel movement this morning.    Updated son Catarino about response and current plan.

## 2021-11-29 NOTE — PROGRESS NOTE BEHAVIORAL HEALTH - PROBLEM SELECTOR PLAN 1
- c/w Prozac 40 mg qday  - increase Zyprexa to 7.5 mg at bedtime (increased on 11/29)  - c/w Klonopin to 0.5 mg only at bedtime for anxiety

## 2021-11-30 LAB — GLUCOSE BLDC GLUCOMTR-MCNC: 129 MG/DL — HIGH (ref 70–99)

## 2021-11-30 PROCEDURE — 99231 SBSQ HOSP IP/OBS SF/LOW 25: CPT

## 2021-11-30 RX ADMIN — OLANZAPINE 7.5 MILLIGRAM(S): 15 TABLET, FILM COATED ORAL at 16:03

## 2021-11-30 NOTE — PROGRESS NOTE BEHAVIORAL HEALTH - OTHER
not assessed delusions of being thrown out into the street, of offensive body odor, not redirectable

## 2021-11-30 NOTE — PROGRESS NOTE BEHAVIORAL HEALTH - NSBHFUPINTERVALHXFT_PSY_A_CORE
Per nursing, Amber has been refusing all her medications (including psych meds) and is very negative.    On assessment this morning, she continues to be perseverative about being thrown out on the street and about smelling bad to others, believes that is why people are wearing masks in the room (knows about COVID, says "but there's no COVID here"). Endorses olfactory hallucination that she can smell herself and it smells "not good". She reports poor sleep and appetite. No SI/HI noted. Despite treatment team encouraging her to do so for 10 minutes, and RN encouraging for some time after, patient refuses to take medications. She is later found rummaging through her neighbor's belongings and has been subsequently moved to a new room.     On reassessment in the afternoon, Amber is able to take her dose of Zyprexa 7.5 when it is the only thing offered to her, and with the encouragement of son Catarino on the phone. He plans to visit her on Saturday. She is observed wandering the unit vocalizing concern that she will be out on the street by then.     Son Catarino is adamant that Amber receive ECT inpatient as he knows she will not be compliant once discharged to [facility]. Advised that this is a long process due to long lines for inpatient ECT, lack of consent, and that we will need to optimize her medical treatment while she is here either way, he verbalizes understanding. Per nursing, Amber has been refusing all her medications (including psych meds) and is very negative.    On assessment this morning, she continues to be perseverative about being thrown out on the street and about smelling bad to others, believes that is why people are wearing masks in the room (knows about COVID, says "but there's no COVID here"). Apologizes profusely despite reassurance, is not redirectable. Endorses olfactory hallucination that she can smell herself and it smells "not good". She reports poor sleep and appetite. No SI/HI noted. Despite treatment team encouraging her to do so for 10 minutes, and RN encouraging for some time after, patient refuses to take medications. She is later found rummaging through her neighbor's belongings and has been subsequently moved to a new room.     On reassessment in the afternoon, Amber is able to take her dose of Zyprexa 7.5 when it is the only thing offered to her, and with the encouragement of son Catarino on the phone. He plans to visit her on Saturday. She is observed wandering the unit vocalizing concern that she will be out on the street by then.     Son Catarino is adamant that Amber receive ECT inpatient as he knows she will not be compliant once discharged to [facility]. Advised that this is a long process due to long lines for inpatient ECT, lack of consent, and that we will need to optimize her medical treatment while she is here either way, he verbalizes understanding.

## 2021-11-30 NOTE — PROGRESS NOTE BEHAVIORAL HEALTH - SUMMARY
Ms. Rodarte is a 77 y/o female with history of bipolar depression (Bipolar II disorder), hypertension, osteoarthritis, T2DM, mild cognitive impairment without behavioral disturbance, brought to the ED at the behest of the senior living she has been residing for the last 7 years for gradual deterioration in patient's ability to care for herself, in the context of recent psychotropic medication changes due to an episode of lithium toxicity in August of 2021.    Patient's current presentation is consistent with severe bipolar depression with psychotic features underlying dementia with emerging behavioral disturbance. Patient was stable on lithium for decades (though per son, she was never "normal" and always fluctuated between highs and lows)--was restarted on low dose lithium 150 mg qday after discussion with outpatient nephrologist Dr. Owens. However, even on a low dose patient's creatinine increased to 2.6 (baseline fluctuates around 2) and it was discontinued on 11/19. Patient's renal function is now stable at 1.8 and hypercalcemia has resolved.    Amber's response to medication treatment has been poor; she remains markedly dysphoric, perseverative, and delusional. She continues to endorse poor sleep and appetite. May be a component of olfactory hallucination present, but also may be a component of her persecutory delusion instead of a true hallucination. No SI/HI/samra noted. Her condition worsened on Seroquel but has improved somewhat on Zyprexa--however, patient has been refusing all medications except the Zyprexa 7.5 mg which she was able to take when it was the only one offered. May need to stagger medications throughout the day to reduce the number of pills offered at once.    Patient would be helped by ECT, however has not given consent, and would need to be transferred to a different institution that offers it after granted court ordered treatment--in the meantime will require better behavioral control. If patient continues to be treatment noncompliant, will consider TOO for IM medications.

## 2021-11-30 NOTE — PROGRESS NOTE BEHAVIORAL HEALTH - PROBLEM SELECTOR PLAN 1
- c/w Prozac 40 mg qday  - c/w Zyprexa 7.5 mg at bedtime (increased on 11/29, has only received one dose d/t noncompliance)  - c/w Klonopin to 0.5 mg only at bedtime for anxiety

## 2021-12-01 LAB — GLUCOSE BLDC GLUCOMTR-MCNC: 142 MG/DL — HIGH (ref 70–99)

## 2021-12-01 PROCEDURE — 99231 SBSQ HOSP IP/OBS SF/LOW 25: CPT

## 2021-12-01 NOTE — PROGRESS NOTE BEHAVIORAL HEALTH - NSBHFUPINTERVALHXFT_PSY_A_CORE
Pt was seen, evaluated, chart reviewed. As per nursing staff, no events overnight. On evaluation, pt reports she is "not good" however was out of bed, sitting in chair. Pt was initially agreeable to take medications to help her feel better. In 5 minutes when the nurse came with the medications, pt refused medications, stating "please, you're just going to kick me out on the street naked." Pt is delusional and paranoid. Refusing medications. Has poor appetite. Reports poor sleep. Denied auditory/visual hallucinations. Denied suicidal/homicidal ideation, intent or plan

## 2021-12-01 NOTE — PROGRESS NOTE BEHAVIORAL HEALTH - OTHER
delusions of being thrown out into the street, of offensive body odor, not redirectable not assessed

## 2021-12-01 NOTE — CHART NOTE - NSCHARTNOTEFT_GEN_A_CORE
Social Work Note:    Treatment team met with patient on unit rounds earlier this morning.  At time of assessment patient was sitting in a chair in her room.  She made several statements about her not looking well, and was concerned that she “would be thrown out on the street.”  Patient also has continued to not be agreeable to medications prescribed.  “What’s the point?” she asked.     After some conversation with patient she was dismissive of treatment team.  She stated that she would be agreeable to taking medications “later.”  When medications are offered after some time she was not agreeable.     Prior to admission patient was living at [facility].  Communication ongoing with facility representative ANTOINETTE Montesinos at [facility] [phone] with last contact on Monday November 29.      Mental Status Exam:    Mood – Depressed   Sleep – Fair    Appetite – Fair    ADLs – Fair    Thought Process – Perseverative   Observation – [id number]    No barriers to discharge identified at this time.     At this time patient is not psychiatrically stable for discharge.

## 2021-12-01 NOTE — PROGRESS NOTE BEHAVIORAL HEALTH - SUMMARY
Ms. Rodarte is a 77 y/o female with history of bipolar depression (Bipolar II disorder), hypertension, osteoarthritis, T2DM, mild cognitive impairment without behavioral disturbance, brought to the ED at the behest of the long term she has been residing for the last 7 years for gradual deterioration in patient's ability to care for herself, in the context of recent psychotropic medication changes due to an episode of lithium toxicity in August of 2021.    Patient's current presentation is consistent with severe bipolar depression with psychotic features underlying dementia with emerging behavioral disturbance. Patient was stable on lithium for decades (though per son, she was never "normal" and always fluctuated between highs and lows)--was restarted on low dose lithium 150 mg qday after discussion with outpatient nephrologist Dr. Owens. However, even on a low dose patient's creatinine increased to 2.6 (baseline fluctuates around 2) and it was discontinued on 11/19. Patient's renal function is now stable at 1.8 and hypercalcemia has resolved.    Amber's response to medication treatment has been poor; she remains markedly dysphoric, perseverative, and delusional. She continues to endorse poor sleep and appetite. May be a component of olfactory hallucination present, but also may be a component of her persecutory delusion instead of a true hallucination. No SI/HI/samra noted. Her condition worsened on Seroquel but has improved somewhat on Zyprexa--however, patient has been refusing all medications except the Zyprexa 7.5 mg which she was able to take when it was the only one offered. May need to stagger medications throughout the day to reduce the number of pills offered at once.    Patient would be helped by ECT, however has not given consent, and would need to be transferred to a different institution that offers it after granted court ordered treatment--in the meantime will require better behavioral control. If patient continues to be treatment noncompliant, will consider TOO for IM medications.

## 2021-12-02 LAB
GLUCOSE BLDC GLUCOMTR-MCNC: 120 MG/DL — HIGH (ref 70–99)
SARS-COV-2 RNA SPEC QL NAA+PROBE: SIGNIFICANT CHANGE UP

## 2021-12-02 PROCEDURE — 99231 SBSQ HOSP IP/OBS SF/LOW 25: CPT

## 2021-12-02 RX ORDER — CLONAZEPAM 1 MG
0.5 TABLET ORAL AT BEDTIME
Refills: 0 | Status: DISCONTINUED | OUTPATIENT
Start: 2021-12-02 | End: 2021-12-09

## 2021-12-02 RX ADMIN — Medication 40 MILLIGRAM(S): at 11:07

## 2021-12-02 RX ADMIN — ATORVASTATIN CALCIUM 40 MILLIGRAM(S): 80 TABLET, FILM COATED ORAL at 20:13

## 2021-12-02 RX ADMIN — Medication 81 MILLIGRAM(S): at 11:06

## 2021-12-02 RX ADMIN — OLANZAPINE 7.5 MILLIGRAM(S): 15 TABLET, FILM COATED ORAL at 20:13

## 2021-12-02 RX ADMIN — Medication 0.5 MILLIGRAM(S): at 20:13

## 2021-12-02 RX ADMIN — Medication 5 MILLIGRAM(S): at 20:13

## 2021-12-02 NOTE — PROGRESS NOTE BEHAVIORAL HEALTH - SUMMARY
Ms. Rodarte is a 77 y/o female with history of bipolar depression (Bipolar II disorder), hypertension, osteoarthritis, T2DM, mild cognitive impairment without behavioral disturbance, brought to the ED at the behest of the [facility] she has been residing for the last 7 years for gradual deterioration in patient's ability to care for herself, in the context of recent psychotropic medication changes due to an episode of lithium toxicity in August of 2021.    Patient's current presentation is consistent with severe bipolar depression with psychotic features underlying dementia with emerging behavioral disturbance. Patient was stable on lithium for decades (though per son, she was never "normal" and always fluctuated between highs and lows)--was restarted on low dose lithium 150 mg qday after discussion with outpatient nephrologist Dr. Owens. However, even on a low dose patient's creatinine increased to 2.6 (baseline fluctuates around 2) and it was discontinued on 11/19. Patient's renal function is now stable at 1.8 and hypercalcemia has resolved.    Amber's response to medication treatment has been poor; she remains markedly dysphoric, perseverative, and delusional. She continues to endorse poor sleep and appetite. May be a component of olfactory hallucination present, but also may be a component of her persecutory delusion instead of a true hallucination. No SI/HI noted. Her condition worsened on Seroquel but has improved somewhat on Zyprexa--however, patient has been inconsistently compliant with medication. Affect is more labile today, may be a mixed episode vs dementia with behavioral disturbance.     Patient would be helped by ECT, however has not given consent, and would need to be transferred to a different institution that offers it after granted court ordered treatment--in the meantime will require better behavioral control. If patient continues to be treatment noncompliant, will consider TOO for IM medications.

## 2021-12-02 NOTE — PROGRESS NOTE BEHAVIORAL HEALTH - PROBLEM SELECTOR PLAN 1
- c/w Prozac 40 mg qday  - c/w Zyprexa 7.5 mg at bedtime (increased on 11/29)  - c/w Klonopin 0.5 mg only at bedtime for anxiety

## 2021-12-02 NOTE — PROGRESS NOTE BEHAVIORAL HEALTH - NSBHFUPINTERVALHXFT_PSY_A_CORE
Per nursing, Amber has been more irritable and accusatory and is inconsistently taking her medications.    Today Amber remains perseverative and dysphoric about being thrown out into the cold naked despite reassurance--she is not redirectable, severely inattentive. She is noncompliant with medication, however does take Prozac and aspirin later in the day. She gets angry when the name of her sons are mentioned, saying "stop saying my son Catarino and Javier's name". She intermittently pulls up her shirt to demonstrate that her body is unattractive. She is observed pleasantly conversing with other patients on the unit later in the afternoon.    Patient endorses poor sleep and appetite, however does endorse memory issues. No SI/HI noted. Per nursing, Amber has been more irritable and accusatory and is inconsistently taking her medications.    Today Amber remains perseverative and dysphoric about being thrown out into the cold naked despite reassurance--she is not redirectable, severely inattentive. She is noncompliant with medication in the morning but does agree to take all of her medication later in the day. She gets angry when the name of her sons are mentioned, saying "stop saying my son Catarino and Javier's name". She intermittently pulls up her shirt to demonstrate that her body is unattractive. She is observed pleasantly conversing with other patients on the unit later in the afternoon.    Patient endorses poor sleep and appetite, however does endorse memory issues. No SI/HI noted.

## 2021-12-03 LAB — GLUCOSE BLDC GLUCOMTR-MCNC: 127 MG/DL — HIGH (ref 70–99)

## 2021-12-03 PROCEDURE — 99231 SBSQ HOSP IP/OBS SF/LOW 25: CPT

## 2021-12-03 RX ORDER — OLANZAPINE 15 MG/1
7.5 TABLET, FILM COATED ORAL ONCE
Refills: 0 | Status: COMPLETED | OUTPATIENT
Start: 2021-12-03 | End: 2021-12-03

## 2021-12-03 RX ADMIN — Medication 1300 MILLIGRAM(S): at 13:36

## 2021-12-03 RX ADMIN — OLANZAPINE 7.5 MILLIGRAM(S): 15 TABLET, FILM COATED ORAL at 14:25

## 2021-12-03 NOTE — PROGRESS NOTE BEHAVIORAL HEALTH - NSBHFUPINTERVALHXFT_PSY_A_CORE
Per nursing, Amber was in the dayroom, went to group, has been cooperating well.    This morning Amber is asleep. Upon reassessment in the afternoon, she reports that her mood is bad "my head is throbbing with bad thoughts". Denies headache. She refuses to take medication despite encouragement from several RNs and MDs over the afternoon. Eventually she agrees to take the Zyprexa. Spoke with son Javier on the phone, son Catarino planning to visit on Saturday.  She has poor appetite, consumed a small amount of chocolate beverage. Denied suicidal/homicidal ideation, intent or plan. Very anxious, paranoid, and not redirectable.    Javier and Catarino both updated on plan and possibility for treatment over objection if patient continues to refuse medication. They verbalize understanding.

## 2021-12-03 NOTE — PROGRESS NOTE BEHAVIORAL HEALTH - SUMMARY
Ms. Rodarte is a 75 y/o female with history of bipolar depression (Bipolar II disorder), hypertension, osteoarthritis, T2DM, mild cognitive impairment without behavioral disturbance, brought to the ED at the behest of the [facility] she has been residing for the last 7 years for gradual deterioration in patient's ability to care for herself, in the context of recent psychotropic medication changes due to an episode of lithium toxicity in August of 2021.    Patient's current presentation is consistent with severe bipolar depression with psychotic features underlying dementia with emerging behavioral disturbance. Patient was stable on lithium for decades (though per son, she was never "normal" and always fluctuated between highs and lows)--was restarted on low dose lithium 150 mg qday after discussion with outpatient nephrologist Dr. Owens. However, even on a low dose patient's creatinine increased to 2.6 (baseline fluctuates around 2) and it was discontinued on 11/19. Patient's renal function is now stable at 1.8 and hypercalcemia has resolved.    Amber's response to medication treatment has been poor; she remains markedly dysphoric, perseverative, and delusional, though she is otherwise functional and able to socialize with others on unit. She continues to endorse poor sleep and appetite despite observed to be sleeping well. No SI/HI noted. Her condition worsened on Seroquel but has improved somewhat on Zyprexa--however, patient has been inconsistently compliant with medication. Now is only taking Zyprexa, will need to consider treatment over objection if noncompliant. Patient's episodes of delusional distress during medication administration may represent sundowning in the context of progression of her primary dementia.    ECT considered--long wait lists make this an unlikely option during inpatient admission.

## 2021-12-03 NOTE — CHART NOTE - NSCHARTNOTEFT_GEN_A_CORE
Social Work Note:    Treatment team met with patient on unit rounds earlier this morning.  Per nursing report, patient has been out of her room a little more during the day.  She continues to require encouragement to leave her room and to take her medications.      Prior to admission patient was living at [facility].  Communication ongoing with facility representative ANTOINETTE Montesinos at [facility] [phone] with last contact yesterday.  Facility to be updated to patient’s progress.      Mental Status Exam:    Mood – Depressed   Sleep – Fair    Appetite – Fair    ADLs – Fair    Thought Process – Perseverative   Observation – [id number]    No barriers to discharge identified at this time.     At this time patient is not psychiatrically stable for discharge.

## 2021-12-03 NOTE — PROGRESS NOTE BEHAVIORAL HEALTH - PROBLEM SELECTOR PLAN 4
- currently normotensive after hydralazine was d/c-ed; noncompliant with BP meds, BP has been ok  - c/w lisinopril 40 mg qday  - c/w amlodipine 10 mg qday  - monitor BP

## 2021-12-04 LAB — GLUCOSE BLDC GLUCOMTR-MCNC: 116 MG/DL — HIGH (ref 70–99)

## 2021-12-04 RX ADMIN — HEPARIN SODIUM 5000 UNIT(S): 5000 INJECTION INTRAVENOUS; SUBCUTANEOUS at 02:00

## 2021-12-04 RX ADMIN — Medication 5 MILLIGRAM(S): at 20:02

## 2021-12-04 RX ADMIN — AMLODIPINE BESYLATE 10 MILLIGRAM(S): 2.5 TABLET ORAL at 15:45

## 2021-12-04 RX ADMIN — OLANZAPINE 7.5 MILLIGRAM(S): 15 TABLET, FILM COATED ORAL at 20:02

## 2021-12-04 RX ADMIN — Medication 81 MILLIGRAM(S): at 15:50

## 2021-12-04 RX ADMIN — Medication 325 MILLIGRAM(S): at 15:50

## 2021-12-04 RX ADMIN — Medication 1300 MILLIGRAM(S): at 15:49

## 2021-12-04 RX ADMIN — ATORVASTATIN CALCIUM 40 MILLIGRAM(S): 80 TABLET, FILM COATED ORAL at 20:02

## 2021-12-04 RX ADMIN — Medication 40 MILLIGRAM(S): at 15:50

## 2021-12-04 RX ADMIN — PANTOPRAZOLE SODIUM 40 MILLIGRAM(S): 20 TABLET, DELAYED RELEASE ORAL at 15:51

## 2021-12-04 RX ADMIN — Medication 1300 MILLIGRAM(S): at 20:08

## 2021-12-04 RX ADMIN — LISINOPRIL 10 MILLIGRAM(S): 2.5 TABLET ORAL at 15:48

## 2021-12-04 RX ADMIN — HEPARIN SODIUM 5000 UNIT(S): 5000 INJECTION INTRAVENOUS; SUBCUTANEOUS at 20:03

## 2021-12-05 PROCEDURE — 99231 SBSQ HOSP IP/OBS SF/LOW 25: CPT

## 2021-12-05 RX ADMIN — OLANZAPINE 7.5 MILLIGRAM(S): 15 TABLET, FILM COATED ORAL at 20:29

## 2021-12-05 RX ADMIN — LISINOPRIL 10 MILLIGRAM(S): 2.5 TABLET ORAL at 08:17

## 2021-12-05 RX ADMIN — PANTOPRAZOLE SODIUM 40 MILLIGRAM(S): 20 TABLET, DELAYED RELEASE ORAL at 08:16

## 2021-12-05 RX ADMIN — Medication 40 MILLIGRAM(S): at 08:16

## 2021-12-05 RX ADMIN — Medication 325 MILLIGRAM(S): at 08:17

## 2021-12-05 RX ADMIN — ATORVASTATIN CALCIUM 40 MILLIGRAM(S): 80 TABLET, FILM COATED ORAL at 20:29

## 2021-12-05 RX ADMIN — AMLODIPINE BESYLATE 10 MILLIGRAM(S): 2.5 TABLET ORAL at 08:17

## 2021-12-05 RX ADMIN — Medication 5 MILLIGRAM(S): at 20:29

## 2021-12-05 RX ADMIN — Medication 1300 MILLIGRAM(S): at 08:16

## 2021-12-05 RX ADMIN — Medication 81 MILLIGRAM(S): at 08:17

## 2021-12-05 RX ADMIN — Medication 0.5 MILLIGRAM(S): at 20:29

## 2021-12-05 NOTE — PROGRESS NOTE BEHAVIORAL HEALTH - NSBHFUPINTERVALHXFT_PSY_A_CORE
Chart reviewed, nursing report discussed, pt seen and assessed.    Pt remains paranoid and delusional, appears fearful and distressed. She kept on ruminating on her delusional thought about someone "is going to kill her grandchildren". Pt refuses her medications. With a lot of encouragement and support for over an hour, e.g. getting her son on the phone to persuade her, she finally took her medications.

## 2021-12-05 NOTE — PROGRESS NOTE BEHAVIORAL HEALTH - SUMMARY
75 y/o female with what appears to be (confirmed by  at [facility] of 7 years and biological son) of bipolar depression, with good response to lithium for 20 to 30 years, with no previous IP hospitalizations, now presenting to the ED multiple times in the last few months for various medical complaints since being admitted in July/August of 2021 for lithium toxicity and metabolic encephalopathy.    Upon discharge and discontinuation of lithium, patient has suffered significant deterioration in her affective and mood stability, and ability to function in the community and care for herself, when previously though she required some assistance, was independent in her activities of daily living, with euthymic and reactive affect. Multiple other medications has been trial-ed including multiple 2nd generation antipsychotics such as abilify,  quetiapine, lurasidone, meds like haldol, ativan, prozac, yet patient has not improved and is now reporting passive suicidal statements or wish to die given her hopelessness.    In addition, patient does have mild cognitive impairment, though is alert and oriented to herself, time/date, situation when doing well, and recently placed on prednisone 50mg (for 3 days?), for diverticulitis and inflammation, both factors which can be included in differential and association with recent decompensation, though regardless patient is unable to care for herself in the community, and requires inpatient level of care at this time.    Patient may be admitted to IP psychiatry only after medical clearance and new BMP after correction with IV fluids, and deemed medically appropriate to receive psychiatric treatment in hospital without acute medical concerns.

## 2021-12-06 LAB — SARS-COV-2 RNA SPEC QL NAA+PROBE: SIGNIFICANT CHANGE UP

## 2021-12-06 PROCEDURE — 99231 SBSQ HOSP IP/OBS SF/LOW 25: CPT

## 2021-12-06 RX ORDER — OLANZAPINE 15 MG/1
10 TABLET, FILM COATED ORAL AT BEDTIME
Refills: 0 | Status: DISCONTINUED | OUTPATIENT
Start: 2021-12-06 | End: 2022-01-27

## 2021-12-06 RX ADMIN — Medication 1300 MILLIGRAM(S): at 20:04

## 2021-12-06 RX ADMIN — ATORVASTATIN CALCIUM 40 MILLIGRAM(S): 80 TABLET, FILM COATED ORAL at 20:03

## 2021-12-06 RX ADMIN — OLANZAPINE 10 MILLIGRAM(S): 15 TABLET, FILM COATED ORAL at 20:04

## 2021-12-06 RX ADMIN — Medication 0.5 MILLIGRAM(S): at 20:03

## 2021-12-06 NOTE — PROGRESS NOTE BEHAVIORAL HEALTH - SUMMARY
Ms. Rodarte is a 75 y/o female with history of bipolar depression (Bipolar II disorder), hypertension, osteoarthritis, T2DM, mild cognitive impairment without behavioral disturbance, brought [facility] the ED [facility] for the last 7 years for gradual deterioration in patient's ability to care for herself, in the context of recent psychotropic medication changes due to an episode of lithium toxicity in August of 2021.    Patient's current presentation is consistent with severe bipolar depression with psychotic features underlying dementia with behavioral disturbance. Patient was stable on lithium for decades (though per son, she was never "normal" and always fluctuated between highs and lows)--was restarted on low dose lithium 150 mg qday after discussion with outpatient nephrologist Dr. Owens. However, even on a low dose patient's creatinine increased to 2.6 (baseline fluctuates around 2) and it was discontinued on 11/19. Patient's renal function is now stable at 1.8 and hypercalcemia has resolved. Patient has been on Prozac for about a month and Zyprexa for a week with good response and then deterioration. Trialed on Seroquel 250, stopped due to worsening of sxs, and put back on Zyprexa for 10 days with little improvement in symptoms.    Overall Amber's response to medication treatment has been poor; she remains markedly dysphoric, perseverative, and delusional, though she is otherwise functional and able to socialize with others on unit. She continues to endorse poor sleep and appetite despite observed to be sleeping well. No SI/HI noted. Her condition worsened on Seroquel but has improved somewhat on Zyprexa--however, patient has been inconsistently compliant with medication, therefore will need to consider TOO. This limits choice of medication as only Zyprexa comes in injectable form--for now changed to dissolvable oral form to increase chances of compliance/decrease risk of cheeking. Patient's episodes of delusional distress during medication administration may represent sundowning in the context of progression of her primary dementia.    Patient has had good response to ECT--long wait lists make this an unlikely option during inpatient admission, however will start application process. Other option includes IV Ketamine infusion or cariprazine if patient is taking PO medications.

## 2021-12-06 NOTE — PROGRESS NOTE BEHAVIORAL HEALTH - NSBHFUPINTERVALHXFT_PSY_A_CORE
Per nursing, Amber has been refusing her medications.    Amber has been intermittently functional on the unit, sometimes out of her room and conversing normally with other patients, at other times dysphoric and refusing to come out of her room and take medications. She is requiring significant encouragement, is intermittently amenable to taking medications, says "go ahead and give me the injection". She did not take any of her medications this morning, however the treatment team is able to convince her to get out of bed and go to the TV room. She has developed a new delusion that various members of the treatment team have killed her sons, is minimally redirectable with reassurance. She is sleeping well but eating poorly. Denies hallucinations.

## 2021-12-06 NOTE — PROGRESS NOTE BEHAVIORAL HEALTH - PROBLEM SELECTOR PLAN 1
- c/w Prozac 40 mg qday  - change to Zyprexa Zidis oral dissolvable tablet 10 mg at bedtime (increased on 12/6)  - c/w Klonopin 0.5 mg only at bedtime for anxiety

## 2021-12-06 NOTE — CHART NOTE - NSCHARTNOTEFT_GEN_A_CORE
Social Work Note:    Treatment team met with patient on unit rounds earlier this morning.  Per nursing report, patient asked “why would you kill my family?”  After interview patient was brought to the day room.  During the day patient has been isolative to her room.  She continues to refuse medications prescribed.      Prior to admission patient was living at [facility].  Communication ongoing with facility representative ANTOINETTE Montesinos at [facility] [phone] with last contact on 12/03.   Facility to be updated to patient’s progress.      Mental Status Exam:    Mood – Depressed   Sleep – Fair    Appetite – Fair    ADLs – Fair    Thought Process – Perseverative   Observation – [id number]    No barriers to discharge identified at this time.     At this time patient is not psychiatrically stable for discharge.

## 2021-12-07 LAB
ALBUMIN SERPL ELPH-MCNC: 4.3 G/DL — SIGNIFICANT CHANGE UP (ref 3.5–5.2)
ALP SERPL-CCNC: 100 U/L — SIGNIFICANT CHANGE UP (ref 30–115)
ALT FLD-CCNC: 14 U/L — SIGNIFICANT CHANGE UP (ref 0–41)
ANION GAP SERPL CALC-SCNC: 13 MMOL/L — SIGNIFICANT CHANGE UP (ref 7–14)
AST SERPL-CCNC: 14 U/L — SIGNIFICANT CHANGE UP (ref 0–41)
BILIRUB SERPL-MCNC: <0.2 MG/DL — SIGNIFICANT CHANGE UP (ref 0.2–1.2)
BUN SERPL-MCNC: 34 MG/DL — HIGH (ref 10–20)
CALCIUM SERPL-MCNC: 9.8 MG/DL — SIGNIFICANT CHANGE UP (ref 8.5–10.1)
CHLORIDE SERPL-SCNC: 103 MMOL/L — SIGNIFICANT CHANGE UP (ref 98–110)
CO2 SERPL-SCNC: 22 MMOL/L — SIGNIFICANT CHANGE UP (ref 17–32)
CREAT SERPL-MCNC: 1.8 MG/DL — HIGH (ref 0.7–1.5)
GLUCOSE BLDC GLUCOMTR-MCNC: 157 MG/DL — HIGH (ref 70–99)
GLUCOSE SERPL-MCNC: 124 MG/DL — HIGH (ref 70–99)
HCT VFR BLD CALC: 34.3 % — LOW (ref 37–47)
HGB BLD-MCNC: 10.9 G/DL — LOW (ref 12–16)
MAGNESIUM SERPL-MCNC: 2.1 MG/DL — SIGNIFICANT CHANGE UP (ref 1.8–2.4)
MCHC RBC-ENTMCNC: 26.5 PG — LOW (ref 27–31)
MCHC RBC-ENTMCNC: 31.8 G/DL — LOW (ref 32–37)
MCV RBC AUTO: 83.3 FL — SIGNIFICANT CHANGE UP (ref 81–99)
NRBC # BLD: 0 /100 WBCS — SIGNIFICANT CHANGE UP (ref 0–0)
PHOSPHATE SERPL-MCNC: 3.6 MG/DL — SIGNIFICANT CHANGE UP (ref 2.1–4.9)
PLATELET # BLD AUTO: 243 K/UL — SIGNIFICANT CHANGE UP (ref 130–400)
POTASSIUM SERPL-MCNC: 5 MMOL/L — SIGNIFICANT CHANGE UP (ref 3.5–5)
POTASSIUM SERPL-SCNC: 5 MMOL/L — SIGNIFICANT CHANGE UP (ref 3.5–5)
PROT SERPL-MCNC: 6.8 G/DL — SIGNIFICANT CHANGE UP (ref 6–8)
RBC # BLD: 4.12 M/UL — LOW (ref 4.2–5.4)
RBC # FLD: 13.4 % — SIGNIFICANT CHANGE UP (ref 11.5–14.5)
SODIUM SERPL-SCNC: 138 MMOL/L — SIGNIFICANT CHANGE UP (ref 135–146)
WBC # BLD: 6.06 K/UL — SIGNIFICANT CHANGE UP (ref 4.8–10.8)
WBC # FLD AUTO: 6.06 K/UL — SIGNIFICANT CHANGE UP (ref 4.8–10.8)

## 2021-12-07 PROCEDURE — 99231 SBSQ HOSP IP/OBS SF/LOW 25: CPT

## 2021-12-07 RX ADMIN — LISINOPRIL 10 MILLIGRAM(S): 2.5 TABLET ORAL at 13:58

## 2021-12-07 RX ADMIN — ATORVASTATIN CALCIUM 40 MILLIGRAM(S): 80 TABLET, FILM COATED ORAL at 20:15

## 2021-12-07 RX ADMIN — OLANZAPINE 10 MILLIGRAM(S): 15 TABLET, FILM COATED ORAL at 20:16

## 2021-12-07 RX ADMIN — Medication 0.5 MILLIGRAM(S): at 20:16

## 2021-12-07 NOTE — PROGRESS NOTE BEHAVIORAL HEALTH - OTHER
delusions of being thrown out into the street, of offensive body odor, of her family being killed Cotard delusion unable to assess if hallucinating independently of delusions

## 2021-12-07 NOTE — PROGRESS NOTE BEHAVIORAL HEALTH - PROBLEM SELECTOR PLAN 4
- hypertensive today (SBP 170s), noncompliant with medications; did take Lisinopril  - c/w lisinopril 40 mg qday  - c/w amlodipine 10 mg qday  - monitor BP

## 2021-12-07 NOTE — PROGRESS NOTE BEHAVIORAL HEALTH - NSBHFUPINTERVALHXFT_PSY_A_CORE
Patient with known previous CVA with deficits. CT head showing  Acute/subacute right temporoparietal infarct with mild local mass effect and small amount of fiath-infarct hemorrhage posteriorly.    - seizure precautions    - patient not on AC currently; reviewed neurology notes from OSH- who recommended restarting AC for afib; will defer final decision to primary team           Per nursing, Amber thinks everyone is dead and is refusing her medications.     This morning Amber is lying in bed endorsing that everyone around her is dead and she is also dead. She refers to her treatment team as "the walking dead." She is dysphoric, irritable, and noncooperative, however sits up in bed and is able to be convinced to walk to the TV room with her walker as long as no one looks directly at her. However once there, she again leaves, endorsing anxiety. She refuses medication for anxiety and refuses ECT. She reports that her sons are dead.    Patient reassessed in afternoon--per RN, she ate half a sandwich with encouragement. Writer was able to get her to take her lisinopril, however she continued to refuse all other medications even with encouragement from son Catarino over the phone. She is remorseful toward team for calling them the living dead. Per nursing, Amber thinks everyone is dead and is refusing her medications.     This morning Amber is lying in bed endorsing that everyone around her is dead and she is also dead. She refers to her treatment team as "the walking dead." She is dysphoric, irritable, and noncooperative, however sits up in bed and is able to be convinced to walk to the TV room with her walker as long as no one looks directly at her. However once there, she again leaves, endorsing anxiety. She refuses medication for anxiety and refuses ECT. She reports that her sons are dead. She denies pain anywhere in her body though does endorse some shoulder pain, refuses medication for pain.    Patient reassessed in afternoon--per RN, she ate half a sandwich with encouragement. Writer was able to get her to take her lisinopril, however she continued to refuse all other medications even with encouragement from son Catarino over the phone. She is remorseful toward team for calling them the living dead.

## 2021-12-07 NOTE — PROGRESS NOTE BEHAVIORAL HEALTH - SUMMARY
Ms. Rodarte is a 77 y/o female with history of bipolar depression (Bipolar II disorder), hypertension, osteoarthritis, T2DM, cognitive impairment without behavioral disturbance, brought to the ED at the behest of the custodial she has been residing for the last 7 years for gradual deterioration in patient's ability to care for herself, in the context of recent psychotropic medication changes due to an episode of lithium toxicity in August of 2021.    Patient's current presentation is consistent with severe bipolar depression with psychotic features on underlying dementia with behavioral disturbance. Patient was stable on lithium for decades (though per son, she was never "normal" and always fluctuated between highs and lows).  During this admission patient has been tried on:  low dose lithium (d/c-ed due to renal injury, no response)  Seroquel (up to 250 mg, d/c-ed due to worsening psychosis)  Prozac (since November 8th) and Zyprexa (currently on Zadis) -- some initial improvement, but now worsening    Overall Amber's response to medication treatment has been poor. She has had increasingly severe delusions of depressive affect (today endorses Cotard's delusion) and is more irritable and uncooperative with staff. Will discontinue Prozac due to poor efficacy, compliance (refused lower dose today for titration down), and risk of transformation to mixed episode. Will c/w Zyprexa Zadis 10 mg at bedtime for bipolar depression, especially as IM version available if TOO is needed. Attending will discuss with mental health  tomorrow re: need for TOO.    Amber displays a variable pattern of functionality and mentation during the day, potentially indicating a delirium or sundowning of her primary dementia. However she has been intermittently refusing care, including labs. She continues to endorse poor sleep and appetite, however has been eating with supervision and observed to be sleeping peacefully. No SI/HI noted.     Patient has had good response to ECT in the past--long wait lists and poor patient health make this an unlikely option during inpatient admission, however will start application process after consultation with mental health . Other options include IV Ketamine infusion, cariprazine, or lurasidone (patient has had trial outpatient before, unclear for how long or what response was) if patient is taking PO medications.

## 2021-12-07 NOTE — PROGRESS NOTE BEHAVIORAL HEALTH - THOUGHT PROCESS
Tangential/Perseverative/Illogical/Impaired reasoning/Disorganized Perseverative/Flight of ideas/Illogical/Impaired reasoning/Disorganized

## 2021-12-07 NOTE — PROGRESS NOTE BEHAVIORAL HEALTH - PROBLEM SELECTOR PLAN 1
- d/c Prozac, patient may be having mixed episode  - c/w Zyprexa Zidis oral dissolvable tablet 10 mg at bedtime (increased on 12/6)  - c/w Klonopin 0.5 mg only at bedtime for anxiety

## 2021-12-08 LAB — GLUCOSE BLDC GLUCOMTR-MCNC: 133 MG/DL — HIGH (ref 70–99)

## 2021-12-08 PROCEDURE — 99231 SBSQ HOSP IP/OBS SF/LOW 25: CPT

## 2021-12-08 RX ORDER — LOPERAMIDE HCL 2 MG
2 TABLET ORAL
Refills: 0 | Status: DISCONTINUED | OUTPATIENT
Start: 2021-12-08 | End: 2022-01-27

## 2021-12-08 RX ORDER — CLONAZEPAM 1 MG
0.5 TABLET ORAL AT BEDTIME
Refills: 0 | Status: DISCONTINUED | OUTPATIENT
Start: 2021-12-10 | End: 2021-12-17

## 2021-12-08 RX ORDER — LAMOTRIGINE 25 MG/1
25 TABLET, ORALLY DISINTEGRATING ORAL DAILY
Refills: 0 | Status: DISCONTINUED | OUTPATIENT
Start: 2021-12-08 | End: 2021-12-15

## 2021-12-08 RX ORDER — LAMOTRIGINE 25 MG/1
25 TABLET, ORALLY DISINTEGRATING ORAL ONCE
Refills: 0 | Status: COMPLETED | OUTPATIENT
Start: 2021-12-08 | End: 2021-12-08

## 2021-12-08 RX ADMIN — PANTOPRAZOLE SODIUM 40 MILLIGRAM(S): 20 TABLET, DELAYED RELEASE ORAL at 08:02

## 2021-12-08 RX ADMIN — LAMOTRIGINE 25 MILLIGRAM(S): 25 TABLET, ORALLY DISINTEGRATING ORAL at 13:58

## 2021-12-08 RX ADMIN — Medication 30 MILLILITER(S): at 13:58

## 2021-12-08 NOTE — CHART NOTE - NSCHARTNOTEFT_GEN_A_CORE
Social Work Note:    Treatment team met with patient on unit rounds earlier this morning.  Patient continues to refuse medications.  She has been reported by nursing to be disorganized and paranoid.  Patient does not engage with treatment team members on interview.  During the day she has been isolative to her room and in bed.  Patient was encouraged to be visible on the unit, sit in the day room and attend groups.      Prior to admission patient was living at [facility].  Communication ongoing with facility representative ANTOINETTE Montesinos at [facility] [phone] with last contact today.  Yamel plans to visit with patient this afternoon.       Mental Status Exam:    Mood – Depressed   Sleep – Fair    Appetite – Fair    ADLs – Fair    Thought Process – Disorganized    Observation – [id number]    No barriers to discharge identified at this time.     At this time patient is not psychiatrically stable for discharge.

## 2021-12-08 NOTE — PROGRESS NOTE BEHAVIORAL HEALTH - NSBHFUPINTERVALHXFT_PSY_A_CORE
Pt was seen, evaluated, chart reviewed. As per nursing staff, no events overnight. Pt took her medications last evening. On evaluation, pt reports she is "ok" but is hesitant to engage in a conversation with treatment team. Continues to say "just please." Wants to be isolative in her room. Requires a lot of encouragement to get out of bed but is able to do it. Denies negative side effects of medications. Pt ate her breakfast today. Denied auditory/visual hallucinations. Denied suicidal/homicidal ideation, intent or plan.

## 2021-12-08 NOTE — PROGRESS NOTE BEHAVIORAL HEALTH - SUMMARY
Ms. Rodarte is a 75 y/o female with history of bipolar depression (Bipolar II disorder), hypertension, osteoarthritis, T2DM, cognitive impairment without behavioral disturbance, brought to the ED at the behest of the [facility] she has been residing for the last 7 years for gradual deterioration in patient's ability to care for herself, in the context of recent psychotropic medication changes due to an episode of lithium toxicity in August of 2021.    Patient's current presentation is consistent with severe bipolar depression with psychotic features on underlying dementia with behavioral disturbance. Patient was stable on lithium for decades (though per son, she was never "normal" and always fluctuated between highs and lows).  During this admission patient has been tried on:  low dose lithium (d/c-ed due to renal injury, no response)  Seroquel (up to 250 mg, d/c-ed due to worsening psychosis)  Prozac (since November 8th) and Zyprexa (currently on Zadis) -- some initial improvement, but now worsening    Overall Amber's response to medication treatment has been poor. She has had increasingly severe delusions of depressive affect (today endorses Cotard's delusion) and is more irritable and uncooperative with staff. Will discontinue Prozac due to poor efficacy, compliance (refused lower dose today for titration down), and risk of transformation to mixed episode. Will c/w Zyprexa Zadis 10 mg at bedtime for bipolar depression, especially as IM version available if TOO is needed. Attending will discuss with mental health  tomorrow re: need for TOO.    Amber displays a variable pattern of functionality and mentation during the day, potentially indicating a delirium or sundowning of her primary dementia. However she has been intermittently refusing care, including labs. She continues to endorse poor sleep and appetite, however has been eating with supervision and observed to be sleeping peacefully. No SI/HI noted.     Patient has had good response to ECT in the past--long wait lists and poor patient health make this an unlikely option during inpatient admission, however will start application process after consultation with mental health . Other options include IV Ketamine infusion, cariprazine, or lurasidone (patient has had trial outpatient before, unclear for how long or what response was) if patient is taking PO medications.

## 2021-12-08 NOTE — CHART NOTE - NSCHARTNOTEFT_GEN_A_CORE
Pt seen with [facility]  for assessment for tx over objection.    Pt is non compliant with meds and continues to say that she is dead.  She also states she has no hair on her head, and that she smells bad, which is why evyashiraone wears masks near her.    Pt is psychotic; agree with plan as proposed for tx over objection

## 2021-12-08 NOTE — PROGRESS NOTE BEHAVIORAL HEALTH - PROBLEM SELECTOR PLAN 1
- c/w Zyprexa Zidis oral dissolvable tablet 10 mg at bedtime (increased on 12/6)  - c/w Klonopin 0.5 mg only at bedtime for anxiety  - Start lamictal 25 mg qd

## 2021-12-09 LAB — GLUCOSE BLDC GLUCOMTR-MCNC: 138 MG/DL — HIGH (ref 70–99)

## 2021-12-09 PROCEDURE — 99231 SBSQ HOSP IP/OBS SF/LOW 25: CPT

## 2021-12-09 NOTE — PROGRESS NOTE BEHAVIORAL HEALTH - PROBLEM SELECTOR PLAN 1
- c/w Zyprexa Zidis oral dissolvable tablet 10 mg at bedtime (increased on 12/6)  - c/w Klonopin 0.5 mg only at bedtime for anxiety  - Start lamictal 25 mg qd  - Pt is non-compliant with medications, TOO court for 12/13/21.

## 2021-12-09 NOTE — PROGRESS NOTE BEHAVIORAL HEALTH - NSBHFUPINTERVALHXFT_PSY_A_CORE
Pt was seen, evaluated, chart reviewed. As per nursing staff, pt continues to refuse all medications. On evaluation, pt reports she is "ok." When asked about why she is not taking her medications, pt responded "because it's a bad place." When asked to elaborate further, pt replied "you're not real, you are all the walking dead." Pt remains delusional and paranoid. Endorsed improved appetite today, finished most of her breakfast, however reported poor sleep. Denied auditory/visual hallucinations.  Denied suicidal/homicidal ideation, intent or plan.

## 2021-12-09 NOTE — CHART NOTE - NSCHARTNOTEFT_GEN_A_CORE
We called patient's son Catarino Rodarte [[phone]] to provide an update on the patient. Dr. Dian Reece spoke to Catarino:    Explained the situation regarding legal challenges to obtaining ECT treatment in a patient that is not providing their consent. Assured him that the referral is out and we are waiting to hear back, but managed his expectations since it is unlikely she will receive it. Also explained the recent addition of Lamotrigine to the patient's medication regimen, and the current goal of achieving medication compliance given the patient's fluctuating mood and willingness to take medications.

## 2021-12-09 NOTE — PROGRESS NOTE BEHAVIORAL HEALTH - SUMMARY
Ms. Rodarte is a 75 y/o female with history of bipolar depression (Bipolar II disorder), hypertension, osteoarthritis, T2DM, cognitive impairment without behavioral disturbance, brought to the ED at the behest of the [facility] she has been residing for the last 7 years for gradual deterioration in patient's ability to care for herself, in the context of recent psychotropic medication changes due to an episode of lithium toxicity in August of 2021.    Patient's current presentation is consistent with severe bipolar depression with psychotic features on underlying dementia with behavioral disturbance. Patient was stable on lithium for decades (though per son, she was never "normal" and always fluctuated between highs and lows).  During this admission patient has been tried on:  low dose lithium (d/c-ed due to renal injury, no response)  Seroquel (up to 250 mg, d/c-ed due to worsening psychosis)  Prozac (since November 8th) and Zyprexa (currently on Zadis) -- some initial improvement, but now worsening    Overall Amber's response to medication treatment has been poor. She has had increasingly severe delusions of depressive affect (today endorses Cotard's delusion) and is more irritable and uncooperative with staff. Will discontinue Prozac due to poor efficacy, compliance (refused lower dose today for titration down), and risk of transformation to mixed episode. Will c/w Zyprexa Zadis 10 mg at bedtime for bipolar depression, especially as IM version available if TOO is needed. Attending will discuss with mental health  tomorrow re: need for TOO.    Amber displays a variable pattern of functionality and mentation during the day, potentially indicating a delirium or sundowning of her primary dementia. However she has been intermittently refusing care, including labs. She continues to endorse poor sleep and appetite, however has been eating with supervision and observed to be sleeping peacefully. No SI/HI noted.     Patient has had good response to ECT in the past--long wait lists and poor patient health make this an unlikely option during inpatient admission, however will start application process after consultation with mental health . Other options include IV Ketamine infusion, cariprazine, or lurasidone (patient has had trial outpatient before, unclear for how long or what response was) if patient is taking PO medications.

## 2021-12-10 LAB
GLUCOSE BLDC GLUCOMTR-MCNC: 170 MG/DL — HIGH (ref 70–99)
GLUCOSE BLDC GLUCOMTR-MCNC: 188 MG/DL — HIGH (ref 70–99)

## 2021-12-10 PROCEDURE — 99231 SBSQ HOSP IP/OBS SF/LOW 25: CPT

## 2021-12-10 RX ADMIN — AMLODIPINE BESYLATE 10 MILLIGRAM(S): 2.5 TABLET ORAL at 10:57

## 2021-12-10 RX ADMIN — LISINOPRIL 10 MILLIGRAM(S): 2.5 TABLET ORAL at 10:58

## 2021-12-10 RX ADMIN — Medication 81 MILLIGRAM(S): at 10:58

## 2021-12-10 RX ADMIN — ATORVASTATIN CALCIUM 40 MILLIGRAM(S): 80 TABLET, FILM COATED ORAL at 21:04

## 2021-12-10 RX ADMIN — Medication 0.5 MILLIGRAM(S): at 21:04

## 2021-12-10 RX ADMIN — OLANZAPINE 10 MILLIGRAM(S): 15 TABLET, FILM COATED ORAL at 21:04

## 2021-12-10 NOTE — PROGRESS NOTE BEHAVIORAL HEALTH - PROBLEM SELECTOR PLAN 6
- f/u labs, patient may be refusing  - sodium bicarb 1300 mg BID  - Low K+ diet  - hypercalcemia resolved; low Vit D noted  - after discharge follow up with Dr. Owens
- sodium bicarb 1300 mg BID  - Low K+ diet  - hypercalcemia resolved; low Vit D noted  - after discharge follow up with Dr. Owens
- sodium bicarb 1300 mg BID  - Low K+ diet  - hypercalcemia noted, follow up Sunday labs  - after discharge follow up with Dr. Owens
- f/u AM labs  - sodium bicarb 1300 mg BID  - Low K+ diet  - hypercalcemia resolved; low Vit D noted  - after discharge follow up with Dr. Owens
- increased Cr, hyperkalemia and hypercalcemia noted, low dose lithium d/c-ed  - sodium bicarb 1300 mg BID  - Low K+ diet  - f/u Monday labs   - after discharge follow up with Dr. Owens
- sodium bicarb 1300 mg BID  - Low K+ diet  - hypercalcemia resolved; low Vit D noted  - after discharge follow up with Dr. Owens
- start sodium bicarb 1300 mg BID  - Low K+ diet  - after discharge follow up with Dr. Owens
- sodium bicarb 1300 mg BID  - Low K+ diet  - hypercalcemia resolved; low Vit D noted  - after discharge follow up with Dr. Owens
- increased Cr, hyperkalemia and hypercalcemia noted, low dose lithium d/c-ed  - sodium bicarb 1300 mg BID  - Low K+ diet  - f/u Monday labs   - after discharge follow up with Dr. Owens
- sodium bicarb 1300 mg BID  - Low K+ diet  - f/u PTH in AM -- hypercalcemia noted, may be cause of abdominal pain  - after discharge follow up with Dr. Owens
- sodium bicarb 1300 mg BID  - Low K+ diet  - f/u PTH in AM  - after discharge follow up with Dr. Owens
- sodium bicarb 1300 mg BID  - Low K+ diet  - after discharge follow up with Dr. Owens
- start sodium bicarb 1300 mg BID  - Low K+ diet  - after discharge follow up with Dr. Owens
- renal function stable (Cr 1.8)  - sodium bicarb 1300 mg BID  - Low K+ diet  - hypercalcemia resolved; low Vit D noted  - after discharge follow up with Dr. Owens
- f/u labs, patient may be refusing  - sodium bicarb 1300 mg BID  - Low K+ diet  - hypercalcemia resolved; low Vit D noted  - after discharge follow up with Dr. Owens
- f/u labs, patient may be refusing  - sodium bicarb 1300 mg BID  - Low K+ diet  - hypercalcemia resolved; low Vit D noted  - after discharge follow up with Dr. Owens
- start sodium bicarb 1300 mg BID  - Low K+ diet  - after discharge follow up with Dr. Owens

## 2021-12-10 NOTE — PROGRESS NOTE BEHAVIORAL HEALTH - OTHER
unable to assess if hallucinating independently of delusions Cotard delusion intermittently cooperative, irritable does not endorse Cotard delusion today not formally assessed internally preoccupied

## 2021-12-10 NOTE — PROGRESS NOTE BEHAVIORAL HEALTH - PROBLEM SELECTOR PROBLEM 5
Hyperlipidemia

## 2021-12-10 NOTE — PROGRESS NOTE BEHAVIORAL HEALTH - PROBLEM SELECTOR PROBLEM 6
Stage 3 chronic kidney disease

## 2021-12-10 NOTE — PROGRESS NOTE BEHAVIORAL HEALTH - PROBLEM SELECTOR PLAN 4
- hypertensive today (SBP 170s), intermittently compliant with medications; did take Lisinopril and amlodipine today  - c/w lisinopril 40 mg qday  - c/w amlodipine 10 mg qday  - monitor BP

## 2021-12-10 NOTE — CHART NOTE - NSCHARTNOTEFT_GEN_A_CORE
Social Work Note:    Treatment team met with patient on unit rounds earlier this morning.  Patient had poor eye contact and only responded to questions with minimal answers.  During the day patient has been isolative to her room at times.  With encouragement she has been in the day room.  Group attendance has been selective.      Treatment over objection hearing scheduled for Monday.  Patient was made aware of same.  She continues to refuse medications prescribed.      Prior to admission patient was a resident of [facility].  Communication ongoing with facility representative ANTOINETTE Montesinos at [facility] [phone] with last contact yesterday 12/09.         Mental Status Exam:    Mood – Depressed   Sleep – Fair    Appetite – Fair    ADLs – Fair    Thought Process – Disorganized    Observation – [id number]    No barriers to discharge identified at this time.     At this time patient is not psychiatrically stable for discharge.

## 2021-12-10 NOTE — PROGRESS NOTE BEHAVIORAL HEALTH - THOUGHT PROCESS
Perseverative/Flight of ideas/Illogical/Impaired reasoning/Disorganized Perseverative/Illogical/Impaired reasoning/Disorganized

## 2021-12-10 NOTE — PROGRESS NOTE BEHAVIORAL HEALTH - SUMMARY
Ms. Rodarte is a 75 y/o female with history of bipolar depression (Bipolar II disorder), hypertension, osteoarthritis, T2DM, cognitive impairment without behavioral disturbance, brought to the ED at the behest of the assisted she has been residing for the last 7 years for gradual deterioration in patient's ability to care for herself, in the context of recent psychotropic medication changes due to an episode of lithium toxicity in August of 2021.    Patient's current presentation is consistent with severe bipolar depression with psychotic features on underlying dementia with behavioral disturbance. Patient was stable on lithium for decades (though per son, she was never "normal" and always fluctuated between highs and lows).  During this admission patient has been tried on:  low dose lithium (d/c-ed due to renal injury, no response)  Seroquel (up to 250 mg, d/c-ed due to worsening psychosis)  Prozac (since November 8th) and Zyprexa (currently on Zadis) -- some initial improvement, but now worsening    Overall Amber's response to medication treatment has been poor. She has had increasingly severe delusions of depressive affect (no delusions reported today due to irritability) and has been intermittently uncooperative with staff. Prozac discontinuation has been associated with less agitation, depressive affect not improved. Will c/w Zyprexa Zadis 10 mg at bedtime for bipolar depression, especially as IM version available if TOO is needed. Lamotrigine 25 mg started for bipolar depression, patient has been intermittently compliant.    Amber displays a variable pattern of functionality and mentation during the day, potentially indicating a delirium or sundowning of her primary dementia. However she has been intermittently refusing care, including labs. She continues to endorse poor sleep and appetite, however has been eating with supervision and observed to be sleeping peacefully. No SI/HI noted.     Spoke to patient's son Javier about their options for ECT which both sons believe will help their mother most as it has in the past. Advised that though her ECT referral has been sent out, it is unlikely that a different institution will accept her using this method given risk factors and long line. Advised that one option would be to have patient discharged under the care of her family and have them take her to an ED in a facility that has ECT and see whether the treatment team at that facility agrees about necessity of ECT, which is not a guarantee. He verbalized understanding and will discuss with brother Catarino before coming to decision.

## 2021-12-10 NOTE — PROGRESS NOTE BEHAVIORAL HEALTH - PROBLEM SELECTOR PLAN 5
- c/w atorvastatin 40 mg qhs  - hyperTG noted, however will hold off on fenofibrate due to potential kidney injury

## 2021-12-10 NOTE — PROGRESS NOTE BEHAVIORAL HEALTH - PROBLEM SELECTOR PLAN 1
- c/w Zyprexa Zidis oral dissolvable tablet 10 mg at bedtime (increased on 12/6)  - c/w Klonopin 0.5 mg only at bedtime for anxiety  - c/w lamictal 25 mg qd  - Pt is non-compliant with medications, TOO court for 12/13/21.

## 2021-12-10 NOTE — PROGRESS NOTE BEHAVIORAL HEALTH - NSBHFUPINTERVALHXFT_PSY_A_CORE
Per nursing, Amber is refusing her medications.    This morning, Amber is in bed turned away from treatment group. She reports that she is taking her medications and wants to be left alone. There are remnants of her breakfast in room--she reports that she is eating and drinking and taking her medications, but wants to be left alone. Per nursing, Amber is refusing her medications.    This morning, Amber is in bed turned away from treatment group. She reports that she is taking her medications and wants to be left alone. There are remnants of her breakfast in room--she reports that she is eating and drinking and taking her medications, but wants to be left alone. Patient reports poor mood and refuses to answer questions. She does take some of her medications (BP medications, aspirin) after rounds.    Spoke to patient's son Javier about their options for ECT which both sons believe will help their mother most as it has in the past. Advised that though her ECT referral has been sent out, it is unlikely that a different institution will accept her using this method given risk factors and long line. Advised that one option would be to have patient discharged under the care of her family and have them take her to an ED in a facility that has ECT and see whether the treatment team at that facility agrees about necessity of ECT, which is not a guarantee. He verbalized understanding and will discuss with brother Catarino before coming to decision.

## 2021-12-11 LAB — GLUCOSE BLDC GLUCOMTR-MCNC: 144 MG/DL — HIGH (ref 70–99)

## 2021-12-11 RX ADMIN — Medication 1300 MILLIGRAM(S): at 14:37

## 2021-12-11 RX ADMIN — AMLODIPINE BESYLATE 10 MILLIGRAM(S): 2.5 TABLET ORAL at 14:36

## 2021-12-11 RX ADMIN — Medication 325 MILLIGRAM(S): at 14:37

## 2021-12-11 RX ADMIN — PANTOPRAZOLE SODIUM 40 MILLIGRAM(S): 20 TABLET, DELAYED RELEASE ORAL at 07:48

## 2021-12-11 RX ADMIN — LAMOTRIGINE 25 MILLIGRAM(S): 25 TABLET, ORALLY DISINTEGRATING ORAL at 14:37

## 2021-12-11 RX ADMIN — LISINOPRIL 10 MILLIGRAM(S): 2.5 TABLET ORAL at 14:37

## 2021-12-11 RX ADMIN — Medication 81 MILLIGRAM(S): at 14:36

## 2021-12-12 LAB — GLUCOSE BLDC GLUCOMTR-MCNC: 129 MG/DL — HIGH (ref 70–99)

## 2021-12-12 RX ADMIN — Medication 1300 MILLIGRAM(S): at 08:02

## 2021-12-12 RX ADMIN — Medication 325 MILLIGRAM(S): at 08:02

## 2021-12-12 RX ADMIN — PANTOPRAZOLE SODIUM 40 MILLIGRAM(S): 20 TABLET, DELAYED RELEASE ORAL at 08:03

## 2021-12-12 RX ADMIN — AMLODIPINE BESYLATE 10 MILLIGRAM(S): 2.5 TABLET ORAL at 08:01

## 2021-12-12 RX ADMIN — Medication 0.5 MILLIGRAM(S): at 21:12

## 2021-12-12 RX ADMIN — OLANZAPINE 10 MILLIGRAM(S): 15 TABLET, FILM COATED ORAL at 21:13

## 2021-12-12 RX ADMIN — LISINOPRIL 10 MILLIGRAM(S): 2.5 TABLET ORAL at 08:02

## 2021-12-12 RX ADMIN — Medication 650 MILLIGRAM(S): at 16:15

## 2021-12-12 RX ADMIN — ATORVASTATIN CALCIUM 40 MILLIGRAM(S): 80 TABLET, FILM COATED ORAL at 21:12

## 2021-12-12 RX ADMIN — Medication 81 MILLIGRAM(S): at 08:02

## 2021-12-12 RX ADMIN — LAMOTRIGINE 25 MILLIGRAM(S): 25 TABLET, ORALLY DISINTEGRATING ORAL at 08:01

## 2021-12-12 RX ADMIN — Medication 1300 MILLIGRAM(S): at 21:13

## 2021-12-13 LAB — GLUCOSE BLDC GLUCOMTR-MCNC: 131 MG/DL — HIGH (ref 70–99)

## 2021-12-13 PROCEDURE — 99231 SBSQ HOSP IP/OBS SF/LOW 25: CPT

## 2021-12-13 RX ADMIN — LISINOPRIL 10 MILLIGRAM(S): 2.5 TABLET ORAL at 08:11

## 2021-12-13 RX ADMIN — ATORVASTATIN CALCIUM 40 MILLIGRAM(S): 80 TABLET, FILM COATED ORAL at 20:25

## 2021-12-13 RX ADMIN — OLANZAPINE 10 MILLIGRAM(S): 15 TABLET, FILM COATED ORAL at 20:25

## 2021-12-13 RX ADMIN — Medication 1300 MILLIGRAM(S): at 08:11

## 2021-12-13 RX ADMIN — Medication 0.5 MILLIGRAM(S): at 20:25

## 2021-12-13 RX ADMIN — Medication 1300 MILLIGRAM(S): at 20:25

## 2021-12-13 RX ADMIN — HEPARIN SODIUM 5000 UNIT(S): 5000 INJECTION INTRAVENOUS; SUBCUTANEOUS at 20:27

## 2021-12-13 RX ADMIN — Medication 81 MILLIGRAM(S): at 08:11

## 2021-12-13 RX ADMIN — AMLODIPINE BESYLATE 10 MILLIGRAM(S): 2.5 TABLET ORAL at 08:12

## 2021-12-13 RX ADMIN — LAMOTRIGINE 25 MILLIGRAM(S): 25 TABLET, ORALLY DISINTEGRATING ORAL at 08:11

## 2021-12-13 RX ADMIN — Medication 325 MILLIGRAM(S): at 08:11

## 2021-12-13 RX ADMIN — PANTOPRAZOLE SODIUM 40 MILLIGRAM(S): 20 TABLET, DELAYED RELEASE ORAL at 08:11

## 2021-12-13 NOTE — DISCHARGE NOTE BEHAVIORAL HEALTH - NSBHDCRESPONSEFT_PSY_A_CORE
Pt presented to the unit, feeling "not right." She then went through a phase of having delusions and not being compliant with medications, where at one point TOO was considered. Since then, pt has been compliant with medications and has been "stable." However, pt has a component of Dementia, which will likely progress. At the time of discharge, pt does not have delusions. Her sleep and appetite have improved. Pt was calm and cooperative and was in good behavioral control. Patient denied any suicidal or homicidal ideations. Patient denied any auditory or visual hallucinations. Pt was evaluated by treatment team, pt is stable for discharge and patient shows no imminent danger to self, others or property at this time. Pt understands and agrees with treatment plan and following up with outpatient. Psychoeducation provided regarding diagnosis, medications, treatment and follow up. Risks, benefits, alternatives discussed, all questions and concerns addressed and answered. Reviewed crisis intervention with verbalized understanding. Pt's son has been in touch with the patient over the phone, felt safe with her discharge plan.

## 2021-12-13 NOTE — DISCHARGE NOTE BEHAVIORAL HEALTH - NS MD DC FALL RISK RISK
For information on Fall & Injury Prevention, visit: [email or web address] OR  [email or web address] OR  https://www.cdc.gov/steadi/patient.html

## 2021-12-13 NOTE — BH SAFETY PLAN - STEP 6 SAFE ENVIRONMENT
If I could find a way to not feel the way I am now, I would be safer. If I had people to help me take care of myself properly, I would be safer.

## 2021-12-13 NOTE — DISCHARGE NOTE BEHAVIORAL HEALTH - FAMILY HISTORY OF PSYCHIATRIC ILLNESS
Currently domiciled at assisted living facility. Has two adult sons, 7 grand children, . Currently domiciled at assisted living facility. Has two adult sons, 7 grandchildren, .

## 2021-12-13 NOTE — PROGRESS NOTE BEHAVIORAL HEALTH - OTHER
intermittently cooperative, irritable not formally assessed does not endorse delusions about sons being in danger today

## 2021-12-13 NOTE — DISCHARGE NOTE BEHAVIORAL HEALTH - MEDICATION SUMMARY - MEDICATIONS TO STOP TAKING
I will STOP taking the medications listed below when I get home from the hospital:    glimepiride 4 mg oral tablet  -- 1 tab(s) by mouth once a day    hydrALAZINE 50 mg oral tablet  -- 1 tab(s) by mouth 3 times a day    omeprazole 20 mg oral delayed release tablet  -- 1 tab(s) by mouth once a day    Ativan 0.5 mg oral tablet  -- 1 tab(s) by mouth once a day    haloperidol 0.5 mg oral tablet  -- 1 tab(s) by mouth every 12 hours    metoprolol succinate 25 mg oral tablet, extended release  -- 1 tab(s) by mouth once a day    donepezil 10 mg oral tablet  -- 1 tab(s) by mouth once a day (at bedtime)    lithium 150 mg oral capsule  -- 1 cap(s) by mouth once a day    Flagyl 500 mg oral tablet  -- 1 tab(s) by mouth every 8 hours   -- Do not drink alcoholic beverages when taking this medication.  Finish all this medication unless otherwise directed by prescriber.  May discolor urine or feces.    Cipro 500 mg oral tablet  -- 1 tab(s) by mouth every 12 hours   -- Avoid prolonged or excessive exposure to direct and/or artificial sunlight while taking this medication.  Check with your doctor before becoming pregnant.  Do not take dairy products, antacids, or iron preparations within one hour of this medication.  Finish all this medication unless otherwise directed by prescriber.  Medication should be taken with plenty of water.    Cipro 500 mg oral tablet  -- 1 tab(s) by mouth 2 times a day   -- Avoid prolonged or excessive exposure to direct and/or artificial sunlight while taking this medication.  Check with your doctor before becoming pregnant.  Do not take dairy products, antacids, or iron preparations within one hour of this medication.  Finish all this medication unless otherwise directed by prescriber.  Medication should be taken with plenty of water.    Flagyl 500 mg oral tablet  -- 1 tab(s) by mouth 3 times a day   -- Do not drink alcoholic beverages when taking this medication.  Finish all this medication unless otherwise directed by prescriber.  May discolor urine or feces.

## 2021-12-13 NOTE — PROGRESS NOTE BEHAVIORAL HEALTH - NSBHADMITMEDEDUDETAILS_A_CORE FT
education provided, discussed with sons
education provided, discussed with son
education provided, discussed with sons
education provided, discussed with son
Psychoeducation provided, discussed with sons
education provided, discussed with son
education provided, discussed with son
education provided, discussed with sons
education provided, discussed with son
education provided, discussed with sons
education provided, discussed with son
education provided, discussed with sons
education provided, discussed with sons

## 2021-12-13 NOTE — CHART NOTE - NSCHARTNOTEFT_GEN_A_CORE
Social Work Note:    Treatment team met with patient on unit rounds earlier this morning.  Patient only minimally engaged on interview.  She reported to have felt tired.  Patient was made aware that court hearing was scheduled for today for treatment over objection.  She declined to participate in this hearing.       During the day patient has been isolative to her room at times.      Prior to admission patient was a resident of [facility].  Communication ongoing with facility representative ANTOINETTE Montesinos at [facility] [phone] with last contact on Friday 12/10.           Mental Status Exam:    Mood – Depressed   Sleep – Fair    Appetite – Fair    ADLs – Fair    Thought Process – Disorganized    Observation – [id number]    No barriers to discharge identified at this time.     At this time patient is not psychiatrically stable for discharge.

## 2021-12-13 NOTE — DISCHARGE NOTE BEHAVIORAL HEALTH - PAST PSYCHIATRIC HISTORY
Bipolar depression (Bipolar II disorder), mild cognitive impairment without behavioral disturbance.  Long standing history of bipolar depression, and had been stable with respect to acute episodes of depression/samra on lithium for apprx. 20 to 30 years, though earlier this summer medication had to be discontinued given patient came in with acute lithium toxicity and required medical admission for encephalopathy.  Since discontinuation of lithium in August, patient has trials multiple anti-psychotic medication including lurasidone, haloperidol, quetiapine, fluoxetine, but patient continued to deteriorate, and in present, is unable to care for herself, does not keep up with her hygiene, does not bathe, reports feeling confused, withdrawing, restless with insomnia though she feels tired and wants to sleep, hopeless, and with irritability and frustration leading to passive suicidal statements without plan or intent.    Patient has also previously had ECT for stabilization of her bipolar depression. Bipolar depression (Bipolar II disorder), mild cognitive impairment without behavioral disturbance.  Long standing history of bipolar depression, and had been stable with respect to acute episodes of depression/samra on lithium for approx. 20 to 30 years, though earlier this summer medication had to be discontinued given patient came in with acute lithium toxicity and required medical admission for encephalopathy.  Since discontinuation of lithium in August, patient has trials multiple anti-psychotic medication including lurasidone, haloperidol, quetiapine, fluoxetine, but patient continued to deteriorate, and in present, is unable to care for herself, does not keep up with her hygiene, does not bathe, reports feeling confused, withdrawing, restless with insomnia though she feels tired and wants to sleep, hopeless, and with irritability and frustration leading to passive suicidal statements without plan or intent.    Patient has also previously had ECT for stabilization of her bipolar depression.
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
## 2021-12-13 NOTE — DISCHARGE NOTE BEHAVIORAL HEALTH - MEDICATION SUMMARY - MEDICATIONS TO TAKE
I will START or STAY ON the medications listed below when I get home from the hospital:    aspirin 81 mg oral tablet, chewable  -- 1 tab(s) by mouth once a day. Continue to take until told otherwise by your provider.   -- Indication: For Heart    lisinopril 40 mg oral tablet  -- 1 tab(s) by mouth once a day. Continue to take until told otherwise by your provider.   -- Indication: For HTN (hypertension)    sodium bicarbonate 650 mg oral tablet  -- 2 tab(s) by mouth 3 times a day. Continue to take until told otherwise by your provider.   -- Indication: For GI    valproic acid 250 mg/5 mL oral liquid  -- 500 milligram(s) by mouth 2 times a day. Continue to take until told otherwise by your provider.   -- Indication: For Mood    traZODone 100 mg oral tablet  -- 1 tab(s) by mouth once a day (at bedtime). Continue to take until told otherwise by your provider.   -- Indication: For insomnia    SITagliptin 50 mg oral tablet  -- 1 tab(s) by mouth once a day. Continue to take until told otherwise by your provider.   -- Indication: For DM (diabetes mellitus)    atorvastatin 40 mg oral tablet  -- 1 tab(s) by mouth once a day. Continue to take until told otherwise by your provider.   -- Indication: For Hld    OLANZapine 10 mg oral tablet, disintegrating  -- 1 tab(s) by mouth once a day (at bedtime). Continue to take until told otherwise by your provider.   -- Indication: For Mood    amLODIPine 10 mg oral tablet  -- 1 tab(s) by mouth once a day. Continue to take until told otherwise by your provider.   -- Indication: For HTN (hypertension)    ferrous sulfate 325 mg (65 mg elemental iron) oral tablet  -- 1 tab(s) by mouth once a day. Continue to take until told otherwise by your provider.   -- Indication: For iron deficiency     melatonin 3 mg oral tablet  -- 5 milligram(s) by mouth once a day (at bedtime) at 19:00. Continue to take until told otherwise by your provider.   -- Indication: For insomnia    pantoprazole 40 mg oral delayed release tablet  -- 1 tab(s) by mouth once a day (before a meal). Continue to take until told otherwise by your provider.   -- Indication: For GERD

## 2021-12-13 NOTE — PROGRESS NOTE BEHAVIORAL HEALTH - PROBLEM SELECTOR PLAN 1
-c/w Zyprexa Zidis oral dissolvable tablet 10 mg at bedtime (increased on 12/6)  - c/w Klonopin 0.5 mg only at bedtime for anxiety  - Continue lamictal 25 mg qd  -  TOO court for 12/13/21 has been adjourned as pt has been compliant with medications over the weekend. - c/w Zyprexa Zydis oral dissolvable tablet 10 mg at bedtime (increased on 12/6)  - c/w Klonopin 0.5 mg only at bedtime for anxiety  - Continue lamictal 25 mg qd  - TOO court for 12/13/21 has been adjourned as pt has been compliant with medications over the weekend.

## 2021-12-13 NOTE — DISCHARGE NOTE BEHAVIORAL HEALTH - NSBHDCSWCOMMENTSFT_PSY_A_CORE
Discharge Summary to [facility] on 01/ Discharge Summary sent to [facility] electronically via [employer] on 01/27/2022 at 9:30am.

## 2021-12-13 NOTE — PROGRESS NOTE BEHAVIORAL HEALTH - SUMMARY
This is a 77 y/o female with PPH of bipolar depression (Bipolar II disorder), HTN, OA, T2DM, cognitive impairment without behavioral disturbance, [facility] she [facility] for the last 7 years for gradual deterioration in patient's ability to care for herself, in the context of recent psychotropic medication changes due to an episode of lithium toxicity in August of 2021.    Patient's current presentation is consistent with severe bipolar depression with psychotic features on underlying dementia with behavioral disturbance. Patient was stable on lithium for decades (though per son, she was never "normal" and always fluctuated between highs and lows).    During this admission patient has been tried on:  - Low dose lithium (d/c-ed due to renal injury, no response)  - Seroquel (up to 250 mg, d/c-ed due to worsening psychosis)  - Prozac (since November 8th) and Zyprexa (currently on Zydis) - some initial improvement, but now worsening    Overall Amber's response to medication treatment has been poor. She has had increasingly severe delusions of depressive affect (no delusions reported today- notably pt was complaint with medication yesterday). While she has been intermittently uncooperative with staff, today she appeared to comply more with staff's requests. Prozac discontinuation has been associated with less agitation, depressive affect not improved. Will c/w Zyprexa Zydis 10 mg at bedtime for bipolar depression, especially as IM version available if TOO is needed. Lamotrigine 25 mg started for bipolar depression, patient has been intermittently compliant. TOO was adjourned for now as pt was compliant with medication yesterday.     Amber displays a variable pattern of functionality and mentation during the day, potentially indicating a delirium or sundowning of her primary dementia. However she has been intermittently refusing care, including labs. She continues to endorse poor sleep and appetite, however has been eating with supervision and observed to be sleeping peacefully. No SI/HI noted.     Spoke to patient's son Javier about their options for ECT which both sons believe will help their mother most as it has in the past. Advised that though her ECT referral has been sent out, it is unlikely that a different institution will accept her using this method given risk factors and long line. Advised that one option would be to have patient discharged under the care of her family and have them take her to an ED in a facility that has ECT and see whether the treatment team at that facility agrees about necessity of ECT, which is not a guarantee. He verbalized understanding and agreed to discuss with brother Catarino before coming to decision. Today, he reported that he could not feasibly take the pt back into his care, and that the pt would have to be discharged to [facility] for plan to request ECT at a different facility.

## 2021-12-13 NOTE — DISCHARGE NOTE BEHAVIORAL HEALTH - NSBHDCMEDSFT_PSY_A_CORE
- Zyprexa Zydis oral dissolvable tablet 10 mg at bedtime for BD depression (increased on 12/6) - pt had mixed compliance, but responded well to this medication with no adverse effects.  - Depakene liquid 250 mg BID for mood-   - Klonopin 0.5 mg only at bedtime for anxiety- good response with no adverse effects.    During this admission patient has also been tried on:  - Low dose lithium (d/c-ed due to renal injury, no response)  - Seroquel (up to 250 mg, d/c-ed due to worsening psychosis)  - Prozac (up to 40 mg, started November 8th) - some initial improvement but dc'ed as pt was worsening.   - Lamictal 25 mg qd dc'ed as pt had been inconsistent with it. After several trials of medications, pt is currently on: Zyprexa Zydis oral dissolvable tablet 10 mg at bedtime for BD depression (increased on 12/6). Depakene liquid 500 mg BID for mood. Trazodone 100 mg qhs for insomnia and melatonin 5 mg at 19:00.     During this admission patient has also been tried on:  - Low dose lithium (d/c-ed due to renal injury, no response)  - Seroquel (up to 250 mg, d/c-ed due to worsening psychosis)  - Prozac (up to 40 mg, started November 8th) - some initial improvement but dc'ed as pt was worsening.   - Lamictal 25 mg qd dc'ed as pt had been inconsistent with it.

## 2021-12-13 NOTE — PROGRESS NOTE BEHAVIORAL HEALTH - NSBHFUPINTERVALHXFT_PSY_A_CORE
No overnight events per staff. Pt was coaxed into bathing over the weekend with the help of her son. Reportedly has required considerable encouragement to leave room and engage in daily activities.   Upon approach this morning, pt was calmly lying in bed, appropriately groomed and dressed. When asked why she was here, she answered that she did not know and that there was a terrible mistake in her coming. She evaded most questions and stated that she did not feel well. She could not state where she was living previously or how she came to the hospital. She did not endorse any delusions today about her sons being harmed, or paranoia that she would be forcibly discharged from the hospital unclothed, which were the case on previous evaluations. Notably, she was compliant with her medications yesterday. She reported eating her breakfast and endorsed poor sleep, which was why she was tired at the time of evaluation.   Son, Catarino Rodarte, called for collateral and update: reported that mother was able to eat with encouragement during his last visit. Concerned that she is far from her baseline (able to shop on her own, etc); this decline reportedly started in July. She has a history of dementia, but is not on any medications such as Sinemet, memantine, etc. Son is aware that attending physician will be attending court today for [facility]. He reported that he could not take the pt into his care and she had "be at her baseline for [facility] to accept her back."

## 2021-12-13 NOTE — DISCHARGE NOTE BEHAVIORAL HEALTH - NSBHDCDXVALIDYESFT_PSY_A_CORE
Bipolar II Disorder (Bipolar depression), current episode mixed.  Mild Cognitive Impairment, without behavioral disturbance. see below.

## 2021-12-14 LAB — GLUCOSE BLDC GLUCOMTR-MCNC: 115 MG/DL — HIGH (ref 70–99)

## 2021-12-14 PROCEDURE — 99231 SBSQ HOSP IP/OBS SF/LOW 25: CPT

## 2021-12-14 RX ADMIN — ATORVASTATIN CALCIUM 40 MILLIGRAM(S): 80 TABLET, FILM COATED ORAL at 20:36

## 2021-12-14 RX ADMIN — OLANZAPINE 10 MILLIGRAM(S): 15 TABLET, FILM COATED ORAL at 20:38

## 2021-12-14 RX ADMIN — HEPARIN SODIUM 5000 UNIT(S): 5000 INJECTION INTRAVENOUS; SUBCUTANEOUS at 20:36

## 2021-12-14 RX ADMIN — Medication 1300 MILLIGRAM(S): at 20:36

## 2021-12-14 NOTE — PROGRESS NOTE BEHAVIORAL HEALTH - PROBLEM SELECTOR PLAN 1
- c/w Zyprexa Zydis oral dissolvable tablet 10 mg at bedtime (increased on 12/6)  - c/w Klonopin 0.5 mg only at bedtime for anxiety  - Continue lamictal 25 mg qd  - TOO court for 12/13/21 was adjourned as pt was compliant with medications over the weekend.
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
## 2021-12-14 NOTE — PROGRESS NOTE BEHAVIORAL HEALTH - OTHER
intermittently cooperative, irritable not formally assessed does not endorse delusions about sons being in danger today endorses [place] delusions today

## 2021-12-14 NOTE — PROGRESS NOTE BEHAVIORAL HEALTH - SUMMARY
This is a 77 y/o female with PPH of bipolar depression (Bipolar II disorder), HTN, OA, T2DM, cognitive impairment without behavioral disturbance, brought to the ED at the behest of the correction she has been residing for the last 7 years for gradual deterioration in patient's ability to care for herself, in the context of recent psychotropic medication changes due to an episode of lithium toxicity in August of 2021.    Patient's current presentation is consistent with severe bipolar depression with psychotic features on underlying dementia with behavioral disturbance. Patient was stable on lithium for decades (though per son, she was never "normal" and always fluctuated between highs and lows).    During this admission patient has been tried on:  - Low dose lithium (d/c-ed due to renal injury, no response)  - Seroquel (up to 250 mg, d/c-ed due to worsening psychosis)  - Prozac (since November 8th) and Zyprexa (currently on Zydis) - some initial improvement, but now worsening    Overall Amber's response to medication treatment has been poor. She has had increasingly severe delusions of depressive affect (no delusions reported today- notably pt was complaint with medication yesterday). While she has been intermittently uncooperative with staff, today she appeared to comply more with staff's requests. Prozac discontinuation has been associated with less agitation, depressive affect not improved. Will c/w Zyprexa Zydis 10 mg at bedtime for bipolar depression, especially as IM version available if TOO is needed. Lamotrigine 25 mg started for bipolar depression, patient has been intermittently compliant. Was scheduled for TOO on 12/13 yesterday but was adjourned as pt was compliant with medication two days ago.     Amber displays a variable pattern of functionality and mentation during the day, potentially indicating a delirium or sundowning of her primary dementia. However she has been intermittently refusing care, including labs. She continues to endorse poor sleep and appetite, however has been eating with supervision and observed to be sleeping peacefully. No SI/HI noted.     Spoke to patient's son Javier about their options for ECT which both sons believe will help their mother most as it has in the past. Advised that though her ECT referral has been sent out, it is unlikely that a different institution will accept her using this method given risk factors and long line. Advised that one option would be to have patient discharged under the care of her family and have them take her to an ED in a facility that has ECT and see whether the treatment team at that facility agrees about necessity of ECT, which is not a guarantee. He verbalized understanding and agreed to discuss with brother Catarino before coming to decision. Today, he reported that he could not feasibly take the pt back into his care, and that the pt would have to be discharged to [facility] for plan to request ECT at a different facility.     Spoke with Dr. Melo who had 3 meetings with the pt at the outpatient clinic prior to this admission. Her behavior after medication administration aligned with her presentation at outpatient appointments (described by depressed mood, deteriorated independence, and requirement of significant encouragement to engage in activities). However, today, amidst refusal of morning medications, she has regressed to the point that she is endorsing Cotard delusions and that she has caused the death of her sons. She has returned to being paranoid about people judging her appearance and being cast out of the hospital unclothed. Her current psychotic state and medication noncompliance place her at acute risk for dangerous or erratic behavior, as well elevated risk for exacerbation of medical problems, and she should remain on the unit for continued monitoring, psychoeducation and treatment.

## 2021-12-15 LAB — GLUCOSE BLDC GLUCOMTR-MCNC: 132 MG/DL — HIGH (ref 70–99)

## 2021-12-15 PROCEDURE — 99231 SBSQ HOSP IP/OBS SF/LOW 25: CPT

## 2021-12-15 RX ORDER — VALPROIC ACID (AS SODIUM SALT) 250 MG/5ML
250 SOLUTION, ORAL ORAL
Refills: 0 | Status: DISCONTINUED | OUTPATIENT
Start: 2021-12-15 | End: 2021-12-29

## 2021-12-15 RX ADMIN — AMLODIPINE BESYLATE 10 MILLIGRAM(S): 2.5 TABLET ORAL at 10:20

## 2021-12-15 RX ADMIN — Medication 1300 MILLIGRAM(S): at 09:59

## 2021-12-15 RX ADMIN — PANTOPRAZOLE SODIUM 40 MILLIGRAM(S): 20 TABLET, DELAYED RELEASE ORAL at 10:21

## 2021-12-15 RX ADMIN — Medication 81 MILLIGRAM(S): at 09:58

## 2021-12-15 RX ADMIN — LISINOPRIL 10 MILLIGRAM(S): 2.5 TABLET ORAL at 10:21

## 2021-12-15 RX ADMIN — Medication 325 MILLIGRAM(S): at 10:20

## 2021-12-15 NOTE — CHART NOTE - NSCHARTNOTEFT_GEN_A_CORE
Social Work Note:    Treatment team met with patient on unit rounds earlier this morning.  Patient was more engaged in interview today.  She endorsed feeling “about the same.”  Medications discussed as patient has not always been agreeable.  Treatment over objection hearing held on Monday December 13.  Patient is educated daily to the benefits of medication adherence for improved mental and physical health.  She appeared to be receptive to this discussion.       During the day patient now prefers not to be isolative to her room.  She was encouraged to attend and actively participate in groups later today.       Prior to admission patient was a resident of [facility].  Communication ongoing with facility representative ANTOINETTE Montesinos at [facility] [phone].             Mental Status Exam:    Mood – Neutral    Sleep – Fair    Appetite – Fair    ADLs – Fair    Thought Process – Perseverative    Observation – [id number]    No barriers to discharge identified at this time.     At this time patient is not psychiatrically stable for discharge. Social Work Note:    Treatment team met with patient on unit rounds earlier this morning.  Patient was more engaged in interview today.  She endorsed feeling “about the same.”  Medications discussed as patient has not always been agreeable.  Treatment over objection hearing held on Monday December 13.  Patient is educated daily to the benefits of medication adherence for improved mental and physical health.  She appeared to be receptive to this discussion.       During the day patient now prefers not to be isolative to her room.  She was encouraged to attend and actively participate in groups later today.       Prior to admission patient was a resident of [facility].  Communication ongoing with facility representative ANTOINETTE Montesinos at [facility] [phone] with last contact today.              Mental Status Exam:    Mood – Neutral    Sleep – Fair    Appetite – Fair    ADLs – Fair    Thought Process – Perseverative    Observation – [id number]    No barriers to discharge identified at this time.     At this time patient is not psychiatrically stable for discharge.

## 2021-12-15 NOTE — PROGRESS NOTE BEHAVIORAL HEALTH - OTHER
not formally assessed intermittently cooperative endorses Cotard delusions today; possessed some insight of delusions today

## 2021-12-15 NOTE — PROGRESS NOTE BEHAVIORAL HEALTH - PROBLEM SELECTOR PLAN 1
- c/w Zyprexa Zydis oral dissolvable tablet 10 mg at bedtime (increased on 12/6)  - c/w Klonopin 0.5 mg only at bedtime for anxiety  - Continue lamictal 25 mg qd  - TOO court for 12/13/21 was adjourned as pt was compliant with medications over the weekend. - c/w Zyprexa Zydis oral dissolvable tablet 10 mg at bedtime (increased on 12/6)  - c/w Klonopin 0.5 mg only at bedtime for anxiety  - D/c  lamictal 25 mg qd as she has been inconsistent with it.  - Start Depakene liquid 250 mg BID for mood  - TOO court for 12/13/21 was adjourned as pt was compliant with medications over the weekend.

## 2021-12-15 NOTE — PROGRESS NOTE BEHAVIORAL HEALTH - THOUGHT CONTENT
Date of Office Visit: 21  Encounter Provider: KYLAH Jha  Place of Service: [facility] CARDIOLOGY  Patient Name: Asia Ly  :1933    Chief Complaint   Patient presents with   • PAF (paroxysmal atrial fibrillation) (CMS/HCC)   • Follow-up   :     HPI: Asia Ly is a 87 y.o. male  with history of paroxysmal atrial fibrillation, stroke and TIA in 2016, aortic stenosis, hyperlipidemia, B12 anemia,Crohn's disease, colon polyps, lower extremity edema, TIA,  pulmonary hypertension and pulmonary fibrosis.      He has been followed by Dr. Hector Rivera. He is now followed by Dr. Brown and I will visit with him in follow up today.      Has been on sotalol in the past as well as amiodarone for recurrent atrial fibrillation.  He had Echocardiogram 2016 showed normal left ventricular systolic dysfunction, moderate tricuspid insufficiency, mild mitral insufficiency , mild pulmonary hypertension.  He also had a perfusion stress test that was negative for ischemia.  He was taken off diltiazem for allergic reaction to rash.  2017 he had an echocardiogram which showed an EF of 60%, grade 2 diastolic dysfunction and mild aortic he had increased lower extremity edema and had again rapid atrial fibrillation.  He was started on a diuretic.  He then had pneumonia hypoxia and influenza and was started on Tikosyn.  He has some prolonged QTC complicated by antibiotics.  He then had issues with gout felt to be related to discontinuation of indomethacin and was prescribed colchicine.  In 2018 he had bronchiectasis and had a bronchoscopy and was found colonized pseudomonas.  He then had some atrial fibrillation with RVR and was given oral metoprolol and Cardizem and converted back to sinus rhythm.  He was evaluated by electrophysiology with Dr. Nettles who felt that his ablation at his age would be very high risk procedure.  He was later started on digoxin  for recurrent rapid atrial fibrillation.  He was admitted again January 2019 and electrophysiology felt that he may for AV node ablation and pacemaker implantation.  However he preferred to stay on Tikosyn and accept occasional breakthroughs.     He then had some confusion and upper epigastric abdominal pain he was hospitalized August 2020 found to have transaminitis with hyperbilirubinemia.  He had some atrial fibrillation with RVR received IV metoprolol and his heart rate improved.  Echocardiogram showed normal left ventricular systolic function, mild concentric hypertrophy, moderate aortic valve stenoses with aortic valve area 1.1 cm², maximum pressure gradient of 42 and mean pressure gradient 24 mmHg.  RVSP was severely elevated at 57.8.  He had mild aortic valve regurgitation and mild mitral regurgitation.     In November 2020 was hospitalized with hematochezia and underwent GI evaluation with no evidence of active bleed.  He was cleared to resume apixaban and was later found to have bilateral pneumonia.    He was treated again for pneumonia and had a bronchoscopy July 7.  He is colonized Pseudomonas and infectious disease recommended conservative treatment.  He had some rapid atrial fibrillation during that admission and had a pacemaker placed by Dr. Nettles.  The plan was that he had outpatient AV node ablation in addition to that.  He then suffered a mechanical fall but had no acute fracture on x-ray or C-spine.    He now presents for hospital discharge follow-up.  He is accompanied by his wife.  He had some issues with gout but apparently his allopurinol was decreased to once daily so he was advised by home health nurse to resume twice daily dosing and his right foot pain has improved since then.  He has a lingering cough and some intermittent white, yellow secretions but that has improved since prior to admission.  He has no chest pain tightness pressure, dizziness or lightheadedness, near-syncope or  "syncope.      Allergies   Allergen Reactions   • Amiodarone Unknown (See Comments)     Pulmonary fibrosis   • Diltiazem Arrhythmia   • Doxycycline Rash   • Indomethacin Rash           Family and social history reviewed.     ROS  All other systems were reviewed and are negative          Objective:     Vitals:    07/22/21 1340   BP: 136/50   BP Location: Right arm   Patient Position: Sitting   Pulse: 65   Weight: 65.3 kg (144 lb)   Height: 175.3 cm (69\")     Body mass index is 21.27 kg/m².    PHYSICAL EXAM:  Constitutional:       General: Not in acute distress.     Appearance: Well-developed. Not diaphoretic.   HENT:      Head: Normocephalic.   Pulmonary:      Effort: Pulmonary effort is normal. No respiratory distress.      Breath sounds: Normal breath sounds. No wheezing. No rhonchi. No rales.   Cardiovascular:      Normal rate. Regular rhythm.      Murmurs: There is a systolic murmur.   Pulses:     Radial: 2+ bilaterally.  Skin:     General: Skin is warm and dry. There is no cyanosis.      Findings: No rash.   Neurological:      Mental Status: Alert and oriented to person, place, and time.   Psychiatric:         Behavior: Behavior normal.         Thought Content: Thought content normal.         Judgment: Judgment normal.           ECG 12 Lead    Date/Time: 7/22/2021 4:19 PM  Performed by: Alta Hackett APRN  Authorized by: Alta Hackett APRN   Comparison: compared with previous ECG   Similar to previous ECG  Rhythm: sinus rhythm              Current Outpatient Medications   Medication Sig Dispense Refill   • allopurinol (ZYLOPRIM) 100 MG tablet Take 100 mg by mouth 2 (Two) Times a Day. Patient had flare up of gout in right foot, so they increased to twice daily     • apixaban (ELIQUIS) 5 MG tablet tablet Take 1 tablet by mouth Every 12 (Twelve) Hours. 180 tablet 3   • atorvastatin (LIPITOR) 10 MG tablet Take one tablet by mouth Mon, Wed and Fri.     • BUDESONIDE PO Take 3 mg by mouth Daily.     • clindamycin " (CLEOCIN T) 1 % swab Apply  topically to the appropriate area as directed Every 12 (Twelve) Hours.     • digoxin (LANOXIN) 125 MCG tablet Take 1 tablet by mouth Daily. 90 tablet 3   • dofetilide (TIKOSYN) 125 MCG capsule Take 1 capsule by mouth 2 (Two) Times a Day. 180 capsule 3   • IPRATROPIUM-ALBUTEROL IN Inhale Daily.     • metoprolol tartrate (LOPRESSOR) 50 MG tablet Take 1 tablet by mouth 3 (Three) Times a Day. 270 tablet 3   • omeprazole (priLOSEC) 40 MG capsule Take 40 mg by mouth 2 (Two) Times a Day.       No current facility-administered medications for this visit.     Assessment:       Diagnosis Plan   1. PAF (paroxysmal atrial fibrillation) (CMS/HCC)     2. Essential hypertension     3. Mixed hyperlipidemia     4. Pulmonary fibrosis (CMS/HCC)     5. Aortic stenosis, moderate          Orders Placed This Encounter   Procedures   • ECG 12 Lead     This order was created via procedure documentation     Order Specific Question:   Release to patient     Answer:   Immediate         Plan:         1. 87 year old gentleman with paroxysmal atrial fibrillation off amiodarone due to history of pulmonary fibrosis.  He  had a rash with diltiazem in the past.  He has been maintained on Tikosyn, metoprolol and digoxin.    -HE is now s/p pacemaker and plan is for AV johanne ablation few weeks.  Dr. Nettles is okay with doing the procedure while on Eliquis.- he is in NSR today   continue current regimen with apixaban  - I will discuss with Dr. Nettles when we will schedule AVN ablation  2. Prediabetes-hemoglobin A1c today is still within prediabetic range.  He was actually in this range for years ago  3.  History of TIA no recurrence  3.  History of pulmonary fibrosis usually sees Dr. Chidi Oconnor.  He is on prophylactic cefdinir once a month  4.  Aortic stenosis mild in November 2017 and moderate on most recent echo August 2020- he is to have repeat echo when he returns in 6 months. He is stable at this time  5.  History of  Crohn's disease and iron deficient anemia usually sees Dr. Susie Mccoy  6.  Hypertension continue home therapy  7.  Hyperlipidemia continue home therapy  8.  History of cholecystitis  9. human rhinovirus/enterovirus found in bronchoalveolar lavage  10. S/p mechanical fall inpatient 07/2021. No acute fracture on xray left hand or c spine   11.  Recent gout flare that resolved with twice daily dosing of allopurinol          It has been a pleasure to participate in this patient's care.      Thank you,  KYLAH Jha      **I used Dragon to dictate this note:**   Ruminations/Hopelessness/Guilt/Other

## 2021-12-15 NOTE — PROGRESS NOTE BEHAVIORAL HEALTH - SUMMARY
This is a 75 y/o female with PPH of bipolar depression (Bipolar II disorder), HTN, OA, T2DM, cognitive impairment without behavioral disturbance, brought to the ED at the behest of the intermediate she has been residing for the last 7 years for gradual deterioration in patient's ability to care for herself, in the context of recent psychotropic medication changes due to an episode of lithium toxicity in August of 2021.    Patient's current presentation is consistent with severe bipolar depression with psychotic features on underlying dementia with behavioral disturbance. Patient was stable on lithium for decades (though per son, she was never "normal" and always fluctuated between highs and lows).    During this admission patient has been tried on:  - Low dose lithium (d/c-ed due to renal injury, no response)  - Seroquel (up to 250 mg, d/c-ed due to worsening psychosis)  - Prozac (since November 8th) and Zyprexa (currently on Zydis) - some initial improvement, but now worsening    Overall Amber's response to medication treatment has been poor. She has had increasingly severe delusions of depressive affect. She has been intermittently uncooperative with staff, though for the past day she has been more interactive with peers and amenable to engaging in activities. Her most significant treatment barrier is mixed compliance with medication, as she has declined her morning medications for several days during this hospitalization. Prozac discontinuation has been associated with less agitation, depressive affect not improved. Will c/w Zyprexa Zydis 10 mg at bedtime for bipolar depression, especially as IM version available if TOO is needed. Lamotrigine 25 mg started for bipolar depression, patient has been intermittently compliant. Was scheduled for TOO on 12/13 but was adjourned as pt was compliant with medication 1d prior to court date.    Amber displays a variable pattern of functionality and mentation during the day, potentially indicating a delirium or sundowning of her primary dementia. However she has been intermittently refusing care, including labs. She endorsed improved sleep and appetite, and has been eating with supervision and observed to be sleeping peacefully. No SI/HI noted.     Spoke to patient's son Javier about their options for ECT which both sons believe will help their mother most as it has in the past. Advised that though her ECT referral has been sent out, it is unlikely that a different institution will accept her using this method given risk factors and long line. Advised that one option would be to have patient discharged under the care of her family and have them take her to an ED in a facility that has ECT and see whether the treatment team at that facility agrees about necessity of ECT, which is not a guarantee. He verbalized understanding and agreed to discuss with brother Catarino before coming to decision. Today, he reported that he could not feasibly take the pt back into his care, and that the pt would have to be discharged to [facility] for plan to request ECT at a different facility.     Spoke with Dr. Melo who had 3 meetings with the pt at the outpatient clinic prior to this admission. Her behavior during consistent medication compliance resembles her presentation at outpatient appointments (described by depressed mood, deteriorated independence, and requirement of significant encouragement to engage in activities). Amidst refusal of morning medications for the past two days, she has regressed to the point of once again endorsing Cotard delusions and that she has caused the death of her eldest son. However, today she conveyed improved insight, evidenced by her awareness that her abnormal thought processes are inducing her belief that she and her son are not alive. She is also less paranoid about judgments of her appearance, but continues to endorse the belief that she will be cast out of the hospital unclothed. Her current psychotic state and medication noncompliance place her at acute risk for dangerous or erratic behavior, as well elevated risk for exacerbation of medical problems, and she should remain on the unit for continued monitoring, psychoeducation and treatment.

## 2021-12-15 NOTE — PROGRESS NOTE BEHAVIORAL HEALTH - NSBHFUPINTERVALHXFT_PSY_A_CORE
Nursing reports no acute events overnight; pt took PM meds yesterday and slept well throughout the night. However, pt declined AM meds today.     Chart reviewed, patient seen and evaluated in AM. On approach, the pt was sitting outside her room enjoying her breakfast. She was properly dressed and groomed and amenable to speaking. Patient reports she has taken her medication but was provided education that she needed to be compliant with taking medications BID. She continued to endorse Cotard delusions, evidenced by her belief that the interviewers were "in the flesh, while [she] was not." She stated that her eldest son was gone, before adding that she was aware that everyone is fine, and that it was her thinking that was wrong. She reported that she was scared to be alone, and was amenable to attending group sessions later in the day when encouraged to do so.   Patient endorsed adequate sleep and appetite. Nursing reports no acute events overnight; pt took PM meds yesterday and slept well throughout the night. However, pt declined AM meds today.     Chart reviewed, patient seen and evaluated in AM. On approach, the pt was sitting outside her room enjoying her breakfast. She was properly dressed and groomed and amenable to speaking. Patient reports she has taken her medication but was provided education that she needed to be compliant with taking medications BID. Pt has been taking the night time medications, has a harder time being consistent with morning medications. She stated that she thought eldest son "was gone", before adding that she was aware that everyone is fine, and that it was her thinking that was wrong. She reported that she was scared to be alone, and was amenable to attending group sessions later in the day when encouraged to do so.   Patient endorsed adequate sleep and appetite. ECT was discussed and pt stated she will think about it.

## 2021-12-16 LAB
GLUCOSE BLDC GLUCOMTR-MCNC: 180 MG/DL — HIGH (ref 70–99)
SARS-COV-2 RNA SPEC QL NAA+PROBE: SIGNIFICANT CHANGE UP

## 2021-12-16 PROCEDURE — 99231 SBSQ HOSP IP/OBS SF/LOW 25: CPT

## 2021-12-16 RX ORDER — ACETAMINOPHEN 500 MG
2 TABLET ORAL
Qty: 0 | Refills: 0 | DISCHARGE
Start: 2021-12-16

## 2021-12-16 RX ADMIN — AMLODIPINE BESYLATE 10 MILLIGRAM(S): 2.5 TABLET ORAL at 09:20

## 2021-12-16 RX ADMIN — Medication 0.5 MILLIGRAM(S): at 20:11

## 2021-12-16 RX ADMIN — OLANZAPINE 10 MILLIGRAM(S): 15 TABLET, FILM COATED ORAL at 20:11

## 2021-12-16 RX ADMIN — LISINOPRIL 10 MILLIGRAM(S): 2.5 TABLET ORAL at 09:21

## 2021-12-16 RX ADMIN — Medication 250 MILLIGRAM(S): at 20:11

## 2021-12-16 RX ADMIN — Medication 250 MILLIGRAM(S): at 09:22

## 2021-12-16 RX ADMIN — Medication 1300 MILLIGRAM(S): at 09:21

## 2021-12-16 RX ADMIN — Medication 81 MILLIGRAM(S): at 09:20

## 2021-12-16 NOTE — PROGRESS NOTE BEHAVIORAL HEALTH - PROBLEM SELECTOR PLAN 1
- c/w Zyprexa Zydis oral dissolvable tablet 10 mg at bedtime (increased on 12/6)  - c/w Klonopin 0.5 mg only at bedtime for anxiety  - D/c  lamictal 25 mg qd as she has been inconsistent with it.  - C/w Depakene liquid 250 mg BID for mood  - TOO court for 12/13/21 was adjourned as pt was compliant with medications over the weekend.

## 2021-12-16 NOTE — PROGRESS NOTE BEHAVIORAL HEALTH - NSBHFUPINTERVALHXFT_PSY_A_CORE
Nursing reports that the pt declined her evening meds last night. She was compliant with her morning medications today.     Chart reviewed, patient seen and evaluated in AM. Pt was appropriately dressed and groomed this morning. She continues to endorse perseverative thoughts about her being cast out of the hospital unclothed and receiving a PEG tube for feedings. She was irritable upon initial interview and asked for the interviewers to leave her room. Upon further evaluation throughout the day, it became evident that while she remains paranoid that people are judging her appearance or avoiding her due to her "smell," she no longer endorses any delusions that she is dead, marked by her expression of regret that she ever said that in the past. Per her outpatient psychiatrist, paranoia regarding her appearance was present at her baseline.     Patient endorsed adequate sleep. She states that she has been attempting to eat more consistently to avoid receiving a PEG tube.     Patient reports no suicidal ideation, intent or plan. Denied auditory or visual hallucinations. Patient did not report any side effects from medications. Nursing reports that the pt declined her evening meds last night. She was compliant with her morning medications today.     Chart reviewed, patient seen and evaluated in AM. Pt was appropriately dressed and groomed this morning. She continues to endorse perseverative thoughts about her being cast out of the hospital unclothed and receiving a PEG tube for feedings. She was irritable upon initial interview and asked for the interviewers to leave her room. Upon further evaluation throughout the day, it became evident that while she remains paranoid that people are judging her appearance or avoiding her due to her "smell," she no longer endorses any delusions that she is dead, marked by her expression of regret that she ever said that in the past. Per her outpatient psychiatrist, paranoia regarding her appearance was present at her baseline.     Patient endorsed adequate sleep. She states that she has been attempting to eat more consistently to avoid receiving a PEG tube.     Patient reports no suicidal ideation, intent or plan. Denied auditory or visual hallucinations. Patient did not report any side effects from medications.    Both the pt's sons were called by phone but could not be reached. Will reattempt contact tomorrow.

## 2021-12-16 NOTE — PROGRESS NOTE BEHAVIORAL HEALTH - SUMMARY
This is a 77 y/o female with PPH of bipolar depression (Bipolar II disorder), HTN, OA, T2DM, cognitive impairment without behavioral disturbance, brought to the ED at the behest of the alf she has been residing for the last 7 years for gradual deterioration in patient's ability to care for herself, in the context of recent psychotropic medication changes due to an episode of lithium toxicity in August of 2021.    Patient's current presentation is consistent with severe bipolar depression with psychotic features on underlying dementia with behavioral disturbance. Patient was stable on lithium for decades (though per son, she was never "normal" and always fluctuated between highs and lows).    During this admission patient has been tried on:  - Low dose lithium (d/c-ed due to renal injury, no response)  - Seroquel (up to 250 mg, d/c-ed due to worsening psychosis)  - Prozac (since November 8th) and Zyprexa (currently on Zydis) - some initial improvement, but now worsening    Overall Amber's response to medication treatment has been poor. She has had increasingly severe delusions of depressive affect. She has been intermittently uncooperative with staff, though for the past day she has been more interactive with peers and amenable to engaging in activities. Her most significant treatment barrier is mixed compliance with medication, as she has declined her morning medications for several days during this hospitalization. Prozac discontinuation has been associated with less agitation, depressive affect not improved. Will c/w Zyprexa Zydis 10 mg at bedtime for bipolar depression, especially as IM version available if TOO is needed. Lamotrigine which was prescribed for Bipolar disorder was discontinued due to noncompliance. Was scheduled for TOO on 12/13 but was adjourned as pt was compliant with medication 1d prior to court date.    Amber displays a variable pattern of functionality and mentation during the day, potentially indicating a delirium or sundowning of her primary dementia. However she has been intermittently refusing care, including labs. She endorsed improved sleep and appetite, and has been eating with supervision and observed to be sleeping peacefully. No SI/HI noted.     Spoke to patient's son Javier about their options for ECT which both sons believe will help their mother most as it has in the past. Advised that though her ECT referral has been sent out, it is unlikely that a different institution will accept her using this method given risk factors and long line. Advised that one option would be to have patient discharged under the care of her family and have them take her to an ED in a facility that has ECT and see whether the treatment team at that facility agrees about necessity of ECT, which is not a guarantee. He verbalized understanding and agreed to discuss with brother Catarino before coming to decision. Today, he reported that he could not feasibly take the pt back into his care, and that the pt would have to be discharged to [facility] for plan to request ECT at a different facility.     Spoke with Dr. Melo who had 3 meetings with the pt at the outpatient clinic prior to this admission. Her behavior during consistent medication compliance resembles her presentation at outpatient appointments (described by depressed mood, deteriorated independence, and requirement of significant encouragement to engage in activities). Amidst intermittent refusal of medications for the past few days, she regressed to the point of once again endorsing Cotard delusions and that she has caused the death of her eldest son. However, today, she conveyed realization and regret that these thoughts were not based in reality and possessed some insight that her abnormal thought processes are what induce these false beliefs. Furthermore, her current state is closer to baseline, as she exhibits more energy and is able to interact with peers and staff.

## 2021-12-16 NOTE — PROGRESS NOTE BEHAVIORAL HEALTH - PRIMARY DX
no abdominal pain, no bloating, no constipation, no diarrhea, no nausea and no vomiting. Bipolar disorder, current episode depressed, severe, with psychotic features

## 2021-12-17 LAB
ALBUMIN SERPL ELPH-MCNC: 4.4 G/DL — SIGNIFICANT CHANGE UP (ref 3.5–5.2)
ALP SERPL-CCNC: 112 U/L — SIGNIFICANT CHANGE UP (ref 30–115)
ALT FLD-CCNC: 12 U/L — SIGNIFICANT CHANGE UP (ref 0–41)
ANION GAP SERPL CALC-SCNC: 14 MMOL/L — SIGNIFICANT CHANGE UP (ref 7–14)
AST SERPL-CCNC: 14 U/L — SIGNIFICANT CHANGE UP (ref 0–41)
BILIRUB SERPL-MCNC: 0.2 MG/DL — SIGNIFICANT CHANGE UP (ref 0.2–1.2)
BUN SERPL-MCNC: 36 MG/DL — HIGH (ref 10–20)
CALCIUM SERPL-MCNC: 10.1 MG/DL — SIGNIFICANT CHANGE UP (ref 8.5–10.1)
CHLORIDE SERPL-SCNC: 105 MMOL/L — SIGNIFICANT CHANGE UP (ref 98–110)
CO2 SERPL-SCNC: 22 MMOL/L — SIGNIFICANT CHANGE UP (ref 17–32)
CREAT SERPL-MCNC: 1.9 MG/DL — HIGH (ref 0.7–1.5)
GLUCOSE BLDC GLUCOMTR-MCNC: 100 MG/DL — HIGH (ref 70–99)
GLUCOSE SERPL-MCNC: 153 MG/DL — HIGH (ref 70–99)
HCT VFR BLD CALC: 36.6 % — LOW (ref 37–47)
HGB BLD-MCNC: 11.3 G/DL — LOW (ref 12–16)
MCHC RBC-ENTMCNC: 25.9 PG — LOW (ref 27–31)
MCHC RBC-ENTMCNC: 30.9 G/DL — LOW (ref 32–37)
MCV RBC AUTO: 83.9 FL — SIGNIFICANT CHANGE UP (ref 81–99)
NRBC # BLD: 0 /100 WBCS — SIGNIFICANT CHANGE UP (ref 0–0)
PLATELET # BLD AUTO: 208 K/UL — SIGNIFICANT CHANGE UP (ref 130–400)
POTASSIUM SERPL-MCNC: 4.5 MMOL/L — SIGNIFICANT CHANGE UP (ref 3.5–5)
POTASSIUM SERPL-SCNC: 4.5 MMOL/L — SIGNIFICANT CHANGE UP (ref 3.5–5)
PROT SERPL-MCNC: 7.1 G/DL — SIGNIFICANT CHANGE UP (ref 6–8)
RBC # BLD: 4.36 M/UL — SIGNIFICANT CHANGE UP (ref 4.2–5.4)
RBC # FLD: 13.5 % — SIGNIFICANT CHANGE UP (ref 11.5–14.5)
SODIUM SERPL-SCNC: 141 MMOL/L — SIGNIFICANT CHANGE UP (ref 135–146)
WBC # BLD: 5.1 K/UL — SIGNIFICANT CHANGE UP (ref 4.8–10.8)
WBC # FLD AUTO: 5.1 K/UL — SIGNIFICANT CHANGE UP (ref 4.8–10.8)

## 2021-12-17 PROCEDURE — 11720 DEBRIDE NAIL 1-5: CPT | Mod: 59

## 2021-12-17 PROCEDURE — 99231 SBSQ HOSP IP/OBS SF/LOW 25: CPT

## 2021-12-17 PROCEDURE — 99221 1ST HOSP IP/OBS SF/LOW 40: CPT | Mod: 25

## 2021-12-17 NOTE — PROGRESS NOTE BEHAVIORAL HEALTH - NSBHFUPINTERVALHXFT_PSY_A_CORE
Nursing reports that pt took Zyprexa last night but not her other medications.    Chart reviewed, patient seen and evaluated in AM. On approach, pt was lying in bed with her covers drawn and appeared irritable. She declined her AM medications despite considerable verbal encouragement and endorsed delusions that her limbs would be amputated. She remains paranoid that others are judging her or planning to harm her. Today, she endorses Cotard delusions. She believes her sons are alive and expressed repeatedly that she would like to speak to them.     Patient endorsed adequate sleep and appetite. Patient reports no suicidal ideation, intent or plan. Denied auditory or visual hallucinations.

## 2021-12-17 NOTE — PROGRESS NOTE BEHAVIORAL HEALTH - SUMMARY
This is a 75 y/o female with PPH of bipolar depression (Bipolar II disorder), HTN, OA, T2DM, cognitive impairment without behavioral disturbance, brought to the ED at the behest of the alf she has been residing for the last 7 years for gradual deterioration in patient's ability to care for herself, in the context of recent psychotropic medication changes due to an episode of lithium toxicity in August of 2021.    Patient's current presentation is consistent with severe bipolar depression with psychotic features on underlying dementia with behavioral disturbance. Patient was stable on lithium for decades (though per son, she was never "normal" and always fluctuated between highs and lows).    During this admission patient has been tried on:  - Low dose lithium (d/c-ed due to renal injury, no response)  - Seroquel (up to 250 mg, d/c-ed due to worsening psychosis)  - Prozac (since November 8th) and Zyprexa (currently on Zydis) - some initial improvement, but now worsening  - Lamictal 25 mg qd (12/15 dc'ed as she has been inconsistent with it)       Overall Amber's response to medication treatment has been poor. She has had increasingly severe delusions of depressive affect. She has been intermittently uncooperative with staff, and occasionally is interactive with peers and amenable to engaging in activities. Her most significant treatment barrier is mixed compliance with medication, as she has declined her morning medications for several days during this hospitalization. Prozac discontinuation has been associated with less agitation, depressive affect not improved. Will c/w Zyprexa Zydis 10 mg at bedtime for bipolar depression, especially as IM version available if TOO is needed. Lamotrigine which was prescribed for Bipolar disorder was discontinued due to noncompliance. Was scheduled for TOO on 12/13 but was adjourned as pt was compliant with medication during the few days directly preceding her court date.    Amber displays a variable pattern of functionality and mentation during the day, potentially indicating a delirium or sundowning of her primary dementia. However she has been intermittently refusing care, including labs. She endorsed improved sleep and appetite, and has been eating with supervision and observed to be sleeping peacefully. No SI/HI noted.     Spoke to patient's son Javier about their options for ECT which both sons believe will help their mother most as it has in the past. Advised that though her ECT referral has been sent out, it is unlikely that a different institution will accept her using this method given risk factors and long line. Advised that one option would be to have patient discharged under the care of her family and have them take her to an ED in a facility that has ECT and see whether the treatment team at that facility agrees about necessity of ECT, which is not a guarantee. He verbalized understanding and agreed to discuss with brother Catarino before coming to decision. Today, he reported that he could not feasibly take the pt back into his care, and that the pt would have to be discharged to [facility] for plan to request ECT at a different facility.     Spoke with Dr. Melo who had 3 meetings with the pt at the outpatient clinic prior to this admission. Her behavior during consistent medication compliance resembles her presentation at outpatient appointments (described by depressed mood, deteriorated independence, and requirement of significant encouragement to engage in activities). Amidst intermittent refusal of medications for the past few days, she regressed to the point of once again endorsing Cotard delusions and that she has caused the death of her eldest son. On one occasion, she conveyed realization and regret that these thoughts were not based in reality and possessed some insight that her abnormal thought processes are what induce these false beliefs.

## 2021-12-17 NOTE — CONSULT NOTE ADULT - SUBJECTIVE AND OBJECTIVE BOX
HPI: 77yo F with hx of BPD, MDD, HTN, DM here with slowly progressive depression, anhedonia and forgetfulness that is frustrating her for the past few weeks. Pt was sent for passive SI and feeling more anxious.   ptn  seen  at bed  side  nad  no new  c/o  aox3  fu command use  rw  for  ambulation       PTN  REFERRED TO ACUTE  REHAB  FOR  EVAL AND  TX   PAST MEDICAL & SURGICAL HISTORY:  HTN (hypertension)    DM (diabetes mellitus)    MDD (major depressive disorder)    Bipolar disorder    No significant past surgical history        Hospital Course:    TODAY'S SUBJECTIVE & REVIEW OF SYMPTOMS:     Constitutional WNL   Cardio WNL   Resp WNL   GI WNL  Heme WNL  Endo WNL  Skin WNL  MSK WNL  Neuro WNL  Cognitive WNL  Psych WNL      MEDICATIONS  (STANDING):  amLODIPine   Tablet 10 milliGRAM(s) Oral daily  aspirin  chewable 81 milliGRAM(s) Oral daily  atorvastatin 40 milliGRAM(s) Oral at bedtime  dextrose 40% Gel 15 Gram(s) Oral once  dextrose 5%. 1000 milliLiter(s) (50 mL/Hr) IV Continuous <Continuous>  dextrose 5%. 1000 milliLiter(s) (100 mL/Hr) IV Continuous <Continuous>  dextrose 50% Injectable 25 Gram(s) IV Push once  dextrose 50% Injectable 12.5 Gram(s) IV Push once  dextrose 50% Injectable 25 Gram(s) IV Push once  donepezil 10 milliGRAM(s) Oral at bedtime  ferrous    sulfate 325 milliGRAM(s) Oral daily  glucagon  Injectable 1 milliGRAM(s) IntraMuscular once  hydrALAZINE 50 milliGRAM(s) Oral three times a day  insulin lispro (ADMELOG) corrective regimen sliding scale   SubCutaneous three times a day before meals  lisinopril 40 milliGRAM(s) Oral daily  LORazepam     Tablet 0.5 milliGRAM(s) Oral two times a day  melatonin. 5 milliGRAM(s) Oral at bedtime  mirtazapine 7.5 milliGRAM(s) Oral at bedtime  pantoprazole    Tablet 40 milliGRAM(s) Oral before breakfast  QUEtiapine 50 milliGRAM(s) Oral at bedtime  QUEtiapine 25 milliGRAM(s) Oral daily  sitaGLIPtin 50 milliGRAM(s) Oral daily    MEDICATIONS  (PRN):      FAMILY HISTORY:  Family history of diabetes mellitus (DM) (Mother)    FH: myocardial infarction (Father)        Allergies    amoxicillin (Unknown)  Iodides (Unknown)  Neosporin (Unknown)    Intolerances        SOCIAL HISTORY:    [  ] Etoh  [  ] Smoking  [  ] Substance abuse     Home Environment:  [  x] Home Alone  [ x ] Lives with Family  [  ] Home Health Aid    Dwelling:  [  ] Apartment  [x  ] Private House  [  ] Adult Home  [  ] Skilled Nursing Facility      [  ] Short Term  [  ] Long Term  [ x ] Stairs       Elevator [  ]    FUNCTIONAL STATUS PTA: (Check all that apply)  Ambulation: [x   ]Independent    [  ] Dependent     [  ] Non-Ambulatory  Assistive Device: [  ] SA Cane  [  ]  Q Cane  [ x ] Walker  [  ]  Wheelchair  ADL : [  x] Independent  [  ]  Dependent       Vital Signs Last 24 Hrs  T(C): 36.3 (08 Nov 2021 08:00), Max: 36.4 (07 Nov 2021 16:10)  T(F): 97.3 (08 Nov 2021 08:00), Max: 97.6 (07 Nov 2021 16:10)  HR: 56 (08 Nov 2021 08:00) (56 - 85)  BP: 186/88 (08 Nov 2021 08:00) (142/77 - 186/88)  BP(mean): --  RR: 18 (08 Nov 2021 08:00) (18 - 18)  SpO2: --      PHYSICAL EXAM: Alert & Oriented X 2  GENERAL: NAD, well-groomed, well-developed  HEAD:  Atraumatic, Normocephalic  EYES: EOMI, PERRLA, conjunctiva and sclera clear  NECK: Supple, No JVD, Normal thyroid  CHEST/LUNG: Clear to percussion bilaterally; No rales, rhonchi, wheezing, or rubs  HEART: Regular rate and rhythm; No murmurs, rubs, or gallops  ABDOMEN: Soft, Nontender, Nondistended; Bowel sounds present  EXTREMITIES:  2+ Peripheral Pulses, No clubbing, cyanosis, or edema    NERVOUS SYSTEM:  Cranial Nerves 2-12 intact [ x ] Abnormal  [  ]  ROM: WFL all extremities [ x ]  Abnormal [  ]  Motor Strength: WFL all extremities  [ x ]  Abnormal [  ]  Sensation: intact to light touch [ x ] Abnormal [  ]  Reflexes: Symmetric [x  ]  Abnormal [  ]    FUNCTIONAL STATUS:  Bed Mobility: Independent [  ]  Supervision [x  ]  Needs Assistance [  ]  N/A [  ]  Transfers: Independent [  ]  Supervision [x ]  Needs Assistance [  ]  N/A [  ]   Ambulation: Independent [  ]  Supervision [x  ]  Needs Assistance [  ]  N/A [  ]  ADL: Independent [  ] Requires Assistance [  ] N/A [x  ]  SEE PT/OT IE NOTES    LABS:                RADIOLOGY & ADDITIONAL STUDIES:    Assesment:
S :   76y year old Female seen at bedside for diabetic risk foot assessment with a chief complaint of   painful thickened, dystrophic, ingrowing  and long toenails digits 1-5 bilaterally  and preventative foot examination. Patient is medically managed  by Medicine/Hospitalists.  Patient denies any history o f trauma to both feet.  Patient has no other pedal complaints.      Patient admits to  (-) Fevers, (-) Chills, (-) Nausea, (-) Vomiting, (-) Shortness of Breath (-) calf pain (-) chest pain       PMH: HTN (hypertension)    DM (diabetes mellitus)    MDD (major depressive disorder)    Bipolar disorder      PSH:No significant past surgical history        Allergies:amoxicillin (Unknown)  Iodides (Unknown)  Neosporin (Unknown)      Labs:                        11.3   5.10  )-----------( 208      ( 17 Dec 2021 07:59 )             36.6       WBC Trend  5.10 Date (12-17 @ 07:59)  6.06 Date (12-07 @ 16:27)  9.48 Date (11-28 @ 04:30)      Chem  12-17    141  |  105  |  36<H>  ----------------------------<  153<H>  4.5   |  22  |  1.9<H>    Ca    10.1      17 Dec 2021 07:59    TPro  7.1  /  Alb  4.4  /  TBili  0.2  /  DBili  x   /  AST  14  /  ALT  12  /  AlkPhos  112  12-17          T(F): 97.2 (12-17-21 @ 09:05), Max: 97.6 (12-16-21 @ 16:41)  HR: 67 (12-17-21 @ 09:05) (60 - 74)  BP: 163/77 (12-17-21 @ 09:05) (131/61 - 174/81)  RR: 16 (12-17-21 @ 06:04) (16 - 18)  SpO2: --  Wt(kg): --    O:   Integument:  Skin warm, dry and supple R foot.  No open lesions or inter-digital macerations noted bilateral.   Toenails 1-5 Right foot  thickened, elongated, discolored, and dystrophic with subungual debris. There is pain upon palpation of all fungal and ingrowing nails 1-5 bilaterally.   Vascular: Dorsalis Pedis and Posterior Tibial pulses R foot diminished.  Capillary re-fill time less than 3 seconds digits 1-5 R foot.   Neuro: Protective sensation intact to the level of the digits R foot        A:   1. R foot hypertrohic Onychomycosis digits 1-5   2. Pain from Elongated nails, ingrowing  and dystrophic nails  P: Discussed diagnosis and treatment with patient  patient refused to have her left foot evaluated and her nails trimmed.   Aseptic debridement and curretage  of all fungal and ingrowing  nails 1-5 R foot with sterile nail pack  Discussed importance of daily foot examinations and proper shoe gear  Please re-consult as needed.

## 2021-12-17 NOTE — CHART NOTE - NSCHARTNOTEFT_GEN_A_CORE
Social Work Note:    Treatment team met with patient on unit rounds.  Patient was in her room in bed.  She did not want to engage with treatment team.  “You’re going to cut me to pieces.”  At time of assessment patient was offered medications by her nurse.  She was not agreeable to taking any of her medications despite her nurse taking time to encourage her and educate her on the benefit of medication adherence.       During the day patient has been alternating between being visible on the unit and isolating in her room.  She was encouraged to attend and actively participate in groups later today.       Prior to admission patient was a resident of [facility].  Communication ongoing with facility representative ANTOINETTE Montesinos at [facility] [phone] with last contact on Wednesday 12/15.                Mental Status Exam:    Mood – Irritable   Sleep – Fair    Appetite – Fair    ADLs – Fair    Thought Process – Perseverative    Observation – [id number]    No barriers to discharge identified at this time.     At this time patient is not psychiatrically stable for discharge.

## 2021-12-17 NOTE — PROGRESS NOTE BEHAVIORAL HEALTH - PROBLEM SELECTOR PLAN 1
- c/w Zyprexa Zydis oral dissolvable tablet 10 mg at bedtime (increased on 12/6)  - c/w Klonopin 0.5 mg only at bedtime for anxiety  - C/w Depakene liquid 250 mg BID for mood  - D/c  lamictal 25 mg qd as she has been inconsistent with it.  - TOO court for 12/13/21 was adjourned as pt was compliant with medications over the weekend.    *Atarax 25 mg q6h PRN for anxiety

## 2021-12-17 NOTE — PROGRESS NOTE BEHAVIORAL HEALTH - NSBHCHARTREVIEWLAB_PSY_A_CORE FT
11.3   5.10  )-----------( 208      ( 17 Dec 2021 07:59 )             36.6       12-17    141  |  105  |  36<H>  ----------------------------<  153<H>  4.5   |  22  |  1.9<H>    Ca    10.1      17 Dec 2021 07:59      TPro  7.1  /  Alb  4.4  /  TBili  0.2  /  DBili  x   /  AST  14  /  ALT  12  /  AlkPhos  112  12-17      CAPILLARY BLOOD GLUCOSE      POCT Blood Glucose.: 100 mg/dL (17 Dec 2021 06:36)

## 2021-12-18 LAB
APPEARANCE UR: CLEAR — SIGNIFICANT CHANGE UP
BACTERIA # UR AUTO: ABNORMAL
BILIRUB UR-MCNC: NEGATIVE — SIGNIFICANT CHANGE UP
COLOR SPEC: YELLOW — SIGNIFICANT CHANGE UP
DIFF PNL FLD: ABNORMAL
EPI CELLS # UR: ABNORMAL /HPF
GLUCOSE BLDC GLUCOMTR-MCNC: 148 MG/DL — HIGH (ref 70–99)
GLUCOSE UR QL: NEGATIVE MG/DL — SIGNIFICANT CHANGE UP
KETONES UR-MCNC: NEGATIVE — SIGNIFICANT CHANGE UP
LEUKOCYTE ESTERASE UR-ACNC: ABNORMAL
NITRITE UR-MCNC: NEGATIVE — SIGNIFICANT CHANGE UP
PH UR: 5.5 — SIGNIFICANT CHANGE UP (ref 5–8)
PROT UR-MCNC: >=300 MG/DL
RBC CASTS # UR COMP ASSIST: SIGNIFICANT CHANGE UP /HPF
SP GR SPEC: >=1.03 (ref 1.01–1.03)
UROBILINOGEN FLD QL: 0.2 MG/DL — SIGNIFICANT CHANGE UP
WBC UR QL: ABNORMAL /HPF

## 2021-12-18 RX ORDER — HYDROCORTISONE 1 %
1 OINTMENT (GRAM) TOPICAL
Refills: 0 | Status: DISCONTINUED | OUTPATIENT
Start: 2021-12-18 | End: 2022-01-27

## 2021-12-18 RX ADMIN — Medication 250 MILLIGRAM(S): at 08:46

## 2021-12-18 RX ADMIN — AMLODIPINE BESYLATE 10 MILLIGRAM(S): 2.5 TABLET ORAL at 08:42

## 2021-12-18 RX ADMIN — PANTOPRAZOLE SODIUM 40 MILLIGRAM(S): 20 TABLET, DELAYED RELEASE ORAL at 08:48

## 2021-12-18 RX ADMIN — Medication 25 MILLIGRAM(S): at 20:50

## 2021-12-18 RX ADMIN — Medication 250 MILLIGRAM(S): at 20:34

## 2021-12-18 RX ADMIN — Medication 1300 MILLIGRAM(S): at 20:31

## 2021-12-18 RX ADMIN — LISINOPRIL 10 MILLIGRAM(S): 2.5 TABLET ORAL at 08:42

## 2021-12-18 RX ADMIN — Medication 1 SUPPOSITORY(S): at 20:32

## 2021-12-18 RX ADMIN — Medication 81 MILLIGRAM(S): at 08:42

## 2021-12-18 RX ADMIN — OLANZAPINE 10 MILLIGRAM(S): 15 TABLET, FILM COATED ORAL at 20:30

## 2021-12-18 RX ADMIN — ATORVASTATIN CALCIUM 40 MILLIGRAM(S): 80 TABLET, FILM COATED ORAL at 20:31

## 2021-12-18 NOTE — CHART NOTE - NSCHARTNOTEFT_GEN_A_CORE
Called to eval patient. Patient states she tripped and braced herself on the wall. She states she hit her L elbow, no head trauma, no LOC. Per staff patient is known to have delusions regarding injuries. Has not taken aspirin.  Patient examined. NAD. Old healing scrape on L elbow. No signs of head trauma. No pain with ROM, no erythema, swelling, or TTP.     Plan:  tylenol prn for pain

## 2021-12-19 LAB — GLUCOSE BLDC GLUCOMTR-MCNC: 132 MG/DL — HIGH (ref 70–99)

## 2021-12-19 RX ADMIN — AMLODIPINE BESYLATE 10 MILLIGRAM(S): 2.5 TABLET ORAL at 09:36

## 2021-12-19 RX ADMIN — OLANZAPINE 10 MILLIGRAM(S): 15 TABLET, FILM COATED ORAL at 20:13

## 2021-12-19 RX ADMIN — Medication 81 MILLIGRAM(S): at 09:35

## 2021-12-19 RX ADMIN — Medication 250 MILLIGRAM(S): at 09:35

## 2021-12-19 RX ADMIN — LISINOPRIL 10 MILLIGRAM(S): 2.5 TABLET ORAL at 09:35

## 2021-12-19 RX ADMIN — Medication 1 SUPPOSITORY(S): at 20:12

## 2021-12-19 RX ADMIN — Medication 25 MILLIGRAM(S): at 20:14

## 2021-12-19 RX ADMIN — Medication 1300 MILLIGRAM(S): at 20:12

## 2021-12-19 RX ADMIN — ATORVASTATIN CALCIUM 40 MILLIGRAM(S): 80 TABLET, FILM COATED ORAL at 20:12

## 2021-12-19 RX ADMIN — PANTOPRAZOLE SODIUM 40 MILLIGRAM(S): 20 TABLET, DELAYED RELEASE ORAL at 09:35

## 2021-12-19 RX ADMIN — Medication 250 MILLIGRAM(S): at 20:12

## 2021-12-20 LAB
GLUCOSE BLDC GLUCOMTR-MCNC: 245 MG/DL — HIGH (ref 70–99)
SARS-COV-2 RNA SPEC QL NAA+PROBE: SIGNIFICANT CHANGE UP

## 2021-12-20 NOTE — PROGRESS NOTE BEHAVIORAL HEALTH - SUMMARY
This is a 77 y/o female with PPH of bipolar depression (Bipolar II disorder), HTN, OA, T2DM, cognitive impairment without behavioral disturbance, brought to the ED at the behest of the [facility] she has been residing for the last 7 years for gradual deterioration in patient's ability to care for herself, in the context of recent psychotropic medication changes due to an episode of lithium toxicity in August of 2021.    Patient's current presentation is consistent with severe bipolar depression with psychotic features on underlying dementia with behavioral disturbance. Patient was stable on lithium for decades (though per son, she was never "normal" and always fluctuated between highs and lows).    During this admission patient has been tried on:  - Low dose lithium (d/c-ed due to renal injury, no response)  - Seroquel (up to 250 mg, d/c-ed due to worsening psychosis)  - Prozac (since November 8th) and Zyprexa (currently on Zydis) - some initial improvement, but now worsening  - Lamictal 25 mg qd (12/15 dc'ed as she has been inconsistent with it)       Overall Amber's response to medication treatment has been poor. She has had increasingly severe delusions of depressive affect. She has been intermittently uncooperative with staff, and occasionally is interactive with peers and amenable to engaging in activities. Her most significant treatment barrier is mixed compliance with medication, as she has declined her morning medications for several days during this hospitalization. Prozac discontinuation has been associated with less agitation, depressive affect not improved. Will c/w Zyprexa Zydis 10 mg at bedtime for bipolar depression, especially as IM version available if TOO is needed. Lamotrigine which was prescribed for Bipolar disorder was discontinued due to noncompliance. Was scheduled for TOO on 12/13 but was adjourned as pt was compliant with medication during the few days directly preceding her court date.    Amber displays a variable pattern of functionality and mentation during the day, potentially indicating a delirium or sundowning of her primary dementia. However she has been intermittently refusing care, including labs. She endorsed improved sleep and appetite, and has been eating with supervision and observed to be sleeping peacefully. No SI/HI noted.     Spoke to patient's son Javier about their options for ECT which both sons believe will help their mother most as it has in the past. Advised that though her ECT referral has been sent out, it is unlikely that a different institution will accept her using this method given risk factors and long line. Advised that one option would be to have patient discharged under the care of her family and have them take her to an ED in a facility that has ECT and see whether the treatment team at that facility agrees about necessity of ECT, which is not a guarantee. He verbalized understanding and agreed to discuss with brother Catarino before coming to decision. Today, he reported that he could not feasibly take the pt back into his care, and that the pt would have to be discharged to [facility] for plan to request ECT at a different facility.     Spoke with Dr. Melo who had 3 meetings with the pt at the outpatient clinic prior to this admission. Her behavior during consistent medication compliance resembles her presentation at outpatient appointments (described by depressed mood, deteriorated independence, and requirement of significant encouragement to engage in activities). Amidst intermittent refusal of medications for the past few days, she regressed to the point of once again endorsing Cotard delusions and that she has caused the death of her eldest son. On one occasion, she conveyed realization and regret that these thoughts were not based in reality and possessed some insight that her abnormal thought processes are what induce these false beliefs.

## 2021-12-20 NOTE — PROGRESS NOTE BEHAVIORAL HEALTH - NSBHFUPINTERVALCCFT_PSY_A_CORE
"I'm not doing good. Can you leave me to get some more sleep?....I'm fine. I just woke so excuse my appearance, but everything is okay."

## 2021-12-20 NOTE — PROGRESS NOTE BEHAVIORAL HEALTH - PRN MEDS
Atarax 25 mg for anxiety
PRN Atarax 25 mg given on 12/19 20:14PM
Atarax 25 mg for anxiety
Tylenol for pain

## 2021-12-20 NOTE — CHART NOTE - NSCHARTNOTEFT_GEN_A_CORE
Social Work Note:    Treatment team met with patient on unit rounds.  Patient was in her room at time of assessment.  This morning patient was more pleasant and engaged appropriately.  She informed that earlier this morning she took her medications.  Patient verbalized an understanding of the importance of continued adherence to prescribed medications for her mental and physical health.  Sleep and appetite were endorsed as good.  Patient ate most of her breakfast.  After interview was concluded patient was visible on the unit.  She was encouraged to attend and actively participate in groups.      Prior to admission patient was a resident of [facility].  Communication ongoing with facility representative ANTOINETTE Montesinos at [facility] [phone].            Mental Status Exam:    Mood – Neutral   Sleep – Good   Appetite – Good     ADLs – Fair    Thought Process – Perseverative    Observation – [id number]    No barriers to discharge identified at this time.     At this time patient is not psychiatrically stable for discharge.

## 2021-12-20 NOTE — PROGRESS NOTE BEHAVIORAL HEALTH - NSBHFUPINTERVALHXFT_PSY_A_CORE
Pt was seen, evaluated, and chart reviewed. PRN Atarax 25 mg given on 12/19 20:14PM. Per nursing pt apparently had to brace herself against the wall and may have tripped although it is unclear if she actually fell. Medication compliant.    Upon approach, pt was lying in bed and interview conducted in pt's room. Pt initially stated, "I'm not doing good. Can you leave me to get some more sleep?" However, when asked to sit up for the interview, she responded, "I'm fine. I just woke so excuse my appearance, but everything is okay." Pt states that her weekend was okay. She mentioned that she felt guilty because she "killed the children" but when asked to elaborate, she stated, "I don't know." When asked whether she may be imagining this and did not actually kill children, she states, "Yea, maybe. I don't know." When asked if she wanted to spend Tiffanie on the unit or back in her group home, she states that she was unsure because she did not know if anyone wanted her. Pt stated she refused medication in the morning because she was eating breakfast but that she would take them if the nurse came back. States her sleep and appetite have been good. Denies having thoughts of not wanting to be alive and denies suicidal ideation. Denies auditory/visual hallucinations. Pt was seen, evaluated, and chart reviewed. PRN Atarax 25 mg given on 12/19 20:14PM. Per nursing pt apparently had to brace herself against the wall and may have tripped although it is unclear if she actually fell. Medication noncompliant.    Upon approach, pt was lying in bed and interview conducted in pt's room. Pt initially stated, "I'm not doing good. Can you leave me to get some more sleep?" However, when asked to sit up for the interview, she responded, "I'm fine. I just woke so excuse my appearance, but everything is okay." Pt states that her weekend was okay. She mentioned that she felt guilty because she "killed the children" but when asked to elaborate, she stated, "I don't know." When asked whether she may be imagining this and did not actually kill children, she states, "Yea, maybe. I don't know." When asked if she wanted to spend Tiffanie on the unit or back in her group home, she states that she was unsure because she did not know if anyone wanted her. Pt stated she refused medication in the morning because she was eating breakfast but that she would take them if the nurse came back. States her sleep and appetite have been good. Denies having thoughts of not wanting to be alive and denies suicidal ideation. Denies auditory/visual hallucinations.

## 2021-12-20 NOTE — PROGRESS NOTE BEHAVIORAL HEALTH - NSBHCHARTREVIEWLAB_PSY_A_CORE FT
Comprehensive Metabolic Panel in AM (12.17.21 @ 07:59)   Sodium, Serum: 141 mmol/L   Potassium, Serum: 4.5 mmol/L   Chloride, Serum: 105 mmol/L   Carbon Dioxide, Serum: 22 mmol/L   Anion Gap, Serum: 14 mmol/L   Blood Urea Nitrogen, Serum: 36 mg/dL   Creatinine, Serum: 1.9 mg/dL   Glucose, Serum: 153 mg/dL   Calcium, Total Serum: 10.1 mg/dL   Protein Total, Serum: 7.1 g/dL   Albumin, Serum: 4.4 g/dL   Bilirubin Total, Serum: 0.2 mg/dL   Alkaline Phosphatase, Serum: 112 U/L   Aspartate Aminotransferase (AST/SGOT): 14 U/L   Alanine Aminotransferase (ALT/SGPT): 12 U/L   eGFR if Non : 25    Complete Blood Count in AM (12.17.21 @ 07:59)   Nucleated RBC: 0 /100 WBCs   WBC Count: 5.10 K/uL   RBC Count: 4.36 M/uL   Hemoglobin: 11.3 g/dL   Hematocrit: 36.6 %

## 2021-12-21 LAB — GLUCOSE BLDC GLUCOMTR-MCNC: 187 MG/DL — HIGH (ref 70–99)

## 2021-12-21 PROCEDURE — 99232 SBSQ HOSP IP/OBS MODERATE 35: CPT | Mod: GC

## 2021-12-21 RX ADMIN — Medication 1 SUPPOSITORY(S): at 20:26

## 2021-12-21 RX ADMIN — Medication 325 MILLIGRAM(S): at 09:00

## 2021-12-21 RX ADMIN — Medication 1300 MILLIGRAM(S): at 20:26

## 2021-12-21 RX ADMIN — Medication 81 MILLIGRAM(S): at 09:00

## 2021-12-21 RX ADMIN — OLANZAPINE 10 MILLIGRAM(S): 15 TABLET, FILM COATED ORAL at 20:26

## 2021-12-21 RX ADMIN — LISINOPRIL 10 MILLIGRAM(S): 2.5 TABLET ORAL at 09:00

## 2021-12-21 RX ADMIN — AMLODIPINE BESYLATE 10 MILLIGRAM(S): 2.5 TABLET ORAL at 09:00

## 2021-12-21 RX ADMIN — Medication 250 MILLIGRAM(S): at 20:25

## 2021-12-21 RX ADMIN — PANTOPRAZOLE SODIUM 40 MILLIGRAM(S): 20 TABLET, DELAYED RELEASE ORAL at 09:00

## 2021-12-21 RX ADMIN — ATORVASTATIN CALCIUM 40 MILLIGRAM(S): 80 TABLET, FILM COATED ORAL at 20:26

## 2021-12-21 RX ADMIN — Medication 1300 MILLIGRAM(S): at 08:59

## 2021-12-21 NOTE — PROGRESS NOTE BEHAVIORAL HEALTH - OTHER
intermittently cooperative not formally assessed "Not good" and "Fine" Cotard delusions Intermittently cooperative Not formally assessed

## 2021-12-21 NOTE — PROGRESS NOTE BEHAVIORAL HEALTH - NSBHFUPINTERVALHXFT_PSY_A_CORE
Pt was seen, evaluated, chart reviewed. Per chart no PRNs administered overnight. Per nursing last night pt refused medication, believed that she had , and exclaimed, "It's over for me." Per chart and nursing pt medication noncompliant yesterday.    Upon approach, pt was lying in bed in her room but she sat up for the interview. When asked how she is doing, she states, "I feel terrible. My mood is okay, but I feel funny. I look like a different person." She proceeded to say, "I don't feel right. I feel funny. My nails are not growing. I look in the mirror and I see a different person. It's horrible. I feel like I am not Amber." When asked what may help her feel better, pt states, "I don't know. I think I may just walk out of here." The team suggested that medication may help the pt and pt's nurse was called to give medication. Pt was agreeable and took the medication in front of the team stating, "I hope it makes me feel better." When asked if she had thoughts of not wanting to be alive, pt stated, "I do. I feel funny. I don't feel right." Pt states she would take her medication later in the day as well. At the end of the interview, pt laid back down in bed.     Nurse was informed to call psychiatry resident if pt refuses medication in the evening.

## 2021-12-21 NOTE — PROGRESS NOTE BEHAVIORAL HEALTH - NSBHCHARTREVIEWLAB_PSY_A_CORE FT
Comprehensive Metabolic Panel in AM (12.17.21 @ 07:59)   Sodium, Serum: 141 mmol/L   Potassium, Serum: 4.5 mmol/L   Chloride, Serum: 105 mmol/L   Carbon Dioxide, Serum: 22 mmol/L   Anion Gap, Serum: 14 mmol/L   Blood Urea Nitrogen, Serum: 36 mg/dL   Creatinine, Serum: 1.9 mg/dL   Glucose, Serum: 153 mg/dL   Calcium, Total Serum: 10.1 mg/dL   Protein Total, Serum: 7.1 g/dL   Albumin, Serum: 4.4 g/dL   Bilirubin Total, Serum: 0.2 mg/dL   Alkaline Phosphatase, Serum: 112 U/L   Aspartate Aminotransferase (AST/SGOT): 14 U/L   Alanine Aminotransferase (ALT/SGPT): 12 U/L   eGFR if Non : 25    Complete Blood Count in AM (12.17.21 @ 07:59)   Nucleated RBC: 0 /100 WBCs   WBC Count: 5.10 K/uL   RBC Count: 4.36 M/uL   Hemoglobin: 11.3 g/dL   Hematocrit: 36.6 %   Mean Cell Volume: 83.9 fL   Mean Cell Hemoglobin: 25.9 pg   Mean Cell Hemoglobin Conc: 30.9 g/dL   Red Cell Distrib Width: 13.5 %   Platelet Count - Automated: 208 K/uL     Urinalysis (12.18.21 @ 20:50)   Glucose Qualitative, Urine: Negative mg/dL   Blood, Urine: Trace-intact   pH Urine: 5.5   Color: Yellow   Urine Appearance: Clear   Bilirubin: Negative   Ketone - Urine: Negative   Specific Gravity: >=1.030   Protein, Urine: >=300 mg/dL   Urobilinogen: 0.2 mg/dL   Nitrite: Negative   Leukocyte Esterase Concentration: Small     Lipid Profile in AM (11.10.21 @ 07:30)   Cholesterol, Serum: 186 mg/dL   Triglycerides, Serum: 360 mg/dL   HDL Cholesterol, Serum: 33 mg/dL   Non HDL Cholesterol: 153

## 2021-12-21 NOTE — PROGRESS NOTE BEHAVIORAL HEALTH - SUMMARY
This is a 77 y/o female with PPH of bipolar depression (Bipolar II disorder), HTN, OA, T2DM, cognitive impairment without behavioral disturbance, brought to the ED at the behest of the nursing home she has been residing for the last 7 years for gradual deterioration in patient's ability to care for herself, in the context of recent psychotropic medication changes due to an episode of lithium toxicity in August of 2021.    Patient's current presentation is consistent with severe bipolar depression with psychotic features on underlying dementia with behavioral disturbance. Patient was stable on lithium for decades (though per son, she was never "normal" and always fluctuated between highs and lows).    During this admission patient has been tried on:  - Low dose lithium (d/c-ed due to renal injury, no response)  - Seroquel (up to 250 mg, d/c-ed due to worsening psychosis)  - Prozac (since November 8th) and Zyprexa (currently on Zydis) - some initial improvement, but now worsening  - Lamictal 25 mg qd (12/15 dc'ed as she has been inconsistent with it)       Overall Amber's response to medication treatment has been poor. She has had increasingly severe delusions of depressive affect. She has been intermittently uncooperative with staff, and occasionally is interactive with peers and amenable to engaging in activities. Her most significant treatment barrier is mixed compliance with medication, as she has declined her morning medications for several days during this hospitalization. Prozac discontinuation has been associated with less agitation, depressive affect not improved. Will c/w Zyprexa Zydis 10 mg at bedtime for bipolar depression, especially as IM version available if TOO is needed. Lamotrigine which was prescribed for Bipolar disorder was discontinued due to noncompliance. Was scheduled for TOO on 12/13 but was adjourned as pt was compliant with medication during the few days directly preceding her court date.    Amber displays a variable pattern of functionality and mentation during the day, potentially indicating a delirium or sundowning of her primary dementia. However she has been intermittently refusing care, including labs. She endorsed improved sleep and appetite, and has been eating with supervision and observed to be sleeping peacefully. No SI/HI noted.     Spoke to patient's son Javier about their options for ECT which both sons believe will help their mother most as it has in the past. Advised that though her ECT referral has been sent out, it is unlikely that a different institution will accept her using this method given risk factors and long line. Advised that one option would be to have patient discharged under the care of her family and have them take her to an ED in a facility that has ECT and see whether the treatment team at that facility agrees about necessity of ECT, which is not a guarantee. He verbalized understanding and agreed to discuss with brother Catarino before coming to decision. Today, he reported that he could not feasibly take the pt back into his care, and that the pt would have to be discharged to [facility] for plan to request ECT at a different facility.     Spoke with Dr. Melo who had 3 meetings with the pt at the outpatient clinic prior to this admission. Her behavior during consistent medication compliance resembles her presentation at outpatient appointments (described by depressed mood, deteriorated independence, and requirement of significant encouragement to engage in activities). Amidst intermittent refusal of medications for the past few days, she regressed to the point of once again endorsing Cotard delusions and that she has caused the death of her eldest son. On one occasion, she conveyed realization and regret that these thoughts were not based in reality and possessed some insight that her abnormal thought processes are what induce these false beliefs.

## 2021-12-22 LAB — GLUCOSE BLDC GLUCOMTR-MCNC: 122 MG/DL — HIGH (ref 70–99)

## 2021-12-22 PROCEDURE — 99232 SBSQ HOSP IP/OBS MODERATE 35: CPT | Mod: GC

## 2021-12-22 RX ADMIN — Medication 250 MILLIGRAM(S): at 08:24

## 2021-12-22 RX ADMIN — AMLODIPINE BESYLATE 10 MILLIGRAM(S): 2.5 TABLET ORAL at 08:24

## 2021-12-22 RX ADMIN — Medication 1300 MILLIGRAM(S): at 08:25

## 2021-12-22 RX ADMIN — Medication 325 MILLIGRAM(S): at 08:25

## 2021-12-22 RX ADMIN — OLANZAPINE 10 MILLIGRAM(S): 15 TABLET, FILM COATED ORAL at 20:10

## 2021-12-22 RX ADMIN — LISINOPRIL 10 MILLIGRAM(S): 2.5 TABLET ORAL at 08:24

## 2021-12-22 RX ADMIN — Medication 81 MILLIGRAM(S): at 08:25

## 2021-12-22 RX ADMIN — PANTOPRAZOLE SODIUM 40 MILLIGRAM(S): 20 TABLET, DELAYED RELEASE ORAL at 08:25

## 2021-12-22 RX ADMIN — Medication 250 MILLIGRAM(S): at 20:10

## 2021-12-22 RX ADMIN — ATORVASTATIN CALCIUM 40 MILLIGRAM(S): 80 TABLET, FILM COATED ORAL at 20:10

## 2021-12-22 RX ADMIN — Medication 1300 MILLIGRAM(S): at 20:10

## 2021-12-22 NOTE — PROGRESS NOTE BEHAVIORAL HEALTH - SUMMARY
This is a 77 y/o female with PPH of bipolar depression (Bipolar II disorder), HTN, OA, T2DM, cognitive impairment without behavioral disturbance, brought to the ED at the behest of the [facility] she has been residing for the last 7 years for gradual deterioration in patient's ability to care for herself, in the context of recent psychotropic medication changes due to an episode of lithium toxicity in August of 2021.    Patient's current presentation is consistent with severe bipolar depression with psychotic features on underlying dementia with behavioral disturbance. Patient was stable on lithium for decades (though per son, she was never "normal" and always fluctuated between highs and lows).    During this admission patient has been tried on:  - Low dose lithium (d/c-ed due to renal injury, no response)  - Seroquel (up to 250 mg, d/c-ed due to worsening psychosis)  - Prozac (since November 8th) and Zyprexa (currently on Zydis) - some initial improvement, but now worsening  - Lamictal 25 mg qd (12/15 dc'ed as she has been inconsistent with it)       Overall Amber's response to medication treatment has been poor. She has had increasingly severe delusions of depressive affect. She has been intermittently uncooperative with staff, and occasionally is interactive with peers and amenable to engaging in activities. Her most significant treatment barrier is mixed compliance with medication, as she has declined her morning medications for several days during this hospitalization. Prozac discontinuation has been associated with less agitation, depressive affect not improved. Will c/w Zyprexa Zydis 10 mg at bedtime for bipolar depression, especially as IM version available if TOO is needed. Lamotrigine which was prescribed for Bipolar disorder was discontinued due to noncompliance. Was scheduled for TOO on 12/13 but was adjourned as pt was compliant with medication during the few days directly preceding her court date.    Amber displays a variable pattern of functionality and mentation during the day, potentially indicating a delirium or sundowning of her primary dementia. However she has been intermittently refusing care, including labs. She endorsed improved sleep and appetite, and has been eating with supervision and observed to be sleeping peacefully. No SI/HI noted.     Spoke to patient's son Javier about their options for ECT which both sons believe will help their mother most as it has in the past. Advised that though her ECT referral has been sent out, it is unlikely that a different institution will accept her using this method given risk factors and long line. Advised that one option would be to have patient discharged under the care of her family and have them take her to an ED in a facility that has ECT and see whether the treatment team at that facility agrees about necessity of ECT, which is not a guarantee. He verbalized understanding and agreed to discuss with brother Catarino before coming to decision. Today, he reported that he could not feasibly take the pt back into his care, and that the pt would have to be discharged to [facility] for plan to request ECT at a different facility.     Spoke with Dr. Melo who had 3 meetings with the pt at the outpatient clinic prior to this admission. Her behavior during consistent medication compliance resembles her presentation at outpatient appointments (described by depressed mood, deteriorated independence, and requirement of significant encouragement to engage in activities). Amidst intermittent refusal of medications for the past few days, she regressed to the point of once again endorsing Cotard delusions and that she has caused the death of her eldest son. On one occasion, she conveyed realization and regret that these thoughts were not based in reality and possessed some insight that her abnormal thought processes are what induce these false beliefs.

## 2021-12-22 NOTE — PROGRESS NOTE BEHAVIORAL HEALTH - NSBHFUPINTERVALHXFT_PSY_A_CORE
Pt seen, evaluated, chart reviewed. Per chart no PRNs administered overnight. Per nursing overnight pt continues to mention at times that she has no pulse and that she is dead. Per chart medication compliant.     Upon approach, pt was lying in bed and interview was conducted in pt's room. When asked how she is doing, she states, "Just look at me. I look horrible. I want to be with everyone, but I can't." When asked to elaborate, she states, "Just look at me. I look horrible." Pt was encouraged to walk to the day room in order to get out of her room, and she walked to the room but ultimately walked back right away stating, "I just can't." States she has been eating and sleeping okay. States she has been taking her medications and denies known side-effects. Pt seen, evaluated, chart reviewed. Per chart no PRNs administered overnight. Per nursing overnight pt continues to mention at times that she has no pulse and that she is dead. Per chart medication compliant.     Upon approach, pt was lying in bed and interview was conducted in pt's room. When asked how she is doing, she states, "Just look at me. I look horrible. I want to be with everyone, but I can't." When asked to elaborate, she states, "Just look at me. I look horrible." Pt was encouraged to walk to the day room in order to get out of her room, and she walked to the room but ultimately walked back right away stating, "I just can't." States she has been eating and sleeping okay. States she has been taking her medications and denies known side-effects. Denies visual and auditory hallucinations. Denies suicidal ideation.

## 2021-12-22 NOTE — PROGRESS NOTE BEHAVIORAL HEALTH - OTHER
Intermittently cooperative "I want to be with everyone, but I can't." not formally assessed Not formally assessed Cotard delusions

## 2021-12-22 NOTE — CHART NOTE - NSCHARTNOTEFT_GEN_A_CORE
Social Work Note:    Treatment team met with patient on unit rounds.  Patient was superficially cooperative with interview.  She continues to inform she is not doing well and she does not look well.  Patient has been adherent to prescribed medications from yesterday.  Sleep and appetite are good per her report.      After interview was concluded patient was visible on the unit and sitting in the day room.  She was encouraged to attend and actively participate in groups.      Prior to admission patient was a resident of [facility].  Communication ongoing with facility representative ANTOINETTE Montesinos at [facility] [phone].  Voicemail left for Yamel earlier today to provide an update.           Mental Status Exam:    Mood – Neutral   Sleep – Good   Appetite – Good     ADLs – Fair    Thought Process – Perseverative    Observation – [id number]    No barriers to discharge identified at this time.     At this time patient is not psychiatrically stable for discharge.

## 2021-12-22 NOTE — PROGRESS NOTE BEHAVIORAL HEALTH - PROBLEM SELECTOR PLAN 1
- c/w Zyprexa Zydis oral dissolvable tablet 10 mg at bedtime (increased on 12/6)  - C/w Depakene liquid 250 mg BID for mood  - D/c  lamictal 25 mg qd as she has been inconsistent with it.  - TOO court for 12/13/21 was adjourned as pt was compliant with medications over the weekend.    *Atarax 25 mg q6h PRN for anxiety

## 2021-12-23 LAB
APPEARANCE UR: CLEAR — SIGNIFICANT CHANGE UP
BACTERIA # UR AUTO: ABNORMAL
BILIRUB UR-MCNC: NEGATIVE — SIGNIFICANT CHANGE UP
COD CRY URNS QL: NEGATIVE — SIGNIFICANT CHANGE UP
COLOR SPEC: YELLOW — SIGNIFICANT CHANGE UP
DIFF PNL FLD: ABNORMAL
EPI CELLS # UR: ABNORMAL /HPF
GLUCOSE BLDC GLUCOMTR-MCNC: 144 MG/DL — HIGH (ref 70–99)
GLUCOSE UR QL: NEGATIVE MG/DL — SIGNIFICANT CHANGE UP
GRAN CASTS # UR COMP ASSIST: NEGATIVE — SIGNIFICANT CHANGE UP
HYALINE CASTS # UR AUTO: ABNORMAL /LPF
KETONES UR-MCNC: NEGATIVE — SIGNIFICANT CHANGE UP
LEUKOCYTE ESTERASE UR-ACNC: ABNORMAL
NITRITE UR-MCNC: NEGATIVE — SIGNIFICANT CHANGE UP
PH UR: 7 — SIGNIFICANT CHANGE UP (ref 5–8)
PROT UR-MCNC: >=300 MG/DL
RBC CASTS # UR COMP ASSIST: ABNORMAL /HPF
SP GR SPEC: 1.02 — SIGNIFICANT CHANGE UP (ref 1.01–1.03)
TRI-PHOS CRY UR QL COMP ASSIST: NEGATIVE — SIGNIFICANT CHANGE UP
URATE CRY FLD QL MICRO: NEGATIVE — SIGNIFICANT CHANGE UP
UROBILINOGEN FLD QL: 0.2 MG/DL — SIGNIFICANT CHANGE UP
WBC UR QL: ABNORMAL /HPF

## 2021-12-23 PROCEDURE — 99232 SBSQ HOSP IP/OBS MODERATE 35: CPT

## 2021-12-23 RX ADMIN — Medication 1300 MILLIGRAM(S): at 20:15

## 2021-12-23 RX ADMIN — Medication 250 MILLIGRAM(S): at 11:27

## 2021-12-23 RX ADMIN — Medication 325 MILLIGRAM(S): at 11:24

## 2021-12-23 RX ADMIN — Medication 250 MILLIGRAM(S): at 20:15

## 2021-12-23 RX ADMIN — LISINOPRIL 10 MILLIGRAM(S): 2.5 TABLET ORAL at 11:24

## 2021-12-23 RX ADMIN — ATORVASTATIN CALCIUM 40 MILLIGRAM(S): 80 TABLET, FILM COATED ORAL at 20:14

## 2021-12-23 RX ADMIN — OLANZAPINE 10 MILLIGRAM(S): 15 TABLET, FILM COATED ORAL at 20:15

## 2021-12-23 RX ADMIN — Medication 1300 MILLIGRAM(S): at 11:25

## 2021-12-23 RX ADMIN — Medication 81 MILLIGRAM(S): at 11:24

## 2021-12-23 RX ADMIN — PANTOPRAZOLE SODIUM 40 MILLIGRAM(S): 20 TABLET, DELAYED RELEASE ORAL at 11:25

## 2021-12-23 RX ADMIN — AMLODIPINE BESYLATE 10 MILLIGRAM(S): 2.5 TABLET ORAL at 11:24

## 2021-12-23 NOTE — PROGRESS NOTE BEHAVIORAL HEALTH - OTHER
Cotard delusions not formally assessed "I look ugly." Not formally assessed Intermittently cooperative

## 2021-12-23 NOTE — PROGRESS NOTE BEHAVIORAL HEALTH - NSBHFUPINTERVALHXFT_PSY_A_CORE
Pt was seen, evaluated, chart reviewed. Per nursing no behavioral events overnight. Per chart no PRNs administered overnight. Per chart medication compliant.    Upon approach, pt was lying in bed and interview was conducted in pt's room. When asked how she was doing, pt states, "I look ugly. Just look at me" as she point to her hair. States she did not sleep well last night and did not eat breakfast. She stated, "I don't know what's going on in my head. I feel out of it. I don't know what caused this." She repeated that she did not have clothes for when she goes outside, but it was explained to pt that she would not be physically leaving the unit. Affirms medication compliance and that she would continue to take her medication. Pt was seen, evaluated, chart reviewed. Per nursing no behavioral events overnight. Per chart no PRNs administered overnight. Per chart medication compliant.    Upon approach, pt was lying in bed and interview was conducted in pt's room. When asked how she was doing, pt states, "I look ugly. Just look at me" as she point to her hair. States she did not sleep well last night and did not eat breakfast. She stated, "I don't know what's going on in my head. I feel out of it. I don't know what caused this. I'm not sure if my kids are alive." When asked if she remembered that we spoke together with her son over the phone on Tuesday, she stated, "Oh yea, we did, right." She repeated that she did not have clothes for when she goes outside, but it was explained to pt that she would not be physically leaving the unit. Affirms medication compliance and that she would continue to take her medication.

## 2021-12-23 NOTE — PROGRESS NOTE BEHAVIORAL HEALTH - PROBLEM SELECTOR PLAN 1
- Continue Zyprexa Zydis oral dissolvable tablet 10 mg at bedtime (increased on 12/6)  - Continue Depakene liquid 250 mg BID for mood  - Discontinued  lamictal 25 mg qd as she has been inconsistent with it.  - TOO court for 12/13/21 was adjourned as pt was compliant with medications over the weekend.    *Atarax 25 mg q6h PRN for anxiety

## 2021-12-23 NOTE — PROGRESS NOTE BEHAVIORAL HEALTH - SUMMARY
This is a 77 y/o female with PPH of bipolar depression (Bipolar II disorder), HTN, OA, T2DM, cognitive impairment without behavioral disturbance, brought to the ED at the behest of the correction she has been residing for the last 7 years for gradual deterioration in patient's ability to care for herself, in the context of recent psychotropic medication changes due to an episode of lithium toxicity in August of 2021.    Patient's current presentation is consistent with severe bipolar depression with psychotic features on underlying dementia with behavioral disturbance. Patient was stable on lithium for decades (though per son, she was never "normal" and always fluctuated between highs and lows).    During this admission patient has been tried on:  - Low dose lithium (d/c-ed due to renal injury, no response)  - Seroquel (up to 250 mg, d/c-ed due to worsening psychosis)  - Prozac (since November 8th) and Zyprexa (currently on Zydis) - some initial improvement, but now worsening  - Lamictal 25 mg qd (12/15 dc'ed as she has been inconsistent with it)       Overall Amber's response to medication treatment has been poor. She has had increasingly severe delusions of depressive affect. She has been intermittently uncooperative with staff, and occasionally is interactive with peers and amenable to engaging in activities. Her most significant treatment barrier is mixed compliance with medication, as she has declined her morning medications for several days during this hospitalization. Prozac discontinuation has been associated with less agitation, depressive affect not improved. Will c/w Zyprexa Zydis 10 mg at bedtime for bipolar depression, especially as IM version available if TOO is needed. Lamotrigine which was prescribed for Bipolar disorder was discontinued due to noncompliance. Was scheduled for TOO on 12/13 but was adjourned as pt was compliant with medication during the few days directly preceding her court date.    Amber displays a variable pattern of functionality and mentation during the day, potentially indicating a delirium or sundowning of her primary dementia. However she has been intermittently refusing care, including labs. She endorsed improved sleep and appetite, and has been eating with supervision and observed to be sleeping peacefully. No SI/HI noted.     Spoke to patient's son Javier about their options for ECT which both sons believe will help their mother most as it has in the past. Advised that though her ECT referral has been sent out, it is unlikely that a different institution will accept her using this method given risk factors and long line. Advised that one option would be to have patient discharged under the care of her family and have them take her to an ED in a facility that has ECT and see whether the treatment team at that facility agrees about necessity of ECT, which is not a guarantee. He verbalized understanding and agreed to discuss with brother Catarino before coming to decision. Today, he reported that he could not feasibly take the pt back into his care, and that the pt would have to be discharged to [facility] for plan to request ECT at a different facility.     Spoke with Dr. Melo who had 3 meetings with the pt at the outpatient clinic prior to this admission. Her behavior during consistent medication compliance resembles her presentation at outpatient appointments (described by depressed mood, deteriorated independence, and requirement of significant encouragement to engage in activities). Amidst intermittent refusal of medications for the past few days, she regressed to the point of once again endorsing Cotard delusions and that she has caused the death of her eldest son. On one occasion, she conveyed realization and regret that these thoughts were not based in reality and possessed some insight that her abnormal thought processes are what induce these false beliefs.

## 2021-12-24 RX ADMIN — Medication 81 MILLIGRAM(S): at 11:33

## 2021-12-24 RX ADMIN — Medication 250 MILLIGRAM(S): at 20:02

## 2021-12-24 RX ADMIN — Medication 250 MILLIGRAM(S): at 11:31

## 2021-12-24 RX ADMIN — PANTOPRAZOLE SODIUM 40 MILLIGRAM(S): 20 TABLET, DELAYED RELEASE ORAL at 11:32

## 2021-12-24 RX ADMIN — OLANZAPINE 10 MILLIGRAM(S): 15 TABLET, FILM COATED ORAL at 20:02

## 2021-12-24 RX ADMIN — Medication 325 MILLIGRAM(S): at 11:33

## 2021-12-24 RX ADMIN — Medication 1300 MILLIGRAM(S): at 11:32

## 2021-12-24 RX ADMIN — Medication 1300 MILLIGRAM(S): at 20:01

## 2021-12-24 RX ADMIN — LISINOPRIL 10 MILLIGRAM(S): 2.5 TABLET ORAL at 11:32

## 2021-12-24 RX ADMIN — AMLODIPINE BESYLATE 10 MILLIGRAM(S): 2.5 TABLET ORAL at 11:34

## 2021-12-24 RX ADMIN — ATORVASTATIN CALCIUM 40 MILLIGRAM(S): 80 TABLET, FILM COATED ORAL at 20:02

## 2021-12-24 NOTE — CHART NOTE - NSCHARTNOTEFT_GEN_A_CORE
Called by RN to endorse that patient's having ringing in the ear   Patient is hypertensive and just received antihypertensives - Amlodipine and Lisinopril   Patient is currently not oriented    Suggested to repeat blood pressure in 2 hours and call Medicine if not improving for further care   Will suggest Psychiatry to consider increasing or adding SSRI vs TCAs as they are first line for Tinnitus

## 2021-12-25 LAB
CULTURE RESULTS: SIGNIFICANT CHANGE UP
SPECIMEN SOURCE: SIGNIFICANT CHANGE UP

## 2021-12-25 RX ADMIN — ATORVASTATIN CALCIUM 40 MILLIGRAM(S): 80 TABLET, FILM COATED ORAL at 20:17

## 2021-12-25 RX ADMIN — Medication 81 MILLIGRAM(S): at 08:23

## 2021-12-25 RX ADMIN — LISINOPRIL 10 MILLIGRAM(S): 2.5 TABLET ORAL at 08:23

## 2021-12-25 RX ADMIN — Medication 325 MILLIGRAM(S): at 08:23

## 2021-12-25 RX ADMIN — PANTOPRAZOLE SODIUM 40 MILLIGRAM(S): 20 TABLET, DELAYED RELEASE ORAL at 08:22

## 2021-12-25 RX ADMIN — AMLODIPINE BESYLATE 10 MILLIGRAM(S): 2.5 TABLET ORAL at 08:22

## 2021-12-25 RX ADMIN — OLANZAPINE 10 MILLIGRAM(S): 15 TABLET, FILM COATED ORAL at 21:42

## 2021-12-25 RX ADMIN — Medication 1300 MILLIGRAM(S): at 08:22

## 2021-12-25 RX ADMIN — Medication 1300 MILLIGRAM(S): at 21:45

## 2021-12-25 RX ADMIN — HEPARIN SODIUM 5000 UNIT(S): 5000 INJECTION INTRAVENOUS; SUBCUTANEOUS at 08:24

## 2021-12-25 RX ADMIN — Medication 1 SUPPOSITORY(S): at 08:24

## 2021-12-25 RX ADMIN — Medication 250 MILLIGRAM(S): at 12:06

## 2021-12-25 RX ADMIN — Medication 250 MILLIGRAM(S): at 21:26

## 2021-12-26 LAB — GLUCOSE BLDC GLUCOMTR-MCNC: 143 MG/DL — HIGH (ref 70–99)

## 2021-12-26 RX ADMIN — LISINOPRIL 10 MILLIGRAM(S): 2.5 TABLET ORAL at 08:03

## 2021-12-26 RX ADMIN — AMLODIPINE BESYLATE 10 MILLIGRAM(S): 2.5 TABLET ORAL at 08:03

## 2021-12-26 RX ADMIN — Medication 250 MILLIGRAM(S): at 20:36

## 2021-12-26 RX ADMIN — ATORVASTATIN CALCIUM 40 MILLIGRAM(S): 80 TABLET, FILM COATED ORAL at 20:39

## 2021-12-26 RX ADMIN — Medication 81 MILLIGRAM(S): at 08:03

## 2021-12-26 RX ADMIN — OLANZAPINE 10 MILLIGRAM(S): 15 TABLET, FILM COATED ORAL at 20:41

## 2021-12-26 RX ADMIN — Medication 250 MILLIGRAM(S): at 08:03

## 2021-12-26 RX ADMIN — PANTOPRAZOLE SODIUM 40 MILLIGRAM(S): 20 TABLET, DELAYED RELEASE ORAL at 08:03

## 2021-12-26 RX ADMIN — Medication 325 MILLIGRAM(S): at 08:03

## 2021-12-26 RX ADMIN — Medication 1300 MILLIGRAM(S): at 20:39

## 2021-12-26 RX ADMIN — Medication 1300 MILLIGRAM(S): at 08:04

## 2021-12-27 LAB — SARS-COV-2 RNA SPEC QL NAA+PROBE: SIGNIFICANT CHANGE UP

## 2021-12-27 PROCEDURE — 99231 SBSQ HOSP IP/OBS SF/LOW 25: CPT

## 2021-12-27 RX ORDER — DIPHENHYDRAMINE HCL 50 MG
50 CAPSULE ORAL ONCE
Refills: 0 | Status: COMPLETED | OUTPATIENT
Start: 2021-12-27 | End: 2021-12-27

## 2021-12-27 RX ORDER — HALOPERIDOL DECANOATE 100 MG/ML
5 INJECTION INTRAMUSCULAR ONCE
Refills: 0 | Status: COMPLETED | OUTPATIENT
Start: 2021-12-27 | End: 2021-12-27

## 2021-12-27 RX ADMIN — LISINOPRIL 10 MILLIGRAM(S): 2.5 TABLET ORAL at 10:15

## 2021-12-27 RX ADMIN — HALOPERIDOL DECANOATE 5 MILLIGRAM(S): 100 INJECTION INTRAMUSCULAR at 01:04

## 2021-12-27 RX ADMIN — ATORVASTATIN CALCIUM 40 MILLIGRAM(S): 80 TABLET, FILM COATED ORAL at 20:29

## 2021-12-27 RX ADMIN — Medication 81 MILLIGRAM(S): at 10:14

## 2021-12-27 RX ADMIN — Medication 325 MILLIGRAM(S): at 10:15

## 2021-12-27 RX ADMIN — AMLODIPINE BESYLATE 10 MILLIGRAM(S): 2.5 TABLET ORAL at 10:14

## 2021-12-27 RX ADMIN — Medication 250 MILLIGRAM(S): at 10:15

## 2021-12-27 RX ADMIN — OLANZAPINE 10 MILLIGRAM(S): 15 TABLET, FILM COATED ORAL at 20:29

## 2021-12-27 RX ADMIN — Medication 50 MILLIGRAM(S): at 01:05

## 2021-12-27 RX ADMIN — Medication 1300 MILLIGRAM(S): at 10:15

## 2021-12-27 RX ADMIN — Medication 1300 MILLIGRAM(S): at 20:29

## 2021-12-27 RX ADMIN — Medication 250 MILLIGRAM(S): at 20:29

## 2021-12-27 RX ADMIN — PANTOPRAZOLE SODIUM 40 MILLIGRAM(S): 20 TABLET, DELAYED RELEASE ORAL at 10:15

## 2021-12-27 NOTE — PROGRESS NOTE BEHAVIORAL HEALTH - PROBLEM SELECTOR PLAN 1
- Continue Zyprexa Zydis oral dissolvable tablet 10 mg at bedtime (increased on 12/6)  - Continue Depakene liquid 250 mg BID for mood  - Discontinued  lamictal 25 mg qd as she has been inconsistent with it.  - TOO court for 12/13/21 was adjourned as pt was compliant with medications over the weekend.    *Atarax 25 mg q6h PRN for anxiety - Continue Zyprexa Zydis oral dissolvable tablet 10 mg at bedtime (increased on 12/6)  - Continue Depakene liquid 250 mg BID for mood  - F/u on Depakote level to be collected 12/28    *Atarax 25 mg q6h PRN for anxiety    - Discontinued  lamictal 25 mg qd as she has been inconsistent with it.  - TOO court for 12/13/21 was adjourned as pt was compliant with medications over the weekend.

## 2021-12-27 NOTE — PROGRESS NOTE BEHAVIORAL HEALTH - NSBHFUPINTERVALHXFT_PSY_A_CORE
Pt was seen, evaluated, chart reviewed. Per nursing no behavioral events overnight. Per chart no PRNs administered overnight. Per chart medication compliant except for Heparin injection.    Upon approach, pt was standing in her room wearing her own clothes and interview conducted in pt's room. She immediately states, "You know, this place is closing up." We explained to her that this was not the case, and pt appeared to agree. She repeated, "I can't believe I did this to myself, being here. I can't believe it," when asked how she was doing. She states she did not sleep well and could not remember the number of hour she slept. States she has been eating some of the food provided. Affirms taking her medications and denies any side-effects. She stated that she wanted to see her children and wants to go back to [facility]. Pt then requested a cup of water and when given, she states, "Thank you. This is some good, cold water." At the end of the interview, pt was agreeable to walk to the day room to watch tv.

## 2021-12-27 NOTE — CHART NOTE - NSCHARTNOTEFT_GEN_A_CORE
Social Work Note:    Amber remains on the unit for continued treatment, safety, and observation.  Treatment team met with patient on morning rounds.  Patient endorses improved mood since admission.  She reports that she wants to see her children and wants to return to her residence.  She endorses adequate appetite.  Patient has been taking her medications as prescribed and denies side effects.  She has been visible on the unit, interacting with peers and staff and watching TV in the day room.     Once stable for discharge, patient will return to her residence, [facility] [phone].      At this time patient is not psychiatrically stable for discharge.    Mental Status Exam:    Mood – Anxious/Depressed  Sleep – Poor  Appetite -Fair  ADLs – Fair  Thought Process – Perseverative  Observation – Routine q10.

## 2021-12-27 NOTE — PROGRESS NOTE BEHAVIORAL HEALTH - OTHER
Intermittently cooperative Not formally assessed Pt endorsed Cotard delusions in the past, but today does not verbalize them.

## 2021-12-27 NOTE — PROGRESS NOTE BEHAVIORAL HEALTH - NSBHCHARTREVIEWLAB_PSY_A_CORE FT
Complete Blood Count in AM (12.17.21 @ 07:59)   Nucleated RBC: 0 /100 WBCs   WBC Count: 5.10 K/uL   RBC Count: 4.36 M/uL   Hemoglobin: 11.3 g/dL   Hematocrit: 36.6 %   Mean Cell Volume: 83.9 fL   Mean Cell Hemoglobin: 25.9 pg   Mean Cell Hemoglobin Conc: 30.9 g/dL   Red Cell Distrib Width: 13.5 %   Platelet Count - Automated: 208 K/uL     Comprehensive Metabolic Panel in AM (12.17.21 @ 07:59)   Sodium, Serum: 141 mmol/L   Potassium, Serum: 4.5 mmol/L   Chloride, Serum: 105 mmol/L   Carbon Dioxide, Serum: 22 mmol/L   Anion Gap, Serum: 14 mmol/L   Blood Urea Nitrogen, Serum: 36 mg/dL   Creatinine, Serum: 1.9 mg/dL   Glucose, Serum: 153 mg/dL   Calcium, Total Serum: 10.1 mg/dL   Protein Total, Serum: 7.1 g/dL   Albumin, Serum: 4.4 g/dL   Bilirubin Total, Serum: 0.2 mg/dL   Alkaline Phosphatase, Serum: 112 U/L   Aspartate Aminotransferase (AST/SGOT): 14 U/L   Alanine Aminotransferase (ALT/SGPT): 12 U/L   eGFR if Non : 25    Lipid Profile in AM (11.10.21 @ 07:30)   Cholesterol, Serum: 186 mg/dL   Triglycerides, Serum: 360 mg/dL   HDL Cholesterol, Serum: 33 mg/dL   Non HDL Cholesterol: 153

## 2021-12-27 NOTE — PROGRESS NOTE BEHAVIORAL HEALTH - SUMMARY
This is a 77 y/o female with PPH of bipolar depression (Bipolar II disorder), HTN, OA, T2DM, cognitive impairment without behavioral disturbance, brought to the ED at the behest of the [facility] she has been residing for the last 7 years for gradual deterioration in patient's ability to care for herself, in the context of recent psychotropic medication changes due to an episode of lithium toxicity in August of 2021.    Patient's current presentation is consistent with severe bipolar depression with psychotic features on underlying dementia with behavioral disturbance. Patient was stable on lithium for decades (though per son, she was never "normal" and always fluctuated between highs and lows).    During this admission patient has been tried on:  - Low dose lithium (d/c-ed due to renal injury, no response)  - Seroquel (up to 250 mg, d/c-ed due to worsening psychosis)  - Prozac (since November 8th) and Zyprexa (currently on Zydis) - some initial improvement, but now worsening  - Lamictal 25 mg qd (12/15 dc'ed as she has been inconsistent with it)     Overall Amber's response to medication treatment has been poor. She has had increasingly severe delusions of depressive affect. She has been intermittently uncooperative with staff, and occasionally is interactive with peers and amenable to engaging in activities. Her most significant treatment barrier is mixed compliance with medication, as she has declined her morning medications for several days during this hospitalization. Prozac discontinuation has been associated with less agitation, depressive affect not improved. Will c/w Zyprexa Zydis 10 mg at bedtime for bipolar depression, especially as IM version available if TOO is needed. Lamotrigine which was prescribed for Bipolar disorder was discontinued due to noncompliance. Was scheduled for TOO on 12/13 but was adjourned as pt was compliant with medication during the few days directly preceding her court date.    Spoke to patient's son Javier about their options for ECT which both sons believe will help their mother most as it has in the past. Advised that though her ECT referral has been sent out, it is unlikely that a different institution will accept her using this method given risk factors and long line. Advised that one option would be to have patient discharged under the care of her family and have them take her to an ED in a facility that has ECT and see whether the treatment team at that facility agrees about necessity of ECT, which is not a guarantee. He verbalized understanding and agreed to discuss with brother Catarino before coming to decision. Today, he reported that he could not feasibly take the pt back into his care, and that the pt would have to be discharged to [facility] for plan to request ECT at a different facility.     Spoke with Dr. Melo who had 3 meetings with the pt at the outpatient clinic prior to this admission. Her behavior during consistent medication compliance resembles her presentation at outpatient appointments (described by depressed mood, deteriorated independence, and requirement of significant encouragement to engage in activities). Amidst intermittent refusal of medications for the past few days, she regressed to the point of once again endorsing Cotard delusions and that she has caused the death of her eldest son. On one occasion, she conveyed realization and regret that these thoughts were not based in reality and possessed some insight that her abnormal thought processes are what induce these false beliefs.    Amber displays a variable pattern of functionality and mentation during the day, potentially indicating a delirium or sundowning of her primary dementia.     On evaluation today, patient appears with a slightly brighter affect as compared to last week. She does not verbalize Cotard delusions without prompting unlike last week where she would immediately reference them during the interview. She also does not perseverate on needing clothing to go outside, as she would repeat this multiple times during the same interview last week. However, it appears she still perseverates on apparently blaming herself for having to come to the hospital. Her thought process still has moments of illogical and impaired reasoning. Of note, patient has been compliant with her medications for the last week.

## 2021-12-28 LAB
ANION GAP SERPL CALC-SCNC: 14 MMOL/L — SIGNIFICANT CHANGE UP (ref 7–14)
BUN SERPL-MCNC: 28 MG/DL — HIGH (ref 10–20)
CALCIUM SERPL-MCNC: 10.4 MG/DL — HIGH (ref 8.5–10.1)
CHLORIDE SERPL-SCNC: 104 MMOL/L — SIGNIFICANT CHANGE UP (ref 98–110)
CO2 SERPL-SCNC: 24 MMOL/L — SIGNIFICANT CHANGE UP (ref 17–32)
CREAT SERPL-MCNC: 1.7 MG/DL — HIGH (ref 0.7–1.5)
GLUCOSE SERPL-MCNC: 192 MG/DL — HIGH (ref 70–99)
HCT VFR BLD CALC: 38.6 % — SIGNIFICANT CHANGE UP (ref 37–47)
HGB BLD-MCNC: 11.9 G/DL — LOW (ref 12–16)
MCHC RBC-ENTMCNC: 25.9 PG — LOW (ref 27–31)
MCHC RBC-ENTMCNC: 30.8 G/DL — LOW (ref 32–37)
MCV RBC AUTO: 83.9 FL — SIGNIFICANT CHANGE UP (ref 81–99)
NRBC # BLD: 0 /100 WBCS — SIGNIFICANT CHANGE UP (ref 0–0)
PLATELET # BLD AUTO: 227 K/UL — SIGNIFICANT CHANGE UP (ref 130–400)
POTASSIUM SERPL-MCNC: 4.2 MMOL/L — SIGNIFICANT CHANGE UP (ref 3.5–5)
POTASSIUM SERPL-SCNC: 4.2 MMOL/L — SIGNIFICANT CHANGE UP (ref 3.5–5)
RBC # BLD: 4.6 M/UL — SIGNIFICANT CHANGE UP (ref 4.2–5.4)
RBC # FLD: 13.3 % — SIGNIFICANT CHANGE UP (ref 11.5–14.5)
SODIUM SERPL-SCNC: 142 MMOL/L — SIGNIFICANT CHANGE UP (ref 135–146)
VALPROATE SERPL-MCNC: 30 UG/ML — LOW (ref 50–100)
WBC # BLD: 5.11 K/UL — SIGNIFICANT CHANGE UP (ref 4.8–10.8)
WBC # FLD AUTO: 5.11 K/UL — SIGNIFICANT CHANGE UP (ref 4.8–10.8)

## 2021-12-28 PROCEDURE — 99231 SBSQ HOSP IP/OBS SF/LOW 25: CPT

## 2021-12-28 RX ADMIN — AMLODIPINE BESYLATE 10 MILLIGRAM(S): 2.5 TABLET ORAL at 10:47

## 2021-12-28 RX ADMIN — PANTOPRAZOLE SODIUM 40 MILLIGRAM(S): 20 TABLET, DELAYED RELEASE ORAL at 10:47

## 2021-12-28 RX ADMIN — LISINOPRIL 10 MILLIGRAM(S): 2.5 TABLET ORAL at 10:47

## 2021-12-28 RX ADMIN — Medication 1300 MILLIGRAM(S): at 10:48

## 2021-12-28 RX ADMIN — Medication 325 MILLIGRAM(S): at 10:47

## 2021-12-28 RX ADMIN — Medication 81 MILLIGRAM(S): at 10:47

## 2021-12-28 NOTE — PROGRESS NOTE BEHAVIORAL HEALTH - PROBLEM SELECTOR PLAN 1
- Continue Zyprexa Zydis oral dissolvable tablet 10 mg at bedtime (increased on 12/6)  - Continue Depakene liquid 250 mg BID for mood  - F/u on Depakote level to be collected 12/28    *Atarax 25 mg q6h PRN for anxiety    - Discontinued  lamictal 25 mg qd as she has been inconsistent with it.  - TOO court for 12/13/21 was adjourned as pt was compliant with medications over the weekend.

## 2021-12-28 NOTE — PROGRESS NOTE BEHAVIORAL HEALTH - NSBHFUPINTERVALHXFT_PSY_A_CORE
Pt was seen, evaluated, chart reviewed. Per nursing no behavioral events overnight. Per chart no PRNs administered overnight. Per chart medication compliant except for Heparin injection.     Upon approach, pt was standing at the door of her room wearing a different pair of her own clothes from yesterday and interview was conduced in pt's room. When asked how she was doing, she states, "I need someone to help me dress." She also mentions feeling like a "walking zombie" but stated she did not know why. She affirms medication compliance and eating a little. Denies any known medication side-effects. Pt was encouraged to take a shower and leaving her room to go the tv day room, and pt replied that she would try.    Called [facility] ([phone]) to give update that discharge is anticipated this week. Left voicemail asking them to call back today. Pt was seen, evaluated, chart reviewed. Per nursing no behavioral events overnight. Per chart no PRNs administered overnight. Per chart medication compliant except for Heparin injection.     Upon approach, pt was standing at the door of her room wearing a different pair of her own clothes from yesterday and interview was conduced in pt's room. When asked how she was doing, she states, "I need someone to help me dress." She also mentions feeling like a "walking zombie" but stated she did not know why. She affirms medication compliance and eating a little. Denies any known medication side-effects. Pt was encouraged to take a shower and leaving her room to go the tv day room, and pt replied that she would try.    Called [facility] ([phone]) and spoke to Liv. Told Liv pt has been independent in feeding herself, walking independently, dressing herself, and been medication compliant for the last 1 week. Liv stated she believes pt needs to a [facility]/rehab and that she does not believe pt has been medication-compliant. I explained that she has been.

## 2021-12-28 NOTE — PROGRESS NOTE BEHAVIORAL HEALTH - NSBHCHARTREVIEWLAB_PSY_A_CORE FT
Basic Metabolic Panel in AM (12.28.21 @ 09:07)   Sodium, Serum: 142 mmol/L   Potassium, Serum: 4.2 mmol/L   Chloride, Serum: 104 mmol/L   Carbon Dioxide, Serum: 24 mmol/L   Anion Gap, Serum: 14 mmol/L   Blood Urea Nitrogen, Serum: 28 mg/dL   Creatinine, Serum: 1.7 mg/dL   Glucose, Serum: 192 mg/dL   Calcium, Total Serum: 10.4 mg/dL   eGFR if Non : 29    Complete Blood Count in AM (12.28.21 @ 09:07)   Nucleated RBC: 0 /100 WBCs   WBC Count: 5.11 K/uL   RBC Count: 4.60 M/uL   Hemoglobin: 11.9 g/dL   Hematocrit: 38.6 %   Mean Cell Volume: 83.9 fL   Mean Cell Hemoglobin: 25.9 pg   Mean Cell Hemoglobin Conc: 30.8 g/dL   Red Cell Distrib Width: 13.3 %   Platelet Count - Automated: 227 K/uL     Valproic Acid Level, Serum (12.28.21 @ 09:07)   Valproic Acid Level, Serum: 30.0 ug/mL

## 2021-12-28 NOTE — PROGRESS NOTE BEHAVIORAL HEALTH - SUMMARY
This is a 75 y/o female with PPH of bipolar depression (Bipolar II disorder), HTN, OA, T2DM, cognitive impairment without behavioral disturbance, brought to the ED at the behest of the intermediate she has been residing for the last 7 years for gradual deterioration in patient's ability to care for herself, in the context of recent psychotropic medication changes due to an episode of lithium toxicity in August of 2021.    Patient's current presentation is consistent with severe bipolar depression with psychotic features on underlying dementia with behavioral disturbance. Patient was stable on lithium for decades (though per son, she was never "normal" and always fluctuated between highs and lows).    During this admission patient has been tried on:  - Low dose lithium (d/c-ed due to renal injury, no response)  - Seroquel (up to 250 mg, d/c-ed due to worsening psychosis)  - Prozac (since November 8th) and Zyprexa (currently on Zydis) - some initial improvement, but now worsening  - Lamictal 25 mg qd (12/15 dc'ed as she has been inconsistent with it)     Overall Amber's response to medication treatment has been poor. She has had increasingly severe delusions of depressive affect. She has been intermittently uncooperative with staff, and occasionally is interactive with peers and amenable to engaging in activities. Her most significant treatment barrier is mixed compliance with medication, as she has declined her morning medications for several days during this hospitalization. Prozac discontinuation has been associated with less agitation, depressive affect not improved. Will c/w Zyprexa Zydis 10 mg at bedtime for bipolar depression, especially as IM version available if TOO is needed. Lamotrigine which was prescribed for Bipolar disorder was discontinued due to noncompliance. Was scheduled for TOO on 12/13 but was adjourned as pt was compliant with medication during the few days directly preceding her court date.    Spoke to patient's son Javier about their options for ECT which both sons believe will help their mother most as it has in the past. Advised that though her ECT referral has been sent out, it is unlikely that a different institution will accept her using this method given risk factors and long line. Advised that one option would be to have patient discharged under the care of her family and have them take her to an ED in a facility that has ECT and see whether the treatment team at that facility agrees about necessity of ECT, which is not a guarantee. He verbalized understanding and agreed to discuss with brother Catarino before coming to decision. Today, he reported that he could not feasibly take the pt back into his care, and that the pt would have to be discharged to [facility] for plan to request ECT at a different facility.     Spoke with Dr. Melo who had 3 meetings with the pt at the outpatient clinic prior to this admission. Her behavior during consistent medication compliance resembles her presentation at outpatient appointments (described by depressed mood, deteriorated independence, and requirement of significant encouragement to engage in activities). Amidst intermittent refusal of medications for the past few days, she regressed to the point of once again endorsing Cotard delusions and that she has caused the death of her eldest son. On one occasion, she conveyed realization and regret that these thoughts were not based in reality and possessed some insight that her abnormal thought processes are what induce these false beliefs.    Amber displays a variable pattern of functionality and mentation during the day, potentially indicating a delirium or sundowning of her primary dementia.     On evaluation today, patient appears with a slightly brighter affect as compared to last week. She has demonstrated medication compliance for the last week. She also does not perseverate on needing clothing to go outside, as she would repeat this multiple times during the same interview last week. Her thought process still has moments of illogical and impaired reasoning. Patient is anticipate for discharge sometime this week, as she has been performing activities (walking, eating, dressing, showering) on her own and taking medications consistently for the last week.

## 2021-12-29 LAB — SARS-COV-2 RNA SPEC QL NAA+PROBE: SIGNIFICANT CHANGE UP

## 2021-12-29 PROCEDURE — 99231 SBSQ HOSP IP/OBS SF/LOW 25: CPT

## 2021-12-29 RX ORDER — TUBERCULIN PURIFIED PROTEIN DERIVATIVE 5 [IU]/.1ML
5 INJECTION, SOLUTION INTRADERMAL ONCE
Refills: 0 | Status: DISCONTINUED | OUTPATIENT
Start: 2021-12-29 | End: 2022-01-27

## 2021-12-29 RX ORDER — VALPROIC ACID (AS SODIUM SALT) 250 MG/5ML
500 SOLUTION, ORAL ORAL
Refills: 0 | Status: DISCONTINUED | OUTPATIENT
Start: 2021-12-29 | End: 2022-01-27

## 2021-12-29 RX ADMIN — ATORVASTATIN CALCIUM 40 MILLIGRAM(S): 80 TABLET, FILM COATED ORAL at 20:31

## 2021-12-29 RX ADMIN — PANTOPRAZOLE SODIUM 40 MILLIGRAM(S): 20 TABLET, DELAYED RELEASE ORAL at 08:11

## 2021-12-29 RX ADMIN — Medication 325 MILLIGRAM(S): at 08:19

## 2021-12-29 RX ADMIN — Medication 500 MILLIGRAM(S): at 20:31

## 2021-12-29 RX ADMIN — OLANZAPINE 10 MILLIGRAM(S): 15 TABLET, FILM COATED ORAL at 20:31

## 2021-12-29 RX ADMIN — Medication 250 MILLIGRAM(S): at 08:30

## 2021-12-29 RX ADMIN — Medication 81 MILLIGRAM(S): at 08:33

## 2021-12-29 RX ADMIN — Medication 1300 MILLIGRAM(S): at 08:12

## 2021-12-29 RX ADMIN — LISINOPRIL 10 MILLIGRAM(S): 2.5 TABLET ORAL at 08:19

## 2021-12-29 RX ADMIN — Medication 1300 MILLIGRAM(S): at 20:31

## 2021-12-29 RX ADMIN — AMLODIPINE BESYLATE 10 MILLIGRAM(S): 2.5 TABLET ORAL at 08:19

## 2021-12-29 NOTE — PHYSICAL THERAPY INITIAL EVALUATION ADULT - PLANNED THERAPY INTERVENTIONS, PT EVAL
Pt able to perform transfers and ambulation (up to and over 150 feet) with RW at supervision level, without episodes of LOB. As discussed with RN staff pt is ambulating with RW throughout the day without incident.  D/C pt from PT - no need for skilled PT in acute care setting.

## 2021-12-29 NOTE — PROGRESS NOTE BEHAVIORAL HEALTH - SUMMARY
This is a 75 y/o female with PPH of bipolar depression (Bipolar II disorder), HTN, OA, T2DM, cognitive impairment without behavioral disturbance, brought to the ED at the behest of the correction she has been residing for the last 7 years for gradual deterioration in patient's ability to care for herself, in the context of recent psychotropic medication changes due to an episode of lithium toxicity in August of 2021.    Patient's current presentation is consistent with severe bipolar depression with psychotic features on underlying dementia with behavioral disturbance. Patient was stable on lithium for decades (though per son, she was never "normal" and always fluctuated between highs and lows).    During this admission patient has been tried on:  - Low dose lithium (d/c-ed due to renal injury, no response)  - Seroquel (up to 250 mg, d/c-ed due to worsening psychosis)  - Prozac (since November 8th) and Zyprexa (currently on Zydis) - some initial improvement, but now worsening  - Lamictal 25 mg qd (12/15 dc'ed as she has been inconsistent with it)     Overall Amber's response to medication treatment has been poor. She has had increasingly severe delusions of depressive affect. She has been intermittently uncooperative with staff, and occasionally is interactive with peers and amenable to engaging in activities. Her most significant treatment barrier is mixed compliance with medication, as she has declined her morning medications for several days during this hospitalization. Prozac discontinuation has been associated with less agitation, depressive affect not improved. Will c/w Zyprexa Zydis 10 mg at bedtime for bipolar depression, especially as IM version available if TOO is needed. Lamotrigine which was prescribed for Bipolar disorder was discontinued due to noncompliance. Was scheduled for TOO on 12/13 but was adjourned as pt was compliant with medication during the few days directly preceding her court date.    Spoke to patient's son Javier about their options for ECT which both sons believe will help their mother most as it has in the past. Advised that though her ECT referral has been sent out, it is unlikely that a different institution will accept her using this method given risk factors and long line. Advised that one option would be to have patient discharged under the care of her family and have them take her to an ED in a facility that has ECT and see whether the treatment team at that facility agrees about necessity of ECT, which is not a guarantee. He verbalized understanding and agreed to discuss with brother Catarino before coming to decision. Today, he reported that he could not feasibly take the pt back into his care, and that the pt would have to be discharged to [facility] for plan to request ECT at a different facility.     Spoke with Dr. Melo who had 3 meetings with the pt at the outpatient clinic prior to this admission. Her behavior during consistent medication compliance resembles her presentation at outpatient appointments (described by depressed mood, deteriorated independence, and requirement of significant encouragement to engage in activities). Amidst intermittent refusal of medications for the past few days, she regressed to the point of once again endorsing Cotard delusions and that she has caused the death of her eldest son. On one occasion, she conveyed realization and regret that these thoughts were not based in reality and possessed some insight that her abnormal thought processes are what induce these false beliefs.    Amber displays a variable pattern of functionality and mentation during the day, potentially indicating a delirium or sundowning of her primary dementia.     On evaluation today, patient appears with a slightly brighter affect as compared to last week. She has demonstrated medication compliance for the last week. Her thought process still has moments of illogical and impaired reasoning. She has been performing activities (walking with  a walker, eating, dressing, showering) on her own and taking medications consistently for the last week. Patient is anticipated for discharge sometime this week, pending evaluated by Physical Therapy.

## 2021-12-29 NOTE — PHYSICAL THERAPY INITIAL EVALUATION ADULT - TRANSFER SAFETY CONCERNS NOTED: SIT/STAND, REHAB EVAL
decreased balance during turns/decreased weight-shifting ability
decreased balance during turns/decreased safety awareness/losing balance/decreased weight-shifting ability

## 2021-12-29 NOTE — PROGRESS NOTE BEHAVIORAL HEALTH - NSBHFUPINTERVALHXFT_PSY_A_CORE
Pt was seen, evaluated, chart reviewed. Per nursing no behavioral events overnight. Per chart no PRNs administered overnight. Per chart medication compliant except for Heparin injection.     Upon approach, pt was lying in bed (after being observed ambulating earlier independently with a walker), dressed in her own clothing, and interview conducted in pt's room. She describes her mood as "not so great" but is unsure why she feels that way. Endorses eating all of her breakfast (eggs and Swazi test) and states it was good. Describes her sleep as okay. Affirms medication compliance and denies side-effects. At the end of the interview, pt was encouraged to stay in the day room and she did so. Pt was seen, evaluated, chart reviewed. Per nursing no behavioral events overnight. Per chart no PRNs administered overnight. Per chart medication compliant except for Heparin injection.     Upon approach, pt was lying in bed (after being observed ambulating earlier independently with a walker), dressed in her own clothing, and interview conducted in pt's room. She describes her mood as "not so great" but is unsure why she feels that way. Endorses eating all of her breakfast (eggs and Japanese test) and states it was good. Describes her sleep as okay. Affirms medication compliance and denies side-effects. At the end of the interview, pt was encouraged to stay in the day room and she did so.    Called [facility] ([phone]): Nursing answer and stated Liv (the  I spoke with yesterday) was out for the day. I explained anticipated discharge for patient to be tomorrow or early next week. I relayed Physical Therapy's assessment of patient that pt does not need rehab on discharge. Nurse noted Physical Therapy's assessment and stated Liv will call back.

## 2021-12-29 NOTE — PROGRESS NOTE BEHAVIORAL HEALTH - PROBLEM SELECTOR PLAN 1
- Continue Zyprexa Zydis oral dissolvable tablet 10 mg at bedtime (increased on 12/6)  - Continue Depakene liquid 250 mg BID for mood  - F/u on Depakote level to be collected 12/28    *Atarax 25 mg q6h PRN for anxiety    - Discontinued  lamictal 25 mg qd as she has been inconsistent with it.  - TOO court for 12/13/21 was adjourned as pt was compliant with medications over the weekend. - Continue Zyprexa Zydis oral dissolvable tablet 10 mg at bedtime (increased on 12/6)  - Increased Depakene liquid to 500 mg BID for mood stabilization (pt will receive a total of 750 mg today 12/29 and 1000 mg starting tomorrow 12/30)  - Depakote level 12/28 was 30.0    *Atarax 25 mg q6h PRN for anxiety    - Discontinued  lamictal 25 mg qd as she has been inconsistent with it.  - TOO court for 12/13/21 was adjourned as pt was compliant with medications over the weekend.

## 2021-12-30 PROCEDURE — 99231 SBSQ HOSP IP/OBS SF/LOW 25: CPT

## 2021-12-30 RX ADMIN — AMLODIPINE BESYLATE 10 MILLIGRAM(S): 2.5 TABLET ORAL at 08:12

## 2021-12-30 RX ADMIN — Medication 500 MILLIGRAM(S): at 08:12

## 2021-12-30 RX ADMIN — Medication 1300 MILLIGRAM(S): at 08:13

## 2021-12-30 RX ADMIN — PANTOPRAZOLE SODIUM 40 MILLIGRAM(S): 20 TABLET, DELAYED RELEASE ORAL at 08:13

## 2021-12-30 RX ADMIN — LISINOPRIL 10 MILLIGRAM(S): 2.5 TABLET ORAL at 08:12

## 2021-12-30 RX ADMIN — Medication 500 MILLIGRAM(S): at 20:34

## 2021-12-30 RX ADMIN — Medication 325 MILLIGRAM(S): at 08:12

## 2021-12-30 RX ADMIN — Medication 1300 MILLIGRAM(S): at 20:34

## 2021-12-30 RX ADMIN — ATORVASTATIN CALCIUM 40 MILLIGRAM(S): 80 TABLET, FILM COATED ORAL at 20:34

## 2021-12-30 RX ADMIN — OLANZAPINE 10 MILLIGRAM(S): 15 TABLET, FILM COATED ORAL at 20:34

## 2021-12-30 RX ADMIN — Medication 81 MILLIGRAM(S): at 08:12

## 2021-12-30 NOTE — PROGRESS NOTE ADULT - SUBJECTIVE AND OBJECTIVE BOX
Podiatry Consult Note    Subjective:    KINGSTON PETTY is a 76y year old Female seen at bedside patient stated she does not want her nails cut.   PMH: HTN (hypertension)    DM (diabetes mellitus)    MDD (major depressive disorder)    Bipolar disorder     PAST MEDICAL & SURGICAL HISTORY:  HTN (hypertension)    DM (diabetes mellitus)    MDD (major depressive disorder)    Bipolar disorder    No significant past surgical history      PSH:No significant past surgical history      Allergies:amoxicillin (Unknown)  Iodides (Unknown)  Neosporin (Unknown)      Labs:      WBC Trend  5.11 Date (12-28 @ 09:07)  5.10 Date (12-17 @ 07:59)  6.06 Date (12-07 @ 16:27)      Chem          T(F): 96.9 (12-30-21 @ 16:06), Max: 96.9 (12-30-21 @ 16:06)  HR: 83 (12-30-21 @ 16:06) (57 - 83)  BP: 135/74 (12-30-21 @ 16:06) (132/62 - 137/61)  RR: 16 (12-30-21 @ 16:06) (16 - 18)  SpO2: --  Wt(kg): --       Assessment:   consult for cutting patients nails patient refuses.     Plan:   Discussed diagnosis and treatment with patient  Patient sits in her bed refusing to have her nails cut.   Patient can follow up out patient with Dr. Levi for further nail debridement if she wishes at Podiatry clinic [address] [place] [address].   Discussed Plan w/ Attending;     Spectra;

## 2021-12-30 NOTE — PROGRESS NOTE ADULT - ATTENDING COMMENTS
Podiatry reconsulted about this patient. pt refusing to be treated. needs podiatry outpatient follow up

## 2021-12-30 NOTE — PROGRESS NOTE BEHAVIORAL HEALTH - SUMMARY
This is a 77 y/o female with PPH of bipolar depression (Bipolar II disorder), HTN, OA, T2DM, cognitive impairment without behavioral disturbance, brought to the ED at the behest of the halfway she has been residing for the last 7 years for gradual deterioration in patient's ability to care for herself, in the context of recent psychotropic medication changes due to an episode of lithium toxicity in August of 2021.    Patient's current presentation is consistent with severe bipolar depression with psychotic features on underlying dementia with behavioral disturbance. Patient was stable on lithium for decades (though per son, she was never "normal" and always fluctuated between highs and lows).    During this admission patient has been tried on:  - Low dose lithium (d/c-ed due to renal injury, no response)  - Seroquel (up to 250 mg, d/c-ed due to worsening psychosis)  - Prozac (since November 8th) and Zyprexa (currently on Zydis) - some initial improvement, but now worsening  - Lamictal 25 mg qd (12/15 dc'ed as she has been inconsistent with it)     Overall Amber's response to medication treatment has been poor. She has had increasingly severe delusions of depressive affect. She has been intermittently uncooperative with staff, and occasionally is interactive with peers and amenable to engaging in activities. Her most significant treatment barrier is mixed compliance with medication, as she has declined her morning medications for several days during this hospitalization. Prozac discontinuation has been associated with less agitation, depressive affect not improved. Will c/w Zyprexa Zydis 10 mg at bedtime for bipolar depression, especially as IM version available if TOO is needed. Lamotrigine which was prescribed for Bipolar disorder was discontinued due to noncompliance. Was scheduled for TOO on 12/13 but was adjourned as pt was compliant with medication during the few days directly preceding her court date.    Spoke to patient's son Javier about their options for ECT which both sons believe will help their mother most as it has in the past. Advised that though her ECT referral has been sent out, it is unlikely that a different institution will accept her using this method given risk factors and long line. Advised that one option would be to have patient discharged under the care of her family and have them take her to an ED in a facility that has ECT and see whether the treatment team at that facility agrees about necessity of ECT, which is not a guarantee. He verbalized understanding and agreed to discuss with brother Catarino before coming to decision. Today, he reported that he could not feasibly take the pt back into his care, and that the pt would have to be discharged to [facility] for plan to request ECT at a different facility.     Spoke with Dr. Melo who had 3 meetings with the pt at the outpatient clinic prior to this admission. Her behavior during consistent medication compliance resembles her presentation at outpatient appointments (described by depressed mood, deteriorated independence, and requirement of significant encouragement to engage in activities). Amidst intermittent refusal of medications for the past few days, she regressed to the point of once again endorsing Cotard delusions and that she has caused the death of her eldest son. On one occasion, she conveyed realization and regret that these thoughts were not based in reality and possessed some insight that her abnormal thought processes are what induce these false beliefs.    Amber displays a variable pattern of functionality and mentation during the day, potentially indicating a delirium or sundowning of her primary dementia.     On evaluation today, patient appears with a slightly brighter affect as compared to last week. She has demonstrated medication compliance for the last week. Her thought process still has moments of illogical and impaired reasoning. She has been performing activities (walking with a walker, eating, dressing, showering) on her own and taking medications consistently for the last week. Patient is anticipated for discharge sometime next week.

## 2021-12-30 NOTE — PROGRESS NOTE BEHAVIORAL HEALTH - PROBLEM SELECTOR PLAN 1
- Continue Zyprexa Zydis oral dissolvable tablet 10 mg at bedtime (increased on 12/6)  - Increased Depakene liquid to 500 mg BID for mood stabilization (pt will receive a total of 750 mg today 12/29 and 1000 mg starting tomorrow 12/30)  - Depakote level 12/28 was 30.0    *Atarax 25 mg q6h PRN for anxiety    - Discontinued  lamictal 25 mg qd as she has been inconsistent with it.  - TOO court for 12/13/21 was adjourned as pt was compliant with medications over the weekend.

## 2021-12-30 NOTE — PROGRESS NOTE BEHAVIORAL HEALTH - NSBHFUPINTERVALHXFT_PSY_A_CORE
Pt was seen, evaluated, chart reviewed. Per nursing no behavioral events overnight. Per chart no PRNs administered overnight. Per chart medication compliant except for Heparin injection.     Upon approach, pt standing in the unit with the rolling walker, dressed in her own clothing, and interview conducted privately in the hallway. Pt reports feeling "not so good" but is unsure why. States she had difficulty eating for an unknown reason, but after encouraging her to eat her next meal and request snacks if needed, she was agreeable and requested some crackers and seltzer. Reports sleeping okay. Affirms medication compliance and denies known side-effects.

## 2021-12-31 LAB — GLUCOSE BLDC GLUCOMTR-MCNC: 144 MG/DL — HIGH (ref 70–99)

## 2021-12-31 PROCEDURE — 99231 SBSQ HOSP IP/OBS SF/LOW 25: CPT

## 2021-12-31 RX ORDER — OXYCODONE AND ACETAMINOPHEN 5; 325 MG/1; MG/1
1 TABLET ORAL ONCE
Refills: 0 | Status: DISCONTINUED | OUTPATIENT
Start: 2021-12-31 | End: 2021-12-31

## 2021-12-31 RX ADMIN — Medication 500 MILLIGRAM(S): at 08:31

## 2021-12-31 RX ADMIN — Medication 325 MILLIGRAM(S): at 08:31

## 2021-12-31 RX ADMIN — Medication 500 MILLIGRAM(S): at 21:25

## 2021-12-31 RX ADMIN — OLANZAPINE 10 MILLIGRAM(S): 15 TABLET, FILM COATED ORAL at 21:24

## 2021-12-31 RX ADMIN — Medication 1300 MILLIGRAM(S): at 21:25

## 2021-12-31 RX ADMIN — LISINOPRIL 10 MILLIGRAM(S): 2.5 TABLET ORAL at 11:00

## 2021-12-31 RX ADMIN — AMLODIPINE BESYLATE 10 MILLIGRAM(S): 2.5 TABLET ORAL at 11:00

## 2021-12-31 RX ADMIN — Medication 81 MILLIGRAM(S): at 11:00

## 2021-12-31 RX ADMIN — ATORVASTATIN CALCIUM 40 MILLIGRAM(S): 80 TABLET, FILM COATED ORAL at 21:24

## 2022-01-01 RX ADMIN — Medication 1300 MILLIGRAM(S): at 08:35

## 2022-01-01 RX ADMIN — LISINOPRIL 10 MILLIGRAM(S): 2.5 TABLET ORAL at 08:35

## 2022-01-01 RX ADMIN — Medication 1 SUPPOSITORY(S): at 08:36

## 2022-01-01 RX ADMIN — Medication 325 MILLIGRAM(S): at 08:35

## 2022-01-01 RX ADMIN — Medication 25 MILLIGRAM(S): at 21:00

## 2022-01-01 RX ADMIN — PANTOPRAZOLE SODIUM 40 MILLIGRAM(S): 20 TABLET, DELAYED RELEASE ORAL at 08:35

## 2022-01-01 RX ADMIN — HEPARIN SODIUM 5000 UNIT(S): 5000 INJECTION INTRAVENOUS; SUBCUTANEOUS at 08:36

## 2022-01-01 RX ADMIN — Medication 81 MILLIGRAM(S): at 08:35

## 2022-01-01 RX ADMIN — Medication 1300 MILLIGRAM(S): at 21:00

## 2022-01-01 RX ADMIN — Medication 500 MILLIGRAM(S): at 21:05

## 2022-01-01 RX ADMIN — AMLODIPINE BESYLATE 10 MILLIGRAM(S): 2.5 TABLET ORAL at 08:35

## 2022-01-01 RX ADMIN — Medication 500 MILLIGRAM(S): at 08:36

## 2022-01-01 RX ADMIN — OLANZAPINE 10 MILLIGRAM(S): 15 TABLET, FILM COATED ORAL at 21:00

## 2022-01-01 RX ADMIN — ATORVASTATIN CALCIUM 40 MILLIGRAM(S): 80 TABLET, FILM COATED ORAL at 21:00

## 2022-01-02 LAB — GLUCOSE BLDC GLUCOMTR-MCNC: 129 MG/DL — HIGH (ref 70–99)

## 2022-01-02 RX ADMIN — AMLODIPINE BESYLATE 10 MILLIGRAM(S): 2.5 TABLET ORAL at 08:05

## 2022-01-02 RX ADMIN — ATORVASTATIN CALCIUM 40 MILLIGRAM(S): 80 TABLET, FILM COATED ORAL at 20:37

## 2022-01-02 RX ADMIN — LISINOPRIL 10 MILLIGRAM(S): 2.5 TABLET ORAL at 08:05

## 2022-01-02 RX ADMIN — Medication 1300 MILLIGRAM(S): at 08:04

## 2022-01-02 RX ADMIN — Medication 500 MILLIGRAM(S): at 20:37

## 2022-01-02 RX ADMIN — Medication 25 MILLIGRAM(S): at 08:14

## 2022-01-02 RX ADMIN — PANTOPRAZOLE SODIUM 40 MILLIGRAM(S): 20 TABLET, DELAYED RELEASE ORAL at 08:08

## 2022-01-02 RX ADMIN — Medication 325 MILLIGRAM(S): at 08:06

## 2022-01-02 RX ADMIN — Medication 1300 MILLIGRAM(S): at 20:37

## 2022-01-02 RX ADMIN — Medication 500 MILLIGRAM(S): at 08:08

## 2022-01-02 RX ADMIN — OLANZAPINE 10 MILLIGRAM(S): 15 TABLET, FILM COATED ORAL at 20:37

## 2022-01-02 RX ADMIN — Medication 81 MILLIGRAM(S): at 08:05

## 2022-01-03 LAB — SARS-COV-2 RNA SPEC QL NAA+PROBE: SIGNIFICANT CHANGE UP

## 2022-01-03 PROCEDURE — 99231 SBSQ HOSP IP/OBS SF/LOW 25: CPT

## 2022-01-03 RX ADMIN — PANTOPRAZOLE SODIUM 40 MILLIGRAM(S): 20 TABLET, DELAYED RELEASE ORAL at 18:40

## 2022-01-03 RX ADMIN — LISINOPRIL 10 MILLIGRAM(S): 2.5 TABLET ORAL at 08:30

## 2022-01-03 RX ADMIN — OLANZAPINE 10 MILLIGRAM(S): 15 TABLET, FILM COATED ORAL at 20:37

## 2022-01-03 RX ADMIN — Medication 1300 MILLIGRAM(S): at 20:38

## 2022-01-03 RX ADMIN — Medication 1300 MILLIGRAM(S): at 08:30

## 2022-01-03 RX ADMIN — Medication 325 MILLIGRAM(S): at 08:30

## 2022-01-03 RX ADMIN — Medication 81 MILLIGRAM(S): at 08:30

## 2022-01-03 RX ADMIN — Medication 500 MILLIGRAM(S): at 20:38

## 2022-01-03 RX ADMIN — ATORVASTATIN CALCIUM 40 MILLIGRAM(S): 80 TABLET, FILM COATED ORAL at 20:38

## 2022-01-03 RX ADMIN — Medication 500 MILLIGRAM(S): at 18:40

## 2022-01-03 RX ADMIN — AMLODIPINE BESYLATE 10 MILLIGRAM(S): 2.5 TABLET ORAL at 08:30

## 2022-01-03 NOTE — PROGRESS NOTE BEHAVIORAL HEALTH - NSBHCHARTREVIEWLAB_PSY_A_CORE FT
Basic Metabolic Panel in AM (12.28.21 @ 09:07)   Sodium, Serum: 142 mmol/L   Potassium, Serum: 4.2 mmol/L   Chloride, Serum: 104 mmol/L   Carbon Dioxide, Serum: 24 mmol/L   Anion Gap, Serum: 14 mmol/L   Blood Urea Nitrogen, Serum: 28 mg/dL   Creatinine, Serum: 1.7 mg/dL   Glucose, Serum: 192 mg/dL   Calcium, Total Serum: 10.4 mg/dL   eGFR if Non : 29 Basic Metabolic Panel in AM (12.28.21 @ 09:07)   Sodium, Serum: 142 mmol/L   Potassium, Serum: 4.2 mmol/L   Chloride, Serum: 104 mmol/L   Carbon Dioxide, Serum: 24 mmol/L   Anion Gap, Serum: 14 mmol/L   Blood Urea Nitrogen, Serum: 28 mg/dL   Creatinine, Serum: 1.7 mg/dL   Glucose, Serum: 192 mg/dL   Calcium, Total Serum: 10.4 mg/dL   eGFR if Non : 29    Complete Blood Count in AM (12.28.21 @ 09:07)   Nucleated RBC: 0 /100 WBCs   WBC Count: 5.11 K/uL   RBC Count: 4.60 M/uL   Hemoglobin: 11.9 g/dL   Hematocrit: 38.6 %   Mean Cell Volume: 83.9 fL   Mean Cell Hemoglobin: 25.9 pg   Mean Cell Hemoglobin Conc: 30.8 g/dL   Red Cell Distrib Width: 13.3 %   Platelet Count - Automated: 227 K/uL     Valproic Acid Level, Serum (12.28.21 @ 09:07)   Valproic Acid Level, Serum: 30.0 ug/mL

## 2022-01-03 NOTE — PROGRESS NOTE BEHAVIORAL HEALTH - NSBHFUPINTERVALHXFT_PSY_A_CORE
Pt was seen, evaluated, chart reviewed. Per nursing no behavioral events overnight. Per chart PRN Atarax 25 mg given yesterday in the AM. Per chart medication compliant except for heparin injection.    Upon approach, pt was lying in bed sleeping and interview conducted in pt's room. Pt states that she is not feeling very well today, but states is unsure why. States she did not eat breakfast this morning because it upset her stomach. Affirms sleeping okay. Affirms medication compliance and denies medication side-effects. Pt was seen, evaluated, chart reviewed. Per nursing no behavioral events overnight. Per chart PRN Atarax 25 mg given yesterday in the AM. Per chart medication compliant except for heparin injection.    Upon approach, pt was lying in bed sleeping and interview conducted in pt's room. Pt states that she "alright" today. States she did not eat breakfast this morning because it upset her stomach. Affirms sleeping okay. Affirms medication compliance and denies medication side-effects. Pt was seen, evaluated, chart reviewed. Per nursing no behavioral events overnight. Per chart PRN Atarax 25 mg given yesterday in the AM. Per chart medication compliant except for heparin injection.    Upon approach, pt was lying in bed sleeping and interview conducted in pt's room. Pt states that she "alright" today. States she did not eat breakfast this morning because it upset her stomach. Affirms sleeping okay. Affirms medication compliance and denies medication side-effects.    Spoke with son Han Rodarte ([phone]): Gave update that we are in the process of discharge planning, mainly attempting to contact Susy ( at [facility]) to discuss discharge. Han expressed understanding.

## 2022-01-03 NOTE — CHART NOTE - NSCHARTNOTEFT_GEN_A_CORE
Social Work Note:    Treatment team met with patient on unit rounds.  Patient was cooperative with interview.  She informed feeling “nauseous” after eating her breakfast earlier this morning.  Per patient report she has been adherent to prescribed medications. She was encouraged to continue taking her medications.  Sleep is good.  Appetite is fair.      Patient was encouraged to be visible on the unit and sit in the day room.  She was agreeable to same.  Plan of return to [facility] discussed with patient.     Prior to admission patient was a resident of [facility].  Communication ongoing with facility representative ANTOINETTE Montesinos at [facility] [phone].      Mental Status Exam:    Mood – Neutral   Sleep – Good   Appetite – Fair   ADLs – Fair    Thought Process – Perseverative    Observation – [id number]    No barriers to discharge identified at this time.     At this time patient is not psychiatrically stable for discharge.

## 2022-01-03 NOTE — PROGRESS NOTE BEHAVIORAL HEALTH - SUMMARY
This is a 77 y/o female with PPH of bipolar depression (Bipolar II disorder), HTN, OA, T2DM, cognitive impairment without behavioral disturbance, brought to the ED at the behest of the [facility] she has been residing for the last 7 years for gradual deterioration in patient's ability to care for herself, in the context of recent psychotropic medication changes due to an episode of lithium toxicity in August of 2021.    Patient's current presentation is consistent with severe bipolar depression with psychotic features on underlying dementia with behavioral disturbance. Patient was stable on lithium for decades (though per son, she was never "normal" and always fluctuated between highs and lows).    During this admission patient has been tried on:  - Low dose lithium (d/c-ed due to renal injury, no response)  - Seroquel (up to 250 mg, d/c-ed due to worsening psychosis)  - Prozac (since November 8th) and Zyprexa (currently on Zydis) - some initial improvement, but now worsening  - Lamictal 25 mg qd (12/15 dc'ed as she has been inconsistent with it)     Overall Amber's response to medication treatment has been poor. She has had increasingly severe delusions of depressive affect. She has been intermittently uncooperative with staff, and occasionally is interactive with peers and amenable to engaging in activities. Her most significant treatment barrier is mixed compliance with medication, as she has declined her morning medications for several days during this hospitalization. Prozac discontinuation has been associated with less agitation, depressive affect not improved. Will c/w Zyprexa Zydis 10 mg at bedtime for bipolar depression, especially as IM version available if TOO is needed. Lamotrigine which was prescribed for Bipolar disorder was discontinued due to noncompliance. Was scheduled for TOO on 12/13 but was adjourned as pt was compliant with medication during the few days directly preceding her court date.    Spoke to patient's son Javier about their options for ECT which both sons believe will help their mother most as it has in the past. Advised that though her ECT referral has been sent out, it is unlikely that a different institution will accept her using this method given risk factors and long line. Advised that one option would be to have patient discharged under the care of her family and have them take her to an ED in a facility that has ECT and see whether the treatment team at that facility agrees about necessity of ECT, which is not a guarantee. He verbalized understanding and agreed to discuss with brother Catarino before coming to decision. Today, he reported that he could not feasibly take the pt back into his care, and that the pt would have to be discharged to [facility] for plan to request ECT at a different facility.     Spoke with Dr. Melo who had 3 meetings with the pt at the outpatient clinic prior to this admission. Her behavior during consistent medication compliance resembles her presentation at outpatient appointments (described by depressed mood, deteriorated independence, and requirement of significant encouragement to engage in activities). Amidst intermittent refusal of medications for the past few days, she regressed to the point of once again endorsing Cotard delusions and that she has caused the death of her eldest son. On one occasion, she conveyed realization and regret that these thoughts were not based in reality and possessed some insight that her abnormal thought processes are what induce these false beliefs.    Amber displays a variable pattern of functionality and mentation during the day, potentially indicating a delirium or sundowning of her primary dementia.     On evaluation today, patient appears with a slightly brighter affect as compared to last week. She has demonstrated medication compliance for the last week. Her thought process still has moments of illogical and impaired reasoning. She has been performing activities (walking with a walker, eating, dressing, showering) on her own and taking medications consistently for the last week. Patient is anticipated for discharge sometime next week. This is a 77 y/o female with PPH of bipolar depression (Bipolar II disorder), HTN, OA, T2DM, cognitive impairment without behavioral disturbance, brought [facility] the ED [facility] she has been [facility] for the last 7 years for gradual deterioration in patient's ability to care for herself, in the context of recent psychotropic medication changes due to an episode of lithium toxicity in August of 2021.    Patient's current presentation is consistent with severe bipolar depression with psychotic features on underlying dementia with behavioral disturbance. Patient was stable on lithium for decades (though per son, she was never "normal" and always fluctuated between highs and lows).    During this admission patient has been tried on:  - Low dose lithium (d/c-ed due to renal injury, no response)  - Seroquel (up to 250 mg, d/c-ed due to worsening psychosis)  - Prozac (since November 8th) and Zyprexa (currently on Zydis) - some initial improvement, but now worsening  - Lamictal 25 mg qd (12/15 dc'ed as she has been inconsistent with it)     Overall Amber's response to medication treatment has been poor. She has had increasingly severe delusions of depressive affect. She has been intermittently uncooperative with staff, and occasionally is interactive with peers and amenable to engaging in activities. Her most significant treatment barrier is mixed compliance with medication, as she has declined her morning medications for several days during this hospitalization. Prozac discontinuation has been associated with less agitation, depressive affect not improved. Will c/w Zyprexa Zydis 10 mg at bedtime for bipolar depression, especially as IM version available if TOO is needed. Lamotrigine which was prescribed for Bipolar disorder was discontinued due to noncompliance. Was scheduled for TOO on 12/13 but was adjourned as pt was compliant with medication during the few days directly preceding her court date.    Spoke to patient's son Javier about their options for ECT which both sons believe will help their mother most as it has in the past. Advised that though her ECT referral has been sent out, it is unlikely that a different institution will accept her using this method given risk factors and long line. Advised that one option would be to have patient discharged under the care of her family and have them take her to an ED in a facility that has ECT and see whether the treatment team at that facility agrees about necessity of ECT, which is not a guarantee. He verbalized understanding and agreed to discuss with brother Catarino before coming to decision. Today, he reported that he could not feasibly take the pt back into his care, and that the pt would have to be discharged to [facility] for plan to request ECT at a different facility.     Spoke with Dr. Melo who had 3 meetings with the pt at the outpatient clinic prior to this admission. Her behavior during consistent medication compliance resembles her presentation at outpatient appointments (described by depressed mood, deteriorated independence, and requirement of significant encouragement to engage in activities). Amidst intermittent refusal of medications for the past few days, she regressed to the point of once again endorsing Cotard delusions and that she has caused the death of her eldest son. On one occasion, she conveyed realization and regret that these thoughts were not based in reality and possessed some insight that her abnormal thought processes are what induce these false beliefs.    Amber displays a variable pattern of functionality and mentation during the day, potentially indicating a delirium or sundowning of her primary dementia.     On evaluation today, patient appears with a slightly brighter affect as compared to last week. She has demonstrated medication compliance. Her thought process still has moments of illogical and impaired reasoning. She has been performing activities (walking with a walker, eating, dressing, showering) on her own and taking medications consistently for the last week. Patient is anticipated for discharge sometime next week.

## 2022-01-04 LAB — GLUCOSE BLDC GLUCOMTR-MCNC: 118 MG/DL — HIGH (ref 70–99)

## 2022-01-04 PROCEDURE — 99231 SBSQ HOSP IP/OBS SF/LOW 25: CPT

## 2022-01-04 RX ADMIN — Medication 1300 MILLIGRAM(S): at 20:22

## 2022-01-04 RX ADMIN — AMLODIPINE BESYLATE 10 MILLIGRAM(S): 2.5 TABLET ORAL at 08:21

## 2022-01-04 RX ADMIN — Medication 1300 MILLIGRAM(S): at 08:21

## 2022-01-04 RX ADMIN — OLANZAPINE 10 MILLIGRAM(S): 15 TABLET, FILM COATED ORAL at 20:21

## 2022-01-04 RX ADMIN — Medication 500 MILLIGRAM(S): at 08:30

## 2022-01-04 RX ADMIN — Medication 81 MILLIGRAM(S): at 08:22

## 2022-01-04 RX ADMIN — ATORVASTATIN CALCIUM 40 MILLIGRAM(S): 80 TABLET, FILM COATED ORAL at 20:22

## 2022-01-04 RX ADMIN — LISINOPRIL 10 MILLIGRAM(S): 2.5 TABLET ORAL at 08:22

## 2022-01-04 RX ADMIN — Medication 325 MILLIGRAM(S): at 08:22

## 2022-01-04 RX ADMIN — Medication 500 MILLIGRAM(S): at 20:22

## 2022-01-04 NOTE — PROGRESS NOTE BEHAVIORAL HEALTH - NSBHFUPINTERVALHXFT_PSY_A_CORE
Pt was seen, evaluated, chart reviewed. Per nursing no behavioral events overnight. Per chart no PRNs given overnight. Per chart medication compliant except for heparin injection.    Upon approach, pt was lying in bed and interview conducted in pt's room. Describes her overall well-being as "okay today." States slept okay and ate "a little" breakfast in the morning. Pt was encouraged to keep eating her meals throughout the day, and she was agreeable that she would try. Affirms medication compliance and denies side-effects. States she also believes going back to [facility] would be good for her.

## 2022-01-04 NOTE — PROGRESS NOTE BEHAVIORAL HEALTH - SUMMARY
This is a 75 y/o female with PPH of bipolar depression (Bipolar II disorder), HTN, OA, T2DM, cognitive impairment without behavioral disturbance, brought to the ED at the behest of the [facility] she has been residing for the last 7 years for gradual deterioration in patient's ability to care for herself, in the context of recent psychotropic medication changes due to an episode of lithium toxicity in August of 2021.    Patient's current presentation is consistent with severe bipolar depression with psychotic features on underlying dementia with behavioral disturbance. Patient was stable on lithium for decades (though per son, she was never "normal" and always fluctuated between highs and lows).    During this admission patient has been tried on:  - Low dose lithium (d/c-ed due to renal injury, no response)  - Seroquel (up to 250 mg, d/c-ed due to worsening psychosis)  - Prozac (since November 8th) and Zyprexa (currently on Zydis) - some initial improvement, but now worsening  - Lamictal 25 mg qd (12/15 dc'ed as she has been inconsistent with it)     Overall Amber's response to medication treatment has been poor. She has had increasingly severe delusions of depressive affect. She has been intermittently uncooperative with staff, and occasionally is interactive with peers and amenable to engaging in activities. Her most significant treatment barrier is mixed compliance with medication, as she has declined her morning medications for several days during this hospitalization. Prozac discontinuation has been associated with less agitation, depressive affect not improved. Will c/w Zyprexa Zydis 10 mg at bedtime for bipolar depression, especially as IM version available if TOO is needed. Lamotrigine which was prescribed for Bipolar disorder was discontinued due to noncompliance. Was scheduled for TOO on 12/13 but was adjourned as pt was compliant with medication during the few days directly preceding her court date.    Spoke to patient's son Javier about their options for ECT which both sons believe will help their mother most as it has in the past. Advised that though her ECT referral has been sent out, it is unlikely that a different institution will accept her using this method given risk factors and long line. Advised that one option would be to have patient discharged under the care of her family and have them take her to an ED in a facility that has ECT and see whether the treatment team at that facility agrees about necessity of ECT, which is not a guarantee. He verbalized understanding and agreed to discuss with brother Catarino before coming to decision. Today, he reported that he could not feasibly take the pt back into his care, and that the pt would have to be discharged to [facility] for plan to request ECT at a different facility.     Spoke with Dr. Melo who had 3 meetings with the pt at the outpatient clinic prior to this admission. Her behavior during consistent medication compliance resembles her presentation at outpatient appointments (described by depressed mood, deteriorated independence, and requirement of significant encouragement to engage in activities). Amidst intermittent refusal of medications for the past few days, she regressed to the point of once again endorsing Cotard delusions and that she has caused the death of her eldest son. On one occasion, she conveyed realization and regret that these thoughts were not based in reality and possessed some insight that her abnormal thought processes are what induce these false beliefs.    Amber displays a variable pattern of functionality and mentation during the day, potentially indicating a delirium or sundowning of her primary dementia.     On evaluation today, patient appears with a slightly brighter affect as compared to last week. She has demonstrated medication compliance. Her thought process still has moments of illogical and impaired reasoning. She has been performing activities (walking with a walker, eating, dressing, showering) on her own and taking medications consistently for the last week. Patient is anticipated for discharge sometime next week. This is a 77 y/o female with PPH of bipolar depression (Bipolar II disorder), HTN, OA, T2DM, cognitive impairment without behavioral disturbance, brought to the ED at the behest of the shelter she has been residing for the last 7 years for gradual deterioration in patient's ability to care for herself, in the context of recent psychotropic medication changes due to an episode of lithium toxicity in August of 2021.    Patient's current presentation is consistent with severe bipolar depression with psychotic features on underlying dementia with behavioral disturbance. Patient was stable on lithium for decades (though per son, she was never "normal" and always fluctuated between highs and lows).    During this admission patient has been tried on:  - Low dose lithium (d/c-ed due to renal injury, no response)  - Seroquel (up to 250 mg, d/c-ed due to worsening psychosis)  - Prozac (since November 8th) and Zyprexa (currently on Zydis) - some initial improvement, but now worsening  - Lamictal 25 mg qd (12/15 dc'ed as she has been inconsistent with it)     Overall Amber's response to medication treatment has been poor. She has had increasingly severe delusions of depressive affect. She has been intermittently uncooperative with staff, and occasionally is interactive with peers and amenable to engaging in activities. Her most significant treatment barrier is mixed compliance with medication, as she has declined her morning medications for several days during this hospitalization. Prozac discontinuation has been associated with less agitation, depressive affect not improved. Will c/w Zyprexa Zydis 10 mg at bedtime for bipolar depression, especially as IM version available if TOO is needed. Lamotrigine which was prescribed for Bipolar disorder was discontinued due to noncompliance. Was scheduled for TOO on 12/13 but was adjourned as pt was compliant with medication during the few days directly preceding her court date.    Spoke to patient's son Javier about their options for ECT which both sons believe will help their mother most as it has in the past. Advised that though her ECT referral has been sent out, it is unlikely that a different institution will accept her using this method given risk factors and long line. Advised that one option would be to have patient discharged under the care of her family and have them take her to an ED in a facility that has ECT and see whether the treatment team at that facility agrees about necessity of ECT, which is not a guarantee. He verbalized understanding and agreed to discuss with brother Catarino before coming to decision. Today, he reported that he could not feasibly take the pt back into his care, and that the pt would have to be discharged to [facility] for plan to request ECT at a different facility.     Spoke with Dr. Melo who had 3 meetings with the pt at the outpatient clinic prior to this admission. Her behavior during consistent medication compliance resembles her presentation at outpatient appointments (described by depressed mood, deteriorated independence, and requirement of significant encouragement to engage in activities). Amidst intermittent refusal of medications for the past few days, she regressed to the point of once again endorsing Cotard delusions and that she has caused the death of her eldest son. On one occasion, she conveyed realization and regret that these thoughts were not based in reality and possessed some insight that her abnormal thought processes are what induce these false beliefs.    Amber displays a variable pattern of functionality and mentation during the day, potentially indicating a delirium or sundowning of her primary dementia.     On evaluation today, patient appears with a brighter affect as compared to last week. She has demonstrated medication compliance. She has been performing activities (walking with a walker, eating, dressing, showering) on her own and taking medications consistently for the last week.

## 2022-01-04 NOTE — PROGRESS NOTE BEHAVIORAL HEALTH - PROBLEM SELECTOR PLAN 1
- Continue Zyprexa Zydis oral dissolvable tablet 10 mg at bedtime (increased on 12/6)  - Increased Depakene liquid to 500 mg BID for mood stabilization (pt will receive a total of 750 mg today 12/29 and 1000 mg starting tomorrow 12/30)  - Depakote level 12/28 was 30.0    *Atarax 25 mg q6h PRN for anxiety    - Discontinued  lamictal 25 mg qd as she has been inconsistent with it.  - TOO court for 12/13/21 was adjourned as pt was compliant with medications over the weekend. - Continue Zyprexa Zydis oral dissolvable tablet 10 mg at bedtime (increased on 12/6)  - Increased Depakene liquid to 500 mg BID for mood stabilization  - Depakote level 12/28 was 30.0    *Atarax 25 mg q6h PRN for anxiety    - Discontinued  lamictal 25 mg qd as she has been inconsistent with it.  - TOO court for 12/13/21 was adjourned as pt was compliant with medications over the weekend.

## 2022-01-05 LAB — GLUCOSE BLDC GLUCOMTR-MCNC: 128 MG/DL — HIGH (ref 70–99)

## 2022-01-05 PROCEDURE — 99231 SBSQ HOSP IP/OBS SF/LOW 25: CPT

## 2022-01-05 RX ADMIN — Medication 325 MILLIGRAM(S): at 08:44

## 2022-01-05 RX ADMIN — ATORVASTATIN CALCIUM 40 MILLIGRAM(S): 80 TABLET, FILM COATED ORAL at 20:22

## 2022-01-05 RX ADMIN — Medication 500 MILLIGRAM(S): at 20:22

## 2022-01-05 RX ADMIN — Medication 1300 MILLIGRAM(S): at 08:44

## 2022-01-05 RX ADMIN — PANTOPRAZOLE SODIUM 40 MILLIGRAM(S): 20 TABLET, DELAYED RELEASE ORAL at 08:45

## 2022-01-05 RX ADMIN — OLANZAPINE 10 MILLIGRAM(S): 15 TABLET, FILM COATED ORAL at 20:22

## 2022-01-05 RX ADMIN — AMLODIPINE BESYLATE 10 MILLIGRAM(S): 2.5 TABLET ORAL at 08:44

## 2022-01-05 RX ADMIN — LISINOPRIL 10 MILLIGRAM(S): 2.5 TABLET ORAL at 08:44

## 2022-01-05 RX ADMIN — Medication 81 MILLIGRAM(S): at 08:44

## 2022-01-05 RX ADMIN — Medication 500 MILLIGRAM(S): at 08:45

## 2022-01-05 RX ADMIN — Medication 1300 MILLIGRAM(S): at 20:21

## 2022-01-05 NOTE — CHART NOTE - NSCHARTNOTEFT_GEN_A_CORE
Social Work Note:    Treatment team met with patient on unit rounds.  Patient was previously observed to be walking around the unit independently with a walker. She was pleasant on interview. Per patient report she has been adherent to prescribed medications. She was encouraged to continue taking her medications.  Sleep is good.  Appetite is fair.      Patient was encouraged to be visible on the unit and sit in the day room.  She was agreeable to same.  Plan of return to [facility] discussed with patient.  She is agreeable to this plan.  Referral to be made for patient to resume treatment at [facility].     Prior to admission patient was a resident of [facility].  Communication ongoing with facility representative ANTOINETTE Montesinos at [facility] [phone].      Mental Status Exam:    Mood – Neutral   Sleep – Good   Appetite – Fair   ADLs – Fair    Thought Process – Perseverative    Observation – [id number]    No barriers to discharge identified at this time.     At this time patient is not psychiatrically stable for discharge.

## 2022-01-05 NOTE — PROGRESS NOTE BEHAVIORAL HEALTH - NSBHFUPINTERVALHXFT_PSY_A_CORE
Pt was seen, evaluated, chart reviewed. Per nursing no behavioral events overnight. Per chart no PRNs given overnight. Per chart medication compliant except for heparin injection.    Upon approach, pt was walking around the unit. States she feels "good", slept okay last night, and ate eggs and a bagel in the morning, but spilled some milk that she would like cleaned up. Affirms medication compliance and denies side-effects. States she is looking forward to going back to [facility].

## 2022-01-05 NOTE — PROGRESS NOTE BEHAVIORAL HEALTH - JUDGMENT (REGARDING EVERYDAY EVENTS)
Fair

## 2022-01-05 NOTE — PROGRESS NOTE BEHAVIORAL HEALTH - PROBLEM SELECTOR PLAN 1
- Continue Zyprexa Zydis oral dissolvable tablet 10 mg at bedtime (increased on 12/6)  - Increased Depakene liquid to 500 mg BID for mood stabilization  - Depakote level 12/28 was 30.0    *Atarax 25 mg q6h PRN for anxiety    - Discontinued  lamictal 25 mg qd as she has been inconsistent with it.  - TOO court for 12/13/21 was adjourned as pt was compliant with medications over the weekend.

## 2022-01-05 NOTE — PROGRESS NOTE BEHAVIORAL HEALTH - SUMMARY
This is a 77 y/o female with PPH of bipolar depression (Bipolar II disorder), HTN, OA, T2DM, cognitive impairment without behavioral disturbance, brought to the ED at the behest of the detention she has been residing for the last 7 years for gradual deterioration in patient's ability to care for herself, in the context of recent psychotropic medication changes due to an episode of lithium toxicity in August of 2021.    Patient's current presentation is consistent with severe bipolar depression with psychotic features on underlying dementia with behavioral disturbance. Patient was stable on lithium for decades (though per son, she was never "normal" and always fluctuated between highs and lows).    During this admission patient has been tried on:  - Low dose lithium (d/c-ed due to renal injury, no response)  - Seroquel (up to 250 mg, d/c-ed due to worsening psychosis)  - Prozac (since November 8th) and Zyprexa (currently on Zydis) - some initial improvement, but now worsening  - Lamictal 25 mg qd (12/15 dc'ed as she has been inconsistent with it)     Overall Amber's response to medication treatment has been poor. She has had increasingly severe delusions of depressive affect. She has been intermittently uncooperative with staff, and occasionally is interactive with peers and amenable to engaging in activities. Her most significant treatment barrier is mixed compliance with medication, as she has declined her morning medications for several days during this hospitalization. Prozac discontinuation has been associated with less agitation, depressive affect not improved. Will c/w Zyprexa Zydis 10 mg at bedtime for bipolar depression, especially as IM version available if TOO is needed. Lamotrigine which was prescribed for Bipolar disorder was discontinued due to noncompliance. Was scheduled for TOO on 12/13 but was adjourned as pt was compliant with medication during the few days directly preceding her court date.    Spoke to patient's son Javier about their options for ECT which both sons believe will help their mother most as it has in the past. Advised that though her ECT referral has been sent out, it is unlikely that a different institution will accept her using this method given risk factors and long line. Advised that one option would be to have patient discharged under the care of her family and have them take her to an ED in a facility that has ECT and see whether the treatment team at that facility agrees about necessity of ECT, which is not a guarantee. He verbalized understanding and agreed to discuss with brother Catarino before coming to decision. Today, he reported that he could not feasibly take the pt back into his care, and that the pt would have to be discharged to [facility] for plan to request ECT at a different facility.     Spoke with Dr. Melo who had 3 meetings with the pt at the outpatient clinic prior to this admission. Her behavior during consistent medication compliance resembles her presentation at outpatient appointments (described by depressed mood, deteriorated independence, and requirement of significant encouragement to engage in activities). Amidst intermittent refusal of medications for the past few days, she regressed to the point of once again endorsing Cotard delusions and that she has caused the death of her eldest son. On one occasion, she conveyed realization and regret that these thoughts were not based in reality and possessed some insight that her abnormal thought processes are what induce these false beliefs.    Amber displays a variable pattern of functionality and mentation during the day, potentially indicating a delirium or sundowning of her primary dementia.     On evaluation today, patient appears with a brighter affect as compared to last week. She has demonstrated medication compliance. She has been performing activities (walking with a walker, eating, dressing, showering) on her own and taking medications consistently for the last week.

## 2022-01-06 PROCEDURE — 99231 SBSQ HOSP IP/OBS SF/LOW 25: CPT

## 2022-01-06 RX ADMIN — OLANZAPINE 10 MILLIGRAM(S): 15 TABLET, FILM COATED ORAL at 20:24

## 2022-01-06 RX ADMIN — Medication 325 MILLIGRAM(S): at 08:51

## 2022-01-06 RX ADMIN — HEPARIN SODIUM 5000 UNIT(S): 5000 INJECTION INTRAVENOUS; SUBCUTANEOUS at 20:24

## 2022-01-06 RX ADMIN — Medication 81 MILLIGRAM(S): at 08:51

## 2022-01-06 RX ADMIN — LISINOPRIL 10 MILLIGRAM(S): 2.5 TABLET ORAL at 08:51

## 2022-01-06 RX ADMIN — ATORVASTATIN CALCIUM 40 MILLIGRAM(S): 80 TABLET, FILM COATED ORAL at 20:23

## 2022-01-06 RX ADMIN — Medication 500 MILLIGRAM(S): at 20:24

## 2022-01-06 RX ADMIN — PANTOPRAZOLE SODIUM 40 MILLIGRAM(S): 20 TABLET, DELAYED RELEASE ORAL at 08:50

## 2022-01-06 RX ADMIN — AMLODIPINE BESYLATE 10 MILLIGRAM(S): 2.5 TABLET ORAL at 08:51

## 2022-01-06 RX ADMIN — Medication 1300 MILLIGRAM(S): at 08:51

## 2022-01-06 RX ADMIN — Medication 1300 MILLIGRAM(S): at 20:24

## 2022-01-06 RX ADMIN — Medication 500 MILLIGRAM(S): at 08:52

## 2022-01-06 NOTE — PROGRESS NOTE BEHAVIORAL HEALTH - SUMMARY
This is a 75 y/o female with PPH of bipolar depression (Bipolar II disorder), HTN, OA, T2DM, cognitive impairment without behavioral disturbance, brought to the ED at the behest of the retirement she has been residing for the last 7 years for gradual deterioration in patient's ability to care for herself, in the context of recent psychotropic medication changes due to an episode of lithium toxicity in August of 2021.    Patient's current presentation is consistent with severe bipolar depression with psychotic features on underlying dementia with behavioral disturbance. Patient was stable on lithium for decades (though per son, she was never "normal" and always fluctuated between highs and lows).    During this admission patient has been tried on:  - Low dose lithium (d/c-ed due to renal injury, no response)  - Seroquel (up to 250 mg, d/c-ed due to worsening psychosis)  - Prozac (since November 8th) and Zyprexa (currently on Zydis) - some initial improvement, but now worsening  - Lamictal 25 mg qd (12/15 dc'ed as she has been inconsistent with it)     Overall Amber's response to medication treatment has been poor. She has had increasingly severe delusions of depressive affect. She has been intermittently uncooperative with staff, and occasionally is interactive with peers and amenable to engaging in activities. Her most significant treatment barrier is mixed compliance with medication, as she has declined her morning medications for several days during this hospitalization. Prozac discontinuation has been associated with less agitation, depressive affect not improved. Will c/w Zyprexa Zydis 10 mg at bedtime for bipolar depression, especially as IM version available if TOO is needed. Lamotrigine which was prescribed for Bipolar disorder was discontinued due to noncompliance. Was scheduled for TOO on 12/13 but was adjourned as pt was compliant with medication during the few days directly preceding her court date.    Spoke to patient's son Javier about their options for ECT which both sons believe will help their mother most as it has in the past. Advised that though her ECT referral has been sent out, it is unlikely that a different institution will accept her using this method given risk factors and long line. Advised that one option would be to have patient discharged under the care of her family and have them take her to an ED in a facility that has ECT and see whether the treatment team at that facility agrees about necessity of ECT, which is not a guarantee. He verbalized understanding and agreed to discuss with brother Catarino before coming to decision. Today, he reported that he could not feasibly take the pt back into his care, and that the pt would have to be discharged to [facility] for plan to request ECT at a different facility.     Spoke with Dr. Melo who had 3 meetings with the pt at the outpatient clinic prior to this admission. Her behavior during consistent medication compliance resembles her presentation at outpatient appointments (described by depressed mood, deteriorated independence, and requirement of significant encouragement to engage in activities). Amidst intermittent refusal of medications for the past few days, she regressed to the point of once again endorsing Cotard delusions and that she has caused the death of her eldest son. On one occasion, she conveyed realization and regret that these thoughts were not based in reality and possessed some insight that her abnormal thought processes are what induce these false beliefs.    Amber displays a variable pattern of functionality and mentation during the day, potentially indicating a delirium or sundowning of her primary dementia.     On evaluation today, patient appears with a brighter affect as compared to last week. She has demonstrated medication compliance. She has been performing activities (walking with a walker, eating, dressing, showering) on her own and taking medications consistently for the last week.

## 2022-01-06 NOTE — PROGRESS NOTE BEHAVIORAL HEALTH - NSBHFUPINTERVALHXFT_PSY_A_CORE
Pt was seen, evaluated, chart reviewed. Per nursing no behavioral events overnight. Per chart no PRNs given overnight. Per chart medication compliant except for heparin injection.    Upon approach, pt was walking around the unit and was dressed in her own clothing that was a different outfit than yesterday. She states she feels "fine" today. Affirms eating eggs and Yi toast today and sleeping well overnight. Affirms medication compliance and denies side-effects.

## 2022-01-07 LAB
GLUCOSE BLDC GLUCOMTR-MCNC: 107 MG/DL — HIGH (ref 70–99)
SARS-COV-2 RNA SPEC QL NAA+PROBE: SIGNIFICANT CHANGE UP

## 2022-01-07 PROCEDURE — 99231 SBSQ HOSP IP/OBS SF/LOW 25: CPT

## 2022-01-07 RX ADMIN — Medication 81 MILLIGRAM(S): at 08:20

## 2022-01-07 RX ADMIN — Medication 1300 MILLIGRAM(S): at 20:42

## 2022-01-07 RX ADMIN — Medication 325 MILLIGRAM(S): at 08:19

## 2022-01-07 RX ADMIN — LISINOPRIL 10 MILLIGRAM(S): 2.5 TABLET ORAL at 08:19

## 2022-01-07 RX ADMIN — Medication 500 MILLIGRAM(S): at 20:42

## 2022-01-07 RX ADMIN — Medication 1300 MILLIGRAM(S): at 08:21

## 2022-01-07 RX ADMIN — AMLODIPINE BESYLATE 10 MILLIGRAM(S): 2.5 TABLET ORAL at 08:20

## 2022-01-07 RX ADMIN — ATORVASTATIN CALCIUM 40 MILLIGRAM(S): 80 TABLET, FILM COATED ORAL at 20:43

## 2022-01-07 RX ADMIN — OLANZAPINE 10 MILLIGRAM(S): 15 TABLET, FILM COATED ORAL at 20:43

## 2022-01-07 RX ADMIN — PANTOPRAZOLE SODIUM 40 MILLIGRAM(S): 20 TABLET, DELAYED RELEASE ORAL at 08:19

## 2022-01-07 RX ADMIN — Medication 500 MILLIGRAM(S): at 08:21

## 2022-01-07 NOTE — PROGRESS NOTE BEHAVIORAL HEALTH - SUMMARY
This is a 75 y/o female with PPH of bipolar depression (Bipolar II disorder), HTN, OA, T2DM, cognitive impairment without behavioral disturbance, brought to the ED at the behest of the assisted she has been residing for the last 7 years for gradual deterioration in patient's ability to care for herself, in the context of recent psychotropic medication changes due to an episode of lithium toxicity in August of 2021.    Patient's current presentation is consistent with severe bipolar depression with psychotic features on underlying dementia with behavioral disturbance. Patient was stable on lithium for decades (though per son, she was never "normal" and always fluctuated between highs and lows).    During this admission patient has been tried on:  - Low dose lithium (d/c-ed due to renal injury, no response)  - Seroquel (up to 250 mg, d/c-ed due to worsening psychosis)  - Prozac (since November 8th) and Zyprexa (currently on Zydis) - some initial improvement, but now worsening  - Lamictal 25 mg qd (12/15 dc'ed as she has been inconsistent with it)     Overall Amber's response to medication treatment has been poor. She has had increasingly severe delusions of depressive affect. She has been intermittently uncooperative with staff, and occasionally is interactive with peers and amenable to engaging in activities. Her most significant treatment barrier is mixed compliance with medication, as she has declined her morning medications for several days during this hospitalization. Prozac discontinuation has been associated with less agitation, depressive affect not improved. Will c/w Zyprexa Zydis 10 mg at bedtime for bipolar depression, especially as IM version available if TOO is needed. Lamotrigine which was prescribed for Bipolar disorder was discontinued due to noncompliance. Was scheduled for TOO on 12/13 but was adjourned as pt was compliant with medication during the few days directly preceding her court date.    Spoke to patient's son Javier about their options for ECT which both sons believe will help their mother most as it has in the past. Advised that though her ECT referral has been sent out, it is unlikely that a different institution will accept her using this method given risk factors and long line. Advised that one option would be to have patient discharged under the care of her family and have them take her to an ED in a facility that has ECT and see whether the treatment team at that facility agrees about necessity of ECT, which is not a guarantee. He verbalized understanding and agreed to discuss with brother Catarino before coming to decision. Today, he reported that he could not feasibly take the pt back into his care, and that the pt would have to be discharged to [facility] for plan to request ECT at a different facility.     Spoke with Dr. Melo who had 3 meetings with the pt at the outpatient clinic prior to this admission. Her behavior during consistent medication compliance resembles her presentation at outpatient appointments (described by depressed mood, deteriorated independence, and requirement of significant encouragement to engage in activities). Amidst intermittent refusal of medications for the past few days, she regressed to the point of once again endorsing Cotard delusions and that she has caused the death of her eldest son. On one occasion, she conveyed realization and regret that these thoughts were not based in reality and possessed some insight that her abnormal thought processes are what induce these false beliefs.    Amber displays a variable pattern of functionality and mentation during the day, potentially indicating a delirium or sundowning of her primary dementia.     On evaluation today, patient appears with a brighter affect as compared to last week. She has demonstrated medication compliance. She has been performing activities (walking with a walker, eating, dressing, showering) on her own and taking medications consistently for the last week.

## 2022-01-07 NOTE — PROGRESS NOTE BEHAVIORAL HEALTH - NSBHFUPINTERVALHXFT_PSY_A_CORE
Pt was seen, evaluated, chart reviewed. Per nursing no behavioral events overnight. Per chart no PRNs given overnight. Per chart medication compliant except for heparin injection.     Upon approach, pt was lying in bed and interview conducted in pt's room. States she is okay today, has been eating her meals, has a good appetite, and has been sleeping well. Affirms medication compliance and denies side-effects. Denies auditory and visual hallucinations. Denies suicidal and homicidal ideation.

## 2022-01-07 NOTE — PROGRESS NOTE BEHAVIORAL HEALTH - NSBHCHARTREVIEWINVESTIGATE_PSY_A_CORE FT
< from: 12 Lead ECG (11.17.21 @ 02:47) >    Ventricular Rate 62 BPM    Atrial Rate 62 BPM    P-R Interval 190 ms    QRS Duration 100 ms    Q-T Interval 432 ms    QTC Calculation(Bazett) 438 ms    P Axis 68 degrees    R Axis 48 degrees    T Axis 79 degrees    Diagnosis Line Normal sinus rhythm    < end of copied text >
Ventricular Rate 97 BPM    Atrial Rate 97 BPM    P-R Interval 182 ms    QRS Duration 102 ms    Q-T Interval 366 ms    QTC Calculation(Bazett) 464 ms    P Axis 59 degrees    R Axis 32 degrees    T Axis 47 degrees    Diagnosis Line Normal sinus rhythm  Possible Left atrial enlargement    Confirmed by JASON CEBALLOS, DELISA ([id number]) on 11/5/2021 3:31:00 PM
< from: 12 Lead ECG (11.17.21 @ 02:47) >    Ventricular Rate 62 BPM    Atrial Rate 62 BPM    P-R Interval 190 ms    QRS Duration 100 ms    Q-T Interval 432 ms    QTC Calculation(Bazett) 438 ms    P Axis 68 degrees    R Axis 48 degrees    T Axis 79 degrees    Diagnosis Line Normal sinus rhythm    < end of copied text >
Ventricular Rate 97 BPM    Atrial Rate 97 BPM    P-R Interval 182 ms    QRS Duration 102 ms    Q-T Interval 366 ms    QTC Calculation(Bazett) 464 ms    P Axis 59 degrees    R Axis 32 degrees    T Axis 47 degrees    Diagnosis Line Normal sinus rhythm  Possible Left atrial enlargement    Confirmed by JASON CEBALLOS, DELISA ([id number] on 11/5/2021 3:31:00 PM
< from: 12 Lead ECG (11.17.21 @ 02:47) >    Ventricular Rate 62 BPM    Atrial Rate 62 BPM    P-R Interval 190 ms    QRS Duration 100 ms    Q-T Interval 432 ms    QTC Calculation(Bazett) 438 ms    P Axis 68 degrees    R Axis 48 degrees    T Axis 79 degrees    Diagnosis Line Normal sinus rhythm    < end of copied text >

## 2022-01-07 NOTE — CHART NOTE - NSCHARTNOTEFT_GEN_A_CORE
Social Work Note:    Treatment team met with patient on unit rounds.  At time of assessment patient endorsed fair sleep and appetite.  Per her report, patient has been adherent to prescribed medications.  During the day patient has been observed to be visible on the unit and sitting in the day room.  Patient has been engaging with staff and peers on the unit.      Yesterday this worker spoke with Yamel of [facility].  She received assisted living form completed by psychiatrist and recommends that patient not return to [facility] and be placed in a skilled nursing facility.  This was discussed with patient’s son Catarino [phone] who was in agreement with this plan.  GRECIA to be completed and sent to all [facility] for review.  Patient’s son expressed preference for either [facility] or [facility] home.   reviewed nursing home placement process and encouraged son to accept alternate facility bed offers and then explore a transfer to his preferred facility in the future if his nursing home choice does not accept patient or has no available bed.     Mental Status Exam:    Mood – Neutral   Sleep – Good   Appetite – Fair   ADLs – Fair    Thought Process – Perseverative    Observation – [id number]    No barriers to discharge identified at this time.     At this time patient is not psychiatrically stable for discharge.

## 2022-01-08 LAB — GLUCOSE BLDC GLUCOMTR-MCNC: 118 MG/DL — HIGH (ref 70–99)

## 2022-01-08 RX ORDER — SALICYLIC ACID 0.5 %
1 CLEANSER (GRAM) TOPICAL EVERY 8 HOURS
Refills: 0 | Status: DISCONTINUED | OUTPATIENT
Start: 2022-01-08 | End: 2022-01-27

## 2022-01-08 RX ADMIN — LISINOPRIL 10 MILLIGRAM(S): 2.5 TABLET ORAL at 08:18

## 2022-01-08 RX ADMIN — Medication 81 MILLIGRAM(S): at 08:19

## 2022-01-08 RX ADMIN — AMLODIPINE BESYLATE 10 MILLIGRAM(S): 2.5 TABLET ORAL at 08:19

## 2022-01-08 RX ADMIN — Medication 500 MILLIGRAM(S): at 08:20

## 2022-01-08 RX ADMIN — Medication 325 MILLIGRAM(S): at 08:19

## 2022-01-08 RX ADMIN — PANTOPRAZOLE SODIUM 40 MILLIGRAM(S): 20 TABLET, DELAYED RELEASE ORAL at 08:19

## 2022-01-08 RX ADMIN — Medication 1300 MILLIGRAM(S): at 08:18

## 2022-01-09 LAB
ALBUMIN SERPL ELPH-MCNC: 4 G/DL — SIGNIFICANT CHANGE UP (ref 3.5–5.2)
ALP SERPL-CCNC: 98 U/L — SIGNIFICANT CHANGE UP (ref 30–115)
ALT FLD-CCNC: 21 U/L — SIGNIFICANT CHANGE UP (ref 0–41)
ANION GAP SERPL CALC-SCNC: 13 MMOL/L — SIGNIFICANT CHANGE UP (ref 7–14)
AST SERPL-CCNC: 20 U/L — SIGNIFICANT CHANGE UP (ref 0–41)
BASOPHILS # BLD AUTO: 0.02 K/UL — SIGNIFICANT CHANGE UP (ref 0–0.2)
BASOPHILS NFR BLD AUTO: 0.3 % — SIGNIFICANT CHANGE UP (ref 0–1)
BILIRUB SERPL-MCNC: 0.3 MG/DL — SIGNIFICANT CHANGE UP (ref 0.2–1.2)
BUN SERPL-MCNC: 44 MG/DL — HIGH (ref 10–20)
C DIFF BY PCR RESULT: POSITIVE
C DIFF TOX GENS STL QL NAA+PROBE: SIGNIFICANT CHANGE UP
CALCIUM SERPL-MCNC: 9.7 MG/DL — SIGNIFICANT CHANGE UP (ref 8.5–10.1)
CHLORIDE SERPL-SCNC: 100 MMOL/L — SIGNIFICANT CHANGE UP (ref 98–110)
CO2 SERPL-SCNC: 23 MMOL/L — SIGNIFICANT CHANGE UP (ref 17–32)
CREAT SERPL-MCNC: 1.8 MG/DL — HIGH (ref 0.7–1.5)
EOSINOPHIL # BLD AUTO: 0.1 K/UL — SIGNIFICANT CHANGE UP (ref 0–0.7)
EOSINOPHIL NFR BLD AUTO: 1.7 % — SIGNIFICANT CHANGE UP (ref 0–8)
GLUCOSE BLDC GLUCOMTR-MCNC: 149 MG/DL — HIGH (ref 70–99)
GLUCOSE SERPL-MCNC: 96 MG/DL — SIGNIFICANT CHANGE UP (ref 70–99)
HCT VFR BLD CALC: 36.5 % — LOW (ref 37–47)
HGB BLD-MCNC: 11.6 G/DL — LOW (ref 12–16)
IMM GRANULOCYTES NFR BLD AUTO: 0.3 % — SIGNIFICANT CHANGE UP (ref 0.1–0.3)
LYMPHOCYTES # BLD AUTO: 0.52 K/UL — LOW (ref 1.2–3.4)
LYMPHOCYTES # BLD AUTO: 8.9 % — LOW (ref 20.5–51.1)
MAGNESIUM SERPL-MCNC: 1.5 MG/DL — LOW (ref 1.8–2.4)
MCHC RBC-ENTMCNC: 26.1 PG — LOW (ref 27–31)
MCHC RBC-ENTMCNC: 31.8 G/DL — LOW (ref 32–37)
MCV RBC AUTO: 82.2 FL — SIGNIFICANT CHANGE UP (ref 81–99)
MONOCYTES # BLD AUTO: 0.36 K/UL — SIGNIFICANT CHANGE UP (ref 0.1–0.6)
MONOCYTES NFR BLD AUTO: 6.2 % — SIGNIFICANT CHANGE UP (ref 1.7–9.3)
NEUTROPHILS # BLD AUTO: 4.81 K/UL — SIGNIFICANT CHANGE UP (ref 1.4–6.5)
NEUTROPHILS NFR BLD AUTO: 82.6 % — HIGH (ref 42.2–75.2)
NRBC # BLD: 0 /100 WBCS — SIGNIFICANT CHANGE UP (ref 0–0)
PHOSPHATE SERPL-MCNC: 3.2 MG/DL — SIGNIFICANT CHANGE UP (ref 2.1–4.9)
PLATELET # BLD AUTO: 167 K/UL — SIGNIFICANT CHANGE UP (ref 130–400)
POTASSIUM SERPL-MCNC: 4.3 MMOL/L — SIGNIFICANT CHANGE UP (ref 3.5–5)
POTASSIUM SERPL-SCNC: 4.3 MMOL/L — SIGNIFICANT CHANGE UP (ref 3.5–5)
PROT SERPL-MCNC: 6.7 G/DL — SIGNIFICANT CHANGE UP (ref 6–8)
RBC # BLD: 4.44 M/UL — SIGNIFICANT CHANGE UP (ref 4.2–5.4)
RBC # FLD: 13.9 % — SIGNIFICANT CHANGE UP (ref 11.5–14.5)
SODIUM SERPL-SCNC: 136 MMOL/L — SIGNIFICANT CHANGE UP (ref 135–146)
WBC # BLD: 5.83 K/UL — SIGNIFICANT CHANGE UP (ref 4.8–10.8)
WBC # FLD AUTO: 5.83 K/UL — SIGNIFICANT CHANGE UP (ref 4.8–10.8)

## 2022-01-09 RX ORDER — VANCOMYCIN HCL 1 G
125 VIAL (EA) INTRAVENOUS EVERY 6 HOURS
Refills: 0 | Status: DISCONTINUED | OUTPATIENT
Start: 2022-01-09 | End: 2022-01-26

## 2022-01-09 RX ADMIN — LISINOPRIL 10 MILLIGRAM(S): 2.5 TABLET ORAL at 09:48

## 2022-01-09 RX ADMIN — Medication 2 MILLIGRAM(S): at 07:55

## 2022-01-09 RX ADMIN — Medication 1300 MILLIGRAM(S): at 20:56

## 2022-01-09 RX ADMIN — AMLODIPINE BESYLATE 10 MILLIGRAM(S): 2.5 TABLET ORAL at 09:48

## 2022-01-09 RX ADMIN — Medication 2 MILLIGRAM(S): at 02:28

## 2022-01-09 RX ADMIN — Medication 1300 MILLIGRAM(S): at 09:48

## 2022-01-09 RX ADMIN — HEPARIN SODIUM 5000 UNIT(S): 5000 INJECTION INTRAVENOUS; SUBCUTANEOUS at 09:52

## 2022-01-09 RX ADMIN — Medication 500 MILLIGRAM(S): at 20:56

## 2022-01-09 RX ADMIN — Medication 325 MILLIGRAM(S): at 09:48

## 2022-01-09 RX ADMIN — Medication 81 MILLIGRAM(S): at 09:48

## 2022-01-09 RX ADMIN — ATORVASTATIN CALCIUM 40 MILLIGRAM(S): 80 TABLET, FILM COATED ORAL at 20:58

## 2022-01-09 RX ADMIN — OLANZAPINE 10 MILLIGRAM(S): 15 TABLET, FILM COATED ORAL at 20:57

## 2022-01-09 RX ADMIN — PANTOPRAZOLE SODIUM 40 MILLIGRAM(S): 20 TABLET, DELAYED RELEASE ORAL at 09:50

## 2022-01-09 RX ADMIN — Medication 500 MILLIGRAM(S): at 09:50

## 2022-01-09 RX ADMIN — Medication 125 MILLIGRAM(S): at 19:56

## 2022-01-09 NOTE — CHART NOTE - NSCHARTNOTEFT_GEN_A_CORE
Diarrhea, watery, multiple bm since yesterday.  Cdiff pcr is positive.  will isolate  po vanco x 10 days  d/w Dr Crawford  will order a set of labs today  pt poor historian secondary to dementia

## 2022-01-10 LAB — GLUCOSE BLDC GLUCOMTR-MCNC: 98 MG/DL — SIGNIFICANT CHANGE UP (ref 70–99)

## 2022-01-10 PROCEDURE — 99231 SBSQ HOSP IP/OBS SF/LOW 25: CPT

## 2022-01-10 RX ADMIN — Medication 1300 MILLIGRAM(S): at 09:08

## 2022-01-10 RX ADMIN — Medication 1300 MILLIGRAM(S): at 20:33

## 2022-01-10 RX ADMIN — LISINOPRIL 10 MILLIGRAM(S): 2.5 TABLET ORAL at 09:09

## 2022-01-10 RX ADMIN — AMLODIPINE BESYLATE 10 MILLIGRAM(S): 2.5 TABLET ORAL at 09:08

## 2022-01-10 RX ADMIN — Medication 500 MILLIGRAM(S): at 20:33

## 2022-01-10 RX ADMIN — Medication 325 MILLIGRAM(S): at 09:09

## 2022-01-10 RX ADMIN — Medication 500 MILLIGRAM(S): at 09:08

## 2022-01-10 RX ADMIN — Medication 125 MILLIGRAM(S): at 18:33

## 2022-01-10 RX ADMIN — Medication 125 MILLIGRAM(S): at 11:47

## 2022-01-10 RX ADMIN — ATORVASTATIN CALCIUM 40 MILLIGRAM(S): 80 TABLET, FILM COATED ORAL at 20:34

## 2022-01-10 RX ADMIN — Medication 125 MILLIGRAM(S): at 23:20

## 2022-01-10 RX ADMIN — PANTOPRAZOLE SODIUM 40 MILLIGRAM(S): 20 TABLET, DELAYED RELEASE ORAL at 06:32

## 2022-01-10 RX ADMIN — Medication 81 MILLIGRAM(S): at 09:09

## 2022-01-10 RX ADMIN — OLANZAPINE 10 MILLIGRAM(S): 15 TABLET, FILM COATED ORAL at 20:34

## 2022-01-10 RX ADMIN — Medication 125 MILLIGRAM(S): at 05:27

## 2022-01-10 NOTE — PROGRESS NOTE BEHAVIORAL HEALTH - SUMMARY
This is a 77 y/o female with PPH of bipolar depression (Bipolar II disorder), HTN, OA, T2DM, cognitive impairment without behavioral disturbance, brought to the ED at the behest of the snf she has been residing for the last 7 years for gradual deterioration in patient's ability to care for herself, in the context of recent psychotropic medication changes due to an episode of lithium toxicity in August of 2021.    Patient's current presentation is consistent with severe bipolar depression with psychotic features on underlying dementia with behavioral disturbance. Patient was stable on lithium for decades (though per son, she was never "normal" and always fluctuated between highs and lows).    During this admission patient has been tried on:  - Low dose lithium (d/c-ed due to renal injury, no response)  - Seroquel (up to 250 mg, d/c-ed due to worsening psychosis)  - Prozac (since November 8th) and Zyprexa (currently on Zydis) - some initial improvement, but now worsening  - Lamictal 25 mg qd (12/15 dc'ed as she has been inconsistent with it)     Overall Amber's response to medication treatment has been poor. She has had increasingly severe delusions of depressive affect. She has been intermittently uncooperative with staff, and occasionally is interactive with peers and amenable to engaging in activities. Her most significant treatment barrier is mixed compliance with medication, as she has declined her morning medications for several days during this hospitalization. Prozac discontinuation has been associated with less agitation, depressive affect not improved. Will c/w Zyprexa Zydis 10 mg at bedtime for bipolar depression, especially as IM version available if TOO is needed. Lamotrigine which was prescribed for Bipolar disorder was discontinued due to noncompliance. Was scheduled for TOO on 12/13 but was adjourned as pt was compliant with medication during the few days directly preceding her court date.    Spoke to patient's son Javier about their options for ECT which both sons believe will help their mother most as it has in the past. Advised that though her ECT referral has been sent out, it is unlikely that a different institution will accept her using this method given risk factors and long line. Advised that one option would be to have patient discharged under the care of her family and have them take her to an ED in a facility that has ECT and see whether the treatment team at that facility agrees about necessity of ECT, which is not a guarantee. He verbalized understanding and agreed to discuss with brother Catarino before coming to decision. Today, he reported that he could not feasibly take the pt back into his care, and that the pt would have to be discharged to [facility] for plan to request ECT at a different facility.     Spoke with Dr. Melo who had 3 meetings with the pt at the outpatient clinic prior to this admission. Her behavior during consistent medication compliance resembles her presentation at outpatient appointments (described by depressed mood, deteriorated independence, and requirement of significant encouragement to engage in activities). Amidst intermittent refusal of medications for the past few days, she regressed to the point of once again endorsing Cotard delusions and that she has caused the death of her eldest son. On one occasion, she conveyed realization and regret that these thoughts were not based in reality and possessed some insight that her abnormal thought processes are what induce these false beliefs.    Amber displays a variable pattern of functionality and mentation during the day, potentially indicating a delirium or sundowning of her primary dementia.     On evaluation today, patient appears with a brighter affect as compared to last week. She has demonstrated medication compliance. She has been performing activities (walking with a walker, eating, dressing, showering) on her own and taking medications consistently for the last week.
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
## 2022-01-10 NOTE — CHART NOTE - NSCHARTNOTEFT_GEN_A_CORE
Social Work Note:    Treatment team met with patient on unit rounds.  At time of assessment patient endorsed “not feeling good” as she now has diarrhea.  Patient’s room has been changed.  She wants to be on the unit but feels like she has to go to the bathroom often.      Plan is now for placement to a skilled nursing facility.  When appropriate, GRECIA to be sent to all [facility] for review.  Patient’s son expressed preference for either [facility] or [facility] home.   reviewed nursing home placement process and encouraged son to accept alternate facility bed offers and then explore a transfer to his preferred facility in the future if his nursing home choice does not accept patient or has no available bed.     Mental Status Exam:    Mood – Neutral   Sleep – Good   Appetite – Fair   ADLs – Fair    Thought Process – Perseverative    Observation – [id number]    No barriers to discharge identified at this time.     At this time patient is not psychiatrically stable for discharge.

## 2022-01-10 NOTE — PROGRESS NOTE BEHAVIORAL HEALTH - NSBHFUPINTERVALHXFT_PSY_A_CORE
Pt was seen, evaluated, chart reviewed. As per nursing staff, no events overnight. On evaluation, pt reports she is "not good" because she had been experiencing diarrhea. States she will be "laying there and then wants to go to the bathroom." Reports that she also wants to be around people and does not want to be alone in her room. Is compliant with medication, denies negative side effects. Endorsed fair sleep and appetite. Denied auditory/visual hallucinations. Denied suicidal/homicidal ideation, intent or plan.

## 2022-01-10 NOTE — PROGRESS NOTE BEHAVIORAL HEALTH - PROBLEM SELECTOR PLAN 1
- Continue Zyprexa Zydis oral dissolvable tablet 10 mg at bedtime (increased on 12/6)  - Continue Depakene liquid 500 mg BID for mood stabilization  - Depakote level 12/28 was 30.0;     *Atarax 25 mg q6h PRN for anxiety    - Discontinued  lamictal 25 mg qd as she has been inconsistent with it.  - TOO court for 12/13/21 was adjourned as pt was compliant with medications over the weekend.

## 2022-01-11 LAB — GLUCOSE BLDC GLUCOMTR-MCNC: 94 MG/DL — SIGNIFICANT CHANGE UP (ref 70–99)

## 2022-01-11 PROCEDURE — 99231 SBSQ HOSP IP/OBS SF/LOW 25: CPT

## 2022-01-11 RX ADMIN — OLANZAPINE 10 MILLIGRAM(S): 15 TABLET, FILM COATED ORAL at 20:53

## 2022-01-11 RX ADMIN — Medication 125 MILLIGRAM(S): at 12:58

## 2022-01-11 RX ADMIN — PANTOPRAZOLE SODIUM 40 MILLIGRAM(S): 20 TABLET, DELAYED RELEASE ORAL at 06:40

## 2022-01-11 RX ADMIN — LISINOPRIL 10 MILLIGRAM(S): 2.5 TABLET ORAL at 08:24

## 2022-01-11 RX ADMIN — Medication 1300 MILLIGRAM(S): at 08:24

## 2022-01-11 RX ADMIN — Medication 125 MILLIGRAM(S): at 06:40

## 2022-01-11 RX ADMIN — Medication 125 MILLIGRAM(S): at 18:51

## 2022-01-11 RX ADMIN — Medication 500 MILLIGRAM(S): at 08:28

## 2022-01-11 RX ADMIN — Medication 1300 MILLIGRAM(S): at 20:53

## 2022-01-11 RX ADMIN — AMLODIPINE BESYLATE 10 MILLIGRAM(S): 2.5 TABLET ORAL at 08:25

## 2022-01-11 RX ADMIN — Medication 325 MILLIGRAM(S): at 08:24

## 2022-01-11 RX ADMIN — Medication 500 MILLIGRAM(S): at 20:55

## 2022-01-11 RX ADMIN — ATORVASTATIN CALCIUM 40 MILLIGRAM(S): 80 TABLET, FILM COATED ORAL at 20:53

## 2022-01-11 RX ADMIN — Medication 81 MILLIGRAM(S): at 08:25

## 2022-01-11 NOTE — PROGRESS NOTE BEHAVIORAL HEALTH - SUMMARY
This is a 75 y/o female with PPH of bipolar depression (Bipolar II disorder), HTN, OA, T2DM, cognitive impairment without behavioral disturbance, brought to the ED at the behest of the intermediate she has been residing for the last 7 years for gradual deterioration in patient's ability to care for herself, in the context of recent psychotropic medication changes due to an episode of lithium toxicity in August of 2021.    Patient's current presentation is consistent with severe bipolar depression with psychotic features on underlying dementia with behavioral disturbance. Patient was stable on lithium for decades (though per son, she was never "normal" and always fluctuated between highs and lows).    During this admission patient has been tried on:  - Low dose lithium (d/c-ed due to renal injury, no response)  - Seroquel (up to 250 mg, d/c-ed due to worsening psychosis)  - Prozac (since November 8th) and Zyprexa (currently on Zydis) - some initial improvement, but now worsening  - Lamictal 25 mg qd (12/15 dc'ed as she has been inconsistent with it)     Overall Amber's response to medication treatment has been poor. She has had increasingly severe delusions of depressive affect. She has been intermittently uncooperative with staff, and occasionally is interactive with peers and amenable to engaging in activities. Her most significant treatment barrier is mixed compliance with medication, as she has declined her morning medications for several days during this hospitalization. Prozac discontinuation has been associated with less agitation, depressive affect not improved. Will c/w Zyprexa Zydis 10 mg at bedtime for bipolar depression, especially as IM version available if TOO is needed. Lamotrigine which was prescribed for Bipolar disorder was discontinued due to noncompliance. Was scheduled for TOO on 12/13 but was adjourned as pt was compliant with medication during the few days directly preceding her court date.    Spoke to patient's son Javier about their options for ECT which both sons believe will help their mother most as it has in the past. Advised that though her ECT referral has been sent out, it is unlikely that a different institution will accept her using this method given risk factors and long line. Advised that one option would be to have patient discharged under the care of her family and have them take her to an ED in a facility that has ECT and see whether the treatment team at that facility agrees about necessity of ECT, which is not a guarantee. He verbalized understanding and agreed to discuss with brother Catarino before coming to decision. Today, he reported that he could not feasibly take the pt back into his care, and that the pt would have to be discharged to [facility] for plan to request ECT at a different facility.     Spoke with Dr. Melo who had 3 meetings with the pt at the outpatient clinic prior to this admission. Her behavior during consistent medication compliance resembles her presentation at outpatient appointments (described by depressed mood, deteriorated independence, and requirement of significant encouragement to engage in activities). Amidst intermittent refusal of medications for the past few days, she regressed to the point of once again endorsing Cotard delusions and that she has caused the death of her eldest son. On one occasion, she conveyed realization and regret that these thoughts were not based in reality and possessed some insight that her abnormal thought processes are what induce these false beliefs.    Amber displays a variable pattern of functionality and mentation during the day, potentially indicating a delirium or sundowning of her primary dementia.     On evaluation today, patient appears with a brighter affect as compared to last week. She has demonstrated medication compliance. She has been performing activities (walking with a walker, eating, dressing, showering) on her own and taking medications consistently for the last week.

## 2022-01-11 NOTE — PROGRESS NOTE BEHAVIORAL HEALTH - NSBHFUPINTERVALHXFT_PSY_A_CORE
Pt was seen, evaluated, chart reviewed. As per nursing staff, no events overnight. On evaluation, pt reports she is "ok." Pt was able to eat some breakfast. Continues to have some diarrhea. Has been isolative to her room, however on 1:1.  Reports that she also wants to be around people and does not want to be alone in her room. Is compliant with medication, denies negative side effects. Endorsed fair sleep. Denied auditory/visual hallucinations. Denied suicidal/homicidal ideation, intent or plan.

## 2022-01-12 LAB — GLUCOSE BLDC GLUCOMTR-MCNC: 113 MG/DL — HIGH (ref 70–99)

## 2022-01-12 PROCEDURE — 99231 SBSQ HOSP IP/OBS SF/LOW 25: CPT

## 2022-01-12 RX ADMIN — Medication 81 MILLIGRAM(S): at 08:15

## 2022-01-12 RX ADMIN — Medication 125 MILLIGRAM(S): at 06:22

## 2022-01-12 RX ADMIN — Medication 500 MILLIGRAM(S): at 08:17

## 2022-01-12 RX ADMIN — Medication 125 MILLIGRAM(S): at 18:45

## 2022-01-12 RX ADMIN — LISINOPRIL 10 MILLIGRAM(S): 2.5 TABLET ORAL at 08:16

## 2022-01-12 RX ADMIN — Medication 125 MILLIGRAM(S): at 00:08

## 2022-01-12 RX ADMIN — AMLODIPINE BESYLATE 10 MILLIGRAM(S): 2.5 TABLET ORAL at 08:16

## 2022-01-12 RX ADMIN — Medication 1300 MILLIGRAM(S): at 08:15

## 2022-01-12 RX ADMIN — Medication 30 MILLILITER(S): at 02:42

## 2022-01-12 RX ADMIN — Medication 500 MILLIGRAM(S): at 20:53

## 2022-01-12 RX ADMIN — Medication 325 MILLIGRAM(S): at 08:15

## 2022-01-12 RX ADMIN — Medication 125 MILLIGRAM(S): at 12:38

## 2022-01-12 RX ADMIN — PANTOPRAZOLE SODIUM 40 MILLIGRAM(S): 20 TABLET, DELAYED RELEASE ORAL at 08:16

## 2022-01-12 NOTE — PROGRESS NOTE BEHAVIORAL HEALTH - NSBHFUPINTERVALHXFT_PSY_A_CORE
Pt was seen, evaluated, chart reviewed. As per nursing staff, pt did not sleep well all night as she had multiple episodes of diarrhea. On evaluation, pt was able to get some rest this morning. Reports feeling "so so." Continues to complain of diarrhea. Pt is on 1:1, encouraged to eat and drink fluids to prevent dehydration. Is compliant with medication, denies negative side effects. Denied auditory/visual hallucinations. Denied suicidal/homicidal ideation, intent or plan.

## 2022-01-12 NOTE — PROGRESS NOTE BEHAVIORAL HEALTH - DETAILS
increasing creatinine, hypercalcemia on low dose lithium
+ diarrhea
+ diarrhea
patient is overly general on interview, is asleep during reassessment attempt. Will reassess in AM

## 2022-01-12 NOTE — CHART NOTE - NSCHARTNOTEFT_GEN_A_CORE
Social Work Note:    Patient remains on a 1:1.  She continues to have diarrhea.  Patient has poor sleep.  She wants to be on the unit but feels like she has to go to the bathroom often.      Plan is now for placement to a skilled nursing facility.  When appropriate, GRECIA to be sent to all [facility] for review.  Patient’s son expressed preference for either [facility] or [facility].   reviewed nursing home placement process and encouraged son to accept alternate facility bed offers and then explore a transfer to his preferred facility in the future if his nursing home choice does not accept patient or has no available bed.     Mental Status Exam:    Mood – Neutral   Sleep – Good   Appetite – Fair   ADLs – Fair    Thought Process – Perseverative    Observation – 1:1     No barriers to discharge identified at this time.     At this time patient is not psychiatrically stable for discharge.

## 2022-01-12 NOTE — PROGRESS NOTE BEHAVIORAL HEALTH - SUMMARY
This is a 75 y/o female with PPH of bipolar depression (Bipolar II disorder), HTN, OA, T2DM, cognitive impairment without behavioral disturbance, brought to the ED at the behest of the [facility] she has been residing for the last 7 years for gradual deterioration in patient's ability to care for herself, in the context of recent psychotropic medication changes due to an episode of lithium toxicity in August of 2021.    Patient's current presentation is consistent with severe bipolar depression with psychotic features on underlying dementia with behavioral disturbance. Patient was stable on lithium for decades (though per son, she was never "normal" and always fluctuated between highs and lows).    During this admission patient has been tried on:  - Low dose lithium (d/c-ed due to renal injury, no response)  - Seroquel (up to 250 mg, d/c-ed due to worsening psychosis)  - Prozac (since November 8th) and Zyprexa (currently on Zydis) - some initial improvement, but now worsening  - Lamictal 25 mg qd (12/15 dc'ed as she has been inconsistent with it)     Overall Amber's response to medication treatment has been poor. She has had increasingly severe delusions of depressive affect. She has been intermittently uncooperative with staff, and occasionally is interactive with peers and amenable to engaging in activities. Her most significant treatment barrier is mixed compliance with medication, as she has declined her morning medications for several days during this hospitalization. Prozac discontinuation has been associated with less agitation, depressive affect not improved. Will c/w Zyprexa Zydis 10 mg at bedtime for bipolar depression, especially as IM version available if TOO is needed. Lamotrigine which was prescribed for Bipolar disorder was discontinued due to noncompliance. Was scheduled for TOO on 12/13 but was adjourned as pt was compliant with medication during the few days directly preceding her court date.    Spoke to patient's son Javier about their options for ECT which both sons believe will help their mother most as it has in the past. Advised that though her ECT referral has been sent out, it is unlikely that a different institution will accept her using this method given risk factors and long line. Advised that one option would be to have patient discharged under the care of her family and have them take her to an ED in a facility that has ECT and see whether the treatment team at that facility agrees about necessity of ECT, which is not a guarantee. He verbalized understanding and agreed to discuss with brother Catarino before coming to decision. Today, he reported that he could not feasibly take the pt back into his care, and that the pt would have to be discharged to [facility] for plan to request ECT at a different facility.     Spoke with Dr. Melo who had 3 meetings with the pt at the outpatient clinic prior to this admission. Her behavior during consistent medication compliance resembles her presentation at outpatient appointments (described by depressed mood, deteriorated independence, and requirement of significant encouragement to engage in activities). Amidst intermittent refusal of medications for the past few days, she regressed to the point of once again endorsing Cotard delusions and that she has caused the death of her eldest son. On one occasion, she conveyed realization and regret that these thoughts were not based in reality and possessed some insight that her abnormal thought processes are what induce these false beliefs.    Amber displays a variable pattern of functionality and mentation during the day, potentially indicating a delirium or sundowning of her primary dementia.     On evaluation today, patient appears with a brighter affect as compared to last week. She has demonstrated medication compliance. She has been performing activities (walking with a walker, eating, dressing, showering) on her own and taking medications consistently for the last week.

## 2022-01-13 LAB
ANION GAP SERPL CALC-SCNC: 13 MMOL/L — SIGNIFICANT CHANGE UP (ref 7–14)
BUN SERPL-MCNC: 30 MG/DL — HIGH (ref 10–20)
CALCIUM SERPL-MCNC: 9.2 MG/DL — SIGNIFICANT CHANGE UP (ref 8.5–10.1)
CHLORIDE SERPL-SCNC: 102 MMOL/L — SIGNIFICANT CHANGE UP (ref 98–110)
CO2 SERPL-SCNC: 24 MMOL/L — SIGNIFICANT CHANGE UP (ref 17–32)
CREAT SERPL-MCNC: 1.4 MG/DL — SIGNIFICANT CHANGE UP (ref 0.7–1.5)
GLUCOSE BLDC GLUCOMTR-MCNC: 101 MG/DL — HIGH (ref 70–99)
GLUCOSE BLDC GLUCOMTR-MCNC: 91 MG/DL — SIGNIFICANT CHANGE UP (ref 70–99)
GLUCOSE SERPL-MCNC: 117 MG/DL — HIGH (ref 70–99)
MAGNESIUM SERPL-MCNC: 1.8 MG/DL — SIGNIFICANT CHANGE UP (ref 1.8–2.4)
POTASSIUM SERPL-MCNC: 3.8 MMOL/L — SIGNIFICANT CHANGE UP (ref 3.5–5)
POTASSIUM SERPL-SCNC: 3.8 MMOL/L — SIGNIFICANT CHANGE UP (ref 3.5–5)
SARS-COV-2 RNA SPEC QL NAA+PROBE: SIGNIFICANT CHANGE UP
SODIUM SERPL-SCNC: 139 MMOL/L — SIGNIFICANT CHANGE UP (ref 135–146)

## 2022-01-13 PROCEDURE — 99231 SBSQ HOSP IP/OBS SF/LOW 25: CPT

## 2022-01-13 RX ADMIN — Medication 81 MILLIGRAM(S): at 08:16

## 2022-01-13 RX ADMIN — ATORVASTATIN CALCIUM 40 MILLIGRAM(S): 80 TABLET, FILM COATED ORAL at 20:35

## 2022-01-13 RX ADMIN — OLANZAPINE 10 MILLIGRAM(S): 15 TABLET, FILM COATED ORAL at 20:35

## 2022-01-13 RX ADMIN — Medication 125 MILLIGRAM(S): at 12:10

## 2022-01-13 RX ADMIN — Medication 125 MILLIGRAM(S): at 07:01

## 2022-01-13 RX ADMIN — Medication 125 MILLIGRAM(S): at 00:32

## 2022-01-13 RX ADMIN — Medication 125 MILLIGRAM(S): at 23:40

## 2022-01-13 RX ADMIN — Medication 325 MILLIGRAM(S): at 08:16

## 2022-01-13 RX ADMIN — AMLODIPINE BESYLATE 10 MILLIGRAM(S): 2.5 TABLET ORAL at 08:15

## 2022-01-13 RX ADMIN — Medication 125 MILLIGRAM(S): at 18:45

## 2022-01-13 RX ADMIN — Medication 1300 MILLIGRAM(S): at 20:35

## 2022-01-13 RX ADMIN — LISINOPRIL 10 MILLIGRAM(S): 2.5 TABLET ORAL at 08:16

## 2022-01-13 RX ADMIN — Medication 1 SUPPOSITORY(S): at 14:57

## 2022-01-13 RX ADMIN — Medication 30 MILLILITER(S): at 08:15

## 2022-01-13 RX ADMIN — PANTOPRAZOLE SODIUM 40 MILLIGRAM(S): 20 TABLET, DELAYED RELEASE ORAL at 08:16

## 2022-01-13 RX ADMIN — Medication 1300 MILLIGRAM(S): at 08:16

## 2022-01-13 RX ADMIN — Medication 500 MILLIGRAM(S): at 20:35

## 2022-01-13 NOTE — CHART NOTE - NSCHARTNOTEFT_GEN_A_CORE
01-13    139  |  102  |  30<H>  ----------------------------<  117<H>  3.8   |  24  |  1.4    Ca    9.2      13 Jan 2022 12:14  Mg     1.8     01-13    POCT Blood Glucose.: 101 mg/dL (13 Jan 2022 10:43)  POCT Blood Glucose.: 91 mg/dL (13 Jan 2022 06:41)      - kidney function improved   - electrolytes in normal range   - c/w medical treatment for c.diff  - hydrate PO

## 2022-01-13 NOTE — PROGRESS NOTE BEHAVIORAL HEALTH - NSBHFUPINTERVALHXFT_PSY_A_CORE
Pt was seen, evaluated, chart reviewed. As per nursing staff, no events overnight. Pt continues to have severe diarrhea, especially at night.     On evaluation, pt reports she is feeling "better" today. States she had a lot of diarrhea yesterday. Pt has been encouraged to eat and drink, she has been consuming food/water. Is compliant with medication, denies negative side effects. She had poor sleep last night due to diarrhea. Denied auditory/visual hallucinations.  Denied suicidal/homicidal ideation, intent or plan.

## 2022-01-13 NOTE — PROGRESS NOTE BEHAVIORAL HEALTH - SUMMARY
This is a 77 y/o female with PPH of bipolar depression (Bipolar II disorder), HTN, OA, T2DM, cognitive impairment without behavioral disturbance, brought to the ED at the behest of the [facility] she has been residing for the last 7 years for gradual deterioration in patient's ability to care for herself, in the context of recent psychotropic medication changes due to an episode of lithium toxicity in August of 2021.    Patient's current presentation is consistent with severe bipolar depression with psychotic features on underlying dementia with behavioral disturbance. Patient was stable on lithium for decades (though per son, she was never "normal" and always fluctuated between highs and lows).    During this admission patient has been tried on:  - Low dose lithium (d/c-ed due to renal injury, no response)  - Seroquel (up to 250 mg, d/c-ed due to worsening psychosis)  - Prozac (since November 8th) and Zyprexa (currently on Zydis) - some initial improvement, but now worsening  - Lamictal 25 mg qd (12/15 dc'ed as she has been inconsistent with it)     Overall Amber's response to medication treatment has been poor. She has had increasingly severe delusions of depressive affect. She has been intermittently uncooperative with staff, and occasionally is interactive with peers and amenable to engaging in activities. Her most significant treatment barrier is mixed compliance with medication, as she has declined her morning medications for several days during this hospitalization. Prozac discontinuation has been associated with less agitation, depressive affect not improved. Will c/w Zyprexa Zydis 10 mg at bedtime for bipolar depression, especially as IM version available if TOO is needed. Lamotrigine which was prescribed for Bipolar disorder was discontinued due to noncompliance. Was scheduled for TOO on 12/13 but was adjourned as pt was compliant with medication during the few days directly preceding her court date.    Spoke to patient's son Javier about their options for ECT which both sons believe will help their mother most as it has in the past. Advised that though her ECT referral has been sent out, it is unlikely that a different institution will accept her using this method given risk factors and long line. Advised that one option would be to have patient discharged under the care of her family and have them take her to an ED in a facility that has ECT and see whether the treatment team at that facility agrees about necessity of ECT, which is not a guarantee. He verbalized understanding and agreed to discuss with brother Catarino before coming to decision. Today, he reported that he could not feasibly take the pt back into his care, and that the pt would have to be discharged to [facility] for plan to request ECT at a different facility.     Spoke with Dr. Melo who had 3 meetings with the pt at the outpatient clinic prior to this admission. Her behavior during consistent medication compliance resembles her presentation at outpatient appointments (described by depressed mood, deteriorated independence, and requirement of significant encouragement to engage in activities). Amidst intermittent refusal of medications for the past few days, she regressed to the point of once again endorsing Cotard delusions and that she has caused the death of her eldest son. On one occasion, she conveyed realization and regret that these thoughts were not based in reality and possessed some insight that her abnormal thought processes are what induce these false beliefs.    Amber displays a variable pattern of functionality and mentation during the day, potentially indicating a delirium or sundowning of her primary dementia.     On evaluation today, patient appears with a brighter affect as compared to last week. She has demonstrated medication compliance. She has been performing activities (walking with a walker, eating, dressing, showering) on her own and taking medications consistently for the last week.

## 2022-01-14 LAB
C DIFF BY PCR RESULT: POSITIVE
C DIFF TOX GENS STL QL NAA+PROBE: SIGNIFICANT CHANGE UP
GLUCOSE BLDC GLUCOMTR-MCNC: 121 MG/DL — HIGH (ref 70–99)
GLUCOSE BLDC GLUCOMTR-MCNC: 94 MG/DL — SIGNIFICANT CHANGE UP (ref 70–99)

## 2022-01-14 PROCEDURE — 99231 SBSQ HOSP IP/OBS SF/LOW 25: CPT

## 2022-01-14 RX ADMIN — Medication 1300 MILLIGRAM(S): at 20:56

## 2022-01-14 RX ADMIN — Medication 81 MILLIGRAM(S): at 08:30

## 2022-01-14 RX ADMIN — Medication 125 MILLIGRAM(S): at 23:06

## 2022-01-14 RX ADMIN — Medication 650 MILLIGRAM(S): at 22:18

## 2022-01-14 RX ADMIN — LISINOPRIL 10 MILLIGRAM(S): 2.5 TABLET ORAL at 08:30

## 2022-01-14 RX ADMIN — Medication 125 MILLIGRAM(S): at 05:59

## 2022-01-14 RX ADMIN — PANTOPRAZOLE SODIUM 40 MILLIGRAM(S): 20 TABLET, DELAYED RELEASE ORAL at 08:35

## 2022-01-14 RX ADMIN — Medication 125 MILLIGRAM(S): at 12:22

## 2022-01-14 RX ADMIN — Medication 1300 MILLIGRAM(S): at 08:30

## 2022-01-14 RX ADMIN — Medication 125 MILLIGRAM(S): at 18:03

## 2022-01-14 RX ADMIN — OLANZAPINE 10 MILLIGRAM(S): 15 TABLET, FILM COATED ORAL at 20:56

## 2022-01-14 RX ADMIN — Medication 325 MILLIGRAM(S): at 08:30

## 2022-01-14 RX ADMIN — Medication 500 MILLIGRAM(S): at 08:35

## 2022-01-14 RX ADMIN — Medication 650 MILLIGRAM(S): at 22:49

## 2022-01-14 RX ADMIN — AMLODIPINE BESYLATE 10 MILLIGRAM(S): 2.5 TABLET ORAL at 08:30

## 2022-01-14 RX ADMIN — ATORVASTATIN CALCIUM 40 MILLIGRAM(S): 80 TABLET, FILM COATED ORAL at 20:56

## 2022-01-14 RX ADMIN — Medication 500 MILLIGRAM(S): at 20:57

## 2022-01-14 NOTE — CHART NOTE - NSCHARTNOTEFT_GEN_A_CORE
Social Work Note:    Patient remains on a 1:1 for assistance with ADLs.  She now has less diarrhea.  Sleep is fair. Appetite is improving.  On interview patient is pleasant and engages appropriately.       Plan is now for placement to a skilled nursing facility.  When appropriate, GRECIA to be sent to all [facility] for review.  Patient’s son expressed preference for either [facility] or [facility].   reviewed nursing home placement process and encouraged son to accept alternate facility bed offers and then explore a transfer to his preferred facility in the future if his nursing home choice does not accept patient or has no available bed.     Attempted to reach patient’s son Catarino earlier this morning [phone].    Mental Status Exam:    Mood – Neutral   Sleep – Good   Appetite – Fair   ADLs – Fair    Thought Process – Perseverative    Observation – 1:1     No barriers to discharge identified at this time.     At this time patient is not psychiatrically stable for discharge.

## 2022-01-14 NOTE — PROGRESS NOTE BEHAVIORAL HEALTH - NSBHFUPINTERVALHXFT_PSY_A_CORE
Pt was seen, evaluated, chart reviewed. As per nursing staff, no events overnight. Pt's diarrhea has been improving.     On evaluation, pt reports she is "ok." Is compliant with medication, denies negative side effects. Endorsed that she is trying to eat and drink. Slept ok at night. No new complaints. Denied auditory/visual hallucinations. Denied suicidal/homicidal ideation, intent or plan.

## 2022-01-14 NOTE — PROGRESS NOTE BEHAVIORAL HEALTH - SUMMARY
This is a 77 y/o female with PPH of bipolar depression (Bipolar II disorder), HTN, OA, T2DM, cognitive impairment without behavioral disturbance, brought to the ED at the behest of the [facility] she has been residing for the last 7 years for gradual deterioration in patient's ability to care for herself, in the context of recent psychotropic medication changes due to an episode of lithium toxicity in August of 2021.    Patient's current presentation is consistent with severe bipolar depression with psychotic features on underlying dementia with behavioral disturbance. Patient was stable on lithium for decades (though per son, she was never "normal" and always fluctuated between highs and lows).    During this admission patient has been tried on:  - Low dose lithium (d/c-ed due to renal injury, no response)  - Seroquel (up to 250 mg, d/c-ed due to worsening psychosis)  - Prozac (since November 8th) and Zyprexa (currently on Zydis) - some initial improvement, but now worsening  - Lamictal 25 mg qd (12/15 dc'ed as she has been inconsistent with it)     Overall Amber's response to medication treatment has been poor. She has had increasingly severe delusions of depressive affect. She has been intermittently uncooperative with staff, and occasionally is interactive with peers and amenable to engaging in activities. Her most significant treatment barrier is mixed compliance with medication, as she has declined her morning medications for several days during this hospitalization. Prozac discontinuation has been associated with less agitation, depressive affect not improved. Will c/w Zyprexa Zydis 10 mg at bedtime for bipolar depression, especially as IM version available if TOO is needed. Lamotrigine which was prescribed for Bipolar disorder was discontinued due to noncompliance. Was scheduled for TOO on 12/13 but was adjourned as pt was compliant with medication during the few days directly preceding her court date.    Spoke to patient's son Javier about their options for ECT which both sons believe will help their mother most as it has in the past. Advised that though her ECT referral has been sent out, it is unlikely that a different institution will accept her using this method given risk factors and long line. Advised that one option would be to have patient discharged under the care of her family and have them take her to an ED in a facility that has ECT and see whether the treatment team at that facility agrees about necessity of ECT, which is not a guarantee. He verbalized understanding and agreed to discuss with brother Catarino before coming to decision. Today, he reported that he could not feasibly take the pt back into his care, and that the pt would have to be discharged to [facility] for plan to request ECT at a different facility.     Spoke with Dr. Melo who had 3 meetings with the pt at the outpatient clinic prior to this admission. Her behavior during consistent medication compliance resembles her presentation at outpatient appointments (described by depressed mood, deteriorated independence, and requirement of significant encouragement to engage in activities). Amidst intermittent refusal of medications for the past few days, she regressed to the point of once again endorsing Cotard delusions and that she has caused the death of her eldest son. On one occasion, she conveyed realization and regret that these thoughts were not based in reality and possessed some insight that her abnormal thought processes are what induce these false beliefs.    Amber displays a variable pattern of functionality and mentation during the day, potentially indicating a delirium or sundowning of her primary dementia.     On evaluation today, patient appears with a brighter affect as compared to last week. She has demonstrated medication compliance. She has been performing activities (walking with a walker, eating, dressing, showering) on her own and taking medications consistently for the last week.

## 2022-01-15 LAB — GLUCOSE BLDC GLUCOMTR-MCNC: 110 MG/DL — HIGH (ref 70–99)

## 2022-01-15 RX ADMIN — Medication 125 MILLIGRAM(S): at 05:46

## 2022-01-15 RX ADMIN — Medication 125 MILLIGRAM(S): at 16:19

## 2022-01-15 RX ADMIN — Medication 81 MILLIGRAM(S): at 09:25

## 2022-01-15 RX ADMIN — Medication 325 MILLIGRAM(S): at 09:25

## 2022-01-15 RX ADMIN — Medication 1300 MILLIGRAM(S): at 09:26

## 2022-01-15 RX ADMIN — OLANZAPINE 10 MILLIGRAM(S): 15 TABLET, FILM COATED ORAL at 20:36

## 2022-01-15 RX ADMIN — Medication 125 MILLIGRAM(S): at 20:34

## 2022-01-15 RX ADMIN — Medication 500 MILLIGRAM(S): at 09:29

## 2022-01-15 RX ADMIN — Medication 1300 MILLIGRAM(S): at 20:36

## 2022-01-15 RX ADMIN — LISINOPRIL 10 MILLIGRAM(S): 2.5 TABLET ORAL at 09:25

## 2022-01-15 RX ADMIN — Medication 500 MILLIGRAM(S): at 20:36

## 2022-01-15 RX ADMIN — PANTOPRAZOLE SODIUM 40 MILLIGRAM(S): 20 TABLET, DELAYED RELEASE ORAL at 09:25

## 2022-01-15 RX ADMIN — ATORVASTATIN CALCIUM 40 MILLIGRAM(S): 80 TABLET, FILM COATED ORAL at 20:36

## 2022-01-15 RX ADMIN — AMLODIPINE BESYLATE 10 MILLIGRAM(S): 2.5 TABLET ORAL at 09:25

## 2022-01-15 NOTE — PROGRESS NOTE BEHAVIORAL HEALTH - SUMMARY
This is a 75 y/o female with PPH of bipolar depression (Bipolar II disorder), HTN, OA, T2DM, cognitive impairment without behavioral disturbance, brought to the ED at the behest of the [facility] she has been residing for the last 7 years for gradual deterioration in patient's ability to care for herself, in the context of recent psychotropic medication changes due to an episode of lithium toxicity in August of 2021.    Patient's current presentation is consistent with severe bipolar depression with psychotic features on underlying dementia with behavioral disturbance. Patient was stable on lithium for decades (though per son, she was never "normal" and always fluctuated between highs and lows).    During this admission patient has been tried on:  - Low dose lithium (d/c-ed due to renal injury, no response)  - Seroquel (up to 250 mg, d/c-ed due to worsening psychosis)  - Prozac (since November 8th) and Zyprexa (currently on Zydis) - some initial improvement, but now worsening  - Lamictal 25 mg qd (12/15 dc'ed as she has been inconsistent with it)     Overall Amber's response to medication treatment has been poor. She has had increasingly severe delusions of depressive affect. She has been intermittently uncooperative with staff, and occasionally is interactive with peers and amenable to engaging in activities. Her most significant treatment barrier is mixed compliance with medication, as she has declined her morning medications for several days during this hospitalization. Prozac discontinuation has been associated with less agitation, depressive affect not improved. Will c/w Zyprexa Zydis 10 mg at bedtime for bipolar depression, especially as IM version available if TOO is needed. Lamotrigine which was prescribed for Bipolar disorder was discontinued due to noncompliance. Was scheduled for TOO on 12/13 but was adjourned as pt was compliant with medication during the few days directly preceding her court date.    Spoke to patient's son Javier about their options for ECT which both sons believe will help their mother most as it has in the past. Advised that though her ECT referral has been sent out, it is unlikely that a different institution will accept her using this method given risk factors and long line. Advised that one option would be to have patient discharged under the care of her family and have them take her to an ED in a facility that has ECT and see whether the treatment team at that facility agrees about necessity of ECT, which is not a guarantee. He verbalized understanding and agreed to discuss with brother Catarino before coming to decision. Today, he reported that he could not feasibly take the pt back into his care, and that the pt would have to be discharged to [facility] for plan to request ECT at a different facility.     Spoke with Dr. Melo who had 3 meetings with the pt at the outpatient clinic prior to this admission. Her behavior during consistent medication compliance resembles her presentation at outpatient appointments (described by depressed mood, deteriorated independence, and requirement of significant encouragement to engage in activities). Amidst intermittent refusal of medications for the past few days, she regressed to the point of once again endorsing Cotard delusions and that she has caused the death of her eldest son. On one occasion, she conveyed realization and regret that these thoughts were not based in reality and possessed some insight that her abnormal thought processes are what induce these false beliefs.    Amber displays a variable pattern of functionality and mentation during the day, potentially indicating a delirium or sundowning of her primary dementia.     On evaluation today, patient endorses some physical discomfort 2/2 C. Dif, but reports improved mood denying SI/HI/AVH and has been compliant with treatment.

## 2022-01-15 NOTE — PROGRESS NOTE BEHAVIORAL HEALTH - NSBHFUPINTERVALHXFT_PSY_A_CORE
Pt seen and examined. Chart reviewed.  As per nursing staff, no events overnight. Pt has not had any diarrhea today.     Pt reports feeling "uncomfortable" stating that she had some stomach discomfort but no diarrhea today. She is compliant with medication, denies negative side effects. Reports sleeping well last night. No new complaints. Denied auditory/visual hallucinations. Denied suicidal/homicidal ideation, intent or plan.

## 2022-01-15 NOTE — PROGRESS NOTE BEHAVIORAL HEALTH - RISK ASSESSMENT
Pt remains paranoid and delusional, requiring a lot of support and encouragement to take her medications for her medical conditions. Pt displays impaired judgement and ability to take care of herself. She needs continued IPP treatment  targeting paranoia and psychotic symptoms.
Risk factors: renal disease, cognitive impairment at baseline, current mood episode, persecutory delusions, treatment noncompliance  Protective factors: supportive family, residential stability, medication supervision.
unable to care for self
Risk factors: renal disease, cognitive impairment at baseline, current mood episode, persecutory delusions, treatment noncompliance  Protective factors: supportive family, residential stability, medication supervision.
Risk factors: renal disease, cognitive impairment at baseline, current mood episode, persecutory delusions, treatment noncompliance  Protective factors: supportive family, residential stability, medication supervision.
Risk factors: possible renal disease, cognitive impairment at baseline, current mood episode, potential fall risk  Protective factors: supportive family, residential stability, medication supervision/compliance, good behavioral control
Risk factors: renal disease, cognitive impairment at baseline, current mood episode, persecutory delusions, treatment noncompliance  Protective factors: supportive family, residential stability, medication supervision.
Risk factors: possible renal disease, cognitive impairment at baseline, current mood episode, potential fall risk  Protective factors: supportive family, residential stability, medication supervision/compliance, good behavioral control
Risk factors: renal disease, cognitive impairment at baseline, current mood episode, persecutory delusions, treatment noncompliance  Protective factors: supportive family, residential stability, medication supervision.
Risk factors: possible renal disease, cognitive impairment at baseline, current mood episode, potential fall risk  Protective factors: supportive family, residential stability, medication supervision/compliance, good behavioral control
Risk factors: renal disease, cognitive impairment at baseline, current mood episode, persecutory delusions, treatment noncompliance  Protective factors: supportive family, residential stability, medication supervision
Risk factors: renal disease, cognitive impairment at baseline, current mood episode, persecutory delusions, treatment noncompliance  Protective factors: supportive family, residential stability, medication supervision
Risk factors: renal disease, cognitive impairment at baseline, current mood episode, persecutory delusions, treatment noncompliance  Protective factors: supportive family, residential stability, medication supervision.
unable to care for self
Risk factors: renal disease, cognitive impairment at baseline, current mood episode, persecutory delusions, treatment noncompliance  Protective factors: supportive family, residential stability, medication supervision.
Risk factors: possible renal disease, cognitive impairment at baseline, current mood episode, potential fall risk  Protective factors: supportive family, residential stability, medication supervision/compliance, good behavioral control
Risk factors: renal disease, cognitive impairment at baseline, current mood episode, potential fall risk  Protective factors: supportive family, residential stability, medication supervision/compliance, fair behavioral control
Risk factors: renal disease, cognitive impairment at baseline, current mood episode, persecutory delusions, treatment noncompliance  Protective factors: supportive family, residential stability, medication supervision
Risk factors: renal disease, cognitive impairment at baseline, current mood episode, persecutory delusions, treatment noncompliance  Protective factors: supportive family, residential stability, medication supervision.
Risk factors: renal disease, cognitive impairment at baseline, current mood episode, potential fall risk  Protective factors: supportive family, residential stability, medication supervision/compliance, good behavioral control
Risk factors: renal disease, cognitive impairment at baseline, current mood episode, persecutory delusions, treatment noncompliance  Protective factors: supportive family, residential stability, medication supervision
Risk factors: possible renal disease, cognitive impairment at baseline, current mood episode, potential fall risk  Protective factors: supportive family, residential stability, medication supervision/compliance, good behavioral control
Risk factors: renal disease, cognitive impairment at baseline, current mood episode, persecutory delusions, treatment noncompliance  Protective factors: supportive family, residential stability, medication supervision
Risk factors: possible renal disease, cognitive impairment at baseline, current mood episode, potential fall risk  Protective factors: supportive family, residential stability, medication supervision/compliance, good behavioral control
Risk factors: renal disease, cognitive impairment at baseline, current mood episode, persecutory delusions, treatment noncompliance  Protective factors: supportive family, residential stability, medication supervision
Risk factors: renal disease, cognitive impairment at baseline, current mood episode, persecutory delusions, treatment noncompliance  Protective factors: supportive family, residential stability, medication supervision
Risk factors: renal disease, cognitive impairment at baseline, current mood episode, persecutory delusions, treatment noncompliance  Protective factors: supportive family, residential stability, medication supervision.
Risk factors: renal disease, cognitive impairment at baseline, current mood episode, persecutory delusions, treatment noncompliance  Protective factors: supportive family, residential stability, medication supervision
Risk factors: renal disease, cognitive impairment at baseline, current mood episode, persecutory delusions, treatment noncompliance  Protective factors: supportive family, residential stability, medication supervision.
Risk factors: renal disease, cognitive impairment at baseline, current mood episode, persecutory delusions, treatment noncompliance  Protective factors: supportive family, residential stability, medication supervision
Risk factors: renal disease, cognitive impairment at baseline, current mood episode, persecutory delusions, treatment noncompliance  Protective factors: supportive family, residential stability, medication supervision.
Risk factors: possible renal disease, cognitive impairment at baseline, current mood episode, potential fall risk  Protective factors: supportive family, residential stability, medication supervision/compliance, good behavioral control
Risk factors: renal disease, cognitive impairment at baseline, current mood episode, persecutory delusions, treatment noncompliance  Protective factors: supportive family, residential stability, medication supervision.
Risk factors: renal disease, cognitive impairment at baseline, current mood episode, persecutory delusions, treatment noncompliance  Protective factors: supportive family, residential stability, medication supervision.
Risk factors: possible renal disease, cognitive impairment at baseline, current mood episode, potential fall risk  Protective factors: supportive family, residential stability, medication supervision/compliance, good behavioral control
Risk factors: renal disease, cognitive impairment at baseline, current mood episode, potential fall risk  Protective factors: supportive family, residential stability, medication supervision/compliance, fair behavioral control
Risk factors: renal disease, cognitive impairment at baseline, current mood episode, persecutory delusions, treatment noncompliance  Protective factors: supportive family, residential stability, medication supervision
Risk factors: renal disease, cognitive impairment at baseline, current mood episode, potential fall risk  Protective factors: supportive family, residential stability, medication supervision/compliance, good behavioral control

## 2022-01-16 LAB — GLUCOSE BLDC GLUCOMTR-MCNC: 96 MG/DL — SIGNIFICANT CHANGE UP (ref 70–99)

## 2022-01-16 RX ADMIN — Medication 125 MILLIGRAM(S): at 11:32

## 2022-01-16 RX ADMIN — PANTOPRAZOLE SODIUM 40 MILLIGRAM(S): 20 TABLET, DELAYED RELEASE ORAL at 08:20

## 2022-01-16 RX ADMIN — Medication 125 MILLIGRAM(S): at 10:19

## 2022-01-16 RX ADMIN — AMLODIPINE BESYLATE 10 MILLIGRAM(S): 2.5 TABLET ORAL at 08:19

## 2022-01-16 RX ADMIN — Medication 1300 MILLIGRAM(S): at 20:10

## 2022-01-16 RX ADMIN — Medication 125 MILLIGRAM(S): at 14:30

## 2022-01-16 RX ADMIN — ATORVASTATIN CALCIUM 40 MILLIGRAM(S): 80 TABLET, FILM COATED ORAL at 20:10

## 2022-01-16 RX ADMIN — OLANZAPINE 10 MILLIGRAM(S): 15 TABLET, FILM COATED ORAL at 20:11

## 2022-01-16 RX ADMIN — Medication 500 MILLIGRAM(S): at 08:21

## 2022-01-16 RX ADMIN — LISINOPRIL 10 MILLIGRAM(S): 2.5 TABLET ORAL at 08:20

## 2022-01-16 RX ADMIN — Medication 325 MILLIGRAM(S): at 08:19

## 2022-01-16 RX ADMIN — Medication 500 MILLIGRAM(S): at 20:10

## 2022-01-16 RX ADMIN — HEPARIN SODIUM 5000 UNIT(S): 5000 INJECTION INTRAVENOUS; SUBCUTANEOUS at 20:10

## 2022-01-16 RX ADMIN — Medication 1300 MILLIGRAM(S): at 08:20

## 2022-01-16 RX ADMIN — Medication 81 MILLIGRAM(S): at 08:21

## 2022-01-17 PROCEDURE — 99232 SBSQ HOSP IP/OBS MODERATE 35: CPT

## 2022-01-17 RX ORDER — CARBAMIDE PEROXIDE 81.86 MG/ML
2 SOLUTION/ DROPS AURICULAR (OTIC)
Refills: 0 | Status: COMPLETED | OUTPATIENT
Start: 2022-01-17 | End: 2022-01-20

## 2022-01-17 RX ORDER — FLUTICASONE PROPIONATE 50 MCG
1 SPRAY, SUSPENSION NASAL
Refills: 0 | Status: DISCONTINUED | OUTPATIENT
Start: 2022-01-17 | End: 2022-01-27

## 2022-01-17 RX ORDER — CARBAMIDE PEROXIDE 81.86 MG/ML
2 SOLUTION/ DROPS AURICULAR (OTIC)
Refills: 0 | Status: DISCONTINUED | OUTPATIENT
Start: 2022-01-17 | End: 2022-01-17

## 2022-01-17 RX ADMIN — OLANZAPINE 10 MILLIGRAM(S): 15 TABLET, FILM COATED ORAL at 20:08

## 2022-01-17 RX ADMIN — HEPARIN SODIUM 5000 UNIT(S): 5000 INJECTION INTRAVENOUS; SUBCUTANEOUS at 09:44

## 2022-01-17 RX ADMIN — PANTOPRAZOLE SODIUM 40 MILLIGRAM(S): 20 TABLET, DELAYED RELEASE ORAL at 09:43

## 2022-01-17 RX ADMIN — Medication 325 MILLIGRAM(S): at 09:44

## 2022-01-17 RX ADMIN — Medication 1 SUPPOSITORY(S): at 20:10

## 2022-01-17 RX ADMIN — Medication 1300 MILLIGRAM(S): at 09:43

## 2022-01-17 RX ADMIN — ATORVASTATIN CALCIUM 40 MILLIGRAM(S): 80 TABLET, FILM COATED ORAL at 20:08

## 2022-01-17 RX ADMIN — AMLODIPINE BESYLATE 10 MILLIGRAM(S): 2.5 TABLET ORAL at 09:44

## 2022-01-17 RX ADMIN — Medication 1 SPRAY(S): at 20:09

## 2022-01-17 RX ADMIN — Medication 125 MILLIGRAM(S): at 18:11

## 2022-01-17 RX ADMIN — Medication 125 MILLIGRAM(S): at 13:39

## 2022-01-17 RX ADMIN — Medication 500 MILLIGRAM(S): at 09:43

## 2022-01-17 RX ADMIN — HEPARIN SODIUM 5000 UNIT(S): 5000 INJECTION INTRAVENOUS; SUBCUTANEOUS at 20:10

## 2022-01-17 RX ADMIN — LISINOPRIL 10 MILLIGRAM(S): 2.5 TABLET ORAL at 09:43

## 2022-01-17 RX ADMIN — CARBAMIDE PEROXIDE 2 DROP(S): 81.86 SOLUTION/ DROPS AURICULAR (OTIC) at 20:09

## 2022-01-17 RX ADMIN — Medication 1300 MILLIGRAM(S): at 20:08

## 2022-01-17 RX ADMIN — Medication 125 MILLIGRAM(S): at 06:12

## 2022-01-17 RX ADMIN — Medication 500 MILLIGRAM(S): at 20:10

## 2022-01-17 RX ADMIN — Medication 81 MILLIGRAM(S): at 09:44

## 2022-01-17 RX ADMIN — Medication 1 SUPPOSITORY(S): at 09:43

## 2022-01-17 NOTE — PROGRESS NOTE BEHAVIORAL HEALTH - SUMMARY
This is a 77 y/o female with PPH of bipolar depression (Bipolar II disorder), HTN, OA, T2DM, cognitive impairment without behavioral disturbance, [facility] last 7 years for gradual deterioration in patient's ability to care for herself, in the context of recent psychotropic medication changes due to an episode of lithium toxicity in August of 2021.    Patient's current presentation is consistent with severe bipolar depression with psychotic features on underlying dementia with behavioral disturbance. Patient was stable on lithium for decades (though per son, she was never "normal" and always fluctuated between highs and lows).    During this admission patient has been tried on:  - Low dose lithium (d/c-ed due to renal injury, no response)  - Seroquel (up to 250 mg, d/c-ed due to worsening psychosis)  - Prozac (since November 8th) and Zyprexa (currently on Zydis) - some initial improvement, but now worsening  - Lamictal 25 mg qd (12/15 dc'ed as she has been inconsistent with it)     Overall Amber's response to medication treatment has been poor. She has had increasingly severe delusions of depressive affect. She has been intermittently uncooperative with staff, and occasionally is interactive with peers and amenable to engaging in activities. Her most significant treatment barrier is mixed compliance with medication, as she has declined her morning medications for several days during this hospitalization. Prozac discontinuation has been associated with less agitation, depressive affect not improved. Will c/w Zyprexa Zydis 10 mg at bedtime for bipolar depression, especially as IM version available if TOO is needed. Lamotrigine which was prescribed for Bipolar disorder was discontinued due to noncompliance. Was scheduled for TOO on 12/13 but was adjourned as pt was compliant with medication during the few days directly preceding her court date.    Spoke to patient's son Javier about their options for ECT which both sons believe will help their mother most as it has in the past. Advised that though her ECT referral has been sent out, it is unlikely that a different institution will accept her using this method given risk factors and long line. Advised that one option would be to have patient discharged under the care of her family and have them take her to an ED in a facility that has ECT and see whether the treatment team at that facility agrees about necessity of ECT, which is not a guarantee. He verbalized understanding and agreed to discuss with brother Catarino before coming to decision. Today, he reported that he could not feasibly take the pt back into his care, and that the pt would have to be discharged to [facility] for plan to request ECT at a different facility.     Spoke with Dr. Melo who had 3 meetings with the pt at the outpatient clinic prior to this admission. Her behavior during consistent medication compliance resembles her presentation at outpatient appointments (described by depressed mood, deteriorated independence, and requirement of significant encouragement to engage in activities). Amidst intermittent refusal of medications for the past few days, she regressed to the point of once again endorsing Cotard delusions and that she has caused the death of her eldest son. On one occasion, she conveyed realization and regret that these thoughts were not based in reality and possessed some insight that her abnormal thought processes are what induce these false beliefs.    Amber displays a variable pattern of functionality and mentation during the day, potentially indicating a delirium or sundowning of her primary dementia.

## 2022-01-17 NOTE — CHART NOTE - NSCHARTNOTEFT_GEN_A_CORE
pt c/o congestion   no fever/ chills / sob     Vital Signs Last 24 Hrs  T(C): 35.6 (17 Jan 2022 06:14), Max: 35.6 (17 Jan 2022 06:14)  T(F): 96.1 (17 Jan 2022 06:14), Max: 96.1 (17 Jan 2022 06:14)  HR: 61 (17 Jan 2022 09:38) (43 - 61)  BP: 170/78 (17 Jan 2022 09:38) (152/65 - 170/78)  RR: 18 (17 Jan 2022 06:14) (18 - 18)    PE:  heent: b/l cerumen impaction , nares with turbinate erythema   chest: cta b/l  cardio: s1s2 heard  abd: bs+ ntnd    a/p   nasal congestion   cerumen impaction b/l    - start flonase   - debrox   - needs outpt. ENT for wax removal

## 2022-01-17 NOTE — PROGRESS NOTE BEHAVIORAL HEALTH - NSBHFUPINTERVALHXFT_PSY_A_CORE
Pt seen and examined. Chart reviewed.  As per nursing staff, no events overnight. Pt has not had any diarrhea today. pt reports feeling ok. has no complained when approached. pt is on constant observation.  She is compliant with medication, denies negative side effects. Reports sleeping well at night. No new complaints. Denied auditory/visual hallucinations. Denied suicidal/homicidal ideation, intent or plan.

## 2022-01-18 LAB — GLUCOSE BLDC GLUCOMTR-MCNC: 158 MG/DL — HIGH (ref 70–99)

## 2022-01-18 PROCEDURE — 99232 SBSQ HOSP IP/OBS MODERATE 35: CPT

## 2022-01-18 RX ADMIN — HEPARIN SODIUM 5000 UNIT(S): 5000 INJECTION INTRAVENOUS; SUBCUTANEOUS at 20:14

## 2022-01-18 RX ADMIN — PANTOPRAZOLE SODIUM 40 MILLIGRAM(S): 20 TABLET, DELAYED RELEASE ORAL at 06:12

## 2022-01-18 RX ADMIN — Medication 1 SPRAY(S): at 20:15

## 2022-01-18 RX ADMIN — Medication 500 MILLIGRAM(S): at 09:01

## 2022-01-18 RX ADMIN — ATORVASTATIN CALCIUM 40 MILLIGRAM(S): 80 TABLET, FILM COATED ORAL at 20:14

## 2022-01-18 RX ADMIN — AMLODIPINE BESYLATE 10 MILLIGRAM(S): 2.5 TABLET ORAL at 09:00

## 2022-01-18 RX ADMIN — Medication 650 MILLIGRAM(S): at 23:43

## 2022-01-18 RX ADMIN — Medication 125 MILLIGRAM(S): at 18:35

## 2022-01-18 RX ADMIN — Medication 81 MILLIGRAM(S): at 09:00

## 2022-01-18 RX ADMIN — LISINOPRIL 10 MILLIGRAM(S): 2.5 TABLET ORAL at 09:00

## 2022-01-18 RX ADMIN — Medication 1300 MILLIGRAM(S): at 08:59

## 2022-01-18 RX ADMIN — OLANZAPINE 10 MILLIGRAM(S): 15 TABLET, FILM COATED ORAL at 20:13

## 2022-01-18 RX ADMIN — Medication 325 MILLIGRAM(S): at 09:00

## 2022-01-18 RX ADMIN — Medication 1300 MILLIGRAM(S): at 20:14

## 2022-01-18 RX ADMIN — Medication 125 MILLIGRAM(S): at 06:12

## 2022-01-18 RX ADMIN — Medication 125 MILLIGRAM(S): at 00:18

## 2022-01-18 RX ADMIN — Medication 650 MILLIGRAM(S): at 23:06

## 2022-01-18 RX ADMIN — Medication 125 MILLIGRAM(S): at 23:43

## 2022-01-18 RX ADMIN — Medication 1 SPRAY(S): at 09:01

## 2022-01-18 RX ADMIN — CARBAMIDE PEROXIDE 2 DROP(S): 81.86 SOLUTION/ DROPS AURICULAR (OTIC) at 20:15

## 2022-01-18 RX ADMIN — Medication 1 SUPPOSITORY(S): at 20:16

## 2022-01-18 RX ADMIN — CARBAMIDE PEROXIDE 2 DROP(S): 81.86 SOLUTION/ DROPS AURICULAR (OTIC) at 08:59

## 2022-01-18 RX ADMIN — Medication 500 MILLIGRAM(S): at 20:14

## 2022-01-18 NOTE — CHART NOTE - NSCHARTNOTEFT_GEN_A_CORE
Social Work Note:    Patient was assessed by treatment team earlier today on unit rounds.  On interview patient is pleasant.  She remains on a 1:1 for assistance with ADLs.  She now has less diarrhea.  Sleep is fair. Appetite is improving. Patient engages appropriately.       Plan is now for placement to a skilled nursing facility.  When appropriate, GRECIA to be sent to all [facility] for review.  Patient’s son expressed preference for either [facility], [facility] or [facility] nursing home.   reviewed nursing home placement process and encouraged son to accept alternate facility bed offers and then explore a transfer to his preferred facility in the future if his nursing home choice does not accept patient or has no available bed.     Last contact with patient’s son Catarino was earlier this morning [phone].    Mental Status Exam:    Mood – Neutral   Sleep – Good   Appetite – Fair   ADLs – Fair    Thought Process – Perseverative    Observation – 1:1     No barriers to discharge identified at this time.     At this time patient is not psychiatrically stable for discharge.

## 2022-01-18 NOTE — PROGRESS NOTE BEHAVIORAL HEALTH - SUMMARY
This is a 77 y/o female with PPH of bipolar depression (Bipolar II disorder), HTN, OA, T2DM, cognitive impairment without behavioral disturbance, brought to the ED at the behest of the nursing home she has been residing for the last 7 years for gradual deterioration in patient's ability to care for herself, in the context of recent psychotropic medication changes due to an episode of lithium toxicity in August of 2021.    Patient's current presentation is consistent with severe bipolar depression with psychotic features on underlying dementia with behavioral disturbance. Patient was stable on lithium for decades (though per son, she was never "normal" and always fluctuated between highs and lows).    During this admission patient has been tried on:  - Low dose lithium (d/c-ed due to renal injury, no response)  - Seroquel (up to 250 mg, d/c-ed due to worsening psychosis)  - Prozac (since November 8th) and Zyprexa (currently on Zydis) - some initial improvement, but now worsening  - Lamictal 25 mg qd (12/15 dc'ed as she has been inconsistent with it)     Overall Amber's response to medication treatment has been poor. She has had increasingly severe delusions of depressive affect. She has been intermittently uncooperative with staff, and occasionally is interactive with peers and amenable to engaging in activities. Her most significant treatment barrier is mixed compliance with medication, as she has declined her morning medications for several days during this hospitalization. Prozac discontinuation has been associated with less agitation, depressive affect not improved. Will c/w Zyprexa Zydis 10 mg at bedtime for bipolar depression, especially as IM version available if TOO is needed. Lamotrigine which was prescribed for Bipolar disorder was discontinued due to noncompliance. Was scheduled for TOO on 12/13 but was adjourned as pt was compliant with medication during the few days directly preceding her court date.    Spoke to patient's son Javier about their options for ECT which both sons believe will help their mother most as it has in the past. Advised that though her ECT referral has been sent out, it is unlikely that a different institution will accept her using this method given risk factors and long line. Advised that one option would be to have patient discharged under the care of her family and have them take her to an ED in a facility that has ECT and see whether the treatment team at that facility agrees about necessity of ECT, which is not a guarantee. He verbalized understanding and agreed to discuss with brother Catarino before coming to decision. Today, he reported that he could not feasibly take the pt back into his care, and that the pt would have to be discharged to [facility] for plan to request ECT at a different facility.     Spoke with Dr. Melo who had 3 meetings with the pt at the outpatient clinic prior to this admission. Her behavior during consistent medication compliance resembles her presentation at outpatient appointments (described by depressed mood, deteriorated independence, and requirement of significant encouragement to engage in activities). Amidst intermittent refusal of medications for the past few days, she regressed to the point of once again endorsing Cotard delusions and that she has caused the death of her eldest son. On one occasion, she conveyed realization and regret that these thoughts were not based in reality and possessed some insight that her abnormal thought processes are what induce these false beliefs.    Amber displays a variable pattern of functionality and mentation during the day, potentially indicating a delirium or sundowning of her primary dementia.

## 2022-01-18 NOTE — PROGRESS NOTE BEHAVIORAL HEALTH - NSBHFUPINTERVALHXFT_PSY_A_CORE
Patient evaluated at bedside, chart reviewed, per nursing staff no overnight events, no PRNs required.      Upon approach, patient is calm and cooperative, eating breakfast. she is engaged with staff and has normal mood, well related.  Patient endorses Multiple times that she "does not feel like herself" but is unable to articulate exactly what she means.  Patient denies SI, HI, AVH, depressive symptoms, manic symptoms, paranoia.  Endorses her sleep is "okay" but overall improved.    Patient's c.diff PCR on 1/14 remained positive.  Patient remains on contact isolation; will continue to reassess.  Patient endorses that her diarrhea has improved and possibly resolved this morning.

## 2022-01-19 LAB
C DIFF BY PCR RESULT: NEGATIVE — SIGNIFICANT CHANGE UP
C DIFF TOX GENS STL QL NAA+PROBE: SIGNIFICANT CHANGE UP
GLUCOSE BLDC GLUCOMTR-MCNC: 106 MG/DL — HIGH (ref 70–99)

## 2022-01-19 PROCEDURE — 99232 SBSQ HOSP IP/OBS MODERATE 35: CPT

## 2022-01-19 RX ORDER — MIRTAZAPINE 45 MG/1
7.5 TABLET, ORALLY DISINTEGRATING ORAL AT BEDTIME
Refills: 0 | Status: DISCONTINUED | OUTPATIENT
Start: 2022-01-19 | End: 2022-01-20

## 2022-01-19 RX ADMIN — Medication 1 SUPPOSITORY(S): at 20:43

## 2022-01-19 RX ADMIN — Medication 125 MILLIGRAM(S): at 06:05

## 2022-01-19 RX ADMIN — MIRTAZAPINE 7.5 MILLIGRAM(S): 45 TABLET, ORALLY DISINTEGRATING ORAL at 20:42

## 2022-01-19 RX ADMIN — Medication 1 SPRAY(S): at 20:42

## 2022-01-19 RX ADMIN — PANTOPRAZOLE SODIUM 40 MILLIGRAM(S): 20 TABLET, DELAYED RELEASE ORAL at 08:49

## 2022-01-19 RX ADMIN — ATORVASTATIN CALCIUM 40 MILLIGRAM(S): 80 TABLET, FILM COATED ORAL at 20:42

## 2022-01-19 RX ADMIN — Medication 1300 MILLIGRAM(S): at 08:48

## 2022-01-19 RX ADMIN — Medication 81 MILLIGRAM(S): at 08:49

## 2022-01-19 RX ADMIN — OLANZAPINE 10 MILLIGRAM(S): 15 TABLET, FILM COATED ORAL at 20:42

## 2022-01-19 RX ADMIN — CARBAMIDE PEROXIDE 2 DROP(S): 81.86 SOLUTION/ DROPS AURICULAR (OTIC) at 08:46

## 2022-01-19 RX ADMIN — AMLODIPINE BESYLATE 10 MILLIGRAM(S): 2.5 TABLET ORAL at 08:48

## 2022-01-19 RX ADMIN — Medication 125 MILLIGRAM(S): at 13:10

## 2022-01-19 RX ADMIN — CARBAMIDE PEROXIDE 2 DROP(S): 81.86 SOLUTION/ DROPS AURICULAR (OTIC) at 21:36

## 2022-01-19 RX ADMIN — Medication 1300 MILLIGRAM(S): at 20:41

## 2022-01-19 RX ADMIN — Medication 500 MILLIGRAM(S): at 20:43

## 2022-01-19 RX ADMIN — Medication 500 MILLIGRAM(S): at 08:47

## 2022-01-19 RX ADMIN — Medication 325 MILLIGRAM(S): at 08:48

## 2022-01-19 RX ADMIN — LISINOPRIL 10 MILLIGRAM(S): 2.5 TABLET ORAL at 08:48

## 2022-01-19 RX ADMIN — Medication 1 SUPPOSITORY(S): at 08:46

## 2022-01-19 RX ADMIN — Medication 1 SPRAY(S): at 08:46

## 2022-01-19 NOTE — PROGRESS NOTE BEHAVIORAL HEALTH - PROBLEM SELECTOR PLAN 1
- Continue Zyprexa Zydis oral dissolvable tablet 10 mg at bedtime (increased on 12/6)  - Continue Depakene liquid 500 mg BID for mood stabilization  - Depakote level 12/28 was 30.0;   - Will add remeron 7.5 mg qhs for insomnia    *Atarax 25 mg q6h PRN for anxiety    - Discontinued  lamictal 25 mg qd as she has been inconsistent with it.  - TOO court for 12/13/21 was adjourned as pt was compliant with medications over the weekend.

## 2022-01-19 NOTE — PROGRESS NOTE BEHAVIORAL HEALTH - NSBHFUPINTERVALHXFT_PSY_A_CORE
Pt was seen, evaluated, chart reviewed. As per nursing staff, no events overnight. On evaluation, pt reports she "didn't sleep much." Reports diarrhea has been resolving. Is compliant with medication, denies negative side effects. Endorsed fair appetite. Remains on contact precautions, will test for C. diff again today. Denied auditory/visual hallucinations. Denied paranoia. Denied suicidal/homicidal ideation, intent or plan.

## 2022-01-19 NOTE — PROGRESS NOTE BEHAVIORAL HEALTH - SUMMARY
This is a 77 y/o female with PPH of bipolar depression (Bipolar II disorder), HTN, OA, T2DM, cognitive impairment without behavioral disturbance, brought to the ED at the behest of the [facility]way she has been residing for the last 7 years for gradual deterioration in patient's ability to care for herself, in the context of recent psychotropic medication changes due to an episode of lithium toxicity in August of 2021.    Patient's current presentation is consistent with severe bipolar depression with psychotic features on underlying dementia with behavioral disturbance. Patient was stable on lithium for decades (though per son, she was never "normal" and always fluctuated between highs and lows).    During this admission patient has been tried on:  - Low dose lithium (d/c-ed due to renal injury, no response)  - Seroquel (up to 250 mg, d/c-ed due to worsening psychosis)  - Prozac (since November 8th) and Zyprexa (currently on Zydis) - some initial improvement, but now worsening  - Lamictal 25 mg qd (12/15 dc'ed as she has been inconsistent with it)     Overall Amebr's response to medication treatment has been poor. She has had increasingly severe delusions of depressive affect. She has been intermittently uncooperative with staff, and occasionally is interactive with peers and amenable to engaging in activities. Her most significant treatment barrier is mixed compliance with medication, as she has declined her morning medications for several days during this hospitalization. Prozac discontinuation has been associated with less agitation, depressive affect not improved. Will c/w Zyprexa Zydis 10 mg at bedtime for bipolar depression, especially as IM version available if TOO is needed. Lamotrigine which was prescribed for Bipolar disorder was discontinued due to noncompliance. Was scheduled for TOO on 12/13 but was adjourned as pt was compliant with medication during the few days directly preceding her court date.    Spoke to patient's son Javier about their options for ECT which both sons believe will help their mother most as it has in the past. Advised that though her ECT referral has been sent out, it is unlikely that a different institution will accept her using this method given risk factors and long line. Advised that one option would be to have patient discharged under the care of her family and have them take her to an ED in a facility that has ECT and see whether the treatment team at that facility agrees about necessity of ECT, which is not a guarantee. He verbalized understanding and agreed to discuss with brother Catarino before coming to decision. Today, he reported that he could not feasibly take the pt back into his care, and that the pt would have to be discharged to [facility] for plan to request ECT at a different facility.     Spoke with Dr. Melo who had 3 meetings with the pt at the outpatient clinic prior to this admission. Her behavior during consistent medication compliance resembles her presentation at outpatient appointments (described by depressed mood, deteriorated independence, and requirement of significant encouragement to engage in activities). Amidst intermittent refusal of medications for the past few days, she regressed to the point of once again endorsing Cotard delusions and that she has caused the death of her eldest son. On one occasion, she conveyed realization and regret that these thoughts were not based in reality and possessed some insight that her abnormal thought processes are what induce these false beliefs.    Amber displays a variable pattern of functionality and mentation during the day, potentially indicating a delirium or sundowning of her primary dementia.

## 2022-01-20 LAB
GLUCOSE BLDC GLUCOMTR-MCNC: 100 MG/DL — HIGH (ref 70–99)
SARS-COV-2 RNA SPEC QL NAA+PROBE: SIGNIFICANT CHANGE UP

## 2022-01-20 PROCEDURE — 99232 SBSQ HOSP IP/OBS MODERATE 35: CPT

## 2022-01-20 RX ORDER — MIRTAZAPINE 45 MG/1
15 TABLET, ORALLY DISINTEGRATING ORAL AT BEDTIME
Refills: 0 | Status: DISCONTINUED | OUTPATIENT
Start: 2022-01-20 | End: 2022-01-21

## 2022-01-20 RX ORDER — HYDROCORTISONE 1 %
1 OINTMENT (GRAM) TOPICAL DAILY
Refills: 0 | Status: DISCONTINUED | OUTPATIENT
Start: 2022-01-20 | End: 2022-01-27

## 2022-01-20 RX ADMIN — CARBAMIDE PEROXIDE 2 DROP(S): 81.86 SOLUTION/ DROPS AURICULAR (OTIC) at 09:30

## 2022-01-20 RX ADMIN — Medication 1300 MILLIGRAM(S): at 09:29

## 2022-01-20 RX ADMIN — AMLODIPINE BESYLATE 10 MILLIGRAM(S): 2.5 TABLET ORAL at 09:29

## 2022-01-20 RX ADMIN — LISINOPRIL 10 MILLIGRAM(S): 2.5 TABLET ORAL at 09:29

## 2022-01-20 RX ADMIN — Medication 500 MILLIGRAM(S): at 09:28

## 2022-01-20 RX ADMIN — Medication 81 MILLIGRAM(S): at 09:30

## 2022-01-20 RX ADMIN — Medication 1 SPRAY(S): at 09:30

## 2022-01-20 RX ADMIN — MIRTAZAPINE 15 MILLIGRAM(S): 45 TABLET, ORALLY DISINTEGRATING ORAL at 20:29

## 2022-01-20 RX ADMIN — Medication 25 MILLIGRAM(S): at 18:40

## 2022-01-20 RX ADMIN — Medication 1 APPLICATION(S): at 18:12

## 2022-01-20 RX ADMIN — OLANZAPINE 10 MILLIGRAM(S): 15 TABLET, FILM COATED ORAL at 20:29

## 2022-01-20 RX ADMIN — ATORVASTATIN CALCIUM 40 MILLIGRAM(S): 80 TABLET, FILM COATED ORAL at 20:29

## 2022-01-20 RX ADMIN — Medication 500 MILLIGRAM(S): at 20:28

## 2022-01-20 RX ADMIN — Medication 325 MILLIGRAM(S): at 09:29

## 2022-01-20 RX ADMIN — Medication 1 SUPPOSITORY(S): at 09:28

## 2022-01-20 RX ADMIN — Medication 1 SUPPOSITORY(S): at 20:29

## 2022-01-20 RX ADMIN — Medication 1300 MILLIGRAM(S): at 20:29

## 2022-01-20 RX ADMIN — Medication 1 SPRAY(S): at 20:31

## 2022-01-20 RX ADMIN — PANTOPRAZOLE SODIUM 40 MILLIGRAM(S): 20 TABLET, DELAYED RELEASE ORAL at 09:29

## 2022-01-20 NOTE — CHART NOTE - NSCHARTNOTEFT_GEN_A_CORE
Social Work Note:    Patient was assessed by treatment team earlier today on unit rounds.  On interview patient minimally engaged with treatment team members.  She remains on a 1:1 for assistance with ADLs. Sleep is poor. Appetite is fair.        Plan is now for placement to a skilled nursing facility.  When appropriate, GRECIA to be sent to all [facility] for review.  Earlier this morning this worker spoke with patient’s son Javier [phone].  Discharge plan of placement to a skilled nursing facility discussed.  Patient’s son verbalized an understanding of same.  Physical therapy to evaluate patient for any recommendations for continued treatment.     Mental Status Exam:    Mood – Neutral   Sleep – Poor   Appetite – Fair   ADLs – Fair    Thought Process – Perseverative    Observation – 1:1     No barriers to discharge identified at this time.     At this time patient is not psychiatrically stable for discharge.

## 2022-01-20 NOTE — PROGRESS NOTE BEHAVIORAL HEALTH - SUMMARY
This is a 75 y/o female with PPH of bipolar depression (Bipolar II disorder), HTN, OA, T2DM, cognitive impairment without behavioral disturbance, brought to the ED at the behest of the long term she has been residing for the last 7 years for gradual deterioration in patient's ability to care for herself, in the context of recent psychotropic medication changes due to an episode of lithium toxicity in August of 2021.    Patient's current presentation is consistent with severe bipolar depression with psychotic features on underlying dementia with behavioral disturbance. Patient was stable on lithium for decades (though per son, she was never "normal" and always fluctuated between highs and lows).    During this admission patient has been tried on:  - Low dose lithium (d/c-ed due to renal injury, no response)  - Seroquel (up to 250 mg, d/c-ed due to worsening psychosis)  - Prozac (since November 8th) and Zyprexa (currently on Zydis) - some initial improvement, but now worsening  - Lamictal 25 mg qd (12/15 dc'ed as she has been inconsistent with it)     Overall Amber's response to medication treatment was initially poor. She has had increasingly severe delusions and depressive affect. She had been intermittently uncooperative with staff, and occasionally is interactive with peers and amenable to engaging in activities. Her most significant treatment barrier is mixed compliance with medication, as she has declined her morning medications for several days during this hospitalization. However, Prozac discontinuation has been associated with less agitation, depressive affect not improved. Will c/w Zyprexa Zydis 10 mg at bedtime for bipolar depression, especially as IM version available if TOO is needed. Lamotrigine which was prescribed for Bipolar disorder was discontinued due to noncompliance. Was scheduled for TOO on 12/13 but was adjourned as pt was compliant with medication during the few days directly preceding her court date.    Spoke to patient's son Javier about their options for ECT which both sons believe will help their mother most as it has in the past. Advised that though her ECT referral has been sent out, it is unlikely that a different institution will accept her using this method given risk factors and long line. Advised that one option would be to have patient discharged under the care of her family and have them take her to an ED in a facility that has ECT and see whether the treatment team at that facility agrees about necessity of ECT, which is not a guarantee. He verbalized understanding and agreed to discuss with brother Catarino before coming to decision. Today, he reported that he could not feasibly take the pt back into his care, and that the pt would have to be discharged to [facility] for plan to request ECT at a different facility.     Spoke with Dr. Melo who had 3 meetings with the pt at the outpatient clinic prior to this admission. Her behavior during consistent medication compliance resembles her presentation at outpatient appointments (described by depressed mood, deteriorated independence, and requirement of significant encouragement to engage in activities). Amidst intermittent refusal of medications for the past few days, she regressed to the point of once again endorsing Cotard delusions and that she has caused the death of her eldest son. On one occasion, she conveyed realization and regret that these thoughts were not based in reality and possessed some insight that her abnormal thought processes are what induce these false beliefs.    Amber displays a variable pattern of functionality and mentation during the day, potentially indicating a delirium or sundowning of her primary dementia.

## 2022-01-20 NOTE — PROGRESS NOTE BEHAVIORAL HEALTH - PROBLEM SELECTOR PLAN 1
- Continue Zyprexa Zydis oral dissolvable tablet 10 mg at bedtime (increased on 12/6)  - Continue Depakene liquid 500 mg BID for mood stabilization  - Depakote level 12/28 was 30.0;   - obtain depakote level on 1/21   - routine labs for renal function on 1/21  - Will increase remeron 7.5 mg to 15mg qhs for insomnia    *Atarax 25 mg q6h PRN for anxiety    - Discontinued  lamictal 25 mg qd as she has been inconsistent with it.  - TOO court for 12/13/21 was adjourned as pt was compliant with medications over the weekend.

## 2022-01-20 NOTE — PROGRESS NOTE BEHAVIORAL HEALTH - NSBHFUPINTERVALHXFT_PSY_A_CORE
Patient evaluated at bedside, chart reviewed, per nursing staff no overnight events, no PRNs required.      Upon approach, patient is laying in bed calm and cooperative, in NAD.  She endorses difficulty falling asleep last night and staying asleep, despite taking Remeron.  She was agreeable to increasing Remeron today.  Patient denies complaints, denies SI, HI, AVH, side effects of medication.  Denies manic symptoms.

## 2022-01-21 LAB
ALBUMIN SERPL ELPH-MCNC: 3.7 G/DL — SIGNIFICANT CHANGE UP (ref 3.5–5.2)
ALP SERPL-CCNC: 82 U/L — SIGNIFICANT CHANGE UP (ref 30–115)
ALT FLD-CCNC: 23 U/L — SIGNIFICANT CHANGE UP (ref 0–41)
ANION GAP SERPL CALC-SCNC: 12 MMOL/L — SIGNIFICANT CHANGE UP (ref 7–14)
AST SERPL-CCNC: 22 U/L — SIGNIFICANT CHANGE UP (ref 0–41)
BILIRUB SERPL-MCNC: <0.2 MG/DL — SIGNIFICANT CHANGE UP (ref 0.2–1.2)
BUN SERPL-MCNC: 38 MG/DL — HIGH (ref 10–20)
CALCIUM SERPL-MCNC: 9.7 MG/DL — SIGNIFICANT CHANGE UP (ref 8.5–10.1)
CHLORIDE SERPL-SCNC: 105 MMOL/L — SIGNIFICANT CHANGE UP (ref 98–110)
CO2 SERPL-SCNC: 23 MMOL/L — SIGNIFICANT CHANGE UP (ref 17–32)
CREAT SERPL-MCNC: 1.6 MG/DL — HIGH (ref 0.7–1.5)
GLUCOSE BLDC GLUCOMTR-MCNC: 110 MG/DL — HIGH (ref 70–99)
GLUCOSE SERPL-MCNC: 225 MG/DL — HIGH (ref 70–99)
HCT VFR BLD CALC: 31.4 % — LOW (ref 37–47)
HGB BLD-MCNC: 9.7 G/DL — LOW (ref 12–16)
MCHC RBC-ENTMCNC: 26.3 PG — LOW (ref 27–31)
MCHC RBC-ENTMCNC: 30.9 G/DL — LOW (ref 32–37)
MCV RBC AUTO: 85.1 FL — SIGNIFICANT CHANGE UP (ref 81–99)
NRBC # BLD: 0 /100 WBCS — SIGNIFICANT CHANGE UP (ref 0–0)
PLATELET # BLD AUTO: 145 K/UL — SIGNIFICANT CHANGE UP (ref 130–400)
POTASSIUM SERPL-MCNC: 4.8 MMOL/L — SIGNIFICANT CHANGE UP (ref 3.5–5)
POTASSIUM SERPL-SCNC: 4.8 MMOL/L — SIGNIFICANT CHANGE UP (ref 3.5–5)
PROT SERPL-MCNC: 6.3 G/DL — SIGNIFICANT CHANGE UP (ref 6–8)
RBC # BLD: 3.69 M/UL — LOW (ref 4.2–5.4)
RBC # FLD: 14.4 % — SIGNIFICANT CHANGE UP (ref 11.5–14.5)
SODIUM SERPL-SCNC: 140 MMOL/L — SIGNIFICANT CHANGE UP (ref 135–146)
VALPROATE SERPL-MCNC: 76 UG/ML — SIGNIFICANT CHANGE UP (ref 50–100)
WBC # BLD: 4.64 K/UL — LOW (ref 4.8–10.8)
WBC # FLD AUTO: 4.64 K/UL — LOW (ref 4.8–10.8)

## 2022-01-21 PROCEDURE — 99231 SBSQ HOSP IP/OBS SF/LOW 25: CPT

## 2022-01-21 RX ORDER — TRAZODONE HCL 50 MG
50 TABLET ORAL AT BEDTIME
Refills: 0 | Status: DISCONTINUED | OUTPATIENT
Start: 2022-01-21 | End: 2022-01-22

## 2022-01-21 RX ORDER — SODIUM CHLORIDE 0.65 %
1 AEROSOL, SPRAY (ML) NASAL
Refills: 0 | Status: DISCONTINUED | OUTPATIENT
Start: 2022-01-21 | End: 2022-01-27

## 2022-01-21 RX ADMIN — Medication 1 SPRAY(S): at 08:24

## 2022-01-21 RX ADMIN — Medication 1300 MILLIGRAM(S): at 21:57

## 2022-01-21 RX ADMIN — Medication 81 MILLIGRAM(S): at 08:26

## 2022-01-21 RX ADMIN — Medication 25 MILLIGRAM(S): at 00:44

## 2022-01-21 RX ADMIN — LISINOPRIL 10 MILLIGRAM(S): 2.5 TABLET ORAL at 08:26

## 2022-01-21 RX ADMIN — Medication 650 MILLIGRAM(S): at 17:32

## 2022-01-21 RX ADMIN — Medication 325 MILLIGRAM(S): at 08:26

## 2022-01-21 RX ADMIN — Medication 1 APPLICATION(S): at 08:25

## 2022-01-21 RX ADMIN — Medication 50 MILLIGRAM(S): at 21:59

## 2022-01-21 RX ADMIN — Medication 1 SUPPOSITORY(S): at 08:25

## 2022-01-21 RX ADMIN — Medication 1 SPRAY(S): at 22:03

## 2022-01-21 RX ADMIN — Medication 500 MILLIGRAM(S): at 08:39

## 2022-01-21 RX ADMIN — PANTOPRAZOLE SODIUM 40 MILLIGRAM(S): 20 TABLET, DELAYED RELEASE ORAL at 08:25

## 2022-01-21 RX ADMIN — ATORVASTATIN CALCIUM 40 MILLIGRAM(S): 80 TABLET, FILM COATED ORAL at 21:57

## 2022-01-21 RX ADMIN — OLANZAPINE 10 MILLIGRAM(S): 15 TABLET, FILM COATED ORAL at 21:56

## 2022-01-21 RX ADMIN — Medication 25 MILLIGRAM(S): at 23:31

## 2022-01-21 RX ADMIN — Medication 650 MILLIGRAM(S): at 23:18

## 2022-01-21 RX ADMIN — Medication 500 MILLIGRAM(S): at 21:58

## 2022-01-21 RX ADMIN — Medication 1300 MILLIGRAM(S): at 08:25

## 2022-01-21 RX ADMIN — AMLODIPINE BESYLATE 10 MILLIGRAM(S): 2.5 TABLET ORAL at 08:25

## 2022-01-21 NOTE — PHYSICAL THERAPY INITIAL EVALUATION ADULT - GENERAL OBSERVATIONS, REHAB EVAL
8;10-8;30 pt received sitting on a bed one on one with PCA, in no apparent distress, anxious but pleasant and agreeable to PT.  Pt requires constant verbal cues for safety, pt has difficulties understanding use of RW
Pt encountered sitting in activity room, NAD, ok to be seen by PT as confirmed by RN and pt agreeable. +chart reviewed
Pt encountered ambulating in hallway, [facility], ok to be seen by PT as confirmed by RN and pt agreeable. +chart reviewed

## 2022-01-21 NOTE — PHYSICAL THERAPY INITIAL EVALUATION ADULT - IMPAIRMENTS FOUND, PT EVAL
gait, locomotion, and balance
aerobic capacity/endurance/gait, locomotion, and balance/muscle strength
aerobic capacity/endurance/gait, locomotion, and balance/muscle strength

## 2022-01-21 NOTE — PHYSICAL THERAPY INITIAL EVALUATION ADULT - ADDITIONAL COMMENTS
pt used to live in group home without stairs to enter and elevator,  amb without AD with S
Pt reports that she lives at [facility] - ambulates with RW/rollator at baseline.
Pt reports that she lives at [facility]; ambulates with rollator at baseline

## 2022-01-21 NOTE — PHYSICAL THERAPY INITIAL EVALUATION ADULT - IMPAIRED TRANSFERS: SIT/STAND, REHAB EVAL
impaired balance/impaired coordination/impaired postural control/decreased strength
impaired balance/impaired postural control/decreased strength
impaired balance/cognition/decreased strength

## 2022-01-21 NOTE — PHYSICAL THERAPY INITIAL EVALUATION ADULT - PATIENT/FAMILY/SIGNIFICANT OTHER GOALS STATEMENT, PT EVAL
'I want to get stronger so I can walk and return home."
pt did not verbalized any goals, agreed that she needs to get stronger and more stable with walking
"I want to see my sons"

## 2022-01-21 NOTE — PHYSICAL THERAPY INITIAL EVALUATION ADULT - GAIT DEVIATIONS NOTED, PT EVAL
forward flexed trunk, dec heel strike and push off/decreased leonel/decreased step length/decreased stride length/decreased weight-shifting ability
decreased step length
forward flexed trunk, dec heel strike and push off/decreased leonel/decreased step length/decreased stride length/decreased weight-shifting ability

## 2022-01-21 NOTE — PHYSICAL THERAPY INITIAL EVALUATION ADULT - LEVEL OF INDEPENDENCE: STAIR NEGOTIATION, REHAB EVAL
N/A at this time, will assess if needed prior to d/c - likely no stairs at [facility]
N/A at this time, pt does not have to climb stairs at [facility]
not performed 2/2 safety concerns and isolation precautions

## 2022-01-21 NOTE — PHYSICAL THERAPY INITIAL EVALUATION ADULT - NAME OF DISCHARGE PLANNER
Will follow up with [facility] CM as needed
Will follow up with [facility] CM as needed
floor case coordinator will be notified

## 2022-01-21 NOTE — PHYSICAL THERAPY INITIAL EVALUATION ADULT - CRITERIA FOR SKILLED THERAPEUTIC INTERVENTIONS
impairments found/functional limitations in following categories/anticipated equipment needs at discharge/anticipated discharge recommendation
impairments found/functional limitations in following categories/anticipated equipment needs at discharge/anticipated discharge recommendation
impairments found/functional limitations in following categories/rehab potential/therapy frequency

## 2022-01-21 NOTE — PHYSICAL THERAPY INITIAL EVALUATION ADULT - IMPAIRMENTS CONTRIBUTING TO GAIT DEVIATIONS, PT EVAL
impaired balance/cognition/decreased strength
impaired balance/impaired coordination/impaired postural control/decreased strength
impaired balance/impaired coordination/impaired postural control/decreased strength

## 2022-01-21 NOTE — PHYSICAL THERAPY INITIAL EVALUATION ADULT - FOLLOWS COMMANDS/ANSWERS QUESTIONS, REHAB EVAL
distracted, anxious/75% of the time
100% of the time/unable to follow multi-step instructions
50% of the time

## 2022-01-21 NOTE — PROGRESS NOTE BEHAVIORAL HEALTH - PROBLEM SELECTOR PLAN 1
- Continue Zyprexa Zydis oral dissolvable tablet 10 mg at bedtime (increased on 12/6)  - Continue Depakene liquid 500 mg BID for mood stabilization  - Depakote level 12/28 was 30.0;   - f/u depakote level on 1/21   - routine labs for renal function on 1/21  - d/c remeron 15mg due to poor effect  - Start trazodone 50mg Qhs for sleep    *Atarax 25 mg q6h PRN for anxiety    - Discontinued  lamictal 25 mg qd as she has been inconsistent with it.  - TOO court for 12/13/21 was adjourned as pt was compliant with medications over the weekend. - Continue Zyprexa Zydis oral dissolvable tablet 10 mg at bedtime (increased on 12/6)  - Continue Depakene liquid 500 mg BID for mood stabilization  - Depakote level 12/28 was 30.0;   - depakote level 1/21 is 76.0  - routine labs for renal function on 1/21 demonstrate Cr 1.6, BUN 38; Encourage PO fluid intake  - d/c remeron 15mg due to poor effect  - Start trazodone 50mg Qhs for sleep    *Atarax 25 mg q6h PRN for anxiety    - Discontinued  lamictal 25 mg qd as she has been inconsistent with it.  - TOO court for 12/13/21 was adjourned as pt was compliant with medications over the weekend.

## 2022-01-21 NOTE — PROGRESS NOTE BEHAVIORAL HEALTH - NSBHFUPINTERVALHXFT_PSY_A_CORE
Patient evaluated at bedside, chart reviewed, per nursing staff no overnight events, no PRNs required.      Upon approach, patient is walking through the unit hallway with assistance of physical therapist; she is ambulating without a walker.  Patient states she feels stronger and more steady on her feet.  She denies further episodes of diarrhea, is tolerating PO intake.  She continues to endorse poor sleep despite increasing remeron to 15mg last night; patient is agreeable to trying trazodone tonight.  She was encouraged to attend to good sleep hygiene practices including staying active and out of the bed during the day.  Patient denies SI, HI, AVH, depressive symptoms, manic symptoms.  Denies side effects of medications. Patient evaluated at bedside, chart reviewed, per nursing staff no overnight events, no PRNs required.      Upon approach, patient is walking through the unit hallway with assistance of physical therapist; she is ambulating without a walker.  Patient states she feels stronger and more steady on her feet.  She denies further episodes of diarrhea, is tolerating PO intake.  She continues to endorse poor sleep despite increasing remeron to 15mg last night; patient is agreeable to trying trazodone tonight.  She was encouraged to attend to good sleep hygiene practices including staying active and out of the bed during the day.  Patient denies SI, HI, AVH, depressive symptoms, manic symptoms.  Denies side effects of medications.     Patient was encouraged to increase PO fluid intake due to elevated Cr, BUN; she expressed understanding.

## 2022-01-21 NOTE — PHYSICAL THERAPY INITIAL EVALUATION ADULT - BALANCE DISTURBANCE, IDENTIFIED IMPAIRMENT CONTRIBUTE, REHAB EVAL
impaired coordination/decreased strength
impaired coordination/impaired postural control/decreased strength
impaired coordination/impaired postural control/decreased strength

## 2022-01-21 NOTE — PROGRESS NOTE BEHAVIORAL HEALTH - NSBHCHARTREVIEWLAB_PSY_A_CORE FT
9.7    4.64  )-----------( 145      ( 21 Jan 2022 09:18 )             31.4     01-21    140  |  105  |  38<H>  ----------------------------<  225<H>  4.8   |  23  |  1.6<H>    Ca    9.7      21 Jan 2022 09:18    TPro  6.3  /  Alb  3.7  /  TBili  <0.2  /  DBili  x   /  AST  22  /  ALT  23  /  AlkPhos  82  01-21

## 2022-01-21 NOTE — PROGRESS NOTE BEHAVIORAL HEALTH - SUMMARY
This is a 75 y/o female with PPH of bipolar depression (Bipolar II disorder), HTN, OA, T2DM, cognitive impairment without behavioral disturbance, brought to the ED at the behest of the senior care she has been residing for the last 7 years for gradual deterioration in patient's ability to care for herself, in the context of recent psychotropic medication changes due to an episode of lithium toxicity in August of 2021.    Patient's current presentation is consistent with severe bipolar depression with psychotic features on underlying dementia with behavioral disturbance. Patient was stable on lithium for decades (though per son, she was never "normal" and always fluctuated between highs and lows).    During this admission patient has been tried on:  - Low dose lithium (d/c-ed due to renal injury, no response)  - Seroquel (up to 250 mg, d/c-ed due to worsening psychosis)  - Prozac (since November 8th) and Zyprexa (currently on Zydis) - some initial improvement, but now worsening  - Lamictal 25 mg qd (12/15 dc'ed as she has been inconsistent with it)     Overall Amber's response to medication treatment was initially poor. She has had increasingly severe delusions and depressive affect. She had been intermittently uncooperative with staff, and occasionally is interactive with peers and amenable to engaging in activities. Her most significant treatment barrier is mixed compliance with medication, as she has declined her morning medications for several days during this hospitalization. However, Prozac discontinuation has been associated with less agitation, depressive affect not improved. Will c/w Zyprexa Zydis 10 mg at bedtime for bipolar depression, especially as IM version available if TOO is needed. Lamotrigine which was prescribed for Bipolar disorder was discontinued due to noncompliance. Was scheduled for TOO on 12/13 but was adjourned as pt was compliant with medication during the few days directly preceding her court date.    Spoke to patient's son Javier about their options for ECT which both sons believe will help their mother most as it has in the past. Advised that though her ECT referral has been sent out, it is unlikely that a different institution will accept her using this method given risk factors and long line. Advised that one option would be to have patient discharged under the care of her family and have them take her to an ED in a facility that has ECT and see whether the treatment team at that facility agrees about necessity of ECT, which is not a guarantee. He verbalized understanding and agreed to discuss with brother Catarino before coming to decision. Today, he reported that he could not feasibly take the pt back into his care, and that the pt would have to be discharged to [facility] for plan to request ECT at a different facility.     Spoke with Dr. Melo who had 3 meetings with the pt at the outpatient clinic prior to this admission. Her behavior during consistent medication compliance resembles her presentation at outpatient appointments (described by depressed mood, deteriorated independence, and requirement of significant encouragement to engage in activities). Amidst intermittent refusal of medications for the past few days, she regressed to the point of once again endorsing Cotard delusions and that she has caused the death of her eldest son. On one occasion, she conveyed realization and regret that these thoughts were not based in reality and possessed some insight that her abnormal thought processes are what induce these false beliefs.    Amber displays a variable pattern of functionality and mentation during the day, potentially indicating a delirium or sundowning of her primary dementia.    On evaluations from 10/17 - 10/21, patient demonstrates improvements in mood, delusions, ADLs, global function, insight and judgement.  She remains perseverative and somewhat illogical in thought process, requires encouragement to engage with milieu, continues to require IPP treatment for stabilization, discharge planning (SNF?), and safety.

## 2022-01-21 NOTE — PHYSICAL THERAPY INITIAL EVALUATION ADULT - PHYSICAL ASSIST/NONPHYSICAL ASSIST: GAIT, REHAB EVAL
posture/supervision/verbal cues
1 person assist
posture, general safety awareness/supervision/verbal cues

## 2022-01-21 NOTE — PHYSICAL THERAPY INITIAL EVALUATION ADULT - LEVEL OF INDEPENDENCE: SIT/SUPINE, REHAB EVAL
not performed, pt was received and left sitting in bed
bed mobility not assessed, pt encountered already OOB ambulating
bed mobility not assessed, pt encountered already OOB in activity room

## 2022-01-22 LAB — GLUCOSE BLDC GLUCOMTR-MCNC: 132 MG/DL — HIGH (ref 70–99)

## 2022-01-22 PROCEDURE — 99231 SBSQ HOSP IP/OBS SF/LOW 25: CPT

## 2022-01-22 RX ORDER — HYDROXYZINE HCL 10 MG
25 TABLET ORAL AT BEDTIME
Refills: 0 | Status: DISCONTINUED | OUTPATIENT
Start: 2022-01-22 | End: 2022-01-27

## 2022-01-22 RX ORDER — TRAZODONE HCL 50 MG
100 TABLET ORAL AT BEDTIME
Refills: 0 | Status: DISCONTINUED | OUTPATIENT
Start: 2022-01-22 | End: 2022-01-27

## 2022-01-22 RX ADMIN — Medication 325 MILLIGRAM(S): at 08:10

## 2022-01-22 RX ADMIN — Medication 1300 MILLIGRAM(S): at 20:32

## 2022-01-22 RX ADMIN — Medication 1 SPRAY(S): at 20:30

## 2022-01-22 RX ADMIN — PANTOPRAZOLE SODIUM 40 MILLIGRAM(S): 20 TABLET, DELAYED RELEASE ORAL at 07:32

## 2022-01-22 RX ADMIN — AMLODIPINE BESYLATE 10 MILLIGRAM(S): 2.5 TABLET ORAL at 08:05

## 2022-01-22 RX ADMIN — Medication 1 SPRAY(S): at 08:09

## 2022-01-22 RX ADMIN — Medication 1 SUPPOSITORY(S): at 09:00

## 2022-01-22 RX ADMIN — Medication 1300 MILLIGRAM(S): at 08:05

## 2022-01-22 RX ADMIN — ATORVASTATIN CALCIUM 40 MILLIGRAM(S): 80 TABLET, FILM COATED ORAL at 21:17

## 2022-01-22 RX ADMIN — Medication 500 MILLIGRAM(S): at 20:33

## 2022-01-22 RX ADMIN — LISINOPRIL 10 MILLIGRAM(S): 2.5 TABLET ORAL at 08:05

## 2022-01-22 RX ADMIN — Medication 650 MILLIGRAM(S): at 00:59

## 2022-01-22 RX ADMIN — Medication 100 MILLIGRAM(S): at 20:32

## 2022-01-22 RX ADMIN — Medication 500 MILLIGRAM(S): at 08:08

## 2022-01-22 RX ADMIN — OLANZAPINE 10 MILLIGRAM(S): 15 TABLET, FILM COATED ORAL at 20:30

## 2022-01-22 RX ADMIN — Medication 81 MILLIGRAM(S): at 08:05

## 2022-01-22 RX ADMIN — Medication 25 MILLIGRAM(S): at 21:57

## 2022-01-22 RX ADMIN — Medication 1 APPLICATION(S): at 09:00

## 2022-01-22 NOTE — PROGRESS NOTE BEHAVIORAL HEALTH - SUMMARY
This is a 77 y/o female with PPH of bipolar depression (Bipolar II disorder), HTN, OA, T2DM, cognitive impairment without behavioral disturbance, brought to the ED at the behest of the [facility] she has been residing for the last 7 years for gradual deterioration in patient's ability to care for herself, in the context of recent psychotropic medication changes due to an episode of lithium toxicity in August of 2021.    Patient's current presentation is consistent with severe bipolar depression with psychotic features on underlying dementia with behavioral disturbance. Patient was stable on lithium for decades (though per son, she was never "normal" and always fluctuated between highs and lows).    During this admission patient has been tried on:  - Low dose lithium (d/c-ed due to renal injury, no response)  - Seroquel (up to 250 mg, d/c-ed due to worsening psychosis)  - Prozac (since November 8th) and Zyprexa (currently on Zydis) - some initial improvement, but now worsening  - Lamictal 25 mg qd (12/15 dc'ed as she has been inconsistent with it)     Overall Amber's response to medication treatment was initially poor. She has had increasingly severe delusions and depressive affect. She had been intermittently uncooperative with staff, and occasionally is interactive with peers and amenable to engaging in activities. Her most significant treatment barrier is mixed compliance with medication, as she has declined her morning medications for several days during this hospitalization. However, Prozac discontinuation has been associated with less agitation, depressive affect not improved. Will c/w Zyprexa Zydis 10 mg at bedtime for bipolar depression, especially as IM version available if TOO is needed. Lamotrigine which was prescribed for Bipolar disorder was discontinued due to noncompliance. Was scheduled for TOO on 12/13 but was adjourned as pt was compliant with medication during the few days directly preceding her court date.    Spoke to patient's son Javier about their options for ECT which both sons believe will help their mother most as it has in the past. Advised that though her ECT referral has been sent out, it is unlikely that a different institution will accept her using this method given risk factors and long line. Advised that one option would be to have patient discharged under the care of her family and have them take her to an ED in a facility that has ECT and see whether the treatment team at that facility agrees about necessity of ECT, which is not a guarantee. He verbalized understanding and agreed to discuss with brother Catarino before coming to decision. Today, he reported that he could not feasibly take the pt back into his care, and that the pt would have to be discharged to [facility] for plan to request ECT at a different facility.     Spoke with Dr. Melo who had 3 meetings with the pt at the outpatient clinic prior to this admission. Her behavior during consistent medication compliance resembles her presentation at outpatient appointments (described by depressed mood, deteriorated independence, and requirement of significant encouragement to engage in activities). Amidst intermittent refusal of medications for the past few days, she regressed to the point of once again endorsing Cotard delusions and that she has caused the death of her eldest son. On one occasion, she conveyed realization and regret that these thoughts were not based in reality and possessed some insight that her abnormal thought processes are what induce these false beliefs.    Amber displays a variable pattern of functionality and mentation during the day, potentially indicating a delirium or sundowning of her primary dementia.    On evaluations from 10/17 - 10/21, patient demonstrates improvements in mood, delusions, ADLs, global function, insight and judgement.  She remains perseverative and somewhat illogical in thought process, requires encouragement to engage with milieu, continues to require IPP treatment for stabilization, discharge planning (SNF?), and safety.

## 2022-01-22 NOTE — PROGRESS NOTE BEHAVIORAL HEALTH - PROBLEM SELECTOR PLAN 1
- Continue Zyprexa Zydis oral dissolvable tablet 10 mg at bedtime (increased on 12/6)  - Continue Depakene liquid 500 mg BID for mood stabilization  - Depakote level 12/28 was 30.0;   - depakote level 1/21 is 76.0  - routine labs for renal function on 1/21 demonstrate Cr 1.6, BUN 38; Encourage PO fluid intake  - d/c remeron 15mg due to poor effect  - Increase trazodone to 100mg Qhs for sleep    *Atarax 25 mg q6h PRN for anxiety    - Discontinued  lamictal 25 mg qd as she has been inconsistent with it.  - TOO court for 12/13/21 was adjourned as pt was compliant with medications over the weekend.

## 2022-01-22 NOTE — PROGRESS NOTE BEHAVIORAL HEALTH - NSBHFUPINTERVALHXFT_PSY_A_CORE
Patient evaluated at bedside, chart reviewed, per nursing staff and 1:1  no overnight events. Pt is adherent to medications. She remains depressed, helpless, at times appears mildly confused, somatically preoccupied, with regressed behavior (eg., asking to cover her in bed although she is capable of doing it herself). Pt did not sleep well last night. Upon approach, patient is resting in bed, withdrawn. She reports still feeling very depressed, and admits vague SI without plan. She denies SI/HI/VH. She c/o insomnia and asks for "sleeping pill".

## 2022-01-23 LAB — GLUCOSE BLDC GLUCOMTR-MCNC: 124 MG/DL — HIGH (ref 70–99)

## 2022-01-23 RX ADMIN — Medication 1 SPRAY(S): at 08:05

## 2022-01-23 RX ADMIN — PANTOPRAZOLE SODIUM 40 MILLIGRAM(S): 20 TABLET, DELAYED RELEASE ORAL at 08:04

## 2022-01-23 RX ADMIN — Medication 100 MILLIGRAM(S): at 20:03

## 2022-01-23 RX ADMIN — Medication 1 SPRAY(S): at 20:04

## 2022-01-23 RX ADMIN — Medication 1 APPLICATION(S): at 08:05

## 2022-01-23 RX ADMIN — Medication 325 MILLIGRAM(S): at 08:04

## 2022-01-23 RX ADMIN — AMLODIPINE BESYLATE 10 MILLIGRAM(S): 2.5 TABLET ORAL at 08:04

## 2022-01-23 RX ADMIN — Medication 81 MILLIGRAM(S): at 08:04

## 2022-01-23 RX ADMIN — Medication 25 MILLIGRAM(S): at 20:03

## 2022-01-23 RX ADMIN — Medication 500 MILLIGRAM(S): at 08:03

## 2022-01-23 RX ADMIN — Medication 1300 MILLIGRAM(S): at 08:03

## 2022-01-23 RX ADMIN — LISINOPRIL 10 MILLIGRAM(S): 2.5 TABLET ORAL at 08:04

## 2022-01-23 RX ADMIN — Medication 500 MILLIGRAM(S): at 20:03

## 2022-01-23 RX ADMIN — HEPARIN SODIUM 5000 UNIT(S): 5000 INJECTION INTRAVENOUS; SUBCUTANEOUS at 20:03

## 2022-01-23 RX ADMIN — ATORVASTATIN CALCIUM 40 MILLIGRAM(S): 80 TABLET, FILM COATED ORAL at 20:02

## 2022-01-23 RX ADMIN — Medication 1300 MILLIGRAM(S): at 20:03

## 2022-01-23 RX ADMIN — HEPARIN SODIUM 5000 UNIT(S): 5000 INJECTION INTRAVENOUS; SUBCUTANEOUS at 08:16

## 2022-01-23 RX ADMIN — OLANZAPINE 10 MILLIGRAM(S): 15 TABLET, FILM COATED ORAL at 20:03

## 2022-01-24 LAB
GLUCOSE BLDC GLUCOMTR-MCNC: 91 MG/DL — SIGNIFICANT CHANGE UP (ref 70–99)
SARS-COV-2 RNA SPEC QL NAA+PROBE: SIGNIFICANT CHANGE UP

## 2022-01-24 PROCEDURE — 99231 SBSQ HOSP IP/OBS SF/LOW 25: CPT

## 2022-01-24 RX ORDER — LANOLIN ALCOHOL/MO/W.PET/CERES
5 CREAM (GRAM) TOPICAL
Refills: 0 | Status: DISCONTINUED | OUTPATIENT
Start: 2022-01-24 | End: 2022-01-27

## 2022-01-24 RX ADMIN — Medication 100 MILLIGRAM(S): at 20:29

## 2022-01-24 RX ADMIN — Medication 25 MILLIGRAM(S): at 20:28

## 2022-01-24 RX ADMIN — AMLODIPINE BESYLATE 10 MILLIGRAM(S): 2.5 TABLET ORAL at 08:12

## 2022-01-24 RX ADMIN — PANTOPRAZOLE SODIUM 40 MILLIGRAM(S): 20 TABLET, DELAYED RELEASE ORAL at 08:12

## 2022-01-24 RX ADMIN — LISINOPRIL 10 MILLIGRAM(S): 2.5 TABLET ORAL at 08:13

## 2022-01-24 RX ADMIN — Medication 1300 MILLIGRAM(S): at 08:13

## 2022-01-24 RX ADMIN — Medication 500 MILLIGRAM(S): at 21:35

## 2022-01-24 RX ADMIN — Medication 1 SPRAY(S): at 08:15

## 2022-01-24 RX ADMIN — Medication 325 MILLIGRAM(S): at 08:12

## 2022-01-24 RX ADMIN — HEPARIN SODIUM 5000 UNIT(S): 5000 INJECTION INTRAVENOUS; SUBCUTANEOUS at 20:28

## 2022-01-24 RX ADMIN — ATORVASTATIN CALCIUM 40 MILLIGRAM(S): 80 TABLET, FILM COATED ORAL at 20:29

## 2022-01-24 RX ADMIN — Medication 1 SPRAY(S): at 21:32

## 2022-01-24 RX ADMIN — Medication 500 MILLIGRAM(S): at 08:48

## 2022-01-24 RX ADMIN — Medication 1300 MILLIGRAM(S): at 20:28

## 2022-01-24 RX ADMIN — Medication 1 APPLICATION(S): at 08:17

## 2022-01-24 RX ADMIN — OLANZAPINE 10 MILLIGRAM(S): 15 TABLET, FILM COATED ORAL at 20:28

## 2022-01-24 RX ADMIN — Medication 81 MILLIGRAM(S): at 08:12

## 2022-01-24 RX ADMIN — Medication 5 MILLIGRAM(S): at 20:29

## 2022-01-24 RX ADMIN — Medication 650 MILLIGRAM(S): at 11:44

## 2022-01-24 RX ADMIN — HEPARIN SODIUM 5000 UNIT(S): 5000 INJECTION INTRAVENOUS; SUBCUTANEOUS at 08:15

## 2022-01-24 RX ADMIN — Medication 650 MILLIGRAM(S): at 14:42

## 2022-01-24 NOTE — CHART NOTE - NSCHARTNOTEFT_GEN_A_CORE
Social Work Note:    Treatment team attempted to meet with patient earlier on unit rounds.  Team was informed that patient had poor sleep last night.  She remains on a 1:1 for assistance with ADLs. Sleep is poor. Appetite is fair.        Plan is now for placement to a skilled nursing facility.  When appropriate, [facility]IA to be sent to all [facility] for review.  Communication ongoing with patient’s son Javier [phone] with last contact on 01/20.  Discharge plan of placement to a skilled nursing facility discussed.  Patient’s son verbalized an understanding of same.  Physical therapy evaluated patient on Friday 01/21.       No barriers to discharge identified at this time.     At this time patient is not psychiatrically stable for discharge.

## 2022-01-24 NOTE — PROGRESS NOTE BEHAVIORAL HEALTH - RELATEDNESS
Impression: Puckering of macula, left eye: H35.372. Plan: Dilated exam  (OS ONLY) ,VFT 24-2, Complete exam 
Next available [place] office. Fair

## 2022-01-24 NOTE — PROGRESS NOTE BEHAVIORAL HEALTH - NSBHFUPINTERVALHXFT_PSY_A_CORE
Patient evaluated at bedside, chart reviewed, per nursing staff no overnight events, no PRNs required.      Upon approach, patient is Patient evaluated at bedside, chart reviewed, per nursing staff no overnight events, no PRNs required.    As per PCA, pt slept a few hours at night but was mostly up.    Upon approach, patient was initially sleeping. Later on, pt was seen out of bed. Reports she is ok. No new complaints. Continues to perseverate on "not feeling right" and apologizing for her appearance. Endorsed poor sleep at night. Reports fair appetite. Was encouraged to ambulate. Denied A/V hallucinations. Denied suicidal/homicidal ideation, intent or plan.

## 2022-01-24 NOTE — PROGRESS NOTE BEHAVIORAL HEALTH - SUMMARY
This is a 75 y/o female with PPH of bipolar depression (Bipolar II disorder), HTN, OA, T2DM, cognitive impairment without behavioral disturbance, brought to the ED at the behest of the [facility] she has been residing for the last 7 years for gradual deterioration in patient's ability to care for herself, in the context of recent psychotropic medication changes due to an episode of lithium toxicity in August of 2021.    Patient's current presentation is consistent with severe bipolar depression with psychotic features on underlying dementia with behavioral disturbance. Patient was stable on lithium for decades (though per son, she was never "normal" and always fluctuated between highs and lows).    During this admission patient has been tried on:  - Low dose lithium (d/c-ed due to renal injury, no response)  - Seroquel (up to 250 mg, d/c-ed due to worsening psychosis)  - Prozac (since November 8th) and Zyprexa (currently on Zydis) - some initial improvement, but now worsening  - Lamictal 25 mg qd (12/15 dc'ed as she has been inconsistent with it)     Overall Amber's response to medication treatment was initially poor. She has had increasingly severe delusions and depressive affect. She had been intermittently uncooperative with staff, and occasionally is interactive with peers and amenable to engaging in activities. Her most significant treatment barrier is mixed compliance with medication, as she has declined her morning medications for several days during this hospitalization. However, Prozac discontinuation has been associated with less agitation, depressive affect not improved. Will c/w Zyprexa Zydis 10 mg at bedtime for bipolar depression, especially as IM version available if TOO is needed. Lamotrigine which was prescribed for Bipolar disorder was discontinued due to noncompliance. Was scheduled for TOO on 12/13 but was adjourned as pt was compliant with medication during the few days directly preceding her court date.    Spoke to patient's son Javier about their options for ECT which both sons believe will help their mother most as it has in the past. Advised that though her ECT referral has been sent out, it is unlikely that a different institution will accept her using this method given risk factors and long line. Advised that one option would be to have patient discharged under the care of her family and have them take her to an ED in a facility that has ECT and see whether the treatment team at that facility agrees about necessity of ECT, which is not a guarantee. He verbalized understanding and agreed to discuss with brother Catarino before coming to decision. Today, he reported that he could not feasibly take the pt back into his care, and that the pt would have to be discharged to [facility] for plan to request ECT at a different facility.     Spoke with Dr. Melo who had 3 meetings with the pt at the outpatient clinic prior to this admission. Her behavior during consistent medication compliance resembles her presentation at outpatient appointments (described by depressed mood, deteriorated independence, and requirement of significant encouragement to engage in activities). Amidst intermittent refusal of medications for the past few days, she regressed to the point of once again endorsing Cotard delusions and that she has caused the death of her eldest son. On one occasion, she conveyed realization and regret that these thoughts were not based in reality and possessed some insight that her abnormal thought processes are what induce these false beliefs.    Amber displays a variable pattern of functionality and mentation during the day, potentially indicating a delirium or sundowning of her primary dementia.    On evaluations from 10/17 - 10/21, patient demonstrates improvements in mood, delusions, ADLs, global function, insight and judgement.  She remains perseverative and somewhat illogical in thought process, requires encouragement to engage with milieu, continues to require IPP treatment for stabilization, discharge planning (SNF?), and safety.

## 2022-01-25 LAB — GLUCOSE BLDC GLUCOMTR-MCNC: 122 MG/DL — HIGH (ref 70–99)

## 2022-01-25 PROCEDURE — 99231 SBSQ HOSP IP/OBS SF/LOW 25: CPT

## 2022-01-25 RX ADMIN — Medication 1 SPRAY(S): at 08:16

## 2022-01-25 RX ADMIN — Medication 1 APPLICATION(S): at 08:15

## 2022-01-25 RX ADMIN — Medication 5 MILLIGRAM(S): at 21:13

## 2022-01-25 RX ADMIN — Medication 100 MILLIGRAM(S): at 21:17

## 2022-01-25 RX ADMIN — AMLODIPINE BESYLATE 10 MILLIGRAM(S): 2.5 TABLET ORAL at 08:13

## 2022-01-25 RX ADMIN — Medication 1300 MILLIGRAM(S): at 21:17

## 2022-01-25 RX ADMIN — LISINOPRIL 10 MILLIGRAM(S): 2.5 TABLET ORAL at 08:13

## 2022-01-25 RX ADMIN — Medication 325 MILLIGRAM(S): at 08:14

## 2022-01-25 RX ADMIN — PANTOPRAZOLE SODIUM 40 MILLIGRAM(S): 20 TABLET, DELAYED RELEASE ORAL at 08:14

## 2022-01-25 RX ADMIN — Medication 81 MILLIGRAM(S): at 08:14

## 2022-01-25 RX ADMIN — Medication 1 SUPPOSITORY(S): at 08:17

## 2022-01-25 RX ADMIN — Medication 1 SPRAY(S): at 21:15

## 2022-01-25 RX ADMIN — Medication 1300 MILLIGRAM(S): at 08:15

## 2022-01-25 RX ADMIN — Medication 1 SUPPOSITORY(S): at 21:16

## 2022-01-25 RX ADMIN — Medication 500 MILLIGRAM(S): at 21:18

## 2022-01-25 RX ADMIN — OLANZAPINE 10 MILLIGRAM(S): 15 TABLET, FILM COATED ORAL at 21:13

## 2022-01-25 RX ADMIN — HEPARIN SODIUM 5000 UNIT(S): 5000 INJECTION INTRAVENOUS; SUBCUTANEOUS at 21:13

## 2022-01-25 RX ADMIN — ATORVASTATIN CALCIUM 40 MILLIGRAM(S): 80 TABLET, FILM COATED ORAL at 21:13

## 2022-01-25 RX ADMIN — Medication 500 MILLIGRAM(S): at 08:16

## 2022-01-25 RX ADMIN — Medication 25 MILLIGRAM(S): at 21:13

## 2022-01-25 NOTE — PROGRESS NOTE BEHAVIORAL HEALTH - NSBHFUPINTERVALHXFT_PSY_A_CORE
Patient evaluated at bedside, chart reviewed, per nursing staff no overnight events, no PRNs required.  Staff reports the patient spelt 6-8 hours last night.      Upon approach, patient is laying in bed in NAD. She is alert to voice and is oriented x 3.  Patient continues to complain that she does not feel like herself but is unable to articulate what feels 'off.'  She later points to the bed sheets and states that she doesn't know how to cover herself with them.    She was encouraged to ambulate, socialize/engage with milieu and to stay out of bed and observe good sleep hygiene; she expressed agreement.  She was encouraged to maintain good PO fluid intake and has been doing so for several days; she expressed understanding.  Patient denies SI, HI, AVH. Patient evaluated at bedside, chart reviewed, per nursing staff no overnight events, no PRNs required.  Staff reports the patient spelt 6-8 hours last night.      Upon approach, patient is laying in bed in NAD. She is alert to voice and is oriented x 3.  Patient continues to complain that she does not feel like herself but is unable to articulate what feels 'off.'  She later points to the bed sheets and states that she doesn't know how to cover herself with them.    She was encouraged to ambulate, socialize/engage with milieu and to stay out of bed and observe good sleep hygiene; she expressed agreement.  She was encouraged to maintain good PO fluid intake and has been doing so for several days; she expressed understanding.  Patient denies SI, HI, AVH.    Spoke with son, Catarino, via phone. He states the patient sounds much improved over the phone and has not mentioned any delusions, hallucinations, paranoid thoughts, or other concerning things in over 2 weeks.  He feels she is ready to discharge to SNF and has no concerns.  Son was updated on patient's progress and all questions were answered.

## 2022-01-25 NOTE — PROGRESS NOTE BEHAVIORAL HEALTH - PRN MEDICATIONS SINCE LAST EVAL
no
yes
no
yes
no
yes
no
yes
no
no

## 2022-01-25 NOTE — PROGRESS NOTE BEHAVIORAL HEALTH - CASE SUMMARY
Pt was seen, evaluated and discussed with the resident, Dr. Siddiqui. Chart reviewed. On evaluation, pt has been taking her medications over the weekend. Pt is not as delusional today. States she ate breakfast but endorsed poor sleep. Remains in bed, requiring much encouragement to do things. Agree with assessment and plan above. Continue with medications as above.
Pt was seen, evaluated and discussed with the resident, Dr. Espana. Chart reviewed. On evaluation, pt reports her diarrhea is resolving. States she ate all of her breakfast. Endorsed her sleep was "so so." Reports feeling better overall. Compliant with medications, denied side effects. States she has been speaking with her 2 sons. Agree with assessment and plan above. Continue with medications as above.
Pt was seen, evaluated and discussed with the resident, Dr. Espana. Chart reviewed. On evaluation, pt reports she had poor sleep at night. No new complaints. Eating well. Compliant with medications, denied side effects. Agree with assessment and plan above. Continue with medications as above.
Pt was seen, evaluated and discussed with the resident, Dr. Espana. Chart reviewed. On evaluation, pt reports she is "ok." No new complaints. Compliant with medications, denied side effects. Agreeable to shower. Visible on the unit. Agree with assessment and plan above. Continue with medications as above.
ADLs improved. She responds well to redirection and reassurance.  Continue tx plan and disposition  efforts
Pt was seen and evaluated with resident, Dr. Espana. Chart reviewed. On evaluation, pt was initially sleeping. Was seen later on in the day, reports she is "ok." No new complaints. Pt endorsed poor sleep at night. Compliant with medications, denied side effects. Remains a placement issue. Agree with assessment and plan as above.
Remains compliant with meds and treatment plan.  Mood has improved; less psychotic. Continue d/c planning
Pt was seen, evaluated and discussed with the resident, Dr. Espana. Chart reviewed. On evaluation, pt reports that she did not sleep well last night. Endorsed fair appetite. Pt ambulating around the unit more. Compliant with medications, denied side effects. Agree with assessment and plan above. Continue with medications as above.
Pt was seen, evaluated and discussed with the resident, Dr. Siddiqui. Chart reviewed. On evaluation, pt took her morning medications. Denied side effects. Has been more visible on the unit, walking around, going to groups. Remains preoccupied with her look and possible smell. Agree with assessment and plan above. Continue with medications as above.
pt continues to show improvement.  Complying with all aspects of tx plan. No s/h ideation.  Continue tx plan.
Pt was seen, evaluated and discussed with the resident, Dr. Siddiqui. Chart reviewed. On evaluation, pt was in her room, in bed, requires a lot of encouragement to take medications. Pt took Zyprexa last night, however remains delusional. Agree with assessment and plan above. Continue with medications as above.
Pt was seen, evaluated and discussed with the resident, Dr. Siddiqui. Chart reviewed. On evaluation, pt was sitting outside of her room, having breakfast. Perseverates on "not feeling good." Has been compliant with evening medications, denied side effects. Agree with assessment and plan above. Continue with medications as above.
Pt was seen, evaluated and discussed with the resident, Dr. Hays. Chart reviewed. On evaluation, pt continues to endorse not feeling well. Compliant with medications, denied side effects. Visible on the unit. Agree with assessment and plan above. Continue with medications as above.
Pt was seen, evaluated and discussed with the resident, Dr. Reece. Chart reviewed. On evaluation, pt reports she is "a little better" however proceeds to state that she "doesn't feel right" and "doesn't know what she should be doing." Feels that her thoughts are foggy/unclear. Compliant with medications, denied side effects. Pt's son visited her today. Agree with assessment and plan above. Continue with medications as above.
Pt was seen, evaluated and discussed with the resident, Dr. Hays. Chart reviewed. On evaluation, pt remains preoccupied with asking "why she is like this?" Has been compliant with medications. No behavioral outbursts. Is visible on the unit, walking around and going to TV room. Denied medication side effects. Agree with assessment and plan above. Continue with medications as above.
Pt was seen, evaluated and discussed with the resident, Dr. Hays. Chart reviewed. On evaluation, pt reports she is "trying." Compliant with medications, denied side effects. Visible on the unit, walking around and is often seen in the day room. Agree with assessment and plan above. Continue with medications as above.
Pt was seen, evaluated and discussed with the resident, Dr. Hays. Chart reviewed. On evaluation, pt reports that she "doesn't feel like herself" however is visible on the unit. She is dressed, out of her room. Reported that's he was going to take a shower. Compliant with medications, denied side effects. Agree with assessment and plan above. Continue with medications as above.
Pt was seen, evaluated and discussed with the resident, Dr. Reece. Chart reviewed. On evaluation, pt continues to require encouragement however is able to do anything she is encouraged to do. Reports poor sleep, states she "tosses and turns a lot." States she continues to feel "not that good" but unable to explain that further. Denied side effects from medications. Agree with assessment and plan above. Continue with medications as above.
Pt is complying with PO meds and showing some improvement. Out of room more; interacting with others and smiling at times.  Less preoccupied with death and delusional thinking. Continue tx plan
Pt is more verbal and out of her room more. Remains psychotic. Compliance with meds remains issue.  No s/h ideation (at times the still thinks she is dead)
Pt was seen and evaluated with resident Dr. Ryan. On evaluation, pt kept perseverating "please, don't do this." Then started to say "you killed my sons, why would you do this?" Pt was redirected to sit up and go to the day room. Requires a lot of encouragement to take medications. Is dysphoric and delusional, with poor appetite. Agree with assessment and plan as above.
pt is out of her room more. No preoccupations with being . Her ADLs have improved and she is complying with PO meds.  Continue tx plan and attempts at DC planning
Pt was seen, evaluated and discussed with the resident, Dr. Reece. Chart reviewed. On evaluation, pt has been paranoid and delusional. Requires a lot of encouragement to take some medication. After 2 hours of encouragement, pt was able to take the Zyprexa. Agree with assessment and plan above. Continue with medications as above.
Pt was seen, evaluated and discussed with the resident, Dr. Reece. Chart reviewed. On evaluation, pt was seen wearing regular clothing and was watching TV in the day room. Appears to have more energy, is no longer spending the whole day in bed. Compliant with medications, denied side effects. Although, refused evening medications yesterday. Continues to perseverate on "trying to do more" but not having the motivation to do anything. Agree with assessment and plan above. Continue with medications as above.
pt is more verbal and open to reassurance regarding delusional thinking.  Complying with meds; spending more time out of her room.  No imminent danger to self/others.  Will continue meds and monitor, as pt appears to be approaching d/c suitability
pt remains psychotic; no s/h ideation.  She is concerned about decreased family members, but is not as convinced she killed anybody. She says she has to leave to the hospital because "it is closing today"  Continue meds and to encourage compliance
Pt was seen, evaluated and discussed with the resident, Dr. Siddiqui. Chart reviewed. On evaluation, pt was initially stating she is "ok" but then proceeded to state "I don't know why I feel this way. I'm sorry." Pt did not take morning medications. Remains delusional. Was visible on the unit, walking around and socializing with peers. Agree with assessment and plan above. Continue with medications as above.
Pt was seen and evaluated with resident Dr. Reece. Chart reviewed. On evaluation, pt was in bed, reports she feels "ok" but essentially remains depressed and despondent. Has been compliant with psychiatric medications, denied side effects. Requires encouragement to get out of her bed. Agree with assessment and plan as above.
Pt was seen, evaluated and discussed with the resident, Dr. Reece. Chart reviewed. On evaluation, pt remains depressed and delusional. Refusing morning medications. Pt finally took PO Zyprexa at around 4PM. Denied side effects. Agree with assessment and plan above. Continue with medications as above.
Pt was seen, evaluated and discussed with the resident, Dr. Reece. Chart reviewed. On evaluation, pt remains depressed and despondent. Has been compliant with medications, denied side effects. Continues to require a lot of encouragement to take care of ADLs and eat. Continues to report poor sleep. Agree with assessment and plan above. Continue with medications as above.
Pt was seen, evaluated and discussed with the resident, Dr. Reece. Chart reviewed. On evaluation, pt remains perseverating on the fact that she is "being thrown out of the hospital." Requires a lot of encouragement to get out of bed. Has been compliant with medications, denied side effects. Agree with assessment and plan above. Continue with medications as above.
Pt was seen, evaluated and discussed with the resident, Dr. Reece. Chart reviewed. On evaluation, pt reports that she is "alright" and continues to state she is "trying." Often states 'I don't know what I am doing" however is able to do ADLs independently. Compliant with medications, denied side effects. Endorsed eating more. Continues to complain of poor sleep. Agree with assessment and plan above. Continue with medications as above.
Pt was seen, evaluated and discussed with the resident, Dr. Reece. Chart reviewed. On evaluation, pt appeared to be more energized however states she saw herself in the mirror and reports she looks "disgusting." Pt was very preoccupied with her appearance. Continues to feel depressed. Endorsed poor sleep and appetite. Agree with assessment and plan above. Continue with medications as above.
Pt was seen, evaluated and discussed with the resident, Dr. Reece. Chart reviewed. On evaluation, pt endorsed feeling "a little better." She stated she had fair sleep. Reports good appetite. States she has been trying to go to groups and made a bracelet. States that she "is trying" to get better. Was going to take a shower after the interview. Denied suicidal/homicidal ideation, intent or plan. Agree with assessment and plan above. Continue with medications as above.
Pt was seen, evaluated and discussed with the resident, Dr. Reece. Chart reviewed. On evaluation, pt reports that she feels that she is "dead" and believes that the treatment team is the "walking dead." Pt is delusional. Non-compliant with medications and decompensating further. Will plan for TOO. Agree with assessment and plan above. Continue with medications as above.
Improvement noted; does not say today that she is dead.  Will continue mes and d/c planning
Pt was seen, evaluated and discussed with the resident, Dr. Reece. Chart reviewed. On evaluation, pt reported that she slept well last night, however remains tired. Wanted to stay in bed. Compliant with medications, denied side effects. Continues to require a lot of encouragement. Agree with assessment and plan above. Continue with medications as above.
Pt was seen, evaluated and discussed with the resident, Dr. Reece. Chart reviewed. On evaluation, pt was initially refusing AM medications. Kept perseverating on "being thrown out, naked." In the afternoon, pt took her medications, went to groups. Denied side effects from medications. Agree with assessment and plan above. Continue with medications as above.
Pt was seen, evaluated and discussed with the resident, Dr. Reece. Chart reviewed. On evaluation, pt was in bed, did not want to engage in interview, stated "I don't have much to say." Pt ate breakfast. Continues to refuse medications. Remains delusional. TOO for 12/13/21. Agree with assessment and plan above. Continue with medications as above.
Pt was seen, evaluated and discussed with the resident, Dr. Reece. Chart reviewed. Ms. Rodarte is a 75 y/o female with history of bipolar depression (Bipolar II disorder), hypertension, osteoarthritis, T2DM, mild cognitive impairment without behavioral disturbance, brought to the ED at the behest of the [facility] she has been residing for the last 7 years for gradual deterioration in patient's ability to care for herself, in the context of recent psychotropic medication changes due to an episode of lithium toxicity in August of 2021. On evaluation, pt reports that she hasn't been feeling "right." States she is "unable to care for herself." Often answers questions with 'I don't know." Unable to provide information such as which medications she is currently on or which medications she had tried. Denied any suicidal/homicidal ideation, intent or plan. Agree with assessment and plan above. Continue with medications as above.

## 2022-01-25 NOTE — PROGRESS NOTE BEHAVIORAL HEALTH - NS ED [facility] REVIEW OF ED CHART AVAILABLE IMAGING REVIEWED
None available

## 2022-01-25 NOTE — PROGRESS NOTE BEHAVIORAL HEALTH - NSBHFUPMEDSE_PSY_A_CORE
None known
Yes
None known
Unable to assess
None known

## 2022-01-25 NOTE — PROGRESS NOTE BEHAVIORAL HEALTH - NSBHATTESTNPTRAINEE_PSY_A_CORE
agree

## 2022-01-25 NOTE — PROGRESS NOTE BEHAVIORAL HEALTH - MODIFICATIONS
None
None
As above
None
None
seen and discussed with resident
None
None
seen and discussed with resident
seen and discussed with resident.
None
seen and discussed with resident
None
seen and discussed with resident
seen and discussed with resident
seen and discussed with resident.
None
seen and discussed with resident
NONE
As above
None
None
As above
None
seen and discussed with resident.
As below.
None
None

## 2022-01-25 NOTE — PROGRESS NOTE BEHAVIORAL HEALTH - SUMMARY
This is a 75 y/o female with PPH of bipolar depression (Bipolar II disorder), HTN, OA, T2DM, cognitive impairment without behavioral disturbance, brought to the ED at the behest of the [facility] she has been residing for the last 7 years for gradual deterioration in patient's ability to care for herself, in the context of recent psychotropic medication changes due to an episode of lithium toxicity in August of 2021.    Patient's current presentation is consistent with severe bipolar depression with psychotic features on underlying dementia with behavioral disturbance. Patient was stable on lithium for decades (though per son, she was never "normal" and always fluctuated between highs and lows).    During this admission patient has been tried on:  - Low dose lithium (d/c-ed due to renal injury, no response)  - Seroquel (up to 250 mg, d/c-ed due to worsening psychosis)  - Prozac (since November 8th) and Zyprexa (currently on Zydis) - some initial improvement, but now worsening  - Lamictal 25 mg qd (12/15 dc'ed as she has been inconsistent with it)     Overall Amber's response to medication treatment was initially poor. She has had increasingly severe delusions and depressive affect. She had been intermittently uncooperative with staff, and occasionally is interactive with peers and amenable to engaging in activities. Her most significant treatment barrier is mixed compliance with medication, as she has declined her morning medications for several days during this hospitalization. However, Prozac discontinuation has been associated with less agitation, depressive affect not improved. Will c/w Zyprexa Zydis 10 mg at bedtime for bipolar depression, especially as IM version available if TOO is needed. Lamotrigine which was prescribed for Bipolar disorder was discontinued due to noncompliance. Was scheduled for TOO on 12/13 but was adjourned as pt was compliant with medication during the few days directly preceding her court date.    Spoke to patient's son Javier about their options for ECT which both sons believe will help their mother most as it has in the past. Advised that though her ECT referral has been sent out, it is unlikely that a different institution will accept her using this method given risk factors and long line. Advised that one option would be to have patient discharged under the care of her family and have them take her to an ED in a facility that has ECT and see whether the treatment team at that facility agrees about necessity of ECT, which is not a guarantee. He verbalized understanding and agreed to discuss with brother Catarino before coming to decision. Today, he reported that he could not feasibly take the pt back into his care, and that the pt would have to be discharged to [facility] for plan to request ECT at a different facility.     Spoke with Dr. Melo who had 3 meetings with the pt at the outpatient clinic prior to this admission. Her behavior during consistent medication compliance resembles her presentation at outpatient appointments (described by depressed mood, deteriorated independence, and requirement of significant encouragement to engage in activities). Amidst intermittent refusal of medications for the past few days, she regressed to the point of once again endorsing [place] delusions and that she has caused the death of her eldest son. On one occasion, she conveyed realization and regret that these thoughts were not based in reality and possessed some insight that her abnormal thought processes are what induce these false beliefs.    Amber displays a variable pattern of functionality and mentation during the day, potentially indicating a delirium or sundowning of her primary dementia.    On evaluations from 1/17-1/25, patient demonstrates improvements in mood, delusions, ADLs, global function, insight and judgement.  She remains perseverative in thought process and requires encouragement to engage with milieu, continues to require IPP treatment for stabilization, discharge planning (SNF), and safety.

## 2022-01-26 LAB
ALBUMIN SERPL ELPH-MCNC: 3.9 G/DL — SIGNIFICANT CHANGE UP (ref 3.5–5.2)
ALP SERPL-CCNC: 88 U/L — SIGNIFICANT CHANGE UP (ref 30–115)
ALT FLD-CCNC: 14 U/L — SIGNIFICANT CHANGE UP (ref 0–41)
ANION GAP SERPL CALC-SCNC: 10 MMOL/L — SIGNIFICANT CHANGE UP (ref 7–14)
AST SERPL-CCNC: 13 U/L — SIGNIFICANT CHANGE UP (ref 0–41)
BILIRUB SERPL-MCNC: <0.2 MG/DL — SIGNIFICANT CHANGE UP (ref 0.2–1.2)
BUN SERPL-MCNC: 45 MG/DL — HIGH (ref 10–20)
CALCIUM SERPL-MCNC: 10 MG/DL — SIGNIFICANT CHANGE UP (ref 8.5–10.1)
CHLORIDE SERPL-SCNC: 106 MMOL/L — SIGNIFICANT CHANGE UP (ref 98–110)
CO2 SERPL-SCNC: 25 MMOL/L — SIGNIFICANT CHANGE UP (ref 17–32)
CREAT SERPL-MCNC: 1.8 MG/DL — HIGH (ref 0.7–1.5)
GLUCOSE BLDC GLUCOMTR-MCNC: 116 MG/DL — HIGH (ref 70–99)
GLUCOSE SERPL-MCNC: 106 MG/DL — HIGH (ref 70–99)
HCT VFR BLD CALC: 30.2 % — LOW (ref 37–47)
HGB BLD-MCNC: 9.4 G/DL — LOW (ref 12–16)
MCHC RBC-ENTMCNC: 26.7 PG — LOW (ref 27–31)
MCHC RBC-ENTMCNC: 31.1 G/DL — LOW (ref 32–37)
MCV RBC AUTO: 85.8 FL — SIGNIFICANT CHANGE UP (ref 81–99)
NRBC # BLD: 0 /100 WBCS — SIGNIFICANT CHANGE UP (ref 0–0)
PLATELET # BLD AUTO: 127 K/UL — LOW (ref 130–400)
POTASSIUM SERPL-MCNC: 5.1 MMOL/L — HIGH (ref 3.5–5)
POTASSIUM SERPL-SCNC: 5.1 MMOL/L — HIGH (ref 3.5–5)
PROT SERPL-MCNC: 6.6 G/DL — SIGNIFICANT CHANGE UP (ref 6–8)
RBC # BLD: 3.52 M/UL — LOW (ref 4.2–5.4)
RBC # FLD: 15.1 % — HIGH (ref 11.5–14.5)
SODIUM SERPL-SCNC: 141 MMOL/L — SIGNIFICANT CHANGE UP (ref 135–146)
WBC # BLD: 5.28 K/UL — SIGNIFICANT CHANGE UP (ref 4.8–10.8)
WBC # FLD AUTO: 5.28 K/UL — SIGNIFICANT CHANGE UP (ref 4.8–10.8)

## 2022-01-26 PROCEDURE — 99231 SBSQ HOSP IP/OBS SF/LOW 25: CPT

## 2022-01-26 RX ORDER — LISINOPRIL 2.5 MG/1
1 TABLET ORAL
Qty: 0 | Refills: 0 | DISCHARGE

## 2022-01-26 RX ORDER — GLIMEPIRIDE 1 MG
1 TABLET ORAL
Qty: 0 | Refills: 0 | DISCHARGE

## 2022-01-26 RX ORDER — LANOLIN ALCOHOL/MO/W.PET/CERES
5 CREAM (GRAM) TOPICAL
Qty: 0 | Refills: 0 | DISCHARGE
Start: 2022-01-26

## 2022-01-26 RX ORDER — VALPROIC ACID (AS SODIUM SALT) 250 MG/5ML
500 SOLUTION, ORAL ORAL
Qty: 0 | Refills: 0 | DISCHARGE
Start: 2022-01-26

## 2022-01-26 RX ORDER — TRAZODONE HCL 50 MG
1 TABLET ORAL
Qty: 0 | Refills: 0 | DISCHARGE
Start: 2022-01-26

## 2022-01-26 RX ORDER — PANTOPRAZOLE SODIUM 20 MG/1
1 TABLET, DELAYED RELEASE ORAL
Qty: 0 | Refills: 0 | DISCHARGE
Start: 2022-01-26

## 2022-01-26 RX ORDER — ASPIRIN/CALCIUM CARB/MAGNESIUM 324 MG
1 TABLET ORAL
Qty: 0 | Refills: 0 | DISCHARGE

## 2022-01-26 RX ORDER — SITAGLIPTIN 50 MG/1
1 TABLET, FILM COATED ORAL
Qty: 0 | Refills: 0 | DISCHARGE
Start: 2022-01-26

## 2022-01-26 RX ORDER — HYDRALAZINE HCL 50 MG
1 TABLET ORAL
Qty: 0 | Refills: 0 | DISCHARGE

## 2022-01-26 RX ORDER — OMEPRAZOLE 10 MG/1
1 CAPSULE, DELAYED RELEASE ORAL
Qty: 0 | Refills: 0 | DISCHARGE

## 2022-01-26 RX ORDER — SODIUM BICARBONATE 1 MEQ/ML
2 SYRINGE (ML) INTRAVENOUS
Qty: 0 | Refills: 0 | DISCHARGE

## 2022-01-26 RX ORDER — ATORVASTATIN CALCIUM 80 MG/1
1 TABLET, FILM COATED ORAL
Qty: 0 | Refills: 0 | DISCHARGE

## 2022-01-26 RX ORDER — OLANZAPINE 15 MG/1
1 TABLET, FILM COATED ORAL
Qty: 0 | Refills: 0 | DISCHARGE
Start: 2022-01-26

## 2022-01-26 RX ADMIN — Medication 500 MILLIGRAM(S): at 20:31

## 2022-01-26 RX ADMIN — Medication 1300 MILLIGRAM(S): at 20:32

## 2022-01-26 RX ADMIN — Medication 650 MILLIGRAM(S): at 16:28

## 2022-01-26 RX ADMIN — Medication 25 MILLIGRAM(S): at 20:34

## 2022-01-26 RX ADMIN — Medication 500 MILLIGRAM(S): at 08:37

## 2022-01-26 RX ADMIN — Medication 325 MILLIGRAM(S): at 08:40

## 2022-01-26 RX ADMIN — LISINOPRIL 10 MILLIGRAM(S): 2.5 TABLET ORAL at 08:40

## 2022-01-26 RX ADMIN — Medication 1 SUPPOSITORY(S): at 08:41

## 2022-01-26 RX ADMIN — Medication 1 SPRAY(S): at 08:41

## 2022-01-26 RX ADMIN — AMLODIPINE BESYLATE 10 MILLIGRAM(S): 2.5 TABLET ORAL at 08:38

## 2022-01-26 RX ADMIN — Medication 1 SPRAY(S): at 20:35

## 2022-01-26 RX ADMIN — Medication 1300 MILLIGRAM(S): at 08:39

## 2022-01-26 RX ADMIN — HEPARIN SODIUM 5000 UNIT(S): 5000 INJECTION INTRAVENOUS; SUBCUTANEOUS at 20:31

## 2022-01-26 RX ADMIN — Medication 81 MILLIGRAM(S): at 08:39

## 2022-01-26 RX ADMIN — Medication 1 SUPPOSITORY(S): at 20:35

## 2022-01-26 RX ADMIN — ATORVASTATIN CALCIUM 40 MILLIGRAM(S): 80 TABLET, FILM COATED ORAL at 20:31

## 2022-01-26 RX ADMIN — Medication 5 MILLIGRAM(S): at 20:33

## 2022-01-26 RX ADMIN — Medication 100 MILLIGRAM(S): at 20:31

## 2022-01-26 RX ADMIN — Medication 1 APPLICATION(S): at 08:41

## 2022-01-26 RX ADMIN — PANTOPRAZOLE SODIUM 40 MILLIGRAM(S): 20 TABLET, DELAYED RELEASE ORAL at 08:38

## 2022-01-26 RX ADMIN — OLANZAPINE 10 MILLIGRAM(S): 15 TABLET, FILM COATED ORAL at 20:32

## 2022-01-26 NOTE — PROGRESS NOTE BEHAVIORAL HEALTH - SECONDARY DX1
Neurocognitive disorder
Neurocognitive disorder
Mild cognitive impairment
Neurocognitive disorder
Mild cognitive impairment
Neurocognitive disorder
Neurocognitive disorder
No difficulties
Neurocognitive disorder
Mild cognitive impairment
Neurocognitive disorder
Mild cognitive impairment
Neurocognitive disorder
Mild cognitive impairment
Mild cognitive impairment

## 2022-01-26 NOTE — PROGRESS NOTE BEHAVIORAL HEALTH - NSBHATTESTSEENBY_PSY_A_CORE
Attending Psychiatrist supervising NP/Trainee, meeting pt...
Attending Psychiatrist supervising NP/Trainee, meeting pt...
attending Psychiatrist without NP/Trainee
Attending Psychiatrist supervising NP/Trainee, meeting pt...
attending Psychiatrist without NP/Trainee
attending Psychiatrist without NP/Trainee
Attending Psychiatrist supervising NP/Trainee, meeting pt...
Attending Psychiatrist supervising NP/Trainee, meeting pt...
attending Psychiatrist without NP/Trainee
Attending Psychiatrist supervising NP/Trainee, meeting pt...
attending Psychiatrist without NP/Trainee
attending Psychiatrist without NP/Trainee
Attending Psychiatrist supervising NP/Trainee, meeting pt...
Attending Psychiatrist supervising NP/Trainee, meeting pt...
attending Psychiatrist without NP/Trainee
attending Psychiatrist without NP/Trainee
Attending Psychiatrist supervising NP/Trainee, meeting pt...
Attending Psychiatrist supervising NP/Trainee, meeting pt...
attending Psychiatrist without NP/Trainee
Attending Psychiatrist supervising NP/Trainee, meeting pt...
attending Psychiatrist without NP/Trainee
Attending Psychiatrist supervising NP/Trainee, meeting pt...
attending Psychiatrist without NP/Trainee
attending Psychiatrist without NP/Trainee
Attending Psychiatrist supervising NP/Trainee, meeting pt...
attending Psychiatrist without NP/Trainee
Attending Psychiatrist supervising NP/Trainee, meeting pt...
Attending Psychiatrist supervising NP/Trainee, meeting pt...
attending Psychiatrist without NP/Trainee
Attending Psychiatrist supervising NP/Trainee, meeting pt...
attending Psychiatrist without NP/Trainee
Attending Psychiatrist supervising NP/Trainee, meeting pt...

## 2022-01-26 NOTE — PROGRESS NOTE BEHAVIORAL HEALTH - SUMMARY
This is a 77 y/o female with PPH of bipolar depression (Bipolar II disorder), HTN, OA, T2DM, cognitive impairment without behavioral disturbance, brought to the ED at the behest of the halfway she has been residing for the last 7 years for gradual deterioration in patient's ability to care for herself, in the context of recent psychotropic medication changes due to an episode of lithium toxicity in August of 2021.    Patient's current presentation is consistent with severe bipolar depression with psychotic features on underlying dementia with behavioral disturbance. Patient was stable on lithium for decades (though per son, she was never "normal" and always fluctuated between highs and lows).    During this admission patient has been tried on:  - Low dose lithium (d/c-ed due to renal injury, no response)  - Seroquel (up to 250 mg, d/c-ed due to worsening psychosis)  - Prozac (since November 8th) and Zyprexa (currently on Zydis) - some initial improvement, but now worsening  - Lamictal 25 mg qd (12/15 dc'ed as she has been inconsistent with it)     Overall Amber's response to medication treatment was initially poor. She has had increasingly severe delusions and depressive affect. She had been intermittently uncooperative with staff, and occasionally is interactive with peers and amenable to engaging in activities. Her most significant treatment barrier is mixed compliance with medication, as she has declined her morning medications for several days during this hospitalization. However, Prozac discontinuation has been associated with less agitation, depressive affect not improved. Will c/w Zyprexa Zydis 10 mg at bedtime for bipolar depression, especially as IM version available if TOO is needed. Lamotrigine which was prescribed for Bipolar disorder was discontinued due to noncompliance. Was scheduled for TOO on 12/13 but was adjourned as pt was compliant with medication during the few days directly preceding her court date.    Spoke to patient's son Javier about their options for ECT which both sons believe will help their mother most as it has in the past. Advised that though her ECT referral has been sent out, it is unlikely that a different institution will accept her using this method given risk factors and long line. Advised that one option would be to have patient discharged under the care of her family and have them take her to an ED in a facility that has ECT and see whether the treatment team at that facility agrees about necessity of ECT, which is not a guarantee. He verbalized understanding and agreed to discuss with brother Catarino before coming to decision. Today, he reported that he could not feasibly take the pt back into his care, and that the pt would have to be discharged to [facility] for plan to request ECT at a different facility.     Spoke with Dr. Melo who had 3 meetings with the pt at the outpatient clinic prior to this admission. Her behavior during consistent medication compliance resembles her presentation at outpatient appointments (described by depressed mood, deteriorated independence, and requirement of significant encouragement to engage in activities). Amidst intermittent refusal of medications for the past few days, she regressed to the point of once again endorsing Cot[place] delusions and that she has caused the death of her eldest son. On one occasion, she conveyed realization and regret that these thoughts were not based in reality and possessed some insight that her abnormal thought processes are what induce these false beliefs.    Amber displays a variable pattern of functionality and mentation during the day, potentially indicating a delirium or sundowning of her primary dementia.    On evaluations from 1/17-1/25, patient demonstrates improvements in mood, delusions, ADLs, global function, insight and judgement.  She remains perseverative in thought process and requires encouragement to engage with milieu, continues to require IPP treatment for stabilization, discharge planning (SNF), and safety.

## 2022-01-26 NOTE — PROGRESS NOTE BEHAVIORAL HEALTH - PROBLEM SELECTOR PROBLEM 3
HTN (hypertension)
DM (diabetes mellitus)
HTN (hypertension)
HTN (hypertension)
DM (diabetes mellitus)
HTN (hypertension)
HTN (hypertension)
DM (diabetes mellitus)
DM (diabetes mellitus)
HTN (hypertension)
DM (diabetes mellitus)
HTN (hypertension)
DM (diabetes mellitus)
HTN (hypertension)
DM (diabetes mellitus)
DM (diabetes mellitus)
HTN (hypertension)
DM (diabetes mellitus)
DM (diabetes mellitus)
HTN (hypertension)
DM (diabetes mellitus)
HTN (hypertension)
DM (diabetes mellitus)
HTN (hypertension)
HTN (hypertension)
DM (diabetes mellitus)
DM (diabetes mellitus)
HTN (hypertension)
DM (diabetes mellitus)
HTN (hypertension)
DM (diabetes mellitus)

## 2022-01-26 NOTE — PROGRESS NOTE BEHAVIORAL HEALTH - NSBHADMITNEWMED_PSY_A_CORE
no
no
yes
no
yes
no
yes
no

## 2022-01-26 NOTE — PROGRESS NOTE BEHAVIORAL HEALTH - NSBHADMITIPREASON_PSY_A_CORE
Danger to self; mental illness expected to respond to inpatient care

## 2022-01-26 NOTE — PROGRESS NOTE BEHAVIORAL HEALTH - NS ED BHA MSE GENERAL APPEARANCE
No deformities present

## 2022-01-26 NOTE — PROGRESS NOTE BEHAVIORAL HEALTH - NSBHADMITDANGERSELF_PSY_A_CORE
unable to care for self

## 2022-01-26 NOTE — PROGRESS NOTE BEHAVIORAL HEALTH - MUSCLE TONE / STRENGTH
Other
Other
Normal muscle tone/strength
Normal muscle tone/strength
Other
Normal muscle tone/strength
Other
Normal muscle tone/strength
Other
Normal muscle tone/strength
Normal muscle tone/strength
Other
Normal muscle tone/strength
Other
Normal muscle tone/strength
Other
Normal muscle tone/strength
Other

## 2022-01-26 NOTE — PROGRESS NOTE BEHAVIORAL HEALTH - NSBHFUPTYPE_PSY_A_CORE
Inpatient
Inpatient-On Service Note
Inpatient
Inpatient-On Service Note
Inpatient

## 2022-01-26 NOTE — PROGRESS NOTE BEHAVIORAL HEALTH - NSBHADMITIPOBSFT_PSY_A_CORE
per unit protocol
per unit protocol
unit routine
As per unit protocol
per unit protocol
As per unit protocol
As per unit protocol
per unit protocol
As per unit protocol
unit routine
per unit protocol
As per unit protocol
per unit protocol
As per unit protocol
per unit protocol
As per unit protocol
per unit protocol
per unit protocol
As per unit protocol
As per unit protocol
per unit protocol
As per unit protocol
per unit protocol
As per unit protocol
safety
As per unit protocol
As per unit protocol
per unit protocol
As per unit protocol
per unit protocol
As per unit protocol
per unit protocol
per unit protocol
As per unit protocol
As per unit protocol
per unit protocol
per unit protocol
As per unit protocol
As per unit protocol
per unit protocol
As per unit protocol
per unit protocol
As per unit protocol
per unit protocol
per unit protocol
As per unit protocol
per unit protocol
per unit protocol

## 2022-01-26 NOTE — PROGRESS NOTE BEHAVIORAL HEALTH - NSBHLOC_PSY_A_CORE
Alert

## 2022-01-26 NOTE — PROGRESS NOTE BEHAVIORAL HEALTH - AFFECT CONGRUENCE
Congruent

## 2022-01-26 NOTE — PROGRESS NOTE BEHAVIORAL HEALTH - NSBHADMITIPBHPROVIDER_PSY_A_CORE
N/A
N/A
no
N/A
no
N/A
no
N/A
no
yes
no
N/A
no
N/A
no
N/A
no
N/A
no
N/A
N/A
no
yes
yes
N/A
no
no
N/A
no
no
yes
N/A
no
no
N/A
no
N/A
N/A
yes
N/A
N/A

## 2022-01-26 NOTE — PROGRESS NOTE BEHAVIORAL HEALTH - NS ED BHA MSE SPEECH ARTICULATION
Normal

## 2022-01-26 NOTE — PROGRESS NOTE BEHAVIORAL HEALTH - ORIENTED TO PERSON
Yes

## 2022-01-26 NOTE — PROGRESS NOTE BEHAVIORAL HEALTH - GAIT / STATION
Normal gait / station
Other
Normal gait / station
Other
Other
Normal gait / station
Other
Other
Normal gait / station
Other
Normal gait / station
Normal gait / station
Other
Other
Normal gait / station
Other
Normal gait / station
Other
Normal gait / station
Other
Normal gait / station
Normal gait / station
Other
Other
Normal gait / station
Other
Normal gait / station
Other
Normal gait / station
Normal gait / station
Other
Normal gait / station
Normal gait / station

## 2022-01-26 NOTE — PROGRESS NOTE BEHAVIORAL HEALTH - NSBHPTASSESSDT_PSY_A_CORE
30-Nov-2021 10:39
20-Jan-2022 13:03
07-Dec-2021 09:59
14-Jan-2022 09:31
07-Nov-2021 19:09
15-Tristin-2022 14:02
29-Dec-2021 09:30
04-Jan-2022 08:45
13-Jan-2022 09:58
08-Nov-2021 13:05
23-Dec-2021 09:00
14-Dec-2021 11:25
17-Dec-2021 09:51
19-Jan-2022 10:27
08-Dec-2021 10:27
16-Dec-2021 12:57
02-Dec-2021 10:19
11-Nov-2021 10:39
24-Nov-2021 10:20
30-Dec-2021 09:30
17-Nov-2021 09:54
19-Nov-2021 10:30
07-Jan-2022 09:00
23-Nov-2021 10:04
11-Jan-2022 09:43
10-Dec-2021 10:05
15-Dec-2021 09:25
29-Nov-2021 09:46
03-Dec-2021 09:07
06-Dec-2021 10:21
09-Nov-2021 10:15
21-Dec-2021 08:45
05-Dec-2021 10:31
13-Dec-2021 11:22
18-Nov-2021 09:46
01-Dec-2021 10:59
22-Nov-2021 10:40
26-Jan-2022 09:39
17-Jan-2022 12:53
06-Jan-2022 09:30
06-Nov-2021 10:53
22-Jan-2022 12:39
12-Jan-2022 09:45
12-Nov-2021 10:13
18-Jan-2022 12:11
09-Dec-2021 10:22
26-Nov-2021 10:20
10-Tristin-2022 09:53
16-Nov-2021 08:51
03-Jan-2022 09:00
10-Nov-2021 10:14
25-Jan-2022 09:37
15-Nov-2021 09:03
24-Jan-2022 14:11
21-Jan-2022 10:59
05-Jan-2022 08:45
27-Dec-2021 09:00
28-Dec-2021 08:45
22-Dec-2021 14:12
20-Dec-2021 11:22

## 2022-01-26 NOTE — PROGRESS NOTE BEHAVIORAL HEALTH - ABNORMAL MOVEMENTS
No abnormal movements

## 2022-01-26 NOTE — PROGRESS NOTE BEHAVIORAL HEALTH - PROBLEM SELECTOR PROBLEM 4
Hyperlipidemia
HTN (hypertension)
Hyperlipidemia
HTN (hypertension)
Hyperlipidemia
Hyperlipidemia
HTN (hypertension)
Hyperlipidemia
HTN (hypertension)
Hyperlipidemia
Hyperlipidemia
HTN (hypertension)
Hyperlipidemia
HTN (hypertension)
Hyperlipidemia
HTN (hypertension)
Hyperlipidemia
Hyperlipidemia
HTN (hypertension)
Hyperlipidemia
HTN (hypertension)
Hyperlipidemia
Hyperlipidemia
HTN (hypertension)
Hyperlipidemia
HTN (hypertension)
Hyperlipidemia
HTN (hypertension)
Hyperlipidemia
Hyperlipidemia
HTN (hypertension)
HTN (hypertension)
Hyperlipidemia
Hyperlipidemia
HTN (hypertension)
HTN (hypertension)
Hyperlipidemia

## 2022-01-26 NOTE — PROGRESS NOTE BEHAVIORAL HEALTH - NS ED BHA MED ROS PSYCHIATRIC
See HPI

## 2022-01-26 NOTE — PROGRESS NOTE BEHAVIORAL HEALTH - NSBHADMITIPDSM_PSY_A_CORE
see above for Axis I, II, III

## 2022-01-26 NOTE — PROGRESS NOTE BEHAVIORAL HEALTH - AXIS III
HTN, HLD, T2DM, Arthritis

## 2022-01-26 NOTE — PROGRESS NOTE BEHAVIORAL HEALTH - BODY HABITUS
Average build

## 2022-01-26 NOTE — PROGRESS NOTE BEHAVIORAL HEALTH - NSBHADMITMEDEDU_PSY_A_CORE
yes...

## 2022-01-26 NOTE — PROGRESS NOTE BEHAVIORAL HEALTH - PROBLEM SELECTOR PROBLEM 1
Bipolar depression

## 2022-01-26 NOTE — PROGRESS NOTE BEHAVIORAL HEALTH - PROBLEM SELECTOR PLAN 3
- Continue lisinopril 40 mg qday  - Continue amlodipine 10 mg qday  - monitor BP.
- c/w Januvia 50 qday  - d/c SS insulin d/t well controlled BG
- Continue lisinopril 40 mg qday  - Continue amlodipine 10 mg qday  - monitor BP.
- Continue lisinopril 40 mg qday  - Continue amlodipine 10 mg qday  - monitor BP.
- c/w lisinopril 40 mg qday  - c/w amlodipine 10 mg qday  - monitor BP.
- c/w lisinopril 40 mg qday  - c/w amlodipine 10 mg qday  - monitor BP.
- c/w Januvia 50 qday
- Continue lisinopril 40 mg qday  - Continue amlodipine 10 mg qday  - monitor BP.
- c/w Januvia 50 qday
- Continue lisinopril 40 mg qday  - Continue amlodipine 10 mg qday  - monitor BP.
- c/w Januvia 50 qday
- c/w Januvia 50 qday  - d/c SS insulin d/t well controlled BG
- Continue lisinopril 40 mg qday  - Continue amlodipine 10 mg qday  - monitor BP.
- c/w Januvia 50 qday
- c/w lisinopril 40 mg qday  - c/w amlodipine 10 mg qday  - monitor BP.
- Continue lisinopril 40 mg qday  - Continue amlodipine 10 mg qday  - monitor BP.
- c/w Januvia 50 qday  - d/c SS insulin d/t well controlled BG
- Continue lisinopril 40 mg qday  - Continue amlodipine 10 mg qday  - monitor BP.
- c/w Januvia 50 qday
- Continue lisinopril 40 mg qday  - Continue amlodipine 10 mg qday  - monitor BP.
- c/w lisinopril 40 mg qday  - c/w amlodipine 10 mg qday  - monitor BP.
- c/w Januvia 50 qday  - d/c SS insulin d/t well controlled BG
- Continue lisinopril 40 mg qday  - Continue amlodipine 10 mg qday  - monitor BP.
- c/w Januvia 50 qday
- Continue lisinopril 40 mg qday  - Continue amlodipine 10 mg qday  - monitor BP.
- c/w lisinopril 40 mg qday  - c/w amlodipine 10 mg qday  - monitor BP.
- c/w Januvia 50 qday
- c/w lisinopril 40 mg qday  - c/w amlodipine 10 mg qday  - monitor BP.
- Continue lisinopril 40 mg qday  - Continue amlodipine 10 mg qday  - monitor BP.
- c/w lisinopril 40 mg qday  - c/w amlodipine 10 mg qday  - monitor BP.
- c/w Januvia 50 qday
- Continue lisinopril 40 mg qday  - Continue amlodipine 10 mg qday  - monitor BP.
- Continue lisinopril 40 mg qday  - Continue amlodipine 10 mg qday  - monitor BP.
- c/w Januvia 50 qday
- Continue lisinopril 40 mg qday  - Continue amlodipine 10 mg qday  - monitor BP.
- c/w Januvia 50 qday
- c/w Januvia 50 qday
- Continue lisinopril 40 mg qday  - Continue amlodipine 10 mg qday  - monitor BP.
- c/w Januvia 50 qday  - d/c SS insulin d/t well controlled BG
- Continue lisinopril 40 mg qday  - Continue amlodipine 10 mg qday  - monitor BP.
- c/w lisinopril 40 mg qday  - c/w amlodipine 10 mg qday  - monitor BP.
- c/w Januvia 50 qday
- c/w Januvia 50 qday  - c/w SS insulin
- c/w Januvia 50 qday

## 2022-01-26 NOTE — PROGRESS NOTE BEHAVIORAL HEALTH - NSBHFUPVIOL_PSY_A_CORE
none known

## 2022-01-26 NOTE — PROGRESS NOTE BEHAVIORAL HEALTH - NS ED BHA AXIS I SECONDARY1 CODE FT
R41.9
G31.84
R41.9
G31.84
R41.9
G31.84
R41.9
G31.84
R41.9
G31.84
G31.84

## 2022-01-26 NOTE — PROGRESS NOTE BEHAVIORAL HEALTH - NSBHCHARTREVIEWVS_PSY_A_CORE FT
ICU Vital Signs Last 24 Hrs  T(C): 36.7 (10 Tristin 2022 05:54), Max: 36.7 (10 Tristin 2022 05:54)  T(F): 98 (10 Tristin 2022 05:54), Max: 98 (10 Tristin 2022 05:54)  HR: 88 (10 Tristin 2022 05:54) (82 - 88)  BP: 137/72 (10 Tristin 2022 05:54) (127/83 - 137/72)  BP(mean): --  ABP: --  ABP(mean): --  RR: 20 (10 Tristin 2022 05:54) (16 - 20)  SpO2: --
ICU Vital Signs Last 24 Hrs  T(C): 36.1 (07 Dec 2021 08:54), Max: 36.6 (06 Dec 2021 15:53)  T(F): 96.9 (07 Dec 2021 08:54), Max: 97.9 (06 Dec 2021 15:53)  HR: 62 (07 Dec 2021 08:54) (53 - 62)  BP: 173/- (07 Dec 2021 08:54) (110/59 - 173/-)  BP(mean): --  ABP: --  ABP(mean): --  RR: 16 (07 Dec 2021 05:57) (16 - 16)  SpO2: --
Vital Signs Last 24 Hrs  T(C): 35.9 (04 Jan 2022 05:34), Max: 36.7 (03 Jan 2022 15:24)  T(F): 96.7 (04 Jan 2022 05:34), Max: 98.1 (03 Jan 2022 15:24)  HR: 67 (04 Jan 2022 05:34) (64 - 67)  BP: 156/74 (04 Jan 2022 05:34) (130/70 - 156/74)  BP(mean): --  RR: 16 (04 Jan 2022 05:34) (16 - 16)  SpO2: --
ICU Vital Signs Last 24 Hrs  T(C): 36.3 (08 Nov 2021 08:00), Max: 36.4 (07 Nov 2021 16:10)  T(F): 97.3 (08 Nov 2021 08:00), Max: 97.6 (07 Nov 2021 16:10)  HR: 78 (08 Nov 2021 14:01) (56 - 85)  BP: 153/68 (08 Nov 2021 14:01) (142/77 - 186/88)  BP(mean): --  ABP: --  ABP(mean): --  RR: 18 (08 Nov 2021 08:00) (18 - 18)  SpO2: --
ICU Vital Signs Last 24 Hrs  T(C): 36.8 (29 Nov 2021 07:45), Max: 36.8 (29 Nov 2021 07:45)  T(F): 98.3 (29 Nov 2021 07:45), Max: 98.3 (29 Nov 2021 07:45)  HR: 50 (29 Nov 2021 07:45) (50 - 68)  BP: 113/56 (29 Nov 2021 07:45) (95/55 - 113/56)  BP(mean): --  ABP: --  ABP(mean): --  RR: 16 (29 Nov 2021 07:45) (16 - 18)  SpO2: --
Vital Signs Last 24 Hrs  T(C): 35.6 (30 Dec 2021 07:24), Max: 35.6 (30 Dec 2021 05:29)  T(F): 96 (30 Dec 2021 07:24), Max: 96.1 (30 Dec 2021 05:29)  HR: 57 (30 Dec 2021 07:24) (57 - 59)  BP: 137/61 (30 Dec 2021 07:24) (132/62 - 137/61)  BP(mean): --  RR: 18 (30 Dec 2021 07:24) (16 - 18)  SpO2: --
Vital Signs Last 24 Hrs  T(C): 35.7 (24 Jan 2022 08:46), Max: 35.8 (23 Jan 2022 15:34)  T(F): 96.3 (24 Jan 2022 08:46), Max: 96.5 (23 Jan 2022 15:34)  HR: 58 (24 Jan 2022 08:46) (50 - 58)  BP: 118/56 (24 Jan 2022 08:46) (118/56 - 122/58)  BP(mean): --  RR: 16 (24 Jan 2022 08:46) (16 - 16)  SpO2: --
ICU Vital Signs Last 24 Hrs  T(C): 35.6 (26 Jan 2022 09:28), Max: 36.3 (25 Jan 2022 15:55)  T(F): 96.1 (26 Jan 2022 09:28), Max: 97.3 (25 Jan 2022 15:55)  HR: 58 (26 Jan 2022 09:28) (50 - 58)  BP: 132/63 (26 Jan 2022 09:28) (116/61 - 132/63)  BP(mean): --  ABP: --  ABP(mean): --  RR: 16 (26 Jan 2022 09:28) (16 - 18)  SpO2: --
Vital Signs Last 24 Hrs  T(C): 35.6 (28 Dec 2021 07:18), Max: 36.2 (27 Dec 2021 15:47)  T(F): 96 (28 Dec 2021 07:18), Max: 97.1 (27 Dec 2021 15:47)  HR: 66 (28 Dec 2021 07:18) (59 - 76)  BP: 160/80 (28 Dec 2021 07:18) (156/74 - 195/83)  BP(mean): --  RR: 18 (28 Dec 2021 07:18) (16 - 18)  SpO2: --
Vital Signs Last 24 Hrs  T(C): 36.3 (18 Jan 2022 05:48), Max: 36.4 (17 Jan 2022 20:46)  T(F): 97.4 (18 Jan 2022 05:48), Max: 97.5 (17 Jan 2022 20:46)  HR: 57 (18 Jan 2022 05:48) (51 - 57)  BP: 150/66 (18 Jan 2022 05:48) (149/64 - 169/79)  BP(mean): --  RR: 16 (18 Jan 2022 05:48) (16 - 18)  SpO2: --
ICU Vital Signs Last 24 Hrs  T(C): 36.2 (19 Jan 2022 05:54), Max: 36.6 (18 Jan 2022 16:14)  T(F): 97.1 (19 Jan 2022 05:54), Max: 97.9 (18 Jan 2022 16:14)  HR: 54 (19 Jan 2022 05:54) (54 - 63)  BP: 162/71 (19 Jan 2022 05:54) (162/71 - 177/79)  BP(mean): --  ABP: --  ABP(mean): --  RR: 16 (19 Jan 2022 05:54) (16 - 16)  SpO2: --
Vital Signs Last 24 Hrs  T(C): 35.8 (06 Nov 2021 08:00), Max: 36.9 (05 Nov 2021 13:48)  T(F): 96.5 (06 Nov 2021 08:00), Max: 98.5 (05 Nov 2021 13:48)  HR: 67 (06 Nov 2021 08:00) (67 - 104)  BP: 171/73 (06 Nov 2021 08:00) (117/62 - 171/73)  BP(mean): --  RR: 18 (06 Nov 2021 08:00) (18 - 20)  SpO2: 98% (05 Nov 2021 22:34) (98% - 99%)
ICU Vital Signs Last 24 Hrs  T(C): 36.2 (08 Dec 2021 07:38), Max: 36.4 (07 Dec 2021 15:59)  T(F): 97.1 (08 Dec 2021 07:38), Max: 97.5 (07 Dec 2021 15:59)  HR: 60 (08 Dec 2021 07:38) (57 - 62)  BP: 110/70 (08 Dec 2021 07:38) (110/70 - 148/77)  BP(mean): --  ABP: --  ABP(mean): --  RR: 16 (08 Dec 2021 07:38) (16 - 16)  SpO2: --
ICU Vital Signs Last 24 Hrs  T(C): 36.2 (15 Nov 2021 09:35), Max: 36.2 (14 Nov 2021 16:00)  T(F): 97.1 (15 Nov 2021 09:35), Max: 97.1 (14 Nov 2021 16:00)  HR: 68 (15 Nov 2021 09:35) (58 - 70)  BP: 148/83 (15 Nov 2021 09:35) (112/60 - 153/71)  BP(mean): --  ABP: --  ABP(mean): --  RR: 18 (15 Nov 2021 09:35) (16 - 18)  SpO2: --
ICU Vital Signs Last 24 Hrs  T(C): 35.7 (24 Nov 2021 08:26), Max: 36.4 (23 Nov 2021 15:51)  T(F): 96.3 (24 Nov 2021 08:26), Max: 97.6 (23 Nov 2021 15:51)  HR: 56 (24 Nov 2021 08:26) (56 - 61)  BP: 78/50 (24 Nov 2021 08:26) (78/50 - 106/52)  BP(mean): --  ABP: --  ABP(mean): --  RR: 16 (24 Nov 2021 08:26) (15 - 18)  SpO2: --
ICU Vital Signs Last 24 Hrs  T(C): 36.1 (12 Jan 2022 09:14), Max: 36.1 (11 Jan 2022 10:09)  T(F): 96.9 (12 Jan 2022 09:14), Max: 96.9 (11 Jan 2022 10:09)  HR: 63 (12 Jan 2022 09:14) (62 - 69)  BP: 143/66 (12 Jan 2022 09:14) (130/71 - 147/67)  BP(mean): --  ABP: --  ABP(mean): --  RR: 18 (12 Jan 2022 09:14) (16 - 18)  SpO2: --
ICU Vital Signs Last 24 Hrs  T(C): 35.9 (01 Dec 2021 08:23), Max: 36.7 (30 Nov 2021 15:06)  T(F): 96.6 (01 Dec 2021 08:23), Max: 98 (30 Nov 2021 15:06)  HR: 59 (01 Dec 2021 08:23) (53 - 62)  BP: 119/60 (01 Dec 2021 08:23) (119/60 - 164/72)  BP(mean): --  ABP: --  ABP(mean): --  RR: 18 (01 Dec 2021 08:23) (16 - 18)  SpO2: --
Vital Signs Last 24 Hrs  T(C): 36.4 (07 Nov 2021 16:10), Max: 36.4 (07 Nov 2021 06:23)  T(F): 97.6 (07 Nov 2021 16:10), Max: 97.6 (07 Nov 2021 16:10)  HR: 85 (07 Nov 2021 16:10) (55 - 85)  BP: 165/70 (07 Nov 2021 16:10) (155/66 - 198/87)  BP(mean): --  RR: 18 (07 Nov 2021 16:10) (16 - 18)  SpO2: --
Vital Signs Last 24 Hrs  T(C): 36.4 (15 Dec 2021 07:30), Max: 36.4 (15 Dec 2021 07:30)  T(F): 97.5 (15 Dec 2021 07:30), Max: 97.5 (15 Dec 2021 07:30)  HR: 56 (15 Dec 2021 07:30) (56 - 80)  BP: 137/64 (15 Dec 2021 07:30) (112/70 - 137/64)  BP(mean): --  RR: 18 (15 Dec 2021 07:30) (16 - 20)  SpO2: --
ICU Vital Signs Last 24 Hrs  T(C): 36.1 (18 Nov 2021 08:45), Max: 36.7 (17 Nov 2021 16:06)  T(F): 96.9 (18 Nov 2021 08:45), Max: 98 (17 Nov 2021 16:06)  HR: 60 (18 Nov 2021 08:45) (57 - 60)  BP: 180/70 (18 Nov 2021 08:45) (107/58 - 181/77)  BP(mean): --  ABP: --  ABP(mean): --  RR: 16 (18 Nov 2021 08:45) (16 - 16)  SpO2: --
ICU Vital Signs Last 24 Hrs  T(C): 36.3 (03 Dec 2021 15:40), Max: 36.6 (02 Dec 2021 19:06)  T(F): 97.4 (03 Dec 2021 15:40), Max: 97.8 (02 Dec 2021 19:06)  HR: 91 (03 Dec 2021 15:40) (50 - 91)  BP: 113/83 (03 Dec 2021 15:40) (106/56 - 141/66)  BP(mean): --  ABP: --  ABP(mean): --  RR: 18 (03 Dec 2021 15:40) (16 - 18)  SpO2: --
ICU Vital Signs Last 24 Hrs  T(C): 35.1 (14 Jan 2022 08:32), Max: 36.7 (14 Jan 2022 06:37)  T(F): 95.2 (14 Jan 2022 08:32), Max: 98.1 (14 Jan 2022 06:37)  HR: 54 (14 Jan 2022 08:32) (48 - 54)  BP: 178/79 (14 Jan 2022 08:32) (122/61 - 178/79)  BP(mean): --  ABP: --  ABP(mean): --  RR: 18 (14 Jan 2022 08:32) (18 - 18)  SpO2: --
ICU Vital Signs Last 24 Hrs  T(C): 36 (09 Nov 2021 15:51), Max: 36 (09 Nov 2021 15:51)  T(F): 96.8 (09 Nov 2021 15:51), Max: 96.8 (09 Nov 2021 15:51)  HR: 69 (09 Nov 2021 15:51) (69 - 69)  BP: 123/71 (09 Nov 2021 15:51) (123/71 - 123/71)  BP(mean): --  ABP: --  ABP(mean): --  RR: 16 (09 Nov 2021 15:51) (16 - 16)  SpO2: --
Vital Signs Last 24 Hrs  T(C): 35.9 (16 Dec 2021 10:26), Max: 37.1 (15 Dec 2021 15:59)  T(F): 96.6 (16 Dec 2021 10:26), Max: 98.7 (15 Dec 2021 15:59)  HR: 85 (16 Dec 2021 10:26) (65 - 89)  BP: 182/83 (16 Dec 2021 10:26) (104/81 - 182/83)  BP(mean): --  RR: 18 (16 Dec 2021 10:26) (16 - 18)  SpO2: --
Vital Signs Last 24 Hrs  T(C): 36.2 (21 Jan 2022 08:37), Max: 36.2 (21 Jan 2022 08:37)  T(F): 97.2 (21 Jan 2022 08:37), Max: 97.2 (21 Jan 2022 08:37)  HR: 56 (21 Jan 2022 08:37) (56 - 60)  BP: 140/63 (21 Jan 2022 08:37) (130/61 - 140/63)  BP(mean): --  RR: 16 (21 Jan 2022 08:37) (16 - 16)  SpO2: --
ICU Vital Signs Last 24 Hrs  T(C): 36.1 (09 Dec 2021 08:36), Max: 36.8 (08 Dec 2021 15:46)  T(F): 96.9 (09 Dec 2021 08:36), Max: 98.2 (08 Dec 2021 15:46)  HR: 52 (09 Dec 2021 08:36) (51 - 72)  BP: 128/70 (09 Dec 2021 08:36) (128/70 - 146/72)  BP(mean): --  ABP: --  ABP(mean): --  RR: 18 (09 Dec 2021 08:36) (16 - 18)  SpO2: --
ICU Vital Signs Last 24 Hrs  T(C): 35.7 (17 Nov 2021 08:52), Max: 36.5 (16 Nov 2021 16:03)  T(F): 96.3 (17 Nov 2021 08:52), Max: 97.7 (16 Nov 2021 16:03)  HR: 73 (17 Nov 2021 08:52) (64 - 73)  BP: 157/70 (17 Nov 2021 08:52) (113/70 - 157/70)  BP(mean): --  ABP: --  ABP(mean): --  RR: 20 (17 Nov 2021 08:52) (18 - 20)  SpO2: --
Vital Signs Last 24 Hrs  T(C): 35.6 (22 Jan 2022 09:29), Max: 36.3 (21 Jan 2022 15:33)  T(F): 96 (22 Jan 2022 09:29), Max: 97.4 (21 Jan 2022 15:33)  HR: 59 (22 Jan 2022 09:29) (53 - 59)  BP: 142/76 (22 Jan 2022 09:29) (141/63 - 146/62)  BP(mean): --  RR: 16 (22 Jan 2022 09:29) (16 - 16)  SpO2: --
Vital Signs Last 24 Hrs  T(C): 35.8 (21 Dec 2021 05:47), Max: 36.3 (20 Dec 2021 15:34)  T(F): 96.4 (21 Dec 2021 05:47), Max: 97.4 (20 Dec 2021 15:34)  HR: 73 (21 Dec 2021 05:47) (73 - 84)  BP: 159/78 (21 Dec 2021 05:47) (127/71 - 159/78)  BP(mean): --  RR: 16 (21 Dec 2021 05:47) (16 - 18)  SpO2: --
Vital Signs Last 24 Hrs  T(C): 35.9 (29 Dec 2021 09:33), Max: 36.8 (28 Dec 2021 15:24)  T(F): 96.6 (29 Dec 2021 09:33), Max: 98.2 (28 Dec 2021 15:24)  HR: 76 (29 Dec 2021 09:33) (59 - 77)  BP: 113/74 (29 Dec 2021 09:33) (113/74 - 160/77)  BP(mean): --  RR: 19 (29 Dec 2021 09:33) (16 - 19)  SpO2: --
ICU Vital Signs Last 24 Hrs  T(C): 35.4 (11 Jan 2022 06:17), Max: 36.3 (10 Tristin 2022 16:02)  T(F): 95.7 (11 Jan 2022 06:17), Max: 97.3 (10 Tristin 2022 16:02)  HR: 65 (11 Jan 2022 06:17) (60 - 65)  BP: 129/63 (11 Jan 2022 06:17) (126/76 - 129/63)  BP(mean): --  ABP: --  ABP(mean): --  RR: 16 (11 Jan 2022 06:17) (16 - 16)  SpO2: --
ICU Vital Signs Last 24 Hrs  T(C): 35.6 (10 Dec 2021 05:41), Max: 36.4 (09 Dec 2021 16:13)  T(F): 96.1 (10 Dec 2021 05:41), Max: 97.6 (09 Dec 2021 16:13)  HR: 70 (10 Dec 2021 11:09) (50 - 70)  BP: 194/86 (10 Dec 2021 11:09) (150/67 - 198/88)  BP(mean): --  ABP: --  ABP(mean): --  RR: 16 (10 Dec 2021 10:10) (16 - 18)  SpO2: --
ICU Vital Signs Last 24 Hrs  T(C): 36.1 (22 Nov 2021 07:56), Max: 37.1 (21 Nov 2021 16:12)  T(F): 97 (22 Nov 2021 07:56), Max: 98.7 (21 Nov 2021 16:12)  HR: 56 (22 Nov 2021 07:56) (56 - 75)  BP: 152/71 (22 Nov 2021 07:56) (152/71 - 171/82)  BP(mean): --  ABP: --  ABP(mean): --  RR: 16 (22 Nov 2021 07:56) (16 - 18)  SpO2: --
Vital Signs Last 24 Hrs  T(C): 36.4 (07 Jan 2022 05:57), Max: 36.4 (07 Jan 2022 05:57)  T(F): 97.6 (07 Jan 2022 05:57), Max: 97.6 (07 Jan 2022 05:57)  HR: 58 (07 Jan 2022 05:57) (58 - 65)  BP: 109/79 (07 Jan 2022 05:57) (109/79 - 158/72)  BP(mean): --  RR: 58 (07 Jan 2022 05:57) (18 - 58)  SpO2: --
Vital Signs Last 24 Hrs  T(C): 35.8 (06 Jan 2022 09:08), Max: 36.3 (05 Jan 2022 16:15)  T(F): 96.5 (06 Jan 2022 09:08), Max: 97.3 (05 Jan 2022 16:15)  HR: 53 (06 Jan 2022 09:08) (53 - 77)  BP: 150/70 (06 Jan 2022 09:08) (139/65 - 150/70)  BP(mean): --  RR: 16 (06 Jan 2022 09:08) (16 - 18)  SpO2: --
ICU Vital Signs Last 24 Hrs  T(C): 36.5 (12 Nov 2021 09:00), Max: 36.5 (12 Nov 2021 09:00)  T(F): 97.7 (12 Nov 2021 09:00), Max: 97.7 (12 Nov 2021 09:00)  HR: 77 (12 Nov 2021 09:00) (53 - 80)  BP: 141/74 (12 Nov 2021 09:00) (114/50 - 141/74)  BP(mean): --  ABP: --  ABP(mean): --  RR: 16 (12 Nov 2021 09:00) (16 - 16)  SpO2: --
ICU Vital Signs Last 24 Hrs  T(C): 36.9 (26 Nov 2021 08:32), Max: 36.9 (25 Nov 2021 15:57)  T(F): 98.4 (26 Nov 2021 08:32), Max: 98.5 (25 Nov 2021 15:57)  HR: 56 (26 Nov 2021 08:32) (56 - 66)  BP: 113/53 (26 Nov 2021 08:32) (99/72 - 150/66)  BP(mean): --  ABP: --  ABP(mean): --  RR: 18 (26 Nov 2021 08:32) (18 - 20)  SpO2: --
ICU Vital Signs Last 24 Hrs  T(C): 36.5 (16 Nov 2021 08:06), Max: 36.5 (16 Nov 2021 08:06)  T(F): 97.7 (16 Nov 2021 08:06), Max: 97.7 (16 Nov 2021 08:06)  HR: 64 (16 Nov 2021 13:47) (55 - 64)  BP: 141/70 (16 Nov 2021 13:47) (141/70 - 164/110)  BP(mean): --  ABP: --  ABP(mean): --  RR: 20 (16 Nov 2021 08:06) (20 - 20)  SpO2: --
ICU Vital Signs Last 24 Hrs  T(C): 36.2 (06 Dec 2021 05:36), Max: 36.2 (06 Dec 2021 05:36)  T(F): 97.2 (06 Dec 2021 05:36), Max: 97.2 (06 Dec 2021 05:36)  HR: 64 (06 Dec 2021 08:24) (52 - 64)  BP: 155/97 (06 Dec 2021 08:24) (130/60 - 155/97)  BP(mean): --  ABP: --  ABP(mean): --  RR: 16 (06 Dec 2021 08:24) (16 - 16)  SpO2: --
ICU Vital Signs Last 24 Hrs  T(C): 35.6 (15 Tristin 2022 05:57), Max: 35.6 (15 Tristin 2022 05:57)  T(F): 96 (15 Tristin 2022 05:57), Max: 96 (15 Tristin 2022 05:57)  HR: 52 (15 Tristin 2022 05:57) (52 - 52)  BP: 148/68 (15 Tristin 2022 05:57) (148/68 - 148/68)  BP(mean): --  ABP: --  ABP(mean): --  RR: 16 (15 Tristin 2022 05:57) (16 - 16)  SpO2: --
ICU Vital Signs Last 24 Hrs  T(C): 36.7 (30 Nov 2021 15:06), Max: 36.7 (30 Nov 2021 15:06)  T(F): 98 (30 Nov 2021 15:06), Max: 98 (30 Nov 2021 15:06)  HR: 62 (30 Nov 2021 15:06) (50 - 62)  BP: 164/72 (30 Nov 2021 15:06) (135/50 - 164/72)  BP(mean): --  ABP: --  ABP(mean): --  RR: 16 (30 Nov 2021 15:06) (16 - 18)  SpO2: --
Vital Signs Last 24 Hrs  T(C): 36.4 (25 Jan 2022 08:46), Max: 36.4 (25 Jan 2022 05:44)  T(F): 97.5 (25 Jan 2022 08:46), Max: 97.5 (25 Jan 2022 05:44)  HR: 60 (25 Jan 2022 08:46) (51 - 60)  BP: 100/50 (25 Jan 2022 08:46) (92/50 - 124/61)  BP(mean): --  RR: 16 (25 Jan 2022 08:46) (16 - 17)  SpO2: --
ICU Vital Signs Last 24 Hrs  T(C): 35.5 (23 Nov 2021 08:12), Max: 36.4 (22 Nov 2021 15:35)  T(F): 95.9 (23 Nov 2021 08:12), Max: 97.5 (22 Nov 2021 15:35)  HR: 67 (23 Nov 2021 08:12) (56 - 87)  BP: 107/61 (23 Nov 2021 08:12) (104/69 - 107/61)  BP(mean): --  ABP: --  ABP(mean): --  RR: 18 (23 Nov 2021 08:12) (16 - 20)  SpO2: --
ICU Vital Signs Last 24 Hrs  T(C): 36.2 (13 Jan 2022 09:26), Max: 36.4 (12 Jan 2022 15:57)  T(F): 97.1 (13 Jan 2022 09:26), Max: 97.6 (12 Jan 2022 15:57)  HR: 61 (13 Jan 2022 09:26) (16 - 61)  BP: 116/60 (13 Jan 2022 09:26) (110/52 - 132/60)  BP(mean): --  ABP: --  ABP(mean): --  RR: 16 (13 Jan 2022 09:26) (16 - 16)  SpO2: --
Vital Signs Last 24 Hrs  T(C): 36.2 (23 Dec 2021 10:02), Max: 36.8 (23 Dec 2021 05:57)  T(F): 97.1 (23 Dec 2021 10:02), Max: 98.2 (23 Dec 2021 05:57)  HR: 72 (23 Dec 2021 10:02) (72 - 79)  BP: 178/82 (23 Dec 2021 10:02) (159/78 - 178/82)  BP(mean): --  RR: 18 (23 Dec 2021 10:02) (16 - 18)  SpO2: --
ICU Vital Signs Last 24 Hrs  T(C): 36.3 (10 Nov 2021 08:00), Max: 36.3 (10 Nov 2021 08:00)  T(F): 97.4 (10 Nov 2021 08:00), Max: 97.4 (10 Nov 2021 08:00)  HR: 60 (10 Nov 2021 08:00) (60 - 69)  BP: 121/62 (10 Nov 2021 08:00) (121/62 - 123/71)  BP(mean): --  ABP: --  ABP(mean): --  RR: 18 (10 Nov 2021 08:00) (16 - 18)  SpO2: --
ICU Vital Signs Last 24 Hrs  T(C): 36.6 (19 Nov 2021 09:27), Max: 36.6 (19 Nov 2021 09:27)  T(F): 97.8 (19 Nov 2021 09:27), Max: 97.8 (19 Nov 2021 09:27)  HR: 69 (19 Nov 2021 09:27) (54 - 72)  BP: 143/69 (19 Nov 2021 09:27) (110/63 - 143/69)  BP(mean): --  ABP: --  ABP(mean): --  RR: 18 (19 Nov 2021 09:27) (16 - 18)  SpO2: --
Vital Signs Last 24 Hrs  T(C): 36 (05 Jan 2022 06:07), Max: 36.3 (04 Jan 2022 16:01)  T(F): 96.8 (05 Jan 2022 06:07), Max: 97.3 (04 Jan 2022 16:01)  HR: 80 (05 Jan 2022 06:07) (80 - 80)  BP: 145/73 (05 Jan 2022 06:07) (145/73 - 151/76)  BP(mean): --  RR: 20 (05 Jan 2022 06:07) (16 - 20)  SpO2: --
ICU Vital Signs Last 24 Hrs  T(C): 35.7 (13 Dec 2021 08:34), Max: 36.2 (12 Dec 2021 16:00)  T(F): 96.2 (13 Dec 2021 08:34), Max: 97.2 (12 Dec 2021 16:00)  HR: 61 (13 Dec 2021 08:34) (51 - 61)  BP: 155/72 (13 Dec 2021 08:34) (107/55 - 155/72)  BP(mean): --  ABP: --  ABP(mean): --  RR: 16 (13 Dec 2021 08:34) (16 - 16)  SpO2: --
ICU Vital Signs Last 24 Hrs  T(C): 36.4 (11 Nov 2021 15:38), Max: 36.8 (10 Nov 2021 21:23)  T(F): 97.5 (11 Nov 2021 15:38), Max: 98.2 (10 Nov 2021 21:23)  HR: 80 (11 Nov 2021 15:38) (54 - 80)  BP: 137/61 (11 Nov 2021 15:38) (102/60 - 172/84)  BP(mean): --  ABP: --  ABP(mean): --  RR: 16 (11 Nov 2021 15:38) (16 - 18)  SpO2: --
Vital Signs Last 24 Hrs  T(C): 36.1 (03 Jan 2022 08:11), Max: 36.2 (03 Jan 2022 05:59)  T(F): 96.9 (03 Jan 2022 08:11), Max: 97.2 (03 Jan 2022 05:59)  HR: 63 (03 Jan 2022 08:11) (59 - 95)  BP: 163/101 (03 Jan 2022 08:11) (142/73 - 177/76)  BP(mean): --  RR: 16 (03 Jan 2022 08:11) (16 - 16)  SpO2: --
Vital Signs Last 24 Hrs  T(C): 35.7 (27 Dec 2021 07:32), Max: 36.2 (26 Dec 2021 15:35)  T(F): 96.2 (27 Dec 2021 07:32), Max: 97.2 (26 Dec 2021 15:35)  HR: 69 (27 Dec 2021 07:32) (69 - 72)  BP: 180/95 (27 Dec 2021 07:32) (120/64 - 180/95)  BP(mean): --  RR: 16 (26 Dec 2021 15:35) (16 - 16)  SpO2: --
Vital Signs Last 24 Hrs  T(C): 35.8 (20 Jan 2022 10:24), Max: 36.4 (19 Jan 2022 17:09)  T(F): 96.5 (20 Jan 2022 10:24), Max: 97.6 (19 Jan 2022 17:09)  HR: 56 (20 Jan 2022 10:24) (52 - 81)  BP: 115/65 (20 Jan 2022 10:24) (115/65 - 130/65)  BP(mean): --  RR: 17 (20 Jan 2022 10:24) (17 - 18)  SpO2: --
Vital Signs Last 24 Hrs  T(C): 35.9 (22 Dec 2021 09:26), Max: 37 (21 Dec 2021 15:24)  T(F): 96.6 (22 Dec 2021 09:26), Max: 98.6 (21 Dec 2021 15:24)  HR: 72 (22 Dec 2021 09:26) (62 - 73)  BP: 131/68 (22 Dec 2021 09:26) (131/68 - 148/65)  BP(mean): --  RR: 18 (22 Dec 2021 09:26) (16 - 18)  SpO2: --
Vital Signs Last 24 Hrs  T(C): 36.1 (20 Dec 2021 07:21), Max: 36.2 (19 Dec 2021 15:36)  T(F): 96.9 (20 Dec 2021 07:21), Max: 97.1 (19 Dec 2021 15:36)  HR: 63 (20 Dec 2021 07:21) (63 - 67)  BP: 159/72 (20 Dec 2021 07:21) (127/61 - 159/72)  BP(mean): --  RR: 18 (20 Dec 2021 07:21) (16 - 18)  SpO2: --
Vital Signs Last 24 Hrs  T(C): 36.2 (17 Dec 2021 09:05), Max: 36.4 (16 Dec 2021 16:41)  T(F): 97.2 (17 Dec 2021 09:05), Max: 97.6 (16 Dec 2021 16:41)  HR: 67 (17 Dec 2021 09:05) (60 - 85)  BP: 163/77 (17 Dec 2021 09:05) (131/61 - 182/83)  BP(mean): --  RR: 16 (17 Dec 2021 06:04) (16 - 18)  SpO2: --
ICU Vital Signs Last 24 Hrs  T(C): 36.4 (02 Dec 2021 08:05), Max: 36.7 (02 Dec 2021 05:49)  T(F): 97.5 (02 Dec 2021 08:05), Max: 98.1 (02 Dec 2021 05:49)  HR: 55 (02 Dec 2021 08:05) (55 - 55)  BP: 138/84 (02 Dec 2021 08:05) (95/53 - 138/84)  BP(mean): --  ABP: --  ABP(mean): --  RR: 16 (02 Dec 2021 08:05) (16 - 20)  SpO2: --
ICU Vital Signs Last 24 Hrs  T(C): 36.1 (14 Dec 2021 09:21), Max: 36.6 (13 Dec 2021 16:00)  T(F): 97 (14 Dec 2021 09:21), Max: 97.9 (13 Dec 2021 16:00)  HR: 63 (14 Dec 2021 09:21) (63 - 98)  BP: 146/69 (14 Dec 2021 09:21) (146/69 - 187/80)  BP(mean): --  ABP: --  ABP(mean): --  RR: 16 (14 Dec 2021 09:21) (16 - 18)  SpO2: --

## 2022-01-26 NOTE — PROGRESS NOTE BEHAVIORAL HEALTH - PROBLEM SELECTOR PLAN 1
24-Feb-2019 10:39 - Continue Zyprexa Zydis oral dissolvable tablet 10 mg at bedtime (increased on 12/6)  - Continue Depakene liquid 500 mg BID for mood stabilization  - depakote level 1/21 is 76.0  - routine labs for renal function on 1/21 demonstrate Cr 1.6, BUN 38; Encourage PO fluid intake  - Continue trazodone 100mg Qhs for sleep    *Atarax 25 mg q6h PRN for anxiety    - Discontinued  lamictal 25 mg qd as she has been inconsistent with it.  - TOO court for 12/13/21 was adjourned as pt was compliant with medications over the weekend.

## 2022-01-26 NOTE — PROGRESS NOTE BEHAVIORAL HEALTH - PROBLEM SELECTOR PROBLEM 2
DM (diabetes mellitus)
Mild cognitive impairment
DM (diabetes mellitus)
DM (diabetes mellitus)
Mild cognitive impairment
DM (diabetes mellitus)
DM (diabetes mellitus)
Mild cognitive impairment
DM (diabetes mellitus)
DM (diabetes mellitus)
Mild cognitive impairment
Mild cognitive impairment
DM (diabetes mellitus)
Mild cognitive impairment
DM (diabetes mellitus)
Mild cognitive impairment
DM (diabetes mellitus)
Mild cognitive impairment
Mild cognitive impairment
DM (diabetes mellitus)
Mild cognitive impairment
DM (diabetes mellitus)
DM (diabetes mellitus)
Mild cognitive impairment
DM (diabetes mellitus)
DM (diabetes mellitus)
Mild cognitive impairment
Mild cognitive impairment
DM (diabetes mellitus)
Mild cognitive impairment
Mild cognitive impairment
DM (diabetes mellitus)
DM (diabetes mellitus)
Mild cognitive impairment
Mild cognitive impairment
DM (diabetes mellitus)
Mild cognitive impairment
Mild cognitive impairment
DM (diabetes mellitus)
DM (diabetes mellitus)
Mild cognitive impairment
DM (diabetes mellitus)

## 2022-01-26 NOTE — PROGRESS NOTE BEHAVIORAL HEALTH - NSBHADMITIPOBS_PSY_A_CORE
Routine observation
Enhanced supervision
Enhanced supervision
Routine observation
Enhanced supervision
Routine observation

## 2022-01-26 NOTE — PROGRESS NOTE BEHAVIORAL HEALTH - NSBHCHARTREVIEWLAB_PSY_A_CORE FT
Comprehensive Metabolic Panel in AM (01.26.22 @ 07:43)    Sodium, Serum: 141 mmol/L    Potassium, Serum: 5.1 mmol/L    Chloride, Serum: 106 mmol/L    Carbon Dioxide, Serum: 25 mmol/L    Anion Gap, Serum: 10 mmol/L    Blood Urea Nitrogen, Serum: 45 mg/dL    Creatinine, Serum: 1.8 mg/dL    Glucose, Serum: 106 mg/dL    Calcium, Total Serum: 10.0 mg/dL    Protein Total, Serum: 6.6 g/dL    Albumin, Serum: 3.9 g/dL    Bilirubin Total, Serum: <0.2 mg/dL    Alkaline Phosphatase, Serum: 88 U/L    Aspartate Aminotransferase (AST/SGOT): 13 U/L    Alanine Aminotransferase (ALT/SGPT): 14 U/L    eGFR if Non : 27: Interpretative comment  Complete Blood Count in AM (01.26.22 @ 07:43)    Nucleated RBC: 0 /100 WBCs    WBC Count: 5.28 K/uL    RBC Count: 3.52 M/uL    Hemoglobin: 9.4 g/dL    Hematocrit: 30.2 %    Mean Cell Volume: 85.8 fL    Mean Cell Hemoglobin: 26.7 pg    Mean Cell Hemoglobin Conc: 31.1 g/dL    Red Cell Distrib Width: 15.1 %    Platelet Count - Automated: 127 K/uL

## 2022-01-26 NOTE — PROGRESS NOTE BEHAVIORAL HEALTH - PROBLEM/PLAN-2
DISPLAY PLAN FREE TEXT
Universal Safety Interventions
DISPLAY PLAN FREE TEXT

## 2022-01-26 NOTE — PROGRESS NOTE BEHAVIORAL HEALTH - INSIGHT (REGARDING PSYCHIATRIC ILLNESS)
Poor
Fair
Poor
Fair
Poor
Fair
Fair
Poor
Poor
Fair
Poor
Fair
Poor
Fair
Poor
Fair
Poor
Fair
Poor
Poor
Fair
Poor
Fair
Poor
Poor
Fair
Poor
Fair
Fair
Poor
Fair
Poor
Fair
Poor
Fair
Poor
Fair
Poor

## 2022-01-26 NOTE — PROGRESS NOTE BEHAVIORAL HEALTH - NSBHCONSORIP_PSY_A_CORE
Inpatient Admission...

## 2022-01-26 NOTE — PROGRESS NOTE BEHAVIORAL HEALTH - REMOTE MEMORY
Impaired
Normal
Impaired
Normal
Normal
Impaired

## 2022-01-26 NOTE — PROGRESS NOTE BEHAVIORAL HEALTH - NSBHFUPINTERVALHXFT_PSY_A_CORE
Pt was seen, evaluated, chart reviewed. As per nursing staff, no events overnight. On evaluation, pt reports she is "good." Is compliant with medication, denies negative side effects. Endorsed fair appetite. States she got some sleep last night. Pt is pleasant, visible on the unit. Denied auditory/visual hallucinations. Denied paranoia. Denied suicidal/homicidal ideation, intent or plan. She was informed of her discharge plan for tomorrow.

## 2022-01-26 NOTE — PROGRESS NOTE BEHAVIORAL HEALTH - ORIENTED TO SITUATION
Yes
No
Yes
No
No
Yes
No
Yes
No
Yes
No
No
Yes
No
Yes

## 2022-01-26 NOTE — CHART NOTE - NSCHARTNOTEFT_GEN_A_CORE
Social Work Note:    GRECIA was sent to all [place] skilled nursing facilities for review.  All facilities declined patient except for [facility].  Bed offer was communicated with patient’s son Catarino [phone].  [facility] accepted facility bed offer as patient has been to this rehab in the past and it is geographically close to where patient’s son resides.      Yesterday this worker spoke with Keisha of admissions office at [facility].  Patient is cleared for admission tomorrow – Thursday January 27.  Son Catarino informed this worker that he is scheduled to be off from work to assist patient with transition to the nursing home.      On unit rounds patient was informed of her discharge tomorrow and she was agreeable to same.

## 2022-01-26 NOTE — PROGRESS NOTE BEHAVIORAL HEALTH - PROBLEM SELECTOR PLAN 2
- c/w donepezil 10 mg qhs
- will hold donepezil 10 mg qhs for now to reduce pill burden
- Continue Januvia 50 qday.
- c/w donepezil 10 mg qhs
- Continue Januvia 50 qday.
- Continue Januvia 50 qday.
- c/w donepezil 10 mg qhs
- c/w donepezil 10 mg qhs
- will hold donepezil 10 mg qhs for now to reduce pill burden
- Continue Januvia 50 qday.
- c/w donepezil 10 mg qhs
- will hold donepezil 10 mg qhs for now to reduce pill burden
- Continue Januvia 50 qday.
- c/w Januvia 50 qday.
- will hold donepezil 10 mg qhs for now to reduce pill burden
- c/w Januvia 50 qday.
- c/w Januvia 50 qday.
- c/w donepezil 10 mg qhs
- c/w donepezil 10 mg qhs
- will hold donepezil 10 mg qhs for now to reduce pill burden
- Continue Januvia 50 qday.
- c/w donepezil 10 mg qhs
- c/w Januvia 50 qday.
- will hold donepezil 10 mg qhs for now to reduce pill burden
- Continue Januvia 50 qday.
- c/w Januvia 50 qday.
- will hold donepezil 10 mg qhs for now to reduce pill burden
- Continue Januvia 50 qday.
- Continue Januvia 50 qday.
- c/w donepezil 10 mg qhs
- Continue Januvia 50 qday.
- c/w Januvia 50 qday.
- c/w donepezil 10 mg qhs
- will hold donepezil 10 mg qhs for now to reduce pill burden
- Continue Januvia 50 qday.
- will hold donepezil 10 mg qhs for now to reduce pill burden
- Continue Januvia 50 qday.
- c/w Januvia 50 qday.
c/w Nan 50 qday.
- c/w donepezil 10 mg qhs

## 2022-01-26 NOTE — PROGRESS NOTE BEHAVIORAL HEALTH - NSBHROSSTATEMENT_PSY_A_CORE
.

## 2022-01-27 VITALS
DIASTOLIC BLOOD PRESSURE: 58 MMHG | SYSTOLIC BLOOD PRESSURE: 117 MMHG | HEART RATE: 61 BPM | RESPIRATION RATE: 61 BRPM | TEMPERATURE: 97 F

## 2022-01-27 LAB — GLUCOSE BLDC GLUCOMTR-MCNC: 124 MG/DL — HIGH (ref 70–99)

## 2022-01-27 PROCEDURE — 99238 HOSP IP/OBS DSCHRG MGMT 30/<: CPT

## 2022-01-27 RX ADMIN — Medication 1 SPRAY(S): at 08:29

## 2022-01-27 RX ADMIN — Medication 1 APPLICATION(S): at 08:30

## 2022-01-27 RX ADMIN — PANTOPRAZOLE SODIUM 40 MILLIGRAM(S): 20 TABLET, DELAYED RELEASE ORAL at 08:20

## 2022-01-27 RX ADMIN — Medication 81 MILLIGRAM(S): at 08:21

## 2022-01-27 RX ADMIN — AMLODIPINE BESYLATE 10 MILLIGRAM(S): 2.5 TABLET ORAL at 08:21

## 2022-01-27 RX ADMIN — Medication 500 MILLIGRAM(S): at 08:20

## 2022-01-27 RX ADMIN — LISINOPRIL 10 MILLIGRAM(S): 2.5 TABLET ORAL at 08:21

## 2022-01-27 RX ADMIN — Medication 1300 MILLIGRAM(S): at 08:20

## 2022-01-27 RX ADMIN — Medication 325 MILLIGRAM(S): at 08:21

## 2022-01-27 NOTE — CHART NOTE - NSCHARTNOTEFT_GEN_A_CORE
Pt was seen and evaluated. Chart reviewed. On evaluation, pt reports she is doing well. Endorsed fair sleep and appetite. Feels ready for discharge. Denied A/V hallucinations. Denied suicidal/homicidal ideation, intent or plan.     Pt is for discharge today.

## 2022-01-27 NOTE — CHART NOTE - NSCHARTNOTESELECT_GEN_ALL_CORE
Event Note
PA Note/Event Note
Event Note
PA NOTE
Tx over objection hearing/Event Note

## 2022-01-27 NOTE — CHART NOTE - NSCHARTNOTEFT_GEN_A_CORE
Social Work Discharge Note:    Patient is for discharge today.  She is alert and oriented x3.  Mood is improved.  Anxiety has decreased.  Insight and judgment have improved.  Suicidal / homicidal ideation denied.      Patient will be discharged to [facility].  She is aware and agreeable to same. Patient’s sons Catarino and Javier were involved in discharge planning process.  They are also agreeable to bed offer from [facility].  Patient was at this facility in the past.     Patient is aware and agreeable to discharge today.

## 2022-01-31 DIAGNOSIS — L60.3 NAIL DYSTROPHY: ICD-10-CM

## 2022-01-31 DIAGNOSIS — F51.05 INSOMNIA DUE TO OTHER MENTAL DISORDER: ICD-10-CM

## 2022-01-31 DIAGNOSIS — Z79.82 LONG TERM (CURRENT) USE OF ASPIRIN: ICD-10-CM

## 2022-01-31 DIAGNOSIS — F05 DELIRIUM DUE TO KNOWN PHYSIOLOGICAL CONDITION: ICD-10-CM

## 2022-01-31 DIAGNOSIS — E83.52 HYPERCALCEMIA: ICD-10-CM

## 2022-01-31 DIAGNOSIS — E86.0 DEHYDRATION: ICD-10-CM

## 2022-01-31 DIAGNOSIS — L60.0 INGROWING NAIL: ICD-10-CM

## 2022-01-31 DIAGNOSIS — K21.9 GASTRO-ESOPHAGEAL REFLUX DISEASE WITHOUT ESOPHAGITIS: ICD-10-CM

## 2022-01-31 DIAGNOSIS — Z81.8 FAMILY HISTORY OF OTHER MENTAL AND BEHAVIORAL DISORDERS: ICD-10-CM

## 2022-01-31 DIAGNOSIS — F41.9 ANXIETY DISORDER, UNSPECIFIED: ICD-10-CM

## 2022-01-31 DIAGNOSIS — A04.72 ENTEROCOLITIS DUE TO CLOSTRIDIUM DIFFICILE, NOT SPECIFIED AS RECURRENT: ICD-10-CM

## 2022-01-31 DIAGNOSIS — R45.851 SUICIDAL IDEATIONS: ICD-10-CM

## 2022-01-31 DIAGNOSIS — Z88.0 ALLERGY STATUS TO PENICILLIN: ICD-10-CM

## 2022-01-31 DIAGNOSIS — F03.91 UNSPECIFIED DEMENTIA WITH BEHAVIORAL DISTURBANCE: ICD-10-CM

## 2022-01-31 DIAGNOSIS — N18.30 CHRONIC KIDNEY DISEASE, STAGE 3 UNSPECIFIED: ICD-10-CM

## 2022-01-31 DIAGNOSIS — B35.1 TINEA UNGUIUM: ICD-10-CM

## 2022-01-31 DIAGNOSIS — R09.81 NASAL CONGESTION: ICD-10-CM

## 2022-01-31 DIAGNOSIS — H61.23 IMPACTED CERUMEN, BILATERAL: ICD-10-CM

## 2022-01-31 DIAGNOSIS — Z91.14 PATIENT'S OTHER NONCOMPLIANCE WITH MEDICATION REGIMEN: ICD-10-CM

## 2022-01-31 DIAGNOSIS — F31.5 BIPOLAR DISORDER, CURRENT EPISODE DEPRESSED, SEVERE, WITH PSYCHOTIC FEATURES: ICD-10-CM

## 2022-01-31 DIAGNOSIS — F32.A DEPRESSION, UNSPECIFIED: ICD-10-CM

## 2022-01-31 DIAGNOSIS — E11.22 TYPE 2 DIABETES MELLITUS WITH DIABETIC CHRONIC KIDNEY DISEASE: ICD-10-CM

## 2022-01-31 DIAGNOSIS — Z79.84 LONG TERM (CURRENT) USE OF ORAL HYPOGLYCEMIC DRUGS: ICD-10-CM

## 2022-01-31 DIAGNOSIS — M19.90 UNSPECIFIED OSTEOARTHRITIS, UNSPECIFIED SITE: ICD-10-CM

## 2022-01-31 DIAGNOSIS — E87.5 HYPERKALEMIA: ICD-10-CM

## 2022-01-31 DIAGNOSIS — I95.9 HYPOTENSION, UNSPECIFIED: ICD-10-CM

## 2022-01-31 DIAGNOSIS — E78.5 HYPERLIPIDEMIA, UNSPECIFIED: ICD-10-CM

## 2022-01-31 DIAGNOSIS — I12.9 HYPERTENSIVE CHRONIC KIDNEY DISEASE WITH STAGE 1 THROUGH STAGE 4 CHRONIC KIDNEY DISEASE, OR UNSPECIFIED CHRONIC KIDNEY DISEASE: ICD-10-CM

## 2022-03-02 ENCOUNTER — NON-APPOINTMENT (OUTPATIENT)
Age: 77
End: 2022-03-02

## 2022-05-16 NOTE — PROGRESS NOTE BEHAVIORAL HEALTH - ESTIMATED INTELLIGENCE
(Monitor) Discussed drooping upper eyelids with patient. Patient not bothered at this time. Will continue to monitor and can pursue surgical intervention if becomes indicated. Below Average

## 2022-05-31 NOTE — ED PROVIDER NOTE - DISPOSITION TYPE
Spoke with pt.  He uses [facility] [address] [facility] [address] and would like for those medications sent in DISCHARGE

## 2022-06-24 NOTE — ED ADULT NURSE NOTE - CHPI ED NUR DURATION
Rx Refill Note    Requested Prescriptions     Pending Prescriptions Disp Refills   • levothyroxine (SYNTHROID, LEVOTHROID) 50 MCG tablet [Pharmacy Med Name: LEVOTHYROXINE 0.05MG (50MCG) TAB] 90 tablet 0     Sig: TAKE 1 TABLET BY MOUTH EVERY DAY        Last office visit with prescribing clinician: 5/23/2022      Next office visit with prescribing clinician: 8/23/2022   Last labs:   Last refill: 09/22/21   Pharmacy (be sure to add in Epic). correct               1/week(s)

## 2022-08-26 NOTE — PROGRESS NOTE BEHAVIORAL HEALTH - ADDITIONAL DETAILS / COMMENTS
4
MOCA score 12/30; somewhat preserved visuospatial/attention/naming (i.e. Draw-a-Clock and list of digits backward and forward). Oriented to month, year, day, and place.

## 2022-11-14 NOTE — ED ADULT NURSE NOTE - TEMPLATE
Addended by: Alejandra Donaldson on: 11/14/2022 12:49 PM     Modules accepted: Orders Abdominal Pain, N/V/D

## 2022-11-29 NOTE — PROGRESS NOTE BEHAVIORAL HEALTH - GROOMING
[facility]  Hospitalist Discharge Summary      Date of Admission:  11/28/2022  Date of Discharge:  11/29/2022  Discharging Provider: Urbano Bowie DO  Discharge Service: Hospitalist Service    Discharge Diagnoses   Back pain and generalized weakness    Follow-ups Needed After Discharge   Follow-up Appointments     Follow-up and recommended labs and tests       Follow up with primary care provider, CAYLA CRUZ, within 7 days to   evaluate medication change and for hospital follow- up.  No follow up labs   or test are needed.             Unresulted Labs Ordered in the Past 30 Days of this Admission     No orders found for last 31 day(s).      These results will be followed up by PCP    Discharge Disposition   Discharged to home  Condition at discharge: Stable      Hospital Course   Generalized weakness with mechanical fall  Traumatic workup including head CT and CXR was negative  Laceration of the R elbow repaired in the ED  Met with PT and OT who recommended home with assistance. Discussed with the patient and his family, who would like to take the patient home and feel they can provide the level of care for him at his assisted living. His son will stay with him for a few days  - discharge to home under care of wife and son  - home care OT/PT/RN ordered  - if pain worsens or he develops focal weakness would need imaging  - scheduled tylenol and continued symptomatic treatment of presume lumbar back strain  - SW consult appreciated  - follow-up with PCP     Back pain, suspect musculoskeletal  No weakness, numbness, or pain with palpation of the back, no red flags so will defer additional imaging  Primary complaint is tightness rather than pain  Plan  - PT consult as above  - scheduled tylenol  - SW consult     Leukocytosis,  Negative UA and covid  Plan  - monitor, no obvious infection and suspect reactive     Chronic medical issues  Anemia: will repeat CBC  HTN: on diltiazem  PAfib:  aspirin and diltiazem  HLD: atorvastatin  PAD  Depression: continue home lorazepam prn    Consultations This Hospital Stay   CARE MANAGEMENT / SOCIAL WORK IP CONSULT  PHYSICAL THERAPY ADULT IP CONSULT  OCCUPATIONAL THERAPY ADULT IP CONSULT    Code Status   Full Code    Time Spent on this Encounter   I, Urbano Bowie DO, personally saw the patient today and spent less than or equal to 30 minutes discharging this patient.       Urbano Bowie DO  [facility] EMERGENCY DEPT  [address] [address]  Phone: [phone]  Fax: [phone]  ______________________________________________________________________    Physical Exam   Vital Signs: Temp: 98.6  F (37  C) Temp src: Oral BP: 129/80 Pulse: 93   Resp: 16 SpO2: 96 % O2 Device: None (Room air)    Weight: 131 lbs 0 oz    GENERAL:  This is an alert and oriented 89-year-old male patient who is lying in bed.  Conversant and friendly..  HEART:  Regular rate and rhythm, without murmurs, rubs, or gallops.  LUNGS:  Diminished at bases.  No crackles or wheezes.  ABDOMEN:  Soft, nontender, nondistended, positive bowel sounds.  No femoral bruits.  NEUROLOGIC:  Exam reveals no gross focal motor or sensory deficits. No saddle anesthesia  Negative straight leg raises.  No point tenderness on palpation of his back.          Primary Care Physician   CAYLA CRUZ    Discharge Orders      Home Care Referral      Reason for your hospital stay    Generalized weakness  Lumbar muscle sprain     Follow-up and recommended labs and tests     Follow up with primary care provider, CAYLA CRUZ, within 7 days to evaluate medication change and for hospital follow- up.  No follow up labs or test are needed.     Activity    Your activity upon discharge: activity as tolerated     Diet    Follow this diet upon discharge: Orders Placed This Encounter      Combination Diet Low Saturated Fat Na <2400mg Diet, No Caffeine Diet       Significant Results  and Procedures   Most Recent 3 CBC's:Recent Labs   Lab Test 11/28/22  1540 07/24/18  0550 07/23/18  0925   WBC 13.1* 7.1 8.7   HGB 11.9* 12.3* 13.1*   * 99 100    170 207     Most Recent 3 BMP's:Recent Labs   Lab Test 11/28/22  1540 07/24/18  0550 07/23/18  0925    132* 130*   POTASSIUM 4.3 4.0 3.7   CHLORIDE 105 99 96   CO2 27 27 27   BUN 29 14 14   CR 0.82 0.77 0.80   ANIONGAP 3 6 7   VIRGEN 8.9 8.2* 8.8   * 95 113*     Most Recent 2 LFT's:No lab results found.,   Results for orders placed or performed during the hospital encounter of 11/28/22   XR Chest 2 Views    Narrative    XR CHEST 2 VIEWS   11/28/2022 3:49 PM     HISTORY: fall, weakness    COMPARISON: None.      Impression    IMPRESSION: No acute cardiopulmonary disease.    BRIAN BENNETT MD         SYSTEM ID:  [id number]   CT Head w/o Contrast    Narrative    CT HEAD W/O CONTRAST 11/28/2022 4:06 PM    INDICATION: fall, head injury  TECHNIQUE: CT scan of the head without contrast. Dose reduction  techniques were used.  CONTRAST: None.  COMPARISON: 8/13/2017 head CT and 8/13/2017 brain MRI.    FINDINGS:   No intracranial hemorrhage, extraaxial collection, mass effect or CT  evidence of acute infarct.  Severe presumed chronic small vessel  ischemic changes. Mild to moderate generalized volume loss. The  ventricles are proportional to the sulci. No skull fracture. No scalp  hematoma. Unremarkable orbits. Paranasal sinuses are free of  significant disease. Clear mastoid air cells.      Impression    IMPRESSION:  Age-related changes as above with no acute intracranial abnormality.    DELMY ROSA MD         SYSTEM ID:  [id number]       Discharge Medications   Current Discharge Medication List      CONTINUE these medications which have NOT CHANGED    Details   acetaminophen (TYLENOL) 500 MG tablet Take 1,000 mg by mouth 4 times daily      atorvastatin (LIPITOR) 20 MG tablet Take 10 mg by mouth daily      carboxymethylcellulose PF (REFRESH  PLUS) 0.5 % ophthalmic solution Place 1 drop into both eyes 2 times daily as needed for dry eyes      Cholecalciferol (VITAMIN D3 PO) Take 1 tablet by mouth daily Unknown tablet strength      diclofenac (VOLTAREN) 1 % topical gel Apply topically 4 times daily as needed for moderate pain (4-6)      diltiazem (DILACOR XR) 180 MG 24 hr capsule Take 1 capsule (180 mg) by mouth daily  Qty: 30 capsule, Refills: 11    Associated Diagnoses: Paroxysmal supraventricular tachycardia (H)      diphenhydrAMINE (BENADRYL) 25 MG capsule Take 25 mg by mouth At Bedtime      hydrocortisone 1 % CREA cream Apply topically 2 times daily as needed for itching      loratadine (CLARITIN) 10 MG tablet Take 10 mg by mouth daily as needed      LORazepam (ATIVAN) 0.5 MG tablet Take 0.5 mg by mouth every 8 hours as needed for anxiety      magnesium hydroxide (MILK OF MAGNESIA) 400 MG/5ML suspension Take 15 mLs by mouth daily as needed for constipation      metroNIDAZOLE (METROGEL) 1 % gel Apply topically daily applies to rosacea on nose      Multiple Vitamins-Minerals (PRESERVISION AREDS PO) Take 1 capsule by mouth daily      saline 0.9 % AERS Spray 1 spray in nostril 2 times daily as needed           Allergies   Allergies   Allergen Reactions     Alendronic Acid      Mold      Peanut-Derived      Pollen Extract      Risedronic Acid [Risedronate]       Poor

## 2022-12-08 ENCOUNTER — OUTPATIENT (OUTPATIENT)
Dept: OUTPATIENT SERVICES | Facility: HOSPITAL | Age: 77
LOS: 1 days | Discharge: HOME | End: 2022-12-08

## 2022-12-08 DIAGNOSIS — Z12.31 ENCOUNTER FOR SCREENING MAMMOGRAM FOR MALIGNANT NEOPLASM OF BREAST: ICD-10-CM

## 2022-12-08 PROCEDURE — 77063 BREAST TOMOSYNTHESIS BI: CPT | Mod: 26

## 2022-12-08 PROCEDURE — 77067 SCR MAMMO BI INCL CAD: CPT | Mod: 26

## 2022-12-27 NOTE — ED ADULT NURSE NOTE - PRIMARY CARE PROVIDER
Assist him in scheduling a lab appointment for a TB test.  To my knowledge that her laboratory does not do COVID-19 testing.  Please verify with Yoanna    MD Magana

## 2023-01-08 NOTE — PROGRESS NOTE BEHAVIORAL HEALTH - THOUGHT PROCESS
Pharmacy Note - Extended Infusion Beta-Lactam Adjustment    Piperacillin/Tazobactam  3375 mg x 1  for treatment of Intra-abdominal Infection. Per [facility] Extended Infusion Beta-Lactam Policy, piperacillin/tazobactam will be changed to  4500 mg x 1 dose      Please call with any questions.     Thank you,    Mamie Ruiz, [facility] Linear/Impaired reasoning

## 2023-02-07 ENCOUNTER — APPOINTMENT (OUTPATIENT)
Dept: GASTROENTEROLOGY | Facility: CLINIC | Age: 78
End: 2023-02-07

## 2023-03-01 NOTE — PROGRESS NOTE BEHAVIORAL HEALTH - OTHER
Patient was stable at discharge. Discharge instructions were explained which included medications that will be resumed as well as new medications that were started within this admission. Patient verbalized understanding and all questions were answered. Patient was educated when to follow up with any physicians and when to seek emergency care.    not formally assessed intermittently cooperative possesses some insight of delusions today; denies Cotard delusions

## 2023-03-08 ENCOUNTER — APPOINTMENT (OUTPATIENT)
Dept: GASTROENTEROLOGY | Facility: CLINIC | Age: 78
End: 2023-03-08

## 2023-03-29 ENCOUNTER — NON-APPOINTMENT (OUTPATIENT)
Age: 78
End: 2023-03-29

## 2023-03-29 ENCOUNTER — OUTPATIENT (OUTPATIENT)
Dept: OUTPATIENT SERVICES | Facility: HOSPITAL | Age: 78
LOS: 1 days | End: 2023-03-29
Payer: MEDICARE

## 2023-03-29 ENCOUNTER — APPOINTMENT (OUTPATIENT)
Dept: GASTROENTEROLOGY | Facility: CLINIC | Age: 78
End: 2023-03-29
Payer: MEDICARE

## 2023-03-29 VITALS
OXYGEN SATURATION: 96 % | HEART RATE: 84 BPM | DIASTOLIC BLOOD PRESSURE: 73 MMHG | WEIGHT: 136 LBS | SYSTOLIC BLOOD PRESSURE: 150 MMHG | HEIGHT: 60 IN | TEMPERATURE: 97 F | BODY MASS INDEX: 26.7 KG/M2

## 2023-03-29 DIAGNOSIS — D64.9 ANEMIA, UNSPECIFIED: ICD-10-CM

## 2023-03-29 DIAGNOSIS — K62.5 HEMORRHAGE OF ANUS AND RECTUM: ICD-10-CM

## 2023-03-29 DIAGNOSIS — Z00.00 ENCOUNTER FOR GENERAL ADULT MEDICAL EXAMINATION WITHOUT ABNORMAL FINDINGS: ICD-10-CM

## 2023-03-29 PROCEDURE — 99204 OFFICE O/P NEW MOD 45 MIN: CPT | Mod: GC

## 2023-03-29 PROCEDURE — 99204 OFFICE O/P NEW MOD 45 MIN: CPT

## 2023-03-29 NOTE — PHYSICAL EXAM
[Normal Color / Pigmentation] : normal skin color and pigmentation [Normal Turgor] : normal skin turgor [Normal] : oriented to person, place, and time

## 2023-03-31 DIAGNOSIS — K62.5 HEMORRHAGE OF ANUS AND RECTUM: ICD-10-CM

## 2023-03-31 DIAGNOSIS — D64.9 ANEMIA, UNSPECIFIED: ICD-10-CM

## 2023-04-27 NOTE — ED ADULT NURSE NOTE - NURSING MUSC ROM
Quality 110: Preventive Care And Screening: Influenza Immunization: Influenza Immunization previously received during influenza season Quality 130: Documentation Of Current Medications In The Medical Record: Current Medications Documented Quality 226: Preventive Care And Screening: Tobacco Use: Screening And Cessation Intervention: Patient screened for tobacco use and is an ex/non-smoker Quality 402: Tobacco Use And Help With Quitting Among Adolescents: Patient screened for tobacco and never smoked Quality 431: Preventive Care And Screening: Unhealthy Alcohol Use - Screening: Patient not identified as an unhealthy alcohol user when screened for unhealthy alcohol use using a systematic screening method Detail Level: Detailed full range of motion in all extremities

## 2023-05-25 NOTE — ED PROVIDER NOTE - IV ALTEPLASE EXCL ABS HIDDEN
"  Attempted to outreach patient for pre-visit via "[employer]", no answer after a week. Sending outreach via Mail Out Letter now.   " show

## 2023-08-22 NOTE — ED PROVIDER NOTE - MUSCULOSKELETAL, MLM
What Type Of Note Output Would You Prefer (Optional)?: Standard Output How Severe Is Your Skin Lesion?: moderate Has Your Skin Lesion Been Treated?: not been treated Is This A New Presentation, Or A Follow-Up?: Skin Lesion Spine appears normal, range of motion is not limited, no muscle or joint tenderness

## 2023-10-03 NOTE — ED ADULT NURSE NOTE - NSICDXPASTMEDICALHX_GEN_ALL_CORE_FT
From: Mamie Fothergill Taflinger  To: Dr. Phuong Godlstein: 10/3/2023 11:16 AM EDT  Subject: Ozempic prescription    Dr. Norah Negrete I am totally out of my ozempic. The pharmacy requested it on Sept. 27th. They have had no response. Please let me know what is going on. Thank you.   Keli Davidson
Patient's last appointment was : 9/25/2023 with our Vannesa Silver  Patient's next appointment is : 3/25/2024 with our Dr. Sia Crooks  Last refilled on: 8/20/2023  Which pharmacy does the script need sent [facility]: [facility]      Lab Results   Component Value Date    LABA1C 5.4 09/25/2023     Lab Results   Component Value Date    CHOL 124 09/25/2023    TRIG 96 09/25/2023    HDL 37 09/25/2023    LDLCALC 68 09/25/2023     Lab Results   Component Value Date     09/25/2023    K 4.3 09/25/2023     09/25/2023    CO2 25 09/25/2023    BUN 17 09/25/2023    CREATININE 0.8 09/25/2023    GLUCOSE 95 09/25/2023    CALCIUM 9.1 09/25/2023    PROT 7.2 09/25/2023    LABALBU 4.4 09/25/2023    BILITOT 0.4 09/25/2023    ALKPHOS 116 09/25/2023    AST 18 09/25/2023    ALT 25 09/25/2023    LABGLOM >60 09/25/2023     Lab Results   Component Value Date    TSH 2.110 09/25/2023    T4FREE 1.16 09/25/2023     Lab Results   Component Value Date    WBC 8.7 09/25/2023    HGB 14.7 09/25/2023    HCT 45.6 09/25/2023    MCV 94.4 (H) 09/25/2023     09/25/2023
PAST MEDICAL HISTORY:  Bipolar disorder     DM (diabetes mellitus)     HTN (hypertension)     MDD (major depressive disorder)

## 2023-10-04 ENCOUNTER — APPOINTMENT (OUTPATIENT)
Dept: GASTROENTEROLOGY | Facility: CLINIC | Age: 78
End: 2023-10-04

## 2023-10-05 NOTE — ED ADULT NURSE NOTE - CAS ELECT INFOMATION PROVIDED
Group Topic: BH Activity Group    Date: 10/4/2023  Start Time: 2010  End Time: 2040  Facilitators: Carlota Mir HUC    Focus: Mindfulness Skills-S.T.O.P., square breathing, 5-4-3-2-1 grounding technique   Number in attendance: 8    Method: Group   Attendance: Present  Participation: Active  Patient Response: Appropriate feedback, Attentive, Interested in topic and Interactive  Mood: Anxious and Depressed  Affect: Type: Anxious and Depressed   Range: Restricted   Congruency: Congruent   Stability: Stable  Behavior/Socialization: Appropriate to group, Cooperative and Engaged  Thought Process: Focused and Goal-directed  Task Performance: Follows directions  Patient Evaluation: Independent - full participation       DC instructions

## 2023-11-10 ENCOUNTER — EMERGENCY (EMERGENCY)
Facility: HOSPITAL | Age: 78
LOS: 0 days | Discharge: HOME | End: 2023-11-11
Attending: STUDENT IN AN ORGANIZED HEALTH CARE EDUCATION/TRAINING PROGRAM
Payer: MEDICARE

## 2023-11-10 VITALS
WEIGHT: 130.07 LBS | HEART RATE: 55 BPM | TEMPERATURE: 98 F | RESPIRATION RATE: 18 BRPM | OXYGEN SATURATION: 97 % | DIASTOLIC BLOOD PRESSURE: 70 MMHG | SYSTOLIC BLOOD PRESSURE: 141 MMHG

## 2023-11-10 DIAGNOSIS — M25.562 PAIN IN LEFT KNEE: ICD-10-CM

## 2023-11-10 DIAGNOSIS — M17.0 BILATERAL PRIMARY OSTEOARTHRITIS OF KNEE: ICD-10-CM

## 2023-11-10 DIAGNOSIS — M25.561 PAIN IN RIGHT KNEE: ICD-10-CM

## 2023-11-10 PROCEDURE — 99284 EMERGENCY DEPT VISIT MOD MDM: CPT | Mod: GC

## 2023-11-10 PROCEDURE — 73564 X-RAY EXAM KNEE 4 OR MORE: CPT | Mod: 50

## 2023-11-10 PROCEDURE — 99283 EMERGENCY DEPT VISIT LOW MDM: CPT | Mod: 25

## 2023-11-10 RX ORDER — IBUPROFEN 200 MG
400 TABLET ORAL ONCE
Refills: 0 | Status: COMPLETED | OUTPATIENT
Start: 2023-11-10 | End: 2023-11-10

## 2023-11-10 NOTE — ED ADULT TRIAGE NOTE - CHIEF COMPLAINT QUOTE
Patient BIBA from [facility] c/o bilateral knee pain r/t arthritis. Denies any injury to area. Patient a&ox3 hx dementia.

## 2023-11-11 VITALS
TEMPERATURE: 98 F | HEART RATE: 56 BPM | RESPIRATION RATE: 18 BRPM | DIASTOLIC BLOOD PRESSURE: 59 MMHG | OXYGEN SATURATION: 96 % | SYSTOLIC BLOOD PRESSURE: 123 MMHG

## 2023-11-11 PROCEDURE — 73564 X-RAY EXAM KNEE 4 OR MORE: CPT | Mod: 26,50

## 2023-11-11 RX ADMIN — Medication 400 MILLIGRAM(S): at 00:08

## 2023-11-11 NOTE — ED PROVIDER NOTE - NSFOLLOWUPINSTRUCTIONS_ED_ALL_ED_FT
Chronic Knee Pain, Adult  Chronic knee pain is pain in one or both knees that lasts longer than 3 months. Symptoms of chronic knee pain may include swelling, stiffness, and discomfort. Age-related wear and tear (osteoarthritis) of the knee joint is the most common cause of chronic knee pain. Other possible causes include:  A long-term immune-related disease that causes inflammation of the knee (rheumatoid arthritis). This usually affects both knees.  Inflammatory arthritis, such as gout or pseudogout.  An injury to the knee that causes arthritis.  An injury to the knee that damages the ligaments. Ligaments are strong tissues that connect bones to each other.  Runner's knee or pain behind the kneecap.  Treatment for chronic knee pain depends on the cause. The main treatments for chronic knee pain are physical therapy and weight loss. This condition may also be treated with medicines, injections, a knee sleeve or brace, and by using crutches. Rest, ice, pressure (compression), and elevation, also known as RICE therapy, may also be recommended.    Follow these instructions at home:  If you have a knee sleeve or brace:      Wear the knee sleeve or brace as told by your health care provider. Remove it only as told by your health care provider.  Loosen it if your toes tingle, become numb, or turn cold and blue.  Keep it clean.  If the sleeve or brace is not waterproof:  Do not let it get wet.  Remove it if allowed by your health care provider, or cover it with a watertight covering when you take a bath or a shower.  Managing pain, stiffness, and swelling        If directed, apply heat to the affected area as often as told by your health care provider. Use the heat source that your health care provider recommends, such as a moist heat pack or a heating pad.  If you have a removable knee sleeve or brace, remove it as told by your health care provider.  Place a towel between your skin and the heat source.  Leave the heat on for 20–30 minutes.  Remove the heat if your skin turns bright red. This is especially important if you are unable to feel pain, heat, or cold. You may have a greater risk of getting burned.  If directed, put ice on the affected area. To do this:  If you have a removable knee sleeve or brace, remove it as told by your health care provider.  Put ice in a plastic bag.  Place a towel between your skin and the bag.  Leave the ice on for 20 minutes, 2–3 times a day.  Remove the ice if your skin turns bright red. This is very important. If you cannot feel pain, heat, or cold, you have a greater risk of damage to the area.  Move your toes often to reduce stiffness and swelling.  Raise (elevate) the injured area above the level of your heart while you are sitting or lying down.  Activity    Avoid high-impact activities or exercises, such as running, jumping rope, or doing jumping jacks.  Follow the exercise plan that your health care provider designed for you. Your health care provider may suggest that you:  Avoid activities that make knee pain worse. This may require you to change your exercise routines, sport participation, or job duties.  Wear shoes with cushioned soles.  Avoid sports that require running and sudden changes in direction.  Do physical therapy. Physical therapy is planned to match your needs and abilities. It may include exercises for strength, flexibility, stability, and endurance.  Do exercises that increase balance and strength, such as aide chi and yoga.  Do not use the injured limb to support your body weight until your health care provider says that you can. Use crutches as told by your health care provider.  Return to your normal activities as told by your health care provider. Ask your health care provider what activities are safe for you.  General instructions    Take over-the-counter and prescription medicines only as told by your health care provider.  Lose weight if you are overweight. Losing even a little weight can reduce knee pain. Ask your health care provider what your ideal weight is, and how to safely lose extra weight. A dietitian may be able to help you plan your meals.  Do not use any products that contain nicotine or tobacco, such as cigarettes, e-cigarettes, and chewing tobacco. These can delay healing. If you need help quitting, ask your health care provider.  Keep all follow-up visits. This is important.  Contact a health care provider if:  You have knee pain that is not getting better or gets worse.  You are unable to do your physical therapy exercises due to knee pain.  Get help right away if:  Your knee swells and the swelling becomes worse.  You cannot move your knee.  You have severe knee pain.  Summary  Knee pain that lasts more than 3 months is considered chronic knee pain.  The main treatments for chronic knee pain are physical therapy and weight loss. You may also need to take medicines, wear a knee sleeve or brace, use crutches, and apply ice or heat.  Losing even a little weight can reduce knee pain. Ask your health care provider what your ideal weight is, and how to safely lose extra weight. A dietitian may be able to help you plan your meals.  Follow the exercise plan that your health care provider designed for you.  This information is not intended to replace advice given to you by your health care provider. Make sure you discuss any questions you have with your health care provider.

## 2023-11-11 NOTE — ED PROVIDER NOTE - OBJECTIVE STATEMENT
Pt is a 79 y/o female with PMH of bipolar, intellectual disability, HTN, DM and arthritis presenting for knee pain x months. Pt reports she has arthritis of both her knees and for the last few months, pain has been worsening. No trauma or injury. No fever or chills. No redness of skin. Uses a cream for the pain but is unsure what cream it is.

## 2023-11-11 NOTE — ED PROVIDER NOTE - ATTENDING CONTRIBUTION TO CARE
78-year-old female with PMH arthritis, HTN, DM, bipolar disorder, presents for evaluation of knee pain for several months.  Patient denies any falls or trauma, numbness, weakness.  Able to ambulate. No leg pain or swelling, fevers or chills.      VITAL SIGNS: noted  CONSTITUTIONAL: Well-developed; well-nourished; in no acute distress  HEAD: Normocephalic; atraumatic  EYES: PERRL, EOM intact; conjunctiva and sclera clear  ENT: No nasal discharge; airway clear.   NECK: Supple; non tender.    CARD: S1, S2 normal; no murmurs, gallops, or rubs. Regular rate and rhythm  RESP: CTAB/L, no wheezes, rales or rhonchi  ABD: Normal bowel sounds; soft; non-distended; non-tender  EXT: Normal ROM. No calf tenderness or edema. Knees nontender bilaterally, no swelling or erythema, no increased warmth.  No signs of trauma.  Able to range well. Distal pulses intact  NEURO: Alert, oriented. Grossly unremarkable. No focal deficits  SKIN: Skin exam is warm and dry, no acute rash

## 2023-11-11 NOTE — ED PROVIDER NOTE - PHYSICAL EXAMINATION
CONST: well appearing for age, NAD  HEAD:  normocephalic, atraumatic  EYES:  conjunctivae without injection, drainage or discharge  ENMT: nasal mucosa moist; mouth moist without ulcerations or lesions; throat moist without erythema, exudate, ulcerations or lesions  NECK:  supple  CARDIAC:  regular rate and rhythm, normal S1 and S2, no murmurs, rubs or gallops  RESP:  respiratory rate and effort appear normal for age; lungs are clear to auscultation bilaterally; no rales or wheezes  ABDOMEN:  soft, nontender, nondistended  MUSCULOSKELETAL/NEURO: awake and alert, [place], no joint tenderness, normal movement, normal tone  SKIN:  normal skin color for age and race, well-perfused; warm and dry

## 2023-11-11 NOTE — ED ADULT NURSE NOTE - CHIEF COMPLAINT QUOTE
Patient BIBA from [facility] c/o bilateral knee pain r/t arthritis. Denies any injury to area. Patient a&ox3 hx dementia.

## 2023-11-11 NOTE — ED PROVIDER NOTE - CLINICAL SUMMARY MEDICAL DECISION MAKING FREE TEXT BOX
Patient evaluated for chronic knee pain, x-ray with signs of degenerative joint disease/arthritis.  Patient advised Tylenol as needed and follow-up with Ortho.  Transport called for transfer back to facility.  Strict return precautions advised.

## 2023-11-11 NOTE — ED PROVIDER NOTE - CARE PROVIDER_API CALL
Babatunde Mcbride Facundo  Internal Medicine  [address] [place] [id number]  Phone: [phone]  Fax: [phone]  Follow Up Time: 1-3 Days

## 2023-11-11 NOTE — ED PROVIDER NOTE - CARE PLAN
"Subjective   Chief Complaint   Patient presents with   • Annual Exam     Pt here today for annual exam. Pt voices no concerns.      Keshia Fletcher is a 68 y.o. year old  menopausal female presenting to be seen for her annual exam.  Overall, patient feeling well, with some aches in knees and wrists; patient has had a miniscus repair since she was here last and is trying to slowly increase her walking.  The patient denies any vaginal bleeding in the past 12 months  Hot flashes and night sweats are not a problem - happening \"only very rarely now\".    She is sexually active.  In the past year there has not been new sexual partners.  Patient reports dyspareunia secondary to dryness.  Patient reports regular self breast exams: yes  She exercises regularly: yes.  Walking 3 miles/day when weather allows  She has noticed changes in height: no.    No Additional Complaints Reported    The following portions of the patient's history were reviewed and updated as appropriate:problem list, current medications, allergies, past family history, past medical history, past social history and past surgical history.  Social History    Tobacco Use      Smoking status: Never      Smokeless tobacco: Never    Review of Systems   Constitutional: Negative for activity change and unexpected weight change.   HENT: Negative for congestion.    Respiratory: Negative for shortness of breath.    Cardiovascular: Negative for chest pain.   Gastrointestinal: Negative for abdominal pain, blood in stool, constipation and diarrhea.        Colonoscopy due in , every 3 years   Endocrine: Negative for cold intolerance and heat intolerance.   Genitourinary: Positive for dyspareunia (some, she attributes to dryness) and enuresis (mild GEOVANNI with sneezing or coughing.  Pt not really bothered). Negative for difficulty urinating, frequency, pelvic pain, urgency, vaginal bleeding, vaginal discharge and vaginal pain.   Musculoskeletal: Positive for " "arthralgias (knees and wrists) and back pain. Negative for neck pain and neck stiffness.   Skin: Negative for rash.   Neurological: Negative for dizziness and headaches.   Psychiatric/Behavioral: Negative for dysphoric mood and sleep disturbance. The patient is not nervous/anxious.          Objective   /82   Ht 160 cm (63\")   Wt 68.5 kg (151 lb)   LMP  (LMP Unknown)   BMI 26.75 kg/m²   Physical Exam  Vitals and nursing note reviewed. Exam conducted with a chaperone present.   Constitutional:       General: She is not in acute distress.     Appearance: She is well-developed.   HENT:      Head: Normocephalic and atraumatic.   Neck:      Thyroid: No thyromegaly.   Cardiovascular:      Rate and Rhythm: Normal rate and regular rhythm.      Heart sounds: No murmur heard.  Pulmonary:      Effort: Pulmonary effort is normal.      Breath sounds: Normal breath sounds.   Chest:   Breasts:     Right: No inverted nipple or mass.      Left: No inverted nipple or mass.   Abdominal:      General: There is no distension.      Palpations: Abdomen is soft.      Tenderness: There is no abdominal tenderness.   Genitourinary:     General: Normal vulva.      Labia:         Right: No tenderness or lesion.         Left: No tenderness or lesion.       Vagina: Normal. No vaginal discharge or bleeding.      Cervix: No cervical motion tenderness or discharge.      Adnexa:         Right: No tenderness or fullness.          Left: No tenderness or fullness.        Rectum: No external hemorrhoid or internal hemorrhoid. Normal anal tone.      Comments: Labia normal, no lesions noted.  Urethral meatus unremarkable, no prolapse of mucosa.  Anus/perineum normal.  Musculoskeletal:         General: Normal range of motion.      Cervical back: Normal range of motion and neck supple.   Skin:     General: Skin is warm and dry.   Neurological:      Mental Status: She is alert and oriented to person, place, and time.   Psychiatric:         Behavior: " Behavior normal.         Judgment: Judgment normal.            Assessment & Plan    Diagnoses and all orders for this visit:    1. Hx of colonic polyps (Primary): Patient with polyps on colonoscopy in 2021.  Recommendation is for patient to screen every 3 years.  Will order new colonoscopy next year    2. GEOVANNI (stress urinary incontinence, female): Patient wearing a panty liner every day.  She was offered pelvic floor physical therapy and advised that 85% of patients report significant improvement in symptoms.  Patient will consider, and call back if she would like a referral placed    3. Encounter for gynecological examination without abnormal finding: Exam unremarkable.  Routine screening labs with PCP.  DEXA scan ordered for history of osteopenia; last scan in 2021.  Patient with family history of breast cancer and a personal history of a lumpectomy; she states Dianne Honeycutt once a year, in between visits with me.  Dr. Honeycutt orders her mammograms.    4. Menopause  -     DEXA Bone Density Axial; Future    5. Dyspareunia in female: Patient would again like to have vaginal estrogen cream sent to pharmacy.  It was sent in the past but I do not think that she filled it.  Digital application has been described to the patient and she voiced understanding.  -     Estrogens Conjugated (Premarin) 0.625 MG/GM vaginal cream; 1/2 gram, twice weekly at bedtime  Dispense: 30 g; Refill: 5    6. Vaginal dryness  -     Estrogens Conjugated (Premarin) 0.625 MG/GM vaginal cream; 1/2 gram, twice weekly at bedtime  Dispense: 30 g; Refill: 5    7. Osteopenia, unspecified location  -     DEXA Bone Density Axial; Future      This note was electronically signed.    Gillian Goff MD  1/17/2023  13:25 CST   1 Principal Discharge DX:	Pain in both knees

## 2023-11-11 NOTE — ED PROVIDER NOTE - PATIENT PORTAL LINK FT
You can access the FollowMyHealth Patient Portal offered by [facility] by registering at the following website: [email or web address]. By joining [employer]’s FollowMyHealth portal, you will also be able to view your health information using other applications (apps) compatible with our system.

## 2023-11-29 NOTE — BEHAVIORAL HEALTH ASSESSMENT NOTE - AFFECT QUALITY
REFERRING PROVIDER: Dr. Nick Gutierrez     Patient ID: Alonso Andrade is a 69 y.o. male.    Chief Complaint: Personal history of prostate cancer dx53y, now with metastatic pancreatic cancer with somatic testing indicating TP53 mutation, and a family history of cancer. Patient presented for genetic counseling on 10/31/2023 and was found appropriate for genetic testing based on the National Comprehensive Cancer Network (NCCN) criteria due to a personal history of prostate cancer and a current diagnosis of pancreatic cancer whereas genetic test result could influence treatment, with additional evidence due to family history that includes colon cancer diagnosed under 50y (brother dx40y, mother) (see family history and pedigree). Patient signed consent, lab was drawn and sent to [facility] for BRCA1/2 Analyses with CancerNext-Expanded+RNAinsight panel testing. Patient and his wife presented in clinic today for results disclosure.     HPI  Past Medical History:   Diagnosis Date    Abnormal abdominal ultrasound     Abnormal findings on diagnostic imaging of abdomen     Asthma     Cancer     liver and pancreas    Cataracts, bilateral     Diabetes mellitus     DVT femoral (deep venous thrombosis) with thrombophlebitis, bilateral 10/03/2023    Gallstones     High blood sugar     continuous blood sugar monitoring device    History of prostate cancer     Hyperlipidemia     Hypertension     Left sided abdominal pain     DULCE on CPAP     Other intra-abdominal and pelvic swelling, mass and lump       Review of patient's allergies indicates:  No Known Allergies   Review of Systems          Problem List Items Addressed This Visit    None    Oncology History   Pancreatic cancer   10/3/2023 Initial Diagnosis    Pancreatic cancer     10/17/2023 -  Chemotherapy    Treatment Summary   Plan Name: OP PANC FOLFIRINOX Q2W  Treatment Goal: Palliative  Status: Active  Start Date: 10/17/2023  End Date: 10/3/2024 (Planned)  Provider:  Nick Gutierrez II, MD  Chemotherapy: irinotecan (CAMPTOSAR) 300 mg in sodium chloride 0.9% 580 mL chemo infusion, 308 mg (100 % of original dose 150 mg/m2), Intravenous, Clinic/HOD 1 time, 4 of 26 cycles  Dose modification: 150 mg/m2 (original dose 150 mg/m2, Cycle 1, Reason: MD Discretion), 135 mg/m2 (90 % of original dose 150 mg/m2, Cycle 2, Reason: Dose Not Tolerated)  Administration: 300 mg (10/17/2023), 276 mg (10/30/2023), 276 mg (11/13/2023), 276 mg (11/27/2023)  oxaliplatin (ELOXATIN) 85 mg/m2 = 174 mg in dextrose 5 % (D5W) 599.8 mL chemo infusion, 85 mg/m2 = 174 mg, Intravenous, Clinic/H[facility] 1 time, 4 of 26 cycles  Dose modification: 76.5 mg/m2 (90 % of original dose 85 mg/m2, Cycle 2, Reason: Dose Not Tolerated)  Administration: 174 mg (10/17/2023), 157 mg (10/30/2023), 157 mg (11/13/2023), 157 mg (11/27/2023)     Secondary malignant neoplasm of liver   10/3/2023 Initial Diagnosis    Secondary malignant neoplasm of liver     10/17/2023 -  Chemotherapy    Treatment Summary   Plan Name: OP PANC FOLFIRINOX Q2W  Treatment Goal: Palliative  Status: Active  Start Date: 10/17/2023  End Date: 10/3/2024 (Planned)  Provider: Nick Gutierrez II, MD  Chemotherapy: irinotecan (CAMPTOSAR) 300 mg in sodium chloride 0.9% 580 mL chemo infusion, 308 mg (100 % of original dose 150 mg/m2), Intravenous, Clinic/[facility] 1 time, 4 of 26 cycles  Dose modification: 150 mg/m2 (original dose 150 mg/m2, Cycle 1, Reason: MD Discretion), 135 mg/m2 (90 % of original dose 150 mg/m2, Cycle 2, Reason: Dose Not Tolerated)  Administration: 300 mg (10/17/2023), 276 mg (10/30/2023), 276 mg (11/13/2023), 276 mg (11/27/2023)  oxaliplatin (ELOXATIN) 85 mg/m2 = 174 mg in dextrose 5 % (D5W) 599.8 mL chemo infusion, 85 mg/m2 = 174 mg, Intravenous, Clinic/[facility] 1 time, 4 of 26 cycles  Dose modification: 76.5 mg/m2 (90 % of original dose 85 mg/m2, Cycle 2, Reason: Dose Not Tolerated)  Administration: 174 mg (10/17/2023), 157 mg (10/30/2023), 157 mg  (11/13/2023), 157 mg (11/27/2023)              Family History:  Family History   Problem Relation Age of Onset    Colon cancer Mother     Hypertension Mother     Asthma Mother     Leukemia Father     Hypertension Father     COPD Father     Emphysema Father     Colon cancer Brother 40    Stroke Maternal Grandfather     Diabetes Maternal Grandfather         Assessment:     Genetic testing was appropriate for this patient because of her personal and family history. This comprehensive [facility] Cancer[facility] analysis indicated that there was no deleterious mutation in  (77 total): AIP, ALK, APC, ANGELA, AXIN2, BAP1, BARD1, BLM, BMPR1A, BRCA1, BRCA2, BRIP1, CDC73, CDH1, CDK4, CDKN1B, CDKN2A, CHEK2, CTNNA1, DICER1, FANCC, FH, FLCN, GALNT12, KIF1B, LZTR1, MAX, MEN1, MET, MLH1, MSH2, MSH3, MSH6, MUTYH, NBN, NF1, NF2, NTHL1, PALB2, PHOX2B, PMS2, POT1, YOSSN8F, PTCH1, PTEN, RAD51C, RAD51D, RB1, RECQL, RET, SDHA, SDHAF2, SDHB, SDHC, SDHD, SMAD4, SMARCA4, SMARCB1, SMARCE1, STK11, SUFU, EOHW345, TP53, TSC1, TSC2, VHL and XRCC2 (sequencing and deletion/duplication); EGFR, EGLN1, HOXB13, KIT, MITF, PDGFRA, POLD1 and POLE (sequencing only); EPCAM and GREM1 (deletion/duplication only). RNA data is routinely analyzed for use in variant interpretation for all genes. However, there were two (2) variants of uncertain significance (VUS): BLM  p.A746V (c.2237C>T) and DICER1 p.N7555N (c.5524A>G). There are currently insufficient data to determine if either variant does or does not cause increased cancer risk. Therefore, the contribution of each variant to the relative risk of cancer cannot be established from this analysis. If as a result of ongoing reclassification efforts, [facility] should determine that either variant becomes clinically actionable, an amended report will be sent to me as healthcare provider. I will then contact the patient for a discussion of implications of this new information and a healthcare plan will be made  accordingly.      A copy of the result was give to Mr. Andrade.    KEYLA BENÍTEZ, PhD         [employer]

## 2023-12-05 ENCOUNTER — INPATIENT (INPATIENT)
Facility: HOSPITAL | Age: 78
LOS: 3 days | Discharge: NURSING, REHAB OR LONG-TERM CARE | DRG: 481 | End: 2023-12-09
Attending: INTERNAL MEDICINE | Admitting: INTERNAL MEDICINE
Payer: MEDICARE

## 2023-12-05 VITALS
SYSTOLIC BLOOD PRESSURE: 167 MMHG | DIASTOLIC BLOOD PRESSURE: 75 MMHG | RESPIRATION RATE: 18 BRPM | TEMPERATURE: 99 F | HEART RATE: 67 BPM | OXYGEN SATURATION: 95 %

## 2023-12-05 DIAGNOSIS — S72.142A DISPLACED INTERTROCHANTERIC FRACTURE OF LEFT FEMUR, INITIAL ENCOUNTER FOR CLOSED FRACTURE: ICD-10-CM

## 2023-12-05 LAB
ALBUMIN SERPL ELPH-MCNC: 4.3 G/DL — SIGNIFICANT CHANGE UP (ref 3.5–5.2)
ALBUMIN SERPL ELPH-MCNC: 4.3 G/DL — SIGNIFICANT CHANGE UP (ref 3.5–5.2)
ALP SERPL-CCNC: 57 U/L — SIGNIFICANT CHANGE UP (ref 30–115)
ALP SERPL-CCNC: 57 U/L — SIGNIFICANT CHANGE UP (ref 30–115)
ALT FLD-CCNC: 18 U/L — SIGNIFICANT CHANGE UP (ref 0–41)
ALT FLD-CCNC: 18 U/L — SIGNIFICANT CHANGE UP (ref 0–41)
ANION GAP SERPL CALC-SCNC: 13 MMOL/L — SIGNIFICANT CHANGE UP (ref 7–14)
ANION GAP SERPL CALC-SCNC: 13 MMOL/L — SIGNIFICANT CHANGE UP (ref 7–14)
APPEARANCE UR: CLEAR — SIGNIFICANT CHANGE UP
APPEARANCE UR: CLEAR — SIGNIFICANT CHANGE UP
APTT BLD: 36.3 SEC — SIGNIFICANT CHANGE UP (ref 27–39.2)
APTT BLD: 36.3 SEC — SIGNIFICANT CHANGE UP (ref 27–39.2)
AST SERPL-CCNC: 23 U/L — SIGNIFICANT CHANGE UP (ref 0–41)
AST SERPL-CCNC: 23 U/L — SIGNIFICANT CHANGE UP (ref 0–41)
BASOPHILS # BLD AUTO: 0.03 K/UL — SIGNIFICANT CHANGE UP (ref 0–0.2)
BASOPHILS # BLD AUTO: 0.03 K/UL — SIGNIFICANT CHANGE UP (ref 0–0.2)
BASOPHILS NFR BLD AUTO: 0.3 % — SIGNIFICANT CHANGE UP (ref 0–1)
BASOPHILS NFR BLD AUTO: 0.3 % — SIGNIFICANT CHANGE UP (ref 0–1)
BILIRUB SERPL-MCNC: <0.2 MG/DL — SIGNIFICANT CHANGE UP (ref 0.2–1.2)
BILIRUB SERPL-MCNC: <0.2 MG/DL — SIGNIFICANT CHANGE UP (ref 0.2–1.2)
BILIRUB UR-MCNC: NEGATIVE — SIGNIFICANT CHANGE UP
BILIRUB UR-MCNC: NEGATIVE — SIGNIFICANT CHANGE UP
BUN SERPL-MCNC: 52 MG/DL — HIGH (ref 10–20)
BUN SERPL-MCNC: 52 MG/DL — HIGH (ref 10–20)
CALCIUM SERPL-MCNC: 10 MG/DL — SIGNIFICANT CHANGE UP (ref 8.4–10.5)
CALCIUM SERPL-MCNC: 10 MG/DL — SIGNIFICANT CHANGE UP (ref 8.4–10.5)
CHLORIDE SERPL-SCNC: 102 MMOL/L — SIGNIFICANT CHANGE UP (ref 98–110)
CHLORIDE SERPL-SCNC: 102 MMOL/L — SIGNIFICANT CHANGE UP (ref 98–110)
CO2 SERPL-SCNC: 20 MMOL/L — SIGNIFICANT CHANGE UP (ref 17–32)
CO2 SERPL-SCNC: 20 MMOL/L — SIGNIFICANT CHANGE UP (ref 17–32)
COLOR SPEC: YELLOW — SIGNIFICANT CHANGE UP
COLOR SPEC: YELLOW — SIGNIFICANT CHANGE UP
CREAT SERPL-MCNC: 1.8 MG/DL — HIGH (ref 0.7–1.5)
CREAT SERPL-MCNC: 1.8 MG/DL — HIGH (ref 0.7–1.5)
DIFF PNL FLD: NEGATIVE — SIGNIFICANT CHANGE UP
DIFF PNL FLD: NEGATIVE — SIGNIFICANT CHANGE UP
EGFR: 28 ML/MIN/1.73M2 — LOW
EGFR: 28 ML/MIN/1.73M2 — LOW
EOSINOPHIL # BLD AUTO: 0.07 K/UL — SIGNIFICANT CHANGE UP (ref 0–0.7)
EOSINOPHIL # BLD AUTO: 0.07 K/UL — SIGNIFICANT CHANGE UP (ref 0–0.7)
EOSINOPHIL NFR BLD AUTO: 0.7 % — SIGNIFICANT CHANGE UP (ref 0–8)
EOSINOPHIL NFR BLD AUTO: 0.7 % — SIGNIFICANT CHANGE UP (ref 0–8)
GLUCOSE BLDC GLUCOMTR-MCNC: 128 MG/DL — HIGH (ref 70–99)
GLUCOSE BLDC GLUCOMTR-MCNC: 128 MG/DL — HIGH (ref 70–99)
GLUCOSE SERPL-MCNC: 204 MG/DL — HIGH (ref 70–99)
GLUCOSE SERPL-MCNC: 204 MG/DL — HIGH (ref 70–99)
GLUCOSE UR QL: >=1000 MG/DL
GLUCOSE UR QL: >=1000 MG/DL
HCT VFR BLD CALC: 32.8 % — LOW (ref 37–47)
HCT VFR BLD CALC: 32.8 % — LOW (ref 37–47)
HGB BLD-MCNC: 10.4 G/DL — LOW (ref 12–16)
HGB BLD-MCNC: 10.4 G/DL — LOW (ref 12–16)
IMM GRANULOCYTES NFR BLD AUTO: 0.4 % — HIGH (ref 0.1–0.3)
IMM GRANULOCYTES NFR BLD AUTO: 0.4 % — HIGH (ref 0.1–0.3)
INR BLD: 0.97 RATIO — SIGNIFICANT CHANGE UP (ref 0.65–1.3)
INR BLD: 0.97 RATIO — SIGNIFICANT CHANGE UP (ref 0.65–1.3)
KETONES UR-MCNC: NEGATIVE MG/DL — SIGNIFICANT CHANGE UP
KETONES UR-MCNC: NEGATIVE MG/DL — SIGNIFICANT CHANGE UP
LEUKOCYTE ESTERASE UR-ACNC: NEGATIVE — SIGNIFICANT CHANGE UP
LEUKOCYTE ESTERASE UR-ACNC: NEGATIVE — SIGNIFICANT CHANGE UP
LYMPHOCYTES # BLD AUTO: 1 K/UL — LOW (ref 1.2–3.4)
LYMPHOCYTES # BLD AUTO: 1 K/UL — LOW (ref 1.2–3.4)
LYMPHOCYTES # BLD AUTO: 10 % — LOW (ref 20.5–51.1)
LYMPHOCYTES # BLD AUTO: 10 % — LOW (ref 20.5–51.1)
MCHC RBC-ENTMCNC: 26.9 PG — LOW (ref 27–31)
MCHC RBC-ENTMCNC: 26.9 PG — LOW (ref 27–31)
MCHC RBC-ENTMCNC: 31.7 G/DL — LOW (ref 32–37)
MCHC RBC-ENTMCNC: 31.7 G/DL — LOW (ref 32–37)
MCV RBC AUTO: 84.8 FL — SIGNIFICANT CHANGE UP (ref 81–99)
MCV RBC AUTO: 84.8 FL — SIGNIFICANT CHANGE UP (ref 81–99)
MONOCYTES # BLD AUTO: 0.43 K/UL — SIGNIFICANT CHANGE UP (ref 0.1–0.6)
MONOCYTES # BLD AUTO: 0.43 K/UL — SIGNIFICANT CHANGE UP (ref 0.1–0.6)
MONOCYTES NFR BLD AUTO: 4.3 % — SIGNIFICANT CHANGE UP (ref 1.7–9.3)
MONOCYTES NFR BLD AUTO: 4.3 % — SIGNIFICANT CHANGE UP (ref 1.7–9.3)
NEUTROPHILS # BLD AUTO: 8.47 K/UL — HIGH (ref 1.4–6.5)
NEUTROPHILS # BLD AUTO: 8.47 K/UL — HIGH (ref 1.4–6.5)
NEUTROPHILS NFR BLD AUTO: 84.3 % — HIGH (ref 42.2–75.2)
NEUTROPHILS NFR BLD AUTO: 84.3 % — HIGH (ref 42.2–75.2)
NITRITE UR-MCNC: NEGATIVE — SIGNIFICANT CHANGE UP
NITRITE UR-MCNC: NEGATIVE — SIGNIFICANT CHANGE UP
NRBC # BLD: 0 /100 WBCS — SIGNIFICANT CHANGE UP (ref 0–0)
NRBC # BLD: 0 /100 WBCS — SIGNIFICANT CHANGE UP (ref 0–0)
PH UR: 6.5 — SIGNIFICANT CHANGE UP (ref 5–8)
PH UR: 6.5 — SIGNIFICANT CHANGE UP (ref 5–8)
PLATELET # BLD AUTO: 217 K/UL — SIGNIFICANT CHANGE UP (ref 130–400)
PLATELET # BLD AUTO: 217 K/UL — SIGNIFICANT CHANGE UP (ref 130–400)
PMV BLD: 11.3 FL — HIGH (ref 7.4–10.4)
PMV BLD: 11.3 FL — HIGH (ref 7.4–10.4)
POTASSIUM SERPL-MCNC: 4.6 MMOL/L — SIGNIFICANT CHANGE UP (ref 3.5–5)
POTASSIUM SERPL-MCNC: 4.6 MMOL/L — SIGNIFICANT CHANGE UP (ref 3.5–5)
POTASSIUM SERPL-SCNC: 4.6 MMOL/L — SIGNIFICANT CHANGE UP (ref 3.5–5)
POTASSIUM SERPL-SCNC: 4.6 MMOL/L — SIGNIFICANT CHANGE UP (ref 3.5–5)
PROT SERPL-MCNC: 6.9 G/DL — SIGNIFICANT CHANGE UP (ref 6–8)
PROT SERPL-MCNC: 6.9 G/DL — SIGNIFICANT CHANGE UP (ref 6–8)
PROT UR-MCNC: 100 MG/DL
PROT UR-MCNC: 100 MG/DL
PROTHROM AB SERPL-ACNC: 11.1 SEC — SIGNIFICANT CHANGE UP (ref 9.95–12.87)
PROTHROM AB SERPL-ACNC: 11.1 SEC — SIGNIFICANT CHANGE UP (ref 9.95–12.87)
RBC # BLD: 3.87 M/UL — LOW (ref 4.2–5.4)
RBC # BLD: 3.87 M/UL — LOW (ref 4.2–5.4)
RBC # FLD: 13.7 % — SIGNIFICANT CHANGE UP (ref 11.5–14.5)
RBC # FLD: 13.7 % — SIGNIFICANT CHANGE UP (ref 11.5–14.5)
SODIUM SERPL-SCNC: 135 MMOL/L — SIGNIFICANT CHANGE UP (ref 135–146)
SODIUM SERPL-SCNC: 135 MMOL/L — SIGNIFICANT CHANGE UP (ref 135–146)
SP GR SPEC: 1.02 — SIGNIFICANT CHANGE UP (ref 1–1.03)
SP GR SPEC: 1.02 — SIGNIFICANT CHANGE UP (ref 1–1.03)
TROPONIN T SERPL-MCNC: <0.01 NG/ML — SIGNIFICANT CHANGE UP
TROPONIN T SERPL-MCNC: <0.01 NG/ML — SIGNIFICANT CHANGE UP
UROBILINOGEN FLD QL: 0.2 MG/DL — SIGNIFICANT CHANGE UP (ref 0.2–1)
UROBILINOGEN FLD QL: 0.2 MG/DL — SIGNIFICANT CHANGE UP (ref 0.2–1)
VALPROATE SERPL-MCNC: 26 UG/ML — LOW (ref 50–100)
VALPROATE SERPL-MCNC: 26 UG/ML — LOW (ref 50–100)
WBC # BLD: 10.04 K/UL — SIGNIFICANT CHANGE UP (ref 4.8–10.8)
WBC # BLD: 10.04 K/UL — SIGNIFICANT CHANGE UP (ref 4.8–10.8)
WBC # FLD AUTO: 10.04 K/UL — SIGNIFICANT CHANGE UP (ref 4.8–10.8)
WBC # FLD AUTO: 10.04 K/UL — SIGNIFICANT CHANGE UP (ref 4.8–10.8)

## 2023-12-05 PROCEDURE — 86850 RBC ANTIBODY SCREEN: CPT

## 2023-12-05 PROCEDURE — P9016: CPT

## 2023-12-05 PROCEDURE — 97162 PT EVAL MOD COMPLEX 30 MIN: CPT | Mod: GP

## 2023-12-05 PROCEDURE — 82553 CREATINE MB FRACTION: CPT

## 2023-12-05 PROCEDURE — 73501 X-RAY EXAM HIP UNI 1 VIEW: CPT | Mod: LT

## 2023-12-05 PROCEDURE — C1776: CPT

## 2023-12-05 PROCEDURE — 93005 ELECTROCARDIOGRAM TRACING: CPT

## 2023-12-05 PROCEDURE — 71045 X-RAY EXAM CHEST 1 VIEW: CPT

## 2023-12-05 PROCEDURE — 99221 1ST HOSP IP/OBS SF/LOW 40: CPT

## 2023-12-05 PROCEDURE — 72170 X-RAY EXAM OF PELVIS: CPT | Mod: 26

## 2023-12-05 PROCEDURE — 73502 X-RAY EXAM HIP UNI 2-3 VIEWS: CPT | Mod: 26,LT

## 2023-12-05 PROCEDURE — 99053 MED SERV 10PM-8AM 24 HR FAC: CPT

## 2023-12-05 PROCEDURE — 74177 CT ABD & PELVIS W/CONTRAST: CPT | Mod: 26,MA

## 2023-12-05 PROCEDURE — 80048 BASIC METABOLIC PNL TOTAL CA: CPT

## 2023-12-05 PROCEDURE — 84484 ASSAY OF TROPONIN QUANT: CPT

## 2023-12-05 PROCEDURE — 70450 CT HEAD/BRAIN W/O DYE: CPT | Mod: 26,MA

## 2023-12-05 PROCEDURE — 99285 EMERGENCY DEPT VISIT HI MDM: CPT

## 2023-12-05 PROCEDURE — 86923 COMPATIBILITY TEST ELECTRIC: CPT

## 2023-12-05 PROCEDURE — 86901 BLOOD TYPING SEROLOGIC RH(D): CPT

## 2023-12-05 PROCEDURE — 85730 THROMBOPLASTIN TIME PARTIAL: CPT

## 2023-12-05 PROCEDURE — 83735 ASSAY OF MAGNESIUM: CPT

## 2023-12-05 PROCEDURE — 36430 TRANSFUSION BLD/BLD COMPNT: CPT

## 2023-12-05 PROCEDURE — 97530 THERAPEUTIC ACTIVITIES: CPT | Mod: GP

## 2023-12-05 PROCEDURE — 72125 CT NECK SPINE W/O DYE: CPT | Mod: 26,MA

## 2023-12-05 PROCEDURE — 73560 X-RAY EXAM OF KNEE 1 OR 2: CPT | Mod: 26,LT

## 2023-12-05 PROCEDURE — 85025 COMPLETE CBC W/AUTO DIFF WBC: CPT

## 2023-12-05 PROCEDURE — 80053 COMPREHEN METABOLIC PANEL: CPT

## 2023-12-05 PROCEDURE — 86900 BLOOD TYPING SEROLOGIC ABO: CPT

## 2023-12-05 PROCEDURE — 82550 ASSAY OF CK (CPK): CPT

## 2023-12-05 PROCEDURE — C1713: CPT

## 2023-12-05 PROCEDURE — 36415 COLL VENOUS BLD VENIPUNCTURE: CPT

## 2023-12-05 PROCEDURE — 71045 X-RAY EXAM CHEST 1 VIEW: CPT | Mod: 26

## 2023-12-05 PROCEDURE — 85027 COMPLETE CBC AUTOMATED: CPT

## 2023-12-05 PROCEDURE — 83036 HEMOGLOBIN GLYCOSYLATED A1C: CPT

## 2023-12-05 PROCEDURE — 73552 X-RAY EXAM OF FEMUR 2/>: CPT | Mod: 26,LT

## 2023-12-05 PROCEDURE — C9399: CPT

## 2023-12-05 PROCEDURE — 97116 GAIT TRAINING THERAPY: CPT | Mod: GP

## 2023-12-05 PROCEDURE — 71260 CT THORAX DX C+: CPT | Mod: 26,MA

## 2023-12-05 PROCEDURE — 82962 GLUCOSE BLOOD TEST: CPT

## 2023-12-05 PROCEDURE — 85610 PROTHROMBIN TIME: CPT

## 2023-12-05 RX ORDER — VALPROIC ACID (AS SODIUM SALT) 250 MG/5ML
500 SOLUTION, ORAL ORAL ONCE
Refills: 0 | Status: COMPLETED | OUTPATIENT
Start: 2023-12-05 | End: 2023-12-05

## 2023-12-05 RX ORDER — INSULIN LISPRO 100/ML
VIAL (ML) SUBCUTANEOUS
Refills: 0 | Status: DISCONTINUED | OUTPATIENT
Start: 2023-12-05 | End: 2023-12-06

## 2023-12-05 RX ORDER — ALPRAZOLAM 0.25 MG
1 TABLET ORAL
Refills: 0 | DISCHARGE

## 2023-12-05 RX ORDER — ASPIRIN/CALCIUM CARB/MAGNESIUM 324 MG
1 TABLET ORAL
Qty: 0 | Refills: 0 | DISCHARGE

## 2023-12-05 RX ORDER — ACETAMINOPHEN 500 MG
650 TABLET ORAL EVERY 6 HOURS
Refills: 0 | Status: DISCONTINUED | OUTPATIENT
Start: 2023-12-05 | End: 2023-12-06

## 2023-12-05 RX ORDER — ATORVASTATIN CALCIUM 80 MG/1
1 TABLET, FILM COATED ORAL
Refills: 0 | DISCHARGE

## 2023-12-05 RX ORDER — GABAPENTIN 400 MG/1
1 CAPSULE ORAL
Refills: 0 | DISCHARGE

## 2023-12-05 RX ORDER — ENOXAPARIN SODIUM 100 MG/ML
40 INJECTION SUBCUTANEOUS EVERY 24 HOURS
Refills: 0 | Status: DISCONTINUED | OUTPATIENT
Start: 2023-12-05 | End: 2023-12-06

## 2023-12-05 RX ORDER — LANOLIN ALCOHOL/MO/W.PET/CERES
3 CREAM (GRAM) TOPICAL AT BEDTIME
Refills: 0 | Status: DISCONTINUED | OUTPATIENT
Start: 2023-12-05 | End: 2023-12-06

## 2023-12-05 RX ORDER — MORPHINE SULFATE 50 MG/1
6 CAPSULE, EXTENDED RELEASE ORAL ONCE
Refills: 0 | Status: DISCONTINUED | OUTPATIENT
Start: 2023-12-05 | End: 2023-12-05

## 2023-12-05 RX ORDER — SODIUM BICARBONATE 1 MEQ/ML
2 SYRINGE (ML) INTRAVENOUS
Qty: 0 | Refills: 0 | DISCHARGE

## 2023-12-05 RX ORDER — SODIUM BICARBONATE 1 MEQ/ML
1 SYRINGE (ML) INTRAVENOUS
Refills: 0 | DISCHARGE

## 2023-12-05 RX ORDER — SENNA PLUS 8.6 MG/1
2 TABLET ORAL
Refills: 0 | DISCHARGE

## 2023-12-05 RX ORDER — OLANZAPINE 15 MG/1
10 TABLET, FILM COATED ORAL AT BEDTIME
Refills: 0 | Status: DISCONTINUED | OUTPATIENT
Start: 2023-12-05 | End: 2023-12-06

## 2023-12-05 RX ORDER — FLUOXETINE HCL 10 MG
20 CAPSULE ORAL DAILY
Refills: 0 | Status: DISCONTINUED | OUTPATIENT
Start: 2023-12-05 | End: 2023-12-06

## 2023-12-05 RX ORDER — ONDANSETRON 8 MG/1
4 TABLET, FILM COATED ORAL EVERY 8 HOURS
Refills: 0 | Status: DISCONTINUED | OUTPATIENT
Start: 2023-12-05 | End: 2023-12-06

## 2023-12-05 RX ORDER — SODIUM BICARBONATE 1 MEQ/ML
650 SYRINGE (ML) INTRAVENOUS THREE TIMES A DAY
Refills: 0 | Status: DISCONTINUED | OUTPATIENT
Start: 2023-12-05 | End: 2023-12-06

## 2023-12-05 RX ORDER — INSULIN DETEMIR 100/ML (3)
15 INSULIN PEN (ML) SUBCUTANEOUS
Refills: 0 | DISCHARGE

## 2023-12-05 RX ORDER — ONDANSETRON 8 MG/1
4 TABLET, FILM COATED ORAL ONCE
Refills: 0 | Status: COMPLETED | OUTPATIENT
Start: 2023-12-05 | End: 2023-12-05

## 2023-12-05 RX ORDER — DEXTROSE 50 % IN WATER 50 %
25 SYRINGE (ML) INTRAVENOUS ONCE
Refills: 0 | Status: DISCONTINUED | OUTPATIENT
Start: 2023-12-05 | End: 2023-12-06

## 2023-12-05 RX ORDER — ALPRAZOLAM 0.25 MG
0.25 TABLET ORAL
Refills: 0 | Status: DISCONTINUED | OUTPATIENT
Start: 2023-12-05 | End: 2023-12-06

## 2023-12-05 RX ORDER — DEXTROSE 50 % IN WATER 50 %
12.5 SYRINGE (ML) INTRAVENOUS ONCE
Refills: 0 | Status: DISCONTINUED | OUTPATIENT
Start: 2023-12-05 | End: 2023-12-06

## 2023-12-05 RX ORDER — LISINOPRIL 2.5 MG/1
1 TABLET ORAL
Qty: 0 | Refills: 0 | DISCHARGE

## 2023-12-05 RX ORDER — DIVALPROEX SODIUM 500 MG/1
250 TABLET, DELAYED RELEASE ORAL THREE TIMES A DAY
Refills: 0 | Status: DISCONTINUED | OUTPATIENT
Start: 2023-12-05 | End: 2023-12-06

## 2023-12-05 RX ORDER — SENNA PLUS 8.6 MG/1
2 TABLET ORAL AT BEDTIME
Refills: 0 | Status: DISCONTINUED | OUTPATIENT
Start: 2023-12-05 | End: 2023-12-06

## 2023-12-05 RX ORDER — INSULIN LISPRO 100/ML
VIAL (ML) SUBCUTANEOUS AT BEDTIME
Refills: 0 | Status: DISCONTINUED | OUTPATIENT
Start: 2023-12-05 | End: 2023-12-06

## 2023-12-05 RX ORDER — SODIUM CHLORIDE 9 MG/ML
1000 INJECTION, SOLUTION INTRAVENOUS
Refills: 0 | Status: DISCONTINUED | OUTPATIENT
Start: 2023-12-05 | End: 2023-12-06

## 2023-12-05 RX ORDER — AMLODIPINE BESYLATE 2.5 MG/1
10 TABLET ORAL DAILY
Refills: 0 | Status: DISCONTINUED | OUTPATIENT
Start: 2023-12-05 | End: 2023-12-06

## 2023-12-05 RX ORDER — BUPIVACAINE HCL/PF 7.5 MG/ML
22 VIAL (ML) INJECTION ONCE
Refills: 0 | Status: COMPLETED | OUTPATIENT
Start: 2023-12-05 | End: 2023-12-05

## 2023-12-05 RX ORDER — FERROUS SULFATE 325(65) MG
325 TABLET ORAL DAILY
Refills: 0 | Status: DISCONTINUED | OUTPATIENT
Start: 2023-12-05 | End: 2023-12-06

## 2023-12-05 RX ORDER — EMPAGLIFLOZIN 10 MG/1
1 TABLET, FILM COATED ORAL
Refills: 0 | DISCHARGE

## 2023-12-05 RX ORDER — LISINOPRIL 2.5 MG/1
20 TABLET ORAL DAILY
Refills: 0 | Status: DISCONTINUED | OUTPATIENT
Start: 2023-12-05 | End: 2023-12-06

## 2023-12-05 RX ORDER — DEXTROSE 50 % IN WATER 50 %
15 SYRINGE (ML) INTRAVENOUS ONCE
Refills: 0 | Status: DISCONTINUED | OUTPATIENT
Start: 2023-12-05 | End: 2023-12-06

## 2023-12-05 RX ORDER — FENOFIBRATE,MICRONIZED 130 MG
1 CAPSULE ORAL
Refills: 0 | DISCHARGE

## 2023-12-05 RX ORDER — INSULIN GLARGINE 100 [IU]/ML
15 INJECTION, SOLUTION SUBCUTANEOUS AT BEDTIME
Refills: 0 | Status: DISCONTINUED | OUTPATIENT
Start: 2023-12-05 | End: 2023-12-06

## 2023-12-05 RX ORDER — GABAPENTIN 400 MG/1
100 CAPSULE ORAL
Refills: 0 | Status: DISCONTINUED | OUTPATIENT
Start: 2023-12-05 | End: 2023-12-06

## 2023-12-05 RX ORDER — GLUCAGON INJECTION, SOLUTION 0.5 MG/.1ML
1 INJECTION, SOLUTION SUBCUTANEOUS ONCE
Refills: 0 | Status: DISCONTINUED | OUTPATIENT
Start: 2023-12-05 | End: 2023-12-06

## 2023-12-05 RX ORDER — ATORVASTATIN CALCIUM 80 MG/1
80 TABLET, FILM COATED ORAL AT BEDTIME
Refills: 0 | Status: DISCONTINUED | OUTPATIENT
Start: 2023-12-05 | End: 2023-12-06

## 2023-12-05 RX ORDER — DIVALPROEX SODIUM 500 MG/1
1 TABLET, DELAYED RELEASE ORAL
Refills: 0 | DISCHARGE

## 2023-12-05 RX ORDER — MORPHINE SULFATE 50 MG/1
2 CAPSULE, EXTENDED RELEASE ORAL ONCE
Refills: 0 | Status: DISCONTINUED | OUTPATIENT
Start: 2023-12-05 | End: 2023-12-05

## 2023-12-05 RX ORDER — ATORVASTATIN CALCIUM 80 MG/1
1 TABLET, FILM COATED ORAL
Qty: 0 | Refills: 0 | DISCHARGE

## 2023-12-05 RX ORDER — FLUOXETINE HCL 10 MG
1 CAPSULE ORAL
Refills: 0 | DISCHARGE

## 2023-12-05 RX ORDER — LISINOPRIL 2.5 MG/1
1 TABLET ORAL
Refills: 0 | DISCHARGE

## 2023-12-05 RX ADMIN — MORPHINE SULFATE 2 MILLIGRAM(S): 50 CAPSULE, EXTENDED RELEASE ORAL at 05:58

## 2023-12-05 RX ADMIN — MORPHINE SULFATE 6 MILLIGRAM(S): 50 CAPSULE, EXTENDED RELEASE ORAL at 09:00

## 2023-12-05 RX ADMIN — DIVALPROEX SODIUM 250 MILLIGRAM(S): 500 TABLET, DELAYED RELEASE ORAL at 22:36

## 2023-12-05 RX ADMIN — SENNA PLUS 2 TABLET(S): 8.6 TABLET ORAL at 21:38

## 2023-12-05 RX ADMIN — Medication 650 MILLIGRAM(S): at 21:37

## 2023-12-05 RX ADMIN — OLANZAPINE 10 MILLIGRAM(S): 15 TABLET, FILM COATED ORAL at 22:36

## 2023-12-05 RX ADMIN — ATORVASTATIN CALCIUM 80 MILLIGRAM(S): 80 TABLET, FILM COATED ORAL at 21:38

## 2023-12-05 RX ADMIN — Medication 22 MILLILITER(S): at 11:45

## 2023-12-05 RX ADMIN — ONDANSETRON 4 MILLIGRAM(S): 8 TABLET, FILM COATED ORAL at 09:00

## 2023-12-05 RX ADMIN — ENOXAPARIN SODIUM 40 MILLIGRAM(S): 100 INJECTION SUBCUTANEOUS at 21:37

## 2023-12-05 RX ADMIN — Medication 3 MILLIGRAM(S): at 21:37

## 2023-12-05 RX ADMIN — Medication 55 MILLIGRAM(S): at 10:08

## 2023-12-05 NOTE — ED PROVIDER NOTE - PHYSICAL EXAMINATION
VITAL SIGNS: I have reviewed nursing notes and confirm.  CONSTITUTIONAL: 79 yo F laying on stretcher; in no acute distress.  SKIN: Skin exam is warm and dry, no acute rash.  HEAD: Normocephalic; atraumatic.  EYES: PERRL, EOM intact; conjunctiva and sclera clear.  ENT: No nasal discharge; airway clear.  NECK: Supple; No midline TTP.   CARD: S1, S2 normal; no murmurs, gallops, or rubs. Regular rate and rhythm.  RESP: No wheezes, rales or rhonchi. Speaking in full sentences.   ABD: Normal bowel sounds; soft; non-distended; non-tender; No rebound or guarding. No CVA tenderness.  EXT: (+) TTP to L hip with limited ROM. No knee/tib-fib/ankle/foot TTP. DP 2+. Sensation intact.   NEURO: Alert. Grossly unremarkable. No focal deficits.

## 2023-12-05 NOTE — H&P ADULT - ASSESSMENT
Patient is a 78y Female PMH bipolar disorder, intellectual disability, HTN, DM and arthritis, on ASA 81mg who presents to the ED BIBA from [facility] s/p fall found to have an acute comminuted impacted left femoral intertrochanteric fracture.    #Acute comminuted impacted left femoral intertrochanteric fracture  #Unwitnessed fall   - Trauma work up negative except hip fracture  - Was found by [facility] staff kneeling at the edge of her bed     Plan:  - Evaluated by Ortho, transferred to [place]  - Planned for OR tomorrow   - F/u preop labs at 8pm  - NPO at midnight      #Misc  - DVT Prophylaxis: Lovenox after cleared by Ortho  - GI Prophylaxis: None  - Diet: Diabetic diet   - Activity: As tolerated  - IV Fluids: None  - Code Status: Full code    Dispo: Acute  Patient is a 78y Female PMH bipolar disorder, intellectual disability, HTN, DM and arthritis, on ASA 81mg who presents to the ED BIBA from [facility] s/p fall found to have an acute comminuted impacted left femoral intertrochanteric fracture.    #Acute comminuted impacted left femoral intertrochanteric fracture  #Unwitnessed fall   - Trauma work up negative except hip fracture  - Was found by [facility] staff kneeling at the edge of her bed     Plan:  - Evaluated by Ortho, transferred to [place]  - Planned for OR tomorrow   - F/u preop labs at 8pm  - NPO at midnight      #Misc  - DVT Prophylaxis: Lovenox after cleared by Ortho  - GI Prophylaxis: None  - Diet: Diabetic diet   - Activity: As tolerated  - IV Fluids: None  - Code Status: Full code    Dispo: Acute

## 2023-12-05 NOTE — CHART NOTE - NSCHARTNOTEFT_GEN_A_CORE
surgery is booked for tomorrow with dr del valle   please obtain all necessary medical clearances   preop for tomorrow   npo at midnight   am labs

## 2023-12-05 NOTE — CHART NOTE - NSCHARTNOTEFT_GEN_A_CORE
Called by ER for notification of admission to Dr. Norris's service at 1055.  Arrived in ED at 1100, patient already transferred [place].  Unable to see patient.  Signed out to MAR [id number].

## 2023-12-05 NOTE — CONSULT NOTE ADULT - SUBJECTIVE AND OBJECTIVE BOX
HPI:  Patient is a 78y Female PMH bipolar disorder, intellectual disability, HTN, DM and arthritis, on ASA 81mg who presents to the ED BIBA from [facility] s/p fall. As per papers, patient was found kneeling on the floor in front of her bed holding onto her bed. Patient poor historian; reporting confusion and cannot recall any events from injury but endorses left hip and femur pain.    PAST MEDICAL & SURGICAL HISTORY:  HTN (hypertension)      DM (diabetes mellitus)      MDD (major depressive disorder)      Bipolar disorder      No significant past surgical history      Home Medications:  amLODIPine 10 mg oral tablet: 1 tab(s) orally once a day. Continue to take until told otherwise by your provider.  (2022 13:29)  aspirin 81 mg oral tablet, chewable: 1 tab(s) orally once a day. Continue to take until told otherwise by your provider.  (2022 13:29)  atorvastatin 40 mg oral tablet: 1 tab(s) orally once a day. Continue to take until told otherwise by your provider.  (2022 13:29)  ferrous sulfate 325 mg (65 mg elemental iron) oral tablet: 1 tab(s) orally once a day. Continue to take until told otherwise by your provider.  (2022 13:29)  lisinopril 40 mg oral tablet: 1 tab(s) orally once a day. Continue to take until told otherwise by your provider.  (2022 14:44)  melatonin 3 mg oral tablet: 5 milligram(s) orally once a day (at bedtime) at 19:00. Continue to take until told otherwise by your provider.  (2022 13:29)  OLANZapine 10 mg oral tablet, disintegratin tab(s) orally once a day (at bedtime). Continue to take until told otherwise by your provider.  (2022 13:29)  pantoprazole 40 mg oral delayed release tablet: 1 tab(s) orally once a day (before a meal). Continue to take until told otherwise by your provider.  (2022 13:29)  SITagliptin 50 mg oral tablet: 1 tab(s) orally once a day. Continue to take until told otherwise by your provider.  (2022 14:44)  sodium bicarbonate 650 mg oral tablet: 2 tab(s) orally 3 times a day. Continue to take until told otherwise by your provider.  (2022 13:29)  traZODone 100 mg oral tablet: 1 tab(s) orally once a day (at bedtime). Continue to take until told otherwise by your provider.  (2022 14:44)  valproic acid 250 mg/5 mL oral liquid: 500 milligram(s) orally 2 times a day. Continue to take until told otherwise by your provider.  (2022 14:44)                            10.4   10.04 )-----------( 217      ( 05 Dec 2023 06:26 )             32.8     12-    135  |  102  |  52<H>  ----------------------------<  204<H>  4.6   |  20  |  1.8<H>    Ca    10.0      05 Dec 2023 06:26    TPro  6.9  /  Alb  4.3  /  TBili  <0.2  /  DBili  x   /  AST  23  /  ALT  18  /  AlkPhos  57  12-05    PT/INR - ( 05 Dec 2023 06:26 )   PT: 11.10 sec;   INR: 0.97 ratio         PTT - ( 05 Dec 2023 06:26 )  PTT:36.3 sec    TYPE AND SCREEN NEEDED  CXR- on chart  EKG- on chart    < from: Xray Hip 2-3 Views, Left (23 @ 05:26) >    ACC: [id number] EXAM:  XR HIP 2-3V LT   ORDERED BY: ETIENNE MARSH     PROCEDURE DATE:  2023          INTERPRETATION:  CLINICAL INFORMATION: Fall    PROCEDURE: 2 views of the left hip    COMPARISON: Pelvic radiograph 10/14/2021    FINDINGS/  IMPRESSION:    Acute displaced intertrochanteric fracture of the left hip.    Degenerative changes.    --- End of Report ---          DANE OSMAN MD; Resident Radiologist  This document has been electronically signed.  SUNITHA CURRIE MD; Attending Radiologist  This document has been electronically signed. Dec  5 2023  8:45AM    < end of copied text >       Review of Systems:  Review of Systems: Constitutional: (-) fever (-) chills   Eyes: (-) visual changes  ENMT: (-) nasal or chest congestion (-) runny nose (-) sore throat (-) neck pain (-) neck stiffness  Cardiac: (-) chest pain (-) syncope  Respiratory: (-) cough (-) SOB  GI: (-) nausea (-) vomiting (-) diarrhea  : (-) incontinence  MS:(-) back pain   Neuro: (-) head injury (-) headache (-) dizziness (-) numbness/tingling to extremities B/L (-) LOC   Skin: (-) abrasion (-) rash (-) laceration  Except as documented in the HPI, all other systems are negative.     Vital Signs Last 24 Hrs  T(C): 36.4 (05 Dec 2023 08:53), Max: 37.2 (05 Dec 2023 03:40)  T(F): 97.6 (05 Dec 2023 08:53), Max: 98.9 (05 Dec 2023 03:40)  HR: 62 (05 Dec 2023 10:41) (62 - 67)  BP: 145/76 (05 Dec 2023 10:41) (145/76 - 186/86)  BP(mean): --  RR: 18 (05 Dec 2023 10:41) (18 - 18)  SpO2: 96% (05 Dec 2023 10:41) (95% - 96%)    Parameters below as of 05 Dec 2023 10:41  Patient On (Oxygen Delivery Method): room air        Physical Exam: GENERAL:   NAD, resting comfortably.awake, alert, poor historian  HEAD:  Atraumatic, Normocephalic  EYES: EOMI,   NECK: Supple, No masses   CHEST/LUNG: unlabored breathing on room air  HEART: Regular rate and rhythm;  ABDOMEN: Soft, Nontender, Nondistended   EXTREMITIES:   Peripheral Pulses Present, No clubbing, no cyanosis, or no edema, no calf tenderness  Pelvis stable. TTP to left hip + ecchymosis, LLE shortened, externally rotated. Limited ROM 2/2 pain. DP 2+. Sensation intact.  PSYCH: A cooperative, appropriate  SKIN: WNL HPI:  Patient is a 78y Female PMH bipolar disorder, intellectual disability, HTN, DM and arthritis, on ASA 81mg who presents to the ED BIBA from [facility] s/p fall. As per papers, patient was found kneeling on the floor in front of her bed holding onto her bed. Patient poor historian; reporting confusion and cannot recall any events from injury but endorses left hip and femur pain.    PAST MEDICAL & SURGICAL HISTORY:  HTN (hypertension)      DM (diabetes mellitus)      MDD (major depressive disorder)      Bipolar disorder      No significant past surgical history      Home Medications:  amLODIPine 10 mg oral tablet: 1 tab(s) orally once a day. Continue to take until told otherwise by your provider.  (2022 13:29)  aspirin 81 mg oral tablet, chewable: 1 tab(s) orally once a day. Continue to take until told otherwise by your provider.  (2022 13:29)  atorvastatin 40 mg oral tablet: 1 tab(s) orally once a day. Continue to take until told otherwise by your provider.  (2022 13:29)  ferrous sulfate 325 mg (65 mg elemental iron) oral tablet: 1 tab(s) orally once a day. Continue to take until told otherwise by your provider.  (2022 13:29)  lisinopril 40 mg oral tablet: 1 tab(s) orally once a day. Continue to take until told otherwise by your provider.  (2022 14:44)  melatonin 3 mg oral tablet: 5 milligram(s) orally once a day (at bedtime) at 19:00. Continue to take until told otherwise by your provider.  (2022 13:29)  OLANZapine 10 mg oral tablet, disintegratin tab(s) orally once a day (at bedtime). Continue to take until told otherwise by your provider.  (2022 13:29)  pantoprazole 40 mg oral delayed release tablet: 1 tab(s) orally once a day (before a meal). Continue to take until told otherwise by your provider.  (2022 13:29)  SITagliptin 50 mg oral tablet: 1 tab(s) orally once a day. Continue to take until told otherwise by your provider.  (2022 14:44)  sodium bicarbonate 650 mg oral tablet: 2 tab(s) orally 3 times a day. Continue to take until told otherwise by your provider.  (2022 13:29)  traZODone 100 mg oral tablet: 1 tab(s) orally once a day (at bedtime). Continue to take until told otherwise by your provider.  (2022 14:44)  valproic acid 250 mg/5 mL oral liquid: 500 milligram(s) orally 2 times a day. Continue to take until told otherwise by your provider.  (2022 14:44)                            10.4   10.04 )-----------( 217      ( 05 Dec 2023 06:26 )             32.8     12-    135  |  102  |  52<H>  ----------------------------<  204<H>  4.6   |  20  |  1.8<H>    Ca    10.0      05 Dec 2023 06:26    TPro  6.9  /  Alb  4.3  /  TBili  <0.2  /  DBili  x   /  AST  23  /  ALT  18  /  AlkPhos  57  12-05    PT/INR - ( 05 Dec 2023 06:26 )   PT: 11.10 sec;   INR: 0.97 ratio         PTT - ( 05 Dec 2023 06:26 )  PTT:36.3 sec    TYPE AND SCREEN NEEDED  CXR- on chart  EKG- on chart    < from: Xray Hip 2-3 Views, Left (23 @ 05:26) >    ACC: [id number] EXAM:  XR HIP 2-3V LT   ORDERED BY: ETIENNE MARSH     PROCEDURE DATE:  2023          INTERPRETATION:  CLINICAL INFORMATION: Fall    PROCEDURE: 2 views of the left hip    COMPARISON: Pelvic radiograph 10/14/2021    FINDINGS/  IMPRESSION:    Acute displaced intertrochanteric fracture of the left hip.    Degenerative changes.    --- End of Report ---          DANE OSMAN MD; Resident Radiologist  This document has been electronically signed.  SUNITHA CURRIE MD; Attending Radiologist  This document has been electronically signed. Dec  5 2023  8:45AM    < end of copied text >       Review of Systems:  Review of Systems: Constitutional: (-) fever (-) chills   Eyes: (-) visual changes  ENMT: (-) nasal or chest congestion (-) runny nose (-) sore throat (-) neck pain (-) neck stiffness  Cardiac: (-) chest pain (-) syncope  Respiratory: (-) cough (-) SOB  GI: (-) nausea (-) vomiting (-) diarrhea  : (-) incontinence  MS:(-) back pain   Neuro: (-) head injury (-) headache (-) dizziness (-) numbness/tingling to extremities B/L (-) LOC   Skin: (-) abrasion (-) rash (-) laceration  Except as documented in the HPI, all other systems are negative.     Vital Signs Last 24 Hrs  T(C): 36.4 (05 Dec 2023 08:53), Max: 37.2 (05 Dec 2023 03:40)  T(F): 97.6 (05 Dec 2023 08:53), Max: 98.9 (05 Dec 2023 03:40)  HR: 62 (05 Dec 2023 10:41) (62 - 67)  BP: 145/76 (05 Dec 2023 10:41) (145/76 - 186/86)  BP(mean): --  RR: 18 (05 Dec 2023 10:41) (18 - 18)  SpO2: 96% (05 Dec 2023 10:41) (95% - 96%)    Parameters below as of 05 Dec 2023 10:41  Patient On (Oxygen Delivery Method): room air        Physical Exam: GENERAL:   NAD, resting comfortably.awake, alert, poor historian  HEAD:  Atraumatic, Normocephalic  EYES: EOMI,   NECK: Supple, No masses   CHEST/LUNG: unlabored breathing on room air  HEART: Regular rate and rhythm;  ABDOMEN: Soft, Nontender, Nondistended   EXTREMITIES:   Peripheral Pulses Present, No clubbing, no cyanosis, or no edema, no calf tenderness  Pelvis stable. TTP to left hip + ecchymosis, LLE shortened, externally rotated. Limited ROM 2/2 pain. DP 2+. Sensation intact.  PSYCH: A cooperative, appropriate  SKIN: WNL

## 2023-12-05 NOTE — ED PROCEDURE NOTE - CPROC ED TIME OUT STATEMENT1
“Patient's name, , procedure and correct site were confirmed during the [place] Timeout.” “Patient's name, , procedure and correct site were confirmed during the [place] Timeout.”

## 2023-12-05 NOTE — ED ADULT NURSE NOTE - CHIEF COMPLAINT QUOTE
BIBA from [facility], s/p fall, c/o left leg pain BIBA from [facility], s/p fall, c/o left leg pain

## 2023-12-05 NOTE — H&P ADULT - HISTORY OF PRESENT ILLNESS
Patient is a 78y Female PMH bipolar disorder, intellectual disability, HTN, DM and arthritis, on ASA 81mg who presents to the ED BIBA from [facility] s/p fall. As per papers, patient was found kneeling on the floor in front of her bed holding onto her bed. Patient is a poor historian, and unable to answer questions.     In the ED trauma work up showed acute comminuted impacted left femoral intertrochanteric fracture. Patient was transferred to [place] and is scheduled for ORIF with orthopedic surgery tomorrow.       Vital Signs Last 24 Hrs  T(C): 36.4 (05 Dec 2023 08:53), Max: 37.2 (05 Dec 2023 03:40)  T(F): 97.6 (05 Dec 2023 08:53), Max: 98.9 (05 Dec 2023 03:40)  HR: 64 (05 Dec 2023 12:00) (62 - 67)  BP: 146/63 (05 Dec 2023 12:00) (145/76 - 186/86)  BP(mean): --  RR: 18 (05 Dec 2023 10:41) (18 - 18)  SpO2: 96% (05 Dec 2023 10:41) (95% - 96%)    Parameters below as of 05 Dec 2023 10:41  Patient On (Oxygen Delivery Method): room air     Patient is a 78y Female PMH bipolar disorder, intellectual disability, HTN, DM and arthritis, on ASA 81mg who presents to the ED BIBA from [facility] s/p fall. As per papers, patient was found kneeling on the floor in front of her bed holding onto her bed. Patient is a poor historian, and unable to answer questions.     In the ED trauma work up showed acute comminuted impacted left femoral intertrochanteric fracture. Patient was transferred to [place] and is scheduled for ORIF with orthopedic surgery tomorrow.       Vital Signs Last 24 Hrs  T(C): 36.4 (05 Dec 2023 08:53), Max: 37.2 (05 Dec 2023 03:40)  T(F): 97.6 (05 Dec 2023 08:53), Max: 98.9 (05 Dec 2023 03:40)  HR: 64 (05 Dec 2023 12:00) (62 - 67)  BP: 146/63 (05 Dec 2023 12:00) (145/76 - 186/86)  BP(mean): --  RR: 18 (05 Dec 2023 10:41) (18 - 18)  SpO2: 96% (05 Dec 2023 10:41) (95% - 96%)    Parameters below as of 05 Dec 2023 10:41  Patient On (Oxygen Delivery Method): room air

## 2023-12-05 NOTE — ED PROVIDER NOTE - OBJECTIVE STATEMENT
Patient is a 78y Female PMH bipolar disorder, intellectual disability, HTN, DM and arthritis, on ASA 81mg who presents to the ED BIBA from [facility] s/p fall. As per papers, patient was found kneeling on the floor in front of her bed holding onto her bed. Patient poor historian; reporting confusion and cannot recall any events from injury but endorses left hip and femur pain. Patient is a 78y Female PMH bipolar disorder, intellectual disability, HTN, DM and arthritis, on ASA 81mg who presents to the ED BIBA from [facility] s/p fall. As per papers, patient was found kneeling on the floor in front of her bed holding onto her bed. Patient poor historian; reporting confusion and cannot recall any events from injury but endorses left hip and femur pain.

## 2023-12-05 NOTE — ED ADULT TRIAGE NOTE - CHIEF COMPLAINT QUOTE
BIBA from [facility], s/p fall, c/o left leg pain BIBA from [facility], s/p fall, c/o left leg pain

## 2023-12-05 NOTE — ED PROVIDER NOTE - DIFFERENTIAL DIAGNOSIS
The differential diagnosis for patients clinical presentation includes but is not limited to:  pelvic fracture, hip fracture, rib fracture, hemoperitoneum Differential Diagnosis

## 2023-12-05 NOTE — ED PROVIDER NOTE - ATTENDING APP SHARED VISIT CONTRIBUTION OF CARE
I reviewed and verified the documentation and  and independently performed the documented    78yF  Biopolar   intellectual disability  htn dm  on  presents from  after Found on her knees unable to get up. Patient does not know what happened discussed with staff at [place] - pt has underlying baseline confusion .   deformity to  left hip .   xray with  fracture.  panscan,  labs  admit I reviewed and verified the documentation and  and independently performed the documented    78yF  Biopolar   intellectual disability  htn dm  on  presents from  after Found on her knees unable to get up. Patient does not know what happened discussed with staff at [place] - pt has underlying baseline confusion .   deformity to  left hip .   xray with  fracture.  panscan,  labs  admit

## 2023-12-05 NOTE — CONSULT NOTE ADULT - PROBLEM SELECTOR RECOMMENDATION 9
-admit to medicine  -pre-op risk assessment/optimization  -npo after midnight, surgery booked for tomorrow with Dr. Ortega  -dvt ppx- can give lovenox tonight   -pain control  -trend cbc  -type and screen

## 2023-12-05 NOTE — H&P ADULT - NSHPLABSRESULTS_GEN_ALL_CORE
Complete Blood Count + Automated Diff (12.05.23 @ 06:26)    WBC Count: 10.04 K/uL    RBC Count: 3.87 M/uL    Hemoglobin: 10.4 g/dL    Hematocrit: 32.8 %    Mean Cell Volume: 84.8 fL    Mean Cell Hemoglobin: 26.9 pg    Mean Cell Hemoglobin Conc: 31.7 g/dL    Red Cell Distrib Width: 13.7 %    Platelet Count - Automated: 217 K/uL    MPV: 11.3 fL    Auto Neutrophil #: 8.47 K/uL    Auto Lymphocyte #: 1.00 K/uL    Auto Monocyte #: 0.43 K/uL    Auto Eosinophil #: 0.07 K/uL    Auto Basophil #: 0.03 K/uL    Auto Neutrophil %: 84.3: Differential percentages must be correlated with absolute numbers for  clinical significance. %    Auto Lymphocyte %: 10.0 %    Auto Monocyte %: 4.3 %    Auto Eosinophil %: 0.7 %    Auto Basophil %: 0.3 %    Auto Immature Granulocyte %: 0.4: (Includes meta, myelo and promyelocytes). Mild elevations in immature  granulocytes may be seen with many inflammatory processes and pregnancy;  clinical correlation suggested. %    Nucleated RBC: 0 /100 WBCs    Comprehensive Metabolic Panel (12.05.23 @ 06:26)    Sodium: 135 mmol/L    Potassium: 4.6 mmol/L    Chloride: 102 mmol/L    Carbon Dioxide: 20 mmol/L    Anion Gap: 13 mmol/L    Blood Urea Nitrogen: 52 mg/dL    Creatinine: 1.8 mg/dL    Glucose: 204 mg/dL    Calcium: 10.0 mg/dL    Protein Total: 6.9 g/dL    Albumin: 4.3 g/dL    Bilirubin Total: <0.2 mg/dL    Alkaline Phosphatase: 57 U/L    Aspartate Aminotransferase (AST/SGOT): 23 U/L    Alanine Aminotransferase (ALT/SGPT): 18 U/L    eGFR: 28: The estimated glomerular filtration rate (eGFR) is calculated using the  2021 CKD-EPI creatinine equation, which does not have a coefficient for  race and is validated in individuals 18 years of age and older (N Engl J  Med 2021; 385:3063-7236). Creatinine-based eGFR may be inaccurate in  various situations including but not limited to extremes of muscle mass,  altered dietary protein intake, or medications that affect renal tubular  creatinine secretion. mL/min/1.73m2 Complete Blood Count + Automated Diff (12.05.23 @ 06:26)    WBC Count: 10.04 K/uL    RBC Count: 3.87 M/uL    Hemoglobin: 10.4 g/dL    Hematocrit: 32.8 %    Mean Cell Volume: 84.8 fL    Mean Cell Hemoglobin: 26.9 pg    Mean Cell Hemoglobin Conc: 31.7 g/dL    Red Cell Distrib Width: 13.7 %    Platelet Count - Automated: 217 K/uL    MPV: 11.3 fL    Auto Neutrophil #: 8.47 K/uL    Auto Lymphocyte #: 1.00 K/uL    Auto Monocyte #: 0.43 K/uL    Auto Eosinophil #: 0.07 K/uL    Auto Basophil #: 0.03 K/uL    Auto Neutrophil %: 84.3: Differential percentages must be correlated with absolute numbers for  clinical significance. %    Auto Lymphocyte %: 10.0 %    Auto Monocyte %: 4.3 %    Auto Eosinophil %: 0.7 %    Auto Basophil %: 0.3 %    Auto Immature Granulocyte %: 0.4: (Includes meta, myelo and promyelocytes). Mild elevations in immature  granulocytes may be seen with many inflammatory processes and pregnancy;  clinical correlation suggested. %    Nucleated RBC: 0 /100 WBCs    Comprehensive Metabolic Panel (12.05.23 @ 06:26)    Sodium: 135 mmol/L    Potassium: 4.6 mmol/L    Chloride: 102 mmol/L    Carbon Dioxide: 20 mmol/L    Anion Gap: 13 mmol/L    Blood Urea Nitrogen: 52 mg/dL    Creatinine: 1.8 mg/dL    Glucose: 204 mg/dL    Calcium: 10.0 mg/dL    Protein Total: 6.9 g/dL    Albumin: 4.3 g/dL    Bilirubin Total: <0.2 mg/dL    Alkaline Phosphatase: 57 U/L    Aspartate Aminotransferase (AST/SGOT): 23 U/L    Alanine Aminotransferase (ALT/SGPT): 18 U/L    eGFR: 28: The estimated glomerular filtration rate (eGFR) is calculated using the  2021 CKD-EPI creatinine equation, which does not have a coefficient for  race and is validated in individuals 18 years of age and older (N Engl J  Med 2021; 385:1974-9348). Creatinine-based eGFR may be inaccurate in  various situations including but not limited to extremes of muscle mass,  altered dietary protein intake, or medications that affect renal tubular  creatinine secretion. mL/min/1.73m2

## 2023-12-05 NOTE — ED PROCEDURE NOTE - ATTENDING CONTRIBUTION TO CARE
I was physically present for and directly supervised this procedure.  Procedure was done as documented without any complications.  Ultrasound images saved on [employer] application. I was physically present for and directly supervised this procedure.  Procedure was done as documented without any complications.  Ultrasound images saved on [employer] application.

## 2023-12-05 NOTE — ED PROVIDER NOTE - FAMILY DETAILS FREE TEXT FOR MDM ADDL HISTORY OBTAINED FROM QUESTION
60 yo F with history of uncontrolled T2DM with unknown complications. Also h/o functional paraplegia, hypothyroidism, pulmonary hypertension, COPD, RA on chronic prednisone, SBO s/p ex lap with lysis of adhesions c/b multifocal pneumonia with AMS of unknown etiology. Pt was on catatonic state that resolved with ativan administration and is alert but still confused and disoriented. Refusing meds/treatments on and off. Glycemic control remains variable. Took Prednisone and Synthroid today. Unknown BG from dinner and lunch yesterday. Uncertain if patient received any bolus insulin coverage at that time. Patient remains above goal, mid to high 200s. Patient refused to answer questions about diet. Patient consumed about 25% of tray at time on interview. Son

## 2023-12-05 NOTE — PRE PROCEDURE NOTE - PRE PROCEDURE EVALUATION
ORTHOPEDIC SURGERY PRE OP NOTE      Diagnosis:   LEFT INTERTROCH FRACTURE     Planned Procedure: OPEN REDUCTION INTERNAL FIXATION LEFT HIP     Consent: TO BE OBTAINED BY ATTENDING                   Risks/benefits/alternatives were discussed with the patient/family and they wish to proceed with surgery.       ANTICIPATED DATE OF PROCEDURE :  BOOKED CASE DR NEAL   SCHEDULED CASE OR ADD-ON CASE:       Consent: FROM SON     Clearances:   [***] Medicine: PENDING SEEN IN [facility] ED BY DR DYLAN DORSEY.     DR RODRIGEZ AS PER OFFICE IS AWARE AND WILL SEE PT AT [place] FIRST THING IN AM   [***] Other:    T(C): 36.4 (12-05-23 @ 08:53), Max: 37.2 (12-05-23 @ 03:40)  HR: 64 (12-05-23 @ 12:00) (62 - 67)  BP: 146/63 (12-05-23 @ 12:00) (145/76 - 186/86)  RR: 18 (12-05-23 @ 10:41) (18 - 18)  SpO2: 96% (12-05-23 @ 10:41) (95% - 96%)    Labs:                        10.4   10.04 )-----------( 217      ( 05 Dec 2023 06:26 )             32.8     12-05    135  |  102  |  52<H>  ----------------------------<  204<H>  4.6   |  20  |  1.8<H>    Ca    10.0      05 Dec 2023 06:26    TPro  6.9  /  Alb  4.3  /  TBili  <0.2  /  DBili  x   /  AST  23  /  ALT  18  /  AlkPhos  57  12-05    PT/INR - ( 05 Dec 2023 06:26 )   PT: 11.10 sec;   INR: 0.97 ratio         PTT - ( 05 Dec 2023 06:26 )  PTT:36.3 sec  Type and Screen X 2:      [ ]Pregnancy test ( if female of childbearing age) : ***  [ ]EKG:   [ ]CXR:       DIET: NPO after midnight  IVF: per primary team      ANTICOAGULATION STATUS ( include name of anticoagulant) :  [***] CONTINUE (**name of anticoagulant)    OK TO GIVE NOW   NO NEED TO HOLD FOR SURGERY   [***] DISCONTINUE(** name of anticoagulant)                                           A/P: Patient is a 78y y/o Female Pending ****** tomorrow    [ ] -NPO and IVF @ midnight  [ ]pain control/analgesia prn per primary team   [ ]Incentive Spirometry   [ ]F/U Clearance  [ ]F/U Pending Labs  [ ]Notify Ortho with any questions- spectra [id number]    [ ]DISCUSSED WITH PRIMARY TEAM MEMBER (name of team member): ****     DR HERNADEZ    [ ]Date and Time DISCUSSED WITH PRIMARY TEAM MEMBER: **** ORTHOPEDIC SURGERY PRE OP NOTE      Diagnosis:   LEFT INTERTROCH FRACTURE     Planned Procedure: OPEN REDUCTION INTERNAL FIXATION LEFT HIP     Consent: TO BE OBTAINED BY ATTENDING                   Risks/benefits/alternatives were discussed with the patient/family and they wish to proceed with surgery.       ANTICIPATED DATE OF PROCEDURE :  BOOKED CASE DR NEAL   SCHEDULED CASE OR ADD-ON CASE:       Consent: FROM SON     Clearances:   [***] Medicine: PENDING SEEN IN [facility] ED BY DR DYLAN DORSEY.     DR RODRIGEZ AS PER OFFICE IS AWARE AND WILL SEE PT AT [place] FIRST THING IN AM   [***] Other:    T(C): 36.4 (12-05-23 @ 08:53), Max: 37.2 (12-05-23 @ 03:40)  HR: 64 (12-05-23 @ 12:00) (62 - 67)  BP: 146/63 (12-05-23 @ 12:00) (145/76 - 186/86)  RR: 18 (12-05-23 @ 10:41) (18 - 18)  SpO2: 96% (12-05-23 @ 10:41) (95% - 96%)    Labs:                        10.4   10.04 )-----------( 217      ( 05 Dec 2023 06:26 )             32.8     12-05    135  |  102  |  52<H>  ----------------------------<  204<H>  4.6   |  20  |  1.8<H>    Ca    10.0      05 Dec 2023 06:26    TPro  6.9  /  Alb  4.3  /  TBili  <0.2  /  DBili  x   /  AST  23  /  ALT  18  /  AlkPhos  57  12-05    PT/INR - ( 05 Dec 2023 06:26 )   PT: 11.10 sec;   INR: 0.97 ratio         PTT - ( 05 Dec 2023 06:26 )  PTT:36.3 sec  Type and Screen X 2:      [ ]Pregnancy test ( if female of childbearing age) : ***  [ ]EKG:   [ ]CXR:       DIET: NPO after midnight  IVF: per primary team      ANTICOAGULATION STATUS ( include name of anticoagulant) :  [***] CONTINUE (**name of anticoagulant)    OK TO GIVE NOW   NO NEED TO HOLD FOR SURGERY   [***] DISCONTINUE(** name of anticoagulant)                                           A/P: Patient is a 78y y/o Female Pending ****** tomorrow    [ ] -NPO and IVF @ midnight  [ ]pain control/analgesia prn per primary team   [ ]Incentive Spirometry   [ ]F/U Clearance  [ ]F/U Pending Labs  [ ]Notify Ortho with any questions- spectra [id number]    [ ]DISCUSSED WITH PRIMARY TEAM MEMBER (name of team member): ****     DR HERNADEZ    [ ]Date and Time DISCUSSED WITH PRIMARY TEAM MEMBER: ****

## 2023-12-05 NOTE — ED PROVIDER NOTE - CLINICAL SUMMARY MEDICAL DECISION MAKING FREE TEXT BOX
ED work-up reviewed and patient found to have an isolated left intertrochanteric hip fracture.  Remainder of trauma work-up negative.  Labs reviewed and results discussed in detail with patient and son.  Patient has a poor memory and was complaining of pain.  Additional pain control given and patient received a nerve block after discussion with patient and son.  Nerve block was successful and performed as documented.  Patient was able to rest after receiving this nerve block.  Preop labs sent and case discussed with orthopedics.  Orthopedics recommends admission to [facility] for operative intervention.  Case endorsed to PMD and medical PA at [facility] aware to perform admission.  Patient slotted for admission to [facility].  Labs reviewed and showed mild baseline anemia and creatinine of 1.8 (baseline) with normal electrolytes. ED work-up reviewed and patient found to have an isolated left intertrochanteric hip fracture.  Remainder of trauma work-up negative.  Labs reviewed and results discussed in detail with patient and son.  Patient has a poor memory and was complaining of pain.  Additional pain control given and patient received a nerve block after discussion with patient and son.  Nerve block was successful and performed as documented.  Patient was able to rest after receiving this nerve block.  Preop labs sent and case discussed with orthopedics.  Orthopedics recommends admission to [facility] for operative intervention.  Case endorsed to PMD and medical PA at [facility] aware to perform admission.  Patient slotted for admission to [facility].  Labs reviewed and showed mild baseline anemia and creatinine of 1.8 (baseline) with normal electrolytes.

## 2023-12-05 NOTE — ED ADULT NURSE NOTE - NSFALLHARMRISKINTERV_ED_ALL_ED

## 2023-12-05 NOTE — CONSULT NOTE ADULT - NS ATTEND AMEND GEN_ALL_CORE FT
Orthopedic Trauma Attending  Patient seen and examined.  PMHx, Psurg Hx, Medications and Allergies reviewed.  X-rays and laboratory reviewed.  Agree with findings, assessment and plan.  Impression: Displaced left basicervical IT hip fracture.  Multiple Med problems, including on going pneumonia.  Now medically optimized. Recommend ORIF ASAP.  Risks, benefits and alternative to surgical management of the patient's fracture were again reviewed with patient and her family, their questions answered to their satisfaction and her son's wishes to proceed with proposed surgery today.  Patient is unable to give consent because of cognitive impairment and her son's are her designated health care proxy.

## 2023-12-06 ENCOUNTER — TRANSCRIPTION ENCOUNTER (OUTPATIENT)
Age: 78
End: 2023-12-06

## 2023-12-06 DIAGNOSIS — S72.142A DISPLACED INTERTROCHANTERIC FRACTURE OF LEFT FEMUR, INITIAL ENCOUNTER FOR CLOSED FRACTURE: ICD-10-CM

## 2023-12-06 LAB
A1C WITH ESTIMATED AVERAGE GLUCOSE RESULT: 6.8 % — HIGH (ref 4–5.6)
A1C WITH ESTIMATED AVERAGE GLUCOSE RESULT: 6.8 % — HIGH (ref 4–5.6)
ALBUMIN SERPL ELPH-MCNC: 4 G/DL — SIGNIFICANT CHANGE UP (ref 3.5–5.2)
ALBUMIN SERPL ELPH-MCNC: 4 G/DL — SIGNIFICANT CHANGE UP (ref 3.5–5.2)
ALP SERPL-CCNC: 47 U/L — SIGNIFICANT CHANGE UP (ref 30–115)
ALP SERPL-CCNC: 47 U/L — SIGNIFICANT CHANGE UP (ref 30–115)
ALT FLD-CCNC: 16 U/L — SIGNIFICANT CHANGE UP (ref 0–41)
ALT FLD-CCNC: 16 U/L — SIGNIFICANT CHANGE UP (ref 0–41)
ANION GAP SERPL CALC-SCNC: 11 MMOL/L — SIGNIFICANT CHANGE UP (ref 7–14)
ANION GAP SERPL CALC-SCNC: 11 MMOL/L — SIGNIFICANT CHANGE UP (ref 7–14)
APTT BLD: 38.8 SEC — SIGNIFICANT CHANGE UP (ref 27–39.2)
APTT BLD: 38.8 SEC — SIGNIFICANT CHANGE UP (ref 27–39.2)
AST SERPL-CCNC: 19 U/L — SIGNIFICANT CHANGE UP (ref 0–41)
AST SERPL-CCNC: 19 U/L — SIGNIFICANT CHANGE UP (ref 0–41)
BASOPHILS # BLD AUTO: 0.02 K/UL — SIGNIFICANT CHANGE UP (ref 0–0.2)
BASOPHILS # BLD AUTO: 0.02 K/UL — SIGNIFICANT CHANGE UP (ref 0–0.2)
BASOPHILS NFR BLD AUTO: 0.3 % — SIGNIFICANT CHANGE UP (ref 0–1)
BASOPHILS NFR BLD AUTO: 0.3 % — SIGNIFICANT CHANGE UP (ref 0–1)
BILIRUB SERPL-MCNC: 0.3 MG/DL — SIGNIFICANT CHANGE UP (ref 0.2–1.2)
BILIRUB SERPL-MCNC: 0.3 MG/DL — SIGNIFICANT CHANGE UP (ref 0.2–1.2)
BUN SERPL-MCNC: 43 MG/DL — HIGH (ref 10–20)
BUN SERPL-MCNC: 43 MG/DL — HIGH (ref 10–20)
CALCIUM SERPL-MCNC: 10 MG/DL — SIGNIFICANT CHANGE UP (ref 8.4–10.5)
CALCIUM SERPL-MCNC: 10 MG/DL — SIGNIFICANT CHANGE UP (ref 8.4–10.5)
CHLORIDE SERPL-SCNC: 108 MMOL/L — SIGNIFICANT CHANGE UP (ref 98–110)
CHLORIDE SERPL-SCNC: 108 MMOL/L — SIGNIFICANT CHANGE UP (ref 98–110)
CO2 SERPL-SCNC: 23 MMOL/L — SIGNIFICANT CHANGE UP (ref 17–32)
CO2 SERPL-SCNC: 23 MMOL/L — SIGNIFICANT CHANGE UP (ref 17–32)
CREAT SERPL-MCNC: 2 MG/DL — HIGH (ref 0.7–1.5)
CREAT SERPL-MCNC: 2 MG/DL — HIGH (ref 0.7–1.5)
EGFR: 25 ML/MIN/1.73M2 — LOW
EGFR: 25 ML/MIN/1.73M2 — LOW
EOSINOPHIL # BLD AUTO: 0.03 K/UL — SIGNIFICANT CHANGE UP (ref 0–0.7)
EOSINOPHIL # BLD AUTO: 0.03 K/UL — SIGNIFICANT CHANGE UP (ref 0–0.7)
EOSINOPHIL NFR BLD AUTO: 0.5 % — SIGNIFICANT CHANGE UP (ref 0–8)
EOSINOPHIL NFR BLD AUTO: 0.5 % — SIGNIFICANT CHANGE UP (ref 0–8)
ESTIMATED AVERAGE GLUCOSE: 148 MG/DL — HIGH (ref 68–114)
ESTIMATED AVERAGE GLUCOSE: 148 MG/DL — HIGH (ref 68–114)
GLUCOSE BLDC GLUCOMTR-MCNC: 116 MG/DL — HIGH (ref 70–99)
GLUCOSE BLDC GLUCOMTR-MCNC: 116 MG/DL — HIGH (ref 70–99)
GLUCOSE BLDC GLUCOMTR-MCNC: 137 MG/DL — HIGH (ref 70–99)
GLUCOSE BLDC GLUCOMTR-MCNC: 137 MG/DL — HIGH (ref 70–99)
GLUCOSE BLDC GLUCOMTR-MCNC: 147 MG/DL — HIGH (ref 70–99)
GLUCOSE BLDC GLUCOMTR-MCNC: 147 MG/DL — HIGH (ref 70–99)
GLUCOSE BLDC GLUCOMTR-MCNC: 159 MG/DL — HIGH (ref 70–99)
GLUCOSE BLDC GLUCOMTR-MCNC: 159 MG/DL — HIGH (ref 70–99)
GLUCOSE BLDC GLUCOMTR-MCNC: 191 MG/DL — HIGH (ref 70–99)
GLUCOSE BLDC GLUCOMTR-MCNC: 191 MG/DL — HIGH (ref 70–99)
GLUCOSE SERPL-MCNC: 126 MG/DL — HIGH (ref 70–99)
GLUCOSE SERPL-MCNC: 126 MG/DL — HIGH (ref 70–99)
HCT VFR BLD CALC: 27.8 % — LOW (ref 37–47)
HCT VFR BLD CALC: 27.8 % — LOW (ref 37–47)
HCT VFR BLD CALC: 31.3 % — LOW (ref 37–47)
HCT VFR BLD CALC: 31.3 % — LOW (ref 37–47)
HGB BLD-MCNC: 8.5 G/DL — LOW (ref 12–16)
HGB BLD-MCNC: 8.5 G/DL — LOW (ref 12–16)
HGB BLD-MCNC: 9.4 G/DL — LOW (ref 12–16)
HGB BLD-MCNC: 9.4 G/DL — LOW (ref 12–16)
IMM GRANULOCYTES NFR BLD AUTO: 0.9 % — HIGH (ref 0.1–0.3)
IMM GRANULOCYTES NFR BLD AUTO: 0.9 % — HIGH (ref 0.1–0.3)
INR BLD: 0.98 RATIO — SIGNIFICANT CHANGE UP (ref 0.65–1.3)
INR BLD: 0.98 RATIO — SIGNIFICANT CHANGE UP (ref 0.65–1.3)
LYMPHOCYTES # BLD AUTO: 0.75 K/UL — LOW (ref 1.2–3.4)
LYMPHOCYTES # BLD AUTO: 0.75 K/UL — LOW (ref 1.2–3.4)
LYMPHOCYTES # BLD AUTO: 11.5 % — LOW (ref 20.5–51.1)
LYMPHOCYTES # BLD AUTO: 11.5 % — LOW (ref 20.5–51.1)
MAGNESIUM SERPL-MCNC: 2.6 MG/DL — HIGH (ref 1.8–2.4)
MAGNESIUM SERPL-MCNC: 2.6 MG/DL — HIGH (ref 1.8–2.4)
MCHC RBC-ENTMCNC: 26.3 PG — LOW (ref 27–31)
MCHC RBC-ENTMCNC: 26.3 PG — LOW (ref 27–31)
MCHC RBC-ENTMCNC: 26.7 PG — LOW (ref 27–31)
MCHC RBC-ENTMCNC: 26.7 PG — LOW (ref 27–31)
MCHC RBC-ENTMCNC: 30 G/DL — LOW (ref 32–37)
MCHC RBC-ENTMCNC: 30 G/DL — LOW (ref 32–37)
MCHC RBC-ENTMCNC: 30.6 G/DL — LOW (ref 32–37)
MCHC RBC-ENTMCNC: 30.6 G/DL — LOW (ref 32–37)
MCV RBC AUTO: 87.4 FL — SIGNIFICANT CHANGE UP (ref 81–99)
MCV RBC AUTO: 87.4 FL — SIGNIFICANT CHANGE UP (ref 81–99)
MCV RBC AUTO: 87.7 FL — SIGNIFICANT CHANGE UP (ref 81–99)
MCV RBC AUTO: 87.7 FL — SIGNIFICANT CHANGE UP (ref 81–99)
MONOCYTES # BLD AUTO: 0.5 K/UL — SIGNIFICANT CHANGE UP (ref 0.1–0.6)
MONOCYTES # BLD AUTO: 0.5 K/UL — SIGNIFICANT CHANGE UP (ref 0.1–0.6)
MONOCYTES NFR BLD AUTO: 7.6 % — SIGNIFICANT CHANGE UP (ref 1.7–9.3)
MONOCYTES NFR BLD AUTO: 7.6 % — SIGNIFICANT CHANGE UP (ref 1.7–9.3)
NEUTROPHILS # BLD AUTO: 5.19 K/UL — SIGNIFICANT CHANGE UP (ref 1.4–6.5)
NEUTROPHILS # BLD AUTO: 5.19 K/UL — SIGNIFICANT CHANGE UP (ref 1.4–6.5)
NEUTROPHILS NFR BLD AUTO: 79.2 % — HIGH (ref 42.2–75.2)
NEUTROPHILS NFR BLD AUTO: 79.2 % — HIGH (ref 42.2–75.2)
NRBC # BLD: 0 /100 WBCS — SIGNIFICANT CHANGE UP (ref 0–0)
PLATELET # BLD AUTO: 188 K/UL — SIGNIFICANT CHANGE UP (ref 130–400)
PLATELET # BLD AUTO: 188 K/UL — SIGNIFICANT CHANGE UP (ref 130–400)
PLATELET # BLD AUTO: 192 K/UL — SIGNIFICANT CHANGE UP (ref 130–400)
PLATELET # BLD AUTO: 192 K/UL — SIGNIFICANT CHANGE UP (ref 130–400)
PMV BLD: 11.2 FL — HIGH (ref 7.4–10.4)
PMV BLD: 11.2 FL — HIGH (ref 7.4–10.4)
PMV BLD: 11.3 FL — HIGH (ref 7.4–10.4)
PMV BLD: 11.3 FL — HIGH (ref 7.4–10.4)
POTASSIUM SERPL-MCNC: 4.9 MMOL/L — SIGNIFICANT CHANGE UP (ref 3.5–5)
POTASSIUM SERPL-MCNC: 4.9 MMOL/L — SIGNIFICANT CHANGE UP (ref 3.5–5)
POTASSIUM SERPL-SCNC: 4.9 MMOL/L — SIGNIFICANT CHANGE UP (ref 3.5–5)
POTASSIUM SERPL-SCNC: 4.9 MMOL/L — SIGNIFICANT CHANGE UP (ref 3.5–5)
PROT SERPL-MCNC: 6.8 G/DL — SIGNIFICANT CHANGE UP (ref 6–8)
PROT SERPL-MCNC: 6.8 G/DL — SIGNIFICANT CHANGE UP (ref 6–8)
PROTHROM AB SERPL-ACNC: 11.2 SEC — SIGNIFICANT CHANGE UP (ref 9.95–12.87)
PROTHROM AB SERPL-ACNC: 11.2 SEC — SIGNIFICANT CHANGE UP (ref 9.95–12.87)
RBC # BLD: 3.18 M/UL — LOW (ref 4.2–5.4)
RBC # BLD: 3.18 M/UL — LOW (ref 4.2–5.4)
RBC # BLD: 3.57 M/UL — LOW (ref 4.2–5.4)
RBC # BLD: 3.57 M/UL — LOW (ref 4.2–5.4)
RBC # FLD: 13.9 % — SIGNIFICANT CHANGE UP (ref 11.5–14.5)
RBC # FLD: 13.9 % — SIGNIFICANT CHANGE UP (ref 11.5–14.5)
RBC # FLD: 14 % — SIGNIFICANT CHANGE UP (ref 11.5–14.5)
RBC # FLD: 14 % — SIGNIFICANT CHANGE UP (ref 11.5–14.5)
SODIUM SERPL-SCNC: 142 MMOL/L — SIGNIFICANT CHANGE UP (ref 135–146)
SODIUM SERPL-SCNC: 142 MMOL/L — SIGNIFICANT CHANGE UP (ref 135–146)
WBC # BLD: 6.55 K/UL — SIGNIFICANT CHANGE UP (ref 4.8–10.8)
WBC # BLD: 6.55 K/UL — SIGNIFICANT CHANGE UP (ref 4.8–10.8)
WBC # BLD: 8.89 K/UL — SIGNIFICANT CHANGE UP (ref 4.8–10.8)
WBC # BLD: 8.89 K/UL — SIGNIFICANT CHANGE UP (ref 4.8–10.8)
WBC # FLD AUTO: 6.55 K/UL — SIGNIFICANT CHANGE UP (ref 4.8–10.8)
WBC # FLD AUTO: 6.55 K/UL — SIGNIFICANT CHANGE UP (ref 4.8–10.8)
WBC # FLD AUTO: 8.89 K/UL — SIGNIFICANT CHANGE UP (ref 4.8–10.8)
WBC # FLD AUTO: 8.89 K/UL — SIGNIFICANT CHANGE UP (ref 4.8–10.8)

## 2023-12-06 PROCEDURE — 93010 ELECTROCARDIOGRAM REPORT: CPT

## 2023-12-06 PROCEDURE — 27245 TREAT THIGH FRACTURE: CPT | Mod: LT

## 2023-12-06 RX ORDER — LANOLIN ALCOHOL/MO/W.PET/CERES
3 CREAM (GRAM) TOPICAL AT BEDTIME
Refills: 0 | Status: DISCONTINUED | OUTPATIENT
Start: 2023-12-06 | End: 2023-12-09

## 2023-12-06 RX ORDER — ENOXAPARIN SODIUM 100 MG/ML
40 INJECTION SUBCUTANEOUS EVERY 24 HOURS
Refills: 0 | Status: DISCONTINUED | OUTPATIENT
Start: 2023-12-07 | End: 2023-12-09

## 2023-12-06 RX ORDER — DEXTROSE 50 % IN WATER 50 %
25 SYRINGE (ML) INTRAVENOUS ONCE
Refills: 0 | Status: DISCONTINUED | OUTPATIENT
Start: 2023-12-06 | End: 2023-12-09

## 2023-12-06 RX ORDER — INSULIN LISPRO 100/ML
VIAL (ML) SUBCUTANEOUS AT BEDTIME
Refills: 0 | Status: DISCONTINUED | OUTPATIENT
Start: 2023-12-06 | End: 2023-12-09

## 2023-12-06 RX ORDER — FERROUS SULFATE 325(65) MG
325 TABLET ORAL DAILY
Refills: 0 | Status: DISCONTINUED | OUTPATIENT
Start: 2023-12-06 | End: 2023-12-09

## 2023-12-06 RX ORDER — ALPRAZOLAM 0.25 MG
0.25 TABLET ORAL
Refills: 0 | Status: DISCONTINUED | OUTPATIENT
Start: 2023-12-06 | End: 2023-12-09

## 2023-12-06 RX ORDER — ATORVASTATIN CALCIUM 80 MG/1
80 TABLET, FILM COATED ORAL AT BEDTIME
Refills: 0 | Status: DISCONTINUED | OUTPATIENT
Start: 2023-12-06 | End: 2023-12-09

## 2023-12-06 RX ORDER — FLUOXETINE HCL 10 MG
20 CAPSULE ORAL DAILY
Refills: 0 | Status: DISCONTINUED | OUTPATIENT
Start: 2023-12-06 | End: 2023-12-09

## 2023-12-06 RX ORDER — DEXTROSE 50 % IN WATER 50 %
12.5 SYRINGE (ML) INTRAVENOUS ONCE
Refills: 0 | Status: DISCONTINUED | OUTPATIENT
Start: 2023-12-06 | End: 2023-12-09

## 2023-12-06 RX ORDER — INSULIN LISPRO 100/ML
VIAL (ML) SUBCUTANEOUS
Refills: 0 | Status: DISCONTINUED | OUTPATIENT
Start: 2023-12-06 | End: 2023-12-09

## 2023-12-06 RX ORDER — AMLODIPINE BESYLATE 2.5 MG/1
10 TABLET ORAL DAILY
Refills: 0 | Status: DISCONTINUED | OUTPATIENT
Start: 2023-12-06 | End: 2023-12-09

## 2023-12-06 RX ORDER — SODIUM CHLORIDE 9 MG/ML
1000 INJECTION, SOLUTION INTRAVENOUS
Refills: 0 | Status: DISCONTINUED | OUTPATIENT
Start: 2023-12-06 | End: 2023-12-06

## 2023-12-06 RX ORDER — OLANZAPINE 15 MG/1
10 TABLET, FILM COATED ORAL AT BEDTIME
Refills: 0 | Status: DISCONTINUED | OUTPATIENT
Start: 2023-12-06 | End: 2023-12-09

## 2023-12-06 RX ORDER — SENNA PLUS 8.6 MG/1
2 TABLET ORAL AT BEDTIME
Refills: 0 | Status: DISCONTINUED | OUTPATIENT
Start: 2023-12-06 | End: 2023-12-09

## 2023-12-06 RX ORDER — SODIUM CHLORIDE 9 MG/ML
1000 INJECTION, SOLUTION INTRAVENOUS
Refills: 0 | Status: DISCONTINUED | OUTPATIENT
Start: 2023-12-06 | End: 2023-12-09

## 2023-12-06 RX ORDER — GABAPENTIN 400 MG/1
100 CAPSULE ORAL
Refills: 0 | Status: DISCONTINUED | OUTPATIENT
Start: 2023-12-06 | End: 2023-12-09

## 2023-12-06 RX ORDER — INSULIN GLARGINE 100 [IU]/ML
15 INJECTION, SOLUTION SUBCUTANEOUS AT BEDTIME
Refills: 0 | Status: DISCONTINUED | OUTPATIENT
Start: 2023-12-06 | End: 2023-12-09

## 2023-12-06 RX ORDER — HYDROMORPHONE HYDROCHLORIDE 2 MG/ML
1 INJECTION INTRAMUSCULAR; INTRAVENOUS; SUBCUTANEOUS
Refills: 0 | Status: DISCONTINUED | OUTPATIENT
Start: 2023-12-06 | End: 2023-12-06

## 2023-12-06 RX ORDER — GLUCAGON INJECTION, SOLUTION 0.5 MG/.1ML
1 INJECTION, SOLUTION SUBCUTANEOUS ONCE
Refills: 0 | Status: DISCONTINUED | OUTPATIENT
Start: 2023-12-06 | End: 2023-12-09

## 2023-12-06 RX ORDER — DIVALPROEX SODIUM 500 MG/1
250 TABLET, DELAYED RELEASE ORAL THREE TIMES A DAY
Refills: 0 | Status: DISCONTINUED | OUTPATIENT
Start: 2023-12-06 | End: 2023-12-09

## 2023-12-06 RX ORDER — SODIUM CHLORIDE 9 MG/ML
1000 INJECTION INTRAMUSCULAR; INTRAVENOUS; SUBCUTANEOUS
Refills: 0 | Status: DISCONTINUED | OUTPATIENT
Start: 2023-12-06 | End: 2023-12-07

## 2023-12-06 RX ORDER — DEXTROSE 50 % IN WATER 50 %
15 SYRINGE (ML) INTRAVENOUS ONCE
Refills: 0 | Status: DISCONTINUED | OUTPATIENT
Start: 2023-12-06 | End: 2023-12-09

## 2023-12-06 RX ORDER — SODIUM CHLORIDE 9 MG/ML
1000 INJECTION INTRAMUSCULAR; INTRAVENOUS; SUBCUTANEOUS
Refills: 0 | Status: DISCONTINUED | OUTPATIENT
Start: 2023-12-06 | End: 2023-12-06

## 2023-12-06 RX ORDER — ONDANSETRON 8 MG/1
4 TABLET, FILM COATED ORAL EVERY 8 HOURS
Refills: 0 | Status: DISCONTINUED | OUTPATIENT
Start: 2023-12-06 | End: 2023-12-09

## 2023-12-06 RX ORDER — HYDROMORPHONE HYDROCHLORIDE 2 MG/ML
0.5 INJECTION INTRAMUSCULAR; INTRAVENOUS; SUBCUTANEOUS
Refills: 0 | Status: DISCONTINUED | OUTPATIENT
Start: 2023-12-06 | End: 2023-12-06

## 2023-12-06 RX ORDER — LISINOPRIL 2.5 MG/1
20 TABLET ORAL DAILY
Refills: 0 | Status: DISCONTINUED | OUTPATIENT
Start: 2023-12-06 | End: 2023-12-09

## 2023-12-06 RX ORDER — ONDANSETRON 8 MG/1
4 TABLET, FILM COATED ORAL ONCE
Refills: 0 | Status: DISCONTINUED | OUTPATIENT
Start: 2023-12-06 | End: 2023-12-06

## 2023-12-06 RX ORDER — ACETAMINOPHEN 500 MG
650 TABLET ORAL EVERY 6 HOURS
Refills: 0 | Status: DISCONTINUED | OUTPATIENT
Start: 2023-12-06 | End: 2023-12-09

## 2023-12-06 RX ORDER — CEFAZOLIN SODIUM 1 G
2000 VIAL (EA) INJECTION EVERY 8 HOURS
Refills: 0 | Status: COMPLETED | OUTPATIENT
Start: 2023-12-06 | End: 2023-12-07

## 2023-12-06 RX ORDER — SODIUM BICARBONATE 1 MEQ/ML
650 SYRINGE (ML) INTRAVENOUS THREE TIMES A DAY
Refills: 0 | Status: DISCONTINUED | OUTPATIENT
Start: 2023-12-06 | End: 2023-12-09

## 2023-12-06 RX ADMIN — Medication 2: at 17:36

## 2023-12-06 RX ADMIN — LISINOPRIL 20 MILLIGRAM(S): 2.5 TABLET ORAL at 05:50

## 2023-12-06 RX ADMIN — GABAPENTIN 100 MILLIGRAM(S): 400 CAPSULE ORAL at 17:40

## 2023-12-06 RX ADMIN — OLANZAPINE 10 MILLIGRAM(S): 15 TABLET, FILM COATED ORAL at 21:28

## 2023-12-06 RX ADMIN — GABAPENTIN 100 MILLIGRAM(S): 400 CAPSULE ORAL at 05:51

## 2023-12-06 RX ADMIN — Medication 20 MILLIGRAM(S): at 14:09

## 2023-12-06 RX ADMIN — AMLODIPINE BESYLATE 10 MILLIGRAM(S): 2.5 TABLET ORAL at 05:50

## 2023-12-06 RX ADMIN — ATORVASTATIN CALCIUM 80 MILLIGRAM(S): 80 TABLET, FILM COATED ORAL at 21:28

## 2023-12-06 RX ADMIN — DIVALPROEX SODIUM 250 MILLIGRAM(S): 500 TABLET, DELAYED RELEASE ORAL at 14:10

## 2023-12-06 RX ADMIN — Medication 650 MILLIGRAM(S): at 21:29

## 2023-12-06 RX ADMIN — SODIUM CHLORIDE 50 MILLILITER(S): 9 INJECTION INTRAMUSCULAR; INTRAVENOUS; SUBCUTANEOUS at 14:10

## 2023-12-06 RX ADMIN — Medication 100 MILLIGRAM(S): at 21:30

## 2023-12-06 RX ADMIN — DIVALPROEX SODIUM 250 MILLIGRAM(S): 500 TABLET, DELAYED RELEASE ORAL at 06:40

## 2023-12-06 RX ADMIN — Medication 650 MILLIGRAM(S): at 05:50

## 2023-12-06 RX ADMIN — DIVALPROEX SODIUM 250 MILLIGRAM(S): 500 TABLET, DELAYED RELEASE ORAL at 21:29

## 2023-12-06 RX ADMIN — SENNA PLUS 2 TABLET(S): 8.6 TABLET ORAL at 21:28

## 2023-12-06 RX ADMIN — Medication 325 MILLIGRAM(S): at 14:09

## 2023-12-06 RX ADMIN — INSULIN GLARGINE 15 UNIT(S): 100 INJECTION, SOLUTION SUBCUTANEOUS at 21:28

## 2023-12-06 RX ADMIN — Medication 650 MILLIGRAM(S): at 14:09

## 2023-12-06 NOTE — PROGRESS NOTE ADULT - ASSESSMENT
Patient is a 78y Female PMH bipolar disorder, intellectual disability, HTN, DM and arthritis, on ASA 81mg who presents to the ED BIBA from [facility] s/p fall found to have an acute comminuted impacted left femoral intertrochanteric fracture.    #Acute comminuted impacted left femoral intertrochanteric fracture  #Unwitnessed fall   - Trauma work up negative except hip fracture  - Was found by [facility] staff kneeling at the edge of her bed   - plan for ORIF w/ortho this am 12/6    Plan:  - Evaluated by Ortho, transferred to [facility] for orif today  - NPO since mn, resume diet per ortho clearance  - am cxr, ecg, and trops ordered, f/u bmp and cbc as well, monitor for post op bleeding   - f/u 4pm T&S    #HTN  #history of aortic stenosis  - c/w home meds  - f/u am trops and ecg and cxr    #bipolar disorder  #intellectual disability   - c/w home meds     #ASHLEY  - likely pre-renal in setting of fall   - on 50cc NS   - f/u bmp     #DM  - on lantus 15 qhs  - ISS  - monitor fs in setting of surgery and npo, may need dose adjusted if signs of hypoglycemia      #Misc  - DVT Prophylaxis: Lovenox after cleared by Ortho  - GI Prophylaxis: None  - Diet: Diabetic diet   - Activity: As tolerated  - IV Fluids: ns @50cc  - Code Status: Full code    Dispo: Acute    Patient is a 78y Female PMH bipolar disorder, intellectual disability, HTN, DM and arthritis, on ASA 81mg who presents to the ED BIBA from [facility] s/p fall found to have an acute comminuted impacted left femoral intertrochanteric fracture.    #Acute comminuted impacted left femoral intertrochanteric fracture  #Unwitnessed fall   - Trauma work up negative except hip fracture  - Was found by [facility] staff kneeling at the edge of her bed   - plan for ORIF w/ortho this am 12/6    Plan:  - Evaluated by Ortho, transferred to [facility] for orif today  - NPO since mn, resume diet per ortho clearance  - am cxr, ecg, and trops ordered, f/u bmp and cbc as well, monitor for post op bleeding   - f/u 4pm T&S    #HTN  #history of aortic stenosis  - c/w home meds  - f/u am trops and ecg and cxr    #bipolar disorder  #intellectual disability   - c/w home meds     #ASHLEY  - likely pre-renal in setting of fall   - on 50cc NS   - f/u bmp     #DM  - on lantus 15 qhs  - ISS  - monitor fs in setting of surgery and npo, may need dose adjusted if signs of hypoglycemia      #Misc  - DVT Prophylaxis: Lovenox after cleared by Ortho  - GI Prophylaxis: None  - Diet: Diabetic diet   - Activity: As tolerated  - IV Fluids: ns @50cc  - Code Status: Full code    Dispo: Acute

## 2023-12-06 NOTE — PROGRESS NOTE ADULT - SUBJECTIVE AND OBJECTIVE BOX
Chart reviewed, patient examined. Pertinent results reviewed.  Case discussed with HO; specialist f/u reviewed  HD#2; Hx from chart, & son, Javier @ bedside  HPI:  Patient is a 78y Female PMH bipolar disorder, intellectual disability, HTN, DM and arthritis, on ASA 81mg who presents to the ED BIBA from [facility] s/p fall. As per papers, patient was found kneeling on the floor in front of her bed holding onto her bed. Patient poor historian; reporting confusion and cannot recall any events from injury but endorses left hip and femur pain. ED evaluation revealed a L hip FX. She was sent here to [facility] for ORIF    PAST MEDICAL & SURGICAL HISTORY:  Never had surgery;   HTN (hypertension)      DM (diabetes mellitus)- many years.  No H/O of CAD or Heart disease  Dementia      MDD (major depressive disorder)  PMH of DVT- now off AC    Bipolar disorder      No significant past surgical history    SH; stopped smoking 15 yrs ago  Lives @ [facility] for about [facility] years  Home Medications:    amLODIPine 10 mg oral tablet: 1 tab(s) orally once a day. Continue to take until told otherwise by your provider.  (2022 13:29)  aspirin 81 mg oral tablet, chewable: 1 tab(s) orally once a day. Continue to take until told otherwise by your provider.  (2022 13:29)  atorvastatin 40 mg oral tablet: 1 tab(s) orally once a day. Continue to take until told otherwise by your provider.  (2022 13:29)  ferrous sulfate 325 mg (65 mg elemental iron) oral tablet: 1 tab(s) orally once a day. Continue to take until told otherwise by your provider.  (2022 13:29)  lisinopril 40 mg oral tablet: 1 tab(s) orally once a day. Continue to take until told otherwise by your provider.  (2022 14:44)  melatonin 3 mg oral tablet: 5 milligram(s) orally once a day (at bedtime) at 19:00. Continue to take until told otherwise by your provider.  (2022 13:29)  OLANZapine 10 mg oral tablet, disintegratin tab(s) orally once a day (at bedtime). Continue to take until told otherwise by your provider.  (2022 13:29)  pantoprazole 40 mg oral delayed release tablet: 1 tab(s) orally once a day (before a meal). Continue to take until told otherwise by your provider.  (2022 13:29)  SITagliptin 50 mg oral tablet: 1 tab(s) orally once a day. Continue to take until told otherwise by your provider.  (2022 14:44)  sodium bicarbonate 650 mg oral tablet: 2 tab(s) orally 3 times a day. Continue to take until told otherwise by your provider.  (2022 13:29)  traZODone 100 mg oral tablet: 1 tab(s) orally once a day (at bedtime). Continue to take until told otherwise by your provider.  (2022 14:44)  valproic acid 250 mg/5 mL oral liquid: 500 milligram(s) orally 2 times a day. Continue to take until told otherwise by your provider.  (2022 14:44)    MEDICATIONS  (STANDING):  amLODIPine   Tablet 10 milliGRAM(s) Oral daily  atorvastatin 80 milliGRAM(s) Oral at bedtime  dextrose 5%. 1000 milliLiter(s) (50 mL/Hr) IV Continuous <Continuous>  dextrose 5%. 1000 milliLiter(s) (100 mL/Hr) IV Continuous <Continuous>  dextrose 50% Injectable 25 Gram(s) IV Push once  dextrose 50% Injectable 25 Gram(s) IV Push once  dextrose 50% Injectable 12.5 Gram(s) IV Push once  divalproex  milliGRAM(s) Oral three times a day  enoxaparin Injectable 40 milliGRAM(s) SubCutaneous every 24 hours  ferrous    sulfate 325 milliGRAM(s) Oral daily  FLUoxetine 20 milliGRAM(s) Oral daily  gabapentin 100 milliGRAM(s) Oral two times a day  glucagon  Injectable 1 milliGRAM(s) IntraMuscular once  insulin glargine Injectable (LANTUS) 15 Unit(s) SubCutaneous at bedtime  insulin lispro (ADMELOG) corrective regimen sliding scale   SubCutaneous three times a day before meals  insulin lispro (ADMELOG) corrective regimen sliding scale   SubCutaneous at bedtime  lisinopril 20 milliGRAM(s) Oral daily  LORazepam   Injectable 1 milliGRAM(s) IV Push once  OLANZapine Disintegrating Tablet 10 milliGRAM(s) Oral at bedtime  senna 2 Tablet(s) Oral at bedtime  sodium bicarbonate 650 milliGRAM(s) Oral three times a day  sodium chloride 0.9%. 1000 milliLiter(s) (50 mL/Hr) IV Continuous <Continuous>    MEDICATIONS  (PRN):  acetaminophen     Tablet .. 650 milliGRAM(s) Oral every 6 hours PRN Temp greater or equal to 38C (100.4F), Mild Pain (1 - 3)  ALPRAZolam 0.25 milliGRAM(s) Oral two times a day PRN Aggitation  aluminum hydroxide/magnesium hydroxide/simethicone Suspension 30 milliLiter(s) Oral every 4 hours PRN Dyspepsia  dextrose Oral Gel 15 Gram(s) Oral once PRN Blood Glucose LESS THAN 70 milliGRAM(s)/deciliter  melatonin 3 milliGRAM(s) Oral at bedtime PRN Insomnia  ondansetron Injectable 4 milliGRAM(s) IV Push every 8 hours PRN Nausea and/or Vomiting                          10.4   10.04 )-----------( 217      ( 05 Dec 2023 06:26 )             32.8     12    135  |  102  |  52<H>  ----------------------------<  204<H>  4.6   |  20  |  1.8<H>    Ca    10.0      05 Dec 2023 06:26    TPro  6.9  /  Alb  4.3  /  TBili  <0.2  /  DBili  x   /  AST  23  /  ALT  18  /  AlkPhos  57  12-05    PT/INR - ( 05 Dec 2023 06:26 )   PT: 11.10 sec;   INR: 0.97 ratio         PTT - ( 05 Dec 2023 06:26 )  PTT:36.3 sec    TYPE AND SCREEN NEEDED  CXR- on chart  EKG- on chart-- ? IW scar.    < from: Xray Hip 2-3 Views, Left (23 @ 05:26) >    ACC: [id number] EXAM:  XR HIP 2-3V LT   ORDERED BY: ETIENNE MARSH     PROCEDURE DATE:  2023          INTERPRETATION:  CLINICAL INFORMATION: Fall    PROCEDURE: 2 views of the left hip    COMPARISON: Pelvic radiograph 10/14/2021    FINDINGS/  IMPRESSION:    Acute displaced intertrochanteric fracture of the left hip.    Degenerative changes.    --- End of Report ---          DANE OSMAN MD; Resident Radiologist  This document has been electronically signed.  SUNITHA CURRIE MD; Attending Radiologist  This document has been electronically signed. Dec  5 2023  8:45AM    < end of copied text >       Review of Systems:  Review of Systems: Constitutional: (-) fever (-) chills   Eyes: (-) visual changes  ENMT: (-) nasal or chest congestion (-) runny nose (-) sore throat (-) neck pain (-) neck stiffness  Cardiac: (-) chest pain (-) syncope  Respiratory: (-) cough (-) SOB  GI: (-) nausea (-) vomiting (-) diarrhea  : (-) incontinence  MS:(-) back pain   Neuro: (-) head injury (-) headache (-) dizziness (-) numbness/tingling to extremities B/L (-) LOC   Skin: (-) abrasion (-) rash (-) laceration  Except as documented in the HPI, all other systems are negative.     Vital Signs Last 24 Hrs  T(C): 36.4 (06 Dec 2023 07:32), Max: 37.1 (05 Dec 2023 18:30)  T(F): 97.5 (06 Dec 2023 07:32), Max: 98.8 (05 Dec 2023 18:30)  HR: 62 (06 Dec 2023 07:32) (62 - 68)  BP: 133/58 (06 Dec 2023 07:32) (133/58 - 161/72)  BP(mean): 104 (06 Dec 2023 05:45) (104 - 104)  RR: 18 (06 Dec 2023 05:45) (18 - 18)  SpO2: 95% (06 Dec 2023 05:45) (95% - 96%)    Parameters below as of 06 Dec 2023 05:45  Patient On (Oxygen Delivery Method): room air    Physical Exam: GENERAL:   NAD, resting comfortably. awake, alert, Ox1-name.  HEAD:  Atraumatic, Normocephalic  EYES: EOMI,   NECK: Supple, No masses   CHEST/LUNG: unlabored breathing on room air; clear;  HEART: RRR, no MRG  ABDOMEN: Soft, Nontender, Nondistended   EXTREMITIES:   Peripheral Pulses Present, No clubbing, no cyanosis, or no edema, no calf tenderness; LLE min ext rot, and short; ++ pain w prom  Pelvis stable. TTP to left hip + ecchymosis, LLE shortened, externally rotated. Limited ROM 2/2 pain. DP 2+. Sensation intact.  PSYCH: A cooperative, calm, confused; son at bedside w patient  SKIN: WNL Chart reviewed, patient examined. Pertinent results reviewed.  Case discussed with HO; specialist f/u reviewed  HD#2; Hx from chart, & son, Javier @ bedside  HPI:  Patient is a 78y Female PMH bipolar disorder, intellectual disability, HTN, DM and arthritis, on ASA 81mg who presents to the ED BIBA from [facility] s/p fall. As per papers, patient was found kneeling on the floor in front of her bed holding onto her bed. Patient poor historian; reporting confusion and cannot recall any events from injury but endorses left hip and femur pain. ED evaluation revealed a L hip FX. She was sent here to [facility] for ORIF    PAST MEDICAL & SURGICAL HISTORY:  Never had surgery;   HTN (hypertension)      DM (diabetes mellitus)- many years.  No H/O of CAD or Heart disease  Dementia      MDD (major depressive disorder)  PMH of DVT- now off AC    Bipolar disorder      No significant past surgical history    SH; stopped smoking 15 yrs ago  Lives @ [facility] for about [facility] years  Home Medications:    amLODIPine 10 mg oral tablet: 1 tab(s) orally once a day. Continue to take until told otherwise by your provider.  (2022 13:29)  aspirin 81 mg oral tablet, chewable: 1 tab(s) orally once a day. Continue to take until told otherwise by your provider.  (2022 13:29)  atorvastatin 40 mg oral tablet: 1 tab(s) orally once a day. Continue to take until told otherwise by your provider.  (2022 13:29)  ferrous sulfate 325 mg (65 mg elemental iron) oral tablet: 1 tab(s) orally once a day. Continue to take until told otherwise by your provider.  (2022 13:29)  lisinopril 40 mg oral tablet: 1 tab(s) orally once a day. Continue to take until told otherwise by your provider.  (2022 14:44)  melatonin 3 mg oral tablet: 5 milligram(s) orally once a day (at bedtime) at 19:00. Continue to take until told otherwise by your provider.  (2022 13:29)  OLANZapine 10 mg oral tablet, disintegratin tab(s) orally once a day (at bedtime). Continue to take until told otherwise by your provider.  (2022 13:29)  pantoprazole 40 mg oral delayed release tablet: 1 tab(s) orally once a day (before a meal). Continue to take until told otherwise by your provider.  (2022 13:29)  SITagliptin 50 mg oral tablet: 1 tab(s) orally once a day. Continue to take until told otherwise by your provider.  (2022 14:44)  sodium bicarbonate 650 mg oral tablet: 2 tab(s) orally 3 times a day. Continue to take until told otherwise by your provider.  (2022 13:29)  traZODone 100 mg oral tablet: 1 tab(s) orally once a day (at bedtime). Continue to take until told otherwise by your provider.  (2022 14:44)  valproic acid 250 mg/5 mL oral liquid: 500 milligram(s) orally 2 times a day. Continue to take until told otherwise by your provider.  (2022 14:44)    MEDICATIONS  (STANDING):  amLODIPine   Tablet 10 milliGRAM(s) Oral daily  atorvastatin 80 milliGRAM(s) Oral at bedtime  dextrose 5%. 1000 milliLiter(s) (50 mL/Hr) IV Continuous <Continuous>  dextrose 5%. 1000 milliLiter(s) (100 mL/Hr) IV Continuous <Continuous>  dextrose 50% Injectable 25 Gram(s) IV Push once  dextrose 50% Injectable 25 Gram(s) IV Push once  dextrose 50% Injectable 12.5 Gram(s) IV Push once  divalproex  milliGRAM(s) Oral three times a day  enoxaparin Injectable 40 milliGRAM(s) SubCutaneous every 24 hours  ferrous    sulfate 325 milliGRAM(s) Oral daily  FLUoxetine 20 milliGRAM(s) Oral daily  gabapentin 100 milliGRAM(s) Oral two times a day  glucagon  Injectable 1 milliGRAM(s) IntraMuscular once  insulin glargine Injectable (LANTUS) 15 Unit(s) SubCutaneous at bedtime  insulin lispro (ADMELOG) corrective regimen sliding scale   SubCutaneous three times a day before meals  insulin lispro (ADMELOG) corrective regimen sliding scale   SubCutaneous at bedtime  lisinopril 20 milliGRAM(s) Oral daily  LORazepam   Injectable 1 milliGRAM(s) IV Push once  OLANZapine Disintegrating Tablet 10 milliGRAM(s) Oral at bedtime  senna 2 Tablet(s) Oral at bedtime  sodium bicarbonate 650 milliGRAM(s) Oral three times a day  sodium chloride 0.9%. 1000 milliLiter(s) (50 mL/Hr) IV Continuous <Continuous>    MEDICATIONS  (PRN):  acetaminophen     Tablet .. 650 milliGRAM(s) Oral every 6 hours PRN Temp greater or equal to 38C (100.4F), Mild Pain (1 - 3)  ALPRAZolam 0.25 milliGRAM(s) Oral two times a day PRN Aggitation  aluminum hydroxide/magnesium hydroxide/simethicone Suspension 30 milliLiter(s) Oral every 4 hours PRN Dyspepsia  dextrose Oral Gel 15 Gram(s) Oral once PRN Blood Glucose LESS THAN 70 milliGRAM(s)/deciliter  melatonin 3 milliGRAM(s) Oral at bedtime PRN Insomnia  ondansetron Injectable 4 milliGRAM(s) IV Push every 8 hours PRN Nausea and/or Vomiting                          10.4   10.04 )-----------( 217      ( 05 Dec 2023 06:26 )             32.8     12    135  |  102  |  52<H>  ----------------------------<  204<H>  4.6   |  20  |  1.8<H>    Ca    10.0      05 Dec 2023 06:26    TPro  6.9  /  Alb  4.3  /  TBili  <0.2  /  DBili  x   /  AST  23  /  ALT  18  /  AlkPhos  57  12-05    PT/INR - ( 05 Dec 2023 06:26 )   PT: 11.10 sec;   INR: 0.97 ratio         PTT - ( 05 Dec 2023 06:26 )  PTT:36.3 sec    TYPE AND SCREEN NEEDED  CXR- on chart  EKG- on chart-- ? IW scar.    < from: Xray Hip 2-3 Views, Left (23 @ 05:26) >    ACC: [id number] EXAM:  XR HIP 2-3V LT   ORDERED BY: ETIENNE MARSH     PROCEDURE DATE:  2023          INTERPRETATION:  CLINICAL INFORMATION: Fall    PROCEDURE: 2 views of the left hip    COMPARISON: Pelvic radiograph 10/14/2021    FINDINGS/  IMPRESSION:    Acute displaced intertrochanteric fracture of the left hip.    Degenerative changes.    --- End of Report ---          DANE OSMAN MD; Resident Radiologist  This document has been electronically signed.  SUNITHA CURRIE MD; Attending Radiologist  This document has been electronically signed. Dec  5 2023  8:45AM    < end of copied text >       Review of Systems:  Review of Systems: Constitutional: (-) fever (-) chills   Eyes: (-) visual changes  ENMT: (-) nasal or chest congestion (-) runny nose (-) sore throat (-) neck pain (-) neck stiffness  Cardiac: (-) chest pain (-) syncope  Respiratory: (-) cough (-) SOB  GI: (-) nausea (-) vomiting (-) diarrhea  : (-) incontinence  MS:(-) back pain   Neuro: (-) head injury (-) headache (-) dizziness (-) numbness/tingling to extremities B/L (-) LOC   Skin: (-) abrasion (-) rash (-) laceration  Except as documented in the HPI, all other systems are negative.     Vital Signs Last 24 Hrs  T(C): 36.4 (06 Dec 2023 07:32), Max: 37.1 (05 Dec 2023 18:30)  T(F): 97.5 (06 Dec 2023 07:32), Max: 98.8 (05 Dec 2023 18:30)  HR: 62 (06 Dec 2023 07:32) (62 - 68)  BP: 133/58 (06 Dec 2023 07:32) (133/58 - 161/72)  BP(mean): 104 (06 Dec 2023 05:45) (104 - 104)  RR: 18 (06 Dec 2023 05:45) (18 - 18)  SpO2: 95% (06 Dec 2023 05:45) (95% - 96%)    Parameters below as of 06 Dec 2023 05:45  Patient On (Oxygen Delivery Method): room air    Physical Exam: GENERAL:   NAD, resting comfortably. awake, alert, Ox1-name.  HEAD:  Atraumatic, Normocephalic  EYES: EOMI,   NECK: Supple, No masses   CHEST/LUNG: unlabored breathing on room air; clear;  HEART: RRR, no MRG  ABDOMEN: Soft, Nontender, Nondistended   EXTREMITIES:   Peripheral Pulses Present, No clubbing, no cyanosis, or no edema, no calf tenderness; LLE min ext rot, and short; ++ pain w prom  Pelvis stable. TTP to left hip + ecchymosis, LLE shortened, externally rotated. Limited ROM 2/2 pain. DP 2+. Sensation intact.  PSYCH: A cooperative, calm, confused; son at bedside w patient  SKIN: WNL

## 2023-12-06 NOTE — BRIEF OPERATIVE NOTE - OPERATION/FINDINGS
above; post opWBAT; Abx and DVT ppx per protocol  Dict:  Implants: Kimani Gamma III CMN (11x 180; 125 deg); 90 mm SHS; 5 x 35 mm interlocking screw above; post opWBAT; Abx and DVT ppx per protocol  Dict:[id number]  Implants: Kimani Gamma III CMN (11x 180; 125 deg); 90 mm SHS; 5 x 35 mm interlocking screw above; post opWBAT; Abx and DVT ppx per protocol  Dict:[id number]  Implants: Kimani Gamma III CMN (11x 180; 125 deg); 90 mm SHS; 5 x 35 mm interlocking screw

## 2023-12-06 NOTE — CHART NOTE - NSCHARTNOTEFT_GEN_A_CORE
PACU ANESTHESIA ADMISSION NOTE      Procedure: L hip ORIF    ____  Intubated  TV:______       Rate: ______      FiO2: ______    ___x_  Patent Airway    __x__  Full return of protective reflexes    __x__  Full recovery from anesthesia / back to baseline     Vitals:   T:     98.5      R:    25              BP:   117/66               Sat:        100           P: 105      Mental Status:  ___x_ Awake   _____ Alert   _____ Drowsy   _____ Sedated    Nausea/Vomiting:  __x__ NO  ______Yes,   See Post - Op Orders          Pain Scale (0-10):  _0____    Treatment: ____ None    ____ See Post - Op/PCA Orders    Post - Operative Fluids:   ____ Oral   ___x_ See Post - Op Orders    Plan: Discharge:   ____Home       __x___Floor     _____Critical Care    _____  Other:_________________    Comments:

## 2023-12-06 NOTE — PROGRESS NOTE ADULT - SUBJECTIVE AND OBJECTIVE BOX
ORTHOPEDIC POST-OP CHECK    Subjective: POD0 s/p L hip IMN. Seen and examined at bedside. Doing well, pain controlled. Denies fevers, numbness/tingling. No other complaints.    MEDICATIONS  (STANDING):  amLODIPine   Tablet 10 milliGRAM(s) Oral daily  atorvastatin 80 milliGRAM(s) Oral at bedtime  ceFAZolin   IVPB 2000 milliGRAM(s) IV Intermittent every 8 hours  dextrose 5%. 1000 milliLiter(s) (50 mL/Hr) IV Continuous <Continuous>  dextrose 5%. 1000 milliLiter(s) (100 mL/Hr) IV Continuous <Continuous>  dextrose 50% Injectable 25 Gram(s) IV Push once  dextrose 50% Injectable 25 Gram(s) IV Push once  dextrose 50% Injectable 12.5 Gram(s) IV Push once  divalproex  milliGRAM(s) Oral three times a day  ferrous    sulfate 325 milliGRAM(s) Oral daily  FLUoxetine 20 milliGRAM(s) Oral daily  gabapentin 100 milliGRAM(s) Oral two times a day  glucagon  Injectable 1 milliGRAM(s) IntraMuscular once  insulin glargine Injectable (LANTUS) 15 Unit(s) SubCutaneous at bedtime  insulin lispro (ADMELOG) corrective regimen sliding scale   SubCutaneous three times a day before meals  insulin lispro (ADMELOG) corrective regimen sliding scale   SubCutaneous at bedtime  lisinopril 20 milliGRAM(s) Oral daily  LORazepam   Injectable 1 milliGRAM(s) IV Push once  OLANZapine Disintegrating Tablet 10 milliGRAM(s) Oral at bedtime  senna 2 Tablet(s) Oral at bedtime  sodium bicarbonate 650 milliGRAM(s) Oral three times a day  sodium chloride 0.9%. 1000 milliLiter(s) (50 mL/Hr) IV Continuous <Continuous>    MEDICATIONS  (PRN):  acetaminophen     Tablet .. 650 milliGRAM(s) Oral every 6 hours PRN Temp greater or equal to 38C (100.4F), Mild Pain (1 - 3)  ALPRAZolam 0.25 milliGRAM(s) Oral two times a day PRN Aggitation  aluminum hydroxide/magnesium hydroxide/simethicone Suspension 30 milliLiter(s) Oral every 4 hours PRN Dyspepsia  dextrose Oral Gel 15 Gram(s) Oral once PRN Blood Glucose LESS THAN 70 milliGRAM(s)/deciliter  melatonin 3 milliGRAM(s) Oral at bedtime PRN Insomnia  ondansetron Injectable 4 milliGRAM(s) IV Push every 8 hours PRN Nausea and/or Vomiting      Objective:  T(C): 36 (12-06-23 @ 14:40), Max: 37.1 (12-05-23 @ 18:30)  HR: 80 (12-06-23 @ 14:40) (62 - 110)  BP: 123/61 (12-06-23 @ 14:40) (122/65 - 161/72)  RR: 18 (12-06-23 @ 14:40) (18 - 18)  SpO2: 100% (12-06-23 @ 14:40) (92% - 100%)    Physical Exam:    LLE:  Dressing c/d/i  SILT s/s/sp/dp/t  Motor intact TA/EHL/FHL/GS  Digits wwp    Labs:                        8.5    8.89  )-----------( 192      ( 06 Dec 2023 13:41 )             27.8     12-06    142  |  108  |  43<H>  ----------------------------<  126<H>  4.9   |  23  |  2.0<H>    Ca    10.0      06 Dec 2023 08:14  Mg     2.6     12-06    TPro  6.8  /  Alb  4.0  /  TBili  0.3  /  DBili  x   /  AST  19  /  ALT  16  /  AlkPhos  47  12-06      A/P: 78yFemale POD0 s/p L hip IMN on 12/6/23, doing well.    - Activity: WBAT LLE  - Abx: Ancef 2g q8hr x3 doses for a total of 24hrs post-operatively  - DVT PPx: LVX per primary team  - Pain control  - IS encouraged  - AM labs  - PT/Rehab  - D/C planning    - If discharged, patient should follow up with Dr. Ortega at [address] [place] [address]., phone [phone].  - Patient should be discharged on 6 weeks (from surgery date) of Aspirin 81mg BID for DVT prophylaxis as their mobility/function/ambulation will be decreased due to lower extremity orthopaedic surgery. ORTHOPEDIC POST-OP CHECK    Subjective: POD0 s/p L hip IMN. Seen and examined at bedside. Doing well, pain controlled. Denies fevers, numbness/tingling. No other complaints.    MEDICATIONS  (STANDING):  amLODIPine   Tablet 10 milliGRAM(s) Oral daily  atorvastatin 80 milliGRAM(s) Oral at bedtime  ceFAZolin   IVPB 2000 milliGRAM(s) IV Intermittent every 8 hours  dextrose 5%. 1000 milliLiter(s) (50 mL/Hr) IV Continuous <Continuous>  dextrose 5%. 1000 milliLiter(s) (100 mL/Hr) IV Continuous <Continuous>  dextrose 50% Injectable 25 Gram(s) IV Push once  dextrose 50% Injectable 25 Gram(s) IV Push once  dextrose 50% Injectable 12.5 Gram(s) IV Push once  divalproex  milliGRAM(s) Oral three times a day  ferrous    sulfate 325 milliGRAM(s) Oral daily  FLUoxetine 20 milliGRAM(s) Oral daily  gabapentin 100 milliGRAM(s) Oral two times a day  glucagon  Injectable 1 milliGRAM(s) IntraMuscular once  insulin glargine Injectable (LANTUS) 15 Unit(s) SubCutaneous at bedtime  insulin lispro (ADMELOG) corrective regimen sliding scale   SubCutaneous three times a day before meals  insulin lispro (ADMELOG) corrective regimen sliding scale   SubCutaneous at bedtime  lisinopril 20 milliGRAM(s) Oral daily  LORazepam   Injectable 1 milliGRAM(s) IV Push once  OLANZapine Disintegrating Tablet 10 milliGRAM(s) Oral at bedtime  senna 2 Tablet(s) Oral at bedtime  sodium bicarbonate 650 milliGRAM(s) Oral three times a day  sodium chloride 0.9%. 1000 milliLiter(s) (50 mL/Hr) IV Continuous <Continuous>    MEDICATIONS  (PRN):  acetaminophen     Tablet .. 650 milliGRAM(s) Oral every 6 hours PRN Temp greater or equal to 38C (100.4F), Mild Pain (1 - 3)  ALPRAZolam 0.25 milliGRAM(s) Oral two times a day PRN Aggitation  aluminum hydroxide/magnesium hydroxide/simethicone Suspension 30 milliLiter(s) Oral every 4 hours PRN Dyspepsia  dextrose Oral Gel 15 Gram(s) Oral once PRN Blood Glucose LESS THAN 70 milliGRAM(s)/deciliter  melatonin 3 milliGRAM(s) Oral at bedtime PRN Insomnia  ondansetron Injectable 4 milliGRAM(s) IV Push every 8 hours PRN Nausea and/or Vomiting      Objective:  T(C): 36 (12-06-23 @ 14:40), Max: 37.1 (12-05-23 @ 18:30)  HR: 80 (12-06-23 @ 14:40) (62 - 110)  BP: 123/61 (12-06-23 @ 14:40) (122/65 - 161/72)  RR: 18 (12-06-23 @ 14:40) (18 - 18)  SpO2: 100% (12-06-23 @ 14:40) (92% - 100%)    Physical Exam:    LLE:  Dressing c/d/i  SILT s/s/sp/dp/t  Motor intact TA/EHL/FHL/GS  Digits wwp    Labs:                        8.5    8.89  )-----------( 192      ( 06 Dec 2023 13:41 )             27.8     12-06    142  |  108  |  43<H>  ----------------------------<  126<H>  4.9   |  23  |  2.0<H>    Ca    10.0      06 Dec 2023 08:14  Mg     2.6     12-06    TPro  6.8  /  Alb  4.0  /  TBili  0.3  /  DBili  x   /  AST  19  /  ALT  16  /  AlkPhos  47  12-06      A/P: 78yFemale POD0 s/p L hip IMN on 12/6/23, doing well.    - Activity: WBAT LLE  - Abx: Ancef 2g q8hr x3 doses for a total of 24hrs post-operatively  - DVT PPx: LVX per primary team  - Pain control  - IS encouraged  - AM labs  - PT/Rehab  - D/C planning    - If discharged, patient should follow up with Dr. Ortega at [address] [place] [address]., phone [phone].  - Patient should be discharged on 6 weeks (from surgery date) of Aspirin 81mg BID for DVT prophylaxis as their mobility/function/ambulation will be decreased due to lower extremity orthopaedic surgery.

## 2023-12-06 NOTE — BRIEF OPERATIVE NOTE - NSICDXBRIEFPOSTOP_GEN_ALL_CORE_FT
POST-OP DIAGNOSIS:  Closed intertrochanteric fracture of left hip 06-Dec-2023 12:57:12  Jai Ortega

## 2023-12-06 NOTE — PROGRESS NOTE ADULT - ASSESSMENT
Left intertrochanteric hip fx which will require surgical fixation by Ortho- plan is for today.      Patient is a 78y Female PMH bipolar disorder, intellectual disability, HTN, DM and arthritis, on ASA 81mg who presents to the ED BIBA from [facility] s/p fall found to have an acute comminuted impacted left femoral intertrochanteric fracture.    #Acute comminuted impacted left femoral intertrochanteric fracture  #Unwitnessed fall   - Trauma work up negative except hip fracture  - Was found by [facility] staff kneeling at the edge of her bed     Plan:  - Evaluated by Ortho, transferred to [place]  - Planned for OR today  - NPO since midnight  - patient is stable; MOD risk for GA, but optimized.  - aggressive DVT prophylasxis, and follow up CBC, renal function and EKG post-op  - ekg and CE in AM.      #Misc  - DVT Prophylaxis: Lovenox after cleared by Ortho  - GI Prophylaxis: None  - Diet: Diabetic diet   - Activity: As tolerated  - IV Fluids: None  - Code Status: Full code     Left intertrochanteric hip fx which will require surgical fixation by Ortho- plan is for today.      Patient is a 78y Female PMH bipolar disorder, intellectual disability, HTN, DM and arthritis, on ASA 81mg who presents to the ED BIBA from [facility] s/p fall found to have an acute comminuted impacted left femoral intertrochanteric fracture.    #Acute comminuted impacted left femoral intertrochanteric fracture  #Unwitnessed fall   - Trauma work up negative except hip fracture  - Was found by [facility] staff kneeling at the edge of her bed     Plan:  - Evaluated by Ortho, transferred to [place]  - Planned for OR today  - NPO since midnight  - patient is stable; MOD risk for GA, but optimized.  - aggressive DVT prophylasxis, and follow up CBC, renal function and EKG post-op  - ekg and CE in AM.      #Misc  - DVT Prophylaxis: Lovenox after cleared by Ortho  - GI Prophylaxis: None  - Diet: Diabetic diet   - Activity: As tolerated  - IV Fluids: None  - Code Status: Full code

## 2023-12-06 NOTE — BRIEF OPERATIVE NOTE - NSICDXBRIEFPROCEDURE_GEN_ALL_CORE_FT
PROCEDURES:  ORIF, hip, using Gamma nail 06-Dec-2023 12:56:29 CPT code 26564 Jai Ortega   PROCEDURES:  ORIF, hip, using Gamma nail 06-Dec-2023 12:56:29 CPT code 61327 Jai Ortega

## 2023-12-06 NOTE — PROGRESS NOTE ADULT - SUBJECTIVE AND OBJECTIVE BOX
24H events:    Patient is a 78y old Female who presents with a chief complaint of Hip Fx (06 Dec 2023 09:22)  Primary diagnosis of Intertrochanteric fracture of left hip  Today is hospital day 1d. This morning patient was seen and examined at bedside, resting comfortably in bed.    No acute or major events overnight. Hemodynamically stable, tolerating oral diet, voiding appropriately with appropriate bowel movements.     AAOX1  no chest pain or sob endorsed but patient is a poor historian, no overnight events reported by nursing   pain w/movement of left hip     Code Status:    Family communication:  Contact date:  Name of person contacted:  Relationship to patient:  Communication details:  What matters most:    PAST MEDICAL & SURGICAL HISTORY  HTN (hypertension)    DM (diabetes mellitus)    MDD (major depressive disorder)    Bipolar disorder    No significant past surgical history      SOCIAL HISTORY:  Social History:      ALLERGIES:  Neosporin (Unknown)  Iodides (Unknown)  amoxicillin (Unknown)    MEDICATIONS:  STANDING MEDICATIONS  amLODIPine   Tablet 10 milliGRAM(s) Oral daily  atorvastatin 80 milliGRAM(s) Oral at bedtime  dextrose 5%. 1000 milliLiter(s) IV Continuous <Continuous>  dextrose 5%. 1000 milliLiter(s) IV Continuous <Continuous>  dextrose 50% Injectable 25 Gram(s) IV Push once  dextrose 50% Injectable 25 Gram(s) IV Push once  dextrose 50% Injectable 12.5 Gram(s) IV Push once  divalproex  milliGRAM(s) Oral three times a day  enoxaparin Injectable 40 milliGRAM(s) SubCutaneous every 24 hours  ferrous    sulfate 325 milliGRAM(s) Oral daily  FLUoxetine 20 milliGRAM(s) Oral daily  gabapentin 100 milliGRAM(s) Oral two times a day  glucagon  Injectable 1 milliGRAM(s) IntraMuscular once  insulin glargine Injectable (LANTUS) 15 Unit(s) SubCutaneous at bedtime  insulin lispro (ADMELOG) corrective regimen sliding scale   SubCutaneous three times a day before meals  insulin lispro (ADMELOG) corrective regimen sliding scale   SubCutaneous at bedtime  lisinopril 20 milliGRAM(s) Oral daily  LORazepam   Injectable 1 milliGRAM(s) IV Push once  OLANZapine Disintegrating Tablet 10 milliGRAM(s) Oral at bedtime  senna 2 Tablet(s) Oral at bedtime  sodium bicarbonate 650 milliGRAM(s) Oral three times a day  sodium chloride 0.9%. 1000 milliLiter(s) IV Continuous <Continuous>    PRN MEDICATIONS  acetaminophen     Tablet .. 650 milliGRAM(s) Oral every 6 hours PRN  ALPRAZolam 0.25 milliGRAM(s) Oral two times a day PRN  aluminum hydroxide/magnesium hydroxide/simethicone Suspension 30 milliLiter(s) Oral every 4 hours PRN  dextrose Oral Gel 15 Gram(s) Oral once PRN  melatonin 3 milliGRAM(s) Oral at bedtime PRN  ondansetron Injectable 4 milliGRAM(s) IV Push every 8 hours PRN    VITALS:   T(F): 98.8  HR: 62  BP: 136/63  RR: 18  SpO2: 92%    PHYSICAL EXAM:  GENERAL: NAD, well-groomed, well-developed  HEAD:  Atraumatic, Normocephalic  EYES: EOMI, PERRLA, conjunctiva and sclera clear  ENMT: No tonsillar erythema, exudates, or enlargement; Moist mucous membranes  NECK: Supple, No JVD, Normal thyroid  HEART: Regular rate and rhythm; systolic murmur  RESPIRATORY: CTA B/L, No W/R/R  ABDOMEN: Soft, Nontender, Nondistended; Bowel sounds present  NEUROLOGY: A&Ox3, nonfocal, moving all extremities  EXTREMITIES:  2+ Peripheral Pulses, No clubbing, cyanosis, or edema, LLE slightly shortened and externally rotated, pain on movement of hip, no hematoma  SKIN: warm, dry, normal color, no rash or abnormal lesions      (  ) Indwelling Finney Catheter:   Date insterted:    Reason (  ) Critical illness     (  ) urinary retention    (  ) Accurate Ins/Outs Monitoring     (  ) CMO patient    (  ) Central Line:   Date inserted:  Location: (  ) Right IJ     (  ) Left IJ     (  ) Right Fem     (  ) Left Fem    (  ) SPC        (  ) pigtail       (  ) PEG tube       (  ) colostomy       (  ) jejunostomy  (  ) U-Dall    LABS:                        9.4    6.55  )-----------( 188      ( 06 Dec 2023 08:14 )             31.3     12-06    142  |  108  |  43<H>  ----------------------------<  126<H>  4.9   |  23  |  2.0<H>    Ca    10.0      06 Dec 2023 08:14  Mg     2.6     12-06    TPro  6.8  /  Alb  4.0  /  TBili  0.3  /  DBili  x   /  AST  19  /  ALT  16  /  AlkPhos  47  12-06    PT/INR - ( 06 Dec 2023 08:14 )   PT: 11.20 sec;   INR: 0.98 ratio         PTT - ( 06 Dec 2023 08:14 )  PTT:38.8 sec  Urinalysis Basic - ( 06 Dec 2023 08:14 )    Color: x / Appearance: x / SG: x / pH: x  Gluc: 126 mg/dL / Ketone: x  / Bili: x / Urobili: x   Blood: x / Protein: x / Nitrite: x   Leuk Esterase: x / RBC: x / WBC x   Sq Epi: x / Non Sq Epi: x / Bacteria: x            CARDIAC MARKERS ( 05 Dec 2023 06:26 )  x     / <0.01 ng/mL / x     / x     / x          RADIOLOGY:    RADIOLOGY

## 2023-12-07 LAB
ALBUMIN SERPL ELPH-MCNC: 3.4 G/DL — LOW (ref 3.5–5.2)
ALBUMIN SERPL ELPH-MCNC: 3.4 G/DL — LOW (ref 3.5–5.2)
ALP SERPL-CCNC: 39 U/L — SIGNIFICANT CHANGE UP (ref 30–115)
ALP SERPL-CCNC: 39 U/L — SIGNIFICANT CHANGE UP (ref 30–115)
ALT FLD-CCNC: 12 U/L — SIGNIFICANT CHANGE UP (ref 0–41)
ALT FLD-CCNC: 12 U/L — SIGNIFICANT CHANGE UP (ref 0–41)
ANION GAP SERPL CALC-SCNC: 14 MMOL/L — SIGNIFICANT CHANGE UP (ref 7–14)
ANION GAP SERPL CALC-SCNC: 14 MMOL/L — SIGNIFICANT CHANGE UP (ref 7–14)
AST SERPL-CCNC: 25 U/L — SIGNIFICANT CHANGE UP (ref 0–41)
AST SERPL-CCNC: 25 U/L — SIGNIFICANT CHANGE UP (ref 0–41)
BASOPHILS # BLD AUTO: 0.01 K/UL — SIGNIFICANT CHANGE UP (ref 0–0.2)
BASOPHILS # BLD AUTO: 0.01 K/UL — SIGNIFICANT CHANGE UP (ref 0–0.2)
BASOPHILS NFR BLD AUTO: 0.1 % — SIGNIFICANT CHANGE UP (ref 0–1)
BASOPHILS NFR BLD AUTO: 0.1 % — SIGNIFICANT CHANGE UP (ref 0–1)
BILIRUB SERPL-MCNC: <0.2 MG/DL — SIGNIFICANT CHANGE UP (ref 0.2–1.2)
BILIRUB SERPL-MCNC: <0.2 MG/DL — SIGNIFICANT CHANGE UP (ref 0.2–1.2)
BUN SERPL-MCNC: 60 MG/DL — HIGH (ref 10–20)
BUN SERPL-MCNC: 60 MG/DL — HIGH (ref 10–20)
CALCIUM SERPL-MCNC: 8.8 MG/DL — SIGNIFICANT CHANGE UP (ref 8.4–10.5)
CALCIUM SERPL-MCNC: 8.8 MG/DL — SIGNIFICANT CHANGE UP (ref 8.4–10.5)
CHLORIDE SERPL-SCNC: 100 MMOL/L — SIGNIFICANT CHANGE UP (ref 98–110)
CHLORIDE SERPL-SCNC: 100 MMOL/L — SIGNIFICANT CHANGE UP (ref 98–110)
CK MB CFR SERPL CALC: 4.7 NG/ML — SIGNIFICANT CHANGE UP (ref 0.6–6.3)
CK MB CFR SERPL CALC: 4.7 NG/ML — SIGNIFICANT CHANGE UP (ref 0.6–6.3)
CK SERPL-CCNC: 106 U/L — SIGNIFICANT CHANGE UP (ref 0–225)
CK SERPL-CCNC: 106 U/L — SIGNIFICANT CHANGE UP (ref 0–225)
CO2 SERPL-SCNC: 21 MMOL/L — SIGNIFICANT CHANGE UP (ref 17–32)
CO2 SERPL-SCNC: 21 MMOL/L — SIGNIFICANT CHANGE UP (ref 17–32)
CREAT SERPL-MCNC: 2.6 MG/DL — HIGH (ref 0.7–1.5)
CREAT SERPL-MCNC: 2.6 MG/DL — HIGH (ref 0.7–1.5)
CULTURE RESULTS: SIGNIFICANT CHANGE UP
CULTURE RESULTS: SIGNIFICANT CHANGE UP
EGFR: 18 ML/MIN/1.73M2 — LOW
EGFR: 18 ML/MIN/1.73M2 — LOW
EOSINOPHIL # BLD AUTO: 0.02 K/UL — SIGNIFICANT CHANGE UP (ref 0–0.7)
EOSINOPHIL # BLD AUTO: 0.02 K/UL — SIGNIFICANT CHANGE UP (ref 0–0.7)
EOSINOPHIL NFR BLD AUTO: 0.3 % — SIGNIFICANT CHANGE UP (ref 0–8)
EOSINOPHIL NFR BLD AUTO: 0.3 % — SIGNIFICANT CHANGE UP (ref 0–8)
GLUCOSE BLDC GLUCOMTR-MCNC: 131 MG/DL — HIGH (ref 70–99)
GLUCOSE BLDC GLUCOMTR-MCNC: 131 MG/DL — HIGH (ref 70–99)
GLUCOSE BLDC GLUCOMTR-MCNC: 163 MG/DL — HIGH (ref 70–99)
GLUCOSE BLDC GLUCOMTR-MCNC: 163 MG/DL — HIGH (ref 70–99)
GLUCOSE BLDC GLUCOMTR-MCNC: 237 MG/DL — HIGH (ref 70–99)
GLUCOSE BLDC GLUCOMTR-MCNC: 237 MG/DL — HIGH (ref 70–99)
GLUCOSE BLDC GLUCOMTR-MCNC: 261 MG/DL — HIGH (ref 70–99)
GLUCOSE BLDC GLUCOMTR-MCNC: 261 MG/DL — HIGH (ref 70–99)
GLUCOSE BLDC GLUCOMTR-MCNC: 305 MG/DL — HIGH (ref 70–99)
GLUCOSE BLDC GLUCOMTR-MCNC: 305 MG/DL — HIGH (ref 70–99)
GLUCOSE SERPL-MCNC: 154 MG/DL — HIGH (ref 70–99)
GLUCOSE SERPL-MCNC: 154 MG/DL — HIGH (ref 70–99)
HCT VFR BLD CALC: 22.3 % — LOW (ref 37–47)
HCT VFR BLD CALC: 22.3 % — LOW (ref 37–47)
HCT VFR BLD CALC: 30.2 % — LOW (ref 37–47)
HCT VFR BLD CALC: 30.2 % — LOW (ref 37–47)
HGB BLD-MCNC: 6.8 G/DL — CRITICAL LOW (ref 12–16)
HGB BLD-MCNC: 6.8 G/DL — CRITICAL LOW (ref 12–16)
HGB BLD-MCNC: 9.8 G/DL — LOW (ref 12–16)
HGB BLD-MCNC: 9.8 G/DL — LOW (ref 12–16)
IMM GRANULOCYTES NFR BLD AUTO: 0.4 % — HIGH (ref 0.1–0.3)
IMM GRANULOCYTES NFR BLD AUTO: 0.4 % — HIGH (ref 0.1–0.3)
LYMPHOCYTES # BLD AUTO: 1 K/UL — LOW (ref 1.2–3.4)
LYMPHOCYTES # BLD AUTO: 1 K/UL — LOW (ref 1.2–3.4)
LYMPHOCYTES # BLD AUTO: 13.9 % — LOW (ref 20.5–51.1)
LYMPHOCYTES # BLD AUTO: 13.9 % — LOW (ref 20.5–51.1)
MAGNESIUM SERPL-MCNC: 2.3 MG/DL — SIGNIFICANT CHANGE UP (ref 1.8–2.4)
MAGNESIUM SERPL-MCNC: 2.3 MG/DL — SIGNIFICANT CHANGE UP (ref 1.8–2.4)
MCHC RBC-ENTMCNC: 26.8 PG — LOW (ref 27–31)
MCHC RBC-ENTMCNC: 26.8 PG — LOW (ref 27–31)
MCHC RBC-ENTMCNC: 27.1 PG — SIGNIFICANT CHANGE UP (ref 27–31)
MCHC RBC-ENTMCNC: 27.1 PG — SIGNIFICANT CHANGE UP (ref 27–31)
MCHC RBC-ENTMCNC: 30.5 G/DL — LOW (ref 32–37)
MCHC RBC-ENTMCNC: 30.5 G/DL — LOW (ref 32–37)
MCHC RBC-ENTMCNC: 32.5 G/DL — SIGNIFICANT CHANGE UP (ref 32–37)
MCHC RBC-ENTMCNC: 32.5 G/DL — SIGNIFICANT CHANGE UP (ref 32–37)
MCV RBC AUTO: 83.7 FL — SIGNIFICANT CHANGE UP (ref 81–99)
MCV RBC AUTO: 83.7 FL — SIGNIFICANT CHANGE UP (ref 81–99)
MCV RBC AUTO: 87.8 FL — SIGNIFICANT CHANGE UP (ref 81–99)
MCV RBC AUTO: 87.8 FL — SIGNIFICANT CHANGE UP (ref 81–99)
MONOCYTES # BLD AUTO: 0.65 K/UL — HIGH (ref 0.1–0.6)
MONOCYTES # BLD AUTO: 0.65 K/UL — HIGH (ref 0.1–0.6)
MONOCYTES NFR BLD AUTO: 9 % — SIGNIFICANT CHANGE UP (ref 1.7–9.3)
MONOCYTES NFR BLD AUTO: 9 % — SIGNIFICANT CHANGE UP (ref 1.7–9.3)
NEUTROPHILS # BLD AUTO: 5.49 K/UL — SIGNIFICANT CHANGE UP (ref 1.4–6.5)
NEUTROPHILS # BLD AUTO: 5.49 K/UL — SIGNIFICANT CHANGE UP (ref 1.4–6.5)
NEUTROPHILS NFR BLD AUTO: 76.3 % — HIGH (ref 42.2–75.2)
NEUTROPHILS NFR BLD AUTO: 76.3 % — HIGH (ref 42.2–75.2)
NRBC # BLD: 0 /100 WBCS — SIGNIFICANT CHANGE UP (ref 0–0)
PLATELET # BLD AUTO: 154 K/UL — SIGNIFICANT CHANGE UP (ref 130–400)
PLATELET # BLD AUTO: 154 K/UL — SIGNIFICANT CHANGE UP (ref 130–400)
PLATELET # BLD AUTO: 156 K/UL — SIGNIFICANT CHANGE UP (ref 130–400)
PLATELET # BLD AUTO: 156 K/UL — SIGNIFICANT CHANGE UP (ref 130–400)
PMV BLD: 11.2 FL — HIGH (ref 7.4–10.4)
PMV BLD: 11.2 FL — HIGH (ref 7.4–10.4)
PMV BLD: 11.6 FL — HIGH (ref 7.4–10.4)
PMV BLD: 11.6 FL — HIGH (ref 7.4–10.4)
POTASSIUM SERPL-MCNC: 4.7 MMOL/L — SIGNIFICANT CHANGE UP (ref 3.5–5)
POTASSIUM SERPL-MCNC: 4.7 MMOL/L — SIGNIFICANT CHANGE UP (ref 3.5–5)
POTASSIUM SERPL-SCNC: 4.7 MMOL/L — SIGNIFICANT CHANGE UP (ref 3.5–5)
POTASSIUM SERPL-SCNC: 4.7 MMOL/L — SIGNIFICANT CHANGE UP (ref 3.5–5)
PROT SERPL-MCNC: 5.7 G/DL — LOW (ref 6–8)
PROT SERPL-MCNC: 5.7 G/DL — LOW (ref 6–8)
RBC # BLD: 2.54 M/UL — LOW (ref 4.2–5.4)
RBC # BLD: 2.54 M/UL — LOW (ref 4.2–5.4)
RBC # BLD: 3.61 M/UL — LOW (ref 4.2–5.4)
RBC # BLD: 3.61 M/UL — LOW (ref 4.2–5.4)
RBC # FLD: 13.9 % — SIGNIFICANT CHANGE UP (ref 11.5–14.5)
RBC # FLD: 13.9 % — SIGNIFICANT CHANGE UP (ref 11.5–14.5)
RBC # FLD: 14.1 % — SIGNIFICANT CHANGE UP (ref 11.5–14.5)
RBC # FLD: 14.1 % — SIGNIFICANT CHANGE UP (ref 11.5–14.5)
SODIUM SERPL-SCNC: 135 MMOL/L — SIGNIFICANT CHANGE UP (ref 135–146)
SODIUM SERPL-SCNC: 135 MMOL/L — SIGNIFICANT CHANGE UP (ref 135–146)
SPECIMEN SOURCE: SIGNIFICANT CHANGE UP
SPECIMEN SOURCE: SIGNIFICANT CHANGE UP
TROPONIN T SERPL-MCNC: <0.01 NG/ML — SIGNIFICANT CHANGE UP
TROPONIN T SERPL-MCNC: <0.01 NG/ML — SIGNIFICANT CHANGE UP
WBC # BLD: 7.2 K/UL — SIGNIFICANT CHANGE UP (ref 4.8–10.8)
WBC # BLD: 7.2 K/UL — SIGNIFICANT CHANGE UP (ref 4.8–10.8)
WBC # BLD: 8.93 K/UL — SIGNIFICANT CHANGE UP (ref 4.8–10.8)
WBC # BLD: 8.93 K/UL — SIGNIFICANT CHANGE UP (ref 4.8–10.8)
WBC # FLD AUTO: 7.2 K/UL — SIGNIFICANT CHANGE UP (ref 4.8–10.8)
WBC # FLD AUTO: 7.2 K/UL — SIGNIFICANT CHANGE UP (ref 4.8–10.8)
WBC # FLD AUTO: 8.93 K/UL — SIGNIFICANT CHANGE UP (ref 4.8–10.8)
WBC # FLD AUTO: 8.93 K/UL — SIGNIFICANT CHANGE UP (ref 4.8–10.8)

## 2023-12-07 PROCEDURE — 71045 X-RAY EXAM CHEST 1 VIEW: CPT | Mod: 26

## 2023-12-07 PROCEDURE — 93010 ELECTROCARDIOGRAM REPORT: CPT

## 2023-12-07 RX ORDER — HYDROMORPHONE HYDROCHLORIDE 2 MG/ML
0.2 INJECTION INTRAMUSCULAR; INTRAVENOUS; SUBCUTANEOUS EVERY 8 HOURS
Refills: 0 | Status: DISCONTINUED | OUTPATIENT
Start: 2023-12-07 | End: 2023-12-09

## 2023-12-07 RX ORDER — SODIUM CHLORIDE 9 MG/ML
1000 INJECTION INTRAMUSCULAR; INTRAVENOUS; SUBCUTANEOUS
Refills: 0 | Status: COMPLETED | OUTPATIENT
Start: 2023-12-07 | End: 2023-12-07

## 2023-12-07 RX ADMIN — Medication 20 MILLIGRAM(S): at 11:50

## 2023-12-07 RX ADMIN — AMLODIPINE BESYLATE 10 MILLIGRAM(S): 2.5 TABLET ORAL at 05:32

## 2023-12-07 RX ADMIN — Medication 100 MILLIGRAM(S): at 04:26

## 2023-12-07 RX ADMIN — Medication 650 MILLIGRAM(S): at 14:24

## 2023-12-07 RX ADMIN — OLANZAPINE 10 MILLIGRAM(S): 15 TABLET, FILM COATED ORAL at 21:37

## 2023-12-07 RX ADMIN — ENOXAPARIN SODIUM 40 MILLIGRAM(S): 100 INJECTION SUBCUTANEOUS at 21:33

## 2023-12-07 RX ADMIN — Medication 8: at 11:50

## 2023-12-07 RX ADMIN — Medication 3 MILLIGRAM(S): at 21:51

## 2023-12-07 RX ADMIN — Medication 4: at 17:56

## 2023-12-07 RX ADMIN — GABAPENTIN 100 MILLIGRAM(S): 400 CAPSULE ORAL at 17:49

## 2023-12-07 RX ADMIN — Medication 2: at 07:51

## 2023-12-07 RX ADMIN — DIVALPROEX SODIUM 250 MILLIGRAM(S): 500 TABLET, DELAYED RELEASE ORAL at 05:28

## 2023-12-07 RX ADMIN — DIVALPROEX SODIUM 250 MILLIGRAM(S): 500 TABLET, DELAYED RELEASE ORAL at 14:24

## 2023-12-07 RX ADMIN — Medication 325 MILLIGRAM(S): at 11:50

## 2023-12-07 RX ADMIN — Medication 650 MILLIGRAM(S): at 21:33

## 2023-12-07 RX ADMIN — INSULIN GLARGINE 15 UNIT(S): 100 INJECTION, SOLUTION SUBCUTANEOUS at 21:34

## 2023-12-07 RX ADMIN — SENNA PLUS 2 TABLET(S): 8.6 TABLET ORAL at 21:33

## 2023-12-07 RX ADMIN — Medication 650 MILLIGRAM(S): at 21:51

## 2023-12-07 RX ADMIN — Medication 650 MILLIGRAM(S): at 08:20

## 2023-12-07 RX ADMIN — Medication 650 MILLIGRAM(S): at 05:28

## 2023-12-07 RX ADMIN — ATORVASTATIN CALCIUM 80 MILLIGRAM(S): 80 TABLET, FILM COATED ORAL at 21:33

## 2023-12-07 RX ADMIN — GABAPENTIN 100 MILLIGRAM(S): 400 CAPSULE ORAL at 05:28

## 2023-12-07 RX ADMIN — Medication 100 MILLIGRAM(S): at 14:35

## 2023-12-07 RX ADMIN — DIVALPROEX SODIUM 250 MILLIGRAM(S): 500 TABLET, DELAYED RELEASE ORAL at 21:33

## 2023-12-07 NOTE — PHYSICAL THERAPY INITIAL EVALUATION ADULT - TRANSFER TRAINING, PT EVAL
Improve transfers to [facility] by DC Improve transfers to [facility] by DC

## 2023-12-07 NOTE — PROGRESS NOTE ADULT - SUBJECTIVE AND OBJECTIVE BOX
Orthopaedic Surgery Progress Note    S&E this AM. Pain well controlled. Denies fever/chills/CP/SOB. No other complaints      T(C): 35.6 (12-07-23 @ 01:20), Max: 37.1 (12-06-23 @ 11:02)  HR: 62 (12-07-23 @ 01:20) (60 - 110)  BP: 102/54 (12-07-23 @ 01:20) (102/54 - 152/61)  RR: 18 (12-07-23 @ 01:20) (18 - 18)  SpO2: 96% (12-06-23 @ 23:48) (92% - 100%)    P/E:  Alert, NAD  Nonlabored breathing    LLE  Dressing c/d/i  SILT SP/DP/T/Sural/Saph  Firing TA/EHL/FHL/GS  Foot WWP, 2+ DP pulse    Labs                        8.5    8.89  )-----------( 192      ( 06 Dec 2023 13:41 )             27.8     12-06    142  |  108  |  43<H>  ----------------------------<  126<H>  4.9   |  23  |  2.0<H>    Ca    10.0      06 Dec 2023 08:14  Mg     2.6     12-06    TPro  6.8  /  Alb  4.0  /  TBili  0.3  /  DBili  x   /  AST  19  /  ALT  16  /  AlkPhos  47  12-06    LIVER FUNCTIONS - ( 06 Dec 2023 08:14 )  Alb: 4.0 g/dL / Pro: 6.8 g/dL / ALK PHOS: 47 U/L / ALT: 16 U/L / AST: 19 U/L / GGT: x           PT/INR - ( 06 Dec 2023 08:14 )   PT: 11.20 sec;   INR: 0.98 ratio         PTT - ( 06 Dec 2023 08:14 )  PTT:38.8 sec          A/P:   79yo Female POD1 s/p L hip IMN on 12/6/23, doing well.    - Activity: WBAT LLE  - Abx: Ancef 2g q8hr x3 doses for a total of 24hrs post-operatively  - DVT PPx: LVX per primary team  - Pain control  - IS encouraged  - AM labs  - PT/Rehab  - D/C planning    - If discharged, patient should follow up with Dr. Ortega at [address] [facility] phone [phone].  - Patient should be discharged on 6 weeks (from surgery date) of Aspirin 81mg BID for DVT prophylaxis as their mobility/function/ambulation will be decreased due to lower extremity orthopaedic surgery.     Orthopaedic Surgery Progress Note    S&E this AM. Pain well controlled. Denies fever/chills/CP/SOB. No other complaints      T(C): 35.6 (12-07-23 @ 01:20), Max: 37.1 (12-06-23 @ 11:02)  HR: 62 (12-07-23 @ 01:20) (60 - 110)  BP: 102/54 (12-07-23 @ 01:20) (102/54 - 152/61)  RR: 18 (12-07-23 @ 01:20) (18 - 18)  SpO2: 96% (12-06-23 @ 23:48) (92% - 100%)    P/E:  Alert, NAD  Nonlabored breathing    LLE  Dressing c/d/i  SILT SP/DP/T/Sural/Saph  Firing TA/EHL/FHL/GS  Foot WWP, 2+ DP pulse    Labs                        8.5    8.89  )-----------( 192      ( 06 Dec 2023 13:41 )             27.8     12-06    142  |  108  |  43<H>  ----------------------------<  126<H>  4.9   |  23  |  2.0<H>    Ca    10.0      06 Dec 2023 08:14  Mg     2.6     12-06    TPro  6.8  /  Alb  4.0  /  TBili  0.3  /  DBili  x   /  AST  19  /  ALT  16  /  AlkPhos  47  12-06    LIVER FUNCTIONS - ( 06 Dec 2023 08:14 )  Alb: 4.0 g/dL / Pro: 6.8 g/dL / ALK PHOS: 47 U/L / ALT: 16 U/L / AST: 19 U/L / GGT: x           PT/INR - ( 06 Dec 2023 08:14 )   PT: 11.20 sec;   INR: 0.98 ratio         PTT - ( 06 Dec 2023 08:14 )  PTT:38.8 sec          A/P:   79yo Female POD1 s/p L hip IMN on 12/6/23, doing well.    - Activity: WBAT LLE  - Abx: Ancef 2g q8hr x3 doses for a total of 24hrs post-operatively  - DVT PPx: LVX per primary team  - Pain control  - IS encouraged  - AM labs  - PT/Rehab  - D/C planning    - If discharged, patient should follow up with Dr. Ortega at [address] [facility] phone [phone].  - Patient should be discharged on 6 weeks (from surgery date) of Aspirin 81mg BID for DVT prophylaxis as their mobility/function/ambulation will be decreased due to lower extremity orthopaedic surgery.

## 2023-12-07 NOTE — PHYSICAL THERAPY INITIAL EVALUATION ADULT - BED MOBILITY TRAINING, PT EVAL
Bicillin L-A (penicillin G benzathine inj susp) injection given. Verbal order for injection from Dr. Torres. Patient aware to return to clinic in 6 weeks for repeat labs. Patient tolerated without incident. See MAR for documentation.  
Improve bed mobility to [facility] by D/C

## 2023-12-07 NOTE — PROGRESS NOTE ADULT - SUBJECTIVE AND OBJECTIVE BOX
24H events:    Patient is a 78y old Female who presents with a chief complaint of Hip Fx (07 Dec 2023 09:21)  Primary diagnosis of Intertrochanteric fracture of left hip  Today is hospital day 2d. This morning patient was seen and examined at bedside, resting comfortably in bed.    No acute or major events overnight. Hemodynamically stable, tolerating oral diet, voiding appropriately with appropriate bowel movements.     expressing c/o pain in left knee and mild pain in left hip  no improvement of tylenol  0.2mg dilaudid added for pain prn, stop after 3 doses   alert to location and self today   noted to have intellectual  disability/dementia   hgb drop today to 6.8, ordered 2 units, active T&S on file,  consent  obtained  from patient's  son Javier via phone     Code Status:    Family communication:  Contact date:  Name of person contacted:  Relationship to patient:  Communication details:  What matters most:    PAST MEDICAL & SURGICAL HISTORY  HTN (hypertension)    DM (diabetes mellitus)    MDD (major depressive disorder)    Bipolar disorder    No significant past surgical history      SOCIAL HISTORY:  Social History:      ALLERGIES:  Neosporin (Unknown)  Iodides (Unknown)  amoxicillin (Unknown)    MEDICATIONS:  STANDING MEDICATIONS  amLODIPine   Tablet 10 milliGRAM(s) Oral daily  atorvastatin 80 milliGRAM(s) Oral at bedtime  ceFAZolin   IVPB 2000 milliGRAM(s) IV Intermittent every 8 hours  dextrose 5%. 1000 milliLiter(s) IV Continuous <Continuous>  dextrose 5%. 1000 milliLiter(s) IV Continuous <Continuous>  dextrose 50% Injectable 12.5 Gram(s) IV Push once  dextrose 50% Injectable 25 Gram(s) IV Push once  dextrose 50% Injectable 25 Gram(s) IV Push once  divalproex  milliGRAM(s) Oral three times a day  enoxaparin Injectable 40 milliGRAM(s) SubCutaneous every 24 hours  ferrous    sulfate 325 milliGRAM(s) Oral daily  FLUoxetine 20 milliGRAM(s) Oral daily  gabapentin 100 milliGRAM(s) Oral two times a day  glucagon  Injectable 1 milliGRAM(s) IntraMuscular once  insulin glargine Injectable (LANTUS) 15 Unit(s) SubCutaneous at bedtime  insulin lispro (ADMELOG) corrective regimen sliding scale   SubCutaneous at bedtime  insulin lispro (ADMELOG) corrective regimen sliding scale   SubCutaneous three times a day before meals  lisinopril 20 milliGRAM(s) Oral daily  LORazepam   Injectable 1 milliGRAM(s) IV Push once  OLANZapine Disintegrating Tablet 10 milliGRAM(s) Oral at bedtime  senna 2 Tablet(s) Oral at bedtime  sodium bicarbonate 650 milliGRAM(s) Oral three times a day  sodium chloride 0.9%. 1000 milliLiter(s) IV Continuous <Continuous>    PRN MEDICATIONS  acetaminophen     Tablet .. 650 milliGRAM(s) Oral every 6 hours PRN  ALPRAZolam 0.25 milliGRAM(s) Oral two times a day PRN  aluminum hydroxide/magnesium hydroxide/simethicone Suspension 30 milliLiter(s) Oral every 4 hours PRN  dextrose Oral Gel 15 Gram(s) Oral once PRN  HYDROmorphone  Injectable 0.2 milliGRAM(s) IV Push every 8 hours PRN  melatonin 3 milliGRAM(s) Oral at bedtime PRN  ondansetron Injectable 4 milliGRAM(s) IV Push every 8 hours PRN    VITALS:   T(F): 96.7  HR: 63  BP: 116/58  RR: 18  SpO2: 100%    PHYSICAL EXAM:  GENERAL: NAD, well-groomed, well-developed  HEAD:  Atraumatic, Normocephalic  EYES: EOMI, PERRLA, conjunctiva and sclera clear  ENMT: No tonsillar erythema, exudates, or enlargement; Moist mucous membranes  NECK: Supple, No JVD, Normal thyroid  HEART: Regular rate and rhythm; systolic murmur  RESPIRATORY: CTA B/L, No W/R/R  ABDOMEN: Soft, Nontender, Nondistended; Bowel sounds present  NEUROLOGY: A&Ox3, nonfocal, moving all extremities  EXTREMITIES:  2+ Peripheral Pulses, No clubbing, cyanosis, or edema, LLE slightly shortened and externally rotated, pain on movement of hip, mild bruising at surgical site, clean/dry dressing, no drainage/erythema, no overt hematoma or skin tightening   SKIN: warm, dry, normal color, no rash or abnormal lesions      (  ) Indwelling Finney Catheter:   Date insterted:    Reason (  ) Critical illness     (  ) urinary retention    (  ) Accurate Ins/Outs Monitoring     (  ) CMO patient    (  ) Central Line:   Date inserted:  Location: (  ) Right IJ     (  ) Left IJ     (  ) Right Fem     (  ) Left Fem    (  ) SPC        (  ) pigtail       (  ) PEG tube       (  ) colostomy       (  ) jejunostomy  (  ) U-Dall    LABS:                        6.8    7.20  )-----------( 156      ( 07 Dec 2023 07:27 )             22.3     12-07    135  |  100  |  60<H>  ----------------------------<  154<H>  4.7   |  21  |  2.6<H>    Ca    8.8      07 Dec 2023 07:27  Mg     2.3     12-07    TPro  5.7<L>  /  Alb  3.4<L>  /  TBili  <0.2  /  DBili  x   /  AST  25  /  ALT  12  /  AlkPhos  39  12-07    PT/INR - ( 06 Dec 2023 08:14 )   PT: 11.20 sec;   INR: 0.98 ratio         PTT - ( 06 Dec 2023 08:14 )  PTT:38.8 sec  Urinalysis Basic - ( 07 Dec 2023 07:27 )    Color: x / Appearance: x / SG: x / pH: x  Gluc: 154 mg/dL / Ketone: x  / Bili: x / Urobili: x   Blood: x / Protein: x / Nitrite: x   Leuk Esterase: x / RBC: x / WBC x   Sq Epi: x / Non Sq Epi: x / Bacteria: x        Troponin T, Serum: <0.01 ng/mL (12-07-23 @ 07:27)  Creatine Kinase, Serum: 106 U/L (12-07-23 @ 07:27)      Culture - Urine (collected 05 Dec 2023 04:42)  Source: Clean Catch Clean Catch (Midstream)  Final Report (07 Dec 2023 06:43):    <10,000 CFU/mL Normal Urogenital Laura      CARDIAC MARKERS ( 07 Dec 2023 07:27 )  x     / <0.01 ng/mL / 106 U/L / x     / 4.7 ng/mL      RADIOLOGY:    RADIOLOGY

## 2023-12-07 NOTE — PROGRESS NOTE ADULT - SUBJECTIVE AND OBJECTIVE BOX
KINGSTON PETTY  78y  Female  HPI:  Patient is a 78y Female PMH bipolar disorder, intellectual disability, HTN, DM and arthritis, on ASA 81mg who presents to the ED BIBA from [facility] s/p fall. As per papers, patient was found kneeling on the floor in front of her bed holding onto her bed. Patient is a poor historian, and unable to answer questions.     In the ED trauma work up showed acute comminuted impacted left femoral intertrochanteric fracture. Patient was transferred to [place] and is scheduled for ORIF with orthopedic surgery tomorrow.       Vital Signs Last 24 Hrs  T(C): 36.4 (05 Dec 2023 08:53), Max: 37.2 (05 Dec 2023 03:40)  T(F): 97.6 (05 Dec 2023 08:53), Max: 98.9 (05 Dec 2023 03:40)  HR: 64 (05 Dec 2023 12:00) (62 - 67)  BP: 146/63 (05 Dec 2023 12:00) (145/76 - 186/86)  BP(mean): --  RR: 18 (05 Dec 2023 10:41) (18 - 18)  SpO2: 96% (05 Dec 2023 10:41) (95% - 96%)    Parameters below as of 05 Dec 2023 10:41  Patient On (Oxygen Delivery Method): room air     (05 Dec 2023 16:24)    MEDICATIONS  (STANDING):  amLODIPine   Tablet 10 milliGRAM(s) Oral daily  atorvastatin 80 milliGRAM(s) Oral at bedtime  ceFAZolin   IVPB 2000 milliGRAM(s) IV Intermittent every 8 hours  dextrose 5%. 1000 milliLiter(s) (100 mL/Hr) IV Continuous <Continuous>  dextrose 5%. 1000 milliLiter(s) (50 mL/Hr) IV Continuous <Continuous>  dextrose 50% Injectable 25 Gram(s) IV Push once  dextrose 50% Injectable 25 Gram(s) IV Push once  dextrose 50% Injectable 12.5 Gram(s) IV Push once  divalproex  milliGRAM(s) Oral three times a day  enoxaparin Injectable 40 milliGRAM(s) SubCutaneous every 24 hours  ferrous    sulfate 325 milliGRAM(s) Oral daily  FLUoxetine 20 milliGRAM(s) Oral daily  gabapentin 100 milliGRAM(s) Oral two times a day  glucagon  Injectable 1 milliGRAM(s) IntraMuscular once  insulin glargine Injectable (LANTUS) 15 Unit(s) SubCutaneous at bedtime  insulin lispro (ADMELOG) corrective regimen sliding scale   SubCutaneous three times a day before meals  insulin lispro (ADMELOG) corrective regimen sliding scale   SubCutaneous at bedtime  lisinopril 20 milliGRAM(s) Oral daily  LORazepam   Injectable 1 milliGRAM(s) IV Push once  OLANZapine Disintegrating Tablet 10 milliGRAM(s) Oral at bedtime  senna 2 Tablet(s) Oral at bedtime  sodium bicarbonate 650 milliGRAM(s) Oral three times a day  sodium chloride 0.9%. 1000 milliLiter(s) (50 mL/Hr) IV Continuous <Continuous>    MEDICATIONS  (PRN):  acetaminophen     Tablet .. 650 milliGRAM(s) Oral every 6 hours PRN Temp greater or equal to 38C (100.4F), Mild Pain (1 - 3)  ALPRAZolam 0.25 milliGRAM(s) Oral two times a day PRN Aggitation  aluminum hydroxide/magnesium hydroxide/simethicone Suspension 30 milliLiter(s) Oral every 4 hours PRN Dyspepsia  dextrose Oral Gel 15 Gram(s) Oral once PRN Blood Glucose LESS THAN 70 milliGRAM(s)/deciliter  melatonin 3 milliGRAM(s) Oral at bedtime PRN Insomnia  ondansetron Injectable 4 milliGRAM(s) IV Push every 8 hours PRN Nausea and/or Vomiting    INTERVAL EVENTS: Patient seen today without distress, complains of pain but was able to ambulate with walker to the bathroom with PT.    T(C): 35.9 (12-07-23 @ 07:44), Max: 37.1 (12-06-23 @ 11:02)  HR: 63 (12-07-23 @ 07:44) (60 - 110)  BP: 116/58 (12-07-23 @ 07:44) (102/54 - 152/61)  RR: 18 (12-07-23 @ 01:20) (18 - 18)  SpO2: 100% (12-07-23 @ 07:44) (92% - 100%)  Wt(kg): --Vital Signs Last 24 Hrs  T(C): 35.9 (07 Dec 2023 07:44), Max: 37.1 (06 Dec 2023 11:02)  T(F): 96.7 (07 Dec 2023 07:44), Max: 98.8 (06 Dec 2023 11:02)  HR: 63 (07 Dec 2023 07:44) (60 - 110)  BP: 116/58 (07 Dec 2023 07:44) (102/54 - 152/61)  BP(mean): --  RR: 18 (07 Dec 2023 01:20) (18 - 18)  SpO2: 100% (07 Dec 2023 07:44) (92% - 100%)    Parameters below as of 07 Dec 2023 07:44  Patient On (Oxygen Delivery Method): nasal cannula        PHYSICAL EXAM:  GENERAL: Pale, NAD  NECK: Supple, No JVD  CHEST/LUNG: Clear; Shallow resp  HEART: S1, S2, Regular   ABDOMEN: Soft, Nontender, Nondistended; Bowel sounds present  EXTREMITIES: + edema  SKIN:Hematoma to the left hip and lower buttock    LABS:                          6.8    7.20  )-----------( 156      ( 07 Dec 2023 07:27 )             22.3             12-07    135  |  100  |  60<H>  ----------------------------<  154<H>  4.7   |  21  |  2.6<H>    Ca    8.8      07 Dec 2023 07:27  Mg     2.3     12-07    TPro  5.7<L>  /  Alb  3.4<L>  /  TBili  <0.2  /  DBili  x   /  AST  25  /  ALT  12  /  AlkPhos  39  12-07    LIVER FUNCTIONS - ( 07 Dec 2023 07:27 )  Alb: 3.4 g/dL / Pro: 5.7 g/dL / ALK PHOS: 39 U/L / ALT: 12 U/L / AST: 25 U/L / GGT: x                 PT/INR - ( 06 Dec 2023 08:14 )   PT: 11.20 sec;   INR: 0.98 ratio         PTT - ( 06 Dec 2023 08:14 )  PTT:38.8 sec  CARDIAC MARKERS ( 07 Dec 2023 07:27 )  x     / <0.01 ng/mL / 106 U/L / x     / 4.7 ng/mL        Urinalysis Basic - ( 07 Dec 2023 07:27 )    Color: x / Appearance: x / SG: x / pH: x  Gluc: 154 mg/dL / Ketone: x  / Bili: x / Urobili: x   Blood: x / Protein: x / Nitrite: x   Leuk Esterase: x / RBC: x / WBC x   Sq Epi: x / Non Sq Epi: x / Bacteria: x              Culture - Urine (collected 05 Dec 2023 04:42)  Source: Clean Catch Clean Catch (Midstream)  Final Report (07 Dec 2023 06:43):    <10,000 CFU/mL Normal Urogenital Laura      RADIOLOGY & ADDITIONAL TESTS:     KINGSTON PETTY  78y  Female  HPI:  Patient is a 78y Female PMH bipolar disorder, intellectual disability, HTN, DM and arthritis, on ASA 81mg who presents to the ED BIBA from [facility] s/p fall. As per papers, patient was found kneeling on the floor in front of her bed holding onto her bed. Patient is a poor historian, and unable to answer questions.     In the ED trauma work up showed acute comminuted impacted left femoral intertrochanteric fracture. Patient was transferred to [place] and is scheduled for ORIF with orthopedic surgery tomorrow.       Vital Signs Last 24 Hrs  T(C): 36.4 (05 Dec 2023 08:53), Max: 37.2 (05 Dec 2023 03:40)  T(F): 97.6 (05 Dec 2023 08:53), Max: 98.9 (05 Dec 2023 03:40)  HR: 64 (05 Dec 2023 12:00) (62 - 67)  BP: 146/63 (05 Dec 2023 12:00) (145/76 - 186/86)  BP(mean): --  RR: 18 (05 Dec 2023 10:41) (18 - 18)  SpO2: 96% (05 Dec 2023 10:41) (95% - 96%)    Parameters below as of 05 Dec 2023 10:41  Patient On (Oxygen Delivery Method): room air     (05 Dec 2023 16:24)    MEDICATIONS  (STANDING):  amLODIPine   Tablet 10 milliGRAM(s) Oral daily  atorvastatin 80 milliGRAM(s) Oral at bedtime  ceFAZolin   IVPB 2000 milliGRAM(s) IV Intermittent every 8 hours  dextrose 5%. 1000 milliLiter(s) (100 mL/Hr) IV Continuous <Continuous>  dextrose 5%. 1000 milliLiter(s) (50 mL/Hr) IV Continuous <Continuous>  dextrose 50% Injectable 25 Gram(s) IV Push once  dextrose 50% Injectable 25 Gram(s) IV Push once  dextrose 50% Injectable 12.5 Gram(s) IV Push once  divalproex  milliGRAM(s) Oral three times a day  enoxaparin Injectable 40 milliGRAM(s) SubCutaneous every 24 hours  ferrous    sulfate 325 milliGRAM(s) Oral daily  FLUoxetine 20 milliGRAM(s) Oral daily  gabapentin 100 milliGRAM(s) Oral two times a day  glucagon  Injectable 1 milliGRAM(s) IntraMuscular once  insulin glargine Injectable (LANTUS) 15 Unit(s) SubCutaneous at bedtime  insulin lispro (ADMELOG) corrective regimen sliding scale   SubCutaneous three times a day before meals  insulin lispro (ADMELOG) corrective regimen sliding scale   SubCutaneous at bedtime  lisinopril 20 milliGRAM(s) Oral daily  LORazepam   Injectable 1 milliGRAM(s) IV Push once  OLANZapine Disintegrating Tablet 10 milliGRAM(s) Oral at bedtime  senna 2 Tablet(s) Oral at bedtime  sodium bicarbonate 650 milliGRAM(s) Oral three times a day  sodium chloride 0.9%. 1000 milliLiter(s) (50 mL/Hr) IV Continuous <Continuous>    MEDICATIONS  (PRN):  acetaminophen     Tablet .. 650 milliGRAM(s) Oral every 6 hours PRN Temp greater or equal to 38C (100.4F), Mild Pain (1 - 3)  ALPRAZolam 0.25 milliGRAM(s) Oral two times a day PRN Aggitation  aluminum hydroxide/magnesium hydroxide/simethicone Suspension 30 milliLiter(s) Oral every 4 hours PRN Dyspepsia  dextrose Oral Gel 15 Gram(s) Oral once PRN Blood Glucose LESS THAN 70 milliGRAM(s)/deciliter  melatonin 3 milliGRAM(s) Oral at bedtime PRN Insomnia  ondansetron Injectable 4 milliGRAM(s) IV Push every 8 hours PRN Nausea and/or Vomiting    INTERVAL EVENTS: Patient seen today without distress, complains of pain but was able to ambulate with walker to the bathroom with PT.    T(C): 35.9 (12-07-23 @ 07:44), Max: 37.1 (12-06-23 @ 11:02)  HR: 63 (12-07-23 @ 07:44) (60 - 110)  BP: 116/58 (12-07-23 @ 07:44) (102/54 - 152/61)  RR: 18 (12-07-23 @ 01:20) (18 - 18)  SpO2: 100% (12-07-23 @ 07:44) (92% - 100%)  Wt(kg): --Vital Signs Last 24 Hrs  T(C): 35.9 (07 Dec 2023 07:44), Max: 37.1 (06 Dec 2023 11:02)  T(F): 96.7 (07 Dec 2023 07:44), Max: 98.8 (06 Dec 2023 11:02)  HR: 63 (07 Dec 2023 07:44) (60 - 110)  BP: 116/58 (07 Dec 2023 07:44) (102/54 - 152/61)  BP(mean): --  RR: 18 (07 Dec 2023 01:20) (18 - 18)  SpO2: 100% (07 Dec 2023 07:44) (92% - 100%)    Parameters below as of 07 Dec 2023 07:44  Patient On (Oxygen Delivery Method): nasal cannula        PHYSICAL EXAM:  GENERAL: Pale, NAD  NECK: Supple, No JVD  CHEST/LUNG: Clear; Shallow resp  HEART: S1, S2, Regular   ABDOMEN: Soft, Nontender, Nondistended; Bowel sounds present  EXTREMITIES: + edema  SKIN:Hematoma to the left hip and lower buttock    LABS:                          6.8    7.20  )-----------( 156      ( 07 Dec 2023 07:27 )             22.3             12-07    135  |  100  |  60<H>  ----------------------------<  154<H>  4.7   |  21  |  2.6<H>    Ca    8.8      07 Dec 2023 07:27  Mg     2.3     12-07    TPro  5.7<L>  /  Alb  3.4<L>  /  TBili  <0.2  /  DBili  x   /  AST  25  /  ALT  12  /  AlkPhos  39  12-07    LIVER FUNCTIONS - ( 07 Dec 2023 07:27 )  Alb: 3.4 g/dL / Pro: 5.7 g/dL / ALK PHOS: 39 U/L / ALT: 12 U/L / AST: 25 U/L / GGT: x                 PT/INR - ( 06 Dec 2023 08:14 )   PT: 11.20 sec;   INR: 0.98 ratio         PTT - ( 06 Dec 2023 08:14 )  PTT:38.8 sec  CARDIAC MARKERS ( 07 Dec 2023 07:27 )  x     / <0.01 ng/mL / 106 U/L / x     / 4.7 ng/mL        Urinalysis Basic - ( 07 Dec 2023 07:27 )    Color: x / Appearance: x / SG: x / pH: x  Gluc: 154 mg/dL / Ketone: x  / Bili: x / Urobili: x   Blood: x / Protein: x / Nitrite: x   Leuk Esterase: x / RBC: x / WBC x   Sq Epi: x / Non Sq Epi: x / Bacteria: x              Culture - Urine (collected 05 Dec 2023 04:42)  Source: Clean Catch Clean Catch (Midstream)  Final Report (07 Dec 2023 06:43):    <10,000 CFU/mL Normal Urogenital Laura      RADIOLOGY & ADDITIONAL TESTS:

## 2023-12-07 NOTE — PHYSICAL THERAPY INITIAL EVALUATION ADULT - PERTINENT HX OF CURRENT PROBLEM, REHAB EVAL
Patient is a 78y Female PMH bipolar disorder, intellectual disability, HTN, DM and arthritis, on ASA 81mg who presents to the ED BIBA from [facility] s/p fall. As per papers, patient was found kneeling on the floor in front of her bed holding onto her bed. Patient is a poor historian, and unable to answer questions.   In the ED trauma work up showed acute comminuted impacted left femoral intertrochanteric fracture. Patient was transferred to [place] and is scheduled for ORIF with orthopedic surgery Patient is a 78y Female PMH bipolar disorder, intellectual disability, HTN, DM and arthritis, on ASA 81mg who presents to the ED BIBA from [facility] s/p fall. As per papers, patient was found kneeling on the floor in front of her bed holding onto her bed. Patient is a poor historian, and unable to answer questions.   In the ED trauma work up showed acute comminuted impacted left femoral intertrochanteric fracture. Patient was transferred to [place] and is scheduled for ORIF with orthopedic surgery

## 2023-12-07 NOTE — PROGRESS NOTE ADULT - ASSESSMENT
Patient is a 78y Female PMH bipolar disorder, intellectual disability, HTN, DM and arthritis, on ASA 81mg who presents to the ED BIBA from [facility] s/p fall found to have an acute comminuted impacted left femoral intertrochanteric fracture.    #Acute comminuted impacted left femoral intertrochanteric fracture  #Unwitnessed fall   - Trauma work up negative except hip fracture  - Was found by [facility] staff kneeling at the edge of her bed   - Evaluated by Ortho, transferred to [place], had orif yesteerday 12/6, surgical site no active bleeding, drainage, or pus noted, clean/dry dressing, mild bruising at site  - NPO since mn, resume diet per ortho clearance  - am cxr, ecg, and trops ordered, f/u bmp and cbc as well, monitor for post op bleeding   - trops negative x2,  ecg and cxr no acute event pathology     #Acute blood loss anemia s/p orthopedic surgery  - patient asymptomatic   -Hgb this am was 6.8   - 2 units prbc's ordered, consent obtained via son over phone  -  active t&s    #HTN  #history of aortic stenosis  - c/w home meds  - f/u am trops and ecg and cxr, negative     #bipolar disorder  #intellectual disability   - c/w home meds     #ASHLEY  - likely pre-renal in setting of fall   - f/u bmp cr increase from 2.0 to 2.6 this am, upon chart review, NS @50cc only administered for 1 hour yesterday, restarted  NS @75 cc for 8 hours   - monitor for sob or signs of overload     #DM  - on lantus 15 qhs  - ISS  - monitor fs in setting of surgery and npo, may need dose adjusted if signs of hypoglycemia      #Misc  - DVT Prophylaxis: Lovenox   - GI Prophylaxis: None  - Diet: Diabetic diet   - Activity: As tolerated  - Code Status: Full code    Dispo: Acute    Patient is a 78y Female PMH bipolar disorder, intellectual disability, HTN, DM and arthritis, on ASA 81mg who presents to the ED BIBA from [facility] s/p fall found to have an acute comminuted impacted left femoral intertrochanteric fracture.    #Acute comminuted impacted left femoral intertrochanteric fracture  #Unwitnessed fall   - Trauma work up negative except hip fracture  - Was found by [facility] staff kneeling at the edge of her bed   - Evaluated by Ortho, transferred to [place], had orif yesteerday 12/6, surgical site no active bleeding, drainage, or pus noted, clean/dry dressing, mild bruising at site  - NPO since mn, resume diet per ortho clearance  - am cxr, ecg, and trops ordered, f/u bmp and cbc as well, monitor for post op bleeding   - trops negative x2,  ecg and cxr no acute event pathology     #Acute blood loss anemia s/p orthopedic surgery  - patient asymptomatic   -Hgb this am was 6.8   - 2 units prbc's ordered, consent obtained via son over phone  -  active t&s    #HTN  #history of aortic stenosis  - c/w home meds  - f/u am trops and ecg and cxr, negative     #bipolar disorder  #intellectual disability   - c/w home meds     #ASHLEY  - likely pre-renal in setting of fall   - f/u bmp cr increase from 2.0 to 2.6 this am, upon chart review, NS @50cc only administered for 1 hour yesterday, restarted  NS @75 cc for 8 hours   - monitor for sob or signs of overload     #DM  - on lantus 15 qhs  - ISS  - monitor fs in setting of surgery and npo, may need dose adjusted if signs of hypoglycemia      #Misc  - DVT Prophylaxis: Lovenox   - GI Prophylaxis: None  - Diet: Diabetic diet   - Activity: As tolerated  - Code Status: Full code    Dispo: Acute

## 2023-12-07 NOTE — PROGRESS NOTE ADULT - ASSESSMENT
Patient is a 78y Female PMH bipolar disorder, intellectual disability, HTN, DM and arthritis, on ASA 81mg who presents to the ED BIBA from [facility] s/p fall found to have an acute comminuted impacted left femoral intertrochanteric fracture.    Acute comminuted impacted left femoral intertrochanteric fracture  Unwitnessed fall   - Trauma work +Left hip fracture  - ORIF yesterday 12/6, surgical site no active bleeding, drainage, or pus noted, clean/dry dressing, mild bruising at site  - pain rx  - DVT prophylaxis  - Bedside PT  - ortho f/u noted       Acute blood loss anemia s/p orthopedic surgery  - Hgb this am was 6.8   - 2 units PRBC today  - f/u labs in am    HTN  Hx of aortic stenosis  - continue home meds    Bipolar Disorder  Intellectual disability   - continue home meds     ASHLEY  - post IVF    DM  - on Lantus 15 qhs  - Insulin sliding scale      DVT Prophylaxis: Lovenox   GI Prophylaxis: None  Diet: NCS / DASH  Activity: As tolerated  Code Status: Full code    Dispo: D/C planning back to SNF  - follow up with Dr. Ortega at [address] [facility] phone [phone].  - Patient should be discharged on 6 weeks (from surgery date) of Aspirin 81mg BID for DVT prophylaxis as their mobility/function/ambulation will be decreased due to lower extremity orthopaedic surgery.   Patient is a 78y Female PMH bipolar disorder, intellectual disability, HTN, DM and arthritis, on ASA 81mg who presents to the ED BIBA from [facility] s/p fall found to have an acute comminuted impacted left femoral intertrochanteric fracture.    Acute comminuted impacted left femoral intertrochanteric fracture  Unwitnessed fall   - Trauma work +Left hip fracture  - ORIF yesterday 12/6, surgical site no active bleeding, drainage, or pus noted, clean/dry dressing, mild bruising at site  - pain rx  - DVT prophylaxis  - Bedside PT  - ortho f/u noted       Acute blood loss anemia s/p orthopedic surgery  - Hgb this am was 6.8   - 2 units PRBC today  - f/u labs in am    HTN  Hx of aortic stenosis  - continue home meds    Bipolar Disorder  Intellectual disability   - continue home meds     ASHLEY  - post IVF    DM  - on Lantus 15 qhs  - Insulin sliding scale      DVT Prophylaxis: Lovenox   GI Prophylaxis: None  Diet: NCS / DASH  Activity: As tolerated  Code Status: Full code    Dispo: D/C planning back to SNF  - follow up with Dr. Ortega at [address] [facility] phone [phone].  - Patient should be discharged on 6 weeks (from surgery date) of Aspirin 81mg BID for DVT prophylaxis as their mobility/function/ambulation will be decreased due to lower extremity orthopaedic surgery.

## 2023-12-08 ENCOUNTER — TRANSCRIPTION ENCOUNTER (OUTPATIENT)
Age: 78
End: 2023-12-08

## 2023-12-08 LAB
ANION GAP SERPL CALC-SCNC: 16 MMOL/L — HIGH (ref 7–14)
ANION GAP SERPL CALC-SCNC: 16 MMOL/L — HIGH (ref 7–14)
BUN SERPL-MCNC: 51 MG/DL — HIGH (ref 10–20)
BUN SERPL-MCNC: 51 MG/DL — HIGH (ref 10–20)
CALCIUM SERPL-MCNC: 9.1 MG/DL — SIGNIFICANT CHANGE UP (ref 8.4–10.5)
CALCIUM SERPL-MCNC: 9.1 MG/DL — SIGNIFICANT CHANGE UP (ref 8.4–10.5)
CHLORIDE SERPL-SCNC: 104 MMOL/L — SIGNIFICANT CHANGE UP (ref 98–110)
CHLORIDE SERPL-SCNC: 104 MMOL/L — SIGNIFICANT CHANGE UP (ref 98–110)
CO2 SERPL-SCNC: 21 MMOL/L — SIGNIFICANT CHANGE UP (ref 17–32)
CO2 SERPL-SCNC: 21 MMOL/L — SIGNIFICANT CHANGE UP (ref 17–32)
CREAT SERPL-MCNC: 1.9 MG/DL — HIGH (ref 0.7–1.5)
CREAT SERPL-MCNC: 1.9 MG/DL — HIGH (ref 0.7–1.5)
EGFR: 27 ML/MIN/1.73M2 — LOW
EGFR: 27 ML/MIN/1.73M2 — LOW
GLUCOSE BLDC GLUCOMTR-MCNC: 153 MG/DL — HIGH (ref 70–99)
GLUCOSE BLDC GLUCOMTR-MCNC: 153 MG/DL — HIGH (ref 70–99)
GLUCOSE BLDC GLUCOMTR-MCNC: 164 MG/DL — HIGH (ref 70–99)
GLUCOSE BLDC GLUCOMTR-MCNC: 164 MG/DL — HIGH (ref 70–99)
GLUCOSE BLDC GLUCOMTR-MCNC: 199 MG/DL — HIGH (ref 70–99)
GLUCOSE BLDC GLUCOMTR-MCNC: 199 MG/DL — HIGH (ref 70–99)
GLUCOSE BLDC GLUCOMTR-MCNC: 217 MG/DL — HIGH (ref 70–99)
GLUCOSE BLDC GLUCOMTR-MCNC: 217 MG/DL — HIGH (ref 70–99)
GLUCOSE SERPL-MCNC: 221 MG/DL — HIGH (ref 70–99)
GLUCOSE SERPL-MCNC: 221 MG/DL — HIGH (ref 70–99)
HCT VFR BLD CALC: 31.6 % — LOW (ref 37–47)
HCT VFR BLD CALC: 31.6 % — LOW (ref 37–47)
HGB BLD-MCNC: 10.2 G/DL — LOW (ref 12–16)
HGB BLD-MCNC: 10.2 G/DL — LOW (ref 12–16)
MAGNESIUM SERPL-MCNC: 2.4 MG/DL — SIGNIFICANT CHANGE UP (ref 1.8–2.4)
MAGNESIUM SERPL-MCNC: 2.4 MG/DL — SIGNIFICANT CHANGE UP (ref 1.8–2.4)
MCHC RBC-ENTMCNC: 27.6 PG — SIGNIFICANT CHANGE UP (ref 27–31)
MCHC RBC-ENTMCNC: 27.6 PG — SIGNIFICANT CHANGE UP (ref 27–31)
MCHC RBC-ENTMCNC: 32.3 G/DL — SIGNIFICANT CHANGE UP (ref 32–37)
MCHC RBC-ENTMCNC: 32.3 G/DL — SIGNIFICANT CHANGE UP (ref 32–37)
MCV RBC AUTO: 85.6 FL — SIGNIFICANT CHANGE UP (ref 81–99)
MCV RBC AUTO: 85.6 FL — SIGNIFICANT CHANGE UP (ref 81–99)
NRBC # BLD: 0 /100 WBCS — SIGNIFICANT CHANGE UP (ref 0–0)
NRBC # BLD: 0 /100 WBCS — SIGNIFICANT CHANGE UP (ref 0–0)
PLATELET # BLD AUTO: 166 K/UL — SIGNIFICANT CHANGE UP (ref 130–400)
PLATELET # BLD AUTO: 166 K/UL — SIGNIFICANT CHANGE UP (ref 130–400)
PMV BLD: 11.7 FL — HIGH (ref 7.4–10.4)
PMV BLD: 11.7 FL — HIGH (ref 7.4–10.4)
POTASSIUM SERPL-MCNC: 4.2 MMOL/L — SIGNIFICANT CHANGE UP (ref 3.5–5)
POTASSIUM SERPL-MCNC: 4.2 MMOL/L — SIGNIFICANT CHANGE UP (ref 3.5–5)
POTASSIUM SERPL-SCNC: 4.2 MMOL/L — SIGNIFICANT CHANGE UP (ref 3.5–5)
POTASSIUM SERPL-SCNC: 4.2 MMOL/L — SIGNIFICANT CHANGE UP (ref 3.5–5)
RBC # BLD: 3.69 M/UL — LOW (ref 4.2–5.4)
RBC # BLD: 3.69 M/UL — LOW (ref 4.2–5.4)
RBC # FLD: 14.4 % — SIGNIFICANT CHANGE UP (ref 11.5–14.5)
RBC # FLD: 14.4 % — SIGNIFICANT CHANGE UP (ref 11.5–14.5)
SODIUM SERPL-SCNC: 141 MMOL/L — SIGNIFICANT CHANGE UP (ref 135–146)
SODIUM SERPL-SCNC: 141 MMOL/L — SIGNIFICANT CHANGE UP (ref 135–146)
WBC # BLD: 8.32 K/UL — SIGNIFICANT CHANGE UP (ref 4.8–10.8)
WBC # BLD: 8.32 K/UL — SIGNIFICANT CHANGE UP (ref 4.8–10.8)
WBC # FLD AUTO: 8.32 K/UL — SIGNIFICANT CHANGE UP (ref 4.8–10.8)
WBC # FLD AUTO: 8.32 K/UL — SIGNIFICANT CHANGE UP (ref 4.8–10.8)

## 2023-12-08 RX ORDER — POLYETHYLENE GLYCOL 3350 17 G/17G
17 POWDER, FOR SOLUTION ORAL
Refills: 0 | Status: DISCONTINUED | OUTPATIENT
Start: 2023-12-08 | End: 2023-12-09

## 2023-12-08 RX ADMIN — Medication 325 MILLIGRAM(S): at 11:18

## 2023-12-08 RX ADMIN — ENOXAPARIN SODIUM 40 MILLIGRAM(S): 100 INJECTION SUBCUTANEOUS at 21:19

## 2023-12-08 RX ADMIN — Medication 650 MILLIGRAM(S): at 21:17

## 2023-12-08 RX ADMIN — ATORVASTATIN CALCIUM 80 MILLIGRAM(S): 80 TABLET, FILM COATED ORAL at 21:17

## 2023-12-08 RX ADMIN — LISINOPRIL 20 MILLIGRAM(S): 2.5 TABLET ORAL at 05:37

## 2023-12-08 RX ADMIN — Medication 2: at 08:13

## 2023-12-08 RX ADMIN — Medication 650 MILLIGRAM(S): at 12:03

## 2023-12-08 RX ADMIN — Medication 650 MILLIGRAM(S): at 11:26

## 2023-12-08 RX ADMIN — Medication 2: at 16:29

## 2023-12-08 RX ADMIN — SENNA PLUS 2 TABLET(S): 8.6 TABLET ORAL at 21:17

## 2023-12-08 RX ADMIN — AMLODIPINE BESYLATE 10 MILLIGRAM(S): 2.5 TABLET ORAL at 05:37

## 2023-12-08 RX ADMIN — Medication 2: at 11:30

## 2023-12-08 RX ADMIN — OLANZAPINE 10 MILLIGRAM(S): 15 TABLET, FILM COATED ORAL at 21:17

## 2023-12-08 RX ADMIN — Medication 650 MILLIGRAM(S): at 05:38

## 2023-12-08 RX ADMIN — GABAPENTIN 100 MILLIGRAM(S): 400 CAPSULE ORAL at 17:08

## 2023-12-08 RX ADMIN — DIVALPROEX SODIUM 250 MILLIGRAM(S): 500 TABLET, DELAYED RELEASE ORAL at 21:17

## 2023-12-08 RX ADMIN — DIVALPROEX SODIUM 250 MILLIGRAM(S): 500 TABLET, DELAYED RELEASE ORAL at 15:17

## 2023-12-08 RX ADMIN — POLYETHYLENE GLYCOL 3350 17 GRAM(S): 17 POWDER, FOR SOLUTION ORAL at 11:18

## 2023-12-08 RX ADMIN — INSULIN GLARGINE 15 UNIT(S): 100 INJECTION, SOLUTION SUBCUTANEOUS at 21:18

## 2023-12-08 RX ADMIN — Medication 3 MILLIGRAM(S): at 21:17

## 2023-12-08 RX ADMIN — Medication 20 MILLIGRAM(S): at 11:18

## 2023-12-08 RX ADMIN — POLYETHYLENE GLYCOL 3350 17 GRAM(S): 17 POWDER, FOR SOLUTION ORAL at 17:09

## 2023-12-08 RX ADMIN — GABAPENTIN 100 MILLIGRAM(S): 400 CAPSULE ORAL at 05:37

## 2023-12-08 RX ADMIN — DIVALPROEX SODIUM 250 MILLIGRAM(S): 500 TABLET, DELAYED RELEASE ORAL at 05:38

## 2023-12-08 RX ADMIN — Medication 650 MILLIGRAM(S): at 15:15

## 2023-12-08 NOTE — DISCHARGE NOTE PROVIDER - PROVIDER TOKENS
PROVIDER:[TOKEN:[[id number]],FOLLOWUP:[1 week]] PROVIDER:[TOKEN:[[id number]],FOLLOWUP:[1 week]] PROVIDER:[TOKEN:[[id number]],FOLLOWUP:[1 week]],PROVIDER:[TOKEN:[[id number]],FOLLOWUP:[2 weeks],ESTABLISHEDPATIENT:[T]] PROVIDER:[TOKEN:[[id number]],FOLLOWUP:[1 week]],PROVIDER:[TOKEN:[[id number]],FOLLOWUP:[2 weeks],ESTABLISHEDPATIENT:[T]]

## 2023-12-08 NOTE — DISCHARGE NOTE PROVIDER - CARE PROVIDER_API CALL
Jai Ortega  Orthopaedic Surgery  [address] [facility] [place] [address]  Phone: [phone]  Fax: [phone]  Follow Up Time: 1 week   Jai Ortega  Orthopaedic Surgery  [address] [facility] [place] [address]  Phone: [phone]  Fax: [phone]  Follow Up Time: 1 week   Jai Ortega  Orthopaedic Surgery  [address] [place] [address]  Phone: [phone]  Fax: [phone]  Follow Up Time: 1 week    Mimi Sahu  Internal Medicine  [address] [place] [address]  Phone: [phone]  Fax: [phone]  Established Patient  Follow Up Time: 2 weeks   Jai Ortega  Orthopaedic Surgery  [address] [place] [address]  Phone: [phone]  Fax: [phone]  Follow Up Time: 1 week    Mimi Sahu  Internal Medicine  [address] [place] [address]  Phone: [phone]  Fax: [phone]  Established Patient  Follow Up Time: 2 weeks

## 2023-12-08 NOTE — DISCHARGE NOTE PROVIDER - CATHETER HOME CARE ORDERS:
May change or replace catheter if leaking or dislodged/May irrigate with 30-60 cc NS prn for clogging or sediment

## 2023-12-08 NOTE — DISCHARGE NOTE PROVIDER - NSDCCPCAREPLAN_GEN_ALL_CORE_FT
PRINCIPAL DISCHARGE DIAGNOSIS  Diagnosis: Intertrochanteric fracture of left hip  Assessment and Plan of Treatment: You presented with a left hip fracture which was surgically repaired by orthopedic surgery. You had acute blood loss anemia after surgery and were given 2 units of blood. Your had an acute kidney injury which was treated with IV  fluids. Miralax was added for constipation. You were treated with IV dilaudid that  was converted to oxycodone for pain. Please follow up with Dr. Ortega orthopedic surgery as outpatient. Please follow up with primary care. Please continue medications  as indicated, please return to the hospital if your symptoms worsen.

## 2023-12-08 NOTE — PROGRESS NOTE ADULT - SUBJECTIVE AND OBJECTIVE BOX
KINGSTON PETTY  78y  Female  HPI:  Patient is a 78y Female PMH bipolar disorder, intellectual disability, HTN, DM and arthritis, on ASA 81mg who presents to the ED BIBA from [facility] s/p fall. As per papers, patient was found kneeling on the floor in front of her bed holding onto her bed. Patient is a poor historian, and unable to answer questions.     In the ED trauma work up showed acute comminuted impacted left femoral intertrochanteric fracture. Patient was transferred to [place] and is scheduled for ORIF with orthopedic surgery tomorrow.       Vital Signs Last 24 Hrs  T(C): 36.4 (05 Dec 2023 08:53), Max: 37.2 (05 Dec 2023 03:40)  T(F): 97.6 (05 Dec 2023 08:53), Max: 98.9 (05 Dec 2023 03:40)  HR: 64 (05 Dec 2023 12:00) (62 - 67)  BP: 146/63 (05 Dec 2023 12:00) (145/76 - 186/86)  BP(mean): --  RR: 18 (05 Dec 2023 10:41) (18 - 18)  SpO2: 96% (05 Dec 2023 10:41) (95% - 96%)    Parameters below as of 05 Dec 2023 10:41  Patient On (Oxygen Delivery Method): room air     (05 Dec 2023 16:24)    MEDICATIONS  (STANDING):  amLODIPine   Tablet 10 milliGRAM(s) Oral daily  atorvastatin 80 milliGRAM(s) Oral at bedtime  dextrose 5%. 1000 milliLiter(s) (50 mL/Hr) IV Continuous <Continuous>  dextrose 5%. 1000 milliLiter(s) (100 mL/Hr) IV Continuous <Continuous>  dextrose 50% Injectable 25 Gram(s) IV Push once  dextrose 50% Injectable 25 Gram(s) IV Push once  dextrose 50% Injectable 12.5 Gram(s) IV Push once  divalproex  milliGRAM(s) Oral three times a day  enoxaparin Injectable 40 milliGRAM(s) SubCutaneous every 24 hours  ferrous    sulfate 325 milliGRAM(s) Oral daily  FLUoxetine 20 milliGRAM(s) Oral daily  gabapentin 100 milliGRAM(s) Oral two times a day  glucagon  Injectable 1 milliGRAM(s) IntraMuscular once  insulin glargine Injectable (LANTUS) 15 Unit(s) SubCutaneous at bedtime  insulin lispro (ADMELOG) corrective regimen sliding scale   SubCutaneous three times a day before meals  insulin lispro (ADMELOG) corrective regimen sliding scale   SubCutaneous at bedtime  lisinopril 20 milliGRAM(s) Oral daily  LORazepam   Injectable 1 milliGRAM(s) IV Push once  OLANZapine Disintegrating Tablet 10 milliGRAM(s) Oral at bedtime  polyethylene glycol 3350 17 Gram(s) Oral two times a day  senna 2 Tablet(s) Oral at bedtime  sodium bicarbonate 650 milliGRAM(s) Oral three times a day    MEDICATIONS  (PRN):  acetaminophen     Tablet .. 650 milliGRAM(s) Oral every 6 hours PRN Temp greater or equal to 38C (100.4F), Mild Pain (1 - 3)  ALPRAZolam 0.25 milliGRAM(s) Oral two times a day PRN Aggitation  aluminum hydroxide/magnesium hydroxide/simethicone Suspension 30 milliLiter(s) Oral every 4 hours PRN Dyspepsia  dextrose Oral Gel 15 Gram(s) Oral once PRN Blood Glucose LESS THAN 70 milliGRAM(s)/deciliter  HYDROmorphone  Injectable 0.2 milliGRAM(s) IV Push every 8 hours PRN Severe Pain (7 - 10)  melatonin 3 milliGRAM(s) Oral at bedtime PRN Insomnia  ondansetron Injectable 4 milliGRAM(s) IV Push every 8 hours PRN Nausea and/or Vomiting    INTERVAL EVENTS: Patient seen today OOB in chair, pain controlled, patient without complaints    T(C): 36.4 (12-08-23 @ 07:48), Max: 37 (12-07-23 @ 18:37)  HR: 65 (12-08-23 @ 07:48) (65 - 78)  BP: 139/63 (12-08-23 @ 07:48) (96/50 - 155/65)  RR: 18 (12-08-23 @ 00:45) (18 - 18)  SpO2: 99% (12-07-23 @ 16:33) (99% - 99%)  Wt(kg): --Vital Signs Last 24 Hrs  T(C): 36.4 (08 Dec 2023 07:48), Max: 37 (07 Dec 2023 18:37)  T(F): 97.6 (08 Dec 2023 07:48), Max: 98.6 (07 Dec 2023 18:37)  HR: 65 (08 Dec 2023 07:48) (65 - 78)  BP: 139/63 (08 Dec 2023 07:48) (96/50 - 155/65)  BP(mean): --  RR: 18 (08 Dec 2023 00:45) (18 - 18)  SpO2: 99% (07 Dec 2023 16:33) (99% - 99%)    Parameters below as of 07 Dec 2023 16:33  Patient On (Oxygen Delivery Method): room air        PHYSICAL EXAM:  GENERAL:   NECK: Supple, No JVD, Normal thyroid  CHEST/LUNG: Clear; No crackles or wheezing  HEART: S1, S2, Regular rate and rhythm;   ABDOMEN: Soft, Nontender, Nondistended; Bowel sounds present  EXTREMITIES: No clubbing, cyanosis, or edema  SKIN: No rashes or lesions    LABS:                          9.8    8.93  )-----------( 154      ( 07 Dec 2023 22:26 )             30.2             12-07    135  |  100  |  60<H>  ----------------------------<  154<H>  4.7   |  21  |  2.6<H>    Ca    8.8      07 Dec 2023 07:27  Mg     2.3     12-07    TPro  5.7<L>  /  Alb  3.4<L>  /  TBili  <0.2  /  DBili  x   /  AST  25  /  ALT  12  /  AlkPhos  39  12-07    LIVER FUNCTIONS - ( 07 Dec 2023 07:27 )  Alb: 3.4 g/dL / Pro: 5.7 g/dL / ALK PHOS: 39 U/L / ALT: 12 U/L / AST: 25 U/L / GGT: x                   CARDIAC MARKERS ( 07 Dec 2023 07:27 )  x     / <0.01 ng/mL / 106 U/L / x     / 4.7 ng/mL        Urinalysis Basic - ( 07 Dec 2023 07:27 )    Color: x / Appearance: x / SG: x / pH: x  Gluc: 154 mg/dL / Ketone: x  / Bili: x / Urobili: x   Blood: x / Protein: x / Nitrite: x   Leuk Esterase: x / RBC: x / WBC x   Sq Epi: x / Non Sq Epi: x / Bacteria: x              RADIOLOGY & ADDITIONAL TESTS:     KINGSTON PETTY  78y  Female  HPI:  Patient is a 78y Female PMH bipolar disorder, intellectual disability, HTN, DM and arthritis, on ASA 81mg who presents to the ED BIBA from [facility] s/p fall. As per papers, patient was found kneeling on the floor in front of her bed holding onto her bed. Patient is a poor historian, and unable to answer questions.     In the ED trauma work up showed acute comminuted impacted left femoral intertrochanteric fracture. Patient was transferred to [place] and is scheduled for ORIF with orthopedic surgery tomorrow.       Vital Signs Last 24 Hrs  T(C): 36.4 (05 Dec 2023 08:53), Max: 37.2 (05 Dec 2023 03:40)  T(F): 97.6 (05 Dec 2023 08:53), Max: 98.9 (05 Dec 2023 03:40)  HR: 64 (05 Dec 2023 12:00) (62 - 67)  BP: 146/63 (05 Dec 2023 12:00) (145/76 - 186/86)  BP(mean): --  RR: 18 (05 Dec 2023 10:41) (18 - 18)  SpO2: 96% (05 Dec 2023 10:41) (95% - 96%)    Parameters below as of 05 Dec 2023 10:41  Patient On (Oxygen Delivery Method): room air     (05 Dec 2023 16:24)    MEDICATIONS  (STANDING):  amLODIPine   Tablet 10 milliGRAM(s) Oral daily  atorvastatin 80 milliGRAM(s) Oral at bedtime  dextrose 5%. 1000 milliLiter(s) (50 mL/Hr) IV Continuous <Continuous>  dextrose 5%. 1000 milliLiter(s) (100 mL/Hr) IV Continuous <Continuous>  dextrose 50% Injectable 25 Gram(s) IV Push once  dextrose 50% Injectable 25 Gram(s) IV Push once  dextrose 50% Injectable 12.5 Gram(s) IV Push once  divalproex  milliGRAM(s) Oral three times a day  enoxaparin Injectable 40 milliGRAM(s) SubCutaneous every 24 hours  ferrous    sulfate 325 milliGRAM(s) Oral daily  FLUoxetine 20 milliGRAM(s) Oral daily  gabapentin 100 milliGRAM(s) Oral two times a day  glucagon  Injectable 1 milliGRAM(s) IntraMuscular once  insulin glargine Injectable (LANTUS) 15 Unit(s) SubCutaneous at bedtime  insulin lispro (ADMELOG) corrective regimen sliding scale   SubCutaneous three times a day before meals  insulin lispro (ADMELOG) corrective regimen sliding scale   SubCutaneous at bedtime  lisinopril 20 milliGRAM(s) Oral daily  LORazepam   Injectable 1 milliGRAM(s) IV Push once  OLANZapine Disintegrating Tablet 10 milliGRAM(s) Oral at bedtime  polyethylene glycol 3350 17 Gram(s) Oral two times a day  senna 2 Tablet(s) Oral at bedtime  sodium bicarbonate 650 milliGRAM(s) Oral three times a day    MEDICATIONS  (PRN):  acetaminophen     Tablet .. 650 milliGRAM(s) Oral every 6 hours PRN Temp greater or equal to 38C (100.4F), Mild Pain (1 - 3)  ALPRAZolam 0.25 milliGRAM(s) Oral two times a day PRN Aggitation  aluminum hydroxide/magnesium hydroxide/simethicone Suspension 30 milliLiter(s) Oral every 4 hours PRN Dyspepsia  dextrose Oral Gel 15 Gram(s) Oral once PRN Blood Glucose LESS THAN 70 milliGRAM(s)/deciliter  HYDROmorphone  Injectable 0.2 milliGRAM(s) IV Push every 8 hours PRN Severe Pain (7 - 10)  melatonin 3 milliGRAM(s) Oral at bedtime PRN Insomnia  ondansetron Injectable 4 milliGRAM(s) IV Push every 8 hours PRN Nausea and/or Vomiting    INTERVAL EVENTS: Patient seen today OOB in chair, pain controlled, patient without complaints    T(C): 36.4 (12-08-23 @ 07:48), Max: 37 (12-07-23 @ 18:37)  HR: 65 (12-08-23 @ 07:48) (65 - 78)  BP: 139/63 (12-08-23 @ 07:48) (96/50 - 155/65)  RR: 18 (12-08-23 @ 00:45) (18 - 18)  SpO2: 99% (12-07-23 @ 16:33) (99% - 99%)  Wt(kg): --Vital Signs Last 24 Hrs  T(C): 36.4 (08 Dec 2023 07:48), Max: 37 (07 Dec 2023 18:37)  T(F): 97.6 (08 Dec 2023 07:48), Max: 98.6 (07 Dec 2023 18:37)  HR: 65 (08 Dec 2023 07:48) (65 - 78)  BP: 139/63 (08 Dec 2023 07:48) (96/50 - 155/65)  BP(mean): --  RR: 18 (08 Dec 2023 00:45) (18 - 18)  SpO2: 99% (07 Dec 2023 16:33) (99% - 99%)    Parameters below as of 07 Dec 2023 16:33  Patient On (Oxygen Delivery Method): room air        PHYSICAL EXAM:  GENERAL:   NECK: Supple, No JVD, Normal thyroid  CHEST/LUNG: Clear; No crackles or wheezing  HEART: S1, S2, Regular rate and rhythm;   ABDOMEN: Soft, Nontender, Nondistended; Bowel sounds present  EXTREMITIES: No clubbing, cyanosis, or edema  SKIN: No rashes or lesions    LABS:                          9.8    8.93  )-----------( 154      ( 07 Dec 2023 22:26 )             30.2             12-07    135  |  100  |  60<H>  ----------------------------<  154<H>  4.7   |  21  |  2.6<H>    Ca    8.8      07 Dec 2023 07:27  Mg     2.3     12-07    TPro  5.7<L>  /  Alb  3.4<L>  /  TBili  <0.2  /  DBili  x   /  AST  25  /  ALT  12  /  AlkPhos  39  12-07    LIVER FUNCTIONS - ( 07 Dec 2023 07:27 )  Alb: 3.4 g/dL / Pro: 5.7 g/dL / ALK PHOS: 39 U/L / ALT: 12 U/L / AST: 25 U/L / GGT: x                   CARDIAC MARKERS ( 07 Dec 2023 07:27 )  x     / <0.01 ng/mL / 106 U/L / x     / 4.7 ng/mL        Urinalysis Basic - ( 07 Dec 2023 07:27 )    Color: x / Appearance: x / SG: x / pH: x  Gluc: 154 mg/dL / Ketone: x  / Bili: x / Urobili: x   Blood: x / Protein: x / Nitrite: x   Leuk Esterase: x / RBC: x / WBC x   Sq Epi: x / Non Sq Epi: x / Bacteria: x              RADIOLOGY & ADDITIONAL TESTS:     KINGSTON PETTY  78y  Female  HPI:  Patient is a 78y Female PMH bipolar disorder, intellectual disability, HTN, DM and arthritis, on ASA 81mg who presents to the ED BIBA from [facility] s/p fall. As per papers, patient was found kneeling on the floor in front of her bed holding onto her bed. Patient is a poor historian, and unable to answer questions.     In the ED trauma work up showed acute comminuted impacted left femoral intertrochanteric fracture. Patient was transferred to [place] and is scheduled for ORIF with orthopedic surgery tomorrow.       Vital Signs Last 24 Hrs  T(C): 36.4 (05 Dec 2023 08:53), Max: 37.2 (05 Dec 2023 03:40)  T(F): 97.6 (05 Dec 2023 08:53), Max: 98.9 (05 Dec 2023 03:40)  HR: 64 (05 Dec 2023 12:00) (62 - 67)  BP: 146/63 (05 Dec 2023 12:00) (145/76 - 186/86)  BP(mean): --  RR: 18 (05 Dec 2023 10:41) (18 - 18)  SpO2: 96% (05 Dec 2023 10:41) (95% - 96%)    Parameters below as of 05 Dec 2023 10:41  Patient On (Oxygen Delivery Method): room air     (05 Dec 2023 16:24)    MEDICATIONS  (STANDING):  amLODIPine   Tablet 10 milliGRAM(s) Oral daily  atorvastatin 80 milliGRAM(s) Oral at bedtime  dextrose 5%. 1000 milliLiter(s) (50 mL/Hr) IV Continuous <Continuous>  dextrose 5%. 1000 milliLiter(s) (100 mL/Hr) IV Continuous <Continuous>  dextrose 50% Injectable 25 Gram(s) IV Push once  dextrose 50% Injectable 25 Gram(s) IV Push once  dextrose 50% Injectable 12.5 Gram(s) IV Push once  divalproex  milliGRAM(s) Oral three times a day  enoxaparin Injectable 40 milliGRAM(s) SubCutaneous every 24 hours  ferrous    sulfate 325 milliGRAM(s) Oral daily  FLUoxetine 20 milliGRAM(s) Oral daily  gabapentin 100 milliGRAM(s) Oral two times a day  glucagon  Injectable 1 milliGRAM(s) IntraMuscular once  insulin glargine Injectable (LANTUS) 15 Unit(s) SubCutaneous at bedtime  insulin lispro (ADMELOG) corrective regimen sliding scale   SubCutaneous three times a day before meals  insulin lispro (ADMELOG) corrective regimen sliding scale   SubCutaneous at bedtime  lisinopril 20 milliGRAM(s) Oral daily  LORazepam   Injectable 1 milliGRAM(s) IV Push once  OLANZapine Disintegrating Tablet 10 milliGRAM(s) Oral at bedtime  polyethylene glycol 3350 17 Gram(s) Oral two times a day  senna 2 Tablet(s) Oral at bedtime  sodium bicarbonate 650 milliGRAM(s) Oral three times a day    MEDICATIONS  (PRN):  acetaminophen     Tablet .. 650 milliGRAM(s) Oral every 6 hours PRN Temp greater or equal to 38C (100.4F), Mild Pain (1 - 3)  ALPRAZolam 0.25 milliGRAM(s) Oral two times a day PRN Aggitation  aluminum hydroxide/magnesium hydroxide/simethicone Suspension 30 milliLiter(s) Oral every 4 hours PRN Dyspepsia  dextrose Oral Gel 15 Gram(s) Oral once PRN Blood Glucose LESS THAN 70 milliGRAM(s)/deciliter  HYDROmorphone  Injectable 0.2 milliGRAM(s) IV Push every 8 hours PRN Severe Pain (7 - 10)  melatonin 3 milliGRAM(s) Oral at bedtime PRN Insomnia  ondansetron Injectable 4 milliGRAM(s) IV Push every 8 hours PRN Nausea and/or Vomiting    INTERVAL EVENTS: Patient seen today OOB in chair, pain controlled, patient without complaints    T(C): 36.4 (12-08-23 @ 07:48), Max: 37 (12-07-23 @ 18:37)  HR: 65 (12-08-23 @ 07:48) (65 - 78)  BP: 139/63 (12-08-23 @ 07:48) (96/50 - 155/65)  RR: 18 (12-08-23 @ 00:45) (18 - 18)  SpO2: 99% (12-07-23 @ 16:33) (99% - 99%)  Wt(kg): --Vital Signs Last 24 Hrs  T(C): 36.4 (08 Dec 2023 07:48), Max: 37 (07 Dec 2023 18:37)  T(F): 97.6 (08 Dec 2023 07:48), Max: 98.6 (07 Dec 2023 18:37)  HR: 65 (08 Dec 2023 07:48) (65 - 78)  BP: 139/63 (08 Dec 2023 07:48) (96/50 - 155/65)  BP(mean): --  RR: 18 (08 Dec 2023 00:45) (18 - 18)  SpO2: 99% (07 Dec 2023 16:33) (99% - 99%)    Parameters below as of 07 Dec 2023 16:33  Patient On (Oxygen Delivery Method): room air        PHYSICAL EXAM:  GENERAL:   NECK: Supple, No JVD, Normal thyroid  CHEST/LUNG: Clear; No crackles or wheezing  HEART: S1, S2, Regular rate and rhythm;   ABDOMEN: Soft, Nontender, Nondistended; Bowel sounds present  EXTREMITIES: No clubbing, cyanosis, or edema  SKIN: No rashes or lesions    LABS:                          10.2   8.32  )-----------( 166      ( 08 Dec 2023 09:19 )             31.6                                  9.8    8.93  )-----------( 154      ( 07 Dec 2023 22:26 )             30.2             12-07    135  |  100  |  60<H>  ----------------------------<  154<H>  4.7   |  21  |  2.6<H>        Ca    8.8      07 Dec 2023 07:27  Mg     2.3     12-07    TPro  5.7<L>  /  Alb  3.4<L>  /  TBili  <0.2  /  DBili  x   /  AST  25  /  ALT  12  /  AlkPhos  39  12-07    LIVER FUNCTIONS - ( 07 Dec 2023 07:27 )  Alb: 3.4 g/dL / Pro: 5.7 g/dL / ALK PHOS: 39 U/L / ALT: 12 U/L / AST: 25 U/L / GGT: x                   CARDIAC MARKERS ( 07 Dec 2023 07:27 )  x     / <0.01 ng/mL / 106 U/L / x     / 4.7 ng/mL        Urinalysis Basic - ( 07 Dec 2023 07:27 )    Color: x / Appearance: x / SG: x / pH: x  Gluc: 154 mg/dL / Ketone: x  / Bili: x / Urobili: x   Blood: x / Protein: x / Nitrite: x   Leuk Esterase: x / RBC: x / WBC x   Sq Epi: x / Non Sq Epi: x / Bacteria: x              RADIOLOGY & ADDITIONAL TESTS:     KINGSTON PETTY  78y  Female  HPI:  Patient is a 78y Female PMH bipolar disorder, intellectual disability, HTN, DM and arthritis, on ASA 81mg who presents to the ED BIBA from [facility] s/p fall. As per papers, patient was found kneeling on the floor in front of her bed holding onto her bed. Patient is a poor historian, and unable to answer questions.     In the ED trauma work up showed acute comminuted impacted left femoral intertrochanteric fracture. Patient was transferred to [facility] and is scheduled for ORIF with orthopedic surgery tomorrow.       Vital Signs Last 24 Hrs  T(C): 36.4 (05 Dec 2023 08:53), Max: 37.2 (05 Dec 2023 03:40)  T(F): 97.6 (05 Dec 2023 08:53), Max: 98.9 (05 Dec 2023 03:40)  HR: 64 (05 Dec 2023 12:00) (62 - 67)  BP: 146/63 (05 Dec 2023 12:00) (145/76 - 186/86)  BP(mean): --  RR: 18 (05 Dec 2023 10:41) (18 - 18)  SpO2: 96% (05 Dec 2023 10:41) (95% - 96%)    Parameters below as of 05 Dec 2023 10:41  Patient On (Oxygen Delivery Method): room air     (05 Dec 2023 16:24)    MEDICATIONS  (STANDING):  amLODIPine   Tablet 10 milliGRAM(s) Oral daily  atorvastatin 80 milliGRAM(s) Oral at bedtime  dextrose 5%. 1000 milliLiter(s) (50 mL/Hr) IV Continuous <Continuous>  dextrose 5%. 1000 milliLiter(s) (100 mL/Hr) IV Continuous <Continuous>  dextrose 50% Injectable 25 Gram(s) IV Push once  dextrose 50% Injectable 25 Gram(s) IV Push once  dextrose 50% Injectable 12.5 Gram(s) IV Push once  divalproex  milliGRAM(s) Oral three times a day  enoxaparin Injectable 40 milliGRAM(s) SubCutaneous every 24 hours  ferrous    sulfate 325 milliGRAM(s) Oral daily  FLUoxetine 20 milliGRAM(s) Oral daily  gabapentin 100 milliGRAM(s) Oral two times a day  glucagon  Injectable 1 milliGRAM(s) IntraMuscular once  insulin glargine Injectable (LANTUS) 15 Unit(s) SubCutaneous at bedtime  insulin lispro (ADMELOG) corrective regimen sliding scale   SubCutaneous three times a day before meals  insulin lispro (ADMELOG) corrective regimen sliding scale   SubCutaneous at bedtime  lisinopril 20 milliGRAM(s) Oral daily  LORazepam   Injectable 1 milliGRAM(s) IV Push once  OLANZapine Disintegrating Tablet 10 milliGRAM(s) Oral at bedtime  polyethylene glycol 3350 17 Gram(s) Oral two times a day  senna 2 Tablet(s) Oral at bedtime  sodium bicarbonate 650 milliGRAM(s) Oral three times a day    MEDICATIONS  (PRN):  acetaminophen     Tablet .. 650 milliGRAM(s) Oral every 6 hours PRN Temp greater or equal to 38C (100.4F), Mild Pain (1 - 3)  ALPRAZolam 0.25 milliGRAM(s) Oral two times a day PRN Aggitation  aluminum hydroxide/magnesium hydroxide/simethicone Suspension 30 milliLiter(s) Oral every 4 hours PRN Dyspepsia  dextrose Oral Gel 15 Gram(s) Oral once PRN Blood Glucose LESS THAN 70 milliGRAM(s)/deciliter  HYDROmorphone  Injectable 0.2 milliGRAM(s) IV Push every 8 hours PRN Severe Pain (7 - 10)  melatonin 3 milliGRAM(s) Oral at bedtime PRN Insomnia  ondansetron Injectable 4 milliGRAM(s) IV Push every 8 hours PRN Nausea and/or Vomiting    INTERVAL EVENTS: Patient seen today OOB in chair, pain controlled, patient without complaints    T(C): 36.4 (12-08-23 @ 07:48), Max: 37 (12-07-23 @ 18:37)  HR: 65 (12-08-23 @ 07:48) (65 - 78)  BP: 139/63 (12-08-23 @ 07:48) (96/50 - 155/65)  RR: 18 (12-08-23 @ 00:45) (18 - 18)  SpO2: 99% (12-07-23 @ 16:33) (99% - 99%)  Wt(kg): --Vital Signs Last 24 Hrs  T(C): 36.4 (08 Dec 2023 07:48), Max: 37 (07 Dec 2023 18:37)  T(F): 97.6 (08 Dec 2023 07:48), Max: 98.6 (07 Dec 2023 18:37)  HR: 65 (08 Dec 2023 07:48) (65 - 78)  BP: 139/63 (08 Dec 2023 07:48) (96/50 - 155/65)  BP(mean): --  RR: 18 (08 Dec 2023 00:45) (18 - 18)  SpO2: 99% (07 Dec 2023 16:33) (99% - 99%)    Parameters below as of 07 Dec 2023 16:33  Patient On (Oxygen Delivery Method): room air        PHYSICAL EXAM:  GENERAL:   NECK: Supple, No JVD, Normal thyroid  CHEST/LUNG: Clear; No crackles or wheezing  HEART: S1, S2, Regular rate and rhythm;   ABDOMEN: Soft, Nontender, Nondistended; Bowel sounds present  EXTREMITIES: No clubbing, cyanosis, or edema  SKIN: No rashes or lesions    LABS:                          10.2   8.32  )-----------( 166      ( 08 Dec 2023 09:19 )             31.6                                  9.8    8.93  )-----------( 154      ( 07 Dec 2023 22:26 )             30.2             12-07    135  |  100  |  60<H>  ----------------------------<  154<H>  4.7   |  21  |  2.6<H>        Ca    8.8      07 Dec 2023 07:27  Mg     2.3     12-07    TPro  5.7<L>  /  Alb  3.4<L>  /  TBili  <0.2  /  DBili  x   /  AST  25  /  ALT  12  /  AlkPhos  39  12-07    LIVER FUNCTIONS - ( 07 Dec 2023 07:27 )  Alb: 3.4 g/dL / Pro: 5.7 g/dL / ALK PHOS: 39 U/L / ALT: 12 U/L / AST: 25 U/L / GGT: x                   CARDIAC MARKERS ( 07 Dec 2023 07:27 )  x     / <0.01 ng/mL / 106 U/L / x     / 4.7 ng/mL        Urinalysis Basic - ( 07 Dec 2023 07:27 )    Color: x / Appearance: x / SG: x / pH: x  Gluc: 154 mg/dL / Ketone: x  / Bili: x / Urobili: x   Blood: x / Protein: x / Nitrite: x   Leuk Esterase: x / RBC: x / WBC x   Sq Epi: x / Non Sq Epi: x / Bacteria: x              RADIOLOGY & ADDITIONAL TESTS:

## 2023-12-08 NOTE — PROGRESS NOTE ADULT - ASSESSMENT
Patient is a 78y Female PMH bipolar disorder, intellectual disability, HTN, DM and arthritis, on ASA 81mg who presents to the ED BIBA from [facility] s/p fall found to have an acute comminuted impacted left femoral intertrochanteric fracture.    Acute comminuted impacted left femoral intertrochanteric fracture  Unwitnessed fall   - Trauma work +Left hip fracture  - ORIF on 12/6, surgical site no active bleeding, drainage, or pus noted, clean/dry dressing, mild bruising at site  - on Dilaudid for pain, transition to PO Oxycodone  - DVT prophylaxis  - Bedside PT  - ortho f/u noted       Acute blood loss anemia s/p orthopedic surgery  - post 2 units PRBC 12/7  - f/u labs noted    HTN  Hx of aortic stenosis  - continue home meds    Bipolar Disorder  Intellectual disability   - continue home meds     ASHLEY  - post IVF    DM  - on Lantus 15 qhs  - Insulin sliding scale      DVT Prophylaxis: Lovenox   GI Prophylaxis: None  Diet: NCS / DASH  Activity: As tolerated  Code Status: Full code    Dispo: D/C planning back to SNF today  - follow up with Dr. Ortega at [address] [facility] phone [phone].  - Patient should be discharged on 6 weeks (from surgery date) of Aspirin 81mg BID for DVT prophylaxis as their mobility/function/ambulation will be decreased due to lower extremity orthopaedic surgery.   Patient is a 78y Female PMH bipolar disorder, intellectual disability, HTN, DM and arthritis, on ASA 81mg who presents to the ED BIBA from [facility] s/p fall found to have an acute comminuted impacted left femoral intertrochanteric fracture.    Acute comminuted impacted left femoral intertrochanteric fracture  Unwitnessed fall   - Trauma work +Left hip fracture  - ORIF on 12/6, surgical site no active bleeding, drainage, or pus noted, clean/dry dressing, mild bruising at site  - on Dilaudid for pain, transition to PO Oxycodone  - DVT prophylaxis  - Bedside PT  - ortho f/u noted       Acute blood loss anemia s/p orthopedic surgery  - post 2 units PRBC 12/7  - f/u labs noted    HTN  Hx of aortic stenosis  - continue home meds    Bipolar Disorder  Intellectual disability   - continue home meds     ASHLEY  - post IVF    DM  - on Lantus 15 qhs  - Insulin sliding scale      DVT Prophylaxis: Lovenox   GI Prophylaxis: None  Diet: NCS / DASH  Activity: As tolerated  Code Status: Full code    Dispo: D/C planning back to SNF today  - follow up with Dr. Ortega at [address] [facility] phone [phone].  - Patient should be discharged on 6 weeks (from surgery date) of Aspirin 81mg BID for DVT prophylaxis as their mobility/function/ambulation will be decreased due to lower extremity orthopaedic surgery.   Patient is a 78y Female PMH bipolar disorder, intellectual disability, HTN, DM and arthritis, on ASA 81mg who presents to the ED BIBA from [facility] s/p fall found to have an acute comminuted impacted left femoral intertrochanteric fracture.    Acute comminuted impacted left femoral intertrochanteric fracture  Unwitnessed fall   - Trauma work +Left hip fracture  - ORIF on 12/6, surgical site no active bleeding, drainage, or pus noted, clean/dry dressing, mild bruising at site  - on Dilaudid for pain, transition to PO Oxycodone  - DVT prophylaxis  - Bedside PT  - ortho f/u noted       Acute blood loss anemia s/p orthopedic surgery  - post 2 units PRBC 12/7  - f/u labs noted    HTN  Hx of aortic stenosis  - continue home meds    Bipolar Disorder  Intellectual disability   - continue home meds     ASHLEY  - receiving  IVF, creat trending upward  - check bladder scan - r/o retention post op    DM  - on Lantus 15 qhs  - Insulin sliding scale      DVT Prophylaxis: Lovenox   GI Prophylaxis: None  Diet: NCS / DASH  Activity: As tolerated  Code Status: Full code    Dispo: D/C planning back to SNF today ? pending the above results  - follow up with Dr. Ortega at [address] [facility] phone [phone].  - Patient should be discharged on 6 weeks (from surgery date) of Aspirin 81mg BID for DVT prophylaxis as their mobility/function/ambulation will be decreased due to lower extremity orthopaedic surgery.   Patient is a 78y Female PMH bipolar disorder, intellectual disability, HTN, DM and arthritis, on ASA 81mg who presents to the ED BIBA from [facility] s/p fall found to have an acute comminuted impacted left femoral intertrochanteric fracture.    Acute comminuted impacted left femoral intertrochanteric fracture  Unwitnessed fall   - Trauma work +Left hip fracture  - ORIF on 12/6, surgical site no active bleeding, drainage, or pus noted, clean/dry dressing, mild bruising at site  - on Dilaudid for pain, transition to PO Oxycodone  - DVT prophylaxis  - Bedside PT  - ortho f/u noted       Acute blood loss anemia s/p orthopedic surgery  - post 2 units PRBC 12/7  - f/u labs noted    HTN  Hx of aortic stenosis  - continue home meds    Bipolar Disorder  Intellectual disability   - continue home meds     ASHLEY  - receiving  IVF, creat trending upward  - check bladder scan - r/o retention post op    DM  - on Lantus 15 qhs  - Insulin sliding scale      DVT Prophylaxis: Lovenox   GI Prophylaxis: None  Diet: NCS / DASH  Activity: As tolerated  Code Status: Full code    Dispo: D/C planning back to SNF today ? pending the above results  - follow up with Dr. Ortega at [address] [facility] phone [phone].  - Patient should be discharged on 6 weeks (from surgery date) of Aspirin 81mg BID for DVT prophylaxis as their mobility/function/ambulation will be decreased due to lower extremity orthopaedic surgery.

## 2023-12-08 NOTE — PROGRESS NOTE ADULT - SUBJECTIVE AND OBJECTIVE BOX
SUBJECTIVE: Patient seen and examined. Pain controlled.  Pt did well o/n  No f/c/n/v/cp/sob.     OBJECTIVE:  NAD  Vital Signs Last 24 Hrs  T(C): 36.4 (08 Dec 2023 00:45), Max: 37 (07 Dec 2023 18:37)  T(F): 97.5 (08 Dec 2023 00:45), Max: 98.6 (07 Dec 2023 18:37)  HR: 78 (08 Dec 2023 00:45) (63 - 78)  BP: 142/59 (08 Dec 2023 00:45) (96/50 - 155/65)  BP(mean): --  RR: 18 (08 Dec 2023 00:45) (18 - 18)  SpO2: 99% (07 Dec 2023 16:33) (99% - 100%)    Parameters below as of 07 Dec 2023 16:33  Patient On (Oxygen Delivery Method): room air        Affected extremity:   LLE  Dressing c/d/i  SILT SP/DP/T/Sural/Saph  Firing TA/EHL/FHL/GS  Foot WWP, 2+ DP pulse                        9.8    8.93  )-----------( 154      ( 07 Dec 2023 22:26 )             30.2     12-07    135  |  100  |  60<H>  ----------------------------<  154<H>  4.7   |  21  |  2.6<H>    Ca    8.8      07 Dec 2023 07:27  Mg     2.3     12-07    TPro  5.7<L>  /  Alb  3.4<L>  /  TBili  <0.2  /  DBili  x   /  AST  25  /  ALT  12  /  AlkPhos  39  12-07    A/P : 79yo Female POD2 s/p L hip IMN on 12/6/23, doing well.  Cleared for discharge from an orthopedic standpoint    - Activity: WBAT LLE  - DVT PPx: LVX per primary team  - Pain control  - IS encouraged  - AM labs  - PT/Rehab  - D/C planning -- Sub-acute Rehab    - If discharged, patient should follow up with Dr. Ortega at [address] [facility] phone [phone].  - Patient should be discharged on 6 weeks (from surgery date) of Aspirin 81mg BID for DVT prophylaxis as their mobility/function/ambulation will be decreased due to lower extremity orthopaedic surgery. SUBJECTIVE: Patient seen and examined. Pain controlled.  Pt did well o/n  No f/c/n/v/cp/sob.     OBJECTIVE:  NAD  Vital Signs Last 24 Hrs  T(C): 36.4 (08 Dec 2023 00:45), Max: 37 (07 Dec 2023 18:37)  T(F): 97.5 (08 Dec 2023 00:45), Max: 98.6 (07 Dec 2023 18:37)  HR: 78 (08 Dec 2023 00:45) (63 - 78)  BP: 142/59 (08 Dec 2023 00:45) (96/50 - 155/65)  BP(mean): --  RR: 18 (08 Dec 2023 00:45) (18 - 18)  SpO2: 99% (07 Dec 2023 16:33) (99% - 100%)    Parameters below as of 07 Dec 2023 16:33  Patient On (Oxygen Delivery Method): room air        Affected extremity:   LLE  Dressing c/d/i  SILT SP/DP/T/Sural/Saph  Firing TA/EHL/FHL/GS  Foot WWP, 2+ DP pulse                        9.8    8.93  )-----------( 154      ( 07 Dec 2023 22:26 )             30.2     12-07    135  |  100  |  60<H>  ----------------------------<  154<H>  4.7   |  21  |  2.6<H>    Ca    8.8      07 Dec 2023 07:27  Mg     2.3     12-07    TPro  5.7<L>  /  Alb  3.4<L>  /  TBili  <0.2  /  DBili  x   /  AST  25  /  ALT  12  /  AlkPhos  39  12-07    A/P : 79yo Female POD2 s/p L hip IMN on 12/6/23, doing well.  Cleared for discharge from an orthopedic standpoint    - Activity: WBAT LLE  - DVT PPx: LVX per primary team  - Pain control  - IS encouraged  - AM labs  - PT/Rehab  - D/C planning -- Sub-acute Rehab    - If discharged, patient should follow up with Dr. Ortega at [address] [facility] phone [phone].  - Patient should be discharged on 6 weeks (from surgery date) of Aspirin 81mg BID for DVT prophylaxis as their mobility/function/ambulation will be decreased due to lower extremity orthopaedic surgery. SUBJECTIVE: Patient seen and examined. Pain controlled.  Pt did well o/n  No f/c/n/v/cp/sob.     OBJECTIVE:  NAD  Vital Signs Last 24 Hrs  T(C): 36.4 (08 Dec 2023 00:45), Max: 37 (07 Dec 2023 18:37)  T(F): 97.5 (08 Dec 2023 00:45), Max: 98.6 (07 Dec 2023 18:37)  HR: 78 (08 Dec 2023 00:45) (63 - 78)  BP: 142/59 (08 Dec 2023 00:45) (96/50 - 155/65)  BP(mean): --  RR: 18 (08 Dec 2023 00:45) (18 - 18)  SpO2: 99% (07 Dec 2023 16:33) (99% - 100%)    Parameters below as of 07 Dec 2023 16:33  Patient On (Oxygen Delivery Method): room air        Affected extremity:   LLE  Dressing c/d/i  SILT SP/DP/T/Sural/Saph  Firing TA/EHL/FHL/GS  Foot WWP, 2+ DP pulse                        9.8    8.93  )-----------( 154      ( 07 Dec 2023 22:26 )             30.2     12-07    135  |  100  |  60<H>  ----------------------------<  154<H>  4.7   |  21  |  2.6<H>    Ca    8.8      07 Dec 2023 07:27  Mg     2.3     12-07    TPro  5.7<L>  /  Alb  3.4<L>  /  TBili  <0.2  /  DBili  x   /  AST  25  /  ALT  12  /  AlkPhos  39  12-07    A/P : 77yo Female POD2 s/p L hip IMN on 12/6/23, doing well.  Cleared for discharge from an orthopedic standpoint    - Activity: WBAT LLE  - DVT PPx: LVX per primary team  - Pain control  - IS encouraged  - AM labs  - PT/Rehab  - D/C planning -- Sub-acute Rehab    - If discharged, patient should follow up with Dr. Ortega at [address] [facility] phone [phone].  - Patient should be discharged on 6 weeks (from surgery date) of Aspirin 81mg BID for DVT prophylaxis as their mobility/function/ambulation will be decreased due to lower extremity orthopaedic surgery.  pt seen and examined with team   OOB to chair SUBJECTIVE: Patient seen and examined. Pain controlled.  Pt did well o/n  No f/c/n/v/cp/sob.     OBJECTIVE:  NAD  Vital Signs Last 24 Hrs  T(C): 36.4 (08 Dec 2023 00:45), Max: 37 (07 Dec 2023 18:37)  T(F): 97.5 (08 Dec 2023 00:45), Max: 98.6 (07 Dec 2023 18:37)  HR: 78 (08 Dec 2023 00:45) (63 - 78)  BP: 142/59 (08 Dec 2023 00:45) (96/50 - 155/65)  BP(mean): --  RR: 18 (08 Dec 2023 00:45) (18 - 18)  SpO2: 99% (07 Dec 2023 16:33) (99% - 100%)    Parameters below as of 07 Dec 2023 16:33  Patient On (Oxygen Delivery Method): room air        Affected extremity:   LLE  Dressing c/d/i  SILT SP/DP/T/Sural/Saph  Firing TA/EHL/FHL/GS  Foot WWP, 2+ DP pulse                        9.8    8.93  )-----------( 154      ( 07 Dec 2023 22:26 )             30.2     12-07    135  |  100  |  60<H>  ----------------------------<  154<H>  4.7   |  21  |  2.6<H>    Ca    8.8      07 Dec 2023 07:27  Mg     2.3     12-07    TPro  5.7<L>  /  Alb  3.4<L>  /  TBili  <0.2  /  DBili  x   /  AST  25  /  ALT  12  /  AlkPhos  39  12-07    A/P : 77yo Female POD2 s/p L hip IMN on 12/6/23, doing well.  Cleared for discharge from an orthopedic standpoint    - Activity: WBAT LLE  - DVT PPx: LVX per primary team  - Pain control  - IS encouraged  - AM labs  - PT/Rehab  - D/C planning -- Sub-acute Rehab    - If discharged, patient should follow up with Dr. Ortega at [address] [facility] phone [phone].  - Patient should be discharged on 6 weeks (from surgery date) of Aspirin 81mg BID for DVT prophylaxis as their mobility/function/ambulation will be decreased due to lower extremity orthopaedic surgery.  pt seen and examined with team   OOB to chair

## 2023-12-08 NOTE — DISCHARGE NOTE PROVIDER - CARE PROVIDERS DIRECT ADDRESSES
,DirectAddress_Unknown ,DirectAddress_Unknown,[email or web address] ,DirectAddress_Unknown,[email or web address]

## 2023-12-08 NOTE — DISCHARGE NOTE PROVIDER - HOSPITAL COURSE
Patient is a 78y Female PMH bipolar disorder, intellectual disability, HTN, DM and arthritis, on ASA 81mg who presents to the ED BIBA from [facility] s/p fall found to have an acute comminuted impacted left femoral intertrochanteric fracture.    Acute comminuted impacted left femoral intertrochanteric fracture  Unwitnessed fall   - Trauma work +Left hip fracture  - ORIF on 12/6, surgical site no active bleeding, drainage, or pus noted, clean/dry dressing, mild bruising at site  - on Dilaudid for pain, transition to PO Oxycodone  - DVT prophylaxis  - Bedside PT  - ortho f/u noted       Acute blood loss anemia s/p orthopedic surgery  - post 2 units PRBC 12/7  - f/u labs noted    HTN  Hx of aortic stenosis  - continue home meds    Bipolar Disorder  Intellectual disability   - continue home meds     ASHLEY  - receiving  IVF, creat trending upward  - check bladder scan - r/o retention post op    DM  - on Lantus 15 qhs  - Insulin sliding scale      DVT Prophylaxis: Lovenox   GI Prophylaxis: None  Diet: NCS / DASH  Activity: As tolerated  Code Status: Full code    Dispo: D/C planning back to SNF today ? pending the above results  - follow up with Dr. Ortega at [address] [facility] phone [phone].  - Patient should be discharged on 6 weeks (from surgery date) of Aspirin 81mg BID for DVT prophylaxis as their mobility/function/ambulation will be decreased due to lower extremity orthopaedic surgery.   Patient is a 78y Female PMH bipolar disorder, intellectual disability, HTN, DM and arthritis, on ASA 81mg who presents to the ED BIBA from [facility] s/p fall found to have an acute comminuted impacted left femoral intertrochanteric fracture.    Acute comminuted impacted left femoral intertrochanteric fracture  Unwitnessed fall   - Trauma work +Left hip fracture  - ORIF on 12/6, surgical site no active bleeding, drainage, or pus noted, clean/dry dressing, mild bruising at site  - on Dilaudid for pain, transition to PO Oxycodone  - DVT prophylaxis  - Bedside PT  - ortho f/u noted       Acute blood loss anemia s/p orthopedic surgery  - post 2 units PRBC 12/7  - f/u labs noted    HTN  Hx of aortic stenosis  - continue home meds    Bipolar Disorder  Intellectual disability   - continue home meds     ASHLEY  - receiving  IVF, creat trending upward  - check bladder scan - r/o retention post op    DM  - on Lantus 15 qhs  - Insulin sliding scale      DVT Prophylaxis: Lovenox   GI Prophylaxis: None  Diet: NCS / DASH  Activity: As tolerated  Code Status: Full code    Dispo: D/C planning back to SNF today ? pending the above results  - follow up with Dr. Ortega at [address] [facility] phone [phone].  - Patient should be discharged on 6 weeks (from surgery date) of Aspirin 81mg BID for DVT prophylaxis as their mobility/function/ambulation will be decreased due to lower extremity orthopaedic surgery.   Patient is a 78y Female PMH bipolar disorder, intellectual disability, HTN, DM and arthritis, on ASA 81mg who presents to the ED BIBA from [facility] s/p fall found to have an acute comminuted impacted left femoral intertrochanteric fracture.    Acute comminuted impacted left femoral intertrochanteric fracture  Unwitnessed fall   - Trauma work +Left hip fracture  - ORIF on 12/6, surgical site no active bleeding, drainage, or pus noted, clean/dry dressing, mild bruising at site  - on Dilaudid for pain, transition to PO Oxycodone  - DVT prophylaxis  - Bedside PT  - ortho f/u noted       Acute blood loss anemia s/p orthopedic surgery  - post 2 units PRBC 12/7  - f/u labs noted    HTN  Hx of aortic stenosis  - continue home meds    Bipolar Disorder  Intellectual disability   - continue home meds     ASHLEY  - receiving  IVF, creat trending upward  - check bladder scan - r/o retention post op    DM  - on Lantus 15 qhs  - Insulin sliding scale    Urinary retention  -bladder scan:1500cc residual bladder urinary volume  -alcala placed      DVT Prophylaxis: Lovenox   GI Prophylaxis: None  Diet: NCS / DASH  Activity: As tolerated  Code Status: Full code    - follow up with Dr. Ortega at [address] [facility] phone [phone].  - Patient should be discharged on 6 weeks (from surgery date) of Aspirin 81mg BID for DVT prophylaxis as their mobility/function/ambulation will be decreased due to lower extremity orthopaedic surgery.   Patient is a 78y Female PMH bipolar disorder, intellectual disability, HTN, DM and arthritis, on ASA 81mg who presents to the ED BIBA from [facility] s/p fall found to have an acute comminuted impacted left femoral intertrochanteric fracture.    Acute comminuted impacted left femoral intertrochanteric fracture  Unwitnessed fall   - Trauma work +Left hip fracture  - ORIF on 12/6, surgical site no active bleeding, drainage, or pus noted, clean/dry dressing, mild bruising at site  - on Dilaudid for pain, transition to PO Oxycodone  - DVT prophylaxis  - Bedside PT  - ortho f/u noted       Acute blood loss anemia s/p orthopedic surgery  - post 2 units PRBC 12/7  - f/u labs noted    HTN  Hx of aortic stenosis  - continue home meds    Bipolar Disorder  Intellectual disability   - continue home meds     ASHLEY  - receiving  IVF, creat trending upward  - check bladder scan - r/o retention post op    DM  - on Lantus 15 qhs  - Insulin sliding scale    Urinary retention  -bladder scan:1500cc residual bladder urinary volume  -alcala placed      DVT Prophylaxis: Lovenox   GI Prophylaxis: None  Diet: NCS / DASH  Activity: As tolerated  Code Status: Full code    - follow up with Dr. Ortega at [address] [facility] phone [phone].  - Patient should be discharged on 6 weeks (from surgery date) of Aspirin 81mg BID for DVT prophylaxis as their mobility/function/ambulation will be decreased due to lower extremity orthopaedic surgery.

## 2023-12-08 NOTE — DISCHARGE NOTE PROVIDER - NSDCMRMEDTOKEN_GEN_ALL_CORE_FT
amLODIPine 10 mg oral tablet: 1 tab(s) orally once a day. Continue to take until told otherwise by your provider.   aspirin 81 mg oral tablet, chewable: 1 tab(s) orally once a day. Continue to take until told otherwise by your provider.   atorvastatin 80 mg oral tablet: 1 tab(s) orally once a day (at bedtime)  Depakote 250 mg oral delayed release tablet: 1 tab(s) orally 3 times a day  fenofibrate 160 mg oral tablet: 1 tab(s) orally once a day  ferrous sulfate 325 mg (65 mg elemental iron) oral tablet: 1 tab(s) orally once a day. Continue to take until told otherwise by your provider.   FLUoxetine 20 mg oral capsule: 1 cap(s) orally once a day  gabapentin 100 mg oral capsule: 1 cap(s) orally 2 times a day  Jardiance 25 mg oral tablet: 1 tab(s) orally once a day  Levemir FlexPen 100 units/mL subcutaneous solution: 15 unit(s) subcutaneous once a day (at bedtime)  lisinopril 20 mg oral tablet: 1 tab(s) orally once a day  melatonin 3 mg oral tablet: 5 milligram(s) orally once a day (at bedtime) at 19:00. Continue to take until told otherwise by your provider.   OLANZapine 10 mg oral tablet, disintegratin tab(s) orally once a day (at bedtime). Continue to take until told otherwise by your provider.   senna (sennosides) 8.6 mg oral tablet: 2 tab(s) orally once a day (at bedtime)  sodium bicarbonate 650 mg oral tablet: 1 tab(s) orally 3 times a day  Xanax 0.25 mg oral tablet: 1 tab(s) orally 2 times a day   aspirin 81 mg oral tablet, chewable: 1 tab(s) orally once a day. Continue to take until told otherwise by your provider.   atorvastatin 80 mg oral tablet: 1 tab(s) orally once a day (at bedtime)  Depakote 250 mg oral delayed release tablet: 1 tab(s) orally 3 times a day  fenofibrate 160 mg oral tablet: 1 tab(s) orally once a day  ferrous sulfate 325 mg (65 mg elemental iron) oral tablet: 1 tab(s) orally once a day. Continue to take until told otherwise by your provider.   FLUoxetine 20 mg oral capsule: 1 cap(s) orally once a day  gabapentin 100 mg oral capsule: 1 cap(s) orally 2 times a day  Jardiance 25 mg oral tablet: 1 tab(s) orally once a day  Levemir FlexPen 100 units/mL subcutaneous solution: 15 unit(s) subcutaneous once a day (at bedtime)  lisinopril 20 mg oral tablet: 1 tab(s) orally once a day  melatonin 3 mg oral tablet: 5 milligram(s) orally once a day (at bedtime) at 19:00. Continue to take until told otherwise by your provider.   OLANZapine 10 mg oral tablet, disintegratin tab(s) orally once a day (at bedtime). Continue to take until told otherwise by your provider.   senna (sennosides) 8.6 mg oral tablet: 2 tab(s) orally once a day (at bedtime)  sodium bicarbonate 650 mg oral tablet: 1 tab(s) orally 3 times a day  Xanax 0.25 mg oral tablet: 1 tab(s) orally 2 times a day

## 2023-12-09 ENCOUNTER — TRANSCRIPTION ENCOUNTER (OUTPATIENT)
Age: 78
End: 2023-12-09

## 2023-12-09 VITALS
TEMPERATURE: 99 F | DIASTOLIC BLOOD PRESSURE: 65 MMHG | SYSTOLIC BLOOD PRESSURE: 137 MMHG | RESPIRATION RATE: 18 BRPM | HEART RATE: 63 BPM

## 2023-12-09 LAB
GLUCOSE BLDC GLUCOMTR-MCNC: 122 MG/DL — HIGH (ref 70–99)

## 2023-12-09 RX ADMIN — POLYETHYLENE GLYCOL 3350 17 GRAM(S): 17 POWDER, FOR SOLUTION ORAL at 05:38

## 2023-12-09 RX ADMIN — Medication 650 MILLIGRAM(S): at 05:38

## 2023-12-09 RX ADMIN — Medication 650 MILLIGRAM(S): at 14:59

## 2023-12-09 RX ADMIN — Medication 325 MILLIGRAM(S): at 12:13

## 2023-12-09 RX ADMIN — DIVALPROEX SODIUM 250 MILLIGRAM(S): 500 TABLET, DELAYED RELEASE ORAL at 13:14

## 2023-12-09 RX ADMIN — Medication 20 MILLIGRAM(S): at 12:13

## 2023-12-09 RX ADMIN — LISINOPRIL 20 MILLIGRAM(S): 2.5 TABLET ORAL at 05:39

## 2023-12-09 RX ADMIN — Medication 0.25 MILLIGRAM(S): at 01:17

## 2023-12-09 RX ADMIN — Medication 650 MILLIGRAM(S): at 13:14

## 2023-12-09 RX ADMIN — DIVALPROEX SODIUM 250 MILLIGRAM(S): 500 TABLET, DELAYED RELEASE ORAL at 05:38

## 2023-12-09 RX ADMIN — GABAPENTIN 100 MILLIGRAM(S): 400 CAPSULE ORAL at 05:38

## 2023-12-09 RX ADMIN — SODIUM CHLORIDE 75 MILLILITER(S): 9 INJECTION INTRAMUSCULAR; INTRAVENOUS; SUBCUTANEOUS at 07:44

## 2023-12-09 RX ADMIN — AMLODIPINE BESYLATE 10 MILLIGRAM(S): 2.5 TABLET ORAL at 05:38

## 2023-12-09 NOTE — PROGRESS NOTE ADULT - ASSESSMENT
Patient is a 78y Female PMH bipolar disorder, intellectual disability, HTN, DM and arthritis, on ASA 81mg who presents to the ED BIBA from [facility] s/p fall found to have an acute comminuted impacted left femoral intertrochanteric fracture.    Acute comminuted impacted left femoral intertrochanteric fracture  Unwitnessed fall   - Trauma work +Left hip fracture  - ORIF on 12/6, surgical site no active bleeding, drainage, or pus noted, clean/dry dressing, mild bruising at site  - on Dilaudid for pain, transition to PO Oxycodone  - DVT prophylaxis  - Bedside PT  - ortho f/u noted       Acute blood loss anemia s/p orthopedic surgery  - post 2 units PRBC 12/7  - f/u labs noted- stable    HTN  Hx of aortic stenosis  - continue home meds    Bipolar Disorder  Intellectual disability   - continue home meds     ASHLEY  - receiving  IVF, creat was trending upward- responded to IVF  - check bladder scan - r/o retention post op    DM  - on Lantus 15 qhs  - Insulin sliding scale      DVT Prophylaxis: Lovenox   GI Prophylaxis: None  Diet: NCS / DASH  Activity: As tolerated  Code Status: Full code    Dispo: D/C planning back to SNF today ? pending the above results  - follow up with Dr. Ortega at [address] [facility] phone [phone].  - Patient should be discharged on 6 weeks (from surgery date) of Aspirin 81mg BID for DVT prophylaxis as their mobility/function/ambulation will be decreased due to lower extremity orthopaedic surgery.   Patient is a 78y Female PMH bipolar disorder, intellectual disability, HTN, DM and arthritis, on ASA 81mg who presents to the ED BIBA from [facility] s/p fall found to have an acute comminuted impacted left femoral intertrochanteric fracture.    Acute comminuted impacted left femoral intertrochanteric fracture  Unwitnessed fall   - Trauma work +Left hip fracture  - ORIF on 12/6, surgical site no active bleeding, drainage, or pus noted, clean/dry dressing, mild bruising at site  - on Dilaudid for pain, transition to PO Oxycodone  - DVT prophylaxis  - Bedside PT  - ortho f/u noted       Acute blood loss anemia s/p orthopedic surgery  - post 2 units PRBC 12/7  - f/u labs noted- stable    HTN  Hx of aortic stenosis  - continue home meds    Bipolar Disorder  Intellectual disability   - continue home meds     ASHLEY  - receiving  IVF, creat was trending upward- responded to IVF  - check bladder scan - r/o retention post op    DM  - on Lantus 15 qhs  - Insulin sliding scale      DVT Prophylaxis: Lovenox   GI Prophylaxis: None  Diet: NCS / DASH  Activity: As tolerated  Code Status: Full code    Dispo: D/C planning back to SNF today ? pending the above results  - follow up with Dr. Ortega at [address] [facility] phone [phone].  - Patient should be discharged on 6 weeks (from surgery date) of Aspirin 81mg BID for DVT prophylaxis as their mobility/function/ambulation will be decreased due to lower extremity orthopaedic surgery.   Patient is a 78y Female PMH bipolar disorder, intellectual disability, HTN, DM and arthritis, on ASA 81mg who presents to the ED BIBA from [facility] s/p fall found to have an acute comminuted impacted left femoral intertrochanteric fracture.    Acute comminuted impacted left femoral intertrochanteric fracture  Unwitnessed fall   - Trauma work +Left hip fracture  - ORIF on 12/6, surgical site no active bleeding, drainage, or pus noted, clean/dry dressing, mild bruising at site  - on Dilaudid for pain, transition to PO Oxycodone  - DVT prophylaxis  - Bedside PT  - ortho f/u noted       Acute blood loss anemia s/p orthopedic surgery  - post 2 units PRBC 12/7  - f/u labs noted- stable    HTN  Hx of aortic stenosis  - continue home meds    Bipolar Disorder  Intellectual disability   - continue home meds     ASHLEY  - receiving  IVF, creat was trending upward- responded to IVF  - check bladder scan - + retention post op  - Case discussed with HO- place a [facility] pre DC; for  F/U at SNF    DM  - on Lantus 15 qhs  - Insulin sliding scale      DVT Prophylaxis: Lovenox   GI Prophylaxis: None  Diet: NCS / DASH  Activity: As tolerated  Code Status: Full code    Dispo: D/C planning back to SNF today ? pending the above results  - follow up with Dr. Ortega at [address] [facility] phone [phone].  - Patient should be discharged on 6 weeks (from surgery date) of Aspirin 81mg BID for DVT prophylaxis as their mobility/function/ambulation will be decreased due to lower extremity orthopaedic surgery.   Patient is a 78y Female PMH bipolar disorder, intellectual disability, HTN, DM and arthritis, on ASA 81mg who presents to the ED BIBA from [facility] s/p fall found to have an acute comminuted impacted left femoral intertrochanteric fracture.    Acute comminuted impacted left femoral intertrochanteric fracture  Unwitnessed fall   - Trauma work +Left hip fracture  - ORIF on 12/6, surgical site no active bleeding, drainage, or pus noted, clean/dry dressing, mild bruising at site  - on Dilaudid for pain, transition to PO Oxycodone  - DVT prophylaxis  - Bedside PT  - ortho f/u noted       Acute blood loss anemia s/p orthopedic surgery  - post 2 units PRBC 12/7  - f/u labs noted- stable    HTN  Hx of aortic stenosis  - continue home meds    Bipolar Disorder  Intellectual disability   - continue home meds     ASHLEY  - receiving  IVF, creat was trending upward- responded to IVF  - check bladder scan - + retention post op  - Case discussed with HO- place a [facility] pre DC; for  F/U at SNF    DM  - on Lantus 15 qhs  - Insulin sliding scale      DVT Prophylaxis: Lovenox   GI Prophylaxis: None  Diet: NCS / DASH  Activity: As tolerated  Code Status: Full code    Dispo: D/C planning back to SNF today ? pending the above results  - follow up with Dr. Ortega at [address] [facility] phone [phone].  - Patient should be discharged on 6 weeks (from surgery date) of Aspirin 81mg BID for DVT prophylaxis as their mobility/function/ambulation will be decreased due to lower extremity orthopaedic surgery.

## 2023-12-09 NOTE — DISCHARGE NOTE NURSING/CASE MANAGEMENT/SOCIAL WORK - PATIENT PORTAL LINK FT
You can access the FollowMyHealth Patient Portal offered by [facility] by registering at the following website: [email or web address]. By joining [employer]’s FollowMyHealth portal, you will also be able to view your health information using other applications (apps) compatible with our system. You can access the FollowMyHealth Patient Portal offered by [facility] by registering at the following website: [email or web address]. By joining [employer]’s FollowMyHealth portal, you will also be able to view your health information using other applications (apps) compatible with our system.

## 2023-12-09 NOTE — PROGRESS NOTE ADULT - SUBJECTIVE AND OBJECTIVE BOX
Orthopaedic Surgery Progress Note    S&E at bedside this AM. Pain well controlled.       T(C): 37.2 (12-09-23 @ 00:00), Max: 37.2 (12-09-23 @ 00:00)  HR: 60 (12-09-23 @ 05:35) (59 - 67)  BP: 131/60 (12-09-23 @ 05:35) (131/60 - 159/69)  RR: 18 (12-09-23 @ 00:00) (18 - 18)  SpO2: 98% (12-09-23 @ 00:00) (98% - 98%)    P/E:  Alert, NAD  Nonlabored breathing    LLE  Dressing c/d/i  SILT SP/DP/T/Sural/Saph  Firing TA/EHL/FHL/GS  Foot WWP, 2+ DP pulse    Labs                        10.2   8.32  )-----------( 166      ( 08 Dec 2023 09:19 )             31.6     12-08    141  |  104  |  51<H>  ----------------------------<  221<H>  4.2   |  21  |  1.9<H>    Ca    9.1      08 Dec 2023 09:19  Mg     2.4     12-08      A/P: 77yo Female POD3 s/p L hip IMN on 12/6/23, doing well.  Cleared for discharge from an orthopedic standpoint    - Activity: WBAT LLE  - DVT PPx: LVX per primary team  - Pain control  - IS encouraged  - AM labs  - PT/Rehab  - D/C planning -- Sub-acute Rehab  - If discharged, patient should follow up with Dr. Ortega at [address] [facility] phone [phone].    - Patient should be discharged on 6 weeks (from surgery date) of appropriate DVT prophylaxis due to their decreased functional/ambulation status after lower extremity surgery. Orthopaedic preference would be Aspirin 325mg daily if there are no contraindications. If patient is already on home anticoagulation, they may continue home anticoagulation medication instead of aspirin. If patient is going to inpatient rehab/nursing facility, and they plan for DVT PPx with SQH/LVX, they may be placed on that instead of aspirin.           Orthopaedic Surgery Progress Note    S&E at bedside this AM. Pain well controlled.       T(C): 37.2 (12-09-23 @ 00:00), Max: 37.2 (12-09-23 @ 00:00)  HR: 60 (12-09-23 @ 05:35) (59 - 67)  BP: 131/60 (12-09-23 @ 05:35) (131/60 - 159/69)  RR: 18 (12-09-23 @ 00:00) (18 - 18)  SpO2: 98% (12-09-23 @ 00:00) (98% - 98%)    P/E:  Alert, NAD  Nonlabored breathing    LLE  Dressing c/d/i  SILT SP/DP/T/Sural/Saph  Firing TA/EHL/FHL/GS  Foot WWP, 2+ DP pulse    Labs                        10.2   8.32  )-----------( 166      ( 08 Dec 2023 09:19 )             31.6     12-08    141  |  104  |  51<H>  ----------------------------<  221<H>  4.2   |  21  |  1.9<H>    Ca    9.1      08 Dec 2023 09:19  Mg     2.4     12-08      A/P: 79yo Female POD3 s/p L hip IMN on 12/6/23, doing well.  Cleared for discharge from an orthopedic standpoint    - Activity: WBAT LLE  - DVT PPx: LVX per primary team  - Pain control  - IS encouraged  - AM labs  - PT/Rehab  - D/C planning -- Sub-acute Rehab  - If discharged, patient should follow up with Dr. Ortega at [address] [facility] phone [phone].    - Patient should be discharged on 6 weeks (from surgery date) of appropriate DVT prophylaxis due to their decreased functional/ambulation status after lower extremity surgery. Orthopaedic preference would be Aspirin 325mg daily if there are no contraindications. If patient is already on home anticoagulation, they may continue home anticoagulation medication instead of aspirin. If patient is going to inpatient rehab/nursing facility, and they plan for DVT PPx with SQH/LVX, they may be placed on that instead of aspirin.

## 2023-12-09 NOTE — DISCHARGE NOTE NURSING/CASE MANAGEMENT/SOCIAL WORK - NSDCPEFALRISK_GEN_ALL_CORE
For information on Fall & Injury Prevention, visit: [email or web address] OR  [email or web address] OR  https://www.cdc.gov/steadi/patient.html For information on Fall & Injury Prevention, visit: [email or web address] OR  [email or web address] OR  https://www.cdc.gov/steadi/patient.html

## 2023-12-09 NOTE — PROGRESS NOTE ADULT - REASON FOR ADMISSION
Hip Fx
Hip Fx, left femoral intratrochanteric fracture
Hip Fx

## 2023-12-09 NOTE — PROGRESS NOTE ADULT - PROVIDER SPECIALTY LIST ADULT
Internal Medicine
Orthopedics
Orthopedics
Internal Medicine
Orthopedics
Orthopedics
Internal Medicine

## 2023-12-09 NOTE — PROGRESS NOTE ADULT - SUBJECTIVE AND OBJECTIVE BOX
Chart reviewed, patient examined. Pertinent results reviewed.  Case discussed with HO; specialist f/u reviewed  HD#5; POD#3  KINGSTON PETTY    HPI:  Patient is a 78y Female PMH bipolar disorder, intellectual disability, HTN, DM and arthritis, on ASA 81mg who presents to the ED BIB from [facility] s/p fall. As per papers, patient was found kneeling on the floor in front of her bed holding onto her bed. Patient is a poor historian, and unable to answer questions.     In the ED trauma work up showed acute comminuted impacted left femoral intertrochanteric fracture. Patient was transferred to [place] and is scheduled for ORIF with orthopedic surgery tomorrow. On HD#2, patient had ORIF- Pinning of the Fx'd Hip w/o acute complications.    Interval:     .MEDICATIONS  (STANDING):  amLODIPine   Tablet 10 milliGRAM(s) Oral daily  atorvastatin 80 milliGRAM(s) Oral at bedtime  dextrose 5%. 1000 milliLiter(s) (50 mL/Hr) IV Continuous <Continuous>  dextrose 5%. 1000 milliLiter(s) (100 mL/Hr) IV Continuous <Continuous>  dextrose 50% Injectable 25 Gram(s) IV Push once  dextrose 50% Injectable 25 Gram(s) IV Push once  dextrose 50% Injectable 12.5 Gram(s) IV Push once  divalproex  milliGRAM(s) Oral three times a day  enoxaparin Injectable 40 milliGRAM(s) SubCutaneous every 24 hours  ferrous    sulfate 325 milliGRAM(s) Oral daily  FLUoxetine 20 milliGRAM(s) Oral daily  gabapentin 100 milliGRAM(s) Oral two times a day  glucagon  Injectable 1 milliGRAM(s) IntraMuscular once  insulin glargine Injectable (LANTUS) 15 Unit(s) SubCutaneous at bedtime  insulin lispro (ADMELOG) corrective regimen sliding scale   SubCutaneous three times a day before meals  insulin lispro (ADMELOG) corrective regimen sliding scale   SubCutaneous at bedtime  lisinopril 20 milliGRAM(s) Oral daily  LORazepam   Injectable 1 milliGRAM(s) IV Push once  OLANZapine Disintegrating Tablet 10 milliGRAM(s) Oral at bedtime  polyethylene glycol 3350 17 Gram(s) Oral two times a day  senna 2 Tablet(s) Oral at bedtime  sodium bicarbonate 650 milliGRAM(s) Oral three times a day    MEDICATIONS  (PRN):  acetaminophen     Tablet .. 650 milliGRAM(s) Oral every 6 hours PRN Temp greater or equal to 38C (100.4F), Mild Pain (1 - 3)  ALPRAZolam 0.25 milliGRAM(s) Oral two times a day PRN Aggitation  aluminum hydroxide/magnesium hydroxide/simethicone Suspension 30 milliLiter(s) Oral every 4 hours PRN Dyspepsia  dextrose Oral Gel 15 Gram(s) Oral once PRN Blood Glucose LESS THAN 70 milliGRAM(s)/deciliter  HYDROmorphone  Injectable 0.2 milliGRAM(s) IV Push every 8 hours PRN Severe Pain (7 - 10)  melatonin 3 milliGRAM(s) Oral at bedtime PRN Insomnia  ondansetron Injectable 4 milliGRAM(s) IV Push every 8 hours PRN Nausea and/or Vomiting      Vital Signs Last 24 Hrs  T(C): 35.9 (09 Dec 2023 08:00), Max: 37.2 (09 Dec 2023 00:00)  T(F): 96.7 (09 Dec 2023 08:00), Max: 98.9 (09 Dec 2023 00:00)  HR: 66 (09 Dec 2023 08:00) (59 - 67)  BP: 169/63 (09 Dec 2023 08:00) (131/60 - 169/63)  BP(mean): 86 (09 Dec 2023 05:35) (86 - 86)  RR: 18 (09 Dec 2023 08:00) (18 - 18)  SpO2: 98% (09 Dec 2023 00:00) (98% - 98%)    Parameters below as of 09 Dec 2023 00:00  Patient On (Oxygen Delivery Method): room air        PHYSICAL EXAM:  GENERAL:   NECK: Supple, No JVD, Normal thyroid  CHEST/LUNG: Clear; No crackles or wheezing  HEART: S1, S2, Regular rate and rhythm;   ABDOMEN: Soft, Nontender, Nondistended; Bowel sounds present  EXTREMITIES: No clubbing, cyanosis, or edema; L Hip dressing:  SKIN: No rashes or lesions    LABS:                          10.2   8.32  )-----------( 166      ( 08 Dec 2023 09:19 )---s/p 2units PRBCs             31.6                                  9.8    8.93  )-----------( 154      ( 07 Dec 2023 22:26 )             30.2     12-08    141  |  104  |  51<H>  ----------------------------<  221<H>  4.2   |  21  |  1.9<H>    Ca    9.1      08 Dec 2023 09:19  Mg     2.4     12-08 12-07    135  |  100  |  60<H>  ----------------------------<  154<H>  4.7   |  21  |  2.6<H>        Ca    8.8      07 Dec 2023 07:27  Mg     2.3     12-07    TPro  5.7<L>  /  Alb  3.4<L>  /  TBili  <0.2  /  DBili  x   /  AST  25  /  ALT  12  /  AlkPhos  39  12-07    LIVER FUNCTIONS - ( 07 Dec 2023 07:27 )  Alb: 3.4 g/dL / Pro: 5.7 g/dL / ALK PHOS: 39 U/L / ALT: 12 U/L / AST: 25 U/L / GGT: x                   CARDIAC MARKERS ( 07 Dec 2023 07:27 )  x     / <0.01 ng/mL / 106 U/L / x     / 4.7 ng/mL        Urinalysis Basic - ( 07 Dec 2023 07:27 )    Color: x / Appearance: x / SG: x / pH: x  Gluc: 154 mg/dL / Ketone: x  / Bili: x / Urobili: x   Blood: x / Protein: x / Nitrite: x   Leuk Esterase: x / RBC: x / WBC x   Sq Epi: x / Non Sq Epi: x / Bacteria: x              RADIOLOGY & ADDITIONAL TESTS:   Chart reviewed, patient examined. Pertinent results reviewed.  Case discussed with HO; specialist f/u reviewed  HD#5; POD#3  KINGSTON PETTY    HPI:  Patient is a 78y Female PMH bipolar disorder, intellectual disability, HTN, DM and arthritis, on ASA 81mg who presents to the ED BIB from [facility] s/p fall. As per papers, patient was found kneeling on the floor in front of her bed holding onto her bed. Patient is a poor historian, and unable to answer questions.     In the ED trauma work up showed acute comminuted impacted left femoral intertrochanteric fracture. Patient was transferred to [place] and is scheduled for ORIF with orthopedic surgery tomorrow. On HD#2, patient had ORIF- Pinning of the Fx'd Hip w/o acute complications.    Interval:     .MEDICATIONS  (STANDING):  amLODIPine   Tablet 10 milliGRAM(s) Oral daily  atorvastatin 80 milliGRAM(s) Oral at bedtime  dextrose 5%. 1000 milliLiter(s) (50 mL/Hr) IV Continuous <Continuous>  dextrose 5%. 1000 milliLiter(s) (100 mL/Hr) IV Continuous <Continuous>  dextrose 50% Injectable 25 Gram(s) IV Push once  dextrose 50% Injectable 25 Gram(s) IV Push once  dextrose 50% Injectable 12.5 Gram(s) IV Push once  divalproex  milliGRAM(s) Oral three times a day  enoxaparin Injectable 40 milliGRAM(s) SubCutaneous every 24 hours  ferrous    sulfate 325 milliGRAM(s) Oral daily  FLUoxetine 20 milliGRAM(s) Oral daily  gabapentin 100 milliGRAM(s) Oral two times a day  glucagon  Injectable 1 milliGRAM(s) IntraMuscular once  insulin glargine Injectable (LANTUS) 15 Unit(s) SubCutaneous at bedtime  insulin lispro (ADMELOG) corrective regimen sliding scale   SubCutaneous three times a day before meals  insulin lispro (ADMELOG) corrective regimen sliding scale   SubCutaneous at bedtime  lisinopril 20 milliGRAM(s) Oral daily  LORazepam   Injectable 1 milliGRAM(s) IV Push once  OLANZapine Disintegrating Tablet 10 milliGRAM(s) Oral at bedtime  polyethylene glycol 3350 17 Gram(s) Oral two times a day  senna 2 Tablet(s) Oral at bedtime  sodium bicarbonate 650 milliGRAM(s) Oral three times a day    MEDICATIONS  (PRN):  acetaminophen     Tablet .. 650 milliGRAM(s) Oral every 6 hours PRN Temp greater or equal to 38C (100.4F), Mild Pain (1 - 3)  ALPRAZolam 0.25 milliGRAM(s) Oral two times a day PRN Aggitation  aluminum hydroxide/magnesium hydroxide/simethicone Suspension 30 milliLiter(s) Oral every 4 hours PRN Dyspepsia  dextrose Oral Gel 15 Gram(s) Oral once PRN Blood Glucose LESS THAN 70 milliGRAM(s)/deciliter  HYDROmorphone  Injectable 0.2 milliGRAM(s) IV Push every 8 hours PRN Severe Pain (7 - 10)  melatonin 3 milliGRAM(s) Oral at bedtime PRN Insomnia  ondansetron Injectable 4 milliGRAM(s) IV Push every 8 hours PRN Nausea and/or Vomiting      Vital Signs Last 24 Hrs  T(C): 35.9 (09 Dec 2023 08:00), Max: 37.2 (09 Dec 2023 00:00)  T(F): 96.7 (09 Dec 2023 08:00), Max: 98.9 (09 Dec 2023 00:00)  HR: 66 (09 Dec 2023 08:00) (59 - 67)  BP: 169/63 (09 Dec 2023 08:00) (131/60 - 169/63)  BP(mean): 86 (09 Dec 2023 05:35) (86 - 86)  RR: 18 (09 Dec 2023 08:00) (18 - 18)  SpO2: 98% (09 Dec 2023 00:00) (98% - 98%)    Parameters below as of 09 Dec 2023 00:00  Patient On (Oxygen Delivery Method): room air        PHYSICAL EXAM:  GENERAL:   NECK: Supple, No JVD, Normal thyroid  CHEST/LUNG: Clear; No crackles or wheezing  HEART: S1, S2, Regular rate and rhythm;   ABDOMEN: Soft, Nontender, Nondistended; Bowel sounds present  EXTREMITIES: No clubbing, cyanosis, or edema; L Hip dressing:  SKIN: No rashes or lesions    LABS:                          10.2   8.32  )-----------( 166      ( 08 Dec 2023 09:19 )---s/p 2units PRBCs             31.6                                  9.8    8.93  )-----------( 154      ( 07 Dec 2023 22:26 )             30.2     12-08    141  |  104  |  51<H>  ----------------------------<  221<H>  4.2   |  21  |  1.9<H>    Ca    9.1      08 Dec 2023 09:19  Mg     2.4     12-08 12-07    135  |  100  |  60<H>  ----------------------------<  154<H>  4.7   |  21  |  2.6<H>        Ca    8.8      07 Dec 2023 07:27  Mg     2.3     12-07    TPro  5.7<L>  /  Alb  3.4<L>  /  TBili  <0.2  /  DBili  x   /  AST  25  /  ALT  12  /  AlkPhos  39  12-07    LIVER FUNCTIONS - ( 07 Dec 2023 07:27 )  Alb: 3.4 g/dL / Pro: 5.7 g/dL / ALK PHOS: 39 U/L / ALT: 12 U/L / AST: 25 U/L / GGT: x                   CARDIAC MARKERS ( 07 Dec 2023 07:27 )  x     / <0.01 ng/mL / 106 U/L / x     / 4.7 ng/mL        Urinalysis Basic - ( 07 Dec 2023 07:27 )    Color: x / Appearance: x / SG: x / pH: x  Gluc: 154 mg/dL / Ketone: x  / Bili: x / Urobili: x   Blood: x / Protein: x / Nitrite: x   Leuk Esterase: x / RBC: x / WBC x   Sq Epi: x / Non Sq Epi: x / Bacteria: x              RADIOLOGY & ADDITIONAL TESTS:   Chart reviewed, patient examined. Pertinent results reviewed.  Case discussed with HO; specialist f/u reviewed  HD#5; POD#3  KINGSTON PETTY    HPI:  Patient is a 78y Female PMH bipolar disorder, intellectual disability, HTN, DM and arthritis, on ASA 81mg who presents to the ED BIB from [facility] s/p fall. As per papers, patient was found kneeling on the floor in front of her bed holding onto her bed. Patient is a poor historian, and unable to answer questions.     In the ED trauma work up showed acute comminuted impacted left femoral intertrochanteric fracture. Patient was transferred to [place] and is scheduled for ORIF with orthopedic surgery tomorrow. On HD#2, patient had ORIF- Pinning of the Fx'd Hip w/o acute complications.    Interval: patient was retaining urine- was catheterized: 1500 ml of clear fluid.    MEDICATIONS  (STANDING):  amLODIPine   Tablet 10 milliGRAM(s) Oral daily  atorvastatin 80 milliGRAM(s) Oral at bedtime  dextrose 5%. 1000 milliLiter(s) (50 mL/Hr) IV Continuous <Continuous>  dextrose 5%. 1000 milliLiter(s) (100 mL/Hr) IV Continuous <Continuous>  dextrose 50% Injectable 25 Gram(s) IV Push once  dextrose 50% Injectable 25 Gram(s) IV Push once  dextrose 50% Injectable 12.5 Gram(s) IV Push once  divalproex  milliGRAM(s) Oral three times a day  enoxaparin Injectable 40 milliGRAM(s) SubCutaneous every 24 hours  ferrous    sulfate 325 milliGRAM(s) Oral daily  FLUoxetine 20 milliGRAM(s) Oral daily  gabapentin 100 milliGRAM(s) Oral two times a day  glucagon  Injectable 1 milliGRAM(s) IntraMuscular once  insulin glargine Injectable (LANTUS) 15 Unit(s) SubCutaneous at bedtime  insulin lispro (ADMELOG) corrective regimen sliding scale   SubCutaneous three times a day before meals  insulin lispro (ADMELOG) corrective regimen sliding scale   SubCutaneous at bedtime  lisinopril 20 milliGRAM(s) Oral daily  LORazepam   Injectable 1 milliGRAM(s) IV Push once  OLANZapine Disintegrating Tablet 10 milliGRAM(s) Oral at bedtime  polyethylene glycol 3350 17 Gram(s) Oral two times a day  senna 2 Tablet(s) Oral at bedtime  sodium bicarbonate 650 milliGRAM(s) Oral three times a day    MEDICATIONS  (PRN):  acetaminophen     Tablet .. 650 milliGRAM(s) Oral every 6 hours PRN Temp greater or equal to 38C (100.4F), Mild Pain (1 - 3)  ALPRAZolam 0.25 milliGRAM(s) Oral two times a day PRN Aggitation  aluminum hydroxide/magnesium hydroxide/simethicone Suspension 30 milliLiter(s) Oral every 4 hours PRN Dyspepsia  dextrose Oral Gel 15 Gram(s) Oral once PRN Blood Glucose LESS THAN 70 milliGRAM(s)/deciliter  HYDROmorphone  Injectable 0.2 milliGRAM(s) IV Push every 8 hours PRN Severe Pain (7 - 10)  melatonin 3 milliGRAM(s) Oral at bedtime PRN Insomnia  ondansetron Injectable 4 milliGRAM(s) IV Push every 8 hours PRN Nausea and/or Vomiting      Vital Signs Last 24 Hrs  T(C): 35.9 (09 Dec 2023 08:00), Max: 37.2 (09 Dec 2023 00:00)  T(F): 96.7 (09 Dec 2023 08:00), Max: 98.9 (09 Dec 2023 00:00)  HR: 66 (09 Dec 2023 08:00) (59 - 67)  BP: 169/63 (09 Dec 2023 08:00) (131/60 - 169/63)  BP(mean): 86 (09 Dec 2023 05:35) (86 - 86)  RR: 18 (09 Dec 2023 08:00) (18 - 18)  SpO2: 98% (09 Dec 2023 00:00) (98% - 98%)    Parameters below as of 09 Dec 2023 00:00  Patient On (Oxygen Delivery Method): room air        PHYSICAL EXAM:  GENERAL: confused; calm, pleasant. w waist belt  NECK: Supple, No JVD, Normal thyroid  CHEST/LUNG: Clear; No crackles or wheezing  HEART: RRR, no MRG  ABDOMEN: Soft, Nontender, Nondistended; Bowel sounds present  EXTREMITIES: No clubbing, cyanosis, or edema; L Hip dressing: CDI  SKIN: No rashes or lesions    LABS:                          10.2   8.32  )-----------( 166      ( 08 Dec 2023 09:19 )---s/p 2units PRBCs             31.6                                  9.8    8.93  )-----------( 154      ( 07 Dec 2023 22:26 )             30.2     12-08    141  |  104  |  51<H>  ----------------------------<  221<H>  4.2   |  21  |  1.9<H>    Ca    9.1      08 Dec 2023 09:19  Mg     2.4     12-08 12-07    135  |  100  |  60<H>  ----------------------------<  154<H>  4.7   |  21  |  2.6<H>        Ca    8.8      07 Dec 2023 07:27  Mg     2.3     12-07    TPro  5.7<L>  /  Alb  3.4<L>  /  TBili  <0.2  /  DBili  x   /  AST  25  /  ALT  12  /  AlkPhos  39  12-07    LIVER FUNCTIONS - ( 07 Dec 2023 07:27 )  Alb: 3.4 g/dL / Pro: 5.7 g/dL / ALK PHOS: 39 U/L / ALT: 12 U/L / AST: 25 U/L / GGT: x                   CARDIAC MARKERS ( 07 Dec 2023 07:27 )  x     / <0.01 ng/mL / 106 U/L / x     / 4.7 ng/mL        Urinalysis Basic - ( 07 Dec 2023 07:27 )    Color: x / Appearance: x / SG: x / pH: x  Gluc: 154 mg/dL / Ketone: x  / Bili: x / Urobili: x   Blood: x / Protein: x / Nitrite: x   Leuk Esterase: x / RBC: x / WBC x   Sq Epi: x / Non Sq Epi: x / Bacteria: x              RADIOLOGY & ADDITIONAL TESTS:   Chart reviewed, patient examined. Pertinent results reviewed.  Case discussed with HO; specialist f/u reviewed  HD#5; POD#3  KINGSTON PETTY    HPI:  Patient is a 78y Female PMH bipolar disorder, intellectual disability, HTN, DM and arthritis, on ASA 81mg who presents to the ED BIB from [facility] s/p fall. As per papers, patient was found kneeling on the floor in front of her bed holding onto her bed. Patient is a poor historian, and unable to answer questions.     In the ED trauma work up showed acute comminuted impacted left femoral intertrochanteric fracture. Patient was transferred to [place] and is scheduled for ORIF with orthopedic surgery tomorrow. On HD#2, patient had ORIF- Pinning of the Fx'd Hip w/o acute complications.    Interval: patient was retaining urine- was catheterized: 1500 ml of clear fluid.    MEDICATIONS  (STANDING):  amLODIPine   Tablet 10 milliGRAM(s) Oral daily  atorvastatin 80 milliGRAM(s) Oral at bedtime  dextrose 5%. 1000 milliLiter(s) (50 mL/Hr) IV Continuous <Continuous>  dextrose 5%. 1000 milliLiter(s) (100 mL/Hr) IV Continuous <Continuous>  dextrose 50% Injectable 25 Gram(s) IV Push once  dextrose 50% Injectable 25 Gram(s) IV Push once  dextrose 50% Injectable 12.5 Gram(s) IV Push once  divalproex  milliGRAM(s) Oral three times a day  enoxaparin Injectable 40 milliGRAM(s) SubCutaneous every 24 hours  ferrous    sulfate 325 milliGRAM(s) Oral daily  FLUoxetine 20 milliGRAM(s) Oral daily  gabapentin 100 milliGRAM(s) Oral two times a day  glucagon  Injectable 1 milliGRAM(s) IntraMuscular once  insulin glargine Injectable (LANTUS) 15 Unit(s) SubCutaneous at bedtime  insulin lispro (ADMELOG) corrective regimen sliding scale   SubCutaneous three times a day before meals  insulin lispro (ADMELOG) corrective regimen sliding scale   SubCutaneous at bedtime  lisinopril 20 milliGRAM(s) Oral daily  LORazepam   Injectable 1 milliGRAM(s) IV Push once  OLANZapine Disintegrating Tablet 10 milliGRAM(s) Oral at bedtime  polyethylene glycol 3350 17 Gram(s) Oral two times a day  senna 2 Tablet(s) Oral at bedtime  sodium bicarbonate 650 milliGRAM(s) Oral three times a day    MEDICATIONS  (PRN):  acetaminophen     Tablet .. 650 milliGRAM(s) Oral every 6 hours PRN Temp greater or equal to 38C (100.4F), Mild Pain (1 - 3)  ALPRAZolam 0.25 milliGRAM(s) Oral two times a day PRN Aggitation  aluminum hydroxide/magnesium hydroxide/simethicone Suspension 30 milliLiter(s) Oral every 4 hours PRN Dyspepsia  dextrose Oral Gel 15 Gram(s) Oral once PRN Blood Glucose LESS THAN 70 milliGRAM(s)/deciliter  HYDROmorphone  Injectable 0.2 milliGRAM(s) IV Push every 8 hours PRN Severe Pain (7 - 10)  melatonin 3 milliGRAM(s) Oral at bedtime PRN Insomnia  ondansetron Injectable 4 milliGRAM(s) IV Push every 8 hours PRN Nausea and/or Vomiting      Vital Signs Last 24 Hrs  T(C): 35.9 (09 Dec 2023 08:00), Max: 37.2 (09 Dec 2023 00:00)  T(F): 96.7 (09 Dec 2023 08:00), Max: 98.9 (09 Dec 2023 00:00)  HR: 66 (09 Dec 2023 08:00) (59 - 67)  BP: 169/63 (09 Dec 2023 08:00) (131/60 - 169/63)  BP(mean): 86 (09 Dec 2023 05:35) (86 - 86)  RR: 18 (09 Dec 2023 08:00) (18 - 18)  SpO2: 98% (09 Dec 2023 00:00) (98% - 98%)    Parameters below as of 09 Dec 2023 00:00  Patient On (Oxygen Delivery Method): room air        PHYSICAL EXAM:  GENERAL: confused; calm, pleasant. w waist belt  NECK: Supple, No JVD, Normal thyroid  CHEST/LUNG: Clear; No crackles or wheezing  HEART: RRR, no MRG  ABDOMEN: Soft, Nontender, Nondistended; Bowel sounds present  EXTREMITIES: No clubbing, cyanosis, or edema; L Hip dressing: CDI  SKIN: No rashes or lesions    LABS:                          10.2   8.32  )-----------( 166      ( 08 Dec 2023 09:19 )---s/p 2units PRBCs             31.6                                  9.8    8.93  )-----------( 154      ( 07 Dec 2023 22:26 )             30.2     12-08    141  |  104  |  51<H>  ----------------------------<  221<H>  4.2   |  21  |  1.9<H>    Ca    9.1      08 Dec 2023 09:19  Mg     2.4     12-08 12-07    135  |  100  |  60<H>  ----------------------------<  154<H>  4.7   |  21  |  2.6<H>        Ca    8.8      07 Dec 2023 07:27  Mg     2.3     12-07    TPro  5.7<L>  /  Alb  3.4<L>  /  TBili  <0.2  /  DBili  x   /  AST  25  /  ALT  12  /  AlkPhos  39  12-07    LIVER FUNCTIONS - ( 07 Dec 2023 07:27 )  Alb: 3.4 g/dL / Pro: 5.7 g/dL / ALK PHOS: 39 U/L / ALT: 12 U/L / AST: 25 U/L / GGT: x                   CARDIAC MARKERS ( 07 Dec 2023 07:27 )  x     / <0.01 ng/mL / 106 U/L / x     / 4.7 ng/mL        Urinalysis Basic - ( 07 Dec 2023 07:27 )    Color: x / Appearance: x / SG: x / pH: x  Gluc: 154 mg/dL / Ketone: x  / Bili: x / Urobili: x   Blood: x / Protein: x / Nitrite: x   Leuk Esterase: x / RBC: x / WBC x   Sq Epi: x / Non Sq Epi: x / Bacteria: x              RADIOLOGY & ADDITIONAL TESTS:

## 2023-12-13 DIAGNOSIS — N17.9 ACUTE KIDNEY FAILURE, UNSPECIFIED: ICD-10-CM

## 2023-12-13 DIAGNOSIS — F79 UNSPECIFIED INTELLECTUAL DISABILITIES: ICD-10-CM
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
## 2023-12-13 DIAGNOSIS — F31.9 BIPOLAR DISORDER, UNSPECIFIED: ICD-10-CM

## 2023-12-13 DIAGNOSIS — E11.9 TYPE 2 DIABETES MELLITUS WITHOUT COMPLICATIONS: ICD-10-CM

## 2023-12-13 DIAGNOSIS — S72.142A DISPLACED INTERTROCHANTERIC FRACTURE OF LEFT FEMUR, INITIAL ENCOUNTER FOR CLOSED FRACTURE: ICD-10-CM

## 2023-12-13 DIAGNOSIS — R33.9 RETENTION OF URINE, UNSPECIFIED: ICD-10-CM
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
## 2023-12-13 DIAGNOSIS — I10 ESSENTIAL (PRIMARY) HYPERTENSION: ICD-10-CM

## 2023-12-13 DIAGNOSIS — D62 ACUTE POSTHEMORRHAGIC ANEMIA: ICD-10-CM

## 2023-12-13 DIAGNOSIS — Z79.82 LONG TERM (CURRENT) USE OF ASPIRIN: ICD-10-CM

## 2023-12-19 ENCOUNTER — APPOINTMENT (OUTPATIENT)
Dept: ORTHOPEDIC SURGERY | Facility: CLINIC | Age: 78
End: 2023-12-19
Payer: MEDICARE

## 2023-12-19 PROCEDURE — 73502 X-RAY EXAM HIP UNI 2-3 VIEWS: CPT

## 2023-12-19 PROCEDURE — 99024 POSTOP FOLLOW-UP VISIT: CPT

## 2023-12-19 RX ORDER — GABAPENTIN 100 MG/1
100 CAPSULE ORAL
Refills: 0 | Status: ACTIVE | COMMUNITY

## 2023-12-19 RX ORDER — ATORVASTATIN CALCIUM 40 MG/1
40 TABLET, FILM COATED ORAL
Refills: 0 | Status: ACTIVE | COMMUNITY

## 2023-12-19 RX ORDER — OLANZAPINE 10 MG/1
10 TABLET, FILM COATED ORAL
Refills: 0 | Status: ACTIVE | COMMUNITY

## 2023-12-19 RX ORDER — SODIUM BICARBONATE 650 MG/1
650 TABLET ORAL
Refills: 0 | Status: ACTIVE | COMMUNITY

## 2023-12-19 RX ORDER — EMPAGLIFLOZIN 25 MG/1
25 TABLET, FILM COATED ORAL
Refills: 0 | Status: ACTIVE | COMMUNITY

## 2023-12-19 RX ORDER — LISINOPRIL 20 MG/1
20 TABLET ORAL
Refills: 0 | Status: ACTIVE | COMMUNITY

## 2023-12-19 RX ORDER — LIDOCAINE HCL 4 %
LIQUID ROLL-ON (ML) TOPICAL
Refills: 0 | Status: ACTIVE | COMMUNITY

## 2023-12-19 RX ORDER — ACETAMINOPHEN 325 MG/1
325 TABLET ORAL
Refills: 0 | Status: ACTIVE | COMMUNITY

## 2023-12-19 RX ORDER — LACTULOSE 10 G/15ML
20 SOLUTION ORAL
Refills: 0 | Status: ACTIVE | COMMUNITY

## 2023-12-19 RX ORDER — FLUOXETINE HYDROCHLORIDE 20 MG/1
20 CAPSULE ORAL
Refills: 0 | Status: ACTIVE | COMMUNITY

## 2023-12-19 RX ORDER — AMLODIPINE BESYLATE 10 MG/1
10 TABLET ORAL
Refills: 0 | Status: ACTIVE | COMMUNITY

## 2023-12-19 RX ORDER — FENOFIBRATE 160 MG/1
160 TABLET ORAL
Refills: 0 | Status: ACTIVE | COMMUNITY

## 2023-12-19 RX ORDER — DIVALPROEX SODIUM 250 MG/1
250 TABLET, DELAYED RELEASE ORAL
Refills: 0 | Status: ACTIVE | COMMUNITY

## 2023-12-19 RX ORDER — HYDROCORTISONE 25 MG/G
2.5 CREAM TOPICAL
Refills: 0 | Status: ACTIVE | COMMUNITY

## 2023-12-19 NOTE — ASSESSMENT
[FreeTextEntry1] : 78-year-old woman 2-week status post open reduction internal fixation of a left intertrochanteric hip fracture.  Wounds of healed sufficiently for staple removal.  May shower.  Should avoid baths or soaks for 2 weeks.  She can continue with physical therapy.  Physical therapy prescription provided.  Physical therapy will continue work on hip strengthening core strengthening gait training balance and generalized conditioning.  Modalities can utilize adjunct of therapy.  Patient will follow-up in my office in 2 to 3 months time for repeat evaluation with radiographs of her left hip.  If she is developing any symptoms of pain or problems she is encouraged to return to the office sooner.  Copy note provided to skilled nursing facility.  All questions answered to her aide satisfaction.

## 2023-12-19 NOTE — IMAGING
[de-identified] : Pleasant elderly woman somewhat cognitively impaired sits comfortably in a wheelchair in no distress.  With assistance she can stand but is very unsteady gait.  Physical examination: Left hip: Surgical incisions are clean dry and intact no erythema induration or fluctuance.  Resolving ecchymosis about surgical sites.  No inguinal lymph nodes.  No cords.  No significant pain about her groin with gentle internal/external rotation of joint.  Radiographs: Left hip (AP, lateral): Well aligned basicervical intertrochanteric hip fracture.  Hardware jay in place no evidence of loosening.

## 2023-12-19 NOTE — HISTORY OF PRESENT ILLNESS
[de-identified] : 78-year-old woman returns for postoperative visit status post open reduction internal fixation left intertrochanteric hip fracture with a short cephalomedullary nail on December 6, 2023.  The patient resides in a skilled nursing facility.  She is cognitively impaired.  No history of fevers or drainage.  Pain controlled with Tylenol.  She is enrolled in a physical therapy program the nursing home.

## 2024-01-03 ENCOUNTER — APPOINTMENT (OUTPATIENT)
Dept: GASTROENTEROLOGY | Facility: CLINIC | Age: 79
End: 2024-01-03

## 2024-01-03 NOTE — PROGRESS NOTE BEHAVIORAL HEALTH - NS ED BHA MSE SPEECH RATE
----- Message from Zeferino Garcia MD sent at 1/3/2024 10:20 AM EST -----  Regarding: FW: Question  Contact: [phone]  There is nothing I would prescribe at this time, options for those medicines are for limited time.  And that would best be handled with her fertility appointment and physician  ----- Message -----  From: Margarette Lang RegSched Rep  Sent: 1/2/2024   8:26 AM EST  To: Zeferino Garcia MD  Subject: FW: Question                                     See pt message. Please advise.   ----- Message -----  From: Alee Richardson  Sent: 1/1/2024  12:41 PM EST  To: [facility]  Subject: Question                                         Hi! I know Dr Garcia & I have talked several times in the past regarding my fertility & the fact that I am not ovulating. Is there any medicine Dr Garcia prescribes to help with ovulation/fertility? I am not ready to take the step to going to a fertility specialist so didn’t know if there was anything he would try prescribing first. My periods have become more regular but still not ovulating.        Normal

## 2024-01-04 ENCOUNTER — EMERGENCY (EMERGENCY)
Facility: HOSPITAL | Age: 79
LOS: 0 days | Discharge: HOME | End: 2024-01-04
Attending: STUDENT IN AN ORGANIZED HEALTH CARE EDUCATION/TRAINING PROGRAM
Payer: MEDICARE

## 2024-01-04 VITALS
TEMPERATURE: 98 F | HEART RATE: 66 BPM | OXYGEN SATURATION: 98 % | RESPIRATION RATE: 20 BRPM | DIASTOLIC BLOOD PRESSURE: 70 MMHG | SYSTOLIC BLOOD PRESSURE: 148 MMHG | WEIGHT: 145.06 LBS | HEIGHT: 64 IN

## 2024-01-04 DIAGNOSIS — E11.9 TYPE 2 DIABETES MELLITUS WITHOUT COMPLICATIONS: ICD-10-CM

## 2024-01-04 DIAGNOSIS — I10 ESSENTIAL (PRIMARY) HYPERTENSION: ICD-10-CM

## 2024-01-04 DIAGNOSIS — M19.90 UNSPECIFIED OSTEOARTHRITIS, UNSPECIFIED SITE: ICD-10-CM

## 2024-01-04 DIAGNOSIS — Z88.0 ALLERGY STATUS TO PENICILLIN: ICD-10-CM

## 2024-01-04 DIAGNOSIS — M79.605 PAIN IN LEFT LEG: ICD-10-CM

## 2024-01-04 DIAGNOSIS — Z91.041 RADIOGRAPHIC DYE ALLERGY STATUS: ICD-10-CM

## 2024-01-04 DIAGNOSIS — F79 UNSPECIFIED INTELLECTUAL DISABILITIES: ICD-10-CM

## 2024-01-04 DIAGNOSIS — F31.9 BIPOLAR DISORDER, UNSPECIFIED: ICD-10-CM

## 2024-01-04 DIAGNOSIS — Z88.1 ALLERGY STATUS TO OTHER ANTIBIOTIC AGENTS STATUS: ICD-10-CM

## 2024-01-04 DIAGNOSIS — M25.562 PAIN IN LEFT KNEE: ICD-10-CM

## 2024-01-04 DIAGNOSIS — Z87.81 PERSONAL HISTORY OF (HEALED) TRAUMATIC FRACTURE: ICD-10-CM

## 2024-01-04 PROCEDURE — 99284 EMERGENCY DEPT VISIT MOD MDM: CPT

## 2024-01-04 PROCEDURE — 99285 EMERGENCY DEPT VISIT HI MDM: CPT | Mod: 25

## 2024-01-04 PROCEDURE — 73590 X-RAY EXAM OF LOWER LEG: CPT | Mod: 26,LT

## 2024-01-04 PROCEDURE — 71045 X-RAY EXAM CHEST 1 VIEW: CPT

## 2024-01-04 PROCEDURE — 71045 X-RAY EXAM CHEST 1 VIEW: CPT | Mod: 26

## 2024-01-04 PROCEDURE — 73700 CT LOWER EXTREMITY W/O DYE: CPT | Mod: 26,LT,MA

## 2024-01-04 PROCEDURE — 72170 X-RAY EXAM OF PELVIS: CPT | Mod: 26

## 2024-01-04 PROCEDURE — 72125 CT NECK SPINE W/O DYE: CPT | Mod: 26,MA

## 2024-01-04 PROCEDURE — 70450 CT HEAD/BRAIN W/O DYE: CPT | Mod: 26,MA

## 2024-01-04 PROCEDURE — 73590 X-RAY EXAM OF LOWER LEG: CPT | Mod: LT

## 2024-01-04 PROCEDURE — 99053 MED SERV 10PM-8AM 24 HR FAC: CPT

## 2024-01-04 PROCEDURE — 73552 X-RAY EXAM OF FEMUR 2/>: CPT | Mod: 26,LT

## 2024-01-04 PROCEDURE — 70450 CT HEAD/BRAIN W/O DYE: CPT | Mod: MA

## 2024-01-04 PROCEDURE — 73552 X-RAY EXAM OF FEMUR 2/>: CPT | Mod: LT

## 2024-01-04 PROCEDURE — 73700 CT LOWER EXTREMITY W/O DYE: CPT | Mod: MA,LT

## 2024-01-04 PROCEDURE — 72125 CT NECK SPINE W/O DYE: CPT | Mod: MA

## 2024-01-04 PROCEDURE — 72170 X-RAY EXAM OF PELVIS: CPT

## 2024-01-04 RX ORDER — ACETAMINOPHEN 500 MG
975 TABLET ORAL ONCE
Refills: 0 | Status: COMPLETED | OUTPATIENT
Start: 2024-01-04 | End: 2024-01-04

## 2024-01-04 RX ADMIN — Medication 975 MILLIGRAM(S): at 05:26

## 2024-01-04 NOTE — ED PROVIDER NOTE - OBJECTIVE STATEMENT
78-year-old female with past medical history of bipolar disorder, intellectual disability, HTN, DM, arthritis and recent acute comminuted impacted left femoral intertrochanteric fracture s/p repair and early December presents to the ED complaining of left leg pain.  Patient was sitting down or excellently slid out of her chair and then started complaining of left leg pain.  Patient unable to provide reliable history secondary to intellectual disability.  Staff at nursing home endorses no other areas of injury or other complaints. Pt denies fever, chills, nausea, vomiting, abdominal pain, diarrhea, headache, dizziness, weakness, chest pain, SOB, back pain, LOC, trauma, urinary symptoms, cough, calf pain/swelling.

## 2024-01-04 NOTE — ED PROCEDURE NOTE - CPROC ED TIME OUT STATEMENT1
“Patient's name, , procedure and correct site were confirmed during the [facility] Timeout.” “Patient's name, , procedure and correct site were confirmed during the [facility] Timeout.”

## 2024-01-04 NOTE — ED PROVIDER NOTE - PHYSICAL EXAMINATION
VITAL SIGNS: I have reviewed nursing notes and confirm.  CONSTITUTIONAL: 79 yo F laying on stretcher; in no acute distress.  SKIN: Skin exam is warm and dry, no acute rash.  HEAD: Normocephalic; atraumatic.  EYES: Conjunctiva and sclera clear.  ENT: No nasal discharge; airway clear.   NECK: Supple; non tender.  CARD: S1, S2 normal; no murmurs, gallops, or rubs. Regular rate and rhythm.  RESP: No wheezes, rales or rhonchi. Speaking in full sentences.   ABD: Normal bowel sounds; soft; non-distended; non-tender; No rebound or guarding. No CVA tenderness.  EXT: No hip TTP, deformity, or erythema. (+) mild TTP to lateral aspect of L knee with ecchymosis. No swelling or deformity. DP 2+ B/L.   NEURO: Alert, oriented. Grossly unremarkable. No focal deficits. VITAL SIGNS: I have reviewed nursing notes and confirm.  CONSTITUTIONAL: 77 yo F laying on stretcher; in no acute distress.  SKIN: Skin exam is warm and dry, no acute rash.  HEAD: Normocephalic; atraumatic.  EYES: Conjunctiva and sclera clear.  ENT: No nasal discharge; airway clear.   NECK: Supple; non tender.  CARD: S1, S2 normal; no murmurs, gallops, or rubs. Regular rate and rhythm.  RESP: No wheezes, rales or rhonchi. Speaking in full sentences.   ABD: Normal bowel sounds; soft; non-distended; non-tender; No rebound or guarding. No CVA tenderness.  EXT: No hip TTP, deformity, or erythema. (+) mild TTP to lateral aspect of L knee with ecchymosis. No swelling or deformity. DP 2+ B/L.   NEURO: Alert, oriented. Grossly unremarkable. No focal deficits.

## 2024-01-04 NOTE — ED ADULT TRIAGE NOTE - CHIEF COMPLAINT QUOTE
KEN. Patient from [facility] for a witness fall. C/O left knee pain. Patient on aspirin. KEN. Patient from [facility] for a witness fall. C/O left knee pain. Patient on aspirin.

## 2024-01-04 NOTE — ED ADULT NURSE NOTE - CHIEF COMPLAINT QUOTE
KEN. Patient from [facility] for a witness fall. C/O left knee pain. Patient on aspirin. KEN. Patient from [facility] for a witness fall. C/O left knee pain. Patient on aspirin.

## 2024-01-04 NOTE — ED PROVIDER NOTE - ATTENDING APP SHARED VISIT CONTRIBUTION OF CARE
I have personally performed a history and physical exam on this patient and personally directed the management of the patient. Patient is a 78-year-old female past medical history of bipolar disorder as well as intellectual disability hypertension diabetes presents for evaluation status post fall she was sitting down in a chair and slid down the chair impacting posterior region no LOC no vomiting history corroborated by nursing home denies any other pain at this time denies headache visual changes neck pain chest pain shortness of breath abdominal pain back pain fevers chills vomiting    On physical exam patient is normocephalic atraumatic pupils equal round reactive light accommodation extraocular muscles intact oropharynx clear chest clear to auscultation bilaterally abdomen soft nontender nondistended bowel sounds positive no guarding or rebound extremities patient has tenderness to palpation to the lateral aspect of the left knee however no signs of infection no signs of vascular compromise pedal pulses 2+ capillary refills normal    Assessment plan patient presents for status post fall no LOC no vomiting with baseline neurologic exam x-rays of the femur per my independent evaluation revealed no fractures x-ray tib-fib per my independent evaluation no fractures we obtain chest x-ray per my independent evaluation no pneumothorax no pneumonia given her age routine CT head which is negative I will discharge at this time follow-up with her PMD

## 2024-01-04 NOTE — ED PROVIDER NOTE - CLINICAL SUMMARY MEDICAL DECISION MAKING FREE TEXT BOX
I, Jas Agosto, have personally seen and examined this patient. I have fully participated in the care of this patient. I have made amendments to the documentation where appropriate and otherwise agree with the history, physical exam, and plan as documented by the PA signed out to me by Dr. Haddad.    Patient will be discharged x-rays reviewed CT reviewed discussed plan with Ortho

## 2024-01-04 NOTE — ED PROVIDER NOTE - PATIENT PORTAL LINK FT
You can access the FollowMyHealth Patient Portal offered by [facility] by registering at the following website: [email or web address]. By joining [employer]’s FollowMyHealth portal, you will also be able to view your health information using other applications (apps) compatible with our system. You can access the FollowMyHealth Patient Portal offered by [facility] by registering at the following website: [email or web address]. By joining [employer]’s FollowMyHealth portal, you will also be able to view your health information using other applications (apps) compatible with our system. You can access the FollowMyHealth Patient Portal offered by [facility] by registering at the following website: [email or web address]. By joining [employer]’s FollowMyHealth portal, you will also be able to view your health information using other applications (apps) compatible with our system. You can access the FollowMyHealth Patient Portal offered by [facility] by registering at the following website: [email or web address]. By joining [employer]’s FollowMyHealth portal, you will also be able to view your health information using other applications (apps) compatible with our system.

## 2024-01-04 NOTE — ED PROVIDER NOTE - CARE PROVIDER_API CALL
Jai Ortega  Orthopaedic Surgery  [address] [place] [address]  Phone: [phone]  Fax: [phone]  Follow Up Time: 1-3 Days   Jai Ortega  Orthopaedic Surgery  [address] [place] [address]  Phone: [phone]  Fax: [phone]  Follow Up Time: 1-3 Days

## 2024-01-04 NOTE — ED PROVIDER NOTE - PROVIDER TOKENS
PROVIDER:[TOKEN:[[id number]],FOLLOWUP:[1-3 Days]] PROVIDER:[TOKEN:[[id number]],FOLLOWUP:[1-3 Days]]

## 2024-01-04 NOTE — ED ADULT NURSE NOTE - COVID-19 ORDERING FACILITY
[facility]  - [facility]-[id number] [facility]  - [facility]-[id number]

## 2024-01-04 NOTE — ED ADULT NURSE NOTE - NSFALLRISKINTERV_ED_ALL_ED
Assistance OOB with selected safe patient handling equipment if applicable/Assistance with ambulation/Communicate fall risk and risk factors to all staff, patient, and family/Monitor gait and stability/Monitor for mental status changes and reorient to person, place, and time, as needed/Move patient closer to nursing station/within visual sight of ED staff/Provide visual cue: yellow wristband, yellow gown, etc/Reinforce activity limits and safety measures with patient and family/Toileting schedule using arm’s reach rule for commode and bathroom/Use of alarms - bed, stretcher, chair and/or video monitoring/Call bell, personal items and telephone in reach/Instruct patient to call for assistance before getting out of bed/chair/stretcher/Non-slip footwear applied when patient is off stretcher/[place] to call system/Physically safe environment - no spills, clutter or unnecessary equipment/Purposeful Proactive Rounding/Room/bathroom lighting operational, light cord in reach Assistance OOB with selected safe patient handling equipment if applicable/Assistance with ambulation/Communicate fall risk and risk factors to all staff, patient, and family/Monitor gait and stability/Monitor for mental status changes and reorient to person, place, and time, as needed/Move patient closer to nursing station/within visual sight of ED staff/Provide visual cue: yellow wristband, yellow gown, etc/Reinforce activity limits and safety measures with patient and family/Toileting schedule using arm’s reach rule for commode and bathroom/Use of alarms - bed, stretcher, chair and/or video monitoring/Call bell, personal items and telephone in reach/Instruct patient to call for assistance before getting out of bed/chair/stretcher/Non-slip footwear applied when patient is off stretcher/[place] to call system/Physically safe environment - no spills, clutter or unnecessary equipment/Purposeful Proactive Rounding/Room/bathroom lighting operational, light cord in reach

## 2024-01-04 NOTE — ED PROVIDER NOTE - WET READ LAUNCH FT
There are 4 Wet Read(s) to document. There are 3 Wet Read(s) to document. There are 2 Wet Read(s) to document. There are no Wet Read(s) to document.

## 2024-01-04 NOTE — ED PROVIDER NOTE - COVID-19 ORDERING FACILITY
[facility]  - [facility]-[id number] [facility]  - [facility]-[id number]

## 2024-01-08 ENCOUNTER — APPOINTMENT (OUTPATIENT)
Dept: ORTHOPEDIC SURGERY | Facility: CLINIC | Age: 79
End: 2024-01-08
Payer: MEDICARE

## 2024-01-08 VITALS — HEIGHT: 60 IN | BODY MASS INDEX: 26.5 KG/M2 | WEIGHT: 135 LBS

## 2024-01-08 PROCEDURE — 99214 OFFICE O/P EST MOD 30 MIN: CPT

## 2024-01-08 NOTE — DATA REVIEWED
[FreeTextEntry1] :   X-rays reviewed on the [facility] PACS system of her left hip and pelvis show all postsurgical hardware intact without any new acute fractures or bony abnormalities.    A CAT scan of her left knee was reviewed as well which shows a small effusion and degenerative changes without any acute fracture or bony abnormality.

## 2024-01-08 NOTE — PHYSICAL EXAM
[de-identified] :   Physical exam of her left knee: Mild swelling on the suprapatella.  Negative ecchymosis.  Nontender over the patella facet.  Negative patellar grind.  She can actively straight leg raise.  Very mild medial and lateral joint line tenderness.  She is nontender over the medial or lateral tibial plateau.  Her calf is soft and nontender.  Nontender over the femur.  The rest of exam is omitted secondary to decreased mobility of the patient.

## 2024-01-08 NOTE — DISCUSSION/SUMMARY
[de-identified] :   The patient has a left knee contusion.  They may discontinue the knee immobilizer and place her in a knee brace which allows for movement of the knee but still be supportive.  She may be protected weightbearing as tolerated with assistance.  She may take Tylenol for pain relief as needed.  Recommend to follow-up with Dr. Ortega in 2 weeks for repeat evaluation and possible repeat imaging depending on her pain level. Her aide was instructed to have the nursing home contact the office if they have any other questions or concerns.

## 2024-01-08 NOTE — HISTORY OF PRESENT ILLNESS
[de-identified] :   Patient is a 78-year-old here for evaluation of her left knee.  She is accompanied by an aide who came with her from [facility].  She is a long-term resident there.   the patient was sleeping when I came into the exam room.  Upon waking, she is alert, but groggy and seemingly tired.  She is a poor historian.  Her aide states that on 1/4/2024, she was found on the floor after a fall.  The patient is about a month status post an ORIF of her left hip done by Dr. Ortega.  Prior to the fall she did  ambulate.  Since surgery, she has been doing physical therapy, but only weightbearing with assistance.  She was taken to [facility] on 1/4/2024 where they took x-rays of her hip and pelvis and a CAT scan of her left knee.  They placed her in a knee immobilizer and sent her back to the nursing home.  The patient states that she is feeling slightly better since the injury occurred.

## 2024-02-06 ENCOUNTER — APPOINTMENT (OUTPATIENT)
Dept: ORTHOPEDIC SURGERY | Facility: CLINIC | Age: 79
End: 2024-02-06

## 2024-02-19 NOTE — PHYSICAL THERAPY INITIAL EVALUATION ADULT - ORIENTATION, REHAB EVAL
I reviewed with the resident the medical history and the resident's findings on the physical examination.  I discussed with the resident the patient's diagnosis and concur with the plan.   Identified pt with two pt identifiers(name and ). Reviewed record in preparation for visit and have obtained necessary documentation.  Chief Complaint   Patient presents with    Follow-up     During BJJ competition patient received a kick to the face causing a fracture. Patient needs Referral to ENT    Health Maintenance Due   Topic    COVID-19 Vaccine (1)    Hepatitis B vaccine (2 of 3 - 3-dose series)    Varicella vaccine (1 of 2 - 2-dose childhood series)    Pap smear     Depression Screen     Flu vaccine (1)       Vitals:    24 1500   BP: 110/69   Site: Right Upper Arm   Position: Sitting   Cuff Size: Medium Adult   Pulse: 87   Resp: 18   Temp: 98.6 °F (37 °C)   TempSrc: Oral   SpO2: 96%   Weight: 90 kg (198 lb 6.6 oz)   Height: 1.651 m (5' 5\")           Coordination of Care Questionnaire:  :   1. Have you been to the ER, urgent care clinic since your last visit?  Hospitalized since your last visit?Yes When: 23 Where: [facility] Reason for visit: Nose Fracture    2. Have you seen or consulted any other health care providers outside of the [facility] since your last visit?  Include any pap smears or colon screening. No    This patient is accompanied in the office by her self.  I have received verbal consent from Kristin Martinez to discuss any/all medical information while they are present in the room.   regular rhythm, normal S1, S2, no murmurs, rubs, clicks or gallops  Neurological - alert, oriented, normal speech, no focal findings or movement disorder noted  Musculoskeletal - no joint tenderness, deformity or swelling  Extremities - peripheral pulses normal, no pedal edema, no clubbing or cyanosis  Skin - normal coloration and turgor, no rashes, no suspicious skin lesions noted      ASSESSMENT & PLAN  1. Closed fracture of nasal bone, initial encounter: Acute nasal bone fracture seen on CT imaging. Tenderness and ecchymosis on exam. Mild to moderate swelling to the nasal septum. Low suspicion for hematoma.  - [facility] - Melvin Singh MD, Otolaryngology, [place]  - avoid heavy lifting, avoid contact sports, avoid submerging in water. Sleep at an incline to assist with swelling and symptoms.  - may continue tylenol and ibuprofen prn for pain control  - discussed clinic and ER precautions    Return in about 1 week (around 2/26/2024) for nasal fracture re-evaluation (please schedule with Dr. Trotter).      I have discussed the diagnosis with the patient and the intended plan as seen in the above orders. Patient verbalized understanding of the plan and agrees with the plan. The patient has received an after-visit summary and questions were answered concerning future plans.  I have discussed medication side effects and warnings with the patient as well. Informed patient to return to the office if new symptoms arise.        Attila Trotter MD  Patient discussed with attending Dr. Nieto   person/place

## 2024-02-22 ENCOUNTER — APPOINTMENT (OUTPATIENT)
Dept: ORTHOPEDIC SURGERY | Facility: CLINIC | Age: 79
End: 2024-02-22
Payer: MEDICARE

## 2024-02-22 PROCEDURE — 73502 X-RAY EXAM HIP UNI 2-3 VIEWS: CPT

## 2024-02-22 PROCEDURE — 99024 POSTOP FOLLOW-UP VISIT: CPT

## 2024-02-22 NOTE — IMAGING
[de-identified] : Pleasant older woman sits reasonably comfortably in a wheelchair.  She was able to stand with assistance.  She is somewhat unsteady gait.  Physical examination: Left hip: Surgical incisions of healed.  No undue swelling erythema duration or fluctuance.  Minimal tenderness palpation of the trochanteric bursa.  No tenderness palpation about her groin.  No lymph nodes in the groin.  She was able to stand up with minimal assistance.  No pain localized to her groin with axial loading.  Radiographs: Left hip (AP, lateral): 4 portal left proximal humerus fracture.  Overall alignment the fracture is unchanged.  Increasing callus summation is appreciated.  Hardware remains in place with no evidence of loosening.

## 2024-02-22 NOTE — ASSESSMENT
[FreeTextEntry1] : 78-year-old woman now 10 weeks status post open reduction internal fixation of a left intertrochanteric hip fracture which is healing uneventfully.  Recommend the patient continue with physical therapy focusing on core strengthening hip strengthening gait training balance and generalized conditioning.  Modalities can be utilized adjunct of therapy.  She can continue with Tylenol for pain relief.  Will have her follow-up in my office in 3 months for interval check with radiographs of her left hip.  Any problems I am happy to see her back sooner.  Copy note provided to skilled nursing facility.

## 2024-02-22 NOTE — HISTORY OF PRESENT ILLNESS
[de-identified] : 78-year-old woman returns for interval follow-up status post open reduction internal fixation of a left intertrochanteric hip fracture with a cephalomedullary nail on December 6, 2023.  Patient remains at a skilled nursing facility.  She has been weightbearing as tolerated.  Today she has some complaints of dizziness but notes that she has had this for a while.  She is only done a limited amount of therapy at the skilled nursing facility.  Denies any fevers or drainage.  She remains on Tylenol for pain relief.  Past medical history: Seizure disorder Hypertension Hyperlipidemia Non-insulin-dependent diabetes  Medications: Gabapentin teen Lisinopril Olanzapine Fenofibrate Levemir Amlodipine Jardiance Depakote Xanax as needed Prozac Baby aspirin

## 2024-02-25 NOTE — PROGRESS NOTE BEHAVIORAL HEALTH - AFFECT RANGE
Subjective   History of Present Illness  Patient presents for evaluation of multiple falls today.  Patient himself states he does not remember the falls.  He states he is feeling fine, he is not in any pain, he is uncertain why he is in the emergency room.  Currently the entirety of the history is provided by EMS.  They states that he has a history of progressive dementia, had 5 falls today, did strike his head on at least 1 fall.  EMS was told by family that he has been having a progressively worsening tremor and generalized weakness.        History provided by:  Patient and EMS personnel      Review of Systems    Past Medical History:   Diagnosis Date    Coronary artery disease     Dementia     Diabetes mellitus     Hyperlipidemia     Peripheral vascular disease        Allergies   Allergen Reactions    Bactrim [Sulfamethoxazole-Trimethoprim] Rash    Adhesive Tape Rash    Neosporin [Neomycin-Bacitracin Zn-Polymyx] Rash       Past Surgical History:   Procedure Laterality Date    CORONARY ARTERY BYPASS GRAFT      FEMORAL ARTERY - POPLITEAL ARTERY BYPASS GRAFT         Family History   Problem Relation Age of Onset    Diabetes Mother     Heart disease Father     Hypertension Father     Hyperlipidemia Father     Diabetes Sister     Diabetes Maternal Grandfather     Diabetes Sister     Diabetes Sister        Social History     Socioeconomic History    Marital status:    Tobacco Use    Smoking status: Former     Types: Cigarettes     Quit date:      Years since quittin.1     Passive exposure: Never    Smokeless tobacco: Never   Vaping Use    Vaping Use: Never used   Substance and Sexual Activity    Alcohol use: No    Drug use: No    Sexual activity: Never           Objective   Physical Exam  Constitutional:       General: He is not in acute distress.  HENT:      Head: Normocephalic and atraumatic.   Eyes:      Conjunctiva/sclera: Conjunctivae normal.      Pupils: Pupils are equal, round, and reactive to  light.      Comments: No raccoon eyes   Neck:      Comments: No tenderness to the cervical spine.  No step-offs or deformities  Cardiovascular:      Rate and Rhythm: Normal rate and regular rhythm.      Pulses: Normal pulses.      Heart sounds: No murmur heard.     No gallop.   Pulmonary:      Effort: Pulmonary effort is normal. No respiratory distress.   Abdominal:      General: Abdomen is flat. There is no distension.      Tenderness: There is no abdominal tenderness.   Musculoskeletal:         General: No swelling or deformity. Normal range of motion.      Comments: Full range of motion of the bilateral upper and lower extremities.  No tenderness to the midline thoracic or lumbar spine.  No step-offs or deformities   Skin:     General: Skin is warm and dry.      Capillary Refill: Capillary refill takes less than 2 seconds.      Comments: Superficial nonbleeding abrasion to the right proximal hand   Neurological:      Mental Status: He is alert.      Comments: GCS 14.  Moves all extremities equally.  Cannot cooperate with full neurologic examination.  Sensation to light touch appears intact.  Face symmetric     Psychiatric:         Mood and Affect: Mood normal.         Behavior: Behavior normal.         Procedures           ED Course  ED Course as of 02/25/24 0149   Sat Feb 24, 2024   2306 Chest x-ray independently interpreted by myself demonstrates small left effusion. [KB]   2306 CT scan of the head independently interpreted by myself demonstrates no acute intracranial bleeding [KB]   2307 Twelve-lead ECG independently interpreted by myself demonstrates sinus bradycardia with a rate of 56.  Subtle ST segment elevation isolated to lead V2 is noted. [KB]      ED Course User Index  [KB] Delmar Rocha MD                                             Medical Decision Making  Differential diagnosis includes progressive dementia and ataxia, perhaps parkinsonian symptoms, consider other acute pathology as a cause for  falls including UTI, metabolic derangement.  Given his anticoagulation and head trauma consider intracranial injury or bleeding.  Lab and imaging studies were conducted.    Patient reassessed on multiple occasions in the ER.  He does have periodic spastic motions of the right upper extremity but has no other neurologic deficits that would be concerning for an acute ischemic stroke.    Ultimately while a clear cause for his symptoms and falls are not identified in the emergency room I do not feel that he is safe to discharge then will require inpatient management.    Problems Addressed:  Bilateral pleural effusion: complicated acute illness or injury  Dementia with behavioral disturbance: complicated acute illness or injury  Generalized weakness: complicated acute illness or injury  Injury of head, initial encounter: complicated acute illness or injury  Multiple falls: complicated acute illness or injury    Amount and/or Complexity of Data Reviewed  Independent Historian: EMS     Details: Prehospital course and history obtained from EMS  External Data Reviewed: notes.     Details: 1/8/2024 reviewed primary care visit documenting patient's chronic medical conditions and long-term treatment plans.  Labs: ordered.     Details: Laboratory workup independently interpreted by myself demonstrates no significant abnormalities  Radiology: ordered and independent interpretation performed. Decision-making details documented in ED Course.  ECG/medicine tests: ordered and independent interpretation performed. Decision-making details documented in ED Course.  Discussion of management or test interpretation with external provider(s): Hospital medicine consulted for admission    Risk  Prescription drug management.  Decision regarding hospitalization.  Diagnosis or treatment significantly limited by social determinants of health.        Final diagnoses:   Dementia with behavioral disturbance   Multiple falls   Generalized weakness    Injury of head, initial encounter   Bilateral pleural effusion       ED Disposition  ED Disposition       ED Disposition   Decision to Admit    Condition   --    Comment   Level of Care: Telemetry [5]   Diagnosis: Spasm of muscle [728.85.ICD-9-CM]   Admitting Physician: HAWA CLARK III [[id number]]   Attending Physician: HAWA CLARK III [[id number]]   Bed Request Comments: tele obs (not CDU)               Recent Results (from the past 24 hour(s))   Comprehensive Metabolic Panel    Collection Time: 02/24/24 10:21 PM    Specimen: Blood   Result Value Ref Range    Glucose 248 (H) 65 - 99 mg/dL    BUN 12 8 - 23 mg/dL    Creatinine 0.87 0.76 - 1.27 mg/dL    Sodium 141 136 - 145 mmol/L    Potassium 3.7 3.5 - 5.2 mmol/L    Chloride 101 98 - 107 mmol/L    CO2 28.0 22.0 - 29.0 mmol/L    Calcium 9.1 8.6 - 10.5 mg/dL    Total Protein 7.4 6.0 - 8.5 g/dL    Albumin 4.3 3.5 - 5.2 g/dL    ALT (SGPT) 20 1 - 41 U/L    AST (SGOT) 19 1 - 40 U/L    Alkaline Phosphatase 73 39 - 117 U/L    Total Bilirubin 1.0 0.0 - 1.2 mg/dL    Globulin 3.1 gm/dL    A/G Ratio 1.4 g/dL    BUN/Creatinine Ratio 13.8 7.0 - 25.0    Anion Gap 12.0 5.0 - 15.0 mmol/L    eGFR 94.6 >60.0 mL/min/1.73   Single High Sensitivity Troponin T    Collection Time: 02/24/24 10:21 PM    Specimen: Blood   Result Value Ref Range    HS Troponin T 55 (C) <22 ng/L   Magnesium    Collection Time: 02/24/24 10:21 PM    Specimen: Blood   Result Value Ref Range    Magnesium 1.6 1.6 - 2.4 mg/dL   Green Top (Gel)    Collection Time: 02/24/24 10:21 PM   Result Value Ref Range    Extra Tube Hold for add-ons.    Lavender Top    Collection Time: 02/24/24 10:21 PM   Result Value Ref Range    Extra Tube hold for add-on    Gold Top - SST    Collection Time: 02/24/24 10:21 PM   Result Value Ref Range    Extra Tube Hold for add-ons.    Light Blue Top    Collection Time: 02/24/24 10:21 PM   Result Value Ref Range    Extra Tube Hold for add-ons.    CBC Auto Differential     Collection Time: 02/24/24 10:21 PM    Specimen: Blood   Result Value Ref Range    WBC 7.89 3.40 - 10.80 10*3/mm3    RBC 4.88 4.14 - 5.80 10*6/mm3    Hemoglobin 15.4 13.0 - 17.7 g/dL    Hematocrit 45.6 37.5 - 51.0 %    MCV 93.4 79.0 - 97.0 fL    MCH 31.6 26.6 - 33.0 pg    MCHC 33.8 31.5 - 35.7 g/dL    RDW 12.9 12.3 - 15.4 %    RDW-SD 44.2 37.0 - 54.0 fl    MPV 10.2 6.0 - 12.0 fL    Platelets 250 140 - 450 10*3/mm3    Neutrophil % 36.4 (L) 42.7 - 76.0 %    Lymphocyte % 40.1 19.6 - 45.3 %    Monocyte % 10.4 5.0 - 12.0 %    Eosinophil % 11.7 (H) 0.3 - 6.2 %    Basophil % 1.1 0.0 - 1.5 %    Immature Grans % 0.3 0.0 - 0.5 %    Neutrophils, Absolute 2.88 1.70 - 7.00 10*3/mm3    Lymphocytes, Absolute 3.16 (H) 0.70 - 3.10 10*3/mm3    Monocytes, Absolute 0.82 0.10 - 0.90 10*3/mm3    Eosinophils, Absolute 0.92 (H) 0.00 - 0.40 10*3/mm3    Basophils, Absolute 0.09 0.00 - 0.20 10*3/mm3    Immature Grans, Absolute 0.02 0.00 - 0.05 10*3/mm3    nRBC 0.0 0.0 - 0.2 /100 WBC   ECG 12 Lead ED Triage Standing Order; Weak / Dizzy / AMS    Collection Time: 02/24/24 10:25 PM   Result Value Ref Range    QT Interval 450 ms    QTC Interval 434 ms   Urinalysis With Microscopic If Indicated (No Culture) - Urine, Clean Catch    Collection Time: 02/24/24 11:54 PM    Specimen: Urine, Clean Catch   Result Value Ref Range    Color, UA Yellow Yellow, Straw    Appearance, UA Clear Clear    pH, UA 6.5 5.0 - 8.0    Specific Gravity, UA 1.032 (H) 1.001 - 1.030    Glucose, UA >=1000 mg/dL (3+) (A) Negative    Ketones, UA Trace (A) Negative    Bilirubin, UA Negative Negative    Blood, UA Negative Negative    Protein,  mg/dL (2+) (A) Negative    Leuk Esterase, UA Negative Negative    Nitrite, UA Negative Negative    Urobilinogen, UA 1.0 E.U./dL 0.2 - 1.0 E.U./dL   Urinalysis, Microscopic Only - Urine, Clean Catch    Collection Time: 02/24/24 11:54 PM    Specimen: Urine, Clean Catch   Result Value Ref Range    RBC, UA 0-2 None Seen, 0-2 /HPF    WBC, UA  0-2 None Seen, 0-2 /HPF    Bacteria, UA None Seen None Seen, Trace /HPF    Squamous Epithelial Cells, UA 0-2 None Seen, 0-2 /HPF    Hyaline Casts, UA 0-6 0 - 6 /LPF    Methodology Automated Microscopy    Urine Drug Screen - Urine, Clean Catch    Collection Time: 02/24/24 11:54 PM    Specimen: Urine, Clean Catch   Result Value Ref Range    THC, Screen, Urine Negative Negative    Phencyclidine (PCP), Urine Negative Negative    Cocaine Screen, Urine Negative Negative    Methamphetamine, Ur Negative Negative    Opiate Screen Negative Negative    Amphetamine Screen, Urine Negative Negative    Benzodiazepine Screen, Urine Positive (A) Negative    Tricyclic Antidepressants Screen Positive (A) Negative    Methadone Screen, Urine Negative Negative    Barbiturates Screen, Urine Negative Negative    Oxycodone Screen, Urine Negative Negative    Buprenorphine, Screen, Urine Negative Negative   Fentanyl, Urine - Urine, Clean Catch    Collection Time: 02/24/24 11:54 PM    Specimen: Urine, Clean Catch   Result Value Ref Range    Fentanyl, Urine Negative Negative   High Sensitivity Troponin T 2Hr    Collection Time: 02/25/24 12:35 AM    Specimen: Blood   Result Value Ref Range    HS Troponin T 45 (H) <22 ng/L    Troponin T Delta -10 (L) >=-4 - <+4 ng/L     Note: In addition to lab results from this visit, the labs listed above may include labs taken at another facility or during a different encounter within the last 24 hours. Please correlate lab times with ED admission and discharge times for further clarification of the services performed during this visit.    CT Head Without Contrast   Final Result   Impression:   Atrophy and chronic microvascular ischemic change. No acute intracranial process.            Electronically Signed: Sam Smith MD     2/24/2024 11:04 PM EST     Workstation ID: [id number]      XR Chest 1 View   Final Result   Impression:   Small left effusion with left basilar atelectasis.            Electronically  Signed: Sam Smith MD     2/24/2024 10:36 PM EST     Workstation ID: [id number]        Vitals:    02/24/24 2220 02/24/24 2303 02/25/24 0008 02/25/24 0031   BP:  (!) 191/77 154/85 162/86   BP Location:       Patient Position:       Pulse:  55  56   Resp:       Temp: 98.5 °F (36.9 °C)      TempSrc: Oral      SpO2:  97%  98%   Weight:       Height:         Medications   QUEtiapine (SEROquel) tablet 50 mg (50 mg Oral Not Given 2/25/24 0115)   temazepam (RESTORIL) capsule 15 mg (15 mg Oral Not Given 2/25/24 0116)   diphenhydrAMINE (BENADRYL) injection 25 mg (25 mg Intravenous Given 2/25/24 0137)     ECG/EMG Results (last 24 hours)       Procedure Component Value Units Date/Time    ECG 12 Lead ED Triage Standing Order; Weak / Dizzy / AMS [[id number]] Collected: 02/24/24 2225     Updated: 02/24/24 2307     QT Interval 450 ms      QTC Interval 434 ms     Narrative:      Test Reason : ED Triage Standing Order~  Blood Pressure :   */*   mmHG  Vent. Rate :  56 BPM     Atrial Rate :  56 BPM     P-R Int : 222 ms          QRS Dur : 112 ms      QT Int : 450 ms       P-R-T Axes :   1 -27  30 degrees     QTc Int : 434 ms    Sinus bradycardia with 1st degree AV block  Moderate voltage criteria for LVH, may be normal variant  Anteroseptal infarct , age undetermined  Abnormal ECG  When compared with ECG of 15-SEP-2011 17:02,  MS interval has increased  Questionable change in QRS duration  Anteroseptal infarct is now present  Confirmed by MD PEBBLES, MONIQUE ([id number]) on 2/24/2024 11:07:46 PM    Referred By: ED MD           Confirmed By: MONIQUE ROGEL MD          ECG 12 Lead ED Triage Standing Order; Weak / Dizzy / AMS   Final Result   Test Reason : ED Triage Standing Order~   Blood Pressure :   */*   mmHG   Vent. Rate :  56 BPM     Atrial Rate :  56 BPM      P-R Int : 222 ms          QRS Dur : 112 ms       QT Int : 450 ms       P-R-T Axes :   1 -27  30 degrees      QTc Int : 434 ms      Sinus bradycardia with 1st degree AV block   Moderate  voltage criteria for LVH, may be normal variant   Anteroseptal infarct , age undetermined   Abnormal ECG   When compared with ECG of 15-SEP-2011 17:02,   OH interval has increased   Questionable change in QRS duration   Anteroseptal infarct is now present   Confirmed by MD ROGEL KYLE ([id number]) on 2/24/2024 11:07:46 PM      Referred By: ED MD           Confirmed By: MONIQUE ROGEL MD              No follow-up provider specified.       Medication List      No changes were made to your prescriptions during this visit.            Monique Rogel MD  02/25/24 0149     Constricted

## 2024-03-05 ENCOUNTER — APPOINTMENT (OUTPATIENT)
Dept: ORTHOPEDIC SURGERY | Facility: CLINIC | Age: 79
End: 2024-03-05
Payer: MEDICARE

## 2024-03-05 PROCEDURE — 99024 POSTOP FOLLOW-UP VISIT: CPT

## 2024-03-05 PROCEDURE — 73502 X-RAY EXAM HIP UNI 2-3 VIEWS: CPT

## 2024-03-05 NOTE — IMAGING
[de-identified] : Pleasant nervous elderly woman sits comfortably in a wheelchair in no distress.  She is accompanied by an aide.  Physical examination: Left hip: No tenderness palpation about the surgical site.  No pain in the inguinal crease.  No lymph nodes in the inguinal crease.  Able to flex her hip up to 90 degrees.  Gentle internal and external rotation 90 degrees does not elicit discomfort.  She is able to get up from a seated position with assistance from the side rails of the chair but no extra assistance.  Unsteady gait.  Complains of knee pain when she walks not hip pain.  She has more knee pain in the right knee than the left knee.  Radiographs: Left hip (AP, lateral): Imaging obscured by habitus.  Fracture appears to have healed.  Acceptable alignment maintained.  Hardware appears to be in place.  Some joint space narrowing but this may be artifactual due to habitus.

## 2024-03-05 NOTE — HISTORY OF PRESENT ILLNESS
[de-identified] : 78-year-old woman returns for interval follow-up status post open reduction internal fixation left intertrochanteric hip fracture December 6, 2023.  Currently resides at [facility].  She continues to walk within the facility with a walker.  For the most part she resides in a wheelchair.  Does not have complaints of pain.  No history of fevers or drainage.  Denies any groin pain.

## 2024-03-05 NOTE — ASSESSMENT
[FreeTextEntry1] : 78-year-old woman now 3 months status post open reduction internal fixation left hip fracture.  Clinically and radiographically the patient appears to be healing her fracture without issue.  She has more complaints of right knee pain in the left hip pain suggestive that her fractures are functioning reasonably well.  Radiographs are somewhat obscured by poor technique but I do not think meaningfully changed recommendations which would be for continued physical therapy.  She can continue to weight-bear as tolerated.  Physical therapy should work on knee strengthening hip strengthening gait training balance and generalized conditioning with modalities utilize adjunct therapy.  Recommend arthritic management for knee pain.  Consider judicious use of NSAIDs in addition to Tylenol.  Will have her follow-up in my office in 3 months time for repeat evaluation radiographs of her left hip as well as bilateral weightbearing knee x-rays.  Any problems in [employer] sooner.  All questions were answered to her satisfaction.

## 2024-05-07 ENCOUNTER — APPOINTMENT (OUTPATIENT)
Dept: ORTHOPEDIC SURGERY | Facility: CLINIC | Age: 79
End: 2024-05-07
Payer: MEDICARE

## 2024-05-07 DIAGNOSIS — S80.02XA CONTUSION OF LEFT KNEE, INITIAL ENCOUNTER: ICD-10-CM

## 2024-05-07 DIAGNOSIS — S72.002D FRACTURE OF UNSPECIFIED PART OF NECK OF LEFT FEMUR, SUBSEQUENT ENCOUNTER FOR CLOSED FRACTURE WITH ROUTINE HEALING: ICD-10-CM

## 2024-05-07 DIAGNOSIS — M17.0 BILATERAL PRIMARY OSTEOARTHRITIS OF KNEE: ICD-10-CM

## 2024-05-07 PROCEDURE — 73560 X-RAY EXAM OF KNEE 1 OR 2: CPT | Mod: 50

## 2024-05-07 PROCEDURE — 99213 OFFICE O/P EST LOW 20 MIN: CPT

## 2024-05-07 NOTE — ASSESSMENT
[FreeTextEntry1] : 78-year-old woman cognitive impairment type 2 diabetes now 6-month status post open reduction internal fixation of left intertrochanteric hip fracture which is healed.  She is complains mostly of knee pain for which she she has difficulty ambulating.  She is more comfortable moving her hips from time to time.  At this point in time is not much more I would do for her left hip.  She does have knee arthritic pain and I would treat this symptomatically with nonsteroidal anti-inflammatory medication.  She had worsening symptoms pain could consider cortisone injection but with her diabetes it would not lead with an injection.  Therapy should continue to focus on core strengthening gait training balance and generalized conditioning with focus now on terminal extension quadricep and VMO strengthening.  I will have the patient follow-up in my office for final visit in 6 months.  Will repeat radiographs of both hips and both knees on follow-up.  If she has any problems am happy to see her back at any time.  All questions answered to her satisfaction a copy note provided to the skilled nursing facility.

## 2024-05-07 NOTE — HISTORY OF PRESENT ILLNESS
[de-identified] : 78-year-old woman with cognitive impairment status post open reduction internal fixation of left intertrochanteric hip fracture December 6, 2023 returns for interval follow-up.  She also has complaints of knee pain related to knee arthritis.  When she stands most of her complaints of pain are about her knees.  She does periodically like to stand up because of discomfort in both groin.  Denies any fevers or drainage.  Returns for interval follow-up.  Physical examination: Left hip: Tenderness palpation over the surgical site.  No trochanteric tenderness.  She is able to stand from a seated posture utilizing the edges of wheelchair to stand.  Significant Trendelenburg lurch she is unsteady on her feet in part because of her knee pain.  She has no pain with rotation of her hip joint.  I can flex to 90 degrees without discomfort.  Bilateral knees: Significant genu varum.  Moderate synovial thickening.  No effusion.  Knee motion 0-115 degrees.  Slightly more motion on the right knee than the left knee.  Slight valgus jog with a stress test at 30 degrees related to arthritis.  No gross instability.  Negative Lachman test.  No geniculate lymph nodes or masses.  Calf soft no cords.  Radiographs: Left hip (AP, lateral): Exuberant callus formation.  Fracture remains reasonably well aligned.  Hardware in place.  Bilateral weightbearing knees (AP, lateral): Joint space narrowing with narrowing in the medial joint space.  Irregularities of the articular surface on the medial compartment are more pronounced on the left knee than the right.  Small marginal osteophytes.  No subchondral sclerotic or cystic changes.

## 2024-05-29 ENCOUNTER — APPOINTMENT (OUTPATIENT)
Dept: GASTROENTEROLOGY | Facility: CLINIC | Age: 79
End: 2024-05-29

## 2024-06-04 ENCOUNTER — APPOINTMENT (OUTPATIENT)
Dept: ORTHOPEDIC SURGERY | Facility: CLINIC | Age: 79
End: 2024-06-04

## 2024-08-06 NOTE — ED ADULT TRIAGE NOTE - BEFAST SCREENING
Negative Products Recommended: specifically rec. titanium and zinc oxides. General Sunscreen Counseling: I recommended a broad spectrum sunscreen with a SPF of 30 or higher.  I explained that SPF 30 sunscreens block approximately 97 percent of the sun's harmful rays.  Sunscreens should be applied at least 15 minutes prior to expected sun exposure and then every 2 hours after that as long as sun exposure continues. If swimming or exercising sunscreen should be reapplied every 45 minutes to an hour after getting wet or sweating.  One ounce, or the equivalent of a shot glass full of sunscreen, is adequate to protect the skin not covered by a bathing suit. I also recommended a lip balm with a sunscreen as well. Sun protective clothing can be used in lieu of sunscreen but must be worn the entire time you are exposed to the sun's rays. Detail Level: Detailed

## 2024-09-13 NOTE — PROGRESS NOTE BEHAVIORAL HEALTH - NSBHFUPSUICINTERVAL_PSY_A_CORE
Diagnosis: PE  Goal Range: 2.0-3.0    Spoke with patient via phone regarding anticoagulation monitoring.   Last INR on 8/29/24 was 2.4.  Dose maintained.   Today's INR is 1.6 with home meter and is below goal range.  Patient missed 15 mg dose on 9/9/24.  Current warfarin total weekly dose of 85 mg verified, missed dose as above. Informed the INR result is below therapeutic range and instructed to boost today and tomorrow's dose and then maintain current dose per protocol. Discussed dose and return date of 9/27/24 for next INR. See Anticoagulation flowsheet.    Dr. Downey is in the office today supervising the treatment.    Call your physician immediately if you notice any of the following symptoms of a blood clot:   Sudden weakness in any limb  Numbness or tingling anywhere  Visual changes or loss of sight in either eye  Sudden onset of slurred speech or inability to speak  Dizziness or faintness  New pain, swelling, redness or heat in any extremity  New SOB or chest pain  Symptoms associated with blood clotting/low INR reviewed and verbalizes understanding.    Instructed to contact the clinic with any unusual bleeding or bruising, any changes in medications, diet, health status, lifestyle, or any other changes, questions or concerns. Verbalized understanding of all discussed.     PST   Referring MD: Dr. Aron Maldonado  [facility] orders exp: 3/7/25    Shira Palmer RN is precepting Dali De La Cruz RN in the office today.      none known

## 2024-09-18 ENCOUNTER — OUTPATIENT (OUTPATIENT)
Dept: OUTPATIENT SERVICES | Facility: HOSPITAL | Age: 79
LOS: 1 days | End: 2024-09-18

## 2024-09-18 ENCOUNTER — NON-APPOINTMENT (OUTPATIENT)
Age: 79
End: 2024-09-18

## 2024-09-18 ENCOUNTER — APPOINTMENT (OUTPATIENT)
Dept: GASTROENTEROLOGY | Facility: CLINIC | Age: 79
End: 2024-09-18

## 2024-09-18 VITALS
DIASTOLIC BLOOD PRESSURE: 78 MMHG | OXYGEN SATURATION: 97 % | HEART RATE: 60 BPM | TEMPERATURE: 97.7 F | SYSTOLIC BLOOD PRESSURE: 150 MMHG

## 2024-09-18 DIAGNOSIS — Z00.00 ENCOUNTER FOR GENERAL ADULT MEDICAL EXAMINATION WITHOUT ABNORMAL FINDINGS: ICD-10-CM

## 2024-09-18 DIAGNOSIS — D64.9 ANEMIA, UNSPECIFIED: ICD-10-CM

## 2024-09-18 PROCEDURE — 99213 OFFICE O/P EST LOW 20 MIN: CPT

## 2024-09-18 PROCEDURE — 99203 OFFICE O/P NEW LOW 30 MIN: CPT

## 2024-09-18 NOTE — ASSESSMENT
[FreeTextEntry1] : 78 yo female pmh hyperlipidemia, DM, DVT completed Xarelto, aortic stenosis, CHF, Hip fracture presents for blood in stool intermittently for 5 months. Currently Patient denies nausea, vomiting, hematemesis, melena, blood in stool, diarrhea, constipation, dysphagia, abdominal pain, weight loss. Patient was previously seen in the clinic for bleed per rectum, was recommended Colonoscopy and cardiology clearance. Patient did not have cardio clearance yet and no procedure performed. Today as per NH supervisor, she did not have bleed per rectum anymore. Bleeding is resolved. Due to advance age patient is unable to provide accurate medical hx. contacted Nursing home supervisor Binder and took hx from her.   #Anemia #Hx of rectal bleeding (resolved)  - No prior EGD and colonoscopy - last hb readings.   9.7,	 9.5	, 8.7	 ,10.2	 - cardiology clearance for colonoscopy - PT/APTT/INR   -  will schedule colonoscopy

## 2024-09-18 NOTE — HISTORY OF PRESENT ILLNESS
[FreeTextEntry1] : 80 yo female pmh hyperlipidemia, DM, DVT completed Xarelto, aortic stenosis, CHF, Hip fracture presents for blood in stool intermittently for 5 months. Currently Patient denies nausea, vomiting, hematemesis, melena, blood in stool, diarrhea, constipation, dysphagia, abdominal pain, weight loss. Patient was previously seen in the clinic for bleed per rectum, was recommended Colonoscopy and cardiology clearance. Patient did not have cardio clearance yet and no procedure performed. Today as per NH supervisor, she did not have bleed per rectum anymore. Bleeding is resolved.

## 2024-10-15 NOTE — ED ADULT NURSE NOTE - AREA
Patient was given Influenza Quadrivalent- High Dose today in clinic.     Patient tolerated well and had all questions and concerns addressed before leaving.     Patient was discharged in stable condition.    lower

## 2024-11-05 ENCOUNTER — NON-APPOINTMENT (OUTPATIENT)
Age: 79
End: 2024-11-05

## 2024-11-05 ENCOUNTER — APPOINTMENT (OUTPATIENT)
Dept: ORTHOPEDIC SURGERY | Facility: CLINIC | Age: 79
End: 2024-11-05

## 2025-01-10 NOTE — PHYSICAL THERAPY INITIAL EVALUATION ADULT - STANDING BALANCE: DYNAMIC, REHAB EVAL
CC:  Haresh Montes is here today for Office Visit (low Blood pressure)     Following up from ER  Medications: medications verified and updated    Refills needed today?  NO    Latex allergy or sensitivity: No known latex allergy     Patient would like communication of their results via:    Cell Phone:   Telephone Information:   Mobile [phone]     Okay to leave a message containing results? Yes    Patient's current [facility] status: Active.    Advanced directives: discussed    Depression Screen: no    Tobacco history: verified       Health Maintenance       Diabetes Eye Exam (Yearly)  Never done    Diabetes Foot Exam (Yearly)  Never done    COVID-19 Vaccine (3 - Pfizer risk series)  Overdue since 4/24/2021    Medicare Advantage- Medicare Wellness Visit (Yearly - January to December)  Due since 1/1/2025           Following review of the above:  Patient is not proceeding with: Diabetes Eye Exam, Diabetes Foot Exam, and COVID-19    Note: Refer to final orders and clinician documentation.          Recent PHQ 2/9 Score    PHQ 2:  PHQ 2 Score Adult PHQ 2 Score Adult PHQ 2 Interpretation Little interest or pleasure in activity?   12/13/2024   1:07 PM 2 No further screening needed 1       PHQ 9:  PHQ 9 Score Adult PHQ 9 Score Adult PHQ 9 Interpretation   12/13/2024   1:07 PM 2 Minimal Depression            poor plus

## 2025-03-04 NOTE — ED PROVIDER NOTE - DISPOSITION TYPE
[de-identified] : Patient is here for evaluation of right shoulder pain Affecting quality of life Wakes up at night due to pain  NAD Right shoulder: TTP ant GH joint, bicipital groove FF 0-175 ER 60 IR T12 5/5 strength scapular abduction, ER, IR Pos Impingement Pos Craig Pos Cross Arm Adduction Negative instability  mri right shouldeR: ac/gh arthritis, pRCT  plan went over findings explained the imaging tx options explained cont cons tx for proceed with pt and injection fu in 2-3 months  Large Joint Injection was performed because of pain/rom. Anesthesia: ethyl chloride sprayed topically. Dexamethasone 2 cc of 4mg.   Lidocaine: 2 cc of 1%  Medication was injected in the right shoulder. Patient has tried OTC's including aspirin, Ibuprofen, Aleve etc or prescription NSAIDS, and/or exercises at home and/ or physical therapy without satisfactory response. After verbal consent using sterile preparation and technique. The risks, benefits, and alternatives to cortisone injection were explained in full to the patient. Risks outlined include but are not limited to infection, sepsis, bleeding, scarring, skin discoloration, temporary increase in pain, syncopal episode, failure to resolve symptoms, allergic reaction, symptom recurrence, and elevation of blood sugar in diabetics. Patient understood the risks. All questions were answered. After discussion of options, patient requested an injection. Oral informed consent was obtained and sterile prep was done of the injection site. Sterile technique was utilized for the procedure including the preparation of the solutions used for the injection. Patient tolerated the procedure well. Advised to ice the injection site this evening. Prep with alcohol locally to site. Sterile technique used. Diagnostic ultrasound was performed of the shoulder to confirm.
DISCHARGE

## 2025-04-16 ENCOUNTER — OUTPATIENT (OUTPATIENT)
Dept: OUTPATIENT SERVICES | Facility: HOSPITAL | Age: 80
LOS: 1 days | End: 2025-04-16
Payer: MEDICARE

## 2025-04-16 ENCOUNTER — APPOINTMENT (OUTPATIENT)
Dept: GASTROENTEROLOGY | Facility: CLINIC | Age: 80
End: 2025-04-16
Payer: MEDICARE

## 2025-04-16 VITALS — HEART RATE: 55 BPM | SYSTOLIC BLOOD PRESSURE: 186 MMHG | OXYGEN SATURATION: 97 % | DIASTOLIC BLOOD PRESSURE: 78 MMHG

## 2025-04-16 DIAGNOSIS — K62.5 HEMORRHAGE OF ANUS AND RECTUM: ICD-10-CM

## 2025-04-16 DIAGNOSIS — D64.9 ANEMIA, UNSPECIFIED: ICD-10-CM

## 2025-04-16 DIAGNOSIS — Z00.00 ENCOUNTER FOR GENERAL ADULT MEDICAL EXAMINATION WITHOUT ABNORMAL FINDINGS: ICD-10-CM

## 2025-04-16 PROCEDURE — 99213 OFFICE O/P EST LOW 20 MIN: CPT

## 2025-04-24 DIAGNOSIS — D64.9 ANEMIA, UNSPECIFIED: ICD-10-CM

## 2025-04-24 DIAGNOSIS — K62.5 HEMORRHAGE OF ANUS AND RECTUM: ICD-10-CM

## 2025-05-12 NOTE — PROGRESS NOTE BEHAVIORAL HEALTH - LANGUAGE
Patient friend called and said Dr. Shepherd and they tested her urnine and there was to much protein in it.     Patient would like to drop off urine to get tested to make sure there is nothing else going .     Dr. Shepherd did not give antibiotics     No symptoms just want 2nd opinion .     Nara  on [facility]       Patient will drop off urine 05/13/025 while here for Dr. Huang appointment       Patient will be picking up urine cup today from .              No abnormalities noted/Other